# Patient Record
Sex: FEMALE | Race: WHITE | Employment: OTHER | URBAN - METROPOLITAN AREA
[De-identification: names, ages, dates, MRNs, and addresses within clinical notes are randomized per-mention and may not be internally consistent; named-entity substitution may affect disease eponyms.]

---

## 2017-04-12 PROBLEM — Z79.899 MEDICATION MANAGEMENT: Status: ACTIVE | Noted: 2017-04-12

## 2017-06-25 PROBLEM — S81.812A LACERATION OF LEFT LEG: Status: ACTIVE | Noted: 2017-06-25

## 2017-10-03 PROBLEM — Z23 NEED FOR INFLUENZA VACCINATION: Status: ACTIVE | Noted: 2017-10-03

## 2017-11-16 PROBLEM — R53.83 FATIGUE: Status: ACTIVE | Noted: 2017-11-16

## 2017-11-16 PROBLEM — R00.2 PALPITATIONS: Status: ACTIVE | Noted: 2017-11-16

## 2018-01-09 PROBLEM — Z23 NEED FOR INFLUENZA VACCINATION: Status: RESOLVED | Noted: 2017-10-03 | Resolved: 2018-01-09

## 2018-01-09 PROBLEM — Z79.899 MEDICATION MANAGEMENT: Status: RESOLVED | Noted: 2017-04-12 | Resolved: 2018-01-09

## 2018-04-05 ENCOUNTER — TELEPHONE (OUTPATIENT)
Dept: FAMILY MEDICINE CLINIC | Age: 83
End: 2018-04-05

## 2018-04-16 DIAGNOSIS — E06.9 THYROIDITIS: ICD-10-CM

## 2018-04-16 RX ORDER — LEVOTHYROXINE SODIUM 0.1 MG/1
100 TABLET ORAL DAILY
Qty: 90 TABLET | Refills: 1 | Status: SHIPPED | OUTPATIENT
Start: 2018-04-16 | End: 2018-07-03 | Stop reason: SDUPTHER

## 2018-04-18 ENCOUNTER — OFFICE VISIT (OUTPATIENT)
Dept: CARDIOLOGY CLINIC | Age: 83
End: 2018-04-18
Payer: MEDICARE

## 2018-04-18 VITALS
SYSTOLIC BLOOD PRESSURE: 100 MMHG | HEART RATE: 67 BPM | WEIGHT: 127 LBS | HEIGHT: 60 IN | DIASTOLIC BLOOD PRESSURE: 50 MMHG | BODY MASS INDEX: 24.94 KG/M2 | RESPIRATION RATE: 14 BRPM

## 2018-04-18 DIAGNOSIS — R00.2 PALPITATIONS: Primary | ICD-10-CM

## 2018-04-18 DIAGNOSIS — I48.0 PAROXYSMAL ATRIAL FIBRILLATION (HCC): ICD-10-CM

## 2018-04-18 DIAGNOSIS — I10 ESSENTIAL HYPERTENSION: ICD-10-CM

## 2018-04-18 PROCEDURE — 93000 ELECTROCARDIOGRAM COMPLETE: CPT | Performed by: INTERNAL MEDICINE

## 2018-04-18 PROCEDURE — 99214 OFFICE O/P EST MOD 30 MIN: CPT | Performed by: INTERNAL MEDICINE

## 2018-04-19 ENCOUNTER — OFFICE VISIT (OUTPATIENT)
Dept: FAMILY MEDICINE CLINIC | Age: 83
End: 2018-04-19
Payer: MEDICARE

## 2018-04-19 ENCOUNTER — HOSPITAL ENCOUNTER (OUTPATIENT)
Age: 83
Discharge: HOME | End: 2018-04-21
Payer: MEDICARE

## 2018-04-19 ENCOUNTER — HOSPITAL ENCOUNTER (OUTPATIENT)
Dept: GENERAL RADIOLOGY | Age: 83
Discharge: HOME | End: 2018-04-21
Payer: MEDICARE

## 2018-04-19 VITALS
HEIGHT: 60 IN | BODY MASS INDEX: 25.13 KG/M2 | DIASTOLIC BLOOD PRESSURE: 70 MMHG | HEART RATE: 62 BPM | SYSTOLIC BLOOD PRESSURE: 145 MMHG | RESPIRATION RATE: 16 BRPM | WEIGHT: 128 LBS

## 2018-04-19 DIAGNOSIS — F43.22 ADJUSTMENT DISORDER WITH ANXIOUS MOOD: ICD-10-CM

## 2018-04-19 DIAGNOSIS — R05.9 COUGH: Primary | ICD-10-CM

## 2018-04-19 DIAGNOSIS — K44.9 HIATAL HERNIA: ICD-10-CM

## 2018-04-19 DIAGNOSIS — R05.9 COUGH: ICD-10-CM

## 2018-04-19 DIAGNOSIS — M05.79 RHEUMATOID ARTHRITIS INVOLVING MULTIPLE SITES WITH POSITIVE RHEUMATOID FACTOR (HCC): ICD-10-CM

## 2018-04-19 DIAGNOSIS — K30 DELAYED GASTRIC EMPTYING: ICD-10-CM

## 2018-04-19 DIAGNOSIS — S82.002A CLOSED NONDISPLACED FRACTURE OF LEFT PATELLA, UNSPECIFIED FRACTURE MORPHOLOGY, INITIAL ENCOUNTER: ICD-10-CM

## 2018-04-19 DIAGNOSIS — I48.0 PAROXYSMAL ATRIAL FIBRILLATION (HCC): ICD-10-CM

## 2018-04-19 PROCEDURE — 3288F FALL RISK ASSESSMENT DOCD: CPT | Performed by: FAMILY MEDICINE

## 2018-04-19 PROCEDURE — 71046 X-RAY EXAM CHEST 2 VIEWS: CPT

## 2018-04-19 PROCEDURE — 99214 OFFICE O/P EST MOD 30 MIN: CPT | Performed by: FAMILY MEDICINE

## 2018-04-19 ASSESSMENT — PATIENT HEALTH QUESTIONNAIRE - PHQ9
SUM OF ALL RESPONSES TO PHQ QUESTIONS 1-9: 0
2. FEELING DOWN, DEPRESSED OR HOPELESS: 0
SUM OF ALL RESPONSES TO PHQ9 QUESTIONS 1 & 2: 0
1. LITTLE INTEREST OR PLEASURE IN DOING THINGS: 0

## 2018-04-20 PROBLEM — K30 DELAYED GASTRIC EMPTYING: Status: ACTIVE | Noted: 2018-04-20

## 2018-04-20 RX ORDER — BENZONATATE 200 MG/1
200 CAPSULE ORAL 3 TIMES DAILY PRN
Qty: 30 CAPSULE | Refills: 3 | Status: SHIPPED | OUTPATIENT
Start: 2018-04-20 | End: 2018-04-30

## 2018-04-20 ASSESSMENT — ENCOUNTER SYMPTOMS
HEARTBURN: 1
SHORTNESS OF BREATH: 0
NAUSEA: 1
ABDOMINAL PAIN: 1
WHEEZING: 0
VOMITING: 0
BACK PAIN: 1
BLOOD IN STOOL: 0
HEMOPTYSIS: 0
SPUTUM PRODUCTION: 0
COUGH: 1

## 2018-04-26 ENCOUNTER — TELEPHONE (OUTPATIENT)
Dept: FAMILY MEDICINE CLINIC | Age: 83
End: 2018-04-26

## 2018-04-27 DIAGNOSIS — R05.9 COUGH: Primary | ICD-10-CM

## 2018-05-11 ENCOUNTER — OFFICE VISIT (OUTPATIENT)
Dept: FAMILY MEDICINE CLINIC | Age: 83
End: 2018-05-11
Payer: MEDICARE

## 2018-05-11 VITALS
HEIGHT: 60 IN | WEIGHT: 126 LBS | OXYGEN SATURATION: 97 % | HEART RATE: 57 BPM | TEMPERATURE: 97.8 F | RESPIRATION RATE: 12 BRPM | SYSTOLIC BLOOD PRESSURE: 122 MMHG | BODY MASS INDEX: 24.74 KG/M2 | DIASTOLIC BLOOD PRESSURE: 62 MMHG

## 2018-05-11 DIAGNOSIS — M62.838 MUSCLE SPASMS OF NECK: Primary | ICD-10-CM

## 2018-05-11 PROCEDURE — 99213 OFFICE O/P EST LOW 20 MIN: CPT | Performed by: NURSE PRACTITIONER

## 2018-05-11 RX ORDER — PREDNISONE 20 MG/1
40 TABLET ORAL DAILY
Qty: 8 TABLET | Refills: 0 | Status: SHIPPED | OUTPATIENT
Start: 2018-05-11 | End: 2018-05-15

## 2018-05-11 RX ORDER — AMLODIPINE BESYLATE 2.5 MG/1
TABLET ORAL
COMMUNITY
Start: 2018-02-14 | End: 2018-08-28 | Stop reason: ALTCHOICE

## 2018-05-11 ASSESSMENT — ENCOUNTER SYMPTOMS
ABDOMINAL PAIN: 0
BACK PAIN: 1

## 2018-06-04 DIAGNOSIS — Z79.899 MEDICATION MANAGEMENT: ICD-10-CM

## 2018-06-04 RX ORDER — OMEPRAZOLE 40 MG/1
40 CAPSULE, DELAYED RELEASE ORAL DAILY
Qty: 90 CAPSULE | Refills: 3 | Status: SHIPPED | OUTPATIENT
Start: 2018-06-04 | End: 2018-12-13 | Stop reason: SDUPTHER

## 2018-06-14 ENCOUNTER — TELEPHONE (OUTPATIENT)
Dept: FAMILY MEDICINE CLINIC | Age: 83
End: 2018-06-14

## 2018-06-14 DIAGNOSIS — R19.7 DIARRHEA, UNSPECIFIED TYPE: Primary | ICD-10-CM

## 2018-06-15 ENCOUNTER — HOSPITAL ENCOUNTER (OUTPATIENT)
Age: 83
Setting detail: SPECIMEN
Discharge: HOME | End: 2018-06-15
Payer: MEDICARE

## 2018-06-15 DIAGNOSIS — R19.7 DIARRHEA, UNSPECIFIED TYPE: ICD-10-CM

## 2018-07-03 DIAGNOSIS — E06.9 THYROIDITIS: ICD-10-CM

## 2018-07-03 RX ORDER — MONTELUKAST SODIUM 10 MG/1
TABLET ORAL
Qty: 90 TABLET | Refills: 1 | Status: SHIPPED | OUTPATIENT
Start: 2018-07-03 | End: 2019-01-17 | Stop reason: SDUPTHER

## 2018-07-03 RX ORDER — LEVOTHYROXINE SODIUM 0.1 MG/1
100 TABLET ORAL DAILY
Qty: 90 TABLET | Refills: 1 | Status: SHIPPED | OUTPATIENT
Start: 2018-07-03 | End: 2019-01-17 | Stop reason: SDUPTHER

## 2018-07-19 ENCOUNTER — OFFICE VISIT (OUTPATIENT)
Dept: FAMILY MEDICINE CLINIC | Age: 83
End: 2018-07-19
Payer: MEDICARE

## 2018-07-19 VITALS
SYSTOLIC BLOOD PRESSURE: 147 MMHG | HEART RATE: 50 BPM | WEIGHT: 125 LBS | OXYGEN SATURATION: 96 % | DIASTOLIC BLOOD PRESSURE: 76 MMHG | RESPIRATION RATE: 16 BRPM | HEIGHT: 60 IN | BODY MASS INDEX: 24.54 KG/M2

## 2018-07-19 DIAGNOSIS — Z79.899 MEDICATION MANAGEMENT: ICD-10-CM

## 2018-07-19 DIAGNOSIS — Z23 NEED FOR ZOSTER VACCINATION: ICD-10-CM

## 2018-07-19 DIAGNOSIS — S82.002K CLOSED NONDISPLACED FRACTURE OF LEFT PATELLA WITH NONUNION, UNSPECIFIED FRACTURE MORPHOLOGY, SUBSEQUENT ENCOUNTER: ICD-10-CM

## 2018-07-19 DIAGNOSIS — M05.79 RHEUMATOID ARTHRITIS INVOLVING MULTIPLE SITES WITH POSITIVE RHEUMATOID FACTOR (HCC): Primary | ICD-10-CM

## 2018-07-19 DIAGNOSIS — I48.0 PAROXYSMAL ATRIAL FIBRILLATION (HCC): ICD-10-CM

## 2018-07-19 PROCEDURE — 99213 OFFICE O/P EST LOW 20 MIN: CPT | Performed by: FAMILY MEDICINE

## 2018-07-19 RX ORDER — FLUTICASONE PROPIONATE 50 MCG
2 SPRAY, SUSPENSION (ML) NASAL DAILY
Qty: 1 BOTTLE | Refills: 11 | Status: SHIPPED
Start: 2018-07-19 | End: 2021-05-19

## 2018-07-19 NOTE — PROGRESS NOTES
CC   Chief Complaint   Patient presents with    Arthritis     HPI:   Patient comes in today for the below issue(s). Fracture patella  Follow up  Is out of brace- having some issues with non-union  States was told that she will not imrpove without surgery, but she is not wanting to pursue  Ambulatory but with walker or cane  Denies falls    Atrial fibrillation  Follow-up. Chronic issue, is on chronic anticoagulation  Has some concerns about long-term use of Celebrex along with oral anticoagulant. She has tried discontinuing the Celebrex but has not been successful in allowing her to have a satisfactory level pain control. She has not had any bleeding events. She reports her pulse is stable. She denies chest pains or palpitations. No dizziness, lightheadedness, syncope, or near syncope. Rheumatoid arthritis  Follow-up chronic issue, present for years. Affecting her spine in the upper and lower extremities. Works with rheumatology. Is on some oral disease modifying agents,  Notably leucovorin and Celebrex. As mentioned above cannot stop Celebrex. NSAIDs are contraindicated as well. Tylenol is ineffective for her pain. Chronic allergic rhinitis,  Stable issue, present for years. Reports seasonal fluctuations. Presently states symptoms are well-controlled. Denies nasal congestion, wheezing, or chronic cough. Does note some postnasal drip. Review of Systems  Review of Systems   Constitutional: Positive for malaise/fatigue. Negative for chills and fever. Respiratory: Negative for cough and shortness of breath. Cardiovascular: Negative for chest pain, palpitations and leg swelling. Gastrointestinal: Negative for abdominal pain and nausea. Musculoskeletal: Positive for back pain and joint pain. Negative for falls, myalgias and neck pain. Neurological: Negative for dizziness, loss of consciousness and headaches. Psychiatric/Behavioral: Negative for depression. The patient is nervous/anxious.  The patient does not have insomnia. Past Medical History:   Diagnosis Date    C. 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; Dr Kenneth Schmidt; no plans for surgery    Neuropathy ([facility] [address]     chronic; 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     Osteoporosis     PAF (paroxysmal atrial fibrillation) (HCC)     anticoagulation per cardiology     Rheumatoid arthritis with rheumatoid factor ([facility] [address]     Rheumatoid arthritis(714.0)     Dr Bulmaro Menchaca; on [facility])    Rib fractures 12/30/2014    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 She appears well-developed and well-nourished. No distress. HENT:   Head: Normocephalic and atraumatic. Right Ear: Tympanic membrane and ear canal normal.   Left Ear: Tympanic membrane and ear canal normal.   Cardiovascular: Normal rate, regular rhythm, S1 normal, S2 normal and normal heart sounds. No murmur heard. Pulmonary/Chest: Effort normal and breath sounds normal. No respiratory distress. She has no wheezes. Abdominal: Normal appearance. Musculoskeletal:        Right knee: She exhibits decreased range of motion. Left knee: She exhibits decreased range of motion. Tenderness (Over the patella) found.         Thoracic back: Normal.   Ulnar deviation of both hands, guardian verbalizes understanding, agrees, feels comfortable with and wishes to proceed with above treatment plan. Call or go to ED immediately if symptoms worsen or persist. Advised patient to call with any new medication issues, and, as applicable, read all Rx info from pharmacy to assure aware of all possible risks and side effects of medication before taking. As applicable, educational materials and/or home exercises printed for patient's review and were included in patient instructions on his/her After Visit Summary and given to patient at the end of visit. Patient and/or guardian given opportunity to ask questions/raise concerns. She verbalized comfort and understanding of instructions. Follow Up:     Return in about 4 months (around 11/19/2018), or if symptoms worsen or fail to improve, for Follow-up chronic issues, RA. or sooner if the above issues change unexpectedly or new issues develop     This document was prepared at least partially through the use of voice recognition software. Although effort is taken to assure the accuracy of this document, it is possible that grammatical, syntax,  or spelling errors may occur.       Electronically signed by Bell Wright MD FAAFP

## 2018-07-20 ASSESSMENT — ENCOUNTER SYMPTOMS
NAUSEA: 0
COUGH: 0
SHORTNESS OF BREATH: 0
BACK PAIN: 1
ABDOMINAL PAIN: 0

## 2018-08-01 ENCOUNTER — TELEPHONE (OUTPATIENT)
Dept: FAMILY MEDICINE CLINIC | Age: 83
End: 2018-08-01

## 2018-08-01 RX ORDER — LOSARTAN POTASSIUM AND HYDROCHLOROTHIAZIDE 12.5; 5 MG/1; MG/1
1 TABLET ORAL DAILY
Qty: 30 TABLET | Refills: 11 | Status: SHIPPED | OUTPATIENT
Start: 2018-08-01 | End: 2018-08-13

## 2018-08-07 ENCOUNTER — HOSPITAL ENCOUNTER (EMERGENCY)
Age: 83
Discharge: HOME | End: 2018-08-07
Payer: MEDICARE

## 2018-08-07 VITALS
HEIGHT: 60 IN | TEMPERATURE: 97.8 F | RESPIRATION RATE: 16 BRPM | OXYGEN SATURATION: 96 % | HEART RATE: 67 BPM | WEIGHT: 125 LBS | BODY MASS INDEX: 24.54 KG/M2 | SYSTOLIC BLOOD PRESSURE: 130 MMHG | DIASTOLIC BLOOD PRESSURE: 63 MMHG

## 2018-08-07 DIAGNOSIS — S81.811A LACERATION OF RIGHT LOWER EXTREMITY, INITIAL ENCOUNTER: Primary | ICD-10-CM

## 2018-08-07 DIAGNOSIS — I10 ESSENTIAL HYPERTENSION: ICD-10-CM

## 2018-08-07 DIAGNOSIS — I48.0 PAROXYSMAL ATRIAL FIBRILLATION (HCC): ICD-10-CM

## 2018-08-07 PROCEDURE — 99282 EMERGENCY DEPT VISIT SF MDM: CPT

## 2018-08-07 PROCEDURE — 12001 RPR S/N/AX/GEN/TRNK 2.5CM/<: CPT

## 2018-08-07 ASSESSMENT — PAIN DESCRIPTION - LOCATION: LOCATION: LEG

## 2018-08-07 ASSESSMENT — PAIN DESCRIPTION - ORIENTATION: ORIENTATION: RIGHT

## 2018-08-07 ASSESSMENT — PAIN DESCRIPTION - PAIN TYPE: TYPE: ACUTE PAIN

## 2018-08-07 ASSESSMENT — PAIN SCALES - GENERAL: PAINLEVEL_OUTOF10: 4

## 2018-08-07 ASSESSMENT — PAIN DESCRIPTION - DESCRIPTORS: DESCRIPTORS: ACHING

## 2018-08-07 NOTE — ED PROVIDER NOTES
Skin:  Patient has a 3 cm V-shaped laceration noted to the right anterior shin. No active bleeding. No sign of foreign body or tendon injury. No signs of surrounding infection including cellulitis or abscess. Neurovascular: Motor deficit: none. Patient has poor range of motion noted to the right lower extremity. Sensory deficit: none. Patient has equal sensation noted to the bilateral lower extremities. Pulse deficit: none. Capillary refill: normal..  capillary refill is brisk, less than 3 seconds noted all digits on the right foot. · Gait:  normal.  · Lymphatics: No lymphangitis or adenopathy noted. · Neurological:  Oriented x3. Motor functions intact. Lab / Imaging Results   (All laboratory and radiology results have been personally reviewed by myself)  Labs:  No results found for this visit on 08/07/18. Imaging: All Radiology results interpreted by Radiologist unless otherwise noted. No orders to display     ED Course / Medical Decision Making   Medications - No data to display     Consults:   None    Procedure(s):     LACERATION REPAIR  PROCEDURE NOTE:  Unless otherwise indicated, this procedure was done or directly supervised by the ED attending. Laceration #: 1. Location: Right anterior shin  Length: 3cm. The wound area was cleansend with shur-clens and draped in a sterile fashion. The wound area was anesthetized with not required. WOUND COMPLEXITY:    Debridement: None. Undermining: None. Wound Margins Revised: no.  Flaps Aligned: yes. The wound was explored with the following results no foreign body or tendon injury seen. The wound was closed with steri strips. Dressing:  gauze was placed. Total number suture and steri-strips: 5    Patient tolerated procedure well. Patient was ambulated in the emergency department, afterwards Steri-Strips were intact.  Patient and family member was instructed on proper wound care and follow-up with primary care provider. Understanding was verbalized. MDM:     Patient presented to the emergency department today for a laceration to the right anterior shin. This is 3 cm in length, in a v shape. There is no active bleeding. No signs of infection or abscess. Patient was offered Steri-Strips or sutures. Patient chose Steri-Strips. These were applied as noted above in the procedure note. Patient tolerated this well. MSPs were intact after the procedure was completed. Patient was ambulated in the emergency department, Steri-Strips remained intact after ambulation. Patient was instructed to follow-up with her primary care provider. She was instructed on proper wound care. She was instructed to return to the ER for new or worsening symptoms. Patient was explicitly instructed on specific signs and symptoms on which to return to the emergency room for. Patient was instructed to return to the ER for any new or worsening symptoms. Additional discharge instructions were given verbally. All questions were answered. Patient is comfortable and agreeable with discharge plan. Patient in no acute distress and non-toxic in appearance. At this time the patient is without objective evidence of an acute process requiring hospitalization or inpatient management. They have remained hemodynamically stable throughout their entire ED visit and are stable for discharge with outpatient follow-up. The plan has been discussed in detail and they are aware of the specific conditions for emergent return, as well as the importance of follow-up. Counseling: The emergency provider has spoken with the patient and family and discussed todays results, in addition to providing specific details for the plan of care and counseling regarding the diagnosis and prognosis. Questions are answered at this time and they are agreeable with the plan. Assessment      1.  Laceration of right lower extremity, initial encounter      Plan   Discharge to home  Patient condition is stable    New Medications     New Prescriptions    No medications on file     Electronically signed by FRAN Walton CNP   DD: 8/7/18  **This report was transcribed using voice recognition software. Every effort was made to ensure accuracy; however, inadvertent computerized transcription errors may be present.   END OF ED PROVIDER NOTE       FRAN Walton CNP  08/07/18 [id number]

## 2018-08-10 ENCOUNTER — CARE COORDINATION (OUTPATIENT)
Dept: CARE COORDINATION | Age: 83
End: 2018-08-10

## 2018-08-13 ENCOUNTER — TELEPHONE (OUTPATIENT)
Dept: FAMILY MEDICINE CLINIC | Age: 83
End: 2018-08-13

## 2018-08-13 RX ORDER — LOSARTAN POTASSIUM AND HYDROCHLOROTHIAZIDE 12.5; 1 MG/1; MG/1
1 TABLET ORAL DAILY
Qty: 30 TABLET | Refills: 11 | Status: SHIPPED | OUTPATIENT
Start: 2018-08-13 | End: 2018-08-16

## 2018-08-13 NOTE — TELEPHONE ENCOUNTER
Med ERx'd. Will need to know BP readings within 1 week on the new dose. Call if new issues in the meantime.

## 2018-08-15 ENCOUNTER — TELEPHONE (OUTPATIENT)
Dept: FAMILY MEDICINE CLINIC | Age: 83
End: 2018-08-15

## 2018-08-15 NOTE — TELEPHONE ENCOUNTER
Patient called in and stated that she went to  medication yesterday and noticed her Hyzaar is 12.5mg   Patient stated that she is usually always on 25mg and would like to know if this is an error. Patient stated that she is having trouble with leg swelling and would like to know if being on this different dosage could be causing this.     Please advise  Thanks

## 2018-08-15 NOTE — TELEPHONE ENCOUNTER
Called and spoke to patient. She is getting confused about medication. I attempted to explain it to her and she seemed to understand it a little better, but not completely. Patient would like to go ahead with the increase to 25 mg - she advised that she will monitor BP at home. \  She would also like to know if she still needs to be on the 100 or if she can go back down to the 50?     Please advise  Thanks

## 2018-08-16 RX ORDER — LOSARTAN POTASSIUM AND HYDROCHLOROTHIAZIDE 25; 100 MG/1; MG/1
1 TABLET ORAL DAILY
Qty: 30 TABLET | Refills: 3 | Status: SHIPPED | OUTPATIENT
Start: 2018-08-16 | End: 2018-12-13

## 2018-08-16 NOTE — TELEPHONE ENCOUNTER
Patient notified, she had the 100/12.5 mg at home. I called [facility] to verify that this is what patient received. They said yes, but they have the 100/25 mg ready for patient to . I called patient back, she will  the new dose and start it Friday. She will continue to moniter her BP and call the office next Friday with the readings.

## 2018-08-16 NOTE — TELEPHONE ENCOUNTER
The Hyzaar only comes with the 25 mg diuretic at the 100 mg dose. So I sent in the 100/25 mg dose. Take that and continue to monitor her BP.

## 2018-08-28 ENCOUNTER — OFFICE VISIT (OUTPATIENT)
Dept: NON INVASIVE DIAGNOSTICS | Age: 83
End: 2018-08-28
Payer: MEDICARE

## 2018-08-28 VITALS
HEIGHT: 60 IN | HEART RATE: 56 BPM | DIASTOLIC BLOOD PRESSURE: 68 MMHG | RESPIRATION RATE: 16 BRPM | BODY MASS INDEX: 24.15 KG/M2 | WEIGHT: 123 LBS | SYSTOLIC BLOOD PRESSURE: 110 MMHG

## 2018-08-28 DIAGNOSIS — I51.89 DIASTOLIC DYSFUNCTION: ICD-10-CM

## 2018-08-28 DIAGNOSIS — I48.0 PAROXYSMAL ATRIAL FIBRILLATION (HCC): Primary | ICD-10-CM

## 2018-08-28 DIAGNOSIS — G47.33 OSA (OBSTRUCTIVE SLEEP APNEA): ICD-10-CM

## 2018-08-28 DIAGNOSIS — I10 ESSENTIAL HYPERTENSION: ICD-10-CM

## 2018-08-28 PROCEDURE — 93000 ELECTROCARDIOGRAM COMPLETE: CPT | Performed by: INTERNAL MEDICINE

## 2018-08-28 PROCEDURE — 99213 OFFICE O/P EST LOW 20 MIN: CPT | Performed by: INTERNAL MEDICINE

## 2018-08-28 RX ORDER — ONDANSETRON 4 MG/1
4 TABLET, FILM COATED ORAL EVERY 8 HOURS PRN
COMMUNITY
End: 2019-07-23 | Stop reason: SDUPTHER

## 2018-08-28 NOTE — PROGRESS NOTES
atrium appears to be enlarged.      Mitral Valve   Normal mitral leaflet structure and coaptation with no more than trace   (physiologic) mitral insufficiency.      Tricuspid Valve   Normal tricuspid valve structure.   Mild tricuspid insufficiency noted.   Right ventricular systolic pressure is mildly elevated at 40 mmHg.      Aortic Valve   The aortic valve is trileaflet.   The aortic valve appears moderately sclerotic.   Mild aortic regurgitation is noted.   No hemodynamically significant aortic stenosis is present.      Pulmonic Valve   Normal pulmonic valve structure and function. Physiologic pulmonic   insufficiency is present.      Pericardial Effusion   No evidence for hemodynamically significant pericardial effusion.      Aorta   Normal aortic root and ascending aorta.   Miscellaneous   The inferior vena cava diameter is normal with normal respiratory   variation.      Conclusions      Summary   The patient appeared to be in sinus rhythm during the examination.   Left ventricular internal dimensions, wall thickness, regional wall motion   and systolic function are normal.   The ejection fraction is estimated to be 68% by the biplane method of   disks.   There is Doppler evidence for stage I diastolic dysfunction.   The left atrium is mildly enlarged as determined by the left atrial volume   index.   The aortic valve appears moderately sclerotic.   Mild aortic regurgitation is noted.   No hemodynamically significant aortic stenosis is present.   Mild tricuspid insufficiency noted.   Right ventricular systolic pressure is mildly elevated at 40 mmHg.      Signature      ----------------------------------------------------------------   Electronically signed by Areli Bentley MD(Interpreting   physician) on 05/02/2017 06:02 PM      Assessment: Nicole Chen is a 80 y.o., female with a history of HTN, OA, hypothyroidism, mild PVD/CAD, diastolic CHF, moderate AI who has paroxysmal AF.  Initial evaluation 5/2016 and started on Flecainide. She is currently in SR and continues to report ongoing fatigue but has repeatedly refused CPAP.       1. Paroxysmal atrial fibrillation  - discovered 2011   - highly symptomatic   - CHADSVASC= 5; recently switched to Xarelto 15 mg; she states she is tolerating well   - current medical therapy includes BB  - No prior DCCV or ablations  - Flecainide started 5/23/2016  - tolerating Flecainide at 50 mg BID; in SB today,  QRS stable   - reports one time episode; see above   -  Re-education on importance of well controlled HTN (goal BP < 130/80), adequate weight control (goal BMI of < 27), physical activity consisting of moderate cardiopulmonary exercise up to a goal of 250 min/wk, daily compliance with CPAP in treating sleep apena, smoking cessation and limited ETOH intake. 2. JANETTE  - sleep study 6/2016: severe sleep apnea   - Defers CPAP at this time and has not followed up as directed. - continues to refuse CPAP   - I STRONGLY emphasized the health implications for untreated JANETTE which include but are NOT limited to: heart failure, worsening arrhythmias, and difficult to control HTN  - I also emphasized that CPAP is NOT the only therapy for JANETTE, and that follow up with sleep medicine COULD help elucidate alternative therapies, she states she willl consider      3. HTN  - well controlled  - continue current medications      4. Chronic diastolic CHF  - follows with Dr. Asiya Weiner  - continue PRN diuretics      5. Hypothyroidism  -   Lab Results   Component Value Date    TSH 3.440 11/16/2017   - on supplementation        6. OA  - discussed risks using NSAIDS in conjunction with warfarin, we did discuss consideration of NOACs, but no true head-head data regarding NOACs on pts also taking NSAIDS  - generally would DISCOURAGE concomitant use of NSAIDS when taking OAC, however depends on her tolerance/clinical need  - defer to Dr. Luigi Francisco  7.  Moderate AI  -  followed by

## 2018-09-05 ENCOUNTER — OFFICE VISIT (OUTPATIENT)
Dept: CARDIOLOGY CLINIC | Age: 83
End: 2018-09-05
Payer: MEDICARE

## 2018-09-05 VITALS
RESPIRATION RATE: 14 BRPM | HEART RATE: 58 BPM | BODY MASS INDEX: 23.95 KG/M2 | WEIGHT: 122 LBS | HEIGHT: 60 IN | SYSTOLIC BLOOD PRESSURE: 110 MMHG | DIASTOLIC BLOOD PRESSURE: 70 MMHG

## 2018-09-05 DIAGNOSIS — I10 ESSENTIAL HYPERTENSION: ICD-10-CM

## 2018-09-05 DIAGNOSIS — I51.89 DIASTOLIC DYSFUNCTION: ICD-10-CM

## 2018-09-05 DIAGNOSIS — I48.0 PAROXYSMAL ATRIAL FIBRILLATION (HCC): Primary | ICD-10-CM

## 2018-09-05 PROCEDURE — 93000 ELECTROCARDIOGRAM COMPLETE: CPT | Performed by: INTERNAL MEDICINE

## 2018-09-05 PROCEDURE — 99214 OFFICE O/P EST MOD 30 MIN: CPT | Performed by: INTERNAL MEDICINE

## 2018-09-05 NOTE — PROGRESS NOTES
right heart chambers. 13. Ambulatory monitor demonstrated nonsustained SVT at 150 bpm, correlating with symptoms, metoprolol dose doubled to 50 mg bid, 01/04/2007. 14. Bilateral knee replacements 06/2007, Dr. Jenniffer Cm. Well tolerated. 15. Dipyridamole stress MPS, 11/10/2008. No TID, normal perfusion, mild soft tissue attenuation, EF 89%. No ischemia. Low risk/low probability. 16. Echo, 11/10/2008. Stage I diastolic dysfunction, borderline/mild LAE. EF normal. Mild-moderate AR. Mild PHTN with RVSP 43.    17. Large hiatal hernia requiring Nissen fundoplication, [facility], 06/2011.    18. Rheumatoid arthritis documented Fall 2010. Patient being treated with methotrexate. 19. Echo, 05/23/2011. Normal LV size, wall thickening and EF. Stage I diastolic dysfunction. Normal LV filling pressures. Mild ANDREAS. MAC with mild MR, mild-moderate TR, moderately elevated RVSP 50-55 mmHg.    20. Lexiscan MPS, 05/25/2011. Normal. No TID. Wall motion normal, EF above 70%.    21. [facility] admission, 06/02/2011 with anorexia, nausea, weight loss, dyspnea. EKG sinus rhythm, borderline low voltage, minimal nonspecific ST-T abnormalities. , BMP normal. Hb 10, WBC and platelet count normal. T4 elevated at 13, free T4 elevated at 2.16, TSH low at 0.079.    22. Periop AF while at [facility] for Nissen fundoplication. Treated with amiodarone. 23. Sharp chest pain, 07/2011. CT chest negative for PE.    24. 24-hour Holter monitor, 11/07/2012. Sinus rhythm with occasional isolated PVC's, frequent PAC's and several short runs of SVT up to 31 beats in duration. No prolonged pauses and no symptoms.    25. [facility] admission, 02/04/2013 with two to three weeks of worsening diarrhea. She had been on OP antibiotic therapy for a nasal infection. CBC, LFT's, BMP normal except Cr 1.4. CXR negative except for mild cardiomegaly. EKG revealed sinus rhythm with possible old inferior infarction. Cardiac enzymes negative.    26.  Chronic back pain and leg pain which and hematuria  Derm: negative for rash and skin lesion(s)  Neurological: negative for seizures and tremors  Endocrine: negative for diabetic symptoms including polydipsia and polyuria  Musculoskeletal: Patient has severe pain and swelling in her left knee. Psychiatric: negative for anxiety and major depression.      The remainder of the review of systems is negative except as noted above. On exam, she is an elderly white female who is awake, alert and oriented. P: 58 and regular. BP: 110/70. Wt. 122 lbs. BMI: 23.8. She is 5 lbs. Less than she weighed in 04/2018. Her neck is supple. She has no jugular distention or hepatojugular reflux. Carotids are full. Respirations are unlabored. Her chest is clear. She has no presacral edema or chest wall tenderness. Her heart has a regular rhythm with an S4 gallop, but no S3 or obvious murmur. The PMI is not displaced. There is no precordial heave, lift or thrill. Her abdomen is soft and normally active. Her extremities showed trace edema bilaterally. She does have a wound on her right shin that is bandaged. I reviewed her electrocardiogram.  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 There is no change from 08/28/2018. From a cardiac standpoint, she does seem to be stable. I agree that she should try to explore her options for management of obstructive sleep apnea more thoroughly. This may help prevent future episodes of atrial fibrillation. I did also encourage her to see a Wound Care specialist for her right shin. I am not making any changes in her cardiac medications now.  She will continue:   ondansetron (ZOFRAN) 4 MG tablet Take 4 mg by mouth every 8 hours as needed for Nausea or Vomiting   losartan-hydrochlorothiazide (HYZAAR) 100-25 MG per tablet Take 1 tablet by mouth daily   metoprolol tartrate (LOPRESSOR) 25 MG tablet Take 0.5

## 2018-09-05 NOTE — LETTER
[facility]  [address]  [place] [address]  Phone: [phone]  Fax: [phone]    Negin Mcgregor MD        September 5, 2018     Patient: Glenroy Smith   YOB: 1935   Date of Visit: 9/5/2018       To Whom It May Concern: It is my medical opinion that Meenakshi Mcdermott is unable to walk to her mailbox. If someone could deliver her mail to her door. If you have any questions or concerns, please don't hesitate to call.     Sincerely,        Negin Mcgregor MD

## 2018-09-12 ENCOUNTER — HOSPITAL ENCOUNTER (OUTPATIENT)
Dept: WOUND CARE | Age: 83
Discharge: HOME | End: 2018-09-12
Payer: MEDICARE

## 2018-09-12 ENCOUNTER — HOSPITAL ENCOUNTER (OUTPATIENT)
Age: 83
Discharge: HOME | End: 2018-09-14
Payer: MEDICARE

## 2018-09-12 VITALS
HEIGHT: 60 IN | DIASTOLIC BLOOD PRESSURE: 60 MMHG | TEMPERATURE: 98.2 F | BODY MASS INDEX: 24.15 KG/M2 | HEART RATE: 60 BPM | RESPIRATION RATE: 16 BRPM | WEIGHT: 123 LBS | SYSTOLIC BLOOD PRESSURE: 116 MMHG

## 2018-09-12 PROCEDURE — 87070 CULTURE OTHR SPECIMN AEROBIC: CPT

## 2018-09-12 PROCEDURE — 11042 DBRDMT SUBQ TIS 1ST 20SQCM/<: CPT

## 2018-09-12 PROCEDURE — 99213 OFFICE O/P EST LOW 20 MIN: CPT

## 2018-09-12 PROCEDURE — 87075 CULTR BACTERIA EXCEPT BLOOD: CPT

## 2018-09-14 LAB
ANAEROBIC CULTURE: NORMAL
ORGANISM: ABNORMAL
WOUND/ABSCESS: ABNORMAL
WOUND/ABSCESS: ABNORMAL

## 2018-09-19 ENCOUNTER — HOSPITAL ENCOUNTER (OUTPATIENT)
Dept: WOUND CARE | Age: 83
Discharge: HOME | End: 2018-09-19
Payer: MEDICARE

## 2018-09-19 VITALS
BODY MASS INDEX: 24.02 KG/M2 | HEART RATE: 68 BPM | DIASTOLIC BLOOD PRESSURE: 62 MMHG | WEIGHT: 123 LBS | RESPIRATION RATE: 16 BRPM | TEMPERATURE: 98.2 F | SYSTOLIC BLOOD PRESSURE: 118 MMHG

## 2018-09-19 PROCEDURE — 11042 DBRDMT SUBQ TIS 1ST 20SQCM/<: CPT

## 2018-09-19 NOTE — PROGRESS NOTES
[facility] Followup Visit Note    Referring Physician : MD Brigette Rogel 57 RECORD NUMBER:  [id number]  AGE: 80 y.o. GENDER: female  : 1935  EPISODE DATE:  2018    Subjective:     Chief Complaint   Patient presents with    Wound Check     Rt leg      HISTORY of PRESENT ILLNESS NOEL Amaya is a 80 y.o. female who presents today for wound/ulcer evaluation.    History of Wound Context:  1 week     Wound/Ulcer Pain Timing/Severity: intermittent  Quality of pain: aching  Severity:  2 / 10   Modifying Factors: Pain worsens with walking  Associated Signs/Symptoms: edema    Ulcer Identification:  Ulcer Type: venous  Contributing Factors: edema and venous stasis    Diabetic/Pressure/Non Pressure Ulcers only:  Ulcer: Non-Pressure ulcer, fat layer exposed    Wound: N/A        PAST MEDICAL HISTORY      Diagnosis Date    Atrial fibrillation (HCC)     C. 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; Dr Sarthak Horne; no plans for surgery    Neuropathy     chronic; 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     Osteoporosis     PAF (paroxysmal atrial fibrillation) (HCC)     anticoagulation per cardiology     Rheumatoid arthritis with rheumatoid factor ([facility] [address]     Rheumatoid arthritis(714.0)     Dr Ciera Escobedo; on DMD Rexann Honey)    Rib fractures 2014    Skin cancer of lip     squamous cell    Staph infection 2017    left ankle    Tachyarrhythmia     PSVT; later A-fib; sees Dr Christophe Abel      Past Surgical History:   Procedure Laterality Date    CARDIAC CATHETERIZATION  CARPAL TUNNEL RELEASE       SECTION      COLONOSCOPY  2011    Dr Violeta Olivas; diverticular disease; repeat in 2016    GASTRIC FUNDOPLICATION  [address], TOTAL ABDOMINAL      TOTAL KNEE ARTHROPLASTY  2007    bilateral     Family History   Problem Relation Age of Onset    Heart Attack Mother 58    Other Father         suicide, depression, leg trouble    Other Sister         Dementia    Hypertension Sister     Hypertension Sister     Hypertension Brother     Thyroid Disease Sister     Rheum Arthritis Sister     Rheum Arthritis Brother     Other Daughter         hemochromatosis     Other Son         hemachromatosis      Social History   Substance Use Topics    Smoking status: Never Smoker    Smokeless tobacco: Never Used    Alcohol use No     Allergies   Allergen Reactions    Milk-Related Compounds Nausea And Vomiting     Current Outpatient Prescriptions on File Prior to Encounter   Medication Sig Dispense Refill    mupirocin (BACTROBAN) 2 % ointment Apply daily.  1 Tube 2    losartan-hydrochlorothiazide (HYZAAR) 100-25 MG per tablet Take 1 tablet by mouth daily 30 tablet 3    metoprolol tartrate (LOPRESSOR) 25 MG tablet Take 0.5 tablets by mouth 2 times daily 90 tablet 3    fluticasone (FLONASE) 50 MCG/ACT nasal spray 2 sprays by Nasal route daily 2 sprays in each nostril daily 1 Bottle 11    levothyroxine (LEVOTHROID) 100 MCG tablet Take 1 tablet by mouth Daily 90 tablet 1    montelukast (SINGULAIR) 10 MG tablet TAKE 1 TABLET NIGHTLY 90 tablet 1    omeprazole (PRILOSEC) 40 MG delayed release capsule Take 1 capsule by mouth daily 90 capsule 3    flecainide (TAMBOCOR) 50 MG tablet Take 1 tablet by mouth 2 times daily 180 tablet 3    rivaroxaban (XARELTO) 15 MG TABS tablet Take 1 tablet by mouth daily (with breakfast) 90 tablet 3    celecoxib (CELEBREX) 200 MG capsule TAKE 1 CAPSULE DAILY 90 capsule 3    clobetasol (TEMOVATE) 0.05 % cream Apply topically 2 times daily (Patient taking differently: Apply topically as needed ) 1 Tube 1    Probiotic Product (ACIDOPHILUS PROBIOTIC) CAPS capsule Take 1 capsule by mouth daily      fexofenadine (ALLEGRA) 180 MG tablet Take 180 mg by mouth daily.  leucovorin calcium (WELLCOVORIN) 5 MG tablet Take 25 mg by mouth once a week.  polyethyl glycol-propyl glycol 0.4-0.3 % (SYSTANE) 0.4-0.3 % ophthalmic solution Place 1 drop into both eyes as needed.  methotrexate 2.5 MG tablet Take by mouth once a week 2 tabs at lunch and 4 tabs  in the pm once a week on Friday      ondansetron (ZOFRAN) 4 MG tablet Take 4 mg by mouth every 8 hours as needed for Nausea or Vomiting      docusate sodium (COLACE, DULCOLAX) 100 MG CAPS Take 100 mg by mouth 2 times daily. (Patient taking differently: Take 100 mg by mouth as needed ) 60 capsule 0     No current facility-administered medications on file prior to encounter. REVIEW OF SYSTEMS See HPI    Objective:    /62   Pulse 68   Temp 98.2 °F (36.8 °C) (Oral)   Resp 16   Wt 123 lb (55.8 kg)   BMI 24.02 kg/m²   Wt Readings from Last 3 Encounters:   09/19/18 123 lb (55.8 kg)   09/12/18 123 lb (55.8 kg)   09/05/18 122 lb (55.3 kg)     PHYSICAL EXAM  CONSTITUTIONAL:   Awake, alert, cooperative   EYES:  lids and lashes normal   ENT: external ears and nose without lesions   NECK:  supple, symmetrical, trachea midline   SKIN:  Open wound Present    Assessment:     Problem List Items Addressed This Visit     None        Procedure Note  Indications:  Based on my examination of this patient's wound(s)/ulcer(s) today, debridement is required to promote healing and evaluate the wound base.     Performed by: Floresita Briones DPM    Consent obtained:  Yes    Time out taken:  Yes    Pain Control: Anesthetic  Anesthetic: 2% Lidocaine Gel Topical     Debridement:Excisional Debridement    Using #15 blade scalpel the wound(s)/ulcer(s) was/were sharply debrided down through and including the removal of subcutaneous tissue. Devitalized Tissue Debrided:  fibrin, biofilm and slough to stimulate bleeding to promote healing, post debridement good bleeding base and wound edges noted    Pre Debridement Measurements:  Are located in the [facility]  Documentation Flow Sheet    Wound/Ulcer #: 5    Post Debridement Measurements:  Wound/Ulcer Descriptions are Pre Debridement except measurements:    Wound 12/23/15 Other (Comment) Leg Left;Lateral #3 lt lateral calf  onset  12/22/15  trauma (Active)   Dressing Status Clean;Dry; Intact 1/20/2016 10:25 AM   Dressing Changed Changed/New 1/20/2016 10:25 AM   Wound Cleansed Rinsed/Irrigated with saline 1/20/2016 10:25 AM   Wound Length (cm) 1 cm 1/20/2016 10:25 AM   Wound Width (cm) 1 cm 1/20/2016 10:25 AM   Wound Depth (cm)  0.1 1/20/2016 10:25 AM   Calculated Wound Size (cm^2) (l*w) 1 cm^2 1/20/2016 10:25 AM   Change in Wound Size % (l*w) 93.65 1/20/2016 10:25 AM   Wound Assessment Red;Yellow;Black 1/20/2016 10:00 AM   Drainage Amount Small 1/20/2016 10:00 AM   Drainage Description Yellow; Tan 1/20/2016 10:00 AM   Odor None 1/20/2016 10:00 AM   Jaycee-wound Assessment Dry; Intact;Dark edges 1/20/2016 10:00 AM   Red%Wound Bed 80 1/20/2016 10:00 AM   Yellow%Wound Bed 20 1/20/2016 10:00 AM   Black%Wound Bed 20 12/30/2015 10:57 AM   Culture Taken Yes 12/30/2015 11:36 AM   Debridement per physician Subcutaneous 1/20/2016 10:25 AM   Time out Yes 1/20/2016 10:25 AM   Procedural Pain 3 1/20/2016 10:25 AM   Post procedural Pain 1 1/20/2016 10:25 AM   Number of days: 1000       Wound 08/09/17 Skin tear Leg Left;Lateral wound # 4 acquired 6/11/17 (Active)   Wound Type Wound 9/20/2017 11:36 AM   Wound Venous 9/20/2017 11:36 AM   Dressing Status Changed 10/11/2017 12:16 PM   Dressing Changed Changed/New 10/11/2017 12:16 PM   Wound Cleansed Rinsed/Irrigated with saline 10/11/2017 12:16 PM   Wound Length (cm) 0 cm 10/18/2017 11:55 AM   Wound Width (cm) 0 cm 10/18/2017 11:55 AM   Wound Depth (cm)  0 10/18/2017 11:55 AM   Calculated Wound Size (cm^2) (l*w) 0 cm^2 10/18/2017 11:55 AM   Change in Wound Size % (l*w) 100 10/18/2017 11:55 AM   Wound Assessment Epithelialization 10/18/2017 11:07 AM   Drainage Amount None 10/18/2017 11:07 AM   Drainage Description Sanguinous 10/11/2017 11:53 AM   Odor None 10/11/2017 11:53 AM   Jaycee-wound Assessment Intact 10/18/2017 11:07 AM   Culture Taken Yes 8/9/2017 11:27 AM   Debridement per physician Full thickness 10/11/2017 11:53 AM   Time out Yes 10/11/2017 11:53 AM   Procedural Pain 0 10/11/2017 11:53 AM   Post procedural Pain 0 10/11/2017 11:53 AM   Number of days: 406       Wound 09/12/18 Venous ulcer Leg Right; Lower #5 acq 8/6/18 (Active)   Wound Image   9/12/2018 10:30 AM   Wound Type Wound 9/12/2018 10:30 AM   Wound Venous 9/12/2018 10:30 AM   Dressing Status Dry;Clean; Intact 9/12/2018 11:15 AM   Dressing Changed Changed/New 9/12/2018 11:15 AM   Wound Cleansed Rinsed/Irrigated with saline 9/12/2018 11:15 AM   Wound Length (cm) 2 cm 9/19/2018 11:10 AM   Wound Width (cm) 1.2 cm 9/19/2018 11:10 AM   Wound Depth (cm)  .4 9/19/2018 11:10 AM   Calculated Wound Size (cm^2) (l*w) 2.4 cm^2 9/19/2018 11:10 AM   Change in Wound Size % (l*w) -20 9/19/2018 11:10 AM   Wound Assessment Red;Pink 9/19/2018 11:10 AM   Drainage Amount Moderate 9/19/2018 11:10 AM   Drainage Description Serosanguinous 9/19/2018 11:10 AM   Odor None 9/19/2018 11:10 AM   Jaycee-wound Assessment Pink 9/19/2018 11:10 AM   Time out Yes 9/12/2018 11:03 AM   Number of days: 7     Percent of Wound/Ulcer Debrided: 100%    Total Surface Area Debrided:  2.5 sq cm     Estimated Blood Loss:  Minimal  Hemostasis Achieved:  by pressure    Procedural Pain:  2  / 10   Post Procedural Pain:  3 / 10     Response to treatment:  Well tolerated by patient.      Plan:   Treatment Note please see attached Discharge Instructions    Written patient dismissal instructions given to patient and signed by patient or POA. Discharge Instructions       Visit Discharge/Physician Orders     Discharge condition: Stable     Assessment of pain at discharge:  \"having some tenderness, ok.\"     Anesthetic used: 2% lidocaine     Discharge to: Home     Left via:Private automobile     Accompanied by: accompanied by self     ECF/[facility]: [facility] for dressing supplies     Dressing Orders:  Cleanse right leg ulcer with normal saline. Apply Bactroban (also known as Muporicin) to wound. Cover with dry dressing. Change every day  In Clinic:  Right leg ulcer cleansed with normal saline. Apply Paulette moistened with normal saline. Cover with dry dressing to secure. Patient to wear single layer tubigrip during the day. Hand wash and hand dry tubigrip only     Take good multiple vitamin daily  Consume diet high in lean protein every day, about 40 grams     Treatment Orders:       [address] [place] [address],[address] followup visit __________one week___________________  (Please note your next appointment above and if you are unable to keep, kindly give a 24 hour notice. Thank you.)    Physician signature:__________________________      If you experience any of the following, please call the [facility] during business hours:    * Increase in Pain  * Temperature over 101  * Increase in drainage from your wound  * Drainage with a foul odor  * Bleeding  * Increase in swelling  * Need for compression bandage changes due to slippage, breakthrough drainage. If you need medical attention outside of the business hours of the [facility] please contact your PCP or go to the nearest emergency room.         Electronically signed by Floresita Briones DPM on 9/19/2018 at 11:26 AM

## 2018-09-19 NOTE — PLAN OF CARE
Problem: Wound:  Goal: Will show signs of wound healing; wound closure and no evidence of infection  Will show signs of wound healing; wound closure and no evidence of infection    Outcome: Ongoing

## 2018-09-26 ENCOUNTER — HOSPITAL ENCOUNTER (OUTPATIENT)
Dept: WOUND CARE | Age: 83
Discharge: HOME | End: 2018-09-26
Payer: MEDICARE

## 2018-09-26 VITALS
HEART RATE: 68 BPM | WEIGHT: 123 LBS | BODY MASS INDEX: 24.02 KG/M2 | RESPIRATION RATE: 16 BRPM | SYSTOLIC BLOOD PRESSURE: 120 MMHG | TEMPERATURE: 98 F | DIASTOLIC BLOOD PRESSURE: 64 MMHG

## 2018-09-26 PROCEDURE — 11042 DBRDMT SUBQ TIS 1ST 20SQCM/<: CPT

## 2018-09-26 PROCEDURE — 11045 DBRDMT SUBQ TISS EACH ADDL: CPT

## 2018-09-26 NOTE — PROGRESS NOTES
Wound Healing Center Followup Visit Note    Referring Physician : MD DANTE Leachshaw French 57 RECORD NUMBER:  [id number]  AGE: 80 y.o. GENDER: female  : 1935  EPISODE DATE:  2018    Subjective:     Chief Complaint   Patient presents with    Wound Check     rt leg      HISTORY of PRESENT ILLNESS HPI   Petra Weiss is a 80 y.o. female who presents today for wound/ulcer evaluation.    History of Wound Context:  1 month      Wound/Ulcer Pain Timing/Severity: intermittent  Quality of pain: aching  Severity:  2 / 10   Modifying Factors: Pain worsens with walking  Associated Signs/Symptoms: edema, erythema and drainage    Ulcer Identification:  Ulcer Type: venous  Contributing Factors: edema and venous stasis    Diabetic/Pressure/Non Pressure Ulcers only:  Ulcer: Non-Pressure ulcer, fat layer exposed    Wound: N/A        PAST MEDICAL HISTORY      Diagnosis Date    Atrial fibrillation (HCC)     C. 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; Dr Negin Richmond; no plans for surgery    Neuropathy     chronic; 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     Osteoporosis     PAF (paroxysmal atrial fibrillation) (HCC)     anticoagulation per cardiology     Rheumatoid arthritis with rheumatoid factor ([facility] [address]     Rheumatoid arthritis(714.0)     Dr Dayron Bobo; on DMD Ricci Blake)    Rib fractures 2014    Skin cancer of lip     squamous cell    Staph infection 2017    left ankle    Tachyarrhythmia     PSVT; later A-fib; sees Dr Mabel Johnson      Past Surgical History:   Procedure Laterality Date  CARDIAC CATHETERIZATION      CARPAL TUNNEL RELEASE       SECTION      COLONOSCOPY  2011    Dr Nick Perez; diverticular disease; repeat in 2016    GASTRIC FUNDOPLICATION  [address], TOTAL ABDOMINAL      TOTAL KNEE ARTHROPLASTY  2007    bilateral     Family History   Problem Relation Age of Onset    Heart Attack Mother 58    Other Father         suicide, depression, leg trouble    Other Sister         Dementia    Hypertension Sister     Hypertension Sister     Hypertension Brother     Thyroid Disease Sister     Rheum Arthritis Sister     Rheum Arthritis Brother     Other Daughter         hemochromatosis     Other Son         hemachromatosis      Social History   Substance Use Topics    Smoking status: Never Smoker    Smokeless tobacco: Never Used    Alcohol use No     Allergies   Allergen Reactions    Milk-Related Compounds Nausea And Vomiting     Current Outpatient Prescriptions on File Prior to Encounter   Medication Sig Dispense Refill    losartan-hydrochlorothiazide (HYZAAR) 100-25 MG per tablet Take 1 tablet by mouth daily 30 tablet 3    metoprolol tartrate (LOPRESSOR) 25 MG tablet Take 0.5 tablets by mouth 2 times daily 90 tablet 3    fluticasone (FLONASE) 50 MCG/ACT nasal spray 2 sprays by Nasal route daily 2 sprays in each nostril daily 1 Bottle 11    levothyroxine (LEVOTHROID) 100 MCG tablet Take 1 tablet by mouth Daily 90 tablet 1    montelukast (SINGULAIR) 10 MG tablet TAKE 1 TABLET NIGHTLY 90 tablet 1    omeprazole (PRILOSEC) 40 MG delayed release capsule Take 1 capsule by mouth daily 90 capsule 3    flecainide (TAMBOCOR) 50 MG tablet Take 1 tablet by mouth 2 times daily 180 tablet 3    rivaroxaban (XARELTO) 15 MG TABS tablet Take 1 tablet by mouth daily (with breakfast) 90 tablet 3    celecoxib (CELEBREX) 200 MG capsule TAKE 1 CAPSULE DAILY 90 capsule 3    clobetasol (TEMOVATE) 0.05 % cream Apply topically 2 times daily (Patient subcutaneous tissue. Devitalized Tissue Debrided:  fibrin, biofilm and slough to stimulate bleeding to promote healing, post debridement good bleeding base and wound edges noted    Pre Debridement Measurements:  Are located in the [facility]  Documentation Flow Sheet    Wound/Ulcer #: 5    Post Debridement Measurements:  Wound/Ulcer Descriptions are Pre Debridement except measurements:    Wound 12/23/15 Other (Comment) Leg Left;Lateral #3 lt lateral calf  onset  12/22/15  trauma (Active)   Dressing Status Clean;Dry; Intact 1/20/2016 10:25 AM   Dressing Changed Changed/New 1/20/2016 10:25 AM   Wound Cleansed Rinsed/Irrigated with saline 1/20/2016 10:25 AM   Wound Length (cm) 1 cm 1/20/2016 10:25 AM   Wound Width (cm) 1 cm 1/20/2016 10:25 AM   Wound Depth (cm)  0.1 1/20/2016 10:25 AM   Calculated Wound Size (cm^2) (l*w) 1 cm^2 1/20/2016 10:25 AM   Change in Wound Size % (l*w) 93.65 1/20/2016 10:25 AM   Wound Assessment Red;Yellow;Black 1/20/2016 10:00 AM   Drainage Amount Small 1/20/2016 10:00 AM   Drainage Description Yellow; Tan 1/20/2016 10:00 AM   Odor None 1/20/2016 10:00 AM   Jaycee-wound Assessment Dry; Intact;Dark edges 1/20/2016 10:00 AM   Red%Wound Bed 80 1/20/2016 10:00 AM   Yellow%Wound Bed 20 1/20/2016 10:00 AM   Black%Wound Bed 20 12/30/2015 10:57 AM   Culture Taken Yes 12/30/2015 11:36 AM   Debridement per physician Subcutaneous 1/20/2016 10:25 AM   Time out Yes 1/20/2016 10:25 AM   Procedural Pain 3 1/20/2016 10:25 AM   Post procedural Pain 1 1/20/2016 10:25 AM   Number of days: 1008       Wound 08/09/17 Skin tear Leg Left;Lateral wound # 4 acquired 6/11/17 (Active)   Wound Type Wound 9/20/2017 11:36 AM   Wound Venous 9/20/2017 11:36 AM   Dressing Status Changed 10/11/2017 12:16 PM   Dressing Changed Changed/New 10/11/2017 12:16 PM   Wound Cleansed Rinsed/Irrigated with saline 10/11/2017 12:16 PM   Wound Length (cm) 0 cm 10/18/2017 11:55 AM   Wound Width (cm) 0 cm 10/18/2017 11:55 AM   Wound Depth

## 2018-10-03 ENCOUNTER — HOSPITAL ENCOUNTER (OUTPATIENT)
Dept: WOUND CARE | Age: 83
Discharge: HOME | End: 2018-10-03
Payer: MEDICARE

## 2018-10-03 VITALS
HEART RATE: 66 BPM | SYSTOLIC BLOOD PRESSURE: 120 MMHG | TEMPERATURE: 97.9 F | DIASTOLIC BLOOD PRESSURE: 68 MMHG | RESPIRATION RATE: 16 BRPM

## 2018-10-03 PROCEDURE — 11042 DBRDMT SUBQ TIS 1ST 20SQCM/<: CPT

## 2018-10-03 NOTE — PROGRESS NOTES
CARDIAC CATHETERIZATION      CARPAL TUNNEL RELEASE       SECTION      COLONOSCOPY  2011    Dr Luigi Brenner; diverticular disease; repeat in 2016    GA[address]IC FUNDOPLICATION  [address], TOTAL ABDOMINAL      TOTAL KNEE ARTHROPLASTY  2007    bilateral     Family History   Problem Relation Age of Onset    Heart Attack Mother 58    Other Father         suicide, depression, leg trouble    Other Sister         Dementia    Hypertension Sister     Hypertension Sister     Hypertension Brother     Thyroid Disease Sister     Rheum Arthritis Sister     Rheum Arthritis Brother     Other Daughter         hemochromatosis     Other Son         hemachromatosis      Social History   Substance Use Topics    Smoking status: Never Smoker    Smokeless tobacco: Never Used    Alcohol use No     Allergies   Allergen Reactions    Milk-Related Compounds Nausea And Vomiting     Current Outpatient Prescriptions on File Prior to Encounter   Medication Sig Dispense Refill    losartan-hydrochlorothiazide (HYZAAR) 100-25 MG per tablet Take 1 tablet by mouth daily 30 tablet 3    metoprolol tartrate (LOPRESSOR) 25 MG tablet Take 0.5 tablets by mouth 2 times daily 90 tablet 3    fluticasone (FLONASE) 50 MCG/ACT nasal spray 2 sprays by Nasal route daily 2 sprays in each nostril daily 1 Bottle 11    levothyroxine (LEVOTHROID) 100 MCG tablet Take 1 tablet by mouth Daily 90 tablet 1    montelukast (SINGULAIR) 10 MG tablet TAKE 1 TABLET NIGHTLY 90 tablet 1    omeprazole (PRILOSEC) 40 MG delayed release capsule Take 1 capsule by mouth daily 90 capsule 3    flecainide (TAMBOCOR) 50 MG tablet Take 1 tablet by mouth 2 times daily 180 tablet 3    rivaroxaban (XARELTO) 15 MG TABS tablet Take 1 tablet by mouth daily (with breakfast) 90 tablet 3    celecoxib (CELEBREX) 200 MG capsule TAKE 1 CAPSULE DAILY 90 capsule 3    clobetasol (TEMOVATE) 0.05 % cream Apply topically 2 times daily (Patient taking differently: Apply topically as needed ) 1 Tube 1    Probiotic Product (ACIDOPHILUS PROBIOTIC) CAPS capsule Take 1 capsule by mouth daily      fexofenadine (ALLEGRA) 180 MG tablet Take 180 mg by mouth daily.  leucovorin calcium (WELLCOVORIN) 5 MG tablet Take 25 mg by mouth once a week.  methotrexate 2.5 MG tablet Take by mouth once a week 2 tabs at lunch and 4 tabs  in the pm once a week on Friday      ondansetron (ZOFRAN) 4 MG tablet Take 4 mg by mouth every 8 hours as needed for Nausea or Vomiting      docusate sodium (COLACE, DULCOLAX) 100 MG CAPS Take 100 mg by mouth 2 times daily. (Patient taking differently: Take 100 mg by mouth as needed ) 60 capsule 0    polyethyl glycol-propyl glycol 0.4-0.3 % (SYSTANE) 0.4-0.3 % ophthalmic solution Place 1 drop into both eyes as needed. No current facility-administered medications on file prior to encounter. REVIEW OF SYSTEMS See HPI    Objective:    /68   Pulse 66   Temp 97.9 °F (36.6 °C) (Oral)   Resp 16   Wt Readings from Last 3 Encounters:   09/26/18 123 lb (55.8 kg)   09/19/18 123 lb (55.8 kg)   09/12/18 123 lb (55.8 kg)     PHYSICAL EXAM  CONSTITUTIONAL:   Awake, alert, cooperative   EYES:  lids and lashes normal   ENT: external ears and nose without lesions   NECK:  supple, symmetrical, trachea midline   SKIN:  Open wound Present    Assessment:     Problem List Items Addressed This Visit     None        Procedure Note  Indications:  Based on my examination of this patient's wound(s)/ulcer(s) today, debridement is required to promote healing and evaluate the wound base. Performed by: Mary Spears DPM    Consent obtained:  Yes    Time out taken:  Yes    Pain Control: Anesthetic  Anesthetic: 2% Lidocaine Gel Topical     Debridement:Excisional Debridement    Using #15 blade scalpel the wound(s)/ulcer(s) was/were sharply debrided down through and including the removal of subcutaneous tissue.         Devitalized Tissue

## 2018-10-05 ENCOUNTER — NURSE ONLY (OUTPATIENT)
Dept: FAMILY MEDICINE CLINIC | Age: 83
End: 2018-10-05
Payer: MEDICARE

## 2018-10-05 PROCEDURE — G0008 ADMIN INFLUENZA VIRUS VAC: HCPCS | Performed by: FAMILY MEDICINE

## 2018-10-05 PROCEDURE — 90662 IIV NO PRSV INCREASED AG IM: CPT | Performed by: FAMILY MEDICINE

## 2018-10-10 ENCOUNTER — HOSPITAL ENCOUNTER (OUTPATIENT)
Dept: WOUND CARE | Age: 83
Discharge: HOME | End: 2018-10-10
Payer: MEDICARE

## 2018-10-10 VITALS
DIASTOLIC BLOOD PRESSURE: 70 MMHG | TEMPERATURE: 98.2 F | HEART RATE: 68 BPM | WEIGHT: 123 LBS | SYSTOLIC BLOOD PRESSURE: 138 MMHG | BODY MASS INDEX: 24.02 KG/M2 | RESPIRATION RATE: 16 BRPM

## 2018-10-10 PROCEDURE — 11042 DBRDMT SUBQ TIS 1ST 20SQCM/<: CPT

## 2018-10-10 NOTE — PLAN OF CARE
Problem: Wound:  Goal: Will show signs of wound healing; wound closure and no evidence of infection  Will show signs of wound healing; wound closure and no evidence of infection    Outcome: Ongoing      Problem: Compression therapy:  Goal: Will be free from complications associated with compression therapy  Will be free from complications associated with compression therapy    Outcome: Ongoing

## 2018-10-10 NOTE — PROGRESS NOTES
daily (Patient taking differently: Apply topically as needed ) 1 Tube 1    Probiotic Product (ACIDOPHILUS PROBIOTIC) CAPS capsule Take 1 capsule by mouth daily      docusate sodium (COLACE, DULCOLAX) 100 MG CAPS Take 100 mg by mouth 2 times daily. (Patient taking differently: Take 100 mg by mouth as needed ) 60 capsule 0    fexofenadine (ALLEGRA) 180 MG tablet Take 180 mg by mouth daily.  leucovorin calcium (WELLCOVORIN) 5 MG tablet Take 25 mg by mouth once a week.  polyethyl glycol-propyl glycol 0.4-0.3 % (SYSTANE) 0.4-0.3 % ophthalmic solution Place 1 drop into both eyes as needed.  methotrexate 2.5 MG tablet Take by mouth once a week 2 tabs at lunch and 4 tabs  in the pm once a week on Friday      ondansetron (ZOFRAN) 4 MG tablet Take 4 mg by mouth every 8 hours as needed for Nausea or Vomiting       No current facility-administered medications on file prior to encounter. REVIEW OF SYSTEMS See HPI    Objective:    /70   Pulse 68   Temp 98.2 °F (36.8 °C) (Oral)   Resp 16   Wt 123 lb (55.8 kg)   BMI 24.02 kg/m²   Wt Readings from Last 3 Encounters:   10/10/18 123 lb (55.8 kg)   09/26/18 123 lb (55.8 kg)   09/19/18 123 lb (55.8 kg)     PHYSICAL EXAM  CONSTITUTIONAL:   Awake, alert, cooperative   EYES:  lids and lashes normal   ENT: external ears and nose without lesions   NECK:  supple, symmetrical, trachea midline   SKIN:  Open wound Present    Assessment:     Problem List Items Addressed This Visit     None        Procedure Note  Indications:  Based on my examination of this patient's wound(s)/ulcer(s) today, debridement is required to promote healing and evaluate the wound base.     Performed by: Clifton Dumont DPM    Consent obtained:  Yes    Time out taken:  Yes    Pain Control: Anesthetic  Anesthetic: 2% Lidocaine Gel Topical     Debridement:Excisional Debridement    Using #15 blade scalpel the wound(s)/ulcer(s) was/were sharply debrided down through and including the

## 2018-10-17 ENCOUNTER — HOSPITAL ENCOUNTER (OUTPATIENT)
Dept: WOUND CARE | Age: 83
Discharge: HOME | End: 2018-10-17
Payer: MEDICARE

## 2018-10-17 VITALS
BODY MASS INDEX: 24.15 KG/M2 | RESPIRATION RATE: 18 BRPM | TEMPERATURE: 97.3 F | SYSTOLIC BLOOD PRESSURE: 118 MMHG | DIASTOLIC BLOOD PRESSURE: 68 MMHG | HEART RATE: 72 BPM | WEIGHT: 123 LBS | HEIGHT: 60 IN

## 2018-10-17 PROCEDURE — 11042 DBRDMT SUBQ TIS 1ST 20SQCM/<: CPT

## 2018-10-17 NOTE — PROGRESS NOTES
including the removal of subcutaneous tissue. Devitalized Tissue Debrided:  fibrin, biofilm and slough to stimulate bleeding to promote healing, post debridement good bleeding base and wound edges noted    Pre Debridement Measurements:  Are located in the [place]  Documentation Flow Sheet    Wound/Ulcer #: 3    Post Debridement Measurements:  Wound/Ulcer Descriptions are Pre Debridement except measurements:    Wound 12/23/15 Other (Comment) Leg Left;Lateral #3 lt lateral calf  onset  12/22/15  trauma (Active)   Dressing Status Clean;Dry; Intact 1/20/2016 10:25 AM   Dressing Changed Changed/New 1/20/2016 10:25 AM   Wound Cleansed Rinsed/Irrigated with saline 1/20/2016 10:25 AM   Wound Length (cm) 1 cm 1/20/2016 10:25 AM   Wound Width (cm) 1 cm 1/20/2016 10:25 AM   Wound Depth (cm)  0.1 1/20/2016 10:25 AM   Calculated Wound Size (cm^2) (l*w) 1 cm^2 1/20/2016 10:25 AM   Change in Wound Size % (l*w) 93.65 1/20/2016 10:25 AM   Wound Assessment Red;Yellow;Black 1/20/2016 10:00 AM   Drainage Amount Small 1/20/2016 10:00 AM   Drainage Description Yellow; Tan 1/20/2016 10:00 AM   Odor None 1/20/2016 10:00 AM   Jaycee-wound Assessment Dry; Intact;Dark edges 1/20/2016 10:00 AM   Red%Wound Bed 80 1/20/2016 10:00 AM   Yellow%Wound Bed 20 1/20/2016 10:00 AM   Black%Wound Bed 20 12/30/2015 10:57 AM   Culture Taken Yes 12/30/2015 11:36 AM   Debridement per physician Subcutaneous 1/20/2016 10:25 AM   Time out Yes 1/20/2016 10:25 AM   Procedural Pain 3 1/20/2016 10:25 AM   Post procedural Pain 1 1/20/2016 10:25 AM   Number of days: 2916       Wound 08/09/17 Skin tear Leg Left;Lateral wound # 4 acquired 6/11/17 (Active)   Wound Type Wound 9/20/2017 11:36 AM   Wound Venous 9/20/2017 11:36 AM   Dressing Status Changed 10/11/2017 12:16 PM   Dressing Changed Changed/New 10/11/2017 12:16 PM   Wound Cleansed Rinsed/Irrigated with saline 10/11/2017 12:16 PM   Wound Length (cm) 0 cm 10/18/2017 11:55 AM   Wound Width (cm) 0 cm 10/18/2017 treatment:  Well tolerated by patient. Plan:   Treatment Note please see attached Discharge Instructions    Written patient dismissal instructions given to patient and signed by patient or POA. Discharge Instructions       Visit Discharge/Physician Orders     Discharge condition: Stable     Assessment of pain at discharge:  none     Anesthetic used: 2% lidocaine     Discharge to: Home     Left via:Private automobile     Accompanied by: accompanied by self     ECF/HHA: [facility] for dressing supplies     Dressing Orders:  Cleanse right leg ulcer with normal saline.  Apply saline moistened Paulette, Unna boot, James Manges in place for one week.  However, if wrap slides down, bunches or becomes painful, remove and do daily dressing changes with bactroban and wear tubigrip     Take good multiple vitamin daily  Consume diet high in lean protein every day, about 40 grams     Treatment Orders:       [facility] followup visit __________one week___________________  (Please note your next appointment above and if you are unable to keep, kindly give a 24 hour notice. Thank you.)    Physician signature:__________________________      If you experience any of the following, please call the [facility] during business hours:    * Increase in Pain  * Temperature over 101  * Increase in drainage from your wound  * Drainage with a foul odor  * Bleeding  * Increase in swelling  * Need for compression bandage changes due to slippage, breakthrough drainage. If you need medical attention outside of the business hours of the [facility] please contact your PCP or go to the nearest emergency room.         Electronically signed by Rock Tayla DPM on 10/17/2018 at 12:02 PM

## 2018-10-24 ENCOUNTER — HOSPITAL ENCOUNTER (OUTPATIENT)
Dept: GENERAL RADIOLOGY | Age: 83
Discharge: HOME | End: 2018-10-26
Payer: MEDICARE

## 2018-10-24 ENCOUNTER — OFFICE VISIT (OUTPATIENT)
Dept: FAMILY MEDICINE CLINIC | Age: 83
End: 2018-10-24
Payer: MEDICARE

## 2018-10-24 ENCOUNTER — HOSPITAL ENCOUNTER (OUTPATIENT)
Dept: WOUND CARE | Age: 83
Discharge: HOME | End: 2018-10-24
Payer: MEDICARE

## 2018-10-24 ENCOUNTER — HOSPITAL ENCOUNTER (OUTPATIENT)
Age: 83
Discharge: HOME | End: 2018-10-26
Payer: MEDICARE

## 2018-10-24 VITALS
BODY MASS INDEX: 24.15 KG/M2 | SYSTOLIC BLOOD PRESSURE: 132 MMHG | RESPIRATION RATE: 18 BRPM | HEART RATE: 68 BPM | WEIGHT: 123 LBS | DIASTOLIC BLOOD PRESSURE: 58 MMHG | TEMPERATURE: 97.9 F | HEIGHT: 60 IN

## 2018-10-24 VITALS
HEIGHT: 60 IN | DIASTOLIC BLOOD PRESSURE: 69 MMHG | HEART RATE: 60 BPM | OXYGEN SATURATION: 96 % | RESPIRATION RATE: 16 BRPM | TEMPERATURE: 98.4 F | SYSTOLIC BLOOD PRESSURE: 146 MMHG | WEIGHT: 124 LBS | BODY MASS INDEX: 24.35 KG/M2

## 2018-10-24 DIAGNOSIS — R05.9 COUGH: Primary | ICD-10-CM

## 2018-10-24 DIAGNOSIS — R05.9 COUGH: ICD-10-CM

## 2018-10-24 DIAGNOSIS — J18.9 COMMUNITY ACQUIRED PNEUMONIA OF LEFT LOWER LOBE OF LUNG: ICD-10-CM

## 2018-10-24 DIAGNOSIS — J40 BRONCHITIS: ICD-10-CM

## 2018-10-24 PROCEDURE — 71046 X-RAY EXAM CHEST 2 VIEWS: CPT

## 2018-10-24 PROCEDURE — 99213 OFFICE O/P EST LOW 20 MIN: CPT | Performed by: FAMILY MEDICINE

## 2018-10-24 PROCEDURE — 11042 DBRDMT SUBQ TIS 1ST 20SQCM/<: CPT

## 2018-10-24 RX ORDER — DOXYCYCLINE HYCLATE 100 MG
100 TABLET ORAL 2 TIMES DAILY WITH MEALS
Qty: 10 TABLET | Refills: 0 | Status: SHIPPED | OUTPATIENT
Start: 2018-10-24 | End: 2018-10-29

## 2018-10-24 RX ORDER — IPRATROPIUM BROMIDE 42 UG/1
2 SPRAY, METERED NASAL 3 TIMES DAILY
Qty: 1 BOTTLE | Refills: 3 | Status: ON HOLD | OUTPATIENT
Start: 2018-10-24 | End: 2019-02-07

## 2018-10-24 RX ORDER — DEXTROMETHORPHAN POLISTIREX 30 MG/5ML
60 SUSPENSION ORAL 2 TIMES DAILY PRN
COMMUNITY
Start: 2018-10-24 | End: 2018-11-03

## 2018-10-24 RX ORDER — AMOXICILLIN 500 MG/1
1000 CAPSULE ORAL 3 TIMES DAILY
Qty: 30 CAPSULE | Refills: 0 | Status: SHIPPED | OUTPATIENT
Start: 2018-10-24 | End: 2018-10-29

## 2018-10-24 ASSESSMENT — ENCOUNTER SYMPTOMS
NAUSEA: 0
TROUBLE SWALLOWING: 0
ABDOMINAL PAIN: 0
VOMITING: 0

## 2018-10-24 NOTE — PROGRESS NOTES
sharply debrided down through and including the removal of subcutaneous tissue. Devitalized Tissue Debrided:  fibrin, biofilm, slough and necrotic/eschar to stimulate bleeding to promote healing, post debridement good bleeding base and wound edges noted    Pre Debridement Measurements:  Are located in the [place]  Documentation Flow Sheet    Wound/Ulcer #: 5    Post Debridement Measurements:  Wound/Ulcer Descriptions are Pre Debridement except measurements:    Wound 12/23/15 Other (Comment) Leg Left;Lateral #3 lt lateral calf  onset  12/22/15  trauma (Active)   Dressing Status Clean;Dry; Intact 1/20/2016 10:25 AM   Dressing Changed Changed/New 1/20/2016 10:25 AM   Wound Cleansed Rinsed/Irrigated with saline 1/20/2016 10:25 AM   Wound Length (cm) 1 cm 1/20/2016 10:25 AM   Wound Width (cm) 1 cm 1/20/2016 10:25 AM   Wound Depth (cm)  0.1 1/20/2016 10:25 AM   Calculated Wound Size (cm^2) (l*w) 1 cm^2 1/20/2016 10:25 AM   Change in Wound Size % (l*w) 93.65 1/20/2016 10:25 AM   Wound Assessment Red;Yellow;Black 1/20/2016 10:00 AM   Drainage Amount Small 1/20/2016 10:00 AM   Drainage Description Yellow; Tan 1/20/2016 10:00 AM   Odor None 1/20/2016 10:00 AM   Jaycee-wound Assessment Dry; Intact;Dark edges 1/20/2016 10:00 AM   Red%Wound Bed 80 1/20/2016 10:00 AM   Yellow%Wound Bed 20 1/20/2016 10:00 AM   Black%Wound Bed 20 12/30/2015 10:57 AM   Culture Taken Yes 12/30/2015 11:36 AM   Debridement per physician Subcutaneous 1/20/2016 10:25 AM   Time out Yes 1/20/2016 10:25 AM   Procedural Pain 3 1/20/2016 10:25 AM   Post procedural Pain 1 1/20/2016 10:25 AM   Number of days: 1036       Wound 08/09/17 Skin tear Leg Left;Lateral wound # 4 acquired 6/11/17 (Active)   Wound Type Wound 9/20/2017 11:36 AM   Wound Venous 9/20/2017 11:36 AM   Dressing Status Changed 10/11/2017 12:16 PM   Dressing Changed Changed/New 10/11/2017 12:16 PM   Wound Cleansed Rinsed/Irrigated with saline 10/11/2017 12:16 PM   Wound Length (cm) 0 cm

## 2018-10-31 ENCOUNTER — HOSPITAL ENCOUNTER (OUTPATIENT)
Dept: WOUND CARE | Age: 83
Discharge: HOME | End: 2018-10-31
Payer: MEDICARE

## 2018-11-07 ENCOUNTER — HOSPITAL ENCOUNTER (OUTPATIENT)
Dept: WOUND CARE | Age: 83
Discharge: HOME | End: 2018-11-07
Payer: MEDICARE

## 2018-11-07 VITALS
WEIGHT: 124 LBS | SYSTOLIC BLOOD PRESSURE: 128 MMHG | DIASTOLIC BLOOD PRESSURE: 70 MMHG | TEMPERATURE: 98.2 F | RESPIRATION RATE: 16 BRPM | BODY MASS INDEX: 24.22 KG/M2 | HEART RATE: 60 BPM

## 2018-11-07 PROCEDURE — 11042 DBRDMT SUBQ TIS 1ST 20SQCM/<: CPT

## 2018-11-07 NOTE — PROGRESS NOTES
Debridement    Using #15 blade scalpel the wound(s)/ulcer(s) was/were sharply debrided down through and including the removal of subcutaneous tissue. Devitalized Tissue Debrided:  fibrin, biofilm and slough to stimulate bleeding to promote healing, post debridement good bleeding base and wound edges noted    Pre Debridement Measurements:  Are located in the [place]  Documentation Flow Sheet    Wound/Ulcer #: 5    Post Debridement Measurements:  Wound/Ulcer Descriptions are Pre Debridement except measurements:    Wound 12/23/15 Other (Comment) Leg Left;Lateral #3 lt lateral calf  onset  12/22/15  trauma (Active)   Dressing Status Clean;Dry; Intact 1/20/2016 10:25 AM   Dressing Changed Changed/New 1/20/2016 10:25 AM   Wound Cleansed Rinsed/Irrigated with saline 1/20/2016 10:25 AM   Wound Length (cm) 1 cm 1/20/2016 10:25 AM   Wound Width (cm) 1 cm 1/20/2016 10:25 AM   Wound Depth (cm)  0.1 1/20/2016 10:25 AM   Calculated Wound Size (cm^2) (l*w) 1 cm^2 1/20/2016 10:25 AM   Change in Wound Size % (l*w) 93.65 1/20/2016 10:25 AM   Wound Assessment Red;Yellow;Black 1/20/2016 10:00 AM   Drainage Amount Small 1/20/2016 10:00 AM   Drainage Description Yellow; Tan 1/20/2016 10:00 AM   Odor None 1/20/2016 10:00 AM   Jaycee-wound Assessment Dry; Intact;Dark edges 1/20/2016 10:00 AM   Red%Wound Bed 80 1/20/2016 10:00 AM   Yellow%Wound Bed 20 1/20/2016 10:00 AM   Black%Wound Bed 20 12/30/2015 10:57 AM   Culture Taken Yes 12/30/2015 11:36 AM   Debridement per physician Subcutaneous 1/20/2016 10:25 AM   Time out Yes 1/20/2016 10:25 AM   Procedural Pain 3 1/20/2016 10:25 AM   Post procedural Pain 1 1/20/2016 10:25 AM   Number of days: 1050       Wound 08/09/17 Skin tear Leg Left;Lateral wound # 4 acquired 6/11/17 (Active)   Wound Type Wound 9/20/2017 11:36 AM   Wound Venous 9/20/2017 11:36 AM   Dressing Status Changed 10/11/2017 12:16 PM   Dressing Changed Changed/New 10/11/2017 12:16 PM   Wound Cleansed Rinsed/Irrigated with saline 10/11/2017 12:16 PM   Wound Length (cm) 0 cm 10/18/2017 11:55 AM   Wound Width (cm) 0 cm 10/18/2017 11:55 AM   Wound Depth (cm)  0 10/18/2017 11:55 AM   Calculated Wound Size (cm^2) (l*w) 0 cm^2 10/18/2017 11:55 AM   Change in Wound Size % (l*w) 100 10/18/2017 11:55 AM   Wound Assessment Epithelialization 10/18/2017 11:07 AM   Drainage Amount None 10/18/2017 11:07 AM   Drainage Description Sanguinous 10/11/2017 11:53 AM   Odor None 10/11/2017 11:53 AM   Jaycee-wound Assessment Intact 10/18/2017 11:07 AM   Culture Taken Yes 8/9/2017 11:27 AM   Debridement per physician Full thickness 10/11/2017 11:53 AM   Time out Yes 10/11/2017 11:53 AM   Procedural Pain 0 10/11/2017 11:53 AM   Post procedural Pain 0 10/11/2017 11:53 AM   Number of days: 455       Wound 09/12/18 Venous ulcer Leg Right; Lower #5 acq 8/6/18 (Active)   Wound Image   10/10/2018 12:02 PM   Wound Type Wound 9/12/2018 10:30 AM   Wound Venous 9/12/2018 10:30 AM   Dressing Status Clean;Dry; Intact 10/24/2018 11:53 AM   Dressing Changed Changed/New 10/24/2018 11:53 AM   Dressing/Treatment Collagen with Ag 10/24/2018 11:53 AM   Wound Cleansed Rinsed/Irrigated with saline 10/24/2018 11:53 AM   Wound Length (cm) 0.4 cm 11/7/2018 11:59 AM   Wound Width (cm) 0.5 cm 11/7/2018 11:59 AM   Wound Depth (cm)  0.1 11/7/2018 11:59 AM   Calculated Wound Size (cm^2) (l*w) 0.2 cm^2 11/7/2018 11:59 AM   Change in Wound Size % (l*w) 90 11/7/2018 11:59 AM   Wound Assessment Pink 11/7/2018 11:33 AM   Drainage Amount Scant 11/7/2018 11:33 AM   Drainage Description Serosanguinous 11/7/2018 11:33 AM   Odor None 11/7/2018 11:33 AM   Jaycee-wound Assessment Intact 11/7/2018 11:33 AM   Red%Wound Bed 50 10/3/2018 11:22 AM   Yellow%Wound Bed 50 10/3/2018 11:22 AM   Time out Yes 11/7/2018 11:59 AM   Procedural Pain 0 11/7/2018 11:59 AM   Post procedural Pain 0 11/7/2018 11:59 AM   Number of days: 56     Percent of Wound/Ulcer Debrided: 100%    Total Surface Area Debrided:  1 sq cm

## 2018-11-13 ENCOUNTER — OFFICE VISIT (OUTPATIENT)
Dept: FAMILY MEDICINE CLINIC | Age: 83
End: 2018-11-13
Payer: MEDICARE

## 2018-11-13 VITALS
DIASTOLIC BLOOD PRESSURE: 72 MMHG | HEIGHT: 60 IN | WEIGHT: 125 LBS | OXYGEN SATURATION: 98 % | SYSTOLIC BLOOD PRESSURE: 138 MMHG | BODY MASS INDEX: 24.54 KG/M2 | RESPIRATION RATE: 16 BRPM | HEART RATE: 56 BPM

## 2018-11-13 DIAGNOSIS — F43.21 GRIEF REACTION: ICD-10-CM

## 2018-11-13 DIAGNOSIS — J18.9 COMMUNITY ACQUIRED PNEUMONIA OF LEFT LOWER LOBE OF LUNG: Primary | ICD-10-CM

## 2018-11-13 PROCEDURE — 99213 OFFICE O/P EST LOW 20 MIN: CPT | Performed by: FAMILY MEDICINE

## 2018-11-13 ASSESSMENT — ENCOUNTER SYMPTOMS
DIARRHEA: 0
WHEEZING: 0
COUGH: 1
ABDOMINAL PAIN: 0
CHEST TIGHTNESS: 0
NAUSEA: 0
SHORTNESS OF BREATH: 0

## 2018-11-14 ENCOUNTER — HOSPITAL ENCOUNTER (OUTPATIENT)
Dept: WOUND CARE | Age: 83
Discharge: HOME | End: 2018-11-14
Payer: MEDICARE

## 2018-11-14 VITALS
DIASTOLIC BLOOD PRESSURE: 68 MMHG | TEMPERATURE: 98.3 F | SYSTOLIC BLOOD PRESSURE: 140 MMHG | RESPIRATION RATE: 16 BRPM | HEART RATE: 60 BPM

## 2018-11-14 PROCEDURE — 11042 DBRDMT SUBQ TIS 1ST 20SQCM/<: CPT

## 2018-11-14 NOTE — PROGRESS NOTES
later A-fib; sees Dr Latanya Monet      Past Surgical History:   Procedure Laterality Date    CARDIAC CATHETERIZATION      CARPAL TUNNEL RELEASE       SECTION      COLONOSCOPY  2011    Dr Mir Snider; diverticular disease; repeat in 2016    GASTRIC FUNDOPLICATION  [address], TOTAL ABDOMINAL      TOTAL KNEE ARTHROPLASTY  2007    bilateral     Family History   Problem Relation Age of Onset    Heart Attack Mother 58    Other Father         suicide, depression, leg trouble    Other Sister         Dementia    Hypertension Sister     Hypertension Sister     Hypertension Brother     Thyroid Disease Sister     Rheum Arthritis Sister     Rheum Arthritis Brother     Other Daughter         hemochromatosis     Other Son         hemachromatosis      Social History   Substance Use Topics    Smoking status: Never Smoker    Smokeless tobacco: Never Used    Alcohol use No     Allergies   Allergen Reactions    Milk-Related Compounds Nausea And Vomiting     Current Outpatient Prescriptions on File Prior to Encounter   Medication Sig Dispense Refill    ipratropium (ATROVENT) 0.06 % nasal spray 2 sprays by Nasal route 3 times daily 1 Bottle 3    losartan-hydrochlorothiazide (HYZAAR) 100-25 MG per tablet Take 1 tablet by mouth daily 30 tablet 3    metoprolol tartrate (LOPRESSOR) 25 MG tablet Take 0.5 tablets by mouth 2 times daily 90 tablet 3    fluticasone (FLONASE) 50 MCG/ACT nasal spray 2 sprays by Nasal route daily 2 sprays in each nostril daily 1 Bottle 11    levothyroxine (LEVOTHROID) 100 MCG tablet Take 1 tablet by mouth Daily 90 tablet 1    montelukast (SINGULAIR) 10 MG tablet TAKE 1 TABLET NIGHTLY 90 tablet 1    omeprazole (PRILOSEC) 40 MG delayed release capsule Take 1 capsule by mouth daily 90 capsule 3    flecainide (TAMBOCOR) 50 MG tablet Take 1 tablet by mouth 2 times daily 180 tablet 3    rivaroxaban (XARELTO) 15 MG TABS tablet Take 1 tablet by mouth daily 100%    Total Surface Area Debrided:  1 sq cm     Estimated Blood Loss:  Minimal  Hemostasis Achieved:  by pressure    Procedural Pain:  2  / 10   Post Procedural Pain:  3 / 10     Response to treatment:  Well tolerated by patient. Plan:   Treatment Note please see attached Discharge Instructions    Written patient dismissal instructions given to patient and signed by patient or POA. Discharge Instructions       Visit Discharge/Physician Orders     Discharge condition: Stable     Assessment of pain at discharge:  none     Anesthetic used: 2% lidocaine     Discharge to: Home     Left via:Private automobile     Accompanied by: accompanied by self     ECF/HHA: [facility] for dressing supplies     Dressing Orders:  Cleanse right leg ulcer with normal saline.  Apply saline moistened Paulette, Unna boot, [place] Rife in place for one week.  However, if wrap slides down, bunches or becomes painful, remove and do daily dressing changes with bactroban and wear tubigrip     Take good multiple vitamin daily  Consume diet high in lean protein every day, about 40 grams     Treatment Orders:       [facility] followup visit __________one week___________________  (Please note your next appointment above and if you are unable to keep, kindly give a 24 hour notice. Thank you.)    Physician signature:__________________________      If you experience any of the following, please call the [facility] during business hours:    * Increase in Pain  * Temperature over 101  * Increase in drainage from your wound  * Drainage with a foul odor  * Bleeding  * Increase in swelling  * Need for compression bandage changes due to slippage, breakthrough drainage. If you need medical attention outside of the business hours of the [facility] please contact your PCP or go to the nearest emergency room.         Electronically signed by Melissa Goode DPM on 11/14/2018 at 11:36 AM

## 2018-11-20 ENCOUNTER — HOSPITAL ENCOUNTER (OUTPATIENT)
Dept: GENERAL RADIOLOGY | Age: 83
Discharge: HOME | End: 2018-11-22
Payer: MEDICARE

## 2018-11-20 ENCOUNTER — HOSPITAL ENCOUNTER (OUTPATIENT)
Age: 83
Discharge: HOME | End: 2018-11-22
Payer: MEDICARE

## 2018-11-20 DIAGNOSIS — J18.9 COMMUNITY ACQUIRED PNEUMONIA OF LEFT LOWER LOBE OF LUNG: ICD-10-CM

## 2018-11-20 PROCEDURE — 71046 X-RAY EXAM CHEST 2 VIEWS: CPT

## 2018-11-21 ENCOUNTER — HOSPITAL ENCOUNTER (OUTPATIENT)
Dept: WOUND CARE | Age: 83
Discharge: HOME | End: 2018-11-21
Payer: MEDICARE

## 2018-11-21 VITALS
RESPIRATION RATE: 16 BRPM | SYSTOLIC BLOOD PRESSURE: 138 MMHG | BODY MASS INDEX: 24.41 KG/M2 | DIASTOLIC BLOOD PRESSURE: 70 MMHG | WEIGHT: 125 LBS | TEMPERATURE: 98.2 F | HEART RATE: 60 BPM

## 2018-11-21 PROCEDURE — 99212 OFFICE O/P EST SF 10 MIN: CPT

## 2018-11-21 NOTE — PROGRESS NOTES
capsule 3    clobetasol (TEMOVATE) 0.05 % cream Apply topically 2 times daily (Patient taking differently: Apply topically as needed ) 1 Tube 1    Probiotic Product (ACIDOPHILUS PROBIOTIC) CAPS capsule Take 1 capsule by mouth daily      docusate sodium (COLACE, DULCOLAX) 100 MG CAPS Take 100 mg by mouth 2 times daily. (Patient taking differently: Take 100 mg by mouth as needed ) 60 capsule 0    fexofenadine (ALLEGRA) 180 MG tablet Take 180 mg by mouth daily.  leucovorin calcium (WELLCOVORIN) 5 MG tablet Take 25 mg by mouth once a week.  polyethyl glycol-propyl glycol 0.4-0.3 % (SYSTANE) 0.4-0.3 % ophthalmic solution Place 1 drop into both eyes as needed.  methotrexate 2.5 MG tablet Take by mouth once a week 2 tabs at lunch and 4 tabs  in the pm once a week on Friday      ondansetron (ZOFRAN) 4 MG tablet Take 4 mg by mouth every 8 hours as needed for Nausea or Vomiting       No current facility-administered medications on file prior to encounter. REVIEW OF SYSTEMS See HPI    Objective:    /70   Pulse 60   Temp 98.2 °F (36.8 °C) (Oral)   Resp 16   Wt 125 lb (56.7 kg)   BMI 24.41 kg/m²   Wt Readings from Last 3 Encounters:   11/21/18 125 lb (56.7 kg)   11/13/18 125 lb (56.7 kg)   11/07/18 124 lb (56.2 kg)     PHYSICAL EXAM  CONSTITUTIONAL:   Awake, alert, cooperative   EYES:  lids and lashes normal   ENT: external ears and nose without lesions   NECK:  supple, symmetrical, trachea midline   SKIN:  Open wound absent     Assessment:     Problem List Items Addressed This Visit     None        Procedure Note  Indications:  Based on my examination of this patient's wound(s)/ulcer(s) today, debridement is not required to promote healing and evaluate the wound base.     Performed by: Licha Cunha DPM    Consent obtained:  Yes    Time out taken:  Yes    Pain Control: Anesthetic  Anesthetic: None     Debridement:Non-excisional Debridement    Pre Debridement Measurements:  Are located in the [place]  Documentation Flow Sheet    Wound/Ulcer #: 5    Post Debridement Measurements:  Wound/Ulcer Descriptions are Pre Debridement except measurements:    Wound 12/23/15 Other (Comment) Leg Left;Lateral #3 lt lateral calf  onset  12/22/15  trauma (Active)   Dressing Status Clean;Dry; Intact 1/20/2016 10:25 AM   Dressing Changed Changed/New 1/20/2016 10:25 AM   Wound Cleansed Rinsed/Irrigated with saline 1/20/2016 10:25 AM   Wound Length (cm) 1 cm 1/20/2016 10:25 AM   Wound Width (cm) 1 cm 1/20/2016 10:25 AM   Wound Depth (cm)  0.1 1/20/2016 10:25 AM   Calculated Wound Size (cm^2) (l*w) 1 cm^2 1/20/2016 10:25 AM   Change in Wound Size % (l*w) 93.65 1/20/2016 10:25 AM   Wound Assessment Red;Yellow;Black 1/20/2016 10:00 AM   Drainage Amount Small 1/20/2016 10:00 AM   Drainage Description Yellow; Tan 1/20/2016 10:00 AM   Odor None 1/20/2016 10:00 AM   Jaycee-wound Assessment Dry; Intact;Dark edges 1/20/2016 10:00 AM   Red%Wound Bed 80 1/20/2016 10:00 AM   Yellow%Wound Bed 20 1/20/2016 10:00 AM   Black%Wound Bed 20 12/30/2015 10:57 AM   Culture Taken Yes 12/30/2015 11:36 AM   Debridement per physician Subcutaneous 1/20/2016 10:25 AM   Time out Yes 1/20/2016 10:25 AM   Procedural Pain 3 1/20/2016 10:25 AM   Post procedural Pain 1 1/20/2016 10:25 AM   Number of days: 0855       Wound 08/09/17 Skin tear Leg Left;Lateral wound # 4 acquired 6/11/17 (Active)   Wound Type Wound 9/20/2017 11:36 AM   Wound Venous 9/20/2017 11:36 AM   Dressing Status Changed 10/11/2017 12:16 PM   Dressing Changed Changed/New 10/11/2017 12:16 PM   Wound Cleansed Rinsed/Irrigated with saline 10/11/2017 12:16 PM   Wound Length (cm) 0 cm 10/18/2017 11:55 AM   Wound Width (cm) 0 cm 10/18/2017 11:55 AM   Wound Depth (cm)  0 10/18/2017 11:55 AM   Calculated Wound Size (cm^2) (l*w) 0 cm^2 10/18/2017 11:55 AM   Change in Wound Size % (l*w) 100 10/18/2017 11:55 AM   Wound Assessment Epithelialization 10/18/2017 11:07 AM   Drainage Amount

## 2018-12-13 ENCOUNTER — OFFICE VISIT (OUTPATIENT)
Dept: FAMILY MEDICINE CLINIC | Age: 83
End: 2018-12-13
Payer: MEDICARE

## 2018-12-13 VITALS
HEIGHT: 60 IN | RESPIRATION RATE: 14 BRPM | OXYGEN SATURATION: 97 % | HEART RATE: 58 BPM | SYSTOLIC BLOOD PRESSURE: 128 MMHG | BODY MASS INDEX: 24.15 KG/M2 | WEIGHT: 123 LBS | DIASTOLIC BLOOD PRESSURE: 70 MMHG

## 2018-12-13 DIAGNOSIS — K44.9 HIATAL HERNIA: ICD-10-CM

## 2018-12-13 DIAGNOSIS — Z79.899 MEDICATION MANAGEMENT: ICD-10-CM

## 2018-12-13 DIAGNOSIS — J18.9 COMMUNITY ACQUIRED PNEUMONIA OF LEFT LOWER LOBE OF LUNG: Primary | ICD-10-CM

## 2018-12-13 DIAGNOSIS — I10 ESSENTIAL HYPERTENSION: ICD-10-CM

## 2018-12-13 DIAGNOSIS — F43.21 GRIEF REACTION: ICD-10-CM

## 2018-12-13 PROCEDURE — 99214 OFFICE O/P EST MOD 30 MIN: CPT | Performed by: FAMILY MEDICINE

## 2018-12-13 RX ORDER — VALSARTAN AND HYDROCHLOROTHIAZIDE 160; 25 MG/1; MG/1
1 TABLET ORAL DAILY
Qty: 90 TABLET | Refills: 3 | Status: SHIPPED | OUTPATIENT
Start: 2018-12-13 | End: 2019-07-23

## 2018-12-13 RX ORDER — OMEPRAZOLE 40 MG/1
40 CAPSULE, DELAYED RELEASE ORAL 2 TIMES DAILY
Qty: 180 CAPSULE | Refills: 3 | Status: SHIPPED | OUTPATIENT
Start: 2018-12-13 | End: 2019-03-21

## 2018-12-13 RX ORDER — LOSARTAN POTASSIUM AND HYDROCHLOROTHIAZIDE 25; 100 MG/1; MG/1
1 TABLET ORAL DAILY
Qty: 30 TABLET | Refills: 3 | Status: CANCELLED | OUTPATIENT
Start: 2018-12-13

## 2018-12-13 ASSESSMENT — ENCOUNTER SYMPTOMS
SHORTNESS OF BREATH: 0
WHEEZING: 0
BACK PAIN: 1
CHOKING: 0

## 2018-12-13 NOTE — PROGRESS NOTES
Cancel: losartan-hydrochlorothiazide (HYZAAR) 100-25 MG per tablet; Take 1 tablet by mouth daily        Plan:     Pneumonia is resolved. She is up-to-date with her vaccinations. Continue to monitor for recurrence. Grief issues are ongoing but stable    Blood pressure was initially high on presentation but improved. However I will switch her back to her baseline/preferred medication of Dyazide. See orders. Discussed the ongoing issues with her hiatal hernia and reflux. Offered her a return to see a surgeon. She declines. Offered Carafate, she declines. Will try twice a day dosing of her PPI for now. See if this improves her symptoms    Counseled regarding above diagnosis, including possible risks and complications,  especially if left uncontrolled. Counseled regarding the possible sideeffects, risks, benefits and alternatives to treatment; patient and/or guardian verbalizes understanding, agrees, feels comfortable with and wishes to proceed with above treatment plan. Call or go to ED immediately if symptoms worsen or persist. Advised patient to call with any new medication issues, and, as applicable, read all Rx info from pharmacy to assure aware ofall possible risks and side effects of medication before taking. As applicable, educational materials and/or home exercises printed forpatient's review and were included in patient instructions on his/her After Visit Summary and given to patient at the end of visit. Patient and/or guardian given opportunity to ask questions/raise concerns. She verbalized comfort and understanding of instructions. Follow Up:     Return in about 3 months (around 3/13/2019), or if symptoms worsen or fail to improve, for recheck GERD, chronic issue . or sooner if the above issues change unexpectedly or new issues develop     This document was prepared at least partially through the use ofvoice recognition software.  Although effort is taken to assure the accuracy

## 2019-01-17 DIAGNOSIS — E06.9 THYROIDITIS: ICD-10-CM

## 2019-01-17 DIAGNOSIS — I48.0 PAROXYSMAL ATRIAL FIBRILLATION (HCC): ICD-10-CM

## 2019-01-17 RX ORDER — MONTELUKAST SODIUM 10 MG/1
TABLET ORAL
Qty: 90 TABLET | Refills: 1 | Status: SHIPPED | OUTPATIENT
Start: 2019-01-17 | End: 2019-08-14 | Stop reason: SDUPTHER

## 2019-01-17 RX ORDER — LEVOTHYROXINE SODIUM 0.1 MG/1
100 TABLET ORAL DAILY
Qty: 90 TABLET | Refills: 1 | Status: SHIPPED | OUTPATIENT
Start: 2019-01-17 | End: 2019-06-21 | Stop reason: SDUPTHER

## 2019-01-17 RX ORDER — FLECAINIDE ACETATE 50 MG/1
50 TABLET ORAL 2 TIMES DAILY
Qty: 180 TABLET | Refills: 3 | Status: SHIPPED
Start: 2019-01-17 | End: 2020-02-24 | Stop reason: SDUPTHER

## 2019-02-06 ENCOUNTER — OFFICE VISIT (OUTPATIENT)
Dept: FAMILY MEDICINE CLINIC | Age: 84
End: 2019-02-06
Payer: MEDICARE

## 2019-02-06 VITALS
RESPIRATION RATE: 20 BRPM | WEIGHT: 126 LBS | HEIGHT: 60 IN | HEART RATE: 66 BPM | OXYGEN SATURATION: 97 % | BODY MASS INDEX: 24.74 KG/M2 | DIASTOLIC BLOOD PRESSURE: 61 MMHG | TEMPERATURE: 97.6 F | SYSTOLIC BLOOD PRESSURE: 131 MMHG

## 2019-02-06 DIAGNOSIS — R11.2 NAUSEA AND VOMITING, INTRACTABILITY OF VOMITING NOT SPECIFIED, UNSPECIFIED VOMITING TYPE: ICD-10-CM

## 2019-02-06 DIAGNOSIS — K44.9 HIATAL HERNIA: Primary | ICD-10-CM

## 2019-02-06 PROCEDURE — 99213 OFFICE O/P EST LOW 20 MIN: CPT | Performed by: FAMILY MEDICINE

## 2019-02-06 ASSESSMENT — PATIENT HEALTH QUESTIONNAIRE - PHQ9
1. LITTLE INTEREST OR PLEASURE IN DOING THINGS: 0
SUM OF ALL RESPONSES TO PHQ QUESTIONS 1-9: 0
SUM OF ALL RESPONSES TO PHQ QUESTIONS 1-9: 0
2. FEELING DOWN, DEPRESSED OR HOPELESS: 0
SUM OF ALL RESPONSES TO PHQ9 QUESTIONS 1 & 2: 0

## 2019-02-07 ENCOUNTER — APPOINTMENT (OUTPATIENT)
Dept: CT IMAGING | Age: 84
End: 2019-02-07
Payer: MEDICARE

## 2019-02-07 ENCOUNTER — HOSPITAL ENCOUNTER (OUTPATIENT)
Age: 84
Setting detail: OBSERVATION
Discharge: HOME | End: 2019-02-08
Attending: EMERGENCY MEDICINE | Admitting: FAMILY MEDICINE
Payer: MEDICARE

## 2019-02-07 ENCOUNTER — APPOINTMENT (OUTPATIENT)
Dept: GENERAL RADIOLOGY | Age: 84
End: 2019-02-07
Payer: MEDICARE

## 2019-02-07 DIAGNOSIS — Z79.899 MEDICATION MANAGEMENT: ICD-10-CM

## 2019-02-07 DIAGNOSIS — R07.1 CHEST PAIN ON BREATHING: Primary | ICD-10-CM

## 2019-02-07 DIAGNOSIS — J40 BRONCHITIS: ICD-10-CM

## 2019-02-07 PROBLEM — R07.9 CHEST PAIN: Status: ACTIVE | Noted: 2019-02-07

## 2019-02-07 LAB
ALBUMIN SERPL-MCNC: 4 G/DL (ref 3.5–5.2)
ALP BLD-CCNC: 51 U/L (ref 35–104)
ALT SERPL-CCNC: 16 U/L (ref 0–32)
ANION GAP SERPL CALCULATED.3IONS-SCNC: 12 MMOL/L (ref 7–16)
AST SERPL-CCNC: 23 U/L (ref 0–31)
BILIRUB SERPL-MCNC: 0.5 MG/DL (ref 0–1.2)
BUN BLDV-MCNC: 24 MG/DL (ref 8–23)
CALCIUM SERPL-MCNC: 9.7 MG/DL (ref 8.6–10.2)
CHLORIDE BLD-SCNC: 106 MMOL/L (ref 98–107)
CO2: 22 MMOL/L (ref 22–29)
CREAT SERPL-MCNC: 1.1 MG/DL (ref 0.5–1)
GFR AFRICAN AMERICAN: 57
GFR NON-AFRICAN AMERICAN: 47 ML/MIN/1.73
GLUCOSE BLD-MCNC: 96 MG/DL (ref 74–99)
HCT VFR BLD CALC: 33.3 % (ref 34–48)
HEMOGLOBIN: 10.8 G/DL (ref 11.5–15.5)
LIPASE: 28 U/L (ref 13–60)
MCH RBC QN AUTO: 31.6 PG (ref 26–35)
MCHC RBC AUTO-ENTMCNC: 32.4 % (ref 32–34.5)
MCV RBC AUTO: 97.4 FL (ref 80–99.9)
PDW BLD-RTO: 15.2 FL (ref 11.5–15)
PLATELET # BLD: 273 E9/L (ref 130–450)
PMV BLD AUTO: 10.7 FL (ref 7–12)
POTASSIUM SERPL-SCNC: 4.4 MMOL/L (ref 3.5–5)
RBC # BLD: 3.42 E12/L (ref 3.5–5.5)
SODIUM BLD-SCNC: 140 MMOL/L (ref 132–146)
TOTAL PROTEIN: 6.7 G/DL (ref 6.4–8.3)
TROPONIN: <0.01 NG/ML (ref 0–0.03)
WBC # BLD: 5.1 E9/L (ref 4.5–11.5)

## 2019-02-07 PROCEDURE — 83690 ASSAY OF LIPASE: CPT

## 2019-02-07 PROCEDURE — 71046 X-RAY EXAM CHEST 2 VIEWS: CPT

## 2019-02-07 PROCEDURE — G0378 HOSPITAL OBSERVATION PER HR: HCPCS

## 2019-02-07 PROCEDURE — 84484 ASSAY OF TROPONIN QUANT: CPT

## 2019-02-07 PROCEDURE — 99285 EMERGENCY DEPT VISIT HI MDM: CPT

## 2019-02-07 PROCEDURE — 85027 COMPLETE CBC AUTOMATED: CPT

## 2019-02-07 PROCEDURE — 6370000000 HC RX 637 (ALT 250 FOR IP): Performed by: EMERGENCY MEDICINE

## 2019-02-07 PROCEDURE — 96360 HYDRATION IV INFUSION INIT: CPT

## 2019-02-07 PROCEDURE — 80053 COMPREHEN METABOLIC PANEL: CPT

## 2019-02-07 PROCEDURE — 96361 HYDRATE IV INFUSION ADD-ON: CPT

## 2019-02-07 PROCEDURE — 2580000003 HC RX 258: Performed by: EMERGENCY MEDICINE

## 2019-02-07 PROCEDURE — 71275 CT ANGIOGRAPHY CHEST: CPT

## 2019-02-07 PROCEDURE — 6360000004 HC RX CONTRAST MEDICATION: Performed by: RADIOLOGY

## 2019-02-07 RX ORDER — POLYVINYL ALCOHOL 14 MG/ML
1 SOLUTION/ DROPS OPHTHALMIC PRN
Status: DISCONTINUED | OUTPATIENT
Start: 2019-02-07 | End: 2019-02-08 | Stop reason: HOSPADM

## 2019-02-07 RX ORDER — CLOTRIMAZOLE AND BETAMETHASONE DIPROPIONATE 10; .64 MG/G; MG/G
CREAM TOPICAL 2 TIMES DAILY
Qty: 1 TUBE | Refills: 5 | Status: SHIPPED
Start: 2019-02-07 | End: 2020-06-16

## 2019-02-07 RX ORDER — HYDROCHLOROTHIAZIDE 25 MG/1
25 TABLET ORAL DAILY
Status: DISCONTINUED | OUTPATIENT
Start: 2019-02-08 | End: 2019-02-08 | Stop reason: HOSPADM

## 2019-02-07 RX ORDER — LACTOBACILLUS RHAMNOSUS GG 10B CELL
1 CAPSULE ORAL DAILY
Status: DISCONTINUED | OUTPATIENT
Start: 2019-02-08 | End: 2019-02-08 | Stop reason: HOSPADM

## 2019-02-07 RX ORDER — ONDANSETRON 2 MG/ML
4 INJECTION INTRAMUSCULAR; INTRAVENOUS EVERY 6 HOURS PRN
Status: DISCONTINUED | OUTPATIENT
Start: 2019-02-07 | End: 2019-02-08 | Stop reason: HOSPADM

## 2019-02-07 RX ORDER — NITROGLYCERIN 0.4 MG/1
0.4 TABLET SUBLINGUAL
Status: ACTIVE | OUTPATIENT
Start: 2019-02-07 | End: 2019-02-07

## 2019-02-07 RX ORDER — FLUTICASONE PROPIONATE 50 MCG
2 SPRAY, SUSPENSION (ML) NASAL DAILY
Status: DISCONTINUED | OUTPATIENT
Start: 2019-02-08 | End: 2019-02-08 | Stop reason: HOSPADM

## 2019-02-07 RX ORDER — CYCLOSPORINE 0.5 MG/ML
1 EMULSION OPHTHALMIC 2 TIMES DAILY
COMMUNITY
End: 2020-02-26 | Stop reason: ALTCHOICE

## 2019-02-07 RX ORDER — ALUMINUM ZIRCONIUM OCTACHLOROHYDREX GLY 16 G/100G
1 GEL TOPICAL DAILY
Status: DISCONTINUED | OUTPATIENT
Start: 2019-02-08 | End: 2019-02-07 | Stop reason: CLARIF

## 2019-02-07 RX ORDER — SODIUM CHLORIDE 0.9 % (FLUSH) 0.9 %
10 SYRINGE (ML) INJECTION EVERY 12 HOURS SCHEDULED
Status: DISCONTINUED | OUTPATIENT
Start: 2019-02-07 | End: 2019-02-08 | Stop reason: HOSPADM

## 2019-02-07 RX ORDER — CLOTRIMAZOLE AND BETAMETHASONE DIPROPIONATE 10; .64 MG/G; MG/G
CREAM TOPICAL 2 TIMES DAILY
Status: DISCONTINUED | OUTPATIENT
Start: 2019-02-07 | End: 2019-02-08 | Stop reason: HOSPADM

## 2019-02-07 RX ORDER — VALSARTAN 160 MG/1
160 TABLET ORAL DAILY
Status: DISCONTINUED | OUTPATIENT
Start: 2019-02-08 | End: 2019-02-08 | Stop reason: HOSPADM

## 2019-02-07 RX ORDER — DOCUSATE SODIUM 100 MG/1
100 CAPSULE, LIQUID FILLED ORAL PRN
Status: DISCONTINUED | OUTPATIENT
Start: 2019-02-07 | End: 2019-02-08 | Stop reason: HOSPADM

## 2019-02-07 RX ORDER — LEUCOVORIN CALCIUM 5 MG/1
25 TABLET ORAL WEEKLY
Status: DISCONTINUED | OUTPATIENT
Start: 2019-02-12 | End: 2019-02-08 | Stop reason: HOSPADM

## 2019-02-07 RX ORDER — ACETAMINOPHEN 325 MG/1
650 TABLET ORAL ONCE
Status: COMPLETED | OUTPATIENT
Start: 2019-02-07 | End: 2019-02-07

## 2019-02-07 RX ORDER — CLOBETASOL PROPIONATE 0.5 MG/G
CREAM TOPICAL 2 TIMES DAILY
Status: DISCONTINUED | OUTPATIENT
Start: 2019-02-07 | End: 2019-02-08 | Stop reason: HOSPADM

## 2019-02-07 RX ORDER — LEVOTHYROXINE SODIUM 0.1 MG/1
100 TABLET ORAL DAILY
Status: DISCONTINUED | OUTPATIENT
Start: 2019-02-08 | End: 2019-02-08 | Stop reason: HOSPADM

## 2019-02-07 RX ORDER — VALSARTAN AND HYDROCHLOROTHIAZIDE 160; 25 MG/1; MG/1
1 TABLET ORAL DAILY
Status: DISCONTINUED | OUTPATIENT
Start: 2019-02-08 | End: 2019-02-07 | Stop reason: CLARIF

## 2019-02-07 RX ORDER — IPRATROPIUM BROMIDE 42 UG/1
2 SPRAY, METERED NASAL 3 TIMES DAILY
Status: DISCONTINUED | OUTPATIENT
Start: 2019-02-07 | End: 2019-02-07 | Stop reason: CLARIF

## 2019-02-07 RX ORDER — MONTELUKAST SODIUM 10 MG/1
10 TABLET ORAL NIGHTLY
Status: DISCONTINUED | OUTPATIENT
Start: 2019-02-07 | End: 2019-02-08 | Stop reason: HOSPADM

## 2019-02-07 RX ORDER — IPRATROPIUM BROMIDE 42 UG/1
2 SPRAY, METERED NASAL 3 TIMES DAILY
Status: DISCONTINUED | OUTPATIENT
Start: 2019-02-07 | End: 2019-02-08 | Stop reason: HOSPADM

## 2019-02-07 RX ORDER — CETIRIZINE HYDROCHLORIDE 10 MG/1
5 TABLET ORAL DAILY
Status: DISCONTINUED | OUTPATIENT
Start: 2019-02-08 | End: 2019-02-08 | Stop reason: HOSPADM

## 2019-02-07 RX ORDER — SODIUM CHLORIDE 0.9 % (FLUSH) 0.9 %
10 SYRINGE (ML) INJECTION PRN
Status: DISCONTINUED | OUTPATIENT
Start: 2019-02-07 | End: 2019-02-08 | Stop reason: HOSPADM

## 2019-02-07 RX ORDER — SODIUM CHLORIDE 9 MG/ML
1000 INJECTION, SOLUTION INTRAVENOUS CONTINUOUS
Status: DISCONTINUED | OUTPATIENT
Start: 2019-02-07 | End: 2019-02-08 | Stop reason: HOSPADM

## 2019-02-07 RX ORDER — FLECAINIDE ACETATE 50 MG/1
50 TABLET ORAL 2 TIMES DAILY
Status: DISCONTINUED | OUTPATIENT
Start: 2019-02-07 | End: 2019-02-08 | Stop reason: HOSPADM

## 2019-02-07 RX ADMIN — IOPAMIDOL 65 ML: 755 INJECTION, SOLUTION INTRAVENOUS at 19:40

## 2019-02-07 RX ADMIN — SODIUM CHLORIDE 1000 ML: 9 INJECTION, SOLUTION INTRAVENOUS at 19:19

## 2019-02-07 RX ADMIN — ACETAMINOPHEN 650 MG: 325 TABLET ORAL at 21:58

## 2019-02-07 ASSESSMENT — PAIN DESCRIPTION - FREQUENCY
FREQUENCY: CONTINUOUS
FREQUENCY: CONTINUOUS

## 2019-02-07 ASSESSMENT — PAIN DESCRIPTION - LOCATION
LOCATION: CHEST
LOCATION: CHEST

## 2019-02-07 ASSESSMENT — PAIN SCALES - GENERAL
PAINLEVEL_OUTOF10: 7
PAINLEVEL_OUTOF10: 5
PAINLEVEL_OUTOF10: 8
PAINLEVEL_OUTOF10: 0
PAINLEVEL_OUTOF10: 0

## 2019-02-07 ASSESSMENT — PAIN DESCRIPTION - DESCRIPTORS
DESCRIPTORS: TIGHTNESS
DESCRIPTORS: PRESSURE

## 2019-02-07 ASSESSMENT — PAIN DESCRIPTION - PAIN TYPE
TYPE: ACUTE PAIN
TYPE: ACUTE PAIN

## 2019-02-07 ASSESSMENT — PAIN DESCRIPTION - ORIENTATION
ORIENTATION: RIGHT;LEFT;MID
ORIENTATION: MID

## 2019-02-07 ASSESSMENT — PAIN SCALES - WONG BAKER: WONGBAKER_NUMERICALRESPONSE: 4

## 2019-02-08 ENCOUNTER — APPOINTMENT (OUTPATIENT)
Dept: NUCLEAR MEDICINE | Age: 84
End: 2019-02-08
Payer: MEDICARE

## 2019-02-08 VITALS
TEMPERATURE: 98 F | BODY MASS INDEX: 24.54 KG/M2 | DIASTOLIC BLOOD PRESSURE: 58 MMHG | OXYGEN SATURATION: 95 % | SYSTOLIC BLOOD PRESSURE: 126 MMHG | HEART RATE: 61 BPM | WEIGHT: 125 LBS | RESPIRATION RATE: 16 BRPM | HEIGHT: 60 IN

## 2019-02-08 PROBLEM — R07.9 CHEST PAIN: Status: RESOLVED | Noted: 2019-02-07 | Resolved: 2019-02-08

## 2019-02-08 LAB
ALBUMIN SERPL-MCNC: 3.9 G/DL (ref 3.5–5.2)
ALP BLD-CCNC: 48 U/L (ref 35–104)
ALT SERPL-CCNC: 14 U/L (ref 0–32)
ANION GAP SERPL CALCULATED.3IONS-SCNC: 11 MMOL/L (ref 7–16)
AST SERPL-CCNC: 19 U/L (ref 0–31)
BILIRUB SERPL-MCNC: 0.6 MG/DL (ref 0–1.2)
BUN BLDV-MCNC: 19 MG/DL (ref 8–23)
CALCIUM SERPL-MCNC: 9.5 MG/DL (ref 8.6–10.2)
CHLORIDE BLD-SCNC: 108 MMOL/L (ref 98–107)
CO2: 23 MMOL/L (ref 22–29)
CREAT SERPL-MCNC: 1 MG/DL (ref 0.5–1)
GFR AFRICAN AMERICAN: >60
GFR NON-AFRICAN AMERICAN: 53 ML/MIN/1.73
GLUCOSE BLD-MCNC: 91 MG/DL (ref 74–99)
LV EF: 95 %
LVEF MODALITY: NORMAL
MAGNESIUM: 1.7 MG/DL (ref 1.6–2.6)
POTASSIUM REFLEX MAGNESIUM: 4 MMOL/L (ref 3.5–5)
SODIUM BLD-SCNC: 142 MMOL/L (ref 132–146)
TOTAL PROTEIN: 6.3 G/DL (ref 6.4–8.3)
TROPONIN: <0.01 NG/ML (ref 0–0.03)

## 2019-02-08 PROCEDURE — 93017 CV STRESS TEST TRACING ONLY: CPT

## 2019-02-08 PROCEDURE — 93018 CV STRESS TEST I&R ONLY: CPT | Performed by: INTERNAL MEDICINE

## 2019-02-08 PROCEDURE — 83735 ASSAY OF MAGNESIUM: CPT

## 2019-02-08 PROCEDURE — 36415 COLL VENOUS BLD VENIPUNCTURE: CPT

## 2019-02-08 PROCEDURE — 6360000002 HC RX W HCPCS: Performed by: INTERNAL MEDICINE

## 2019-02-08 PROCEDURE — 84484 ASSAY OF TROPONIN QUANT: CPT

## 2019-02-08 PROCEDURE — 99219 PR INITIAL OBSERVATION CARE/DAY 50 MINUTES: CPT | Performed by: FAMILY MEDICINE

## 2019-02-08 PROCEDURE — 78452 HT MUSCLE IMAGE SPECT MULT: CPT

## 2019-02-08 PROCEDURE — 6370000000 HC RX 637 (ALT 250 FOR IP): Performed by: STUDENT IN AN ORGANIZED HEALTH CARE EDUCATION/TRAINING PROGRAM

## 2019-02-08 PROCEDURE — 97161 PT EVAL LOW COMPLEX 20 MIN: CPT

## 2019-02-08 PROCEDURE — G0378 HOSPITAL OBSERVATION PER HR: HCPCS

## 2019-02-08 PROCEDURE — 80053 COMPREHEN METABOLIC PANEL: CPT

## 2019-02-08 PROCEDURE — 2580000003 HC RX 258: Performed by: STUDENT IN AN ORGANIZED HEALTH CARE EDUCATION/TRAINING PROGRAM

## 2019-02-08 PROCEDURE — A9500 TC99M SESTAMIBI: HCPCS | Performed by: RADIOLOGY

## 2019-02-08 PROCEDURE — 97165 OT EVAL LOW COMPLEX 30 MIN: CPT

## 2019-02-08 PROCEDURE — 3430000000 HC RX DIAGNOSTIC RADIOPHARMACEUTICAL: Performed by: RADIOLOGY

## 2019-02-08 PROCEDURE — 99215 OFFICE O/P EST HI 40 MIN: CPT | Performed by: INTERNAL MEDICINE

## 2019-02-08 PROCEDURE — 93016 CV STRESS TEST SUPVJ ONLY: CPT | Performed by: INTERNAL MEDICINE

## 2019-02-08 RX ORDER — ACETAMINOPHEN 325 MG/1
650 TABLET ORAL ONCE
Status: COMPLETED | OUTPATIENT
Start: 2019-02-08 | End: 2019-02-08

## 2019-02-08 RX ORDER — PANTOPRAZOLE SODIUM 40 MG/1
40 TABLET, DELAYED RELEASE ORAL
Status: DISCONTINUED | OUTPATIENT
Start: 2019-02-08 | End: 2019-02-08 | Stop reason: HOSPADM

## 2019-02-08 RX ORDER — ACETAMINOPHEN 500 MG
500 TABLET ORAL EVERY 6 HOURS PRN
Status: DISCONTINUED | OUTPATIENT
Start: 2019-02-08 | End: 2019-02-08 | Stop reason: HOSPADM

## 2019-02-08 RX ORDER — IPRATROPIUM BROMIDE 42 UG/1
2 SPRAY, METERED NASAL 3 TIMES DAILY
Qty: 1 BOTTLE | Refills: 3 | Status: SHIPPED | OUTPATIENT
Start: 2019-02-08 | End: 2020-07-12

## 2019-02-08 RX ADMIN — FLUTICASONE PROPIONATE 2 SPRAY: 50 SPRAY, METERED NASAL at 09:20

## 2019-02-08 RX ADMIN — METOPROLOL TARTRATE 12.5 MG: 25 TABLET ORAL at 00:23

## 2019-02-08 RX ADMIN — CLOTRIMAZOLE AND BETAMETHASONE DIPROPIONATE: 10; .5 CREAM TOPICAL at 09:21

## 2019-02-08 RX ADMIN — METOPROLOL TARTRATE 12.5 MG: 25 TABLET ORAL at 09:30

## 2019-02-08 RX ADMIN — FLECAINIDE ACETATE 50 MG: 50 TABLET ORAL at 09:20

## 2019-02-08 RX ADMIN — Medication 30 MILLICURIE: at 12:57

## 2019-02-08 RX ADMIN — Medication 10 ML: at 09:21

## 2019-02-08 RX ADMIN — Medication 10 MILLICURIE: at 12:57

## 2019-02-08 RX ADMIN — CLOBETASOL PROPIONATE: 0.5 CREAM TOPICAL at 09:21

## 2019-02-08 RX ADMIN — CETIRIZINE HYDROCHLORIDE 5 MG: 10 TABLET, FILM COATED ORAL at 09:22

## 2019-02-08 RX ADMIN — LEVOTHYROXINE SODIUM 100 MCG: 100 TABLET ORAL at 09:20

## 2019-02-08 RX ADMIN — Medication 10 ML: at 00:17

## 2019-02-08 RX ADMIN — HYDROCHLOROTHIAZIDE 25 MG: 25 TABLET ORAL at 09:20

## 2019-02-08 RX ADMIN — REGADENOSON 0.4 MG: 0.08 INJECTION, SOLUTION INTRAVENOUS at 14:35

## 2019-02-08 RX ADMIN — ACETAMINOPHEN 650 MG: 325 TABLET ORAL at 07:56

## 2019-02-08 RX ADMIN — PANTOPRAZOLE SODIUM 40 MG: 40 TABLET, DELAYED RELEASE ORAL at 09:21

## 2019-02-08 RX ADMIN — VALSARTAN 160 MG: 160 TABLET ORAL at 09:21

## 2019-02-08 RX ADMIN — RIVAROXABAN 15 MG: 15 TABLET, FILM COATED ORAL at 09:31

## 2019-02-08 ASSESSMENT — PAIN DESCRIPTION - DESCRIPTORS: DESCRIPTORS: ACHING;SORE;DISCOMFORT

## 2019-02-08 ASSESSMENT — PAIN SCALES - GENERAL: PAINLEVEL_OUTOF10: 8

## 2019-02-08 ASSESSMENT — PAIN DESCRIPTION - FREQUENCY: FREQUENCY: INTERMITTENT

## 2019-02-08 ASSESSMENT — PAIN DESCRIPTION - ONSET: ONSET: GRADUAL

## 2019-02-08 ASSESSMENT — PAIN DESCRIPTION - PAIN TYPE: TYPE: CHRONIC PAIN

## 2019-02-08 ASSESSMENT — PAIN DESCRIPTION - LOCATION: LOCATION: GENERALIZED

## 2019-02-12 LAB
BASOPHILS ABSOLUTE: NORMAL /ΜL
BASOPHILS RELATIVE PERCENT: NORMAL %
EOSINOPHILS ABSOLUTE: NORMAL /ΜL
EOSINOPHILS RELATIVE PERCENT: NORMAL %
HCT VFR BLD CALC: NORMAL % (ref 36–46)
HEMOGLOBIN: NORMAL G/DL (ref 12–16)
LYMPHOCYTES ABSOLUTE: NORMAL /ΜL
LYMPHOCYTES RELATIVE PERCENT: NORMAL %
MCH RBC QN AUTO: NORMAL PG
MCHC RBC AUTO-ENTMCNC: NORMAL G/DL
MCV RBC AUTO: NORMAL FL
MONOCYTES ABSOLUTE: NORMAL /ΜL
MONOCYTES RELATIVE PERCENT: NORMAL %
NEUTROPHILS ABSOLUTE: NORMAL /ΜL
NEUTROPHILS RELATIVE PERCENT: NORMAL %
PLATELET # BLD: NORMAL K/ΜL
PMV BLD AUTO: NORMAL FL
RBC # BLD: NORMAL 10^6/ΜL
WBC # BLD: NORMAL 10^3/ML

## 2019-02-13 ENCOUNTER — TELEPHONE (OUTPATIENT)
Dept: CARDIOLOGY CLINIC | Age: 84
End: 2019-02-13

## 2019-02-13 LAB
ALBUMIN SERPL-MCNC: NORMAL G/DL
ALP BLD-CCNC: NORMAL U/L
ALT SERPL-CCNC: NORMAL U/L
ANION GAP SERPL CALCULATED.3IONS-SCNC: NORMAL MMOL/L
AST SERPL-CCNC: NORMAL U/L
BILIRUB SERPL-MCNC: NORMAL MG/DL (ref 0.1–1.4)
BUN BLDV-MCNC: NORMAL MG/DL
CALCIUM SERPL-MCNC: NORMAL MG/DL
CHLORIDE BLD-SCNC: NORMAL MMOL/L
CO2: NORMAL MMOL/L
CREAT SERPL-MCNC: 1.02 MG/DL
GFR CALCULATED: NORMAL
GLUCOSE BLD-MCNC: NORMAL MG/DL
POTASSIUM SERPL-SCNC: 4.6 MMOL/L
SODIUM BLD-SCNC: NORMAL MMOL/L
TOTAL PROTEIN: NORMAL

## 2019-02-21 LAB
EKG ATRIAL RATE: 70 BPM
EKG P AXIS: 40 DEGREES
EKG P-R INTERVAL: 162 MS
EKG Q-T INTERVAL: 398 MS
EKG QRS DURATION: 78 MS
EKG QTC CALCULATION (BAZETT): 429 MS
EKG R AXIS: -11 DEGREES
EKG T AXIS: -7 DEGREES
EKG VENTRICULAR RATE: 70 BPM

## 2019-03-20 ENCOUNTER — OFFICE VISIT (OUTPATIENT)
Dept: CARDIOLOGY CLINIC | Age: 84
End: 2019-03-20
Payer: MEDICARE

## 2019-03-20 VITALS
RESPIRATION RATE: 16 BRPM | SYSTOLIC BLOOD PRESSURE: 100 MMHG | DIASTOLIC BLOOD PRESSURE: 60 MMHG | BODY MASS INDEX: 24.19 KG/M2 | HEART RATE: 55 BPM | HEIGHT: 60 IN | WEIGHT: 123.2 LBS

## 2019-03-20 DIAGNOSIS — R00.2 PALPITATIONS: Primary | ICD-10-CM

## 2019-03-20 PROCEDURE — 99214 OFFICE O/P EST MOD 30 MIN: CPT | Performed by: INTERNAL MEDICINE

## 2019-03-20 PROCEDURE — 93000 ELECTROCARDIOGRAM COMPLETE: CPT | Performed by: INTERNAL MEDICINE

## 2019-03-20 RX ORDER — FAMOTIDINE 10 MG
10 TABLET ORAL NIGHTLY
COMMUNITY
End: 2020-02-26 | Stop reason: ALTCHOICE

## 2019-03-20 RX ORDER — AMLODIPINE BESYLATE 2.5 MG/1
TABLET ORAL
COMMUNITY
End: 2020-01-23

## 2019-03-21 ENCOUNTER — OFFICE VISIT (OUTPATIENT)
Dept: FAMILY MEDICINE CLINIC | Age: 84
End: 2019-03-21
Payer: MEDICARE

## 2019-03-21 VITALS
DIASTOLIC BLOOD PRESSURE: 70 MMHG | BODY MASS INDEX: 24.15 KG/M2 | RESPIRATION RATE: 14 BRPM | WEIGHT: 123 LBS | SYSTOLIC BLOOD PRESSURE: 130 MMHG | OXYGEN SATURATION: 97 % | HEIGHT: 60 IN | TEMPERATURE: 98.5 F | HEART RATE: 62 BPM

## 2019-03-21 DIAGNOSIS — K44.9 HIATAL HERNIA: ICD-10-CM

## 2019-03-21 DIAGNOSIS — M05.79 RHEUMATOID ARTHRITIS INVOLVING MULTIPLE SITES WITH POSITIVE RHEUMATOID FACTOR (HCC): ICD-10-CM

## 2019-03-21 DIAGNOSIS — I10 ESSENTIAL HYPERTENSION: ICD-10-CM

## 2019-03-21 DIAGNOSIS — Z79.899 MEDICATION MANAGEMENT: ICD-10-CM

## 2019-03-21 DIAGNOSIS — I48.0 PAROXYSMAL ATRIAL FIBRILLATION (HCC): Primary | ICD-10-CM

## 2019-03-21 PROCEDURE — 99213 OFFICE O/P EST LOW 20 MIN: CPT | Performed by: FAMILY MEDICINE

## 2019-03-21 RX ORDER — OMEPRAZOLE 40 MG/1
40 CAPSULE, DELAYED RELEASE ORAL 2 TIMES DAILY
Qty: 180 CAPSULE | Refills: 3
Start: 2019-03-21 | End: 2019-04-25 | Stop reason: SDUPTHER

## 2019-03-21 SDOH — SOCIAL STABILITY: SOCIAL NETWORK: ARE YOU MARRIED, WIDOWED, DIVORCED, SEPARATED, NEVER MARRIED, OR LIVING WITH A PARTNER?: WIDOWED

## 2019-03-22 ASSESSMENT — ENCOUNTER SYMPTOMS
SHORTNESS OF BREATH: 0
CONSTIPATION: 0
TROUBLE SWALLOWING: 0
BLOOD IN STOOL: 0
BACK PAIN: 1

## 2019-04-01 ENCOUNTER — TELEPHONE (OUTPATIENT)
Dept: NON INVASIVE DIAGNOSTICS | Age: 84
End: 2019-04-01

## 2019-04-01 NOTE — TELEPHONE ENCOUNTER
LVM for patient to call office about canceled appointment in March with [facility]. Patient does not have a device but does follow with Dr. Dione Hough. Patient is on Flecainide and is current with labs and EKG. If patient has any issues she is to call our office.

## 2019-04-16 NOTE — TELEPHONE ENCOUNTER
Pt returning call and notified of message below. She states understanding. She is on the recall list for a 6 mth OV with Valorie Mullins in Sept 2019. She will call either office if she has any issues in the future.

## 2019-04-18 LAB
ALBUMIN SERPL-MCNC: NORMAL G/DL
ALP BLD-CCNC: NORMAL U/L
ALT SERPL-CCNC: NORMAL U/L
ANION GAP SERPL CALCULATED.3IONS-SCNC: NORMAL MMOL/L
AST SERPL-CCNC: NORMAL U/L
BASOPHILS ABSOLUTE: NORMAL /ΜL
BASOPHILS RELATIVE PERCENT: NORMAL %
BILIRUB SERPL-MCNC: NORMAL MG/DL (ref 0.1–1.4)
BUN BLDV-MCNC: NORMAL MG/DL
CALCIUM SERPL-MCNC: NORMAL MG/DL
CHLORIDE BLD-SCNC: NORMAL MMOL/L
CO2: NORMAL MMOL/L
CREAT SERPL-MCNC: 1 MG/DL
EOSINOPHILS ABSOLUTE: NORMAL /ΜL
EOSINOPHILS RELATIVE PERCENT: NORMAL %
GFR CALCULATED: NORMAL
GLUCOSE BLD-MCNC: NORMAL MG/DL
HCT VFR BLD CALC: NORMAL % (ref 36–46)
HEMOGLOBIN: NORMAL G/DL (ref 12–16)
LYMPHOCYTES ABSOLUTE: NORMAL /ΜL
LYMPHOCYTES RELATIVE PERCENT: NORMAL %
MCH RBC QN AUTO: NORMAL PG
MCHC RBC AUTO-ENTMCNC: NORMAL G/DL
MCV RBC AUTO: NORMAL FL
MONOCYTES ABSOLUTE: NORMAL /ΜL
MONOCYTES RELATIVE PERCENT: NORMAL %
NEUTROPHILS ABSOLUTE: NORMAL /ΜL
NEUTROPHILS RELATIVE PERCENT: NORMAL %
PLATELET # BLD: NORMAL K/ΜL
PMV BLD AUTO: NORMAL FL
POTASSIUM SERPL-SCNC: 4.9 MMOL/L
RBC # BLD: NORMAL 10^6/ΜL
SODIUM BLD-SCNC: NORMAL MMOL/L
TOTAL PROTEIN: NORMAL
WBC # BLD: NORMAL 10^3/ML

## 2019-04-19 DIAGNOSIS — I10 ESSENTIAL HYPERTENSION: ICD-10-CM

## 2019-04-19 DIAGNOSIS — I48.0 PAROXYSMAL ATRIAL FIBRILLATION (HCC): ICD-10-CM

## 2019-04-25 DIAGNOSIS — K44.9 HIATAL HERNIA: ICD-10-CM

## 2019-04-25 DIAGNOSIS — Z79.899 MEDICATION MANAGEMENT: ICD-10-CM

## 2019-04-25 RX ORDER — OMEPRAZOLE 40 MG/1
40 CAPSULE, DELAYED RELEASE ORAL 2 TIMES DAILY
Qty: 180 CAPSULE | Refills: 3 | Status: SHIPPED
Start: 2019-04-25 | End: 2020-04-16 | Stop reason: SDUPTHER

## 2019-04-29 DIAGNOSIS — Z79.899 ENCOUNTER FOR MONITORING FLECAINIDE THERAPY: Primary | ICD-10-CM

## 2019-04-29 DIAGNOSIS — Z51.81 ENCOUNTER FOR MONITORING FLECAINIDE THERAPY: Primary | ICD-10-CM

## 2019-06-04 ENCOUNTER — OFFICE VISIT (OUTPATIENT)
Dept: FAMILY MEDICINE CLINIC | Age: 84
End: 2019-06-04
Payer: MEDICARE

## 2019-06-04 ENCOUNTER — HOSPITAL ENCOUNTER (OUTPATIENT)
Age: 84
Discharge: HOME | End: 2019-06-06
Payer: MEDICARE

## 2019-06-04 VITALS
SYSTOLIC BLOOD PRESSURE: 133 MMHG | HEART RATE: 63 BPM | WEIGHT: 126 LBS | OXYGEN SATURATION: 97 % | HEIGHT: 60 IN | DIASTOLIC BLOOD PRESSURE: 64 MMHG | BODY MASS INDEX: 24.74 KG/M2 | RESPIRATION RATE: 18 BRPM

## 2019-06-04 DIAGNOSIS — R29.898 WEAKNESS OF BOTH LOWER EXTREMITIES: Primary | ICD-10-CM

## 2019-06-04 DIAGNOSIS — Z86.018 HISTORY OF MENINGIOMA: ICD-10-CM

## 2019-06-04 DIAGNOSIS — R29.898 WEAKNESS OF BOTH LOWER EXTREMITIES: ICD-10-CM

## 2019-06-04 DIAGNOSIS — R53.83 FATIGUE, UNSPECIFIED TYPE: ICD-10-CM

## 2019-06-04 LAB
BACTERIA: ABNORMAL /HPF
BILIRUBIN URINE: NEGATIVE
BILIRUBIN, POC: NEGATIVE
BLOOD URINE, POC: NEGATIVE
BLOOD, URINE: NEGATIVE
CLARITY, POC: CLEAR
CLARITY: CLEAR
COLOR, POC: YELLOW
COLOR: YELLOW
EPITHELIAL CELLS, UA: ABNORMAL /HPF
GLUCOSE URINE, POC: NEGATIVE
GLUCOSE URINE: NEGATIVE MG/DL
KETONES, POC: NEGATIVE
KETONES, URINE: NEGATIVE MG/DL
LEUKOCYTE EST, POC: ABNORMAL
LEUKOCYTE ESTERASE, URINE: ABNORMAL
NITRITE, POC: NEGATIVE
NITRITE, URINE: NEGATIVE
PH UA: 5 (ref 5–9)
PH, POC: 5.5
PROTEIN UA: NEGATIVE MG/DL
PROTEIN, POC: NEGATIVE
RBC UA: ABNORMAL /HPF (ref 0–2)
SPECIFIC GRAVITY UA: 1.02 (ref 1–1.03)
SPECIFIC GRAVITY, POC: 1.02
UROBILINOGEN, POC: 0.2
UROBILINOGEN, URINE: 0.2 E.U./DL
WBC UA: ABNORMAL /HPF (ref 0–5)
YEAST: ABNORMAL

## 2019-06-04 PROCEDURE — 81002 URINALYSIS NONAUTO W/O SCOPE: CPT | Performed by: STUDENT IN AN ORGANIZED HEALTH CARE EDUCATION/TRAINING PROGRAM

## 2019-06-04 PROCEDURE — 81001 URINALYSIS AUTO W/SCOPE: CPT

## 2019-06-04 PROCEDURE — 87088 URINE BACTERIA CULTURE: CPT

## 2019-06-04 PROCEDURE — 99213 OFFICE O/P EST LOW 20 MIN: CPT | Performed by: STUDENT IN AN ORGANIZED HEALTH CARE EDUCATION/TRAINING PROGRAM

## 2019-06-04 NOTE — PROGRESS NOTES
tablet 3    valsartan-hydrochlorothiazide (DIOVAN HCT) 160-25 MG per tablet Take 1 tablet by mouth daily 90 tablet 3    ondansetron (ZOFRAN) 4 MG tablet Take 4 mg by mouth every 8 hours as needed for Nausea or Vomiting      metoprolol tartrate (LOPRESSOR) 25 MG tablet Take 0.5 tablets by mouth 2 times daily 90 tablet 3    fluticasone (FLONASE) 50 MCG/ACT nasal spray 2 sprays by Nasal route daily 2 sprays in each nostril daily 1 Bottle 11    celecoxib (CELEBREX) 200 MG capsule TAKE 1 CAPSULE DAILY 90 capsule 3    clobetasol (TEMOVATE) 0.05 % cream Apply topically 2 times daily (Patient taking differently: Apply topically as needed ) 1 Tube 1    Probiotic Product (ACIDOPHILUS PROBIOTIC) CAPS capsule Take 1 capsule by mouth daily      docusate sodium (COLACE, DULCOLAX) 100 MG CAPS Take 100 mg by mouth 2 times daily. (Patient taking differently: Take 100 mg by mouth as needed ) 60 capsule 0    fexofenadine (ALLEGRA) 180 MG tablet Take 180 mg by mouth daily.  leucovorin calcium (WELLCOVORIN) 5 MG tablet Take 25 mg by mouth once a week.  methotrexate 2.5 MG tablet Take by mouth once a week 2 tabs at lunch and 4 tabs  in the pm once a week on Friday       No current facility-administered medications for this visit.          PastMedical/Surgical Hx;  Reviewed with patient      Diagnosis Date    [facility] fibrillation ([facility] [address]     C. 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[facility])     thoracic; Dr Pancho Garcia; no plans for surgery    Neuropathy     chronic; 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     Osteoporosis     PAF (paroxysmal atrial fibrillation) (HCC)     anticoagulation per cardiology     Rheumatoid arthritis with rheumatoid factor (HCC)     Rheumatoid arthritis(714.0)     Dr Hoda Castañeda; on DMD [facility])    Rib fractures 2014    Skin cancer of lip     squamous cell    Staph infection 2017    left ankle    Tachyarrhythmia     PSVT; later A-fib; sees Dr Aroldo Montes      Past Surgical History:   Procedure Laterality Date    CARDIAC CATHETERIZATION      CARPAL TUNNEL RELEASE       SECTION      COLONOSCOPY  2011    Dr Dc Mccord; diverticular disease; repeat in 2016    GASTRIC FUNDOPLICATION  [address], TOTAL ABDOMINAL      TOTAL KNEE ARTHROPLASTY  2007    bilateral       Past Family Hx:  Reviewed with patient      Problem Relation Age of Onset    Heart Attack Mother 58    Other Father         suicide, depression, leg trouble    Other Sister         Dementia    Hypertension Sister     Hypertension Sister     Hypertension Brother     Thyroid Disease Sister     Rheum Arthritis Sister     Rheum Arthritis Brother     Other Daughter         hemochromatosis     Other Son         hemachromatosis        Social Hx:  Reviewed with patient  Social History     Tobacco Use    Smoking status: Never Smoker    Smokeless tobacco: Never Used   Substance Use Topics    Alcohol use: No       Immunization History   Administered Date(s) Administered    Influenza, High Dose (Fluzone 65 yrs and older) 2014, 10/06/2015, 10/21/2016, 10/02/2017, 10/05/2018    Pneumococcal 13-valent Conjugate ([id number]) 10/22/2015    Pneumococcal Polysaccharide ([id number]) 2017    Tdap (Boostrix, Adacel) 2011    Zoster Live (Zostavax) 2012    Zoster Subunit (Shingrix) 2018, 2019       Review of Systems  Review of Systems   Constitutional: Positive for activity change and fatigue.  Negative for fever. Respiratory: Negative for cough, shortness of breath and wheezing. Cardiovascular: Negative for chest pain and palpitations. Gastrointestinal: Negative for abdominal distention, abdominal pain and blood in stool. Neurological: Positive for dizziness and numbness. Negative for light-headedness. PE:  VS:  /64   Pulse 63   Resp 18   Ht 5' (1.524 m)   Wt 126 lb (57.2 kg)   SpO2 97%   BMI 24.61 kg/m²   Physical Exam   Constitutional: She appears well-developed and well-nourished. Neck: No thyromegaly present. Cardiovascular: Normal rate and regular rhythm. No murmur heard. Pulmonary/Chest: Effort normal and breath sounds normal. No respiratory distress. Abdominal: Bowel sounds are normal. She exhibits no distension. Musculoskeletal: She exhibits no edema or tenderness. Lymphadenopathy:     She has no cervical adenopathy. Neurological: She displays normal reflexes. No cranial nerve deficit. Assessment/Plan:  Esme Wilson was seen today for numbness. Diagnoses and all orders for this visit:    Weakness of both lower extremities:  Patient has weakness and numbness of both lower extremities. This could be due to her meningioma or thyroid, anemia, UTI, electrolyte abnormality. We'll check for all these etiologies. Patient advised about safety and when to return. -     CBC Auto Differential; Future  -     COMPREHENSIVE METABOLIC PANEL; Future  -     TSH; Future  -     Cancel: Urinalysis; Future  -     MRI Cervical Spine WO Contrast; Future  -     MRI Lumbar Spine WO Contrast; Future  -     URINE CULTURE; Future  -     POCT Urinalysis no Micro  -     External Referral To Physical Therapy    Fatigue, unspecified type  -     CBC Auto Differential; Future  -     COMPREHENSIVE METABOLIC PANEL; Future  -     TSH; Future  -     Cancel: Urinalysis;  Future  -     MRI Cervical Spine WO Contrast; Future  -     URINE CULTURE; Future  -     Urinalysis with Microscopic;

## 2019-06-04 NOTE — PROGRESS NOTES
[facility]  Precepting Note    Subjective:  Weakness and leg numbness, worsened over past 1-2 weeks. Normally able to ambulate with walker or cane. Had been going to PT twice weekly. Having more difficulty to get up and out of chairs. Feels unsure of footing, feels she could fall. Worried her knees will give out. Not having inc pain. ROS otherwise negative     Past medical, surgical, family and social history were reviewed, non-contributory, and unchanged unless otherwise stated. Objective:    /64   Pulse 63   Resp 18   Ht 5' (1.524 m)   Wt 126 lb (57.2 kg)   SpO2 97%   BMI 24.61 kg/m²     Exam is as noted by resident with the following changes, additions or corrections:    General:  NAD; alert & oriented x 3   Heart:  RRR, no murmurs, gallops, or rubs. Lungs:  CTA bilaterally, no wheeze, rales or rhonchi  Abd: bowel sounds present, nontender, nondistended, no masses  Extrem:  No clubbing, cyanosis, or edema  Neuro: left leg weaker than right (chronic). Reflexes normal in LE BL. Assessment/Plan:  Lower extremity weakness - progressed over past 2 weeks  Consider MRI spinal cord (if able to have and willing to have) to evaluate known enlarging meningioma - suspect cord compression. Check some acute causes of generalized fatigue  Labs, UA   Close follow up with pcp     Attending Physician Statement  I have reviewed the chart, including any radiology or labs. I have discussed the case, including pertinent history and exam findings with the resident. I agree with the assessment, plan and orders as documented by the resident. Please refer to the resident note for additional information.       Electronically signed by Pattie Ribeiro MD on 6/4/2019 at 12:00 PM

## 2019-06-07 LAB — URINE CULTURE, ROUTINE: NORMAL

## 2019-06-10 ENCOUNTER — HOSPITAL ENCOUNTER (OUTPATIENT)
Age: 84
Discharge: HOME | End: 2019-06-12
Payer: MEDICARE

## 2019-06-10 DIAGNOSIS — Z51.81 ENCOUNTER FOR MONITORING FLECAINIDE THERAPY: ICD-10-CM

## 2019-06-10 DIAGNOSIS — R53.83 FATIGUE, UNSPECIFIED TYPE: ICD-10-CM

## 2019-06-10 DIAGNOSIS — I48.0 PAROXYSMAL ATRIAL FIBRILLATION (HCC): ICD-10-CM

## 2019-06-10 DIAGNOSIS — R29.898 WEAKNESS OF BOTH LOWER EXTREMITIES: ICD-10-CM

## 2019-06-10 DIAGNOSIS — I10 ESSENTIAL HYPERTENSION: ICD-10-CM

## 2019-06-10 DIAGNOSIS — Z79.899 ENCOUNTER FOR MONITORING FLECAINIDE THERAPY: ICD-10-CM

## 2019-06-10 LAB
ALBUMIN SERPL-MCNC: 3.9 G/DL (ref 3.5–5.2)
ALP BLD-CCNC: 58 U/L (ref 35–104)
ALT SERPL-CCNC: 13 U/L (ref 0–32)
ANION GAP SERPL CALCULATED.3IONS-SCNC: 19 MMOL/L (ref 7–16)
AST SERPL-CCNC: 28 U/L (ref 0–31)
BASOPHILS ABSOLUTE: 0.03 E9/L (ref 0–0.2)
BASOPHILS RELATIVE PERCENT: 0.6 % (ref 0–2)
BILIRUB SERPL-MCNC: 0.2 MG/DL (ref 0–1.2)
BUN BLDV-MCNC: 38 MG/DL (ref 8–23)
CALCIUM SERPL-MCNC: 9.7 MG/DL (ref 8.6–10.2)
CHLORIDE BLD-SCNC: 108 MMOL/L (ref 98–107)
CHOLESTEROL, TOTAL: 174 MG/DL (ref 0–199)
CO2: 16 MMOL/L (ref 22–29)
CREAT SERPL-MCNC: 1.4 MG/DL (ref 0.5–1)
EOSINOPHILS ABSOLUTE: 0.21 E9/L (ref 0.05–0.5)
EOSINOPHILS RELATIVE PERCENT: 4.2 % (ref 0–6)
GFR AFRICAN AMERICAN: 43
GFR NON-AFRICAN AMERICAN: 36 ML/MIN/1.73
GLUCOSE BLD-MCNC: 95 MG/DL (ref 74–99)
HCT VFR BLD CALC: 31.7 % (ref 34–48)
HDLC SERPL-MCNC: 49 MG/DL
HEMOGLOBIN: 9.5 G/DL (ref 11.5–15.5)
IMMATURE GRANULOCYTES #: 0.03 E9/L
IMMATURE GRANULOCYTES %: 0.6 % (ref 0–5)
LDL CHOLESTEROL CALCULATED: 99 MG/DL (ref 0–99)
LYMPHOCYTES ABSOLUTE: 0.75 E9/L (ref 1.5–4)
LYMPHOCYTES RELATIVE PERCENT: 14.9 % (ref 20–42)
MAGNESIUM: 2.1 MG/DL (ref 1.6–2.6)
MCH RBC QN AUTO: 31.3 PG (ref 26–35)
MCHC RBC AUTO-ENTMCNC: 30 % (ref 32–34.5)
MCV RBC AUTO: 104.3 FL (ref 80–99.9)
MONOCYTES ABSOLUTE: 0.74 E9/L (ref 0.1–0.95)
MONOCYTES RELATIVE PERCENT: 14.7 % (ref 2–12)
NEUTROPHILS ABSOLUTE: 3.27 E9/L (ref 1.8–7.3)
NEUTROPHILS RELATIVE PERCENT: 65 % (ref 43–80)
PDW BLD-RTO: 16.9 FL (ref 11.5–15)
PLATELET # BLD: 326 E9/L (ref 130–450)
PMV BLD AUTO: 10.8 FL (ref 7–12)
POTASSIUM SERPL-SCNC: 5.2 MMOL/L (ref 3.5–5)
RBC # BLD: 3.04 E12/L (ref 3.5–5.5)
SODIUM BLD-SCNC: 143 MMOL/L (ref 132–146)
TOTAL PROTEIN: 6.4 G/DL (ref 6.4–8.3)
TRIGL SERPL-MCNC: 129 MG/DL (ref 0–149)
TSH SERPL DL<=0.05 MIU/L-ACNC: 5.41 UIU/ML (ref 0.27–4.2)
VLDLC SERPL CALC-MCNC: 26 MG/DL
WBC # BLD: 5 E9/L (ref 4.5–11.5)

## 2019-06-10 PROCEDURE — 80053 COMPREHEN METABOLIC PANEL: CPT

## 2019-06-10 PROCEDURE — 83735 ASSAY OF MAGNESIUM: CPT

## 2019-06-10 PROCEDURE — 80061 LIPID PANEL: CPT

## 2019-06-10 PROCEDURE — 85025 COMPLETE CBC W/AUTO DIFF WBC: CPT

## 2019-06-10 PROCEDURE — 84443 ASSAY THYROID STIM HORMONE: CPT

## 2019-06-10 PROCEDURE — 36415 COLL VENOUS BLD VENIPUNCTURE: CPT

## 2019-06-11 ASSESSMENT — ENCOUNTER SYMPTOMS
ABDOMINAL PAIN: 0
COUGH: 0
WHEEZING: 0
BLOOD IN STOOL: 0
ABDOMINAL DISTENTION: 0
SHORTNESS OF BREATH: 0

## 2019-06-12 ENCOUNTER — HOSPITAL ENCOUNTER (OUTPATIENT)
Dept: MRI IMAGING | Age: 84
Discharge: HOME | End: 2019-06-14
Payer: MEDICARE

## 2019-06-12 DIAGNOSIS — R53.83 FATIGUE, UNSPECIFIED TYPE: ICD-10-CM

## 2019-06-12 DIAGNOSIS — R29.898 WEAKNESS OF BOTH LOWER EXTREMITIES: ICD-10-CM

## 2019-06-12 DIAGNOSIS — Z86.018 HISTORY OF MENINGIOMA: ICD-10-CM

## 2019-06-12 PROCEDURE — 72148 MRI LUMBAR SPINE W/O DYE: CPT

## 2019-06-12 PROCEDURE — 72141 MRI NECK SPINE W/O DYE: CPT

## 2019-06-13 DIAGNOSIS — D64.9 ANEMIA, UNSPECIFIED TYPE: Primary | ICD-10-CM

## 2019-06-14 ENCOUNTER — HOSPITAL ENCOUNTER (OUTPATIENT)
Age: 84
Discharge: HOME | End: 2019-06-16
Payer: MEDICARE

## 2019-06-14 DIAGNOSIS — D64.9 ANEMIA, UNSPECIFIED TYPE: ICD-10-CM

## 2019-06-14 LAB
BASOPHILS ABSOLUTE: 0.03 E9/L (ref 0–0.2)
BASOPHILS RELATIVE PERCENT: 0.7 % (ref 0–2)
EOSINOPHILS ABSOLUTE: 0.15 E9/L (ref 0.05–0.5)
EOSINOPHILS RELATIVE PERCENT: 3.3 % (ref 0–6)
FERRITIN: 25 NG/ML
FOLATE: >20 NG/ML (ref 4.8–24.2)
HCT VFR BLD CALC: 31.4 % (ref 34–48)
HEMOGLOBIN: 9.6 G/DL (ref 11.5–15.5)
IMMATURE GRANULOCYTES #: 0.02 E9/L
IMMATURE GRANULOCYTES %: 0.4 % (ref 0–5)
IMMATURE RETIC FRACT: 20.1 % (ref 3–15.9)
IRON SATURATION: 22 % (ref 15–50)
IRON: 78 MCG/DL (ref 37–145)
LYMPHOCYTES ABSOLUTE: 0.72 E9/L (ref 1.5–4)
LYMPHOCYTES RELATIVE PERCENT: 15.9 % (ref 20–42)
MCH RBC QN AUTO: 30.9 PG (ref 26–35)
MCHC RBC AUTO-ENTMCNC: 30.6 % (ref 32–34.5)
MCV RBC AUTO: 101 FL (ref 80–99.9)
MONOCYTES ABSOLUTE: 0.56 E9/L (ref 0.1–0.95)
MONOCYTES RELATIVE PERCENT: 12.3 % (ref 2–12)
NEUTROPHILS ABSOLUTE: 3.06 E9/L (ref 1.8–7.3)
NEUTROPHILS RELATIVE PERCENT: 67.4 % (ref 43–80)
PDW BLD-RTO: 16.2 FL (ref 11.5–15)
PLATELET # BLD: 364 E9/L (ref 130–450)
PMV BLD AUTO: 10.5 FL (ref 7–12)
RBC # BLD: 3.11 E12/L (ref 3.5–5.5)
RETIC HGB EQUIVALENT: 35.4 PG (ref 28.2–36.6)
RETICULOCYTE ABSOLUTE COUNT: 0.05 E12/L
RETICULOCYTE COUNT PCT: 1.5 % (ref 0.4–1.9)
TOTAL IRON BINDING CAPACITY: 359 MCG/DL (ref 250–450)
TRANSFERRIN: 308 MG/DL (ref 200–360)
VITAMIN B-12: 330 PG/ML (ref 211–946)
WBC # BLD: 4.5 E9/L (ref 4.5–11.5)

## 2019-06-14 PROCEDURE — 83540 ASSAY OF IRON: CPT

## 2019-06-14 PROCEDURE — 83550 IRON BINDING TEST: CPT

## 2019-06-14 PROCEDURE — 82746 ASSAY OF FOLIC ACID SERUM: CPT

## 2019-06-14 PROCEDURE — 82607 VITAMIN B-12: CPT

## 2019-06-14 PROCEDURE — 85025 COMPLETE CBC W/AUTO DIFF WBC: CPT

## 2019-06-14 PROCEDURE — 85045 AUTOMATED RETICULOCYTE COUNT: CPT

## 2019-06-14 PROCEDURE — 84466 ASSAY OF TRANSFERRIN: CPT

## 2019-06-14 PROCEDURE — 36415 COLL VENOUS BLD VENIPUNCTURE: CPT

## 2019-06-14 PROCEDURE — 82728 ASSAY OF FERRITIN: CPT

## 2019-06-18 ENCOUNTER — OFFICE VISIT (OUTPATIENT)
Dept: FAMILY MEDICINE CLINIC | Age: 84
End: 2019-06-18
Payer: MEDICARE

## 2019-06-18 VITALS
OXYGEN SATURATION: 98 % | SYSTOLIC BLOOD PRESSURE: 140 MMHG | DIASTOLIC BLOOD PRESSURE: 58 MMHG | HEIGHT: 60 IN | RESPIRATION RATE: 18 BRPM | WEIGHT: 126 LBS | HEART RATE: 59 BPM | BODY MASS INDEX: 24.74 KG/M2

## 2019-06-18 DIAGNOSIS — M54.16 LUMBAR RADICULOPATHY: ICD-10-CM

## 2019-06-18 DIAGNOSIS — Z86.018 HISTORY OF MENINGIOMA: ICD-10-CM

## 2019-06-18 DIAGNOSIS — R29.898 WEAKNESS OF BOTH LOWER EXTREMITIES: Primary | ICD-10-CM

## 2019-06-18 PROCEDURE — 99213 OFFICE O/P EST LOW 20 MIN: CPT | Performed by: STUDENT IN AN ORGANIZED HEALTH CARE EDUCATION/TRAINING PROGRAM

## 2019-06-18 NOTE — PROGRESS NOTES
S: 80 y.o. female with   Chief Complaint   Patient presents with    Results       Follow up balance issues and weakness in RLE. History of spine meningioma. She had some neurological sx in LE and repeat imaging was obtained showing no changes to meningioma. She however is describing numbness from R foot up into thigh. BP Readings from Last 3 Encounters:   06/18/19 (!) 140/58   06/04/19 133/64   03/21/19 130/70       O: VS:  height is 5' (1.524 m) and weight is 126 lb (57.2 kg). Her blood pressure is 140/58 (abnormal) and her pulse is 59. Her respiration is 18 and oxygen saturation is 98%. AAO/NAD, appropriate affect for mood  CV:  RRR, Sys murmur  Resp: CTAB  LE with 4+/5 str b/l LE. Diminished sensation RLE. Impression/Plan:   1) Numbness - refer to Dr Hao De La Torre. Also will refer to PM&R        There are no preventive care reminders to display for this patient. Attending Physician Statement  I have discussed the case, including pertinent history and exam findings with the resident. I agree with the documented assessment and plan.       Eva Campa MD

## 2019-06-18 NOTE — PROGRESS NOTES
6/19/2019    Yanci Jones is a 80 y.o. female here for   Chief Complaint   Patient presents with    Results   Patient is here for a follow up of her numbness and balance issues. She has a history of meningioma in her spine which was previously stable but was found to have increased in size in Feb 2019. Since it was asymptomatic, patient had decline work up at that time. Last visit she reported increased numbness and tingling, right LE more than left. She also had worsening balance. In light of her history, we suggested MRI for the mass and patient agreed. MRI was reviewed. Showed mass and also L4 root compression. Multiple disc bulging. Patient has previously seen Dr. Kita Treadwell, about 4 yrs ago. In the interim, patient reports that her numbness has progressed. Her sensory symptoms now progress up to her thigh, on the medial side. SHe has numbness,tingling and pain. Right foot is worse than left. Cannot walk in bare feet. They are very sensitive when she walks. She has burning pain when she walks.       Allergies   Allergen Reactions    Amoxicillin-Pot Clavulanate     Milk-Related Compounds Nausea And Vomiting       Medications  Current Outpatient Medications   Medication Sig Dispense Refill    omeprazole (PRILOSEC) 40 MG delayed release capsule Take 1 capsule by mouth 2 times daily 180 capsule 3    amLODIPine (NORVASC) 2.5 MG tablet Take 2.5 mg by mouth daily      Multiple Vitamins-Minerals (MULTIVITAMIN ADULT PO) Take by mouth      famotidine (PEPCID) 10 MG tablet Take 10 mg by mouth nightly       polyethyl glycol-propyl glycol 0.4-0.3 % (SYSTANE) 0.4-0.3 % ophthalmic solution 1 drop as needed for Dry Eyes      ipratropium (ATROVENT) 0.06 % nasal spray 2 sprays by Nasal route 3 times daily 1 Bottle 3    clotrimazole-betamethasone (LOTRISONE) 1-0.05 % cream Apply topically 2 times daily 1 Tube 5    cycloSPORINE (RESTASIS) 0.05 % ophthalmic emulsion Place 1 drop into both eyes 2 times daily      levothyroxine (LEVOTHROID) 100 MCG tablet Take 1 tablet by mouth Daily 90 tablet 1    montelukast (SINGULAIR) 10 MG tablet TAKE 1 TABLET NIGHTLY 90 tablet 1    rivaroxaban (XARELTO) 15 MG TABS tablet Take 1 tablet by mouth daily (with breakfast) 90 tablet 3    flecainide (TAMBOCOR) 50 MG tablet Take 1 tablet by mouth 2 times daily 180 tablet 3    valsartan-hydrochlorothiazide (DIOVAN HCT) 160-25 MG per tablet Take 1 tablet by mouth daily 90 tablet 3    ondansetron (ZOFRAN) 4 MG tablet Take 4 mg by mouth every 8 hours as needed for Nausea or Vomiting      metoprolol tartrate (LOPRESSOR) 25 MG tablet Take 0.5 tablets by mouth 2 times daily 90 tablet 3    fluticasone (FLONASE) 50 MCG/ACT nasal spray 2 sprays by Nasal route daily 2 sprays in each nostril daily 1 Bottle 11    celecoxib (CELEBREX) 200 MG capsule TAKE 1 CAPSULE DAILY 90 capsule 3    clobetasol (TEMOVATE) 0.05 % cream Apply topically 2 times daily (Patient taking differently: Apply topically as needed ) 1 Tube 1    Probiotic Product (ACIDOPHILUS PROBIOTIC) CAPS capsule Take 1 capsule by mouth daily      docusate sodium (COLACE, DULCOLAX) 100 MG CAPS Take 100 mg by mouth 2 times daily. (Patient taking differently: Take 100 mg by mouth as needed ) 60 capsule 0    fexofenadine (ALLEGRA) 180 MG tablet Take 180 mg by mouth daily.  leucovorin calcium (WELLCOVORIN) 5 MG tablet Take 25 mg by mouth once a week.  methotrexate 2.5 MG tablet Take by mouth once a week 2 tabs at lunch and 4 tabs  in the pm once a week on Friday       No current facility-administered medications for this visit.          PastMedical/Surgical Hx;  Reviewed with patient      Diagnosis Date    [facility] ([facility] [address]     C. 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)     thoracic; Dr Luis Manjarrez; no plans for surgery    Neuropathy     chronic; 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     Osteoporosis     PAF (paroxysmal atrial fibrillation) (HCC)     anticoagulation per cardiology     Rheumatoid arthritis with rheumatoid factor ([address]     Rheumatoid arthritis(714.0)     Dr [place] Patient; on DMD [place])    Rib fractures 2014    Skin cancer of lip     squamous cell    Staph infection 2017    left ankle    Tachyarrhythmia     PSVT; later A-fib; sees Dr Donna Morgan      Past Surgical History:   Procedure Laterality Date    CARDIAC CATHETERIZATION      CARPAL TUNNEL RELEASE       SECTION      COLONOSCOPY  2011    Dr Martha Garner; diverticular disease; repeat in     GASTRIC [address]LICATION  [address], TOTAL ABDOMINAL      TOTAL KNEE ARTHROPLASTY  2007    bilateral       Past Family Hx:  Reviewed with patient      Problem Relation Age of Onset    Heart Attack Mother 58    Other Father         suicide, depression, leg trouble    Other Sister         Dementia    Hypertension Sister     Hypertension Sister     Hypertension Brother     Thyroid Disease Sister     Rheum Arthritis Sister     Rheum Arthritis Brother     Other Daughter         hemochromatosis     Other Son         hemachromatosis        Social Hx:  Reviewed with patient  Social History     Tobacco Use    Smoking status: Never Smoker    Smokeless tobacco: Never Used   Substance Use Topics    Alcohol use: No       Immunization History   Administered Date(s) Administered    Influenza, High Dose (Fluzone 65 yrs and older) 2014, 10/06/2015, 10/21/2016, 10/02/2017, 10/05/2018    Pneumococcal Conjugate 13-valent ([id number]) 10/22/2015    Pneumococcal Polysaccharide ([id number]) 01/23/2017    Tdap (Boostrix, Adacel) 11/17/2011    Zoster Live (Zostavax) 08/27/2012    Zoster Recombinant (Shingrix) 09/19/2018, 03/19/2019       Review of Systems  Review of Systems   Constitutional: Positive for activity change. Negative for chills and fever. Respiratory: Negative for cough, shortness of breath and wheezing. Cardiovascular: Negative for chest pain and palpitations. Neurological: Positive for light-headedness and numbness. Negative for dizziness, weakness and headaches. PE:  VS:  BP (!) 140/58   Pulse 59   Resp 18   Ht 5' (1.524 m)   Wt 126 lb (57.2 kg)   SpO2 98%   BMI 24.61 kg/m²   Physical Exam   Constitutional: She appears well-developed and well-nourished. Neck: No thyromegaly present. Cardiovascular: Normal rate, regular rhythm and normal heart sounds. No murmur heard. Pulmonary/Chest: Effort normal and breath sounds normal. No respiratory distress. She has no wheezes. Musculoskeletal: Normal range of motion. She exhibits no edema, tenderness or deformity. Lymphadenopathy:     She has no cervical adenopathy. Neurological: She displays normal reflexes. No cranial nerve deficit. Medial leg, thigh and foot numbness to touch. Motor strength 4.5/5 bilaterally. DTR intact. Assessment/Plan:  Esme Wilson was seen today for results. Diagnoses and all orders for this visit:      History of meningioma  -     Miranda Moya MD, Neurosurgery, [facility]    Lumbar radiculopathy:  Given her numbness and sensory change, will send to Neurosurgery for Mx options. Also has L4 compression, so will refer to PMR for possible injections for symptom control.  -     Marlene De Leon MD, Physical Medicine and Rehabilitation, [facility]    Return if symptoms worsen or fail to improve.

## 2019-06-19 ASSESSMENT — ENCOUNTER SYMPTOMS
SHORTNESS OF BREATH: 0
COUGH: 0
WHEEZING: 0

## 2019-06-21 DIAGNOSIS — E06.9 THYROIDITIS: ICD-10-CM

## 2019-06-21 DIAGNOSIS — I48.0 PAROXYSMAL ATRIAL FIBRILLATION (HCC): ICD-10-CM

## 2019-06-21 DIAGNOSIS — I10 ESSENTIAL HYPERTENSION: ICD-10-CM

## 2019-06-21 RX ORDER — LEVOTHYROXINE SODIUM 0.1 MG/1
100 TABLET ORAL DAILY
Qty: 90 TABLET | Refills: 1 | Status: SHIPPED | OUTPATIENT
Start: 2019-06-21 | End: 2019-11-07 | Stop reason: SDUPTHER

## 2019-07-23 ENCOUNTER — OFFICE VISIT (OUTPATIENT)
Dept: FAMILY MEDICINE CLINIC | Age: 84
End: 2019-07-23
Payer: MEDICARE

## 2019-07-23 VITALS
WEIGHT: 128 LBS | DIASTOLIC BLOOD PRESSURE: 63 MMHG | HEIGHT: 60 IN | BODY MASS INDEX: 25.13 KG/M2 | RESPIRATION RATE: 16 BRPM | SYSTOLIC BLOOD PRESSURE: 120 MMHG | HEART RATE: 64 BPM

## 2019-07-23 DIAGNOSIS — I48.0 PAROXYSMAL ATRIAL FIBRILLATION (HCC): ICD-10-CM

## 2019-07-23 DIAGNOSIS — M54.16 LUMBAR RADICULOPATHY: Primary | ICD-10-CM

## 2019-07-23 DIAGNOSIS — N17.9 AKI (ACUTE KIDNEY INJURY) (HCC): ICD-10-CM

## 2019-07-23 DIAGNOSIS — R29.898 WEAKNESS OF BOTH LOWER EXTREMITIES: ICD-10-CM

## 2019-07-23 PROCEDURE — 99214 OFFICE O/P EST MOD 30 MIN: CPT | Performed by: FAMILY MEDICINE

## 2019-07-23 RX ORDER — VALSARTAN 160 MG/1
160 TABLET ORAL DAILY
Qty: 90 TABLET | Refills: 3 | Status: SHIPPED | OUTPATIENT
Start: 2019-07-23 | End: 2019-08-07 | Stop reason: SDUPTHER

## 2019-07-23 RX ORDER — PREDNISONE 20 MG/1
40 TABLET ORAL DAILY
Qty: 8 TABLET | Refills: 0 | Status: SHIPPED | OUTPATIENT
Start: 2019-07-23 | End: 2019-07-27

## 2019-07-23 RX ORDER — ONDANSETRON 4 MG/1
4 TABLET, FILM COATED ORAL EVERY 8 HOURS PRN
Qty: 30 TABLET | Refills: 5 | Status: SHIPPED | OUTPATIENT
Start: 2019-07-23

## 2019-07-23 ASSESSMENT — ENCOUNTER SYMPTOMS
NAUSEA: 1
WHEEZING: 0
CONSTIPATION: 0
SHORTNESS OF BREATH: 0
BACK PAIN: 1
ABDOMINAL PAIN: 0

## 2019-07-23 NOTE — PROGRESS NOTES
CC   Chief Complaint   Patient presents with    Atrial Fibrillation     HPI:   Patient comes in today for the below issue(s). Gait issues and radicular pain  Notes from Dr Dinora Kaiser reviewed  Had PT sessions x 2  sw some improvement but not feeling like balance is back to normal.   No falls   Overall gait has declined to point where using walker almost exclusively. Still has paresthesia and numbness especially in RLE   Sees neurosurgeon in a few weeks  Had no success with prior attempts with epidurals and nerve ablation   Rheum has been working with her on her rheumatoid arthritis  Stopped Cymbalta -thought it made her feel unusual    We also reviewed recent labs from her rheumatologist.  BUN and creatinine continue to increase  She admits to poor oral intake of liquids  She is also on a thiazide diuretic  Her blood pressure is well controlled    PAF  Chronic issue, no new issues. Negative stress test earlier in the year  Denies any chest pains, palpitations, syncope or near syncope    Review of Systems  Review of Systems   Constitutional: Positive for fatigue. Negative for fever and unexpected weight change. Respiratory: Negative for shortness of breath and wheezing. Cardiovascular: Negative for chest pain, palpitations and leg swelling. Gastrointestinal: Positive for nausea. Negative for abdominal pain and constipation. Still gets moderate to significant amounts of heartburn. Does create some nausea   Genitourinary: Negative for difficulty urinating. Musculoskeletal: Positive for arthralgias, back pain and gait problem. Hematological: Does not bruise/bleed easily.           Past Medical History:   Diagnosis Date    Atrial fibrillation ([facility] [address]     C. difficile colitis     february 2013    Community acquired pneumonia of left lower lobe of lung ([facility] [address] 06/26/3574    Diastolic dysfunction     stage I    Diverticular disease 2011    identified on colonoscopy in 9/2011    Dyspnea     mixed

## 2019-07-31 ENCOUNTER — TELEPHONE (OUTPATIENT)
Dept: FAMILY MEDICINE CLINIC | Age: 84
End: 2019-07-31

## 2019-07-31 ENCOUNTER — OFFICE VISIT (OUTPATIENT)
Dept: NEUROSURGERY | Age: 84
End: 2019-07-31
Payer: MEDICARE

## 2019-07-31 VITALS
SYSTOLIC BLOOD PRESSURE: 163 MMHG | BODY MASS INDEX: 24.74 KG/M2 | DIASTOLIC BLOOD PRESSURE: 77 MMHG | HEART RATE: 61 BPM | HEIGHT: 60 IN | WEIGHT: 126 LBS

## 2019-07-31 DIAGNOSIS — G89.29 CHRONIC MIDLINE LOW BACK PAIN WITH BILATERAL SCIATICA: Primary | ICD-10-CM

## 2019-07-31 DIAGNOSIS — M54.42 CHRONIC MIDLINE LOW BACK PAIN WITH BILATERAL SCIATICA: Primary | ICD-10-CM

## 2019-07-31 DIAGNOSIS — M54.41 CHRONIC MIDLINE LOW BACK PAIN WITH BILATERAL SCIATICA: Primary | ICD-10-CM

## 2019-07-31 PROCEDURE — 99203 OFFICE O/P NEW LOW 30 MIN: CPT | Performed by: NEUROLOGICAL SURGERY

## 2019-07-31 RX ORDER — HYDROCHLOROTHIAZIDE 25 MG/1
25 TABLET ORAL DAILY
Qty: 90 TABLET | Refills: 0 | Status: SHIPPED | OUTPATIENT
Start: 2019-07-31 | End: 2019-08-07 | Stop reason: SDUPTHER

## 2019-07-31 ASSESSMENT — ENCOUNTER SYMPTOMS
GASTROINTESTINAL NEGATIVE: 1
ABDOMINAL PAIN: 0
EYES NEGATIVE: 1
RESPIRATORY NEGATIVE: 1
BACK PAIN: 1
ALLERGIC/IMMUNOLOGIC NEGATIVE: 1
BOWEL INCONTINENCE: 0

## 2019-07-31 NOTE — PROGRESS NOTES
Subjective:      Patient ID: Valdez Bradley is a 80 y.o. female. Back Pain   This is a chronic problem. The current episode started more than 1 year ago. The problem occurs constantly. The problem has been gradually worsening since onset. The pain is present in the lumbar spine. The quality of the pain is described as aching, shooting and stabbing. The pain radiates to the right thigh, right knee, right foot, left thigh and left knee. The pain is at a severity of 5/10. The pain is moderate. The pain is the same all the time. The symptoms are aggravated by position. Stiffness is present in the morning. Associated symptoms include leg pain, numbness and paresthesias. Pertinent negatives include no abdominal pain, bladder incontinence, bowel incontinence, chest pain, dysuria, fever, headaches, paresis, pelvic pain, perianal numbness, tingling or weakness. She has tried NSAIDs, ice, heat and analgesics for the symptoms. The treatment provided mild relief. Leg Pain    Associated symptoms include numbness. Pertinent negatives include no tingling. Review of Systems   Constitutional: Negative. Negative for fever. HENT: Negative. Eyes: Negative. Respiratory: Negative. Cardiovascular: Negative. Negative for chest pain. Gastrointestinal: Negative. Negative for abdominal pain and bowel incontinence. Endocrine: Negative. Genitourinary: Negative. Negative for bladder incontinence, dysuria and pelvic pain. Musculoskeletal: Positive for back pain. Skin: Negative. Allergic/Immunologic: Negative. Neurological: Positive for numbness and paresthesias. Negative for tingling, weakness and headaches. Hematological: Negative. Psychiatric/Behavioral: Negative. Objective:   Physical Exam   Constitutional: She is oriented to person, place, and time. She appears well-developed and well-nourished. HENT:   Head: Normocephalic and atraumatic.    Right Ear: External ear normal.   Left Ear: Overall, she is a poor surgical candidate and no surgical intervention is recommended. Recommend continued conservative care with medications, epidurals and PT.         Jesus Treadwell MD

## 2019-07-31 NOTE — LETTER
[facility] Neurosurgery  [address] [address]  Phone: [phone]  Fax: [phone]    Laura Edgar MD      July 31, 2019     Louis Ramos MD  [facility] [address] [address]    Patient: Conrad Veliz  MR Number: [id number]  YOB: 1935  Date of Visit: 7/31/2019    Dear Dr. Louis Ramos: Thank you for the request for consultation for Mallory Turciosd to me for the evaluation of back pain. Below are the relevant portions of my assessment and plan of care. Assessment:  80year old lady with back pain. She also has numbness in her feet and legs. This suspicious for neuropathy. She also has some mild cervical stenosis. Plan:  Overall, she is a poor surgical candidate and no surgical intervention is recommended. Recommend continued conservative care with medications, epidurals and PT. If you have questions, please do not hesitate to call me. I look forward to following David Little along with you.     Sincerely,        Laura Edgar MD

## 2019-07-31 NOTE — TELEPHONE ENCOUNTER
Noted, agree. Blood pressures need to be running better.   Okay to restart hydrochlorothiazide 25 mg daily, 90-day supply prescribed

## 2019-07-31 NOTE — COMMUNICATION BODY
Assessment:  80year old lady with back pain. She also has numbness in her feet and legs. This suspicious for neuropathy. She also has some mild cervical stenosis. Plan:  Overall, she is a poor surgical candidate and no surgical intervention is recommended. Recommend continued conservative care with medications, epidurals and PT.

## 2019-08-01 NOTE — PROGRESS NOTES
Electrophysiology Outpatient Progress Note    Ale Tucker  1935  Date of Service: 8/7/2019  Referring Provider/PCP: Bakari Cano MD  Primary Electrophysiologist: Nelly Quiroz MD    Chief Complaint: Paroxysmal AF on Flecainide     SUBJECTIVE: Ale Tucker presents to the office today for a follow -up. Ms. Gogo Blackburn previously followed with Dr. Berto Douglass and wishes to follow with me since Dr. Audrey Larkin departure. Ms. Gogo Blackburn states she feels overall well and her only complaint today in fatigue which she reports is ongoing and relates to her back pain. She denies any episodes of AF. She presents today in SR and continues on Flecainide for rhythm control. She continues on Xarelto for stroke prevention. She currently denies any chest pain, SOB, palpitations or syncope.      Patient Active Problem List    Diagnosis Date Noted    Grief reaction 11/13/2018    Community acquired pneumonia of left lower lobe of lung ([address] 11/13/2018    Delayed gastric emptying 04/20/2018    Closed nondisplaced fracture of left patella 04/19/2018    Palpitations 11/16/2017    Fatigue 11/16/2017    Laceration of left leg 06/25/2017    Sinus bradycardia 10/10/2016    Adjustment disorder with anxious mood 07/20/2016    Paroxysmal atrial fibrillation ([address] 05/23/2016    JANETTE (obstructive sleep apnea) 05/23/2016     Overview Note:     Recommended CPAP, pt refused numerous treatment options by Dr. Flo Marcelo hypertension 05/23/2016    Abrasion, leg w/o infection 08/20/2015    Multiple fractures of ribs of left side 8-11 12/16/2014    Thoracic spinal stenosis with mass at T6-T7 12/16/2014    Thoracic spine tumor 12/16/2014    Thoracic spine fracture ([address] 12/16/2014    Anemia 07/26/2014    Lumbar radicular pain 03/19/2014    Numbness and tingling of leg 71/17/7502    Diastolic dysfunction     Rheumatoid arthritis (HCC)     Skin cancer of lip      Overview Note:     squamous cell      Diverticular (CELEBREX) 200 MG capsule TAKE 1 CAPSULE DAILY 90 capsule 3    clobetasol (TEMOVATE) 0.05 % cream Apply topically 2 times daily (Patient taking differently: Apply topically as needed ) 1 Tube 1    Probiotic Product (ACIDOPHILUS PROBIOTIC) CAPS capsule Take 1 capsule by mouth daily      docusate sodium (COLACE, DULCOLAX) 100 MG CAPS Take 100 mg by mouth 2 times daily. (Patient taking differently: Take 100 mg by mouth daily ) 60 capsule 0    fexofenadine (ALLEGRA) 180 MG tablet Take 180 mg by mouth daily.  leucovorin calcium (WELLCOVORIN) 5 MG tablet Take 25 mg by mouth once a week.  methotrexate 2.5 MG tablet Take by mouth once a week 2 tabs at lunch and 4 tabs  in the pm once a week on Friday      amLODIPine (NORVASC) 2.5 MG tablet        No current facility-administered medications for this visit. Allergies   Allergen Reactions    Amoxicillin-Pot Clavulanate     Milk-Related Compounds Nausea And Vomiting       PHYSICAL EXAM:  Vitals:    19 1516   BP: 110/68   Pulse: 68   Resp: 18   Weight: 125 lb (56.7 kg)   Height: 5' (1.524 m)   Constitutional: Oriented to person, place, and time. Well-developed and cooperative. Head: Normocephalic and atraumatic. Cardiovascular:  Normal S1/ S2, Feet appear to be well perfused. Regular rhythm present. PMI is not displaced. Pulmonary/Chest: Effort normal and breath sounds normal. No respiratory distress. Musculoskeletal: Normal range of motion of all extremities, no muscle weakness. Neurological: Alert and oriented to person, place, and time. Gait normal.   Skin: Skin is warm and dry. No bruising, no ecchymosis and no rash noted. Extremity: No clubbing or cyanosis. No edema. Psychiatric: Normal mood and affect.  Thought content normal.     EK2019: SR,  Rate 68 bpm, low voltage, possible anteroseptal infarct, , QRS 88, QTc 422 - see scanned cardiology    TTE  2017:  Findings      Left Ventricle   Left ventricular internal dimensions, wall thickness, regional wall motion   and systolic function are normal.   The ejection fraction is estimated to be 68% by the biplane method of   disks.   There is Doppler evidence for stage I diastolic dysfunction.      Right Ventricle   Normal right ventricular size and systolic function.   TAPSE is 17 mm consistent with normal global right ventricular systolic   function.      Left Atrium   The left atrium is mildly enlarged as determined by the left atrial volume   index.      Right Atrium   The right atrium appears to be enlarged.      Mitral Valve   Normal mitral leaflet structure and coaptation with no more than trace   (physiologic) mitral insufficiency.      Tricuspid Valve   Normal tricuspid valve structure.   Mild tricuspid insufficiency noted.   Right ventricular systolic pressure is mildly elevated at 40 mmHg.      Aortic Valve   The aortic valve is trileaflet.   The aortic valve appears moderately sclerotic.   Mild aortic regurgitation is noted.   No hemodynamically significant aortic stenosis is present.      Pulmonic Valve   Normal pulmonic valve structure and function.  Physiologic pulmonic   insufficiency is present.      Pericardial Effusion   No evidence for hemodynamically significant pericardial effusion.      Aorta   Normal aortic root and ascending aorta.   Miscellaneous   The inferior vena cava diameter is normal with normal respiratory   variation.      Conclusions      Summary   The patient appeared to be in sinus rhythm during the examination.   Left ventricular internal dimensions, wall thickness, regional wall motion   and systolic function are normal.   The ejection fraction is estimated to be 68% by the biplane method of   disks.   There is Doppler evidence for stage I diastolic dysfunction.   The left atrium is mildly enlarged as determined by the left atrial volume   index.   The aortic valve appears moderately sclerotic.   Mild aortic regurgitation is

## 2019-08-07 ENCOUNTER — OFFICE VISIT (OUTPATIENT)
Dept: NON INVASIVE DIAGNOSTICS | Age: 84
End: 2019-08-07
Payer: MEDICARE

## 2019-08-07 VITALS
DIASTOLIC BLOOD PRESSURE: 68 MMHG | HEART RATE: 68 BPM | WEIGHT: 125 LBS | HEIGHT: 60 IN | SYSTOLIC BLOOD PRESSURE: 110 MMHG | RESPIRATION RATE: 18 BRPM | BODY MASS INDEX: 24.54 KG/M2

## 2019-08-07 DIAGNOSIS — I48.0 PAROXYSMAL ATRIAL FIBRILLATION (HCC): Primary | ICD-10-CM

## 2019-08-07 PROCEDURE — 93000 ELECTROCARDIOGRAM COMPLETE: CPT | Performed by: INTERNAL MEDICINE

## 2019-08-07 PROCEDURE — 99214 OFFICE O/P EST MOD 30 MIN: CPT | Performed by: INTERNAL MEDICINE

## 2019-08-07 RX ORDER — LOTEPREDNOL ETABONATE 5 MG/G
1 GEL OPHTHALMIC PRN
COMMUNITY
Start: 2019-07-30 | End: 2020-01-23

## 2019-08-07 RX ORDER — VALSARTAN AND HYDROCHLOROTHIAZIDE 160; 25 MG/1; MG/1
1 TABLET ORAL DAILY
COMMUNITY
End: 2020-02-24 | Stop reason: SDUPTHER

## 2019-08-14 RX ORDER — MONTELUKAST SODIUM 10 MG/1
TABLET ORAL
Qty: 90 TABLET | Refills: 1 | Status: SHIPPED
Start: 2019-08-14 | End: 2020-02-27 | Stop reason: SDUPTHER

## 2019-08-23 ENCOUNTER — CARE COORDINATION (OUTPATIENT)
Dept: CARE COORDINATION | Age: 84
End: 2019-08-23

## 2019-10-16 ENCOUNTER — OFFICE VISIT (OUTPATIENT)
Dept: FAMILY MEDICINE CLINIC | Age: 84
End: 2019-10-16
Payer: MEDICARE

## 2019-10-16 VITALS
BODY MASS INDEX: 25.13 KG/M2 | SYSTOLIC BLOOD PRESSURE: 116 MMHG | RESPIRATION RATE: 18 BRPM | DIASTOLIC BLOOD PRESSURE: 61 MMHG | WEIGHT: 128 LBS | HEART RATE: 67 BPM | OXYGEN SATURATION: 97 % | HEIGHT: 60 IN

## 2019-10-16 DIAGNOSIS — K44.9 HIATAL HERNIA: ICD-10-CM

## 2019-10-16 DIAGNOSIS — M05.79 RHEUMATOID ARTHRITIS INVOLVING MULTIPLE SITES WITH POSITIVE RHEUMATOID FACTOR (HCC): ICD-10-CM

## 2019-10-16 DIAGNOSIS — I48.0 PAROXYSMAL ATRIAL FIBRILLATION (HCC): Primary | ICD-10-CM

## 2019-10-16 DIAGNOSIS — Z23 NEED FOR IMMUNIZATION AGAINST INFLUENZA: ICD-10-CM

## 2019-10-16 DIAGNOSIS — M54.16 LUMBAR RADICULOPATHY: ICD-10-CM

## 2019-10-16 DIAGNOSIS — I10 ESSENTIAL HYPERTENSION: ICD-10-CM

## 2019-10-16 PROCEDURE — 90653 IIV ADJUVANT VACCINE IM: CPT | Performed by: FAMILY MEDICINE

## 2019-10-16 PROCEDURE — G0008 ADMIN INFLUENZA VIRUS VAC: HCPCS | Performed by: FAMILY MEDICINE

## 2019-10-16 PROCEDURE — 99214 OFFICE O/P EST MOD 30 MIN: CPT | Performed by: FAMILY MEDICINE

## 2019-10-16 RX ORDER — FAMOTIDINE 40 MG/1
TABLET, FILM COATED ORAL
COMMUNITY
Start: 2019-10-02 | End: 2019-10-16 | Stop reason: SDUPTHER

## 2019-10-17 ASSESSMENT — ENCOUNTER SYMPTOMS
SHORTNESS OF BREATH: 0
WHEEZING: 0
TROUBLE SWALLOWING: 0
ABDOMINAL PAIN: 1
BACK PAIN: 1

## 2019-10-22 ENCOUNTER — HOSPITAL ENCOUNTER (OUTPATIENT)
Age: 84
Discharge: HOME | End: 2019-10-24
Payer: MEDICARE

## 2019-10-22 DIAGNOSIS — I10 ESSENTIAL HYPERTENSION: ICD-10-CM

## 2019-10-22 LAB
ALBUMIN SERPL-MCNC: 3.9 G/DL (ref 3.5–5.2)
ALP BLD-CCNC: 51 U/L (ref 35–104)
ALT SERPL-CCNC: 8 U/L (ref 0–32)
ANION GAP SERPL CALCULATED.3IONS-SCNC: 15 MMOL/L (ref 7–16)
AST SERPL-CCNC: 21 U/L (ref 0–31)
BASOPHILS ABSOLUTE: 0.02 E9/L (ref 0–0.2)
BASOPHILS RELATIVE PERCENT: 0.4 % (ref 0–2)
BILIRUB SERPL-MCNC: 0.3 MG/DL (ref 0–1.2)
BUN BLDV-MCNC: 29 MG/DL (ref 8–23)
C-REACTIVE PROTEIN: <0.1 MG/DL (ref 0–0.4)
CALCIUM SERPL-MCNC: 9.1 MG/DL (ref 8.6–10.2)
CHLORIDE BLD-SCNC: 105 MMOL/L (ref 98–107)
CHOLESTEROL, TOTAL: 182 MG/DL (ref 0–199)
CO2: 18 MMOL/L (ref 22–29)
CREAT SERPL-MCNC: 1.4 MG/DL (ref 0.5–1)
EOSINOPHILS ABSOLUTE: 0.25 E9/L (ref 0.05–0.5)
EOSINOPHILS RELATIVE PERCENT: 5.5 % (ref 0–6)
GFR AFRICAN AMERICAN: 43
GFR NON-AFRICAN AMERICAN: 36 ML/MIN/1.73
GLUCOSE BLD-MCNC: 91 MG/DL (ref 74–99)
HCT VFR BLD CALC: 31.9 % (ref 34–48)
HDLC SERPL-MCNC: 44 MG/DL
HEMOGLOBIN: 9.3 G/DL (ref 11.5–15.5)
IMMATURE GRANULOCYTES #: 0.02 E9/L
IMMATURE GRANULOCYTES %: 0.4 % (ref 0–5)
LDL CHOLESTEROL CALCULATED: 110 MG/DL (ref 0–99)
LYMPHOCYTES ABSOLUTE: 0.8 E9/L (ref 1.5–4)
LYMPHOCYTES RELATIVE PERCENT: 17.5 % (ref 20–42)
MCH RBC QN AUTO: 28.9 PG (ref 26–35)
MCHC RBC AUTO-ENTMCNC: 29.2 % (ref 32–34.5)
MCV RBC AUTO: 99.1 FL (ref 80–99.9)
MONOCYTES ABSOLUTE: 0.73 E9/L (ref 0.1–0.95)
MONOCYTES RELATIVE PERCENT: 15.9 % (ref 2–12)
NEUTROPHILS ABSOLUTE: 2.76 E9/L (ref 1.8–7.3)
NEUTROPHILS RELATIVE PERCENT: 60.3 % (ref 43–80)
PDW BLD-RTO: 16.6 FL (ref 11.5–15)
PLATELET # BLD: 307 E9/L (ref 130–450)
PMV BLD AUTO: 11.3 FL (ref 7–12)
POTASSIUM SERPL-SCNC: 4.8 MMOL/L (ref 3.5–5)
RBC # BLD: 3.22 E12/L (ref 3.5–5.5)
SEDIMENTATION RATE, ERYTHROCYTE: 17 MM/HR (ref 0–20)
SODIUM BLD-SCNC: 138 MMOL/L (ref 132–146)
TOTAL PROTEIN: 6.8 G/DL (ref 6.4–8.3)
TRIGL SERPL-MCNC: 140 MG/DL (ref 0–149)
VLDLC SERPL CALC-MCNC: 28 MG/DL
WBC # BLD: 4.6 E9/L (ref 4.5–11.5)

## 2019-10-22 PROCEDURE — 80061 LIPID PANEL: CPT

## 2019-10-22 PROCEDURE — 86140 C-REACTIVE PROTEIN: CPT

## 2019-10-22 PROCEDURE — 85651 RBC SED RATE NONAUTOMATED: CPT

## 2019-10-22 PROCEDURE — 80053 COMPREHEN METABOLIC PANEL: CPT

## 2019-10-22 PROCEDURE — 85025 COMPLETE CBC W/AUTO DIFF WBC: CPT

## 2019-10-22 PROCEDURE — 36415 COLL VENOUS BLD VENIPUNCTURE: CPT

## 2019-10-23 ENCOUNTER — OFFICE VISIT (OUTPATIENT)
Dept: CARDIOLOGY CLINIC | Age: 84
End: 2019-10-23
Payer: MEDICARE

## 2019-10-23 VITALS
SYSTOLIC BLOOD PRESSURE: 92 MMHG | HEIGHT: 60 IN | WEIGHT: 122.8 LBS | RESPIRATION RATE: 18 BRPM | DIASTOLIC BLOOD PRESSURE: 60 MMHG | HEART RATE: 54 BPM | BODY MASS INDEX: 24.11 KG/M2

## 2019-10-23 DIAGNOSIS — R00.1 SINUS BRADYCARDIA: ICD-10-CM

## 2019-10-23 DIAGNOSIS — I10 ESSENTIAL HYPERTENSION: ICD-10-CM

## 2019-10-23 DIAGNOSIS — I48.0 PAROXYSMAL ATRIAL FIBRILLATION (HCC): Primary | ICD-10-CM

## 2019-10-23 PROCEDURE — 99213 OFFICE O/P EST LOW 20 MIN: CPT | Performed by: INTERNAL MEDICINE

## 2019-10-23 PROCEDURE — 93000 ELECTROCARDIOGRAM COMPLETE: CPT | Performed by: INTERNAL MEDICINE

## 2019-11-07 DIAGNOSIS — E06.9 THYROIDITIS: ICD-10-CM

## 2019-11-07 RX ORDER — LEVOTHYROXINE SODIUM 0.1 MG/1
100 TABLET ORAL DAILY
Qty: 90 TABLET | Refills: 1 | Status: SHIPPED
Start: 2019-11-07 | End: 2020-09-16 | Stop reason: SDUPTHER

## 2019-12-09 DIAGNOSIS — Z12.11 COLON CANCER SCREENING: Primary | ICD-10-CM

## 2019-12-09 LAB
CONTROL: NORMAL
HEMOCCULT STL QL: POSITIVE

## 2019-12-09 PROCEDURE — 82274 ASSAY TEST FOR BLOOD FECAL: CPT | Performed by: FAMILY MEDICINE

## 2020-01-02 ENCOUNTER — TELEPHONE (OUTPATIENT)
Dept: CARDIOLOGY CLINIC | Age: 85
End: 2020-01-02

## 2020-01-08 ENCOUNTER — HOSPITAL ENCOUNTER (OUTPATIENT)
Age: 85
Discharge: HOME | End: 2020-01-10

## 2020-01-08 PROCEDURE — 87081 CULTURE SCREEN ONLY: CPT

## 2020-01-08 PROCEDURE — 88305 TISSUE EXAM BY PATHOLOGIST: CPT

## 2020-01-09 LAB — CLOTEST: NORMAL

## 2020-01-20 ENCOUNTER — TELEPHONE (OUTPATIENT)
Dept: ADMINISTRATIVE | Age: 85
End: 2020-01-20

## 2020-01-21 NOTE — TELEPHONE ENCOUNTER
Patient notified of Dr. Breezy Kenyon recommendations. Put put on list to call when schedule opens. Patient given EP phone number, her daughter will call to schedule.

## 2020-01-22 NOTE — PROGRESS NOTES
Electrophysiology Outpatient Progress Note    Norm Valiente  1935  Date of Service: 1/23/2020  Referring Provider/PCP: Ana Canchola MD  Primary Electrophysiologist: Renetta Romero MD    Chief Complaint: Paroxysmal AF on Flecainide     SUBJECTIVE: Norm Valiente presents to the office today for a follow -up of Paroxsymal Atrial Fibrillation. Mrs. Jeff Cousin was last seen in office by Dr. Zita Gonzalez on 8/1/19 and was doing well at that time. She recently went to [facility] to visit her daughter and grandson while  their she awoke suddenly with feelings of palpitation and chest pain on the 16th of January and was taken to [facility] where she was noted to be in SVT/a narrow complex tachycardia and spontaneously converted to sinus. At that time her Lopressor was increased to 25 mg twice daily and an echocardiogram was performed that showed a normal ejection fraction. She reports no orthopnea, no chest pain outside of this incident, palpitations, syncope or near syncope. She continues to refuse treatment of her obstructive sleep apnea. She reports no decreased activity tolerance. She has been compliant with her Xarelto without complaints of bleeding.         Patient Active Problem List    Diagnosis Date Noted    Delayed gastric emptying 04/20/2018    Closed nondisplaced fracture of left patella 04/19/2018    Palpitations 11/16/2017    Fatigue 11/16/2017    Laceration of left leg 06/25/2017    Sinus bradycardia 10/10/2016    Adjustment disorder with anxious mood 07/20/2016    Paroxysmal atrial fibrillation ([address] 05/23/2016    JANETTE (obstructive sleep apnea) 05/23/2016     Overview Note:     Recommended CPAP, pt refused numerous treatment options by Dr. Herman Mitchell hypertension 05/23/2016    Abrasion, leg w/o infection 08/20/2015    Multiple fractures of ribs of left side 8-11 12/16/2014    Thoracic spinal stenosis with mass at T6-T7 12/16/2014    Thoracic spine tumor 12/16/2014    Thoracic spine fracture ([facility] 12/16/2014    Anemia 07/26/2014    Lumbar radicular pain 03/19/2014    Numbness and tingling of leg 75/24/7767    Diastolic dysfunction     Rheumatoid arthritis (HCC)     Skin cancer of lip      Overview Note:     squamous cell      Diverticular disease      Overview Note:     identified on colonoscopy in 9/2011      Thyroiditis 07/11/2011    Elevated CA-125 06/20/2011     Overview Note:     Uncertain significance; identified along with elevated CEA; will need later outpatient oncology follow up      Hiatal hernia 06/20/2011     Overview Note:     S/p Nissen      Hypertension 06/20/2011       Current Outpatient Medications   Medication Sig Dispense Refill    metoprolol tartrate (LOPRESSOR) 25 MG tablet Take 1 tablet by mouth 2 times daily 180 tablet 3    levothyroxine (LEVOTHROID) 100 MCG tablet Take 1 tablet by mouth Daily 90 tablet 1    montelukast (SINGULAIR) 10 MG tablet TAKE 1 TABLET NIGHTLY 90 tablet 1    valsartan-hydrochlorothiazide (DIOVAN-HCT) 160-25 MG per tablet Take 1 tablet by mouth daily      ondansetron (ZOFRAN) 4 MG tablet Take 1 tablet by mouth every 8 hours as needed for Nausea or Vomiting 30 tablet 5    omeprazole (PRILOSEC) 40 MG delayed release capsule Take 1 capsule by mouth 2 times daily 180 capsule 3    Multiple Vitamins-Minerals (MULTIVITAMIN ADULT PO) Take by mouth      famotidine (PEPCID) 10 MG tablet Take 10 mg by mouth nightly       polyethyl glycol-propyl glycol 0.4-0.3 % (SYSTANE) 0.4-0.3 % ophthalmic solution 1 drop as needed for Dry Eyes      ipratropium (ATROVENT) 0.06 % nasal spray 2 sprays by Nasal route 3 times daily (Patient taking differently: 2 sprays by Nasal route as needed ) 1 Bottle 3    clotrimazole-betamethasone (LOTRISONE) 1-0.05 % cream Apply topically 2 times daily (Patient taking differently: Apply topically as needed ) 1 Tube 5    rivaroxaban (XARELTO) 15 MG TABS tablet Take 1 tablet by mouth daily (with is prominent. Left ventricular systolic function is normal. EF = 57% (2D biplane)  Left ventricular diastolic function is consistent with abnormal relaxation (stage I). Left Atrial pressures are elevated. Mitral annular lateral e': 5.3 cm/s. Mitral annular lateral E/e': 15.7. Mitral annular septal e': 5.4 cm/s. Mitral annular septal E/e': 15.4. The average Mitral E/e' ratio is 15.6. LV Wall Motion:  Left ventricular wall motion is normal.    RIGHT VENTRICLE:  The right ventricle is mildly dilated. Right ventricular systolic function is normal.  The right atrial pressure could not be estimated due to poor visualization of the inferior vena cava. The estimated right ventricular systolic pressure is 28 mmHg. The right atrial pressure is 3 mmHg. LEFT ATRIUM:  The left atrial size is mildly enlarged. RIGHT ATRIUM:  The right atrial cavity is mildly dilated. MITRAL VALVE:  There is mild posterior mitral annular calcification. There is mild mitral valve leaflet thickening. There is no mitral stenosis. There is trivial mitral valve regurgitation. TRICUSPID VALVE:  The tricuspid valve leaflets are structurally normal. There is no tricuspid stenosis. There is mild tricuspid valve regurgitation. AORTIC VALVE:  The aortic valve is tricuspid. There is mild thickening. There is mild calcification. There is no aortic valve stenosis. There is mild aortic valve regurgitation. The left coronary leaflet is immobile. PULMONIC VALVE  The pulmonic valve is not well visualized, but grossly normal. There is no pulmonic stenosis. There is trivial pulmonic valve regurgitation. AORTA:  The visualized aorta is dilated. Measurements - Aortic valve annulus 2.2 cm. Sinus 3.0 cm. Sinotubular junction 2.3 cm. Proximal ascending aorta 3.5 cm. PERICARDIUM:  There is small circumferential pericardial effusion. There is an epicardial fat pad.     CONCLUSIONS:  -Two-dimensional transthoracic echocardiography was performed using

## 2020-01-23 ENCOUNTER — OFFICE VISIT (OUTPATIENT)
Dept: NON INVASIVE DIAGNOSTICS | Age: 85
End: 2020-01-23
Payer: MEDICARE

## 2020-01-23 VITALS
BODY MASS INDEX: 24.54 KG/M2 | WEIGHT: 125 LBS | HEIGHT: 60 IN | DIASTOLIC BLOOD PRESSURE: 60 MMHG | RESPIRATION RATE: 16 BRPM | SYSTOLIC BLOOD PRESSURE: 118 MMHG | HEART RATE: 56 BPM

## 2020-01-23 PROBLEM — F43.21 GRIEF REACTION: Status: RESOLVED | Noted: 2018-11-13 | Resolved: 2020-01-23

## 2020-01-23 PROBLEM — J18.9 COMMUNITY ACQUIRED PNEUMONIA OF LEFT LOWER LOBE OF LUNG: Status: RESOLVED | Noted: 2018-11-13 | Resolved: 2020-01-23

## 2020-01-23 PROCEDURE — 99213 OFFICE O/P EST LOW 20 MIN: CPT | Performed by: NURSE PRACTITIONER

## 2020-01-23 PROCEDURE — 93000 ELECTROCARDIOGRAM COMPLETE: CPT | Performed by: INTERNAL MEDICINE

## 2020-01-23 NOTE — PROGRESS NOTES
for dizziness, facial asymmetry and light-headedness. Psychiatric/Behavioral: Positive for dysphoric mood and sleep disturbance. The patient is nervous/anxious. Prior to Visit Medications    Medication Sig Taking?  Authorizing Provider   metoprolol tartrate (LOPRESSOR) 25 MG tablet Take 1 tablet by mouth 2 times daily Yes FRAN Nicolas - CNP   levothyroxine (LEVOTHROID) 100 MCG tablet Take 1 tablet by mouth Daily Yes Rui Castaneda MD   montelukast (SINGULAIR) 10 MG tablet TAKE 1 TABLET NIGHTLY Yes Rui Castaneda MD   valsartan-hydrochlorothiazide (DIOVAN-HCT) 160-25 MG per tablet Take 1 tablet by mouth daily Yes Historical Provider, MD   ondansetron (ZOFRAN) 4 MG tablet Take 1 tablet by mouth every 8 hours as needed for Nausea or Vomiting Yes Rui Castaneda MD   omeprazole (PRILOSEC) 40 MG delayed release capsule Take 1 capsule by mouth 2 times daily Yes Rui Castaneda MD   Multiple Vitamins-Minerals (MULTIVITAMIN ADULT PO) Take by mouth Yes Historical Provider, MD   famotidine (PEPCID) 10 MG tablet Take 10 mg by mouth nightly  Yes Historical Provider, MD   polyethyl glycol-propyl glycol 0.4-0.3 % (SYSTANE) 0.4-0.3 % ophthalmic solution 1 drop as needed for Dry Eyes Yes Historical Provider, MD   ipratropium (ATROVENT) 0.06 % nasal spray 2 sprays by Nasal route 3 times daily  Patient taking differently: 2 sprays by Nasal route as needed  Yes Mckenzie Sheets DO   clotrimazole-betamethasone (LOTRISONE) 1-0.05 % cream Apply topically 2 times daily  Patient taking differently: Apply topically as needed  Yes Rui Castaneda MD   cycloSPORINE (RESTASIS) 0.05 % ophthalmic emulsion Place 1 drop into both eyes 2 times daily Yes Historical Provider, MD   rivaroxaban (XARELTO) 15 MG TABS tablet Take 1 tablet by mouth daily (with breakfast) Yes Felton Merlin, MD   flecainide (TAMBOCOR) 50 MG tablet Take 1 tablet by mouth 2 times daily Yes Alison Marin,    fluticasone (FLONASE) 50 MCG/ACT nasal spray 2 sprays by Nasal route daily 2 sprays in each nostril daily Yes Liseth Pelaez MD   celecoxib (CELEBREX) 200 MG capsule TAKE 1 CAPSULE DAILY Yes Liseth Pelaez MD   clobetasol (TEMOVATE) 0.05 % cream Apply topically 2 times daily  Patient taking differently: Apply topically as needed  Yes Liseth Pelaez MD   Probiotic Product (ACIDOPHILUS PROBIOTIC) CAPS capsule Take 1 capsule by mouth daily Yes Historical Provider, MD   docusate sodium (COLACE, DULCOLAX) 100 MG CAPS Take 100 mg by mouth 2 times daily. Patient taking differently: Take 100 mg by mouth daily  Yes Lazarus Mejía MD   fexofenadine (ALLEGRA) 180 MG tablet Take 180 mg by mouth daily. Yes Historical Provider, MD   leucovorin calcium (WELLCOVORIN) 5 MG tablet Take 25 mg by mouth once a week. Yes Historical Provider, MD   methotrexate 2.5 MG tablet Take by mouth once a week 2 tabs at lunch and 4 tabs  in the pm once a week on Friday Yes Historical Provider, MD        Social History     Tobacco Use    Smoking status: Never Smoker    Smokeless tobacco: Never Used   Substance Use Topics    Alcohol use: No        Vitals:    01/24/20 1020   BP: (!) 105/52   Pulse: 57   Resp: 18   Temp: 97.8 °F (36.6 °C)   SpO2: 95%   Weight: 125 lb (56.7 kg)   Height: 5' (1.524 m)     Estimated body mass index is 24.41 kg/m² as calculated from the following:    Height as of this encounter: 5' (1.524 m). Weight as of this encounter: 125 lb (56.7 kg). Physical Exam  Constitutional:       General: She is not in acute distress. Appearance: Normal appearance. She is well-developed and normal weight. She is not ill-appearing or toxic-appearing. HENT:      Head: Normocephalic and atraumatic. Neck:      Thyroid: No thyromegaly. Vascular: No carotid bruit. Cardiovascular:      Rate and Rhythm: Bradycardia present. No extrasystoles are present. Heart sounds: Normal heart sounds, S1 normal and S2 normal. Heart sounds not distant.

## 2020-01-24 ENCOUNTER — OFFICE VISIT (OUTPATIENT)
Dept: FAMILY MEDICINE CLINIC | Age: 85
End: 2020-01-24
Payer: MEDICARE

## 2020-01-24 VITALS
RESPIRATION RATE: 18 BRPM | OXYGEN SATURATION: 95 % | HEIGHT: 60 IN | TEMPERATURE: 97.8 F | SYSTOLIC BLOOD PRESSURE: 105 MMHG | BODY MASS INDEX: 24.54 KG/M2 | WEIGHT: 125 LBS | DIASTOLIC BLOOD PRESSURE: 52 MMHG | HEART RATE: 57 BPM

## 2020-01-24 PROCEDURE — 99214 OFFICE O/P EST MOD 30 MIN: CPT | Performed by: FAMILY MEDICINE

## 2020-01-24 ASSESSMENT — ENCOUNTER SYMPTOMS
WHEEZING: 0
CHEST TIGHTNESS: 0
ABDOMINAL PAIN: 1
ABDOMINAL DISTENTION: 0
SHORTNESS OF BREATH: 1
COUGH: 0

## 2020-01-30 ENCOUNTER — HOSPITAL ENCOUNTER (OUTPATIENT)
Age: 85
Discharge: HOME | End: 2020-02-01
Payer: MEDICARE

## 2020-01-30 LAB
ANISOCYTOSIS: ABNORMAL
BASOPHILS ABSOLUTE: 0.04 E9/L (ref 0–0.2)
BASOPHILS RELATIVE PERCENT: 0.6 % (ref 0–2)
BURR CELLS: ABNORMAL
EOSINOPHILS ABSOLUTE: 0.25 E9/L (ref 0.05–0.5)
EOSINOPHILS RELATIVE PERCENT: 3.7 % (ref 0–6)
HCT VFR BLD CALC: 34.4 % (ref 34–48)
HEMOGLOBIN: 9.7 G/DL (ref 11.5–15.5)
IMMATURE GRANULOCYTES #: 0.03 E9/L
IMMATURE GRANULOCYTES %: 0.4 % (ref 0–5)
LYMPHOCYTES ABSOLUTE: 0.93 E9/L (ref 1.5–4)
LYMPHOCYTES RELATIVE PERCENT: 13.6 % (ref 20–42)
MCH RBC QN AUTO: 28.4 PG (ref 26–35)
MCHC RBC AUTO-ENTMCNC: 28.2 % (ref 32–34.5)
MCV RBC AUTO: 100.6 FL (ref 80–99.9)
MONOCYTES ABSOLUTE: 0.6 E9/L (ref 0.1–0.95)
MONOCYTES RELATIVE PERCENT: 8.8 % (ref 2–12)
NEUTROPHILS ABSOLUTE: 4.98 E9/L (ref 1.8–7.3)
NEUTROPHILS RELATIVE PERCENT: 72.9 % (ref 43–80)
OVALOCYTES: ABNORMAL
PDW BLD-RTO: 18.7 FL (ref 11.5–15)
PLATELET # BLD: 326 E9/L (ref 130–450)
PMV BLD AUTO: 11.8 FL (ref 7–12)
POIKILOCYTES: ABNORMAL
POLYCHROMASIA: ABNORMAL
RBC # BLD: 3.42 E12/L (ref 3.5–5.5)
SCHISTOCYTES: ABNORMAL
TEAR DROP CELLS: ABNORMAL
WBC # BLD: 6.8 E9/L (ref 4.5–11.5)

## 2020-01-30 PROCEDURE — 36415 COLL VENOUS BLD VENIPUNCTURE: CPT

## 2020-01-30 PROCEDURE — 85025 COMPLETE CBC W/AUTO DIFF WBC: CPT

## 2020-01-31 ENCOUNTER — TELEPHONE (OUTPATIENT)
Dept: FAMILY MEDICINE CLINIC | Age: 85
End: 2020-01-31

## 2020-01-31 NOTE — TELEPHONE ENCOUNTER
MA:  Please call daughter Karen Montoya [phone]. Anemia is improving from hospital visit and is back to her baseline before the hospitalization.     lnw

## 2020-01-31 NOTE — TELEPHONE ENCOUNTER
Dr. Mabel Soto: Thoughts on general surgeon to send patient too? They would prefer to stay in the [id number] [employer] system and GI is not in the system. Also, pt was referred back to pulmonary for her JANETTE as she is open to using a CPAP machine.   tiffany

## 2020-01-31 NOTE — TELEPHONE ENCOUNTER
The referral was already done to Dr. Fely Andersen as was requested by the family. Please note calling the daughter re: anemia improvement.    lnw

## 2020-02-04 NOTE — TELEPHONE ENCOUNTER
Daughter, Tre Hickman, notified and verbalized understanding. Please place referral to Dr Melly Stout.

## 2020-02-11 ENCOUNTER — TELEPHONE (OUTPATIENT)
Dept: ADMINISTRATIVE | Age: 85
End: 2020-02-11

## 2020-02-11 NOTE — TELEPHONE ENCOUNTER
Patient called to cancel her appt for tomorrow 2/12/20 due to she is not feeling good and stated she will call back to reschedule.

## 2020-02-12 ENCOUNTER — CARE COORDINATION (OUTPATIENT)
Dept: CARE COORDINATION | Age: 85
End: 2020-02-12

## 2020-02-12 NOTE — LETTER
2/12/2020    [address] [place] [id number]      Dear Wilver Mccann,    My name is Cherelle Carrasco and I am a registered nurse who partners with Filippo Cronin MD to improve patients' health. Filippo Cronin MD believes you would benefit from working with me. As a member of your health care team, I would work with other providers involved in your care, offer education for your specific health conditions, and connect you with additional resources as needed. I will collaborate with Filippo Cronin MD to support you in following your treatment plan. The additional support I provide is no additional cost to you. My primary focus is to help you achieve specific goals and improve your health. We are committed to walk with you on this journey and look forward to working with you. Please call me to further discuss your healthcare needs. I am available by phone or for appointments at the office. You can reach me at [phone].     In good health,     Cherelle Carrasco RN

## 2020-02-12 NOTE — CARE COORDINATION
-Attempted to reach pt to offer enrollment into care coordination program, however no answer.  -Detailed VM left introducing self, reason for call, and brief explanation of program.  -Left [facility]'s contact information, requesting call back.  -Will mail introduction letter to patient.

## 2020-02-19 ENCOUNTER — HOSPITAL ENCOUNTER (OUTPATIENT)
Age: 85
Discharge: HOME | End: 2020-02-21
Payer: MEDICARE

## 2020-02-19 ENCOUNTER — OFFICE VISIT (OUTPATIENT)
Dept: FAMILY MEDICINE CLINIC | Age: 85
End: 2020-02-19
Payer: MEDICARE

## 2020-02-19 VITALS
BODY MASS INDEX: 24.74 KG/M2 | HEIGHT: 60 IN | SYSTOLIC BLOOD PRESSURE: 133 MMHG | OXYGEN SATURATION: 97 % | WEIGHT: 126 LBS | HEART RATE: 57 BPM | RESPIRATION RATE: 18 BRPM | DIASTOLIC BLOOD PRESSURE: 63 MMHG

## 2020-02-19 PROCEDURE — 80053 COMPREHEN METABOLIC PANEL: CPT

## 2020-02-19 PROCEDURE — 85025 COMPLETE CBC W/AUTO DIFF WBC: CPT

## 2020-02-19 PROCEDURE — 36415 COLL VENOUS BLD VENIPUNCTURE: CPT | Performed by: FAMILY MEDICINE

## 2020-02-19 PROCEDURE — 99214 OFFICE O/P EST MOD 30 MIN: CPT | Performed by: FAMILY MEDICINE

## 2020-02-19 ASSESSMENT — PATIENT HEALTH QUESTIONNAIRE - PHQ9
1. LITTLE INTEREST OR PLEASURE IN DOING THINGS: 0
SUM OF ALL RESPONSES TO PHQ QUESTIONS 1-9: 0
2. FEELING DOWN, DEPRESSED OR HOPELESS: 0
SUM OF ALL RESPONSES TO PHQ QUESTIONS 1-9: 0
SUM OF ALL RESPONSES TO PHQ9 QUESTIONS 1 & 2: 0

## 2020-02-19 ASSESSMENT — ENCOUNTER SYMPTOMS
WHEEZING: 0
BACK PAIN: 1
ABDOMINAL PAIN: 1
SHORTNESS OF BREATH: 0
TROUBLE SWALLOWING: 0

## 2020-02-19 NOTE — PROGRESS NOTES
CC   Chief Complaint   Patient presents with    Atrial Fibrillation    Hypertension     HPI:   Patient comes in today for the below issue(s). History obtained from patient but also was accompanied today by her daughter-in-law as well as her daughter Andrés Arreola who spoke with me by phone during the interview    Cardiac issues  A fib, diastolic HF, HTN  She was admitted to the hospital in [facility] with SVT  Notes from cardio reviewed  No new issues. Feels about the same as before, denies syncope, chest pains, or palpitations  Continues to complain of significant fatigue and lack of energy. Blood pressure is reasonably well controlled. Remains on oral anticoagulation    Chronic back pain and RA  Known calcified meningeal mass  No new issues  Continues to struggle with back pain, not sure how much of that is from this versus her rheumatoid arthritis. Is on disease modifying drugs through rheumatology  Ambulates with a walker  Also is reporting pain in her hands, hips, and other joints. Taking Tylenol with suboptimal relief  Denies falls  Was not able to wean off her Celebrex  This issue probably is the most significantly limiting to her daily functioning. However, because no falls and has attentive family nearby, family agrees that she is safe being by herself. No significant needs to be identified- has grab bars and other home health equipment. Able to make her own meals and family assists with shopping. Not interested in pill pack delivery but may consider general home delivery of her prescriptions    Hiatal hernia  Status post failed Nissen fundoplication  No new complaints  Still having significant reflux symptoms  Denies any odynophagia  Bowels are regular   recent labs showed anemia-was to be working with someone about colonoscopy, but she did not follow through with GI. States she was told she could keep her appointment in April. We offered referral to surgeon, but she canceled her appointment. Wants to have someone closer to her home. Has no melena or hematochezia reported. She is on anticoagulants. She did require transfusion when in the hospital in [address] [place] [address] to feel fatigued    Sleep apnea  In light of the above issues, she is scheduled to see a pulmonologist regarding the previous diagnosis of sleep apnea but she had refused treatment for    Review of Systems  Review of Systems   Constitutional: Positive for fatigue. Negative for activity change, appetite change, fever and unexpected weight change. HENT: Positive for postnasal drip. Negative for trouble swallowing. Respiratory: Negative for shortness of breath and wheezing. Cardiovascular: Negative for chest pain, palpitations and leg swelling. Gastrointestinal: Positive for abdominal pain. Genitourinary: Negative for difficulty urinating. Musculoskeletal: Positive for arthralgias, back pain, gait problem and joint swelling. Skin: Negative for rash. Neurological: Positive for weakness. Negative for dizziness and headaches. Hematological: Does not bruise/bleed easily. Psychiatric/Behavioral: Negative for behavioral problems, confusion and dysphoric mood.           Past Medical History:   Diagnosis Date    Atrial fibrillation ([facility]     C. 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

## 2020-02-20 ENCOUNTER — CARE COORDINATION (OUTPATIENT)
Dept: CARE COORDINATION | Age: 85
End: 2020-02-20

## 2020-02-20 LAB
ALBUMIN SERPL-MCNC: 3.8 G/DL (ref 3.5–5.2)
ALP BLD-CCNC: 57 U/L (ref 35–104)
ALT SERPL-CCNC: 12 U/L (ref 0–32)
ANION GAP SERPL CALCULATED.3IONS-SCNC: 11 MMOL/L (ref 7–16)
ANISOCYTOSIS: ABNORMAL
AST SERPL-CCNC: 19 U/L (ref 0–31)
BASOPHILS ABSOLUTE: 0.03 E9/L (ref 0–0.2)
BASOPHILS RELATIVE PERCENT: 0.5 % (ref 0–2)
BILIRUB SERPL-MCNC: 0.3 MG/DL (ref 0–1.2)
BUN BLDV-MCNC: 26 MG/DL (ref 8–23)
BURR CELLS: ABNORMAL
CALCIUM SERPL-MCNC: 8.9 MG/DL (ref 8.6–10.2)
CHLORIDE BLD-SCNC: 108 MMOL/L (ref 98–107)
CO2: 20 MMOL/L (ref 22–29)
CREAT SERPL-MCNC: 1.2 MG/DL (ref 0.5–1)
EOSINOPHILS ABSOLUTE: 0.27 E9/L (ref 0.05–0.5)
EOSINOPHILS RELATIVE PERCENT: 4.7 % (ref 0–6)
GFR AFRICAN AMERICAN: 52
GFR NON-AFRICAN AMERICAN: 43 ML/MIN/1.73
GLUCOSE BLD-MCNC: 107 MG/DL (ref 74–99)
HCT VFR BLD CALC: 31.2 % (ref 34–48)
HEMOGLOBIN: 8.9 G/DL (ref 11.5–15.5)
IMMATURE GRANULOCYTES #: 0.03 E9/L
IMMATURE GRANULOCYTES %: 0.5 % (ref 0–5)
LYMPHOCYTES ABSOLUTE: 0.83 E9/L (ref 1.5–4)
LYMPHOCYTES RELATIVE PERCENT: 14.4 % (ref 20–42)
MCH RBC QN AUTO: 27.9 PG (ref 26–35)
MCHC RBC AUTO-ENTMCNC: 28.5 % (ref 32–34.5)
MCV RBC AUTO: 97.8 FL (ref 80–99.9)
MONOCYTES ABSOLUTE: 0.85 E9/L (ref 0.1–0.95)
MONOCYTES RELATIVE PERCENT: 14.7 % (ref 2–12)
NEUTROPHILS ABSOLUTE: 3.77 E9/L (ref 1.8–7.3)
NEUTROPHILS RELATIVE PERCENT: 65.2 % (ref 43–80)
OVALOCYTES: ABNORMAL
PDW BLD-RTO: 18.3 FL (ref 11.5–15)
PLATELET # BLD: 340 E9/L (ref 130–450)
PMV BLD AUTO: 10.9 FL (ref 7–12)
POIKILOCYTES: ABNORMAL
POLYCHROMASIA: ABNORMAL
POTASSIUM SERPL-SCNC: 5.1 MMOL/L (ref 3.5–5)
RBC # BLD: 3.19 E12/L (ref 3.5–5.5)
SODIUM BLD-SCNC: 139 MMOL/L (ref 132–146)
TEAR DROP CELLS: ABNORMAL
TOTAL PROTEIN: 6.4 G/DL (ref 6.4–8.3)
WBC # BLD: 5.8 E9/L (ref 4.5–11.5)

## 2020-02-24 RX ORDER — FLECAINIDE ACETATE 50 MG/1
50 TABLET ORAL 2 TIMES DAILY
Qty: 180 TABLET | Refills: 3 | Status: SHIPPED
Start: 2020-02-24 | End: 2020-11-23 | Stop reason: SDUPTHER

## 2020-02-24 RX ORDER — VALSARTAN AND HYDROCHLOROTHIAZIDE 160; 25 MG/1; MG/1
1 TABLET ORAL DAILY
Qty: 90 TABLET | Refills: 1 | Status: SHIPPED
Start: 2020-02-24 | End: 2020-10-28 | Stop reason: SDUPTHER

## 2020-02-25 ENCOUNTER — TELEPHONE (OUTPATIENT)
Dept: NON INVASIVE DIAGNOSTICS | Age: 85
End: 2020-02-25

## 2020-02-25 NOTE — TELEPHONE ENCOUNTER
[facility] informed [facility] that her event monitor showed no abnormal arrhythmias and she verbalized understanding.

## 2020-02-27 ENCOUNTER — OFFICE VISIT (OUTPATIENT)
Dept: SURGERY | Age: 85
End: 2020-02-27
Payer: MEDICARE

## 2020-02-27 VITALS
DIASTOLIC BLOOD PRESSURE: 64 MMHG | TEMPERATURE: 97.5 F | HEIGHT: 60 IN | BODY MASS INDEX: 24.62 KG/M2 | WEIGHT: 125.4 LBS | HEART RATE: 55 BPM | OXYGEN SATURATION: 95 % | SYSTOLIC BLOOD PRESSURE: 119 MMHG | RESPIRATION RATE: 16 BRPM

## 2020-02-27 PROCEDURE — 99203 OFFICE O/P NEW LOW 30 MIN: CPT | Performed by: SURGERY

## 2020-02-27 RX ORDER — MONTELUKAST SODIUM 10 MG/1
TABLET ORAL
Qty: 90 TABLET | Refills: 1 | Status: SHIPPED
Start: 2020-02-27 | End: 2020-07-27

## 2020-02-27 NOTE — PROGRESS NOTES
Provider, MD   clotrimazole-betamethasone (LOTRISONE) 1-0.05 % cream Apply topically 2 times daily  Patient taking differently: Apply topically as needed  2/7/19  Yes Ana Canchola MD   fluticasone Randye Pair) 50 MCG/ACT nasal spray 2 sprays by Nasal route daily 2 sprays in each nostril daily  Patient taking differently: 2 sprays by Nasal route as needed 2 sprays in each nostril daily 7/19/18  Yes Ana Canchola MD   celecoxib (CELEBREX) 200 MG capsule TAKE 1 CAPSULE DAILY 6/22/17  Yes Ana Canchola MD   clobetasol (TEMOVATE) 0.05 % cream Apply topically 2 times daily  Patient taking differently: Apply topically as needed  2/23/17  Yes Ana Canchola MD   Probiotic Product (ACIDOPHILUS PROBIOTIC) CAPS capsule Take 1 capsule by mouth daily   Yes Historical Provider, MD   docusate sodium (COLACE, DULCOLAX) 100 MG CAPS Take 100 mg by mouth 2 times daily. Patient taking differently: Take 100 mg by mouth daily  12/18/14  Yes Johanny Early MD   fexofenadine (ALLEGRA) 180 MG tablet Take 180 mg by mouth daily. Yes Historical Provider, MD   leucovorin calcium (WELLCOVORIN) 5 MG tablet Take 25 mg by mouth once a week. 7/19/13  Yes Historical Provider, MD   methotrexate 2.5 MG tablet Take by mouth once a week 2 tabs at lunch and 4 tabs  in the pm once a week on Monday   Yes Historical Provider, MD   ipratropium (ATROVENT) 0.06 % nasal spray 2 sprays by Nasal route 3 times daily  Patient not taking: Reported on 2/27/2020 2/8/19 2/27/20  Mercy Saldaña DO       Allergies   Allergen Reactions    Amoxicillin-Pot Clavulanate     Milk-Related Compounds Nausea And Vomiting       Social History     Socioeconomic History    Marital status:       Spouse name: None    Number of children: None    Years of education: None    Highest education level: None   Occupational History    None   Social Needs    Financial resource strain: None    Food insecurity:     Worry: None     Inability: None    Transportation needs: Medical: None     Non-medical: None   Tobacco Use    Smoking status: Never Smoker    Smokeless tobacco: Never Used   Substance and Sexual Activity    Alcohol use: No    Drug use: No    Sexual activity: None   Lifestyle    Physical activity:     Days per week: None     Minutes per session: None    Stress: None   Relationships    Social connections:     Talks on phone: None     Gets together: None     Attends Yazidi service: None     Active member of club or organization: None     Attends meetings of clubs or organizations: None     Relationship status:     Intimate partner violence:     Fear of current or ex partner: None     Emotionally abused: None     Physically abused: None     Forced sexual activity: None   Other Topics Concern    None   Social History Narrative    None       Family History   Problem Relation Age of Onset    Heart Attack Mother 58    Other Father         suicide, depression, leg trouble    Other Sister         Dementia    Hypertension Sister     Hypertension Sister     Hypertension Brother     Thyroid Disease Sister     Rheum Arthritis Sister     Rheum Arthritis Brother     Other Daughter         hemochromatosis     Other Son         hemachromatosis        Review of Systems   All other systems reviewed and are negative. Objective:  Vitals:    02/27/20 1301   BP: 119/64   Site: Right Upper Arm   Position: Sitting   Cuff Size: Medium Adult   Pulse: 55   Resp: 16   Temp: 97.5 °F (36.4 °C)   TempSrc: Oral   SpO2: 95%   Weight: 125 lb 6.4 oz (56.9 kg)   Height: 5' (1.524 m)          Physical Exam  HENT:      Head: Normocephalic and atraumatic. Eyes:      General:         Right eye: No discharge. Left eye: No discharge. Neck:      Trachea: No tracheal deviation. Cardiovascular:      Rate and Rhythm: Normal rate. Pulmonary:      Effort: Pulmonary effort is normal. No respiratory distress.    Abdominal:      General: There is no

## 2020-03-04 ENCOUNTER — TELEPHONE (OUTPATIENT)
Dept: SURGERY | Age: 85
End: 2020-03-04

## 2020-03-04 NOTE — TELEPHONE ENCOUNTER
Mane James is scheduled for Colonoscopy/EGD with Dr Dayne Santamaria on 4/1/20 at 1:45 PM at [facility]. Patient was told to arrive at 12:45 PM. Patient needs to be NPO after midnight the night before procedure. All surgery instructions were explained to the patient and a surgery letter was also mailed out. MA informed patient that PAT will also be calling to review pre-op instructions and medications. Patient verbalized understanding.     Electronically signed by Leon Cole on 3/4/20 at 2:14 PM

## 2020-03-04 NOTE — TELEPHONE ENCOUNTER
MA attempted to call the patient in regards to her colonoscopy/EGD being scheduled. MA left a message with contact information to call the office back.     Electronically signed by Hermelinda Vargas on 3/4/20 at 8:02 AM

## 2020-03-10 ENCOUNTER — TELEPHONE (OUTPATIENT)
Dept: CARDIOLOGY CLINIC | Age: 85
End: 2020-03-10

## 2020-03-10 NOTE — TELEPHONE ENCOUNTER
Patient's daughter called stating patient is scheduled for a colonoscopy on 4/1/20 and has been recommended to hold Xarelto (2) days prior. Please advise.

## 2020-03-17 ENCOUNTER — TELEPHONE (OUTPATIENT)
Dept: SURGERY | Age: 85
End: 2020-03-17

## 2020-03-25 ENCOUNTER — TELEPHONE (OUTPATIENT)
Dept: SURGERY | Age: 85
End: 2020-03-25

## 2020-03-25 NOTE — TELEPHONE ENCOUNTER
Patient's colonoscopy possible EGD is rescheduled for 06-11-20 due to covid 19. MA mailed out new surgery and follow up letter.     Electronically signed by Sunshine Breen MA on 3/25/2020 at 8:25 AM

## 2020-03-31 RX ORDER — FAMOTIDINE 40 MG/1
40 TABLET, FILM COATED ORAL NIGHTLY
Qty: 90 TABLET | Refills: 3 | Status: SHIPPED
Start: 2020-03-31 | End: 2021-05-19

## 2020-04-02 ENCOUNTER — TELEPHONE (OUTPATIENT)
Dept: NON INVASIVE DIAGNOSTICS | Age: 85
End: 2020-04-02

## 2020-04-02 NOTE — TELEPHONE ENCOUNTER
Phone call to the patient to discuss her 04/24/20 appointment with [facility]. Patient declined virtual visit options understanding at this time we will not be able to schedule them until after May 26 which is also subject to change.      Added to the Recall List

## 2020-04-14 ENCOUNTER — VIRTUAL VISIT (OUTPATIENT)
Dept: FAMILY MEDICINE CLINIC | Age: 85
End: 2020-04-14
Payer: MEDICARE

## 2020-04-14 VITALS — DIASTOLIC BLOOD PRESSURE: 58 MMHG | SYSTOLIC BLOOD PRESSURE: 122 MMHG

## 2020-04-14 PROCEDURE — 99442 PR PHYS/QHP TELEPHONE EVALUATION 11-20 MIN: CPT | Performed by: FAMILY MEDICINE

## 2020-04-15 ENCOUNTER — NURSE ONLY (OUTPATIENT)
Dept: FAMILY MEDICINE CLINIC | Age: 85
End: 2020-04-15
Payer: MEDICARE

## 2020-04-15 ENCOUNTER — HOSPITAL ENCOUNTER (OUTPATIENT)
Age: 85
Discharge: HOME | End: 2020-04-17
Payer: MEDICARE

## 2020-04-15 LAB
ANISOCYTOSIS: ABNORMAL
BASOPHILS ABSOLUTE: 0.04 E9/L (ref 0–0.2)
BASOPHILS RELATIVE PERCENT: 0.7 % (ref 0–2)
BURR CELLS: ABNORMAL
EOSINOPHILS ABSOLUTE: 0.25 E9/L (ref 0.05–0.5)
EOSINOPHILS RELATIVE PERCENT: 4.1 % (ref 0–6)
HCT VFR BLD CALC: 32.9 % (ref 34–48)
HEMOGLOBIN: 9.3 G/DL (ref 11.5–15.5)
IMMATURE GRANULOCYTES #: 0.02 E9/L
IMMATURE GRANULOCYTES %: 0.3 % (ref 0–5)
LYMPHOCYTES ABSOLUTE: 0.86 E9/L (ref 1.5–4)
LYMPHOCYTES RELATIVE PERCENT: 14.1 % (ref 20–42)
MCH RBC QN AUTO: 27.1 PG (ref 26–35)
MCHC RBC AUTO-ENTMCNC: 28.3 % (ref 32–34.5)
MCV RBC AUTO: 95.9 FL (ref 80–99.9)
MONOCYTES ABSOLUTE: 0.75 E9/L (ref 0.1–0.95)
MONOCYTES RELATIVE PERCENT: 12.3 % (ref 2–12)
NEUTROPHILS ABSOLUTE: 4.18 E9/L (ref 1.8–7.3)
NEUTROPHILS RELATIVE PERCENT: 68.5 % (ref 43–80)
OVALOCYTES: ABNORMAL
PDW BLD-RTO: 17.2 FL (ref 11.5–15)
PLATELET # BLD: 343 E9/L (ref 130–450)
PMV BLD AUTO: 11.7 FL (ref 7–12)
POIKILOCYTES: ABNORMAL
POLYCHROMASIA: ABNORMAL
RBC # BLD: 3.43 E12/L (ref 3.5–5.5)
TEAR DROP CELLS: ABNORMAL
WBC # BLD: 6.1 E9/L (ref 4.5–11.5)

## 2020-04-15 PROCEDURE — 85025 COMPLETE CBC W/AUTO DIFF WBC: CPT

## 2020-04-15 PROCEDURE — 36415 COLL VENOUS BLD VENIPUNCTURE: CPT | Performed by: FAMILY MEDICINE

## 2020-04-16 RX ORDER — OMEPRAZOLE 40 MG/1
40 CAPSULE, DELAYED RELEASE ORAL 2 TIMES DAILY
Qty: 180 CAPSULE | Refills: 3 | Status: SHIPPED
Start: 2020-04-16 | End: 2021-05-05

## 2020-04-20 ENCOUNTER — TELEPHONE (OUTPATIENT)
Dept: FAMILY MEDICINE CLINIC | Age: 85
End: 2020-04-20

## 2020-04-20 ENCOUNTER — TELEPHONE (OUTPATIENT)
Dept: SURGERY | Age: 85
End: 2020-04-20

## 2020-04-20 NOTE — TELEPHONE ENCOUNTER
Wolf Huggins is scheduled for colonoscopy possible EGD with Dr Regina Rick on 04-29-20 at [facility] at 8:15 am. Patient was told to arrive at 7:15 am. Patient needs to be NPO after midnight the night before procedure. All surgery instructions were explained to the patient and a surgery letter was also mailed out. MA informed patient that PAT will also be calling to review pre-op instructions and medications. Patient verbalized understanding.   Electronically signed by Richard Retana MA on 4/20/2020 at 3:38 PM

## 2020-04-22 ENCOUNTER — TELEPHONE (OUTPATIENT)
Dept: NON INVASIVE DIAGNOSTICS | Age: 85
End: 2020-04-22

## 2020-04-22 ENCOUNTER — TELEPHONE (OUTPATIENT)
Dept: FAMILY MEDICINE CLINIC | Age: 85
End: 2020-04-22

## 2020-04-22 NOTE — PROGRESS NOTES
Take your temperature before you leave for your procedure, if temperature is 100 or greater call us at [phone], [phone] before 8am    Have you been tested for COVID       No              Have you been told you were positive for COVID   No  Have you had any known exposure to someone that is positive for COVID   No  Do you have a cough    No              Do you have shortness of breath       No                 Do you have a sore throat       No                Are you having chills          No                Are you having muscle aches     No                   Please come to the hospital wearing a mask and have your significant other wear a mask as well.

## 2020-04-24 ENCOUNTER — HOSPITAL ENCOUNTER (OUTPATIENT)
Dept: INTERVENTIONAL RADIOLOGY/VASCULAR | Age: 85
Discharge: HOME | End: 2020-04-26
Payer: MEDICARE

## 2020-04-24 PROCEDURE — 93923 UPR/LXTR ART STDY 3+ LVLS: CPT

## 2020-04-24 NOTE — PROGRESS NOTES
Cardiac Electrophysiology Telephone Visit Progress Note    Smita Velez  1935  Date of Service: 5/5/2020  Referring Provider/PCP: Barbara Guardado MD  Primary Electrophysiologist: Cara Dalton MD    Chief Complaint: Paroxysmal atrial fibrillation on Flecainide     SUBJECTIVE: Smita Velez has been called today for a telephone visit for paroxysmal atrial fibrillation. She gives verbal consent for a telephone visit. Mrs. Keeley Joaquin was last seen in office by Kalani Cortez on 1/23/20. She underwent 14 day cardiac monitor on 2/10/20 which showed no arrhythmias. She underwent sleep study in March 2020. She was diagnosed with moderate JANETTE and was prescribed BIPAP. She recently also had EGD and colonoscopy done which showed mild diverticulosis, polyp, hemorrhoid and moderate gastritis. The patient denies any chest pain, dyspnea, palpitations, dizziness, syncope, orthopnea or paroxysmal nocturnal dyspnea. She remains on Tambocor for rhythm control, Lopressor for rate control and Xarelto for stroke risk reduction. She denies any bleeding issues.     Patient Active Problem List    Diagnosis Date Noted    Delayed gastric emptying 04/20/2018    Closed nondisplaced fracture of left patella 04/19/2018    Palpitations 11/16/2017    Fatigue 11/16/2017    Laceration of left leg 06/25/2017    Sinus bradycardia 10/10/2016    Adjustment disorder with anxious mood 07/20/2016    JANETTE (obstructive sleep apnea) 05/23/2016     Overview Note:     Recommended CPAP, pt refused numerous treatment options by Dr. Bryce Rodriguez hypertension 05/23/2016    Abrasion, leg w/o infection 08/20/2015    Multiple fractures of ribs of left side 8-11 12/16/2014    Thoracic spinal stenosis with mass at T6-T7 12/16/2014    Thoracic spine tumor 12/16/2014    Thoracic spine fracture ([facility] [address] 12/16/2014    Anemia 07/26/2014    Lumbar radicular pain 03/19/2014    Numbness and tingling of leg 88/86/9664    Diastolic shows normal left ventricular wall motion and   myocardial thickening during systole. Resting left ventricular   ejection fraction is calculated at 95%.         Impression   No evidence of stress-induced left ventricular myocardial ischemia. TTE 1/16/20:  FINDINGS:    LEFT VENTRICLE:  The left ventricle is normal in size. There is no left ventricular hypertrophy. Basal septum is prominent. Left ventricular systolic function is normal. EF = 57% (2D biplane)  Left ventricular diastolic function is consistent with abnormal relaxation (stage I). Left Atrial pressures are elevated. Mitral annular lateral e': 5.3 cm/s. Mitral annular lateral E/e': 15.7. Mitral annular septal e': 5.4 cm/s. Mitral annular septal E/e': 15.4. The average Mitral E/e' ratio is 15.6. LV Wall Motion:  Left ventricular wall motion is normal.    RIGHT VENTRICLE:  The right ventricle is mildly dilated. Right ventricular systolic function is normal.  The right atrial pressure could not be estimated due to poor visualization of the inferior vena cava. The estimated right ventricular systolic pressure is 28 mmHg. The right atrial pressure is 3 mmHg. LEFT ATRIUM:  The left atrial size is mildly enlarged. RIGHT ATRIUM:  The right atrial cavity is mildly dilated. MITRAL VALVE:  There is mild posterior mitral annular calcification. There is mild mitral valve leaflet thickening. There is no mitral stenosis. There is trivial mitral valve regurgitation. TRICUSPID VALVE:  The tricuspid valve leaflets are structurally normal. There is no tricuspid stenosis. There is mild tricuspid valve regurgitation. AORTIC VALVE:  The aortic valve is tricuspid. There is mild thickening. There is mild calcification. There is no aortic valve stenosis. There is mild aortic valve regurgitation. The left coronary leaflet is immobile. PULMONIC VALVE  The pulmonic valve is not well visualized, but grossly normal. There is no pulmonic stenosis.  There is 3/3/20: moderate sleep apnea  - Started on BIPAP but noncompliants. - Encouraged treatment.      3. Hypertension  - On Diovan-HCT and Lopressor      4. Chronic diastolic CHF  - Euvolemic and compensated per history. - Follows with Cardiology.      5. Hypothyroidism  - On Levothroid. 6. OA  - On Celebrex.      7. Moderate AI  -  Followed by Cardiology. 8. Peripheral neuropathy  - Newly diagnosed. Plan:     1. Continue Flecainide 50 mg bid. 2. Continue Lopressor 25 mg bid. 3. Continue Xarelto 15 mg daily. 4. Life style modifications and encouraged treatment of JANETTE  5. EKG and CMP every 6 months. 6. Follow-up in 6 months. Encouraged the patient to call the office for any questions or concerns. Thank you for allowing me to participate in their care.      I have spent a total of 25 minutes with the Lizet Jm a telephone visit reviewing the above stated recommendations. A total of >50% of that time involved face-to-face time providing counseling and or coordination of care with the other providers     Mariel Schaumann, MD  Cardiac Electrophysiology  [facility]  [facility] [place]: [facility]  10:08 AM  5/5/2020     Due to this being a TeleHealth encounter, evaluation of organ systems is limited.     Pursuant to the emergency declaration under the [facility], [facility] waiver authority and the [employer] Act, this telephone visit was conducted, with patient's consent, to reduce the patient's risk of exposure to COVID-19 and provide continuity of care for an established patient.     Services were provided through a telephone discussion to substitute for in-person clinic visit.

## 2020-04-28 ENCOUNTER — ANESTHESIA EVENT (OUTPATIENT)
Dept: ENDOSCOPY | Age: 85
End: 2020-04-28
Payer: MEDICARE

## 2020-04-28 NOTE — ANESTHESIA PRE PROCEDURE
Department of Anesthesiology  Preprocedure Note       Name:  Marisa Ortega   Age:  80 y.o.  :  1935                                          MRN:  [id number]         Date:  2020      Surgeon: Marlon Johnson):  Brian Ballard MD    Procedure: POSS EGD ESOPHAGOGASTRODUODENOSCOPY (N/A )  COLONOSCOPY DIAGNOSTIC (N/A )    Medications prior to admission:   Prior to Admission medications    Medication Sig Start Date End Date Taking?  Authorizing Provider   omeprazole (PRILOSEC) 40 MG delayed release capsule Take 1 capsule by mouth 2 times daily 20  Yes Alex Hurst MD   famotidine (PEPCID) 40 MG tablet Take 1 tablet by mouth nightly 3/31/20  Yes Alex Hurst MD   montelukast (SINGULAIR) 10 MG tablet TAKE 1 TABLET NIGHTLY 20  Yes Alex Hurst MD   valsartan-hydroCHLOROthiazide (DIOVAN-HCT) 160-25 MG per tablet Take 1 tablet by mouth daily 20  Yes Alex Hurst MD   flecainide (TAMBOCOR) 50 MG tablet Take 1 tablet by mouth 2 times daily 20  Yes Adalberto Hanna MD   metoprolol tartrate (LOPRESSOR) 25 MG tablet Take 1 tablet by mouth 2 times daily 20  Yes FRAN Ricketts - CNP   levothyroxine (LEVOTHROID) 100 MCG tablet Take 1 tablet by mouth Daily 19  Yes Alex Hurst MD   ondansetron [facility]) 4 MG tablet Take 1 tablet by mouth every 8 hours as needed for Nausea or Vomiting 19  Yes Alex Hurst MD   polyethyl glycol-propyl glycol 0.4-0.3 % (SYSTANE) 0.4-0.3 % ophthalmic solution 1 drop as needed for Dry Eyes   Yes Historical Provider, MD   fluticasone (FLONASE) 50 MCG/ACT nasal spray 2 sprays by Nasal route daily 2 sprays in each nostril daily  Patient taking differently: 2 sprays by Nasal route as needed 2 sprays in each nostril daily 18  Yes Alex Hurst MD   celecoxib (CELEBREX) 200 MG capsule TAKE 1 CAPSULE DAILY 17  Yes Alex Hurst MD   Probiotic Product (ACIDOPHILUS PROBIOTIC) CAPS capsule Take 1 capsule by mouth daily PREGTESTUR, PREGSERUM, HCG, HCGQUANT     ABGs: No results found for: PHART, PO2ART, LNA6GDB, POM5DWH, BEART, D2DNFRHB     Type & Screen (If Applicable):  No results found for: LABABO, [address]    Anesthesia Evaluation  Patient summary reviewed no history of anesthetic complications:   Airway: Mallampati: II  TM distance: <3 FB     Mouth opening: < 3 FB Dental:    (+) upper dentures      Pulmonary: breath sounds clear to auscultation  (+) sleep apnea: on noncompliant,                            ROS comment: Denies smoking    Cardiovascular:    (+) hypertension:, dysrhythmias: atrial fibrillation and SVT,     (-) past MI and CABG/stent      Rhythm: regular  Rate: normal                 ROS comment: Uses walker for ambulation     Neuro/Psych:   (+) psychiatric history:             ROS comment: 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 , .                 Abdominal:           Vascular: negative vascular ROS. Anesthesia Plan      MAC     ASA 3     (Backup GA if needed)  Induction: intravenous. Anesthetic plan and risks discussed with patient. Plan discussed with CRNA.                   Alysa Ibanez MD   4/29/2020

## 2020-04-29 ENCOUNTER — ANESTHESIA (OUTPATIENT)
Dept: ENDOSCOPY | Age: 85
End: 2020-04-29
Payer: MEDICARE

## 2020-04-29 ENCOUNTER — HOSPITAL ENCOUNTER (OUTPATIENT)
Age: 85
Setting detail: OUTPATIENT SURGERY
Discharge: HOME | End: 2020-04-29
Attending: SURGERY | Admitting: SURGERY
Payer: MEDICARE

## 2020-04-29 VITALS
OXYGEN SATURATION: 100 % | HEART RATE: 58 BPM | HEIGHT: 60 IN | DIASTOLIC BLOOD PRESSURE: 68 MMHG | BODY MASS INDEX: 24.54 KG/M2 | RESPIRATION RATE: 16 BRPM | WEIGHT: 125 LBS | TEMPERATURE: 97.3 F | SYSTOLIC BLOOD PRESSURE: 148 MMHG

## 2020-04-29 VITALS
OXYGEN SATURATION: 100 % | RESPIRATION RATE: 18 BRPM | DIASTOLIC BLOOD PRESSURE: 63 MMHG | SYSTOLIC BLOOD PRESSURE: 148 MMHG

## 2020-04-29 PROCEDURE — 88305 TISSUE EXAM BY PATHOLOGIST: CPT

## 2020-04-29 PROCEDURE — 3700000000 HC ANESTHESIA ATTENDED CARE: Performed by: SURGERY

## 2020-04-29 PROCEDURE — 88342 IMHCHEM/IMCYTCHM 1ST ANTB: CPT

## 2020-04-29 PROCEDURE — 7100000011 HC PHASE II RECOVERY - ADDTL 15 MIN: Performed by: SURGERY

## 2020-04-29 PROCEDURE — 45380 COLONOSCOPY AND BIOPSY: CPT | Performed by: SURGERY

## 2020-04-29 PROCEDURE — 88341 IMHCHEM/IMCYTCHM EA ADD ANTB: CPT

## 2020-04-29 PROCEDURE — 6360000002 HC RX W HCPCS: Performed by: NURSE ANESTHETIST, CERTIFIED REGISTERED

## 2020-04-29 PROCEDURE — 2709999900 HC NON-CHARGEABLE SUPPLY: Performed by: SURGERY

## 2020-04-29 PROCEDURE — 3609010300 HC COLONOSCOPY W/BIOPSY SINGLE/MULTIPLE: Performed by: SURGERY

## 2020-04-29 PROCEDURE — 3609012400 HC EGD TRANSORAL BIOPSY SINGLE/MULTIPLE: Performed by: SURGERY

## 2020-04-29 PROCEDURE — 3700000001 HC ADD 15 MINUTES (ANESTHESIA): Performed by: SURGERY

## 2020-04-29 PROCEDURE — 7100000010 HC PHASE II RECOVERY - FIRST 15 MIN: Performed by: SURGERY

## 2020-04-29 PROCEDURE — 2580000003 HC RX 258: Performed by: NURSE ANESTHETIST, CERTIFIED REGISTERED

## 2020-04-29 PROCEDURE — 43239 EGD BIOPSY SINGLE/MULTIPLE: CPT | Performed by: SURGERY

## 2020-04-29 RX ORDER — PROPOFOL 10 MG/ML
INJECTION, EMULSION INTRAVENOUS PRN
Status: DISCONTINUED | OUTPATIENT
Start: 2020-04-29 | End: 2020-04-29 | Stop reason: SDUPTHER

## 2020-04-29 RX ORDER — SODIUM CHLORIDE 9 MG/ML
INJECTION, SOLUTION INTRAVENOUS CONTINUOUS PRN
Status: DISCONTINUED | OUTPATIENT
Start: 2020-04-29 | End: 2020-04-29 | Stop reason: SDUPTHER

## 2020-04-29 RX ADMIN — SODIUM CHLORIDE: 9 INJECTION, SOLUTION INTRAVENOUS at 08:06

## 2020-04-29 RX ADMIN — PROPOFOL 300 MG: 10 INJECTION, EMULSION INTRAVENOUS at 08:13

## 2020-04-29 ASSESSMENT — PAIN SCALES - GENERAL
PAINLEVEL_OUTOF10: 0

## 2020-04-29 ASSESSMENT — PAIN - FUNCTIONAL ASSESSMENT: PAIN_FUNCTIONAL_ASSESSMENT: 0-10

## 2020-04-29 NOTE — H&P
no plans for surgery    Neuropathy       chronic; 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     Osteoporosis      PAF (paroxysmal atrial fibrillation) (HCC)       anticoagulation per cardiology     Rheumatoid arthritis with rheumatoid factor ([facility] [address]      Rheumatoid arthritis(714.0)       Dr Jazmine Rubi; on DMD Jessica Almaguer)    Rib fractures 2014    Skin cancer of lip      squamous cell    Staph infection 2017     left ankle    Tachyarrhythmia       PSVT; later A-fib; sees Dr Prosper Gamboa  Past Surgical History         Past Surgical History:   Procedure Laterality Date    CARDIAC CATHETERIZATION        CARPAL TUNNEL RELEASE         SECTION        COLONOSCOPY   2011     Dr Flor Molina; diverticular disease; repeat in 2016    GASTRIC FUNDOPLICATION   [id number], TOTAL ABDOMINAL        TOTAL KNEE ARTHROPLASTY   2007     bilateral            Home Medications           Prior to Admission medications    Medication Sig Start Date End Date Taking?  Authorizing Provider   montelukast (SINGULAIR) 10 MG tablet TAKE 1 TABLET NIGHTLY 20   Yes Sydnee Mata MD   valsartan-hydroCHLOROthiazide (DIOVAN-HCT) 160-25 MG per tablet Take 1 tablet by mouth daily 20   Yes Sydnee Mata MD   rivaroxaban (XARELTO) 15 MG TABS tablet Take 1 tablet by mouth daily (with breakfast) 20   Yes Kala Hernández MD   flecainide (TAMBOCOR) 50 MG tablet Take 1 tablet by mouth 2 times daily 20   Yes Kala Hernández MD   metoprolol tartrate (LOPRESSOR) 25 MG tablet Take 1 tablet by mouth 2 times daily 20   Yes Flip Starks, APRN - CNP   levothyroxine (LEVOTHROID) 100 MCG tablet Take 1 tablet by mouth Daily 19   Yes Sydnee Mata MD   ondansetron [facility]) 4 MG tablet Take 1 tablet by mouth every 8 hours as needed for Nausea or Vomiting 19   Yes Sydnee Mata MD   omeprazole (PRILOSEC) 40 MG delayed release capsule Take 1 capsule by mouth 2 times daily 19   Yes Eliza Srivastava MD   polyethyl glycol-propyl glycol 0.4-0.3 % (SYSTANE) 0.4-0.3 % ophthalmic solution 1 drop as needed for Dry Eyes     Yes Historical Provider, MD   clotrimazole-betamethasone (LOTRISONE) 1-0.05 % cream Apply topically 2 times daily  Patient taking differently: Apply topically as needed  2/7/19   Yes Eliza Srivastava MD   fluticasone (FLONASE) 50 MCG/ACT nasal spray 2 sprays by Nasal route daily 2 sprays in each nostril daily  Patient taking differently: 2 sprays by Nasal route as needed 2 sprays in each nostril daily 7/19/18   Yes Eliza Srivastava MD   celecoxib (CELEBREX) 200 MG capsule TAKE 1 CAPSULE DAILY 6/22/17   Yes Eliza Srivastava MD   clobetasol (TEMOVATE) 0.05 % cream Apply topically 2 times daily  Patient taking differently: Apply topically as needed  2/23/17   Yes Eliza Srivastava MD   Probiotic Product (ACIDOPHILUS PROBIOTIC) CAPS capsule Take 1 capsule by mouth daily     Yes Historical Provider, MD   docusate sodium (COLACE, DULCOLAX) 100 MG CAPS Take 100 mg by mouth 2 times daily. Patient taking differently: Take 100 mg by mouth daily  12/18/14   Yes Tha Silva MD   fexofenadine (ALLEGRA) 180 MG tablet Take 180 mg by mouth daily.     Yes Historical Provider, MD   leucovorin calcium (WELLCOVORIN) 5 MG tablet Take 25 mg by mouth once a week. 7/19/13   Yes Historical Provider, MD   methotrexate 2.5 MG tablet Take by mouth once a week 2 tabs at lunch and 4 tabs  in the pm once a week on Monday     Yes Historical Provider, MD   ipratropium (ATROVENT) 0.06 % nasal spray 2 sprays by Nasal route 3 times daily  Patient not taking: Reported on 2/27/2020 2/8/19 2/27/20   Brandon Marino DO                 Allergies   Allergen Reactions    Amoxicillin-Pot Clavulanate      Milk-Related Compounds Nausea And Vomiting         Social History   Social History            Socioeconomic History    Marital status:         Spouse name: None    Number of children: None    Years atraumatic. Eyes:      General:         Right eye: No discharge. Left eye: No discharge. Neck:      Trachea: No tracheal deviation. Cardiovascular:      Rate and Rhythm: Normal rate. Pulmonary:      Effort: Pulmonary effort is normal. No respiratory distress. Abdominal:      General: There is no distension. Palpations: Abdomen is soft. Tenderness: There is no abdominal tenderness. There is no guarding or rebound. Skin:     General: Skin is warm and dry. Neurological:      Mental Status: She is alert and oriented to person, place, and time.                      Krysten Jackson MD       NOTE: This report, in part or full,may have been transcribed using voice recognition software. Every effort was made to ensure accuracy; however, inadvertent computerized transcription errors may be present. Please excuse any transcriptional grammatical or spelling errors that may have escaped my editorial review.     CC:  Chloe Nuñez MD

## 2020-04-29 NOTE — ANESTHESIA POSTPROCEDURE EVALUATION
Department of Anesthesiology  Postprocedure Note    Patient: Maurizio Dao  MRN: [id number]  YOB: 1935  Date of evaluation: 4/29/2020  Time:  1:03 PM     Procedure Summary     Date:  04/29/20 Room / Location:  [facility] 02 / [address] [facility]    Anesthesia Start:  7061 Anesthesia Stop:  0848    Procedures:       EGD BIOPSY (N/A )      COLONOSCOPY WITH BIOPSY (N/A ) Diagnosis:  (POSITIVE FIT TEST)    Surgeon:  Aditi Park MD Responsible Provider:  Sandy Castillo MD    Anesthesia Type:  MAC ASA Status:  3          Anesthesia Type: MAC    Tanesha Phase I: Tanesha Score: 10    Tanesha Phase II: Tanesha Score: 10    Last vitals: Reviewed and per EMR flowsheets.        Anesthesia Post Evaluation    Patient location during evaluation: PACU  Patient participation: complete - patient participated  Level of consciousness: awake  Pain score: 1  Airway patency: patent  Nausea & Vomiting: no vomiting and no nausea  Complications: no  Cardiovascular status: hemodynamically stable  Respiratory status: acceptable  Hydration status: stable

## 2020-05-04 ENCOUNTER — TELEPHONE (OUTPATIENT)
Dept: FAMILY MEDICINE CLINIC | Age: 85
End: 2020-05-04

## 2020-05-04 NOTE — TELEPHONE ENCOUNTER
Patient daughter, Kamaljit Cyr, called requesting orders for Home PT. Patient fell 1 week ago. She had minor injuries. She is having severe peripheral neuropathy from her groin down. Daughter is concerned with her difficulty walking and her lack of strength.

## 2020-05-05 ENCOUNTER — TELEPHONE (OUTPATIENT)
Dept: FAMILY MEDICINE CLINIC | Age: 85
End: 2020-05-05

## 2020-05-05 ENCOUNTER — VIRTUAL VISIT (OUTPATIENT)
Dept: NON INVASIVE DIAGNOSTICS | Age: 85
End: 2020-05-05
Payer: MEDICARE

## 2020-05-05 VITALS
HEIGHT: 60 IN | SYSTOLIC BLOOD PRESSURE: 125 MMHG | DIASTOLIC BLOOD PRESSURE: 53 MMHG | BODY MASS INDEX: 24.54 KG/M2 | WEIGHT: 125 LBS | HEART RATE: 53 BPM

## 2020-05-05 PROCEDURE — 99214 OFFICE O/P EST MOD 30 MIN: CPT | Performed by: INTERNAL MEDICINE

## 2020-05-07 ENCOUNTER — OFFICE VISIT (OUTPATIENT)
Dept: VASCULAR SURGERY | Age: 85
End: 2020-05-07
Payer: MEDICARE

## 2020-05-07 VITALS — RESPIRATION RATE: 16 BRPM | WEIGHT: 125 LBS | HEIGHT: 60 IN | BODY MASS INDEX: 24.54 KG/M2

## 2020-05-07 PROBLEM — S81.812A LACERATION OF LEFT LEG: Status: RESOLVED | Noted: 2017-06-25 | Resolved: 2020-05-07

## 2020-05-07 PROBLEM — M79.662 PAIN IN BOTH LOWER LEGS: Status: ACTIVE | Noted: 2020-05-07

## 2020-05-07 PROBLEM — M79.661 PAIN IN BOTH LOWER LEGS: Status: ACTIVE | Noted: 2020-05-07

## 2020-05-07 PROCEDURE — 99204 OFFICE O/P NEW MOD 45 MIN: CPT | Performed by: SURGERY

## 2020-05-07 RX ORDER — GABAPENTIN 100 MG/1
100 CAPSULE ORAL DAILY
COMMUNITY
End: 2020-05-15

## 2020-05-07 RX ORDER — LANOLIN ALCOHOL/MO/W.PET/CERES
1000 CREAM (GRAM) TOPICAL DAILY
COMMUNITY

## 2020-05-07 NOTE — PROGRESS NOTES
informed them, that I have personally reviewed the lower extremity arterial Doppler study, essentially normal, her symptoms are not from a vascular issue and most likely due to neuropathy and to discuss with her PCP regarding additional work-up for the same and to call me. If any increasing symptoms    All their questions were answered            Indicated follow-up: Return if symptoms worsen or fail to improve.

## 2020-05-15 ENCOUNTER — OFFICE VISIT (OUTPATIENT)
Dept: SURGERY | Age: 85
End: 2020-05-15
Payer: MEDICARE

## 2020-05-15 VITALS
RESPIRATION RATE: 16 BRPM | HEART RATE: 59 BPM | WEIGHT: 125 LBS | BODY MASS INDEX: 24.54 KG/M2 | SYSTOLIC BLOOD PRESSURE: 120 MMHG | TEMPERATURE: 97.8 F | HEIGHT: 60 IN | DIASTOLIC BLOOD PRESSURE: 62 MMHG

## 2020-05-15 PROCEDURE — 99213 OFFICE O/P EST LOW 20 MIN: CPT | Performed by: SURGERY

## 2020-05-15 RX ORDER — GABAPENTIN 300 MG/1
CAPSULE ORAL
COMMUNITY
Start: 2020-05-08 | End: 2020-06-16 | Stop reason: DRUGHIGH

## 2020-05-15 NOTE — PROGRESS NOTES
[facility] Note    Assessment/Plan:     Diagnosis Orders   1. Colon adenoma      Given age, recommend repeating colonoscopy only as needed. 2. Diverticulosis of large intestine without hemorrhage      Fiber diet   3. Gastroesophageal reflux disease with recurrent hiatal hernia      Symptoms better controlled with omeprazole and diet modification. Continue. Return if symptoms worsen or fail to improve. Chief Complaint   Patient presents with    2 Week Follow-Up     2 week follow up colonoscopy. no problems since the colonoscopy. doing good. PCP: [facility], [place]    HPI: Genaro Bobo is a 80 y.o. female for follow-up of endoscopy for positive fit test.  Upper endoscopy showed what appeared to be recurrent hiatal hernia from prior fundoplication procedure. She had some gastric ulcerations. She is taking her omeprazole. That seems to control her reflux when she combines with dietary modification. Her colonoscopy showed hemorrhoids, diverticulosis, and a sessile serrated adenoma. She otherwise doing well. No abdominal pain. Bowels moving normally. Review of Systems   All other systems reviewed and are negative. The remainder of the past medical, past surgical, family, and psychosocial history, as well as medication and allergy review, were completed and are as documented elsewhere in the chart. Objective:  Vitals:    05/15/20 0935   BP: 120/62   Site: Right Upper Arm   Position: Sitting   Cuff Size: Medium Adult   Pulse: 59   Resp: 16   Temp: 97.8 °F (36.6 °C)   TempSrc: Oral   Weight: 125 lb (56.7 kg)   Height: 5' (1.524 m)          Physical Exam  Constitutional:       General: She is not in acute distress. Appearance: She is not diaphoretic. Cardiovascular:      Rate and Rhythm: Normal rate. Pulmonary:      Effort: Pulmonary effort is normal. No respiratory distress. Abdominal:      General: There is no distension.       Palpations: Abdomen is soft. Tenderness: There is no abdominal tenderness. There is no guarding or rebound. Toya Mir MD  5/15/2020    NOTE: This report, in part or full, may have been transcribed using voice recognition software. Every effort was made to ensure accuracy; however, inadvertent computerized transcription errors may be present. Please excuse any transcriptional grammatical or spelling errors that may have escaped my editorial review.       CC: Jovanny Boston MD

## 2020-06-09 ENCOUNTER — TELEPHONE (OUTPATIENT)
Dept: FAMILY MEDICINE CLINIC | Age: 85
End: 2020-06-09

## 2020-06-16 ENCOUNTER — HOSPITAL ENCOUNTER (OUTPATIENT)
Age: 85
Discharge: HOME | End: 2020-06-18
Payer: MEDICARE

## 2020-06-16 ENCOUNTER — OFFICE VISIT (OUTPATIENT)
Dept: FAMILY MEDICINE CLINIC | Age: 85
End: 2020-06-16
Payer: MEDICARE

## 2020-06-16 VITALS
WEIGHT: 129 LBS | BODY MASS INDEX: 25.19 KG/M2 | TEMPERATURE: 98.1 F | OXYGEN SATURATION: 97 % | SYSTOLIC BLOOD PRESSURE: 122 MMHG | DIASTOLIC BLOOD PRESSURE: 50 MMHG | RESPIRATION RATE: 16 BRPM | HEART RATE: 59 BPM

## 2020-06-16 PROBLEM — S82.002A CLOSED NONDISPLACED FRACTURE OF LEFT PATELLA: Status: RESOLVED | Noted: 2018-04-19 | Resolved: 2020-06-16

## 2020-06-16 LAB
ALBUMIN SERPL-MCNC: 3.8 G/DL (ref 3.5–5.2)
ALP BLD-CCNC: 48 U/L (ref 35–104)
ALT SERPL-CCNC: 14 U/L (ref 0–32)
ANION GAP SERPL CALCULATED.3IONS-SCNC: 11 MMOL/L (ref 7–16)
AST SERPL-CCNC: 25 U/L (ref 0–31)
BILIRUB SERPL-MCNC: 0.3 MG/DL (ref 0–1.2)
BUN BLDV-MCNC: 29 MG/DL (ref 8–23)
CALCIUM SERPL-MCNC: 8.9 MG/DL (ref 8.6–10.2)
CHLORIDE BLD-SCNC: 107 MMOL/L (ref 98–107)
CO2: 21 MMOL/L (ref 22–29)
CREAT SERPL-MCNC: 1.4 MG/DL (ref 0.5–1)
FOLATE: >20 NG/ML (ref 4.8–24.2)
GFR AFRICAN AMERICAN: 43
GFR NON-AFRICAN AMERICAN: 36 ML/MIN/1.73
GLUCOSE BLD-MCNC: 94 MG/DL (ref 74–99)
IRON: 41 MCG/DL (ref 37–145)
POTASSIUM SERPL-SCNC: 4.9 MMOL/L (ref 3.5–5)
SODIUM BLD-SCNC: 139 MMOL/L (ref 132–146)
TOTAL PROTEIN: 6.7 G/DL (ref 6.4–8.3)
VITAMIN B-12: 1718 PG/ML (ref 211–946)

## 2020-06-16 PROCEDURE — 80053 COMPREHEN METABOLIC PANEL: CPT

## 2020-06-16 PROCEDURE — 85025 COMPLETE CBC W/AUTO DIFF WBC: CPT

## 2020-06-16 PROCEDURE — 36415 COLL VENOUS BLD VENIPUNCTURE: CPT | Performed by: FAMILY MEDICINE

## 2020-06-16 PROCEDURE — 99214 OFFICE O/P EST MOD 30 MIN: CPT | Performed by: FAMILY MEDICINE

## 2020-06-16 PROCEDURE — 82607 VITAMIN B-12: CPT

## 2020-06-16 PROCEDURE — 82746 ASSAY OF FOLIC ACID SERUM: CPT

## 2020-06-16 PROCEDURE — 83540 ASSAY OF IRON: CPT

## 2020-06-16 RX ORDER — GABAPENTIN 100 MG/1
100 CAPSULE ORAL NIGHTLY
COMMUNITY
End: 2020-10-08 | Stop reason: SDUPTHER

## 2020-06-16 ASSESSMENT — ENCOUNTER SYMPTOMS
SHORTNESS OF BREATH: 0
BACK PAIN: 1
TROUBLE SWALLOWING: 0
ABDOMINAL PAIN: 0
CONSTIPATION: 0

## 2020-06-16 NOTE — PROGRESS NOTES
CC   Chief Complaint   Patient presents with    Anemia     HPI:   Patient comes in today for the below issue(s). Accompanied today by her daughter Mary Newsome and her son Tabitha Britton    Anemia follow-up  See previous notes  She is status post panendoscopy  Adenoma found, recommended no further follow-up. Also has diverticulosis without diverticulitis. GERD is found to be recurrent related to her hiatal hernia. She is due for repeat blood counts  She is back on Xarelto, tolerating without any obvious bleeding or bruising problems    Atrial fibrillation  Stable  Paroxysmal issue present for over a year  Did have an issue with SVT for which she is now on antiarrhythmics  Follows with cardiology and EP  No complaints with chest pain palpitations dizziness or syncope    Peripheral neuropathy and gait dysfunction  Working with physical therapy. Had slight abnormalities found on vascular imaging so she saw vascular surgery. [place] no significant vascular issues are going on  Persisting peripheral neuropathy symptoms that have complicated her chronic gait issues. Gait is also impacted by her rheumatoid arthritis  Has been working with physical therapy  She is adamant about remaining in her home. She does not want to consider assisted living or nursing facility  She tries to perform as many of her activities of daily living as possible  Family wants her to move in with any of her children but she refuses  She does acknowledge she is at an increased risk for fall with serious injury  Family is employing someone to start coming to the home 3 hours a day 5 days a week to assist with some of her activities of daily living      End-of-life discussion  Patient discussion regarding her mobility, ability to remain at home led into a discussion of her end-of-life wishes. She states she has a living will. I have asked her to provide a copy. She is not ready to consider a DNR status.   Indications and discussion of each type of DNR were reviewed today    Review of Systems  Review of Systems   Constitutional: Positive for fatigue. Negative for appetite change and unexpected weight change. HENT: Negative for trouble swallowing. Respiratory: Negative for shortness of breath. Cardiovascular: Negative for chest pain, palpitations and leg swelling. Gastrointestinal: Negative for abdominal pain and constipation. Genitourinary: Negative for difficulty urinating. Musculoskeletal: Positive for arthralgias, back pain, gait problem and joint swelling. Hematological: Does not bruise/bleed easily.            Past Medical History:   Diagnosis Date    Anticoagulated     takes Xarelto    Atrial fibrillation ([facility] [address]     follows with Dr. 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    Wears dentures          PE:  VS:  BP (!) 122/50   Pulse 59   Temp 98.1 °F (36.7 °C) (Temporal)   Resp 16   Wt 129 lb (58.5 kg)   SpO2 97%   BMI 25.19 kg/m²   Physical Exam  General: Well nourished, well developed, no acute distress  Eyes:  PERRL   Conjunctiva unremarkable   ENT: TM's intact bilaterally, no effusion   Nasal:  +mucosal edema     No nasal drainage   Oral:  mucosa moist and pink             Minimal posterior pharyngeal drainage     no posterior pharyngeal exudate  Neck:  Supple   No palpable cervical lymphoadenopathy   Thyroid without mass or enlargement  Resp: Lungs clear to auscultation bilaterally   Equal and adequate air exchange without accessory muscle use   No wheeze  CV: S1S2 RRR no ectopy    +1-7/4 systolic RUSB murmur   Intact distal pulses   No edema  GI: Abdomen Soft, non tender, non distended  MS: Kyphosis noted   Seated in wheelchair, non-ambulatory  Skin No rash  Psych Normal mood, congruent affect       Assessment (including specific orders/meds/labs):      Trell Hurd was seen today for anemia. Diagnoses and all orders for this visit:    Anemia, unspecified type  -     CBC Auto Differential; Future  -     Iron; Future  -     Vitamin B12 & Folate; Future  -     Comprehensive Metabolic Panel; Future    Rheumatoid arthritis involving multiple sites with positive rheumatoid factor (HCC)    Hiatal hernia    Paroxysmal atrial fibrillation ([facility] [address]    Counseling regarding end of life decision making    Pain in both lower legs        Plan:     Reassurance regarding findings on panendoscopy. No obvious active bleeding source identified. Most likely an anemia of chronic disease although subacute blood loss was not entirely excluded. Okay to resume anticoagulation. Continue with risk factor modification. Check status of anemia. RA and polyneuropathy issues are ongoing, progressive, and this is probably the main reason behind her progressive difficulty with ambulation. Lumbar radiculopathy may also be playing a lesser role in this. We discussed her difficulty with tolerating additional medications. She has worked with neurology. She is not wanting to return. She is going to continue with physical therapy.   I did discuss with her with her family present the risk for serious or fatal accidents that could occur if she were to fall with an injury. She verbalizes comfort and understanding. She is insistent upon remaining in her home as long as possible. End-of-life discussion undertaken today. She is going to think about what her DNR wishes are and we will discuss further at follow-up. She will provide a copy of her power of  paperwork and living will    Atrial fibrillation issues appear stable today. Continue with anticoagulation follow-up with cardiology as scheduled    Counseled regarding above diagnosis, including possible risks and complications,  especially if left uncontrolled. Counseled regarding the possible sideeffects, risks, benefits and alternatives to treatment; patient and/or guardian verbalizes understanding, agrees, feels comfortable with and wishes to proceed with above treatment plan. Call or go to ED immediately if symptoms worsen or persist. Advised patient to call with any new medication issues, and, as applicable, read all Rx info from pharmacy to assure aware ofall possible risks and side effects of medication before taking. As applicable, educational materials and/or home exercises printed forpatient's review and were included in patient instructions on his/her After Visit Summary and given to patient at the end of visit. Patient and/or guardian given opportunity to ask questions/raise concerns. She and her children verbalized comfort and understanding of instructions. Follow Up:     Return in about 3 months (around 9/16/2020), or if symptoms worsen or fail to improve, for a-fib, neuropathy, anemia . or sooner if the above issues change unexpectedly or new issues develop     This document was prepared at least partially through the use of[employer] recognition software.  Although effort is taken to assure the accuracy of this document, it is possible that grammatical, syntax,  or spelling errors may occur.      Electronically signed by Severiano Gess, MD FAAFP

## 2020-06-16 NOTE — PATIENT INSTRUCTIONS
might happen? (Maybe you're afraid of having pain, losing your independence, or being kept alive by machines.)  · Where would you prefer to die? (Your home? A hospital? A nursing home?)  · Do you want to donate your organs when you die? · Do you want certain Congregational practices performed before you die? When should you call for help? Be sure to contact your doctor if you have any questions. Where can you learn more? Go to [email or web address] and sign in to your [employer] account. Enter R264 in the Locaid box to learn more about \"Advance Directives: Care Instructions. \"     If you do not have an account, please click on the \"Sign Up Now\" link. Current as of: December 9, 2019               Content Version: 12.5  © 2204-7757 Healthwise, Incorporated. Care instructions adapted under license by [facility] ([facility]). If you have questions about a medical condition or this instruction, always ask your healthcare professional. [place] [address] any warranty or liability for your use of this information. Patient Education        Learning About Living [place]  What is a living will? A living will, also called a declaration, is a legal form. It tells your family and your doctor your wishes when you can't speak for yourself. It's used by the health professionals who will treat you as you near the end of your life or if you get seriously hurt or ill. If you put your wishes in writing, your loved ones and others will know what kind of care you want. They won't need to guess. This can ease your mind and be helpful to others. And you can change or cancel your living will at any time. A living will is not the same as an estate or property will. An estate will explains what you want to happen with your money and property after you die. How do you use it?   A living will is used to describe the kinds of treatment or life support you want as you near the end of your life

## 2020-06-17 LAB
ANISOCYTOSIS: ABNORMAL
BASOPHILS ABSOLUTE: 0.05 E9/L (ref 0–0.2)
BASOPHILS RELATIVE PERCENT: 0.9 % (ref 0–2)
BURR CELLS: ABNORMAL
EOSINOPHILS ABSOLUTE: 0.23 E9/L (ref 0.05–0.5)
EOSINOPHILS RELATIVE PERCENT: 4.4 % (ref 0–6)
HCT VFR BLD CALC: 30.3 % (ref 34–48)
HEMOGLOBIN: 8.6 G/DL (ref 11.5–15.5)
LYMPHOCYTES ABSOLUTE: 0.62 E9/L (ref 1.5–4)
LYMPHOCYTES RELATIVE PERCENT: 12.4 % (ref 20–42)
MCH RBC QN AUTO: 27.4 PG (ref 26–35)
MCHC RBC AUTO-ENTMCNC: 28.4 % (ref 32–34.5)
MCV RBC AUTO: 96.5 FL (ref 80–99.9)
MONOCYTES ABSOLUTE: 0.62 E9/L (ref 0.1–0.95)
MONOCYTES RELATIVE PERCENT: 11.5 % (ref 2–12)
MYELOCYTE PERCENT: 0.9 % (ref 0–0)
NEUTROPHILS ABSOLUTE: 3.69 E9/L (ref 1.8–7.3)
NEUTROPHILS RELATIVE PERCENT: 69.9 % (ref 43–80)
OVALOCYTES: ABNORMAL
PDW BLD-RTO: 18.9 FL (ref 11.5–15)
PLATELET # BLD: 322 E9/L (ref 130–450)
PMV BLD AUTO: 11.1 FL (ref 7–12)
POIKILOCYTES: ABNORMAL
POLYCHROMASIA: ABNORMAL
RBC # BLD: 3.14 E12/L (ref 3.5–5.5)
SCHISTOCYTES: ABNORMAL
TEAR DROP CELLS: ABNORMAL
WBC # BLD: 5.2 E9/L (ref 4.5–11.5)

## 2020-06-18 RX ORDER — FERROUS SULFATE 325(65) MG
325 TABLET ORAL
Qty: 30 TABLET | Refills: 5 | Status: SHIPPED
Start: 2020-06-18 | End: 2020-09-17

## 2020-07-06 ENCOUNTER — TELEPHONE (OUTPATIENT)
Dept: FAMILY MEDICINE CLINIC | Age: 85
End: 2020-07-06

## 2020-07-06 NOTE — TELEPHONE ENCOUNTER
Dinora Dave from [address] [place] [address] requests to continue Home PT 2 times a week for 4 weeks.

## 2020-07-12 ENCOUNTER — HOSPITAL ENCOUNTER (INPATIENT)
Age: 85
LOS: 4 days | Discharge: NURSING, REHAB OR LONG-TERM CARE | DRG: 544 | End: 2020-07-16
Attending: EMERGENCY MEDICINE | Admitting: FAMILY MEDICINE
Payer: MEDICARE

## 2020-07-12 ENCOUNTER — APPOINTMENT (OUTPATIENT)
Dept: CT IMAGING | Age: 85
DRG: 544 | End: 2020-07-12
Payer: MEDICARE

## 2020-07-12 ENCOUNTER — APPOINTMENT (OUTPATIENT)
Dept: GENERAL RADIOLOGY | Age: 85
DRG: 544 | End: 2020-07-12
Payer: MEDICARE

## 2020-07-12 PROBLEM — M81.0 OSTEOPOROSIS: Status: ACTIVE | Noted: 2020-07-12

## 2020-07-12 PROBLEM — R79.1 ELEVATED INTERNATIONAL NORMALIZED RATIO (INR): Status: ACTIVE | Noted: 2020-07-12

## 2020-07-12 PROBLEM — S82.842A BIMALLEOLAR FRACTURE, LEFT, CLOSED, INITIAL ENCOUNTER: Status: ACTIVE | Noted: 2020-07-12

## 2020-07-12 LAB
ANION GAP SERPL CALCULATED.3IONS-SCNC: 12 MMOL/L (ref 7–16)
APTT: 34.5 SEC (ref 24.5–35.1)
BASOPHILS ABSOLUTE: 0.04 E9/L (ref 0–0.2)
BASOPHILS RELATIVE PERCENT: 0.5 % (ref 0–2)
BUN BLDV-MCNC: 29 MG/DL (ref 8–23)
CALCIUM SERPL-MCNC: 9.5 MG/DL (ref 8.6–10.2)
CHLORIDE BLD-SCNC: 105 MMOL/L (ref 98–107)
CO2: 22 MMOL/L (ref 22–29)
CREAT SERPL-MCNC: 1.3 MG/DL (ref 0.5–1)
EOSINOPHILS ABSOLUTE: 0.14 E9/L (ref 0.05–0.5)
EOSINOPHILS RELATIVE PERCENT: 1.7 % (ref 0–6)
GFR AFRICAN AMERICAN: 47
GFR NON-AFRICAN AMERICAN: 39 ML/MIN/1.73
GLUCOSE BLD-MCNC: 113 MG/DL (ref 74–99)
HCT VFR BLD CALC: 32.3 % (ref 34–48)
HEMOGLOBIN: 10 G/DL (ref 11.5–15.5)
IMMATURE GRANULOCYTES #: 0.06 E9/L
IMMATURE GRANULOCYTES %: 0.7 % (ref 0–5)
INR BLD: 2.1
LYMPHOCYTES ABSOLUTE: 0.67 E9/L (ref 1.5–4)
LYMPHOCYTES RELATIVE PERCENT: 8.3 % (ref 20–42)
MAGNESIUM: 1.7 MG/DL (ref 1.6–2.6)
MCH RBC QN AUTO: 29.9 PG (ref 26–35)
MCHC RBC AUTO-ENTMCNC: 31 % (ref 32–34.5)
MCV RBC AUTO: 96.4 FL (ref 80–99.9)
MONOCYTES ABSOLUTE: 0.77 E9/L (ref 0.1–0.95)
MONOCYTES RELATIVE PERCENT: 9.6 % (ref 2–12)
NEUTROPHILS ABSOLUTE: 6.37 E9/L (ref 1.8–7.3)
NEUTROPHILS RELATIVE PERCENT: 79.2 % (ref 43–80)
PDW BLD-RTO: 19.9 FL (ref 11.5–15)
PHOSPHORUS: 4.4 MG/DL (ref 2.5–4.5)
PLATELET # BLD: 324 E9/L (ref 130–450)
PMV BLD AUTO: 10.8 FL (ref 7–12)
POTASSIUM REFLEX MAGNESIUM: 4.4 MMOL/L (ref 3.5–5)
PROTHROMBIN TIME: 24.9 SEC (ref 9.3–12.4)
RBC # BLD: 3.35 E12/L (ref 3.5–5.5)
SODIUM BLD-SCNC: 139 MMOL/L (ref 132–146)
WBC # BLD: 8.1 E9/L (ref 4.5–11.5)

## 2020-07-12 PROCEDURE — 2580000003 HC RX 258: Performed by: FAMILY MEDICINE

## 2020-07-12 PROCEDURE — 85730 THROMBOPLASTIN TIME PARTIAL: CPT

## 2020-07-12 PROCEDURE — 96376 TX/PRO/DX INJ SAME DRUG ADON: CPT

## 2020-07-12 PROCEDURE — 80076 HEPATIC FUNCTION PANEL: CPT

## 2020-07-12 PROCEDURE — 70450 CT HEAD/BRAIN W/O DYE: CPT

## 2020-07-12 PROCEDURE — 36415 COLL VENOUS BLD VENIPUNCTURE: CPT

## 2020-07-12 PROCEDURE — 82550 ASSAY OF CK (CPK): CPT

## 2020-07-12 PROCEDURE — 6370000000 HC RX 637 (ALT 250 FOR IP): Performed by: FAMILY MEDICINE

## 2020-07-12 PROCEDURE — 72125 CT NECK SPINE W/O DYE: CPT

## 2020-07-12 PROCEDURE — 85610 PROTHROMBIN TIME: CPT

## 2020-07-12 PROCEDURE — 94761 N-INVAS EAR/PLS OXIMETRY MLT: CPT

## 2020-07-12 PROCEDURE — 6360000002 HC RX W HCPCS: Performed by: FAMILY MEDICINE

## 2020-07-12 PROCEDURE — G0378 HOSPITAL OBSERVATION PER HR: HCPCS

## 2020-07-12 PROCEDURE — 84100 ASSAY OF PHOSPHORUS: CPT

## 2020-07-12 PROCEDURE — 73560 X-RAY EXAM OF KNEE 1 OR 2: CPT

## 2020-07-12 PROCEDURE — 6360000002 HC RX W HCPCS: Performed by: STUDENT IN AN ORGANIZED HEALTH CARE EDUCATION/TRAINING PROGRAM

## 2020-07-12 PROCEDURE — 73610 X-RAY EXAM OF ANKLE: CPT

## 2020-07-12 PROCEDURE — 99285 EMERGENCY DEPT VISIT HI MDM: CPT

## 2020-07-12 PROCEDURE — 6360000002 HC RX W HCPCS

## 2020-07-12 PROCEDURE — 83735 ASSAY OF MAGNESIUM: CPT

## 2020-07-12 PROCEDURE — 96374 THER/PROPH/DIAG INJ IV PUSH: CPT

## 2020-07-12 PROCEDURE — 80048 BASIC METABOLIC PNL TOTAL CA: CPT

## 2020-07-12 PROCEDURE — 85025 COMPLETE CBC W/AUTO DIFF WBC: CPT

## 2020-07-12 PROCEDURE — 96375 TX/PRO/DX INJ NEW DRUG ADDON: CPT

## 2020-07-12 PROCEDURE — 2580000003 HC RX 258

## 2020-07-12 PROCEDURE — 2060000000 HC ICU INTERMEDIATE R&B

## 2020-07-12 RX ORDER — HYDROCODONE BITARTRATE AND ACETAMINOPHEN 5; 325 MG/1; MG/1
1 TABLET ORAL EVERY 6 HOURS PRN
Status: DISCONTINUED | OUTPATIENT
Start: 2020-07-12 | End: 2020-07-12

## 2020-07-12 RX ORDER — FERROUS SULFATE 325(65) MG
325 TABLET ORAL
Status: DISCONTINUED | OUTPATIENT
Start: 2020-07-13 | End: 2020-07-16 | Stop reason: HOSPADM

## 2020-07-12 RX ORDER — CETIRIZINE HYDROCHLORIDE 10 MG/1
10 TABLET ORAL DAILY
Status: DISCONTINUED | OUTPATIENT
Start: 2020-07-13 | End: 2020-07-16 | Stop reason: HOSPADM

## 2020-07-12 RX ORDER — HYDROCODONE BITARTRATE AND ACETAMINOPHEN 7.5; 325 MG/1; MG/1
1 TABLET ORAL EVERY 6 HOURS PRN
Status: DISCONTINUED | OUTPATIENT
Start: 2020-07-12 | End: 2020-07-12

## 2020-07-12 RX ORDER — ACETAMINOPHEN 650 MG/1
650 SUPPOSITORY RECTAL EVERY 6 HOURS PRN
Status: DISCONTINUED | OUTPATIENT
Start: 2020-07-12 | End: 2020-07-16 | Stop reason: HOSPADM

## 2020-07-12 RX ORDER — DOCUSATE SODIUM 100 MG/1
100 CAPSULE, LIQUID FILLED ORAL DAILY
Status: DISCONTINUED | OUTPATIENT
Start: 2020-07-12 | End: 2020-07-16 | Stop reason: HOSPADM

## 2020-07-12 RX ORDER — FLECAINIDE ACETATE 50 MG/1
50 TABLET ORAL 2 TIMES DAILY
Status: DISCONTINUED | OUTPATIENT
Start: 2020-07-12 | End: 2020-07-16 | Stop reason: HOSPADM

## 2020-07-12 RX ORDER — ONDANSETRON 2 MG/ML
4 INJECTION INTRAMUSCULAR; INTRAVENOUS EVERY 6 HOURS PRN
Status: DISCONTINUED | OUTPATIENT
Start: 2020-07-12 | End: 2020-07-16 | Stop reason: HOSPADM

## 2020-07-12 RX ORDER — SODIUM CHLORIDE 0.9 % (FLUSH) 0.9 %
10 SYRINGE (ML) INJECTION PRN
Status: DISCONTINUED | OUTPATIENT
Start: 2020-07-12 | End: 2020-07-16 | Stop reason: HOSPADM

## 2020-07-12 RX ORDER — HYDRALAZINE HYDROCHLORIDE 20 MG/ML
10 INJECTION INTRAMUSCULAR; INTRAVENOUS EVERY 6 HOURS PRN
Status: DISCONTINUED | OUTPATIENT
Start: 2020-07-12 | End: 2020-07-12

## 2020-07-12 RX ORDER — FENTANYL CITRATE 50 UG/ML
50 INJECTION, SOLUTION INTRAMUSCULAR; INTRAVENOUS ONCE
Status: COMPLETED | OUTPATIENT
Start: 2020-07-12 | End: 2020-07-12

## 2020-07-12 RX ORDER — SODIUM CHLORIDE 0.9 % (FLUSH) 0.9 %
10 SYRINGE (ML) INJECTION EVERY 12 HOURS SCHEDULED
Status: DISCONTINUED | OUTPATIENT
Start: 2020-07-12 | End: 2020-07-16 | Stop reason: HOSPADM

## 2020-07-12 RX ORDER — VALSARTAN AND HYDROCHLOROTHIAZIDE 160; 25 MG/1; MG/1
1 TABLET ORAL DAILY
Status: DISCONTINUED | OUTPATIENT
Start: 2020-07-13 | End: 2020-07-12 | Stop reason: CLARIF

## 2020-07-12 RX ORDER — MAGNESIUM SULFATE IN WATER 40 MG/ML
INJECTION, SOLUTION INTRAVENOUS
Status: COMPLETED
Start: 2020-07-12 | End: 2020-07-12

## 2020-07-12 RX ORDER — LEUCOVORIN CALCIUM 5 MG/1
25 TABLET ORAL WEEKLY
Status: DISCONTINUED | OUTPATIENT
Start: 2020-07-14 | End: 2020-07-16 | Stop reason: HOSPADM

## 2020-07-12 RX ORDER — MORPHINE SULFATE 2 MG/ML
1 INJECTION, SOLUTION INTRAMUSCULAR; INTRAVENOUS EVERY 4 HOURS PRN
Status: DISCONTINUED | OUTPATIENT
Start: 2020-07-12 | End: 2020-07-12

## 2020-07-12 RX ORDER — MONTELUKAST SODIUM 10 MG/1
10 TABLET ORAL DAILY
Status: DISCONTINUED | OUTPATIENT
Start: 2020-07-13 | End: 2020-07-16 | Stop reason: HOSPADM

## 2020-07-12 RX ORDER — VALSARTAN 160 MG/1
160 TABLET ORAL DAILY
Status: DISCONTINUED | OUTPATIENT
Start: 2020-07-13 | End: 2020-07-13

## 2020-07-12 RX ORDER — SODIUM CHLORIDE 9 MG/ML
INJECTION, SOLUTION INTRAVENOUS CONTINUOUS
Status: DISCONTINUED | OUTPATIENT
Start: 2020-07-12 | End: 2020-07-14

## 2020-07-12 RX ORDER — FAMOTIDINE 20 MG/1
40 TABLET, FILM COATED ORAL NIGHTLY
Status: DISCONTINUED | OUTPATIENT
Start: 2020-07-12 | End: 2020-07-16 | Stop reason: HOSPADM

## 2020-07-12 RX ORDER — LEVOTHYROXINE SODIUM 0.1 MG/1
100 TABLET ORAL DAILY
Status: DISCONTINUED | OUTPATIENT
Start: 2020-07-13 | End: 2020-07-16 | Stop reason: HOSPADM

## 2020-07-12 RX ORDER — MORPHINE SULFATE 2 MG/ML
2 INJECTION, SOLUTION INTRAMUSCULAR; INTRAVENOUS EVERY 4 HOURS PRN
Status: DISCONTINUED | OUTPATIENT
Start: 2020-07-12 | End: 2020-07-12

## 2020-07-12 RX ORDER — ACETAMINOPHEN 325 MG/1
650 TABLET ORAL EVERY 6 HOURS PRN
Status: DISCONTINUED | OUTPATIENT
Start: 2020-07-12 | End: 2020-07-16 | Stop reason: HOSPADM

## 2020-07-12 RX ORDER — HYDRALAZINE HYDROCHLORIDE 20 MG/ML
10 INJECTION INTRAMUSCULAR; INTRAVENOUS EVERY 6 HOURS PRN
Status: DISCONTINUED | OUTPATIENT
Start: 2020-07-12 | End: 2020-07-16 | Stop reason: HOSPADM

## 2020-07-12 RX ORDER — PANTOPRAZOLE SODIUM 40 MG/1
40 TABLET, DELAYED RELEASE ORAL
Status: DISCONTINUED | OUTPATIENT
Start: 2020-07-13 | End: 2020-07-16 | Stop reason: HOSPADM

## 2020-07-12 RX ORDER — MAGNESIUM SULFATE IN WATER 40 MG/ML
2 INJECTION, SOLUTION INTRAVENOUS ONCE
Status: COMPLETED | OUTPATIENT
Start: 2020-07-12 | End: 2020-07-12

## 2020-07-12 RX ORDER — LANOLIN ALCOHOL/MO/W.PET/CERES
1000 CREAM (GRAM) TOPICAL DAILY
Status: DISCONTINUED | OUTPATIENT
Start: 2020-07-13 | End: 2020-07-16 | Stop reason: HOSPADM

## 2020-07-12 RX ORDER — TIZANIDINE 4 MG/1
1 TABLET ORAL ONCE
Status: COMPLETED | OUTPATIENT
Start: 2020-07-12 | End: 2020-07-12

## 2020-07-12 RX ORDER — SODIUM CHLORIDE 0.9 % (FLUSH) 0.9 %
SYRINGE (ML) INJECTION
Status: COMPLETED
Start: 2020-07-12 | End: 2020-07-12

## 2020-07-12 RX ORDER — POLYETHYLENE GLYCOL 3350 17 G/17G
17 POWDER, FOR SOLUTION ORAL DAILY
Status: DISCONTINUED | OUTPATIENT
Start: 2020-07-12 | End: 2020-07-15

## 2020-07-12 RX ORDER — PROMETHAZINE HYDROCHLORIDE 25 MG/1
12.5 TABLET ORAL EVERY 6 HOURS PRN
Status: DISCONTINUED | OUTPATIENT
Start: 2020-07-12 | End: 2020-07-16 | Stop reason: HOSPADM

## 2020-07-12 RX ORDER — CELECOXIB 100 MG/1
200 CAPSULE ORAL DAILY
Status: DISCONTINUED | OUTPATIENT
Start: 2020-07-13 | End: 2020-07-16 | Stop reason: HOSPADM

## 2020-07-12 RX ORDER — GABAPENTIN 100 MG/1
100 CAPSULE ORAL NIGHTLY
Status: DISCONTINUED | OUTPATIENT
Start: 2020-07-12 | End: 2020-07-16 | Stop reason: HOSPADM

## 2020-07-12 RX ORDER — HYDROCHLOROTHIAZIDE 25 MG/1
25 TABLET ORAL DAILY
Status: DISCONTINUED | OUTPATIENT
Start: 2020-07-13 | End: 2020-07-13

## 2020-07-12 RX ADMIN — FENTANYL CITRATE 50 MCG: 50 INJECTION INTRAMUSCULAR; INTRAVENOUS at 15:10

## 2020-07-12 RX ADMIN — METOPROLOL TARTRATE 25 MG: 25 TABLET, FILM COATED ORAL at 22:10

## 2020-07-12 RX ADMIN — Medication 10 ML: at 21:15

## 2020-07-12 RX ADMIN — TIZANIDINE 1 MG: 4 TABLET ORAL at 20:11

## 2020-07-12 RX ADMIN — Medication 10 ML: at 19:20

## 2020-07-12 RX ADMIN — MORPHINE SULFATE 2 MG: 2 INJECTION, SOLUTION INTRAMUSCULAR; INTRAVENOUS at 22:00

## 2020-07-12 RX ADMIN — MAGNESIUM SULFATE 2 G: 2 INJECTION INTRAVENOUS at 22:06

## 2020-07-12 RX ADMIN — SODIUM CHLORIDE: 9 INJECTION, SOLUTION INTRAVENOUS at 21:15

## 2020-07-12 RX ADMIN — HYDROCODONE BITARTRATE AND ACETAMINOPHEN 1 TABLET: 5; 325 TABLET ORAL at 20:56

## 2020-07-12 RX ADMIN — SODIUM CHLORIDE, PRESERVATIVE FREE 10 ML: 5 INJECTION INTRAVENOUS at 19:20

## 2020-07-12 RX ADMIN — MORPHINE SULFATE 2 MG: 2 INJECTION, SOLUTION INTRAMUSCULAR; INTRAVENOUS at 19:19

## 2020-07-12 RX ADMIN — FAMOTIDINE 40 MG: 20 TABLET, FILM COATED ORAL at 22:10

## 2020-07-12 RX ADMIN — MAGNESIUM SULFATE IN WATER 2 G: 40 INJECTION, SOLUTION INTRAVENOUS at 22:06

## 2020-07-12 RX ADMIN — GABAPENTIN 100 MG: 100 CAPSULE ORAL at 20:56

## 2020-07-12 ASSESSMENT — PAIN SCALES - GENERAL
PAINLEVEL_OUTOF10: 10

## 2020-07-12 ASSESSMENT — ENCOUNTER SYMPTOMS
ABDOMINAL PAIN: 0
CHEST TIGHTNESS: 0
RHINORRHEA: 0
COUGH: 0
SHORTNESS OF BREATH: 0
DIARRHEA: 0
APNEA: 0
PHOTOPHOBIA: 0
COLOR CHANGE: 0
TROUBLE SWALLOWING: 0
BACK PAIN: 0
BLOOD IN STOOL: 0
SORE THROAT: 0
NAUSEA: 0
EYE PAIN: 0
WHEEZING: 0
CONSTIPATION: 0
VOMITING: 0

## 2020-07-12 ASSESSMENT — PAIN DESCRIPTION - ORIENTATION
ORIENTATION: LEFT

## 2020-07-12 ASSESSMENT — PAIN DESCRIPTION - FREQUENCY
FREQUENCY: CONTINUOUS
FREQUENCY: CONTINUOUS

## 2020-07-12 ASSESSMENT — PAIN DESCRIPTION - LOCATION
LOCATION: LEG

## 2020-07-12 ASSESSMENT — PAIN DESCRIPTION - ONSET
ONSET: ON-GOING
ONSET: ON-GOING

## 2020-07-12 ASSESSMENT — PAIN DESCRIPTION - PAIN TYPE
TYPE: ACUTE PAIN

## 2020-07-12 ASSESSMENT — PAIN - FUNCTIONAL ASSESSMENT
PAIN_FUNCTIONAL_ASSESSMENT: PREVENTS OR INTERFERES SOME ACTIVE ACTIVITIES AND ADLS
PAIN_FUNCTIONAL_ASSESSMENT: PREVENTS OR INTERFERES SOME ACTIVE ACTIVITIES AND ADLS

## 2020-07-12 ASSESSMENT — PAIN DESCRIPTION - PROGRESSION
CLINICAL_PROGRESSION: NOT CHANGED
CLINICAL_PROGRESSION: NOT CHANGED

## 2020-07-12 NOTE — ED PROVIDER NOTES
[address]      Pt Name: Sravan Campos  MRN: [id number]  [place] 1935  Date of evaluation: 7/12/2020      CHIEF COMPLAINT       Chief Complaint   Patient presents with    Leg Injury     right and left    Fall        HPI  Sravan Campos is a 80 y.o. female with history of rheumatoid arthritis on methotrexate, atrial fibrillation on Xarelto, systolic heart function who presents to the emergency department after a mechanical fall. She was walking her walker when she slipped and fell  . She is complaining of left-sided ankle pain. She describes as moderate to severe, constant. The pain is worse with movement or when she touches her ankle. Is better at rest.  EMS arrived noted strong pulses. She does have a skin tear to the right lower leg. They applied a air splint and wrapped the other leg. The patient denies hitting her head or any loss of consciousness. She insisted was a mechanical fall where she tripped. She denies any head pain, vision changes, headache, neck pain, back pain, chest pain, shortness of breath, abdominal pain, hip pain. She was unable to ambulate after the fall secondary to pain. Except as noted above the remainder of the review of systems was reviewed and negative. Review of Systems   Constitutional: Negative for chills, diaphoresis, fatigue and fever. HENT: Negative for rhinorrhea, sore throat and trouble swallowing. Eyes: Negative for photophobia and pain. Respiratory: Negative for apnea, cough, chest tightness, shortness of breath and wheezing. Cardiovascular: Negative for chest pain, palpitations and leg swelling. Gastrointestinal: Negative for abdominal pain, constipation, diarrhea, nausea and vomiting. Endocrine: Negative for polyuria. Genitourinary: Negative for difficulty urinating and dysuria.    Musculoskeletal: Negative for back pain, neck pain and neck Skin:     General: Skin is warm and dry. Capillary Refill: Capillary refill takes less than 2 seconds. Comments: There is a 6 cm skin tear into the subcu to the right anterior shin with no active bleeding no obvious foreign bodies. Neurological:      General: No focal deficit present. Mental Status: She is alert and oriented to person, place, and time. Cranial Nerves: No cranial nerve deficit. Sensory: No sensory deficit. Motor: No weakness or abnormal muscle tone. Psychiatric:         Mood and Affect: Mood normal.         Behavior: Behavior normal.          Procedures     MDM   This is a 80-year-old female with a history of atrial fibrillation on Xarelto, rheumatoid arthritis, who presents after mechanical fall with left ankle pain. In the emergency department she is awake and alert, hemodynamically stable. She does have tenderness to the bilateral malleolus. X-ray showed bimalleolar fracture to the left ankle. Not displaced. The ankle was splinted in the emergency department. Discussed with orthopedics, Dr. Amisha Vasquez, who will consult on the patient in the hospital.  Patient unable to ambulate lives alone not safe for discharge. Patient was discussed with on-call doctor for the house medicine team who accepts the patient for further evaluation and actually evaluated the patient in the ER. Basic lab testing was unremarkable. Patient did have a skin tear to the right anterior calf however given the skin tear the skin cannot be approximated and will have to heal by secondary intention. IV fentanyl analgesic was provided while the patient was in the department. Given her distracting injury CT head without contrast as well as CT cervical spine without contrast were obtained that showed no acute findings.               --------------------------------------------- PAST HISTORY ---------------------------------------------  Past Medical History:  has a past medical history of Anticoagulated, Atrial fibrillation (HCC), Diastolic dysfunction, Difficulty walking, Diverticular disease, Elevated CA-125, Hiatal hernia, Hypertension, Hypothyroid, Meningocele spinal ([facility] [address] Neuropathy, JANETTE (obstructive sleep apnea), Osteoporosis, PAF (paroxysmal atrial fibrillation) ([facility] [address] Pain in both lower legs, Rheumatoid arthritis with rheumatoid factor ([facility] [address] Rheumatoid arthritis(714.0), Rib fractures, Seasonal allergies, Skin cancer of lip, and Wears dentures. Past Surgical History:  has a past surgical history that includes Gastric fundoplication ();  section; Carpal tunnel release (Bilateral); Colonoscopy (2011); Total knee arthroplasty (2007); Cardiac catheterization; Hysterectomy, total abdominal (early ); Upper gastrointestinal endoscopy (N/A, 2020); and Colonoscopy (N/A, 2020). Social History:  reports that she has never smoked. She has never used smokeless tobacco. She reports that she does not drink alcohol or use drugs. Family History: family history includes Heart Attack (age of onset: 58) in her mother; Hypertension in her brother, sister, and sister; Other in her daughter, father, sister, and son; Rheum Arthritis in her brother and sister; Thyroid Disease in her sister. The patients home medications have been reviewed.     Allergies: Amoxicillin-pot clavulanate and Milk-related compounds    -------------------------------------------------- RESULTS -------------------------------------------------    LABS:  Results for orders placed or performed during the hospital encounter of 20   CBC Auto Differential   Result Value Ref Range    WBC 8.1 4.5 - 11.5 E9/L    RBC 3.35 (L) 3.50 - 5.50 E12/L    Hemoglobin 10.0 (L) 11.5 - 15.5 g/dL    Hematocrit 32.3 (L) 34.0 - 48.0 %    MCV 96.4 80.0 - 99.9 fL    MCH 29.9 26.0 - 35.0 pg    MCHC 31.0 (L) 32.0 - 34.5 %    RDW 19.9 (H) 11.5 - 15.0 fL    Platelets 764 502 - 044 E9/L    MPV 10.8 7.0 - 12.0 fL ---------------------------  Date / Time Roomed:  7/12/2020  2:31 PM  ED Bed Assignment:  BAUDILIO/BAUDILIO    The nursing notes within the ED encounter and vital signs as below have been reviewed. Patient Vitals for the past 24 hrs:   BP Temp Temp src Pulse Resp SpO2   07/12/20 1817 (!) 160/80 98.4 °F (36.9 °C) Oral 68 16 96 %   07/12/20 1615 (!) 169/88 -- -- 60 -- 94 %   07/12/20 1600 (!) 169/88 -- -- 61 -- 97 %       Oxygen Saturation Interpretation: Normal    ------------------------------------------ PROGRESS NOTES ------------------------------------------    Counseling:  I have spoken with the patient and discussed todays results, in addition to providing specific details for the plan of care and counseling regarding the diagnosis and prognosis. Their questions are answered at this time and they are agreeable with the plan of admission.    --------------------------------- ADDITIONAL PROVIDER NOTES ---------------------------------  Consultations:  Time: 1740. Spoke with Dr. Tiffany Reyes. Discussed case. They will admit the patient. This patient's ED course included: a personal history and physicial examination, re-evaluation prior to disposition, multiple bedside re-evaluations, IV medications, cardiac monitoring and continuous pulse oximetry    This patient has remained hemodynamically stable during their ED course. Diagnosis:  1. Bimalleolar fracture, left, closed, initial encounter    2. Fall, initial encounter        Disposition:  Patient's disposition: Admit to telemetry  Patient's condition is stable.          Radha Carrasco DO  Resident  07/12/20 9821

## 2020-07-12 NOTE — ED NOTES
Bed: 12  Expected date:   Expected time:   Means of arrival:   Comments:  Fadia Andersen RN  07/12/20 1690

## 2020-07-12 NOTE — H&P
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 Lindsay Chiquito    Pain in both lower legs 2020    Rheumatoid arthritis with rheumatoid factor ([facility]     Rheumatoid arthritis(714.0)     Dr Baird Purpkasie; on DMD Wandalee Comings)    Rib fractures 2014    Seasonal allergies     Skin cancer of lip     squamous cell    Wears dentures          Past Surgical History:   Procedure Laterality Date    CARDIAC CATHETERIZATION      CARPAL TUNNEL RELEASE Bilateral      SECTION      COLONOSCOPY  2011    Dr Tyrell Arellano; diverticular disease; repeat in     COLONOSCOPY N/A 2020    COLONOSCOPY WITH BIOPSY performed by Angela Ferrera MD at [address] [place] [address]  early     TOTAL KNEE ARTHROPLASTY  2007    bilateral    UPPER GASTROINTESTINAL ENDOSCOPY N/A 2020    EGD BIOPSY performed by Angela Ferrera MD at [facility] ENDOSCOPY       Medications Prior to Admission:    Not in a hospital admission. Allergies:    Amoxicillin-pot clavulanate and Milk-related compounds    Social History:    reports that she has never smoked. She has never used smokeless tobacco. She reports that she does not drink alcohol or use drugs.     Family History:   family history includes Heart Attack (age of onset: 58) in her mother; Hypertension in her brother, sister, and sister; Other in her daughter, father, sister, and son; Rheum Arthritis in her brother and sister; Thyroid Disease in her sister. REVIEW OF SYSTEMS:  Review of Systems   Constitutional: Negative for appetite change, chills, fever and unexpected weight change. HENT: Negative for congestion, ear pain, rhinorrhea, sore throat and trouble swallowing. Eyes: Negative for pain and visual disturbance. Respiratory: Negative for cough, shortness of breath and wheezing. Cardiovascular: Positive for leg swelling. Negative for chest pain and palpitations. Gastrointestinal: Negative for abdominal pain, blood in stool, constipation, diarrhea, nausea and vomiting. Genitourinary: Negative for dysuria, hematuria and vaginal bleeding. Musculoskeletal: Positive for arthralgias (left ankle) and joint swelling (left ankle). Negative for back pain. Skin: Positive for wound (laceration to right leg). Negative for color change and rash. Allergic/Immunologic: Negative for environmental allergies and food allergies. Neurological: Positive for numbness (neuropathy). Negative for dizziness, syncope and light-headedness. Hematological: Negative for adenopathy. Bruises/bleeds easily. Psychiatric/Behavioral: Negative for decreased concentration. The patient is not nervous/anxious. PHYSICAL EXAM:  Vitals:  BP (!) 169/88   Pulse 60   SpO2 94%     Physical Exam  Vitals signs reviewed. Constitutional:       General: She is awake. She is not in acute distress. Appearance: She is well-developed and well-groomed. She is not ill-appearing, toxic-appearing or diaphoretic. HENT:      Head: Normocephalic and atraumatic. Mouth/Throat:      Lips: Pink. Mouth: Mucous membranes are moist.      Dentition: Has dentures. Tongue: Tongue does not deviate from midline. Eyes:      General: No scleral icterus. Extraocular Movements: Extraocular movements intact.       Conjunctiva/sclera:      Right eye: Right conjunctiva is not injected. Left eye: Left conjunctiva is not injected. Pupils: Pupils are equal, round, and reactive to light. Cardiovascular:      Rate and Rhythm: Normal rate and regular rhythm. Pulses:           Dorsalis pedis pulses are 2+ on the right side and 2+ on the left side. Posterior tibial pulses are 2+ on the right side and 2+ on the left side. Heart sounds: S1 normal and S2 normal. No murmur. Pulmonary:      Effort: No tachypnea or respiratory distress. Breath sounds: No decreased breath sounds, wheezing, rhonchi or rales. Abdominal:      General: Bowel sounds are normal.      Palpations: Abdomen is soft. Tenderness: There is no abdominal tenderness. There is no guarding or rebound. Musculoskeletal:      Right ankle: She exhibits no swelling and no ecchymosis. No tenderness. Left ankle: She exhibits swelling and ecchymosis. Tenderness. Lateral malleolus and medial malleolus tenderness found. Right lower leg: She exhibits laceration. Left lower leg: She exhibits no laceration. Skin:     General: Skin is warm and dry. Coloration: Skin is not jaundiced. Findings: Laceration (right lower extremity) present. No rash. Neurological:      General: No focal deficit present. Mental Status: She is alert. Sensory: Sensation is intact. Psychiatric:         Attention and Perception: Attention normal.         Mood and Affect: Mood normal.         Speech: Speech normal.         Behavior: Behavior is cooperative. Thought Content:  Thought content normal.         Cognition and Memory: Cognition normal.         Judgment: Judgment normal.         LABS:  CBC with Differential:    Lab Results   Component Value Date    WBC 8.1 07/12/2020    RBC 3.35 07/12/2020    HGB 10.0 07/12/2020    HCT 32.3 07/12/2020     07/12/2020    MCV 96.4 07/12/2020    MCH 29.9 07/12/2020    MCHC 31.0 07/12/2020    RDW 19.9 07/12/2020 SEGSPCT 66 03/26/2013    LYMPHOPCT 8.3 07/12/2020    MONOPCT 9.6 07/12/2020    MYELOPCT 0.9 06/16/2020    BASOPCT 0.5 07/12/2020    MONOSABS 0.77 07/12/2020    LYMPHSABS 0.67 07/12/2020    EOSABS 0.14 07/12/2020    BASOSABS 0.04 07/12/2020     BMP:    Lab Results   Component Value Date     07/12/2020    K 4.4 07/12/2020     07/12/2020    CO2 22 07/12/2020    BUN 29 07/12/2020    LABALBU 3.8 06/16/2020    LABALBU 4.0 04/24/2012    CREATININE 1.3 07/12/2020    CALCIUM 9.5 07/12/2020    GFRAA 47 07/12/2020    LABGLOM 39 07/12/2020    GLUCOSE 113 07/12/2020    GLUCOSE 79 04/24/2012     PT/INR:    Lab Results   Component Value Date    PROTIME 24.9 07/12/2020    PROTIME 15.8 05/12/2014    INR 2.1 07/12/2020     PTT:    Lab Results   Component Value Date    APTT 34.5 07/12/2020       IMAGING  CT Head WO Contrast   Final Result      No evidence for acute intracranial process. Cortical atrophy and chronic periventricular microangiopathy. CT Cervical Spine WO Contrast   Final Result      Grade 1 anterior spondylolisthesis of C7 relation to T1 was present on   the prior study. Slight anterolisthesis of C6 is unchanged. Remote fracture of the left articulating facet of T1. Degenerative spondylosis, uncovertebral hypertrophy and facet   arthropathy result in neural foraminal canal stenosis bilaterally at   C5-6 and on the right at C6-7. XR ANKLE LEFT (MIN 3 VIEWS)   Final Result      Acute left ankle bimalleolar fractures. XR KNEE LEFT (1-2 VIEWS)   Final Result      Acute fracture of the left patella.            ASSESSMENT:      Active Hospital Problems    Diagnosis Date Noted    Bimalleolar fracture, left, closed, initial encounter Onel Meredith 07/12/2020    Osteoporosis [M81.0] 07/12/2020    Elevated international normalized ratio (INR) [R79.1] 07/12/2020    Anemia [D64.9] 07/26/2014    Numbness and tingling of both legs below knees [R20.0, R20.2] 04/18/2013    Hypertension [I10] 06/20/2011    Hypothyroidism [E03.9] 06/10/2011    Rheumatoid arthritis ([facility] [M06.9] 06/10/2011    A-fib ([facility] [I48.91] 06/09/2011       PLAN:  Bimalleolar fracture (left)  - Neurovascular checks  - Bowel regimen: Colace 100 mg QD, Glycolax 17 g QD  - Pain control: Norco 5-325 mg q 6 hrs PRN (moderate) or Norco 7.5-325 mg q 6 hrs PRN (severe); IV morphine 1 mg q 4 hrs PRN (moderate) or IV morphine 2 mg q 4 hrs PRN (severe) for breakthrough pain  - Consult to Orthopedic Surgery (Dr. Gaston Kenney). Appreciate input.   - Consult to  for disposition planning   - Consult to PT/OT to assess need for placement upon discharge     Right lower extremity laceration  - Consult to Wound Care     Osteoporosis  Hx of multiple rib fractures and a thoracic spine fracture. Not currently on a bisphosphonate. - Check phosphorus, PTH, and vitamin D 25-hydroxy to evaluate for hyperparathyroidism as potential cause of osteoporosis     Elevated INR  - Check hepatic function panel to evaluate liver function   - INR ordered daily     Anemia  Stable  - Daily CBC   - Continue ferrous sulfate 325 mg QD    Neuropathy of lower extremities   - Continue gabapentin 100 mg nightly     HTN   - Monitor BP daily   - Continue valsartan- HCTZ 160-25 mg QD   - IV hydralazine 10 mg q 6 hrs PRN if SBP>160 mmHg or DBP> 100 mmHg     Hypothyroidism  Stable  - Continue Synthroid 100 mcg QD    Rheumatoid arthritis  Stable  - Continue Celebrex 200 mg QD  - Continue methotrexate 2.5 mg tablets once weekly (Mondays). Patient takes 2 tablets in the morning and 4 tablets in the evenings on Mondays. Atrial fibrillation  Stable  - Telemetry monitoring   - Continue flecainide 50 mg BID   - Continue Lopressor 25 mg BID   - Patient is anticoagulated and is on Xarelto 15 mg QD. She took her Xarelto this morning. It was NOT continued upon admission in case she has surgery tomorrow for her bimalleolar fracture.  If no surgery is warranted, her Xarelto will need to be resumed during her admission. Additional home medications continued:  - Allegra 180 mg QD (substituted with Zyrtec 10 mg QD)  - Pepcid 40 mg nightly   - Omeprazole 40 mg BID (substituted with Protonix 40 mg BID)  - Singulair 10 mg QD  - Vitamin B12 1000 mcg QD     DVT prophylaxis: PCDs  Code: Full   Diet: General, NPO after midnight     Discussed with on-call attending physician, Dr. Orville Raygoza.        Caryle Nip, DO, PGY-3  6:10 PM  7/12/2020

## 2020-07-12 NOTE — PROGRESS NOTES
Notified Dr GONZALO Rader of pt c/o severe spasms in LLE. Had morphine @ 1920 with no effect on spasms.

## 2020-07-13 LAB
ALBUMIN SERPL-MCNC: 3.8 G/DL (ref 3.5–5.2)
ALBUMIN SERPL-MCNC: 3.8 G/DL (ref 3.5–5.2)
ALP BLD-CCNC: 52 U/L (ref 35–104)
ALP BLD-CCNC: 52 U/L (ref 35–104)
ALT SERPL-CCNC: 10 U/L (ref 0–32)
ALT SERPL-CCNC: 12 U/L (ref 0–32)
ANION GAP SERPL CALCULATED.3IONS-SCNC: 8 MMOL/L (ref 7–16)
APTT: 24.7 SEC (ref 24.5–35.1)
AST SERPL-CCNC: 18 U/L (ref 0–31)
AST SERPL-CCNC: 20 U/L (ref 0–31)
BASOPHILS ABSOLUTE: 0.03 E9/L (ref 0–0.2)
BASOPHILS RELATIVE PERCENT: 0.5 % (ref 0–2)
BILIRUB SERPL-MCNC: 0.4 MG/DL (ref 0–1.2)
BILIRUB SERPL-MCNC: 0.4 MG/DL (ref 0–1.2)
BILIRUBIN DIRECT: <0.2 MG/DL (ref 0–0.3)
BILIRUBIN DIRECT: <0.2 MG/DL (ref 0–0.3)
BILIRUBIN, INDIRECT: ABNORMAL MG/DL (ref 0–1)
BILIRUBIN, INDIRECT: ABNORMAL MG/DL (ref 0–1)
BUN BLDV-MCNC: 23 MG/DL (ref 8–23)
CALCIUM SERPL-MCNC: 9.6 MG/DL (ref 8.6–10.2)
CHLORIDE BLD-SCNC: 101 MMOL/L (ref 98–107)
CO2: 26 MMOL/L (ref 22–29)
CREAT SERPL-MCNC: 1.1 MG/DL (ref 0.5–1)
EOSINOPHILS ABSOLUTE: 0.12 E9/L (ref 0.05–0.5)
EOSINOPHILS RELATIVE PERCENT: 2 % (ref 0–6)
GFR AFRICAN AMERICAN: 57
GFR NON-AFRICAN AMERICAN: 47 ML/MIN/1.73
GLUCOSE BLD-MCNC: 96 MG/DL (ref 74–99)
HCT VFR BLD CALC: 31 % (ref 34–48)
HEMOGLOBIN: 9.4 G/DL (ref 11.5–15.5)
IMMATURE GRANULOCYTES #: 0.04 E9/L
IMMATURE GRANULOCYTES %: 0.7 % (ref 0–5)
INR BLD: 1.1
LYMPHOCYTES ABSOLUTE: 0.81 E9/L (ref 1.5–4)
LYMPHOCYTES RELATIVE PERCENT: 13.4 % (ref 20–42)
MCH RBC QN AUTO: 29.2 PG (ref 26–35)
MCHC RBC AUTO-ENTMCNC: 30.3 % (ref 32–34.5)
MCV RBC AUTO: 96.3 FL (ref 80–99.9)
MONOCYTES ABSOLUTE: 1.04 E9/L (ref 0.1–0.95)
MONOCYTES RELATIVE PERCENT: 17.2 % (ref 2–12)
NEUTROPHILS ABSOLUTE: 3.99 E9/L (ref 1.8–7.3)
NEUTROPHILS RELATIVE PERCENT: 66.2 % (ref 43–80)
PARATHYROID HORMONE INTACT: 45 PG/ML (ref 15–65)
PDW BLD-RTO: 20 FL (ref 11.5–15)
PLATELET # BLD: 300 E9/L (ref 130–450)
PMV BLD AUTO: 10.7 FL (ref 7–12)
POTASSIUM REFLEX MAGNESIUM: 4.6 MMOL/L (ref 3.5–5)
PROTHROMBIN TIME: 12.7 SEC (ref 9.3–12.4)
RBC # BLD: 3.22 E12/L (ref 3.5–5.5)
SODIUM BLD-SCNC: 135 MMOL/L (ref 132–146)
TOTAL CK: 105 U/L (ref 20–180)
TOTAL PROTEIN: 6 G/DL (ref 6.4–8.3)
TOTAL PROTEIN: 6 G/DL (ref 6.4–8.3)
VITAMIN D 25-HYDROXY: 20 NG/ML (ref 30–100)
WBC # BLD: 6 E9/L (ref 4.5–11.5)

## 2020-07-13 PROCEDURE — 2580000003 HC RX 258: Performed by: FAMILY MEDICINE

## 2020-07-13 PROCEDURE — G0378 HOSPITAL OBSERVATION PER HR: HCPCS

## 2020-07-13 PROCEDURE — 80076 HEPATIC FUNCTION PANEL: CPT

## 2020-07-13 PROCEDURE — 6370000000 HC RX 637 (ALT 250 FOR IP): Performed by: FAMILY MEDICINE

## 2020-07-13 PROCEDURE — 6370000000 HC RX 637 (ALT 250 FOR IP): Performed by: STUDENT IN AN ORGANIZED HEALTH CARE EDUCATION/TRAINING PROGRAM

## 2020-07-13 PROCEDURE — 1200000000 HC SEMI PRIVATE

## 2020-07-13 PROCEDURE — 96376 TX/PRO/DX INJ SAME DRUG ADON: CPT

## 2020-07-13 PROCEDURE — 82306 VITAMIN D 25 HYDROXY: CPT

## 2020-07-13 PROCEDURE — 85025 COMPLETE CBC W/AUTO DIFF WBC: CPT

## 2020-07-13 PROCEDURE — 96375 TX/PRO/DX INJ NEW DRUG ADDON: CPT

## 2020-07-13 PROCEDURE — 97165 OT EVAL LOW COMPLEX 30 MIN: CPT

## 2020-07-13 PROCEDURE — 80048 BASIC METABOLIC PNL TOTAL CA: CPT

## 2020-07-13 PROCEDURE — 99222 1ST HOSP IP/OBS MODERATE 55: CPT | Performed by: FAMILY MEDICINE

## 2020-07-13 PROCEDURE — 85730 THROMBOPLASTIN TIME PARTIAL: CPT

## 2020-07-13 PROCEDURE — 83970 ASSAY OF PARATHORMONE: CPT

## 2020-07-13 PROCEDURE — 6360000002 HC RX W HCPCS: Performed by: FAMILY MEDICINE

## 2020-07-13 PROCEDURE — 36415 COLL VENOUS BLD VENIPUNCTURE: CPT

## 2020-07-13 PROCEDURE — 85610 PROTHROMBIN TIME: CPT

## 2020-07-13 PROCEDURE — 97161 PT EVAL LOW COMPLEX 20 MIN: CPT

## 2020-07-13 RX ORDER — VALSARTAN 80 MG/1
80 TABLET ORAL DAILY
Status: DISCONTINUED | OUTPATIENT
Start: 2020-07-14 | End: 2020-07-16 | Stop reason: HOSPADM

## 2020-07-13 RX ORDER — HYDROCODONE BITARTRATE AND ACETAMINOPHEN 10; 325 MG/1; MG/1
1 TABLET ORAL EVERY 6 HOURS PRN
Status: DISCONTINUED | OUTPATIENT
Start: 2020-07-13 | End: 2020-07-16 | Stop reason: HOSPADM

## 2020-07-13 RX ORDER — ERGOCALCIFEROL 1.25 MG/1
50000 CAPSULE ORAL WEEKLY
Status: DISCONTINUED | OUTPATIENT
Start: 2020-07-13 | End: 2020-07-16 | Stop reason: HOSPADM

## 2020-07-13 RX ORDER — HYDROCHLOROTHIAZIDE 25 MG/1
25 TABLET ORAL DAILY
Status: DISCONTINUED | OUTPATIENT
Start: 2020-07-14 | End: 2020-07-16 | Stop reason: HOSPADM

## 2020-07-13 RX ADMIN — HYDROCHLOROTHIAZIDE 25 MG: 25 TABLET ORAL at 09:25

## 2020-07-13 RX ADMIN — METHOTREXATE 10 MG: 2.5 TABLET ORAL at 17:57

## 2020-07-13 RX ADMIN — Medication 10 ML: at 09:26

## 2020-07-13 RX ADMIN — ONDANSETRON 4 MG: 2 INJECTION INTRAMUSCULAR; INTRAVENOUS at 07:51

## 2020-07-13 RX ADMIN — FLECAINIDE ACETATE 50 MG: 50 TABLET ORAL at 09:37

## 2020-07-13 RX ADMIN — FAMOTIDINE 40 MG: 20 TABLET, FILM COATED ORAL at 23:00

## 2020-07-13 RX ADMIN — HYDROMORPHONE HYDROCHLORIDE 0.5 MG: 1 INJECTION, SOLUTION INTRAMUSCULAR; INTRAVENOUS; SUBCUTANEOUS at 07:02

## 2020-07-13 RX ADMIN — MONTELUKAST SODIUM 10 MG: 10 TABLET, FILM COATED ORAL at 09:25

## 2020-07-13 RX ADMIN — CELECOXIB 200 MG: 100 CAPSULE ORAL at 09:37

## 2020-07-13 RX ADMIN — HYDROMORPHONE HYDROCHLORIDE 0.5 MG: 1 INJECTION, SOLUTION INTRAMUSCULAR; INTRAVENOUS; SUBCUTANEOUS at 02:19

## 2020-07-13 RX ADMIN — PANTOPRAZOLE SODIUM 40 MG: 40 TABLET, DELAYED RELEASE ORAL at 17:53

## 2020-07-13 RX ADMIN — SODIUM CHLORIDE: 9 INJECTION, SOLUTION INTRAVENOUS at 18:02

## 2020-07-13 RX ADMIN — DOCUSATE SODIUM 100 MG: 100 CAPSULE, LIQUID FILLED ORAL at 09:25

## 2020-07-13 RX ADMIN — GABAPENTIN 100 MG: 100 CAPSULE ORAL at 23:00

## 2020-07-13 RX ADMIN — RIVAROXABAN 15 MG: 15 TABLET, FILM COATED ORAL at 09:36

## 2020-07-13 RX ADMIN — Medication 10 ML: at 22:20

## 2020-07-13 RX ADMIN — FLECAINIDE ACETATE 50 MG: 50 TABLET ORAL at 00:08

## 2020-07-13 RX ADMIN — HYDROCODONE BITARTRATE AND ACETAMINOPHEN 1 TABLET: 10; 325 TABLET ORAL at 17:57

## 2020-07-13 RX ADMIN — CYANOCOBALAMIN TAB 1000 MCG 1000 MCG: 1000 TAB at 09:37

## 2020-07-13 RX ADMIN — METHOTREXATE 5 MG: 2.5 TABLET ORAL at 09:37

## 2020-07-13 RX ADMIN — VALSARTAN 160 MG: 160 TABLET, FILM COATED ORAL at 09:37

## 2020-07-13 RX ADMIN — POLYETHYLENE GLYCOL 3350 17 G: 17 POWDER, FOR SOLUTION ORAL at 09:25

## 2020-07-13 RX ADMIN — METOPROLOL TARTRATE 25 MG: 25 TABLET, FILM COATED ORAL at 09:25

## 2020-07-13 RX ADMIN — CETIRIZINE HYDROCHLORIDE 10 MG: 10 TABLET, FILM COATED ORAL at 09:25

## 2020-07-13 RX ADMIN — ERGOCALCIFEROL 50000 UNITS: 1.25 CAPSULE ORAL at 09:36

## 2020-07-13 RX ADMIN — FLECAINIDE ACETATE 50 MG: 50 TABLET ORAL at 23:01

## 2020-07-13 RX ADMIN — FERROUS SULFATE TAB 325 MG (65 MG ELEMENTAL FE) 325 MG: 325 (65 FE) TAB at 09:25

## 2020-07-13 ASSESSMENT — PAIN DESCRIPTION - PROGRESSION
CLINICAL_PROGRESSION: GRADUALLY WORSENING
CLINICAL_PROGRESSION: NOT CHANGED

## 2020-07-13 ASSESSMENT — PAIN SCALES - GENERAL
PAINLEVEL_OUTOF10: 0
PAINLEVEL_OUTOF10: 3
PAINLEVEL_OUTOF10: 8
PAINLEVEL_OUTOF10: 0
PAINLEVEL_OUTOF10: 6
PAINLEVEL_OUTOF10: 6

## 2020-07-13 ASSESSMENT — PAIN DESCRIPTION - ONSET
ONSET: ON-GOING
ONSET: ON-GOING

## 2020-07-13 ASSESSMENT — PAIN DESCRIPTION - FREQUENCY
FREQUENCY: INTERMITTENT
FREQUENCY: INTERMITTENT

## 2020-07-13 ASSESSMENT — PAIN DESCRIPTION - LOCATION
LOCATION: LEG
LOCATION: LEG

## 2020-07-13 ASSESSMENT — PAIN DESCRIPTION - ORIENTATION
ORIENTATION: LEFT
ORIENTATION: LEFT

## 2020-07-13 ASSESSMENT — PAIN DESCRIPTION - PAIN TYPE
TYPE: ACUTE PAIN
TYPE: ACUTE PAIN

## 2020-07-13 ASSESSMENT — PAIN DESCRIPTION - DESCRIPTORS: DESCRIPTORS: ACHING;DISCOMFORT

## 2020-07-13 NOTE — PROGRESS NOTES
Occupational Therapy  OCCUPATIONAL THERAPY INITIAL EVALUATION      Date:2020  Patient Name: Ranulfo Golden  MRN: [id number]  : 1935  Room: [address] [place] [address]    Referring Provider: Azam Vickers DO    Evaluating OT: Leesa Harrison OTR/L [id number]    AM-PAC Daily Activity Raw Score: 1624    Recommended Adaptive Equipment: TBD    Diagnosis: L bimalleolar fracture. Pertinent Medical History:  Conservative management of L ankle fracture per ortho  Past Medical History: RA, HTN, PAF, osteoporosis  Precautions:  Falls, NWB L LE     Home Living: Pt lives alone in a single story home with 2 steps on entry. Bathroom setup: walk in shower with seat, standard commode     Prior Level of Function: Mod I with ADLs, pd caregiver assist 5x/wk for 3hr/day for assist with IADLs; completed functional mobility with [employer]  Driving: Yes    Pain Level: L LE pain, nausea throughout session    Cognition: A&O: 4/4.  Pt is alert and oriented, appears distracted by nausea and fatigue during session   Problem solving:  fair   Judgement/safety:  fair     Functional Assessment:   Initial Eval Status  Date: 20 Treatment session:  Short Term Goals  Treatment frequency: 2-5x/wk PRN x1-3 wks     Feeding Independent     Grooming Set up     UB Dressing Min A  Management of hospital gown  Set up   LB Dressing Max A  Management R sock  Min A    Bathing Max A  Min A   Toileting Max A  Incontinent of urine upon sitting at EOB  Min A   Bed Mobility  Supine to sit: SBA  Sit to Supine: Min A  Scooting to HOB: SBA  Min cues to maintain NWB L LE     Functional Transfers STS: Max A  Max A to maintain NWB L LE  Min A   Functional Mobility Unable to complete hop at [employer] A during ADLs   Balance Sitting: fair    Standing: poor plus at [employer]     Activity Tolerance poor  standing lelo x4-5 min with fair balance during self care tasks             Treatment: Patient educated on techniques for completion of ADL, safe functional transfers and functional understanding. Upon arrival, patient supine in bed. At end of session, patient supine in bed with call light and phone within reach, all lines and tubes intact. Pt would benefit from continued skilled OT to increase safety and independence with completion of ADL/IADL tasks for functional independence and quality of life.  Bed/chair alarm: ON    Low Evaluation + 0 timed treatment minutes    Evaluation time includes thorough review of current medical information, gathering information on past medical history/social history and prior level of function, completion of standardized testing/informal observation of tasks, assessment of data, and development of POC/Goals    Kam Whatley OTR/L  [id number]

## 2020-07-13 NOTE — PROGRESS NOTES
[facility]  Progress Note    Subjective:    Patient states her pain is improved compared to overnight. She was having contractions in the ankle/foot that have improved. She feels nauseous and believes it is from the pain medicine but is able to tolerate nawaf jhon of food. Denies CP, SOB, fever, chills. Objective:    BP (!) 130/56   Pulse 78   Temp 98.2 °F (36.8 °C) (Oral)   Resp 16   Wt 137 lb 9.6 oz (62.4 kg)   SpO2 94%   Breastfeeding No   BMI 26.87 kg/m²     Heart:  RRR, 2/6 systolic murmur, gallops, or rubs. Lungs:  CTA bilaterally, no wheeze, rales or rhonchi  Abd: bowel sounds present, nontender, nondistended, no masses  Extrem:  No clubbing, cyanosis, or edema. L leg wrapped. Good capillary refill of the toes. Dressing is CDI. R leg dressing in place CDI.     Recent Results (from the past 12 hour(s))   Magnesium    Collection Time: 07/12/20  9:20 PM   Result Value Ref Range    Magnesium 1.7 1.6 - 2.6 mg/dL   Hepatic Function Panel    Collection Time: 07/12/20  9:20 PM   Result Value Ref Range    Total Protein 6.0 (L) 6.4 - 8.3 g/dL    Alb 3.8 3.5 - 5.2 g/dL    Alkaline Phosphatase 52 35 - 104 U/L    ALT 12 0 - 32 U/L    AST 18 0 - 31 U/L    Total Bilirubin 0.4 0.0 - 1.2 mg/dL    Bilirubin, Direct <0.2 0.0 - 0.3 mg/dL    Bilirubin, Indirect see below 0.0 - 1.0 mg/dL   CK    Collection Time: 07/12/20  9:20 PM   Result Value Ref Range    Total  20 - 180 U/L   Basic Metabolic Panel w/ Reflex to MG    Collection Time: 07/13/20  3:40 AM   Result Value Ref Range    Sodium 135 132 - 146 mmol/L    Potassium reflex Magnesium 4.6 3.5 - 5.0 mmol/L    Chloride 101 98 - 107 mmol/L    CO2 26 22 - 29 mmol/L    Anion Gap 8 7 - 16 mmol/L    Glucose 96 74 - 99 mg/dL    BUN 23 8 - 23 mg/dL    CREATININE 1.1 (H) 0.5 - 1.0 mg/dL    GFR Non-African American 47 >=60 mL/min/1.73    GFR African American 57     Calcium 9.6 8.6 - 10.2 mg/dL   Hepatic function panel    Collection Time: 07/13/20  3:40 AM   Result Value Ref Range    Total Protein 6.0 (L) 6.4 - 8.3 g/dL    Alb 3.8 3.5 - 5.2 g/dL    Alkaline Phosphatase 52 35 - 104 U/L    ALT 10 0 - 32 U/L    AST 20 0 - 31 U/L    Total Bilirubin 0.4 0.0 - 1.2 mg/dL    Bilirubin, Direct <0.2 0.0 - 0.3 mg/dL    Bilirubin, Indirect see below 0.0 - 1.0 mg/dL   CBC auto differential    Collection Time: 07/13/20  3:40 AM   Result Value Ref Range    WBC 6.0 4.5 - 11.5 E9/L    RBC 3.22 (L) 3.50 - 5.50 E12/L    Hemoglobin 9.4 (L) 11.5 - 15.5 g/dL    Hematocrit 31.0 (L) 34.0 - 48.0 %    MCV 96.3 80.0 - 99.9 fL    MCH 29.2 26.0 - 35.0 pg    MCHC 30.3 (L) 32.0 - 34.5 %    RDW 20.0 (H) 11.5 - 15.0 fL    Platelets 030 079 - 244 E9/L    MPV 10.7 7.0 - 12.0 fL    Neutrophils % 66.2 43.0 - 80.0 %    Immature Granulocytes % 0.7 0.0 - 5.0 %    Lymphocytes % 13.4 (L) 20.0 - 42.0 %    Monocytes % 17.2 (H) 2.0 - 12.0 %    Eosinophils % 2.0 0.0 - 6.0 %    Basophils % 0.5 0.0 - 2.0 %    Neutrophils Absolute 3.99 1.80 - 7.30 E9/L    Immature Granulocytes # 0.04 E9/L    Lymphocytes Absolute 0.81 (L) 1.50 - 4.00 E9/L    Monocytes Absolute 1.04 (H) 0.10 - 0.95 E9/L    Eosinophils Absolute 0.12 0.05 - 0.50 E9/L    Basophils Absolute 0.03 0.00 - 0.20 E9/L   Protime-INR    Collection Time: 07/13/20  3:40 AM   Result Value Ref Range    Protime 12.7 (H) 9.3 - 12.4 sec    INR 1.1    APTT    Collection Time: 07/13/20  3:40 AM   Result Value Ref Range    aPTT 24.7 24.5 - 35.1 sec   ]    Assessment:    Active Hospital Problems    Diagnosis Date Noted    Bimalleolar fracture, left, closed, initial encounter [S82.842A] 07/12/2020    Osteoporosis [M81.0] 07/12/2020    Elevated international normalized ratio (INR) [R79.1] 07/12/2020    Anemia [D64.9] 07/26/2014    Numbness and tingling of both legs below knees [R20.0, R20.2] 04/18/2013    Hypertension [I10] 06/20/2011    Hypothyroidism [E03.9] 06/10/2011    Rheumat[facility] arthritis ([facility] [M06.9] 06/10/2011    A-fib ([facility] [I48.91] 06/09/2011       Plan:     Bimalleolar fracture (left)  - Neurovascular checks  - Bowel regimen: Colace 100 mg QD, Glycolax 17 g QD  - Pain control: switched to 2 mg dilaudid PRN overnight, will likely switch to oral option this AM with morphine for breakthrough  - Consult to Orthopedic Surgery (Dr. Nemesio Trotter). Appreciate input. No surgical intervention planned  - Consult to  for disposition planning   - Consult to PT/OT to assess need for placement upon discharge      Right lower extremity laceration  - Consult to Wound Care      Osteoporosis  Hx of multiple rib fractures and a thoracic spine fracture. Not currently on a bisphosphonate.    - Check phosphorus, PTH, and vitamin D 25-hydroxy to evaluate for hyperparathyroidism as potential cause of osteoporosis, remain pending    Anemia  Stable  - Daily CBC, 9.4 this AM down from 10, likely dillutional  - Continue ferrous sulfate 325 mg QD     HTN   - Monitor BP daily, currently stable  - Continue valsartan- HCTZ 160-25 mg QD   - IV hydralazine 10 mg q 6 hrs PRN if SBP>160 mmHg or DBP> 100 mmHg      Atrial fibrillation  Stable  - Telemetry monitoring   - Continue flecainide 50 mg BID   - Continue Lopressor 25 mg BID   - Home xarelto 15 mg QD resumed this AM       Additional home medications continued:  - Allegra 180 mg QD (substituted with Zyrtec 10 mg QD)  - Pepcid 40 mg nightly   - Omeprazole 40 mg BID (substituted with Protonix 40 mg BID)  - Singulair 10 mg QD  - Vitamin B12 1000 mcg QD   - Celebrex 200 mg QD and methotrexate 15 mg in divided doses weekly (Mondays) for RA  - Synthroid 100 mcg QD  - Gabapentin 100 mg nightly     DVT prophylaxis: PCDs  Code: Full   Diet: General, NPO after midnight       Electronically signed by Margarita Castillo MD on 7/13/2020 at 7:04 AM

## 2020-07-13 NOTE — PROGRESS NOTES
Secure message sent to Dr. Bonnie Marcelo to check transfer to [address] [place], [place] [address]vibha Oneal [facility].

## 2020-07-13 NOTE — PROGRESS NOTES
Physical Therapy    Facility/Department: [address] INTERNAL MEDICINE 2  Initial Assessment    NAME: Nikki Main  : 1935  MRN: [id number]    Date of Service: 2020       REQUIRES PT FOLLOW UP: Yes       Patient Diagnosis(es): The primary encounter diagnosis was Bimalleolar fracture, left, closed, initial encounter. A diagnosis of Fall, initial encounter was also pertinent to this visit. has a past medical history of Anticoagulated, Atrial fibrillation ([address] Diastolic dysfunction, Difficulty walking, Diverticular disease, Elevated CA-125, Hiatal hernia, Hypertension, Hypothyroid, Meningocele spinal ([address] Neuropathy, JANETTE (obstructive sleep apnea), Osteoporosis, PAF (paroxysmal atrial fibrillation) ([address] Pain in both lower legs, Rheumatoid arthritis with rheumatoid factor ([address] Rheumatoid arthritis(714.0), Rib fractures, Seasonal allergies, Skin cancer of lip, and Wears dentures. has a past surgical history that includes Gastric fundoplication (7298);  section; Carpal tunnel release (Bilateral); Colonoscopy (2011); Total knee arthroplasty (2007); Cardiac catheterization; Hysterectomy, total abdominal (early ); Upper gastrointestinal endoscopy (N/A, 2020); and Colonoscopy (N/A, 2020). Evaluating Therapist: Jerry Smiley PT     Referring Provider:     Marichuy Conroy DO          Room #: 262   DIAGNOSIS:  Bimalleolar L ankle fx , non surgical treatment   PRECAUTIONS: falls, L LE NWB     Social:  Pt lives with  Alone  in a 1 floor plan 1 steps  to enter. Prior to admission pt walked with  ww . Has aides 3 hours /dey      Initial Evaluation  Date: 2020 Treatment      Short Term/ Long Term   Goals   Was pt agreeable to Eval/treatment?  with encouragement      Does pt have pain?   L ankle      Bed Mobility  Rolling:  NT   Supine to sit:  SBA   Sit to supine:  Min assist   Scooting:  Min assist in sit    S/I    Transfers Sit to stand:  Max assist   Stand to sit:  Max assist   Stand pivot:  NT    min assist    Ambulation   NT , unable   10  feet with  ww  with  Min assist L LE NWB    LE ROM  Grossly WFL, L ankle NT      LE strength  4- / 5 except L ankle NT      AM- PAC RAW score  12/ 24            Pt is alert and Oriented x  3      Balance:  Max assist in stand, high fall risk   Endurance: poor   Bed/Chair alarm: Yes      ASSESSMENT  Pt displays functional ability as noted in the objective portion of this evaluation. Treatment/Education:     Limited mobility due to pt c/o nausea and fatigue. Needs assist to maintain L LE NWB . High fall risk due to max assist to maintain static standing balance while hygiene performed     Pt educated on fall risk, L LE NWB        Patient response to education:   Pt verbalized understanding Pt demonstrated skill Pt requires further education in this area   Yes   no  yes        Comments:  Pt left in bed after session, with call light in reach. Rehab potential is Good for reaching above PT goals. Pts/ family goals   1. Rehab     Patient and or family understand(s) diagnosis, prognosis, and plan of care. - yes     PLAN  PT care will be provided in accordance with the objectives noted above. Whenever appropriate, clear delegation orders will be provided for nursing staff. Exercises and functional mobility practice will be used as well as appropriate assistive devices or modalities to obtain goals. Patient and family education will also be administered as needed. Frequency of treatments will be 2-5x/week x  5-7 days. Time in:  1145   Time out:  1158       Evaluation Time includes thorough review of current medical information, gathering information on past medical history/social history and prior level of function, completion of standardized testing/informal observation of tasks, assessment of data and education on plan of care and goals.     CPT codes:  [x] Low Complexity PT evaluation 45383  [] Moderate Complexity PT evaluation [id number]  [] High Complexity PT evaluation [id number]  [] PT Re-evaluation [id number]  [] Gait training 37219  minutes  [] Therapeutic activities 10205  minutes  [] Therapeutic exercises 51422  minutes  [] Neuromuscular reeducation 97086  minutes       [address] number:  PT [id number]

## 2020-07-13 NOTE — CARE COORDINATION
7/13/2020  Social Work Discharge Planning: This worker met with Pt to discuss  role and transition of care/discharge planning. Pt resides alone in a 1 story home. Pt has a fractured ankle and is interested in going to a [facility] for rehab. SW gave SR choices.  Awaiting Pt eval. Electronically signed by JU Lincoln on 7/13/2020 at 10:58 AM

## 2020-07-13 NOTE — PROGRESS NOTES
Pt's daughter Ana Paula Crowder updated on pt condition; pt resting comfortably at this time. Daughter would like surgical team to call her from pt's cell phone in room regarding surgical plans as she states pt tends to be forgetful or confused about these things.

## 2020-07-13 NOTE — PROGRESS NOTES
Notified Dr Darian Liang of pt Mg level. Pt daughter, who is an NP, wanted this nurse to inform Dr that pt goes into SVT easily with trauma/illness. Message relayed.

## 2020-07-14 LAB
ALBUMIN SERPL-MCNC: 3.1 G/DL (ref 3.5–5.2)
ALP BLD-CCNC: 45 U/L (ref 35–104)
ALT SERPL-CCNC: 10 U/L (ref 0–32)
ANION GAP SERPL CALCULATED.3IONS-SCNC: 10 MMOL/L (ref 7–16)
APTT: 27.9 SEC (ref 24.5–35.1)
AST SERPL-CCNC: 19 U/L (ref 0–31)
BASOPHILS ABSOLUTE: 0.03 E9/L (ref 0–0.2)
BASOPHILS RELATIVE PERCENT: 0.6 % (ref 0–2)
BILIRUB SERPL-MCNC: 0.5 MG/DL (ref 0–1.2)
BILIRUBIN DIRECT: <0.2 MG/DL (ref 0–0.3)
BILIRUBIN, INDIRECT: ABNORMAL MG/DL (ref 0–1)
BUN BLDV-MCNC: 17 MG/DL (ref 8–23)
CALCIUM SERPL-MCNC: 8.8 MG/DL (ref 8.6–10.2)
CHLORIDE BLD-SCNC: 105 MMOL/L (ref 98–107)
CO2: 23 MMOL/L (ref 22–29)
CREAT SERPL-MCNC: 1 MG/DL (ref 0.5–1)
EOSINOPHILS ABSOLUTE: 0.25 E9/L (ref 0.05–0.5)
EOSINOPHILS RELATIVE PERCENT: 5.2 % (ref 0–6)
GFR AFRICAN AMERICAN: >60
GFR NON-AFRICAN AMERICAN: 53 ML/MIN/1.73
GLUCOSE BLD-MCNC: 95 MG/DL (ref 74–99)
HCT VFR BLD CALC: 26.6 % (ref 34–48)
HEMOGLOBIN: 8.2 G/DL (ref 11.5–15.5)
IMMATURE GRANULOCYTES #: 0.02 E9/L
IMMATURE GRANULOCYTES %: 0.4 % (ref 0–5)
INR BLD: 1.1
LYMPHOCYTES ABSOLUTE: 0.77 E9/L (ref 1.5–4)
LYMPHOCYTES RELATIVE PERCENT: 16 % (ref 20–42)
MCH RBC QN AUTO: 29.7 PG (ref 26–35)
MCHC RBC AUTO-ENTMCNC: 30.8 % (ref 32–34.5)
MCV RBC AUTO: 96.4 FL (ref 80–99.9)
MONOCYTES ABSOLUTE: 0.89 E9/L (ref 0.1–0.95)
MONOCYTES RELATIVE PERCENT: 18.5 % (ref 2–12)
NEUTROPHILS ABSOLUTE: 2.85 E9/L (ref 1.8–7.3)
NEUTROPHILS RELATIVE PERCENT: 59.3 % (ref 43–80)
PDW BLD-RTO: 20 FL (ref 11.5–15)
PLATELET # BLD: 216 E9/L (ref 130–450)
PMV BLD AUTO: 10.2 FL (ref 7–12)
POTASSIUM REFLEX MAGNESIUM: 4.1 MMOL/L (ref 3.5–5)
PROTHROMBIN TIME: 13.3 SEC (ref 9.3–12.4)
RBC # BLD: 2.76 E12/L (ref 3.5–5.5)
SODIUM BLD-SCNC: 138 MMOL/L (ref 132–146)
TOTAL PROTEIN: 5.2 G/DL (ref 6.4–8.3)
WBC # BLD: 4.8 E9/L (ref 4.5–11.5)

## 2020-07-14 PROCEDURE — 85730 THROMBOPLASTIN TIME PARTIAL: CPT

## 2020-07-14 PROCEDURE — 6370000000 HC RX 637 (ALT 250 FOR IP): Performed by: FAMILY MEDICINE

## 2020-07-14 PROCEDURE — 99232 SBSQ HOSP IP/OBS MODERATE 35: CPT | Performed by: FAMILY MEDICINE

## 2020-07-14 PROCEDURE — 80048 BASIC METABOLIC PNL TOTAL CA: CPT

## 2020-07-14 PROCEDURE — G0378 HOSPITAL OBSERVATION PER HR: HCPCS

## 2020-07-14 PROCEDURE — 85610 PROTHROMBIN TIME: CPT

## 2020-07-14 PROCEDURE — 1200000000 HC SEMI PRIVATE

## 2020-07-14 PROCEDURE — 6370000000 HC RX 637 (ALT 250 FOR IP): Performed by: STUDENT IN AN ORGANIZED HEALTH CARE EDUCATION/TRAINING PROGRAM

## 2020-07-14 PROCEDURE — 36415 COLL VENOUS BLD VENIPUNCTURE: CPT

## 2020-07-14 PROCEDURE — 85025 COMPLETE CBC W/AUTO DIFF WBC: CPT

## 2020-07-14 PROCEDURE — 97530 THERAPEUTIC ACTIVITIES: CPT

## 2020-07-14 PROCEDURE — 2580000003 HC RX 258: Performed by: FAMILY MEDICINE

## 2020-07-14 PROCEDURE — 97535 SELF CARE MNGMENT TRAINING: CPT

## 2020-07-14 PROCEDURE — 80076 HEPATIC FUNCTION PANEL: CPT

## 2020-07-14 RX ADMIN — CETIRIZINE HYDROCHLORIDE 10 MG: 10 TABLET, FILM COATED ORAL at 09:04

## 2020-07-14 RX ADMIN — RIVAROXABAN 15 MG: 15 TABLET, FILM COATED ORAL at 09:03

## 2020-07-14 RX ADMIN — FERROUS SULFATE TAB 325 MG (65 MG ELEMENTAL FE) 325 MG: 325 (65 FE) TAB at 09:04

## 2020-07-14 RX ADMIN — FAMOTIDINE 40 MG: 20 TABLET, FILM COATED ORAL at 21:43

## 2020-07-14 RX ADMIN — MONTELUKAST SODIUM 10 MG: 10 TABLET, FILM COATED ORAL at 09:03

## 2020-07-14 RX ADMIN — LEUCOVORIN CALCIUM 25 MG: 5 TABLET ORAL at 09:04

## 2020-07-14 RX ADMIN — POLYETHYLENE GLYCOL 3350 17 G: 17 POWDER, FOR SOLUTION ORAL at 09:03

## 2020-07-14 RX ADMIN — CYANOCOBALAMIN TAB 1000 MCG 1000 MCG: 1000 TAB at 09:04

## 2020-07-14 RX ADMIN — HYDROCHLOROTHIAZIDE 25 MG: 25 TABLET ORAL at 09:03

## 2020-07-14 RX ADMIN — METOPROLOL TARTRATE 25 MG: 25 TABLET, FILM COATED ORAL at 09:04

## 2020-07-14 RX ADMIN — FLECAINIDE ACETATE 50 MG: 50 TABLET ORAL at 21:43

## 2020-07-14 RX ADMIN — HYDROCODONE BITARTRATE AND ACETAMINOPHEN 1 TABLET: 10; 325 TABLET ORAL at 09:10

## 2020-07-14 RX ADMIN — PANTOPRAZOLE SODIUM 40 MG: 40 TABLET, DELAYED RELEASE ORAL at 09:04

## 2020-07-14 RX ADMIN — Medication 10 ML: at 21:03

## 2020-07-14 RX ADMIN — VALSARTAN 80 MG: 80 TABLET, FILM COATED ORAL at 09:03

## 2020-07-14 RX ADMIN — FLECAINIDE ACETATE 50 MG: 50 TABLET ORAL at 09:04

## 2020-07-14 RX ADMIN — PANTOPRAZOLE SODIUM 40 MG: 40 TABLET, DELAYED RELEASE ORAL at 16:58

## 2020-07-14 RX ADMIN — CELECOXIB 200 MG: 100 CAPSULE ORAL at 09:03

## 2020-07-14 RX ADMIN — LEVOTHYROXINE SODIUM 100 MCG: 100 TABLET ORAL at 06:40

## 2020-07-14 RX ADMIN — DOCUSATE SODIUM 100 MG: 100 CAPSULE, LIQUID FILLED ORAL at 09:03

## 2020-07-14 RX ADMIN — GABAPENTIN 100 MG: 100 CAPSULE ORAL at 21:43

## 2020-07-14 ASSESSMENT — PAIN DESCRIPTION - PAIN TYPE: TYPE: ACUTE PAIN

## 2020-07-14 ASSESSMENT — PAIN DESCRIPTION - FREQUENCY: FREQUENCY: CONTINUOUS

## 2020-07-14 ASSESSMENT — PAIN SCALES - GENERAL
PAINLEVEL_OUTOF10: 7
PAINLEVEL_OUTOF10: 2

## 2020-07-14 ASSESSMENT — PAIN - FUNCTIONAL ASSESSMENT: PAIN_FUNCTIONAL_ASSESSMENT: PREVENTS OR INTERFERES WITH MANY ACTIVE NOT PASSIVE ACTIVITIES

## 2020-07-14 ASSESSMENT — PAIN DESCRIPTION - LOCATION: LOCATION: LEG

## 2020-07-14 ASSESSMENT — PAIN DESCRIPTION - DESCRIPTORS: DESCRIPTORS: ACHING;DISCOMFORT

## 2020-07-14 ASSESSMENT — PAIN DESCRIPTION - ONSET: ONSET: GRADUAL

## 2020-07-14 ASSESSMENT — PAIN DESCRIPTION - ORIENTATION: ORIENTATION: LEFT

## 2020-07-14 NOTE — PROGRESS NOTES
[facility] [address]  Progress Note    Subjective:    Feeling better this AM without nausea. Tolerating food. She has not been using any IV pain medication and has not taken any oral since last night. Denies chest pain and SOB    Objective:    BP (!) 108/49   Pulse 58   Temp 98.8 °F (37.1 °C) (Oral)   Resp 16   Ht 5' 2\" (1.575 m)   Wt 134 lb (60.8 kg)   SpO2 94%   Breastfeeding No   BMI 24.51 kg/m²     Heart:  RRR, 2/6 systolic murmur, gallops, or rubs. Lungs:  CTA bilaterally, no wheeze, rales or rhonchi  Abd: bowel sounds present, nontender, nondistended, no masses  Extrem:  No clubbing, cyanosis, or edema. L leg wrapped. Good capillary refill of the toes. Dressing is CDI. R leg dressing in place CDI.     Recent Results (from the past 12 hour(s))   Basic Metabolic Panel w/ Reflex to MG    Collection Time: 07/14/20  4:50 AM   Result Value Ref Range    Sodium 138 132 - 146 mmol/L    Potassium reflex Magnesium 4.1 3.5 - 5.0 mmol/L    Chloride 105 98 - 107 mmol/L    CO2 23 22 - 29 mmol/L    Anion Gap 10 7 - 16 mmol/L    Glucose 95 74 - 99 mg/dL    BUN 17 8 - 23 mg/dL    CREATININE 1.0 0.5 - 1.0 mg/dL    GFR Non-African American 53 >=60 mL/min/1.73    GFR African American >60     Calcium 8.8 8.6 - 10.2 mg/dL   Hepatic function panel    Collection Time: 07/14/20  4:50 AM   Result Value Ref Range    Total Protein 5.2 (L) 6.4 - 8.3 g/dL    Alb 3.1 (L) 3.5 - 5.2 g/dL    Alkaline Phosphatase 45 35 - 104 U/L    ALT 10 0 - 32 U/L    AST 19 0 - 31 U/L    Total Bilirubin 0.5 0.0 - 1.2 mg/dL    Bilirubin, Direct <0.2 0.0 - 0.3 mg/dL    Bilirubin, Indirect see below 0.0 - 1.0 mg/dL   CBC auto differential    Collection Time: 07/14/20  4:50 AM   Result Value Ref Range    WBC 4.8 4.5 - 11.5 E9/L    RBC 2.76 (L) 3.50 - 5.50 E12/L    Hemoglobin 8.2 (L) 11.5 - 15.5 g/dL    Hematocrit 26.6 (L) 34.0 - 48.0 %    MCV 96.4 80.0 - 99.9 fL    MCH 29.7 26.0 - 35.0 pg    MCHC 30.8 (L) 32.0 - 34.5 %    RDW 20.0 (H) 11.5 - 15.0 fL    Platelets 249 496 - 267 E9/L    MPV 10.2 7.0 - 12.0 fL    Neutrophils % 59.3 43.0 - 80.0 %    Immature Granulocytes % 0.4 0.0 - 5.0 %    Lymphocytes % 16.0 (L) 20.0 - 42.0 %    Monocytes % 18.5 (H) 2.0 - 12.0 %    Eosinophils % 5.2 0.0 - 6.0 %    Basophils % 0.6 0.0 - 2.0 %    Neutrophils Absolute 2.85 1.80 - 7.30 E9/L    Immature Granulocytes # 0.02 E9/L    Lymphocytes Absolute 0.77 (L) 1.50 - 4.00 E9/L    Monocytes Absolute 0.89 0.10 - 0.95 E9/L    Eosinophils Absolute 0.25 0.05 - 0.50 E9/L    Basophils Absolute 0.03 0.00 - 0.20 E9/L   Protime-INR    Collection Time: 07/14/20  4:50 AM   Result Value Ref Range    Protime 13.3 (H) 9.3 - 12.4 sec    INR 1.1    APTT    Collection Time: 07/14/20  4:50 AM   Result Value Ref Range    aPTT 27.9 24.5 - 35.1 sec   ]    Assessment:    Active Hospital Problems    Diagnosis Date Noted    Fall [W19. XXXA]     Bimalleolar fracture, left, closed, initial encounter Carlos A Mclaughlin 07/12/2020    Osteoporosis [M81.0] 07/12/2020    Elevated international normalized ratio (INR) [R79.1] 07/12/2020    Anemia [D64.9] 07/26/2014    Numbness and tingling of both legs below knees [R20.0, R20.2] 04/18/2013    Hypertension [I10] 06/20/2011    Hypothyroidism [E03.9] 06/10/2011    Rheumatoid arthritis ([facility] [M06.9] 06/10/2011    A-fib ([facility] [I48.91] 06/09/2011       Plan:     Bimalleolar fracture (left)  - Neurovascular checks  - Bowel regimen: Colace 100 mg QD, Glycolax 17 g QD  - Pain control: Norco 10 q6 hours. - Consult to Orthopedic Surgery (Dr. Denise Wick). Appreciate input. No surgical intervention planned  - Consult to SW for disposition planning   - PT/OT  - Placement needed for discharge     Right lower extremity laceration  - Consult to Wound Care      Osteoporosis  Hx of multiple rib fractures and a thoracic spine fracture. Not currently on a bisphosphonate. - Vit D deficiency.  Consider bisphosphonate as soutpatient    Anemia  Stable  - Daily CBC, 9.4 this AM down from 10, likely dillutional  - Continue ferrous sulfate 325 mg QD     HTN   - Monitor BP daily, currently stable  - Continue valsartan- HCTZ 160-25 mg QD   - IV hydralazine 10 mg q 6 hrs PRN if SBP>160 mmHg or DBP> 100 mmHg      Atrial fibrillation  Stable  - Telemetry monitoring   - Continue flecainide 50 mg BID   - Continue Lopressor 25 mg BID   - Home xarelto 15 mg QD resumed       Additional home medications continued:  - Allegra 180 mg QD (substituted with Zyrtec 10 mg QD)  - Pepcid 40 mg nightly   - Omeprazole 40 mg BID (substituted with Protonix 40 mg BID)  - Singulair 10 mg QD  - Vitamin B12 1000 mcg QD   - Celebrex 200 mg QD and methotrexate 15 mg in divided doses weekly (Mondays) for RA  - Synthroid 100 mcg QD  - Gabapentin 100 mg nightly    Discharge when placement available    DVT prophylaxis: PCDs  Code: Full   Diet: General, NPO after midnight       Electronically signed by Rahul Marrufo MD on 7/14/2020 at 6:55 AM

## 2020-07-14 NOTE — PROGRESS NOTES
Ortho- Dr. Marshal Meigs    Pt seen and evaluated this afternoon. Still moderate swelling of digits. Cap refill intact, NVSI. Moderate bimalleolar tenderness to palpation. Imp: Bimalleolar fracture of the left ankle. Plan:  Pt placed in plaster cast in neutral position. She tolerated procedure well. Cont NWB Left LE with walker.

## 2020-07-14 NOTE — PROGRESS NOTES
Physical Therapy    Facility/Department: [facility] Ortho/surgical  Treatment note    NAME: Georgie Vega  : 1935  MRN: [id number]    Date of Service: 2020               Patient Diagnosis(es): The primary encounter diagnosis was Bimalleolar fracture, left, closed, initial encounter. A diagnosis of Fall, initial encounter was also pertinent to this visit. has a past medical history of Anticoagulated, Atrial fibrillation ([address] Diastolic dysfunction, Difficulty walking, Diverticular disease, Elevated CA-125, Hiatal hernia, Hypertension, Hypothyroid, Meningocele spinal ([address] Neuropathy, JANETTE (obstructive sleep apnea), Osteoporosis, PAF (paroxysmal atrial fibrillation) ([address] Pain in both lower legs, Rheumatoid arthritis with rheumatoid factor ([address] Rheumatoid arthritis(714.0), Rib fractures, Seasonal allergies, Skin cancer of lip, and Wears dentures. has a past surgical history that includes Gastric fundoplication ();  section; Carpal tunnel release (Bilateral); Colonoscopy (2011); Total knee arthroplasty (2007); Cardiac catheterization; Hysterectomy, total abdominal (early s); Upper gastrointestinal endoscopy (N/A, 2020); and Colonoscopy (N/A, 2020). Evaluating Therapist: Ziyad Brooke PT     Referring Provider:     Ashli Wyatt DO          Room #: 390  DIAGNOSIS:  Bimalleolar L ankle fx , non surgical treatment   PRECAUTIONS: falls, L LE NWB     Social:  Pt lives with  Alone  in a 1 floor plan 1 steps  to enter. Prior to admission pt walked with  ww . Has aides 3 hours /dey      Initial Evaluation  Date: 2020 Treatment  2020    Short Term/ Long Term   Goals   Was pt agreeable to Eval/treatment?  with encouragement  yes    Does pt have pain?   L ankle  L ankle    Bed Mobility  Rolling:  NT   Supine to sit:  SBA   Sit to supine:  Min assist   Scooting:  Min assist in sit  NT  S/I    Transfers Sit to stand:  Max assist   Stand to sit:  Max assist Stand pivot:  NT  Sit to stand: Max A  Stand to sit: Max A    min assist    Ambulation   NT , unable  NT. See comments 10  feet with  ww  with  Min assist L LE NWB    LE ROM  Grossly WFL, L ankle NT      LE strength  4- / 5 except L ankle NT      AM- PAC RAW score  12/ 24 12/24      Pt is alert and able to follow instruction  Balance: poor standing using [address] for support    Pt performed therapeutic exercise of the following: NT    Patient education  Pt was educated on UE usage to assist with transfers, standing tolerance, NWB L LE    Patient response to education:   Pt verbalized understanding Pt demonstrated skill Pt requires further education in this area   yes With prompt yes     ASSESSMENT:   Comments: Pt found in a bedside chair, agreed to rx, states L ankle painful. Pt performed standing tolerance 1 minute x 3 with mod A for standing balance and prompt to maintain NWB L LE. Much explanation given about need for standing tolerance promoting strengthening needed for functional mobility. Treatment: Pt practiced and was instructed in the following treatment: UE usage to assist with transfers, standing tolerance    Pt was left in a bedside chair as found with call light in reach, LEs elevated for comfort and edema management. Time in 0940   Time out 1005   Total Treatment Time 25 minutes   CPT codes:     Therapeutic activities 46285 25 minutes   Therapeutic exercises 38624 0 minutes       Pt is making good progress toward established Physical Therapy goals as per tolerance to standing. Continue with physical therapy current plan of care.     Alexandra Kern PTA   License Number: [id number]

## 2020-07-14 NOTE — PROGRESS NOTES
Occupational Therapy  OT BEDSIDE TREATMENT NOTE      Date:2020  Patient Name: Seamus Moctezuma  MRN: [id number]  : 1935  Room: [address] [place] [address]-A     Referring Provider: Isabelle Cisneros DO     Evaluating OT: Irasema Cordova OTR/L [id number]     AM-PAC Daily Activity Raw Score: 16     Recommended Adaptive Equipment: TBD     Diagnosis: L bimalleolar fracture.       Pertinent Medical History:  Conservative management of L ankle fracture per ortho  Past Medical History: RA, HTN, PAF, osteoporosis  Precautions:  Falls, NWB L LE     Home Living: Pt lives alone in a single story home with 2 steps on entry. Bathroom setup: walk in shower with seat, standard commode      Prior Level of Function: Mod I with ADLs, pd caregiver assist 5x/wk for 3hr/day for assist with IADLs; completed functional mobility with [address]  Driving: Yes     Pain Level: L LE- Moderate     Cognition: A&O: 4/4 with poor safety awareness demoed                Functional Assessment:    Initial Eval Status  Date: 20 Treatment session: 20 Short Term Goals  Treatment frequency: 2-5x/wk PRN x1-3 wks      Feeding Independent       Grooming Set up S/U seated      UB Dressing Min A  Management of hospital gown Min A  To doff/ don gown  Set up   LB Dressing Max A  Management R sock Max A to don brief  Min A    Bathing Max A UE: S/U  LE: Mod A  Min A   Toileting Max A  Incontinent of urine upon sitting at EOB Max A  Standing during brown care  Min A   Bed Mobility  Supine to sit: SBA  Sit to Supine: Min A  Scooting to HOB: SBA  Min cues to maintain NWB L LE Supine to sit: Min A      Functional Transfers STS: Max A  Max A to maintain NWB L LE  STS: Max A from EOB and BSC    SPT: Max A    SPT: Mod A x2 bed to BSC using ww Min A   Functional Mobility Unable to complete hop at [employer] A during ADLs   Balance Sitting: fair     Standing: poor plus at [address] Sitting: Sup  Standing:  Max A     Activity Tolerance poor  Limited  standing lelo x4-5 min with fair

## 2020-07-14 NOTE — PLAN OF CARE
Problem: Falls - Risk of:  Goal: Will remain free from falls  Description: Will remain free from falls  Outcome: Met This Shift     Problem: Skin Integrity:  Goal: Will show no infection signs and symptoms  Description: Will show no infection signs and symptoms  Outcome: Met This Shift     Problem: Skin Integrity:  Goal: Absence of new skin breakdown  Description: Absence of new skin breakdown  Outcome: Met This Shift     Problem: Pain:  Goal: Pain level will decrease  Description: Pain level will decrease  Outcome: Met This Shift     Problem: Pain:  Goal: Control of acute pain  Description: Control of acute pain  Outcome: Met This Shift

## 2020-07-14 NOTE — PROGRESS NOTES
Nutrition Consult:   Consult received for Keny Almaraz. Chart reviewed. Pt currently w/ no noted significant non-healing wounds (skin tear noted only) and w/ no other significant nutritional issues noted at this time. Will follow-up per policy. Please re-consult if RD needed.       Electronically signed by Lisa Lay RD, LD on 7/14/2020 at 3:52 PM

## 2020-07-14 NOTE — DISCHARGE INSTR - COC
Continuity of Care Form    Patient Name: Georgie Vega   :  1935  MRN:  [id number]    Admit date:  2020  Discharge date:  2020    Code Status Order: Full Code   Advance Directives:   [facility] Documentation     Date/Time Healthcare Directive Type of Healthcare Directive Copy in [address] Agent's Name Healthcare Agent's Phone Number    [phone]  Yes, patient has an advance directive for healthcare treatment  Living will;Durable power of  for health care  --  --  unknown to patient  --          Admitting Physician:  Emile Lake MD  PCP: Ligia Oh MD    Discharging Nurse: [facility] Unit/Room#: 3497/6791-N  Discharging Unit Phone Number: [phone]    Emergency Contact:   Extended Emergency Contact Information  Primary Emergency Contact: Lawson Christinayoung  Address: [address] [address] [place] [address] [address] [facility] Phone: [phone]  Relation: Child  Secondary Emergency Contact: Violet Kee  Address: [address] [facility] Phone: [phone]  Relation: Child    Past Surgical History:  Past Surgical History:   Procedure Laterality Date    CARDIAC CATHETERIZATION      CARPAL TUNNEL RELEASE Bilateral      SECTION      COLONOSCOPY  2011    Dr Mayra Frederick; diverticular disease; repeat in 2016    COLONOSCOPY N/A 2020    COLONOSCOPY WITH BIOPSY performed by Arielle Garzon MD at [address] [place] [address]  early     TOTAL KNEE ARTHROPLASTY  2007    bilateral    UPPER GASTROINTESTINAL ENDOSCOPY N/A 2020    EGD BIOPSY [facility] by [facility]elle Garzon MD at [facility] ENDOSCOPY       Immunization History:   Immunization History   Administered Date(s) Administered    Influenza Virus Vaccine 10/06/2015, 10/21/2016    Influenza, High Dose (Fluzone 65 yrs and older) 09/25/2014, 10/06/2015, 10/21/2016, 10/02/2017, 10/05/2018    Influenza, Triv, inactivated, subunit, adjuvanted, IM (Fluad 65 yrs and older) 10/16/2019    Pneumococcal Conjugate 13-valent (Bpgygfe99) 10/22/2015    Pneumococcal Polysaccharide (Ftitvezdg02) 01/23/2017    Tdap (Boostrix, Adacel) 11/17/2011    Zoster Live (Zostavax) 08/27/2012    Zoster Recombinant (Shingrix) 09/19/2018, 03/19/2019       Active Problems:  Patient Active Problem List   Diagnosis Code    Elevated CA-125 R97.1    Hiatal hernia K44.9    Hypertension I10    Hypothyroidism E03.9    Diverticular disease K57.90    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    Rheumatoid arthritis ([facility] C86.0    Diastolic dysfunction M56.92    Numbness and tingling of both legs below knees R20.0, R20.2    Lumbar radicular pain M54.16    Anemia D64.9    Multiple fractures of ribs of left side 8-11 S22.42XA    Thoracic spinal stenosis with mass at T6-T7 M48.04    Thoracic spine tumor D49.2    Thoracic spine fracture (HCC) S22.009A    A-fib (HCC) I48.91    JANETTE (obstructive sleep apnea) G47.33    Essential hypertension I10    Adjustment disorder with anxious mood F43.22    Palpitations R00.2    Fatigue R53.83    Delayed gastric emptying K30    Pain in both lower legs M79.661, M79.662    Bimalleolar fracture, left, closed, initial encounter S82.842A    Osteoporosis M81.0    Elevated international normalized ratio (INR) R79.1    Fall W19. Susan Flaherty       Isolation/Infection:   Isolation          No Isolation        Patient Infection Status     None to display          Nurse Assessment:  Last Vital Signs: BP (!) 110/54   Pulse 85   Temp 98.2 °F (36.8 °C) (Oral)   Resp 17   Ht 5' 2\" (1.575 m)   Wt 134 lb (60.8 kg)   SpO2 95%   Breastfeeding No   BMI 24.51 kg/m²     Last documented pain score (0-10 scale): Pain Level: 2  Last Weight:   Wt Readings from Last 1 Encounters:   07/14/20 134 lb (60.8 kg)     Mental Status: oriented, alert, coherent, logical, thought processes intact and able to concentrate and follow conversation    IV Access:  - None    Nursing Mobility/ADLs:  Walking   Assisted  Transfer  Assisted  Bathing  Assisted  Dressing  Assisted  Toileting  Assisted  Feeding  Assisted  Med Admin  Assisted  Med Delivery   whole    Wound Care Documentation and Therapy:  Wound 07/12/20 Leg Lateral;Lower;Right (Active)   Wound Skin Tear 07/13/20 1305   Dressing Status Clean;Dry; Intact 07/14/20 0900   Dressing Changed Changed/New 07/13/20 1305   Dressing/Treatment Alginate;Non adherent 07/14/20 0900   Wound Cleansed Rinsed/Irrigated with saline 07/13/20 1305   Dressing Change Due 07/15/20 07/14/20 0900   Wound Length (cm) 10 cm 07/13/20 1305   Wound Width (cm) 1.5 cm 07/13/20 1305   Wound Depth (cm) 0.2 cm 07/13/20 1305   Wound Surface Area (cm^2) 15 cm^2 07/13/20 1305   Change in Wound Size % (l*w) 25 07/13/20 1305   Wound Volume (cm^3) 3 cm^3 07/13/20 1305   Wound Healing % -50 07/13/20 1305   Wound Assessment Other (Comment) 07/14/20 0900   Drainage Amount None 07/14/20 0900   Drainage Description Serosanguinous 07/13/20 1305   Odor None 07/14/20 0900   Jaycee-wound Assessment Other (Comment) 07/14/20 0900   Number of days: 1        Elimination:  Continence:   · Bowel: Yes  · Bladder: Yes  Urinary Catheter: None   Colostomy/Ileostomy/Ileal Conduit: {YES / :64380}       Date of Last BM: 07/16/20    Intake/Output Summary (Last 24 hours) at 7/14/2020 1331  Last data filed at 7/13/2020 1804  Gross per 24 hour   Intake 600 ml   Output --   Net 600 ml     I/O last 3 completed shifts: In: 600 [I.V.:600]  Out: -     Safety Concerns:     None    Impairments/Disabilities:      nonweightbearing to left leg, cast on leg    Nutrition Therapy:  Current Nutrition Therapy:   - Oral Diet:  General    Routes of Feeding: Oral  Liquids:  Thin Liquids  Daily Fluid Restriction: no  Last Modified Barium Swallow with Video (Video Swallowing Test): not done    Treatments at the Time of Hospital Discharge:   Respiratory Treatments: none    Oxygen Therapy:  is not on home oxygen therapy. Ventilator:    - No ventilator support    Rehab Therapies: Physical Therapy and Occupational Therapy  Weight Bearing Status/Restrictions: Non-weight bearing on left leg  Other Medical Equipment (for information only, NOT a DME order):  walker and bedside commode  Other Treatments: dressing change to abrasion left lower leg m-w-f cleanse with normal saline, then adaptic, then opticell, abd and kerlex     Patient's personal belongings (please select all that are sent with patient):  Glasses, Dentures upper and lower    RN SIGNATURE: Electronically signed by Ravinder Hallman RN on 7/16/2020 at 12:33 PM      CASE MANAGEMENT/SOCIAL WORK SECTION    Inpatient Status Date: ***    Readmission Risk Assessment Score:  Readmission Risk              Risk of Unplanned Readmission:        12           Discharging to Facility/ Agency   · Name: [place]  · Address:  · Phone: [phone]  · Fax:[phone]    Dialysis Facility (if applicable)   · Name:  · Address:  · Dialysis Schedule:  · Phone:  · Fax:    / signature: Electronically signed by JU Cuevas on 7/14/2020 at 1:31 PM    PHYSICIAN SECTION    Prognosis: Good    Condition at Discharge: Stable    Rehab Potential (if transferring to Rehab): Good    Recommended Labs or Other Treatments After Discharge: Check BMP in one week, Wound Care for Right Lower Extremity    Physician Certification: I certify the above information and transfer of Jhonathan Patel  is necessary for the continuing treatment of the diagnosis listed and that she requires [facility] for less 30 days.      Update Admission H&P: No change in H&P    PHYSICIAN SIGNATURE:  Electronically signed by Tereza Nair MD on 7/16/20 at 10:58 AM EDT

## 2020-07-14 NOTE — CARE COORDINATION
Social Work:    Social service placed two calls to [employer] regarding skilled rehab choices and a final decision was made for [facility]. Radha Santamaria at [facility] accepted the referral and will start auth today.      Electronically signed by JU Mcgregor on 7/14/2020 at 1:42 PM

## 2020-07-15 LAB
ALBUMIN SERPL-MCNC: 3.5 G/DL (ref 3.5–5.2)
ALP BLD-CCNC: 54 U/L (ref 35–104)
ALT SERPL-CCNC: 15 U/L (ref 0–32)
ANION GAP SERPL CALCULATED.3IONS-SCNC: 11 MMOL/L (ref 7–16)
APTT: 29.6 SEC (ref 24.5–35.1)
AST SERPL-CCNC: 24 U/L (ref 0–31)
BASOPHILS ABSOLUTE: 0.02 E9/L (ref 0–0.2)
BASOPHILS RELATIVE PERCENT: 0.4 % (ref 0–2)
BILIRUB SERPL-MCNC: 0.4 MG/DL (ref 0–1.2)
BILIRUBIN DIRECT: <0.2 MG/DL (ref 0–0.3)
BILIRUBIN, INDIRECT: ABNORMAL MG/DL (ref 0–1)
BUN BLDV-MCNC: 17 MG/DL (ref 8–23)
CALCIUM SERPL-MCNC: 9.2 MG/DL (ref 8.6–10.2)
CHLORIDE BLD-SCNC: 103 MMOL/L (ref 98–107)
CO2: 25 MMOL/L (ref 22–29)
CREAT SERPL-MCNC: 1.1 MG/DL (ref 0.5–1)
EOSINOPHILS ABSOLUTE: 0.25 E9/L (ref 0.05–0.5)
EOSINOPHILS RELATIVE PERCENT: 5 % (ref 0–6)
GFR AFRICAN AMERICAN: 57
GFR NON-AFRICAN AMERICAN: 47 ML/MIN/1.73
GLUCOSE BLD-MCNC: 102 MG/DL (ref 74–99)
HCT VFR BLD CALC: 30.5 % (ref 34–48)
HEMOGLOBIN: 9.5 G/DL (ref 11.5–15.5)
IMMATURE GRANULOCYTES #: 0.03 E9/L
IMMATURE GRANULOCYTES %: 0.6 % (ref 0–5)
INR BLD: 1
LYMPHOCYTES ABSOLUTE: 1.01 E9/L (ref 1.5–4)
LYMPHOCYTES RELATIVE PERCENT: 20.4 % (ref 20–42)
MCH RBC QN AUTO: 30.1 PG (ref 26–35)
MCHC RBC AUTO-ENTMCNC: 31.1 % (ref 32–34.5)
MCV RBC AUTO: 96.5 FL (ref 80–99.9)
MONOCYTES ABSOLUTE: 0.52 E9/L (ref 0.1–0.95)
MONOCYTES RELATIVE PERCENT: 10.5 % (ref 2–12)
NEUTROPHILS ABSOLUTE: 3.13 E9/L (ref 1.8–7.3)
NEUTROPHILS RELATIVE PERCENT: 63.1 % (ref 43–80)
PDW BLD-RTO: 19.9 FL (ref 11.5–15)
PLATELET # BLD: 256 E9/L (ref 130–450)
PMV BLD AUTO: 10.6 FL (ref 7–12)
POTASSIUM REFLEX MAGNESIUM: 4.3 MMOL/L (ref 3.5–5)
PROTHROMBIN TIME: 12.4 SEC (ref 9.3–12.4)
RBC # BLD: 3.16 E12/L (ref 3.5–5.5)
SARS-COV-2, NAAT: NOT DETECTED
SODIUM BLD-SCNC: 139 MMOL/L (ref 132–146)
TOTAL PROTEIN: 6.1 G/DL (ref 6.4–8.3)
WBC # BLD: 5 E9/L (ref 4.5–11.5)

## 2020-07-15 PROCEDURE — 6370000000 HC RX 637 (ALT 250 FOR IP): Performed by: STUDENT IN AN ORGANIZED HEALTH CARE EDUCATION/TRAINING PROGRAM

## 2020-07-15 PROCEDURE — 85730 THROMBOPLASTIN TIME PARTIAL: CPT

## 2020-07-15 PROCEDURE — 2580000003 HC RX 258: Performed by: FAMILY MEDICINE

## 2020-07-15 PROCEDURE — G0378 HOSPITAL OBSERVATION PER HR: HCPCS

## 2020-07-15 PROCEDURE — 80048 BASIC METABOLIC PNL TOTAL CA: CPT

## 2020-07-15 PROCEDURE — 80076 HEPATIC FUNCTION PANEL: CPT

## 2020-07-15 PROCEDURE — 6370000000 HC RX 637 (ALT 250 FOR IP): Performed by: FAMILY MEDICINE

## 2020-07-15 PROCEDURE — 1200000000 HC SEMI PRIVATE

## 2020-07-15 PROCEDURE — U0002 COVID-19 LAB TEST NON-CDC: HCPCS

## 2020-07-15 PROCEDURE — 99232 SBSQ HOSP IP/OBS MODERATE 35: CPT | Performed by: FAMILY MEDICINE

## 2020-07-15 PROCEDURE — 36415 COLL VENOUS BLD VENIPUNCTURE: CPT

## 2020-07-15 PROCEDURE — 97530 THERAPEUTIC ACTIVITIES: CPT

## 2020-07-15 PROCEDURE — 85025 COMPLETE CBC W/AUTO DIFF WBC: CPT

## 2020-07-15 PROCEDURE — 85610 PROTHROMBIN TIME: CPT

## 2020-07-15 RX ORDER — POLYETHYLENE GLYCOL 3350 17 G/17G
17 POWDER, FOR SOLUTION ORAL 2 TIMES DAILY
Status: DISCONTINUED | OUTPATIENT
Start: 2020-07-15 | End: 2020-07-16 | Stop reason: HOSPADM

## 2020-07-15 RX ADMIN — CYANOCOBALAMIN TAB 1000 MCG 1000 MCG: 1000 TAB at 09:20

## 2020-07-15 RX ADMIN — PANTOPRAZOLE SODIUM 40 MG: 40 TABLET, DELAYED RELEASE ORAL at 15:59

## 2020-07-15 RX ADMIN — GABAPENTIN 100 MG: 100 CAPSULE ORAL at 20:16

## 2020-07-15 RX ADMIN — RIVAROXABAN 15 MG: 15 TABLET, FILM COATED ORAL at 09:20

## 2020-07-15 RX ADMIN — CELECOXIB 200 MG: 100 CAPSULE ORAL at 09:19

## 2020-07-15 RX ADMIN — POLYETHYLENE GLYCOL 3350 17 G: 17 POWDER, FOR SOLUTION ORAL at 09:20

## 2020-07-15 RX ADMIN — Medication 10 ML: at 20:15

## 2020-07-15 RX ADMIN — MONTELUKAST SODIUM 10 MG: 10 TABLET, FILM COATED ORAL at 09:19

## 2020-07-15 RX ADMIN — FLECAINIDE ACETATE 50 MG: 50 TABLET ORAL at 20:14

## 2020-07-15 RX ADMIN — METOPROLOL TARTRATE 25 MG: 25 TABLET, FILM COATED ORAL at 09:19

## 2020-07-15 RX ADMIN — HYDROCHLOROTHIAZIDE 25 MG: 25 TABLET ORAL at 09:19

## 2020-07-15 RX ADMIN — PANTOPRAZOLE SODIUM 40 MG: 40 TABLET, DELAYED RELEASE ORAL at 07:00

## 2020-07-15 RX ADMIN — VALSARTAN 80 MG: 80 TABLET, FILM COATED ORAL at 09:20

## 2020-07-15 RX ADMIN — Medication 10 ML: at 09:21

## 2020-07-15 RX ADMIN — DOCUSATE SODIUM 100 MG: 100 CAPSULE, LIQUID FILLED ORAL at 09:19

## 2020-07-15 RX ADMIN — HYDROCODONE BITARTRATE AND ACETAMINOPHEN 1 TABLET: 10; 325 TABLET ORAL at 15:18

## 2020-07-15 RX ADMIN — CETIRIZINE HYDROCHLORIDE 10 MG: 10 TABLET, FILM COATED ORAL at 09:19

## 2020-07-15 RX ADMIN — FERROUS SULFATE TAB 325 MG (65 MG ELEMENTAL FE) 325 MG: 325 (65 FE) TAB at 09:19

## 2020-07-15 RX ADMIN — LEVOTHYROXINE SODIUM 100 MCG: 100 TABLET ORAL at 07:00

## 2020-07-15 RX ADMIN — FLECAINIDE ACETATE 50 MG: 50 TABLET ORAL at 09:20

## 2020-07-15 RX ADMIN — ACETAMINOPHEN 650 MG: 325 TABLET ORAL at 09:33

## 2020-07-15 RX ADMIN — POLYETHYLENE GLYCOL 3350 17 G: 17 POWDER, FOR SOLUTION ORAL at 20:13

## 2020-07-15 RX ADMIN — FAMOTIDINE 40 MG: 20 TABLET, FILM COATED ORAL at 20:14

## 2020-07-15 ASSESSMENT — PAIN DESCRIPTION - ORIENTATION
ORIENTATION: LEFT
ORIENTATION: LEFT

## 2020-07-15 ASSESSMENT — PAIN SCALES - GENERAL
PAINLEVEL_OUTOF10: 6
PAINLEVEL_OUTOF10: 7
PAINLEVEL_OUTOF10: 5
PAINLEVEL_OUTOF10: 5
PAINLEVEL_OUTOF10: 0

## 2020-07-15 ASSESSMENT — PAIN DESCRIPTION - PAIN TYPE
TYPE: ACUTE PAIN

## 2020-07-15 ASSESSMENT — PAIN DESCRIPTION - LOCATION
LOCATION: LEG

## 2020-07-15 ASSESSMENT — PAIN DESCRIPTION - DESCRIPTORS
DESCRIPTORS: ACHING;DISCOMFORT
DESCRIPTORS: ACHING;DISCOMFORT
DESCRIPTORS: ACHING;CONSTANT;DISCOMFORT

## 2020-07-15 NOTE — PLAN OF CARE
Problem: Falls - Risk of:  Goal: Will remain free from falls  Description: Will remain free from falls  Outcome: Met This Shift     Problem: Skin Integrity:  Goal: Will show no infection signs and symptoms  Description: Will show no infection signs and symptoms  Outcome: Met This Shift     Problem: Pain:  Goal: Control of acute pain  Description: Control of acute pain  Outcome: Met This Shift

## 2020-07-15 NOTE — PROGRESS NOTES
Platelets 607 239 - 571 E9/L    MPV 10.6 7.0 - 12.0 fL    Neutrophils % 63.1 43.0 - 80.0 %    Immature Granulocytes % 0.6 0.0 - 5.0 %    Lymphocytes % 20.4 20.0 - 42.0 %    Monocytes % 10.5 2.0 - 12.0 %    Eosinophils % 5.0 0.0 - 6.0 %    Basophils % 0.4 0.0 - 2.0 %    Neutrophils Absolute 3.13 1.80 - 7.30 E9/L    Immature Granulocytes # 0.03 E9/L    Lymphocytes Absolute 1.01 (L) 1.50 - 4.00 E9/L    Monocytes Absolute 0.52 0.10 - 0.95 E9/L    Eosinophils Absolute 0.25 0.05 - 0.50 E9/L    Basophils Absolute 0.02 0.00 - 0.20 E9/L   Protime-INR    Collection Time: 07/15/20  3:52 AM   Result Value Ref Range    Protime 12.4 9.3 - 12.4 sec    INR 1.0    APTT    Collection Time: 07/15/20  3:52 AM   Result Value Ref Range    aPTT 29.6 24.5 - 35.1 sec   ]    Assessment:    Active Hospital Problems    Diagnosis Date Noted    Fall [W19. XXXA]     Bimalleolar fracture, left, closed, initial encounter Jacinto Johnson 07/12/2020    Osteoporosis [M81.0] 07/12/2020    Elevated international normalized ratio (INR) [R79.1] 07/12/2020    Anemia [D64.9] 07/26/2014    Numbness and tingling of both legs below knees [R20.0, R20.2] 04/18/2013    Hypertension [I10] 06/20/2011    Hypothyroidism [E03.9] 06/10/2011    Rheumatoid arthritis ([facility] [M06.9] 06/10/2011    A-fib ([facility] [I48.91] 06/09/2011       Plan:     Bimalleolar fracture (left)  - Bowel regimen: Colace 100 mg QD, Glycolax 17 g QD  - Pain control: Norco 10 q6 hours. - Consult to Orthopedic Surgery. No surgical intervention planned  - Consult to  for disposition planning   - PT/OT  - Placement needed for discharge     Right lower extremity laceration  - Consult to Wound Care      Osteoporosis  Hx of multiple rib fractures and a thoracic spine fracture. Not currently on a bisphosphonate. - Vit D deficiency.  Consider bisphosphonate as soutpatient    Anemia  Stable  - Daily CBC, 9.4 this AM down from 10, likely dillutional  - Continue ferrous sulfate 325 mg QD     HTN   - Monitor BP daily, currently stable  - Continue valsartan- HCTZ 160-25 mg QD   - IV hydralazine 10 mg q 6 hrs PRN if SBP>160 mmHg or DBP> 100 mmHg      Atrial fibrillation  Stable  - Telemetry monitoring   - Continue flecainide 50 mg BID   - Continue Lopressor 25 mg BID   - Home xarelto 15 mg QD resumed       Additional home medications continued:  - Allegra 180 mg QD (substituted with Zyrtec 10 mg QD)  - Pepcid 40 mg nightly   - Omeprazole 40 mg BID (substituted with Protonix 40 mg BID)  - Singulair 10 mg QD  - Vitamin B12 1000 mcg QD   - Celebrex 200 mg QD and methotrexate 15 mg in divided doses weekly (Mondays) for RA  - Synthroid 100 mcg QD  - Gabapentin 100 mg nightly    Discharge when placement available    DVT prophylaxis: PCDs  Code: Full   Diet: General, NPO after midnight       Electronically signed by Melisa Sykes MD on 7/15/2020 at 7:05 AM

## 2020-07-15 NOTE — PROGRESS NOTES
Occupational Therapy  OT BEDSIDE TREATMENT NOTE      Date:7/15/2020  Patient Name: Damon Summers  MRN: [id number]  : 1935  Room: [address] [place] [address]A     Referring Colt Hernadez DO     Evaluating OT: Matt Morrison OTR/L [id number]     AM-PAC Daily Activity Raw Score:      Recommended Adaptive Equipment: TBD     Diagnosis: L bimalleolar fracture.       Pertinent Medical History:  Conservative management of L ankle fracture per ortho  Past Medical History: RA, HTN, PAF, osteoporosis  Precautions:  Falls, NWB L LE     Home Living: Pt lives alone in a single story home with 2 steps on entry. Bathroom setup: walk in shower with seat, standard commode      Prior Level of Function: Mod I with ADLs, pd caregiver assist 5x/wk for 3hr/day for assist with IADLs; completed functional mobility with [employer]  Driving: Yes     Pain Level: L LE- Moderate     Cognition: A&O: 4/4 with fair ability to follow commands.                Functional Assessment:    Initial Eval Status  Date: 20 Treatment session: 7/15/20 Short Term Goals  Treatment frequency: 2-5x/wk PRN x1-3 wks      Feeding Independent       Grooming Set up Pt declined ADLs at this time     UB Dressing Min A  Management of hospital gown  Set up   LB Dressing Max A  Management R sock  Min A    Bathing Max A  Min A   Toileting Max A  Incontinent of urine upon sitting at EOB  Min A   Bed Mobility  Supine to sit: SBA  Sit to Supine: Min A  Scooting to HOB: SBA  Min cues to maintain NWB L LE      Functional Transfers STS: Max A  Max A to maintain NWB L LE  STS: Max A from arm chair Min A   Functional Mobility Unable to complete hop at [employer] Max A x2 taking few steps forward using ww Min A during ADLs   Balance Sitting: fair     Standing: poor plus at [employer] Sitting: Sup  Standing: Mod A     Activity Tolerance poor  Limited  standing lelo x4-5 min with fair balance during self care tasks          B UE were WFL during tasks.     Comments: Upon arrival pt was sitting in arm chair. At end of session pt was transferred back to chair with alarm on, all lines and tubes intact and call light within reach. Treatment: Instructed pt on static and dynamic standing balance tasks. Max A required to maintain NWB restrictions. Education: Safe transfer training and techniques to improve standing balance to maximize independence with ADLs. · Pt has made good progress towards set goals.    · Continue with current plan of care      Treatment Charges: Mins Units   Ther Ex  32696     Manual Therapy 79531     Thera Activities 74986 15 1   ADL/Home Mgt [id number]     Neuro Re-ed 64590     Group Therapy      Orthotic manage/training  11357     Non-Billable Time     Total Timed Treatment 15 1       Kattie Mcardle JARAMILLO/L [id number]

## 2020-07-15 NOTE — CARE COORDINATION
Updated discharge planning. Auth pending for [facility]. COVID done this am and pending results.  Will follow-MJO

## 2020-07-15 NOTE — PLAN OF CARE
Problem: Falls - Risk of:  Goal: Will remain free from falls  Description: Will remain free from falls  7/15/2020 1039 by Cherrie Humphreys RN  Outcome: Met This Shift     Problem: Falls - Risk of:  Goal: Absence of physical injury  Description: Absence of physical injury  Outcome: Met This Shift     Problem: Skin Integrity:  Goal: Will show no infection signs and symptoms  Description: Will show no infection signs and symptoms  7/15/2020 1039 by Cherrie Humphreys RN  Outcome: Met This Shift     Problem: Skin Integrity:  Goal: Absence of new skin breakdown  Description: Absence of new skin breakdown  Outcome: Met This Shift     Problem: Pain:  Goal: Pain level will decrease  Description: Pain level will decrease  Outcome: Met This Shift     Problem: Pain:  Goal: Control of acute pain  Description: Control of acute pain  7/15/2020 1039 by Cherrie Humphreys RN  Outcome: Met This Shift

## 2020-07-15 NOTE — PROGRESS NOTES
Physician Progress Note      PATIENT:               Latrice Gaffney  CSN #:                  [id number]  :                       1935  ADMIT DATE:       2020 2:31 PM  [address] [place] DATE:  RESPONDING  PROVIDER #:        Lori Hargrove MD          QUERY TEXT:    Pt admitted with left bimalleolar fracture. Pt noted to have osteoporosis. If   possible, please document in progress notes and discharge summary if you are   evaluating and/or treating any of the following:[[Traumatic bimalleolar   fracture[de-identified] This patient has a traumatic fracture of left bimalleolar bone. The medical record reflects the following:  Risk Factors: osteoporosis, Vitamin D deficiency  Clinical Indicators: per PN's \"Osteoporosis, Hx of multiple rib fractures and   a thoracic spine fracture. Not currently on a bisphosphonate. Vit D   deficiency. Consider bisphosphonate as outpatient. \"  Treatment:  bisphosphonate as outpatient    Thank you,  Yobany Cordova RN, CCDS  Clinical Documentation Improvement Specialist  [email or web address]  Options provided:  -- Pathological bimalleolar fracture  -- Osteoporotic bimalleolar Fracture  -- Osteoporotic bimalleolar fracture following fall which would not usually   break a normal, healthy bone  -- Refer to Clinical Documentation Reviewer    PROVIDER RESPONSE TEXT:    This patient has a osteoporotic fracture of left bimalleolar following fall   which would not break a normal, healthy bone.     Query created by: Alyson Hernandez on 7/15/2020 11:27 AM      Electronically signed by:  Lori Hargrove MD 7/15/2020 12:09 PM

## 2020-07-15 NOTE — PROGRESS NOTES
Physical Therapy    Facility/Department: [facility] Ortho/surgical  Treatment note    NAME: Velasquez Gamboa  : 1935  MRN: [id number]    Date of Service: 7/15/2020               Patient Diagnosis(es): The primary encounter diagnosis was Bimalleolar fracture, left, closed, initial encounter. A diagnosis of Fall, initial encounter was also pertinent to this visit. has a past medical history of Anticoagulated, Atrial fibrillation ([address] Diastolic dysfunction, Difficulty walking, Diverticular disease, Elevated CA-125, Hiatal hernia, Hypertension, Hypothyroid, Meningocele spinal ([address] Neuropathy, JANETTE (obstructive sleep apnea), Osteoporosis, PAF (paroxysmal atrial fibrillation) ([address] Pain in both lower legs, Rheumatoid arthritis with rheumatoid factor ([address] Rheumatoid arthritis(714.0), Rib fractures, Seasonal allergies, Skin cancer of lip, and Wears dentures. has a past surgical history that includes Gastric fundoplication (495);  section; Carpal tunnel release (Bilateral); Colonoscopy (2011); Total knee arthroplasty (2007); Cardiac catheterization; Hysterectomy, total abdominal (early ); Upper gastrointestinal endoscopy (N/A, 2020); and Colonoscopy (N/A, 2020). Evaluating Therapist: Oksana Armas PT     Referring Provider:     Tish Montana DO          Room #: 070  DIAGNOSIS:  Bimalleolar L ankle fx , non surgical treatment   PRECAUTIONS: falls, L LE NWB     Social:  Pt lives with  Alone  in a 1 floor plan 1 steps  to enter. Prior to admission pt walked with  ww . Has aides 3 hours /dey      Initial Evaluation  Date: 2020 Treatment  7/15/2020    Short Term/ Long Term   Goals   Was pt agreeable to Eval/treatment?  with encouragement  yes    Does pt have pain?   L ankle  L ankle    Bed Mobility  Rolling:  NT   Supine to sit:  SBA   Sit to supine:  Min assist   Scooting:  Min assist in sit  NT  S/I    Transfers Sit to stand:  Max assist   Stand to sit:  Max assist

## 2020-07-16 VITALS
TEMPERATURE: 98 F | HEART RATE: 67 BPM | SYSTOLIC BLOOD PRESSURE: 133 MMHG | BODY MASS INDEX: 23.7 KG/M2 | RESPIRATION RATE: 16 BRPM | WEIGHT: 128.8 LBS | HEIGHT: 62 IN | DIASTOLIC BLOOD PRESSURE: 58 MMHG | OXYGEN SATURATION: 95 %

## 2020-07-16 PROBLEM — R79.1 ELEVATED INTERNATIONAL NORMALIZED RATIO (INR): Status: RESOLVED | Noted: 2020-07-12 | Resolved: 2020-07-16

## 2020-07-16 LAB
ALBUMIN SERPL-MCNC: 3.2 G/DL (ref 3.5–5.2)
ALP BLD-CCNC: 51 U/L (ref 35–104)
ALT SERPL-CCNC: 14 U/L (ref 0–32)
ANION GAP SERPL CALCULATED.3IONS-SCNC: 10 MMOL/L (ref 7–16)
APTT: 30 SEC (ref 24.5–35.1)
AST SERPL-CCNC: 20 U/L (ref 0–31)
BASOPHILS ABSOLUTE: 0.02 E9/L (ref 0–0.2)
BASOPHILS RELATIVE PERCENT: 0.5 % (ref 0–2)
BILIRUB SERPL-MCNC: 0.3 MG/DL (ref 0–1.2)
BILIRUBIN DIRECT: <0.2 MG/DL (ref 0–0.3)
BILIRUBIN, INDIRECT: ABNORMAL MG/DL (ref 0–1)
BUN BLDV-MCNC: 21 MG/DL (ref 8–23)
CALCIUM SERPL-MCNC: 8.9 MG/DL (ref 8.6–10.2)
CHLORIDE BLD-SCNC: 104 MMOL/L (ref 98–107)
CO2: 25 MMOL/L (ref 22–29)
CREAT SERPL-MCNC: 1.3 MG/DL (ref 0.5–1)
EOSINOPHILS ABSOLUTE: 0.28 E9/L (ref 0.05–0.5)
EOSINOPHILS RELATIVE PERCENT: 6.4 % (ref 0–6)
GFR AFRICAN AMERICAN: 47
GFR NON-AFRICAN AMERICAN: 39 ML/MIN/1.73
GLUCOSE BLD-MCNC: 103 MG/DL (ref 74–99)
HCT VFR BLD CALC: 28.9 % (ref 34–48)
HEMOGLOBIN: 8.7 G/DL (ref 11.5–15.5)
IMMATURE GRANULOCYTES #: 0.03 E9/L
IMMATURE GRANULOCYTES %: 0.7 % (ref 0–5)
INR BLD: 1.2
LYMPHOCYTES ABSOLUTE: 1.06 E9/L (ref 1.5–4)
LYMPHOCYTES RELATIVE PERCENT: 24.2 % (ref 20–42)
MCH RBC QN AUTO: 28.8 PG (ref 26–35)
MCHC RBC AUTO-ENTMCNC: 30.1 % (ref 32–34.5)
MCV RBC AUTO: 95.7 FL (ref 80–99.9)
MONOCYTES ABSOLUTE: 0.52 E9/L (ref 0.1–0.95)
MONOCYTES RELATIVE PERCENT: 11.9 % (ref 2–12)
NEUTROPHILS ABSOLUTE: 2.47 E9/L (ref 1.8–7.3)
NEUTROPHILS RELATIVE PERCENT: 56.3 % (ref 43–80)
PDW BLD-RTO: 19.5 FL (ref 11.5–15)
PLATELET # BLD: 263 E9/L (ref 130–450)
PMV BLD AUTO: 10.5 FL (ref 7–12)
POTASSIUM REFLEX MAGNESIUM: 4.2 MMOL/L (ref 3.5–5)
PROTHROMBIN TIME: 14.4 SEC (ref 9.3–12.4)
RBC # BLD: 3.02 E12/L (ref 3.5–5.5)
SODIUM BLD-SCNC: 139 MMOL/L (ref 132–146)
TOTAL PROTEIN: 5.5 G/DL (ref 6.4–8.3)
WBC # BLD: 4.4 E9/L (ref 4.5–11.5)

## 2020-07-16 PROCEDURE — 6370000000 HC RX 637 (ALT 250 FOR IP): Performed by: FAMILY MEDICINE

## 2020-07-16 PROCEDURE — 97535 SELF CARE MNGMENT TRAINING: CPT

## 2020-07-16 PROCEDURE — 85025 COMPLETE CBC W/AUTO DIFF WBC: CPT

## 2020-07-16 PROCEDURE — 97530 THERAPEUTIC ACTIVITIES: CPT

## 2020-07-16 PROCEDURE — 85610 PROTHROMBIN TIME: CPT

## 2020-07-16 PROCEDURE — G0378 HOSPITAL OBSERVATION PER HR: HCPCS

## 2020-07-16 PROCEDURE — 80048 BASIC METABOLIC PNL TOTAL CA: CPT

## 2020-07-16 PROCEDURE — 85730 THROMBOPLASTIN TIME PARTIAL: CPT

## 2020-07-16 PROCEDURE — 80076 HEPATIC FUNCTION PANEL: CPT

## 2020-07-16 PROCEDURE — 6370000000 HC RX 637 (ALT 250 FOR IP): Performed by: STUDENT IN AN ORGANIZED HEALTH CARE EDUCATION/TRAINING PROGRAM

## 2020-07-16 PROCEDURE — 99238 HOSP IP/OBS DSCHRG MGMT 30/<: CPT | Performed by: FAMILY MEDICINE

## 2020-07-16 PROCEDURE — 2580000003 HC RX 258: Performed by: STUDENT IN AN ORGANIZED HEALTH CARE EDUCATION/TRAINING PROGRAM

## 2020-07-16 PROCEDURE — 36415 COLL VENOUS BLD VENIPUNCTURE: CPT

## 2020-07-16 RX ORDER — ERGOCALCIFEROL 1.25 MG/1
50000 CAPSULE ORAL WEEKLY
Qty: 6 CAPSULE | Refills: 1 | DISCHARGE
Start: 2020-07-20 | End: 2020-11-23

## 2020-07-16 RX ORDER — PSEUDOEPHEDRINE HCL 30 MG
100 TABLET ORAL DAILY
Qty: 30 CAPSULE | Refills: 0 | DISCHARGE
Start: 2020-07-17 | End: 2021-05-19

## 2020-07-16 RX ORDER — 0.9 % SODIUM CHLORIDE 0.9 %
1000 INTRAVENOUS SOLUTION INTRAVENOUS ONCE
Status: COMPLETED | OUTPATIENT
Start: 2020-07-16 | End: 2020-07-16

## 2020-07-16 RX ORDER — POLYETHYLENE GLYCOL 3350 17 G/17G
17 POWDER, FOR SOLUTION ORAL 2 TIMES DAILY
Qty: 527 G | Refills: 0 | DISCHARGE
Start: 2020-07-16 | End: 2020-08-15

## 2020-07-16 RX ORDER — HYDROCODONE BITARTRATE AND ACETAMINOPHEN 10; 325 MG/1; MG/1
1 TABLET ORAL 2 TIMES DAILY PRN
Qty: 6 TABLET | Refills: 0 | Status: SHIPPED | DISCHARGE
Start: 2020-07-16 | End: 2020-07-19

## 2020-07-16 RX ADMIN — FLECAINIDE ACETATE 50 MG: 50 TABLET ORAL at 08:29

## 2020-07-16 RX ADMIN — POLYETHYLENE GLYCOL 3350 17 G: 17 POWDER, FOR SOLUTION ORAL at 08:30

## 2020-07-16 RX ADMIN — LEVOTHYROXINE SODIUM 100 MCG: 100 TABLET ORAL at 06:14

## 2020-07-16 RX ADMIN — RIVAROXABAN 15 MG: 15 TABLET, FILM COATED ORAL at 08:29

## 2020-07-16 RX ADMIN — PANTOPRAZOLE SODIUM 40 MG: 40 TABLET, DELAYED RELEASE ORAL at 06:14

## 2020-07-16 RX ADMIN — CYANOCOBALAMIN TAB 1000 MCG 1000 MCG: 1000 TAB at 08:29

## 2020-07-16 RX ADMIN — MONTELUKAST SODIUM 10 MG: 10 TABLET, FILM COATED ORAL at 08:29

## 2020-07-16 RX ADMIN — CETIRIZINE HYDROCHLORIDE 10 MG: 10 TABLET, FILM COATED ORAL at 08:30

## 2020-07-16 RX ADMIN — HYDROCODONE BITARTRATE AND ACETAMINOPHEN 1 TABLET: 10; 325 TABLET ORAL at 12:10

## 2020-07-16 RX ADMIN — VALSARTAN 80 MG: 80 TABLET, FILM COATED ORAL at 08:29

## 2020-07-16 RX ADMIN — DOCUSATE SODIUM 100 MG: 100 CAPSULE, LIQUID FILLED ORAL at 08:29

## 2020-07-16 RX ADMIN — HYDROCHLOROTHIAZIDE 25 MG: 25 TABLET ORAL at 08:30

## 2020-07-16 RX ADMIN — METOPROLOL TARTRATE 25 MG: 25 TABLET, FILM COATED ORAL at 08:30

## 2020-07-16 RX ADMIN — FERROUS SULFATE TAB 325 MG (65 MG ELEMENTAL FE) 325 MG: 325 (65 FE) TAB at 08:29

## 2020-07-16 RX ADMIN — SODIUM CHLORIDE 1000 ML: 9 INJECTION, SOLUTION INTRAVENOUS at 08:45

## 2020-07-16 ASSESSMENT — PAIN DESCRIPTION - PAIN TYPE: TYPE: ACUTE PAIN

## 2020-07-16 ASSESSMENT — PAIN SCALES - GENERAL
PAINLEVEL_OUTOF10: 6
PAINLEVEL_OUTOF10: 5

## 2020-07-16 ASSESSMENT — PAIN DESCRIPTION - DESCRIPTORS: DESCRIPTORS: ACHING;DISCOMFORT

## 2020-07-16 ASSESSMENT — PAIN DESCRIPTION - LOCATION: LOCATION: LEG

## 2020-07-16 ASSESSMENT — PAIN DESCRIPTION - FREQUENCY: FREQUENCY: CONTINUOUS

## 2020-07-16 ASSESSMENT — PAIN DESCRIPTION - ORIENTATION: ORIENTATION: LEFT

## 2020-07-16 NOTE — PROGRESS NOTES
tasks. Comments: Upon arrival pt was supine in bed. At end of session pt was transferred to chair with alarm on, all lines and tubes intact and call light within reach. Treatment: Pt demoed limited carry over of safety training during transfers and functional mobility as discussed during previous tx. Demonstrated use of sock aid/ reacher to manage socks    Education: Safety training during ADLs and AE    · Pt has made good progress towards set goals.    · Continue with current plan of care      Treatment Charges: Mins Units   Ther Ex  96816     Manual Therapy 39350     Thera Activities 53165 9 1   ADL/Home Mgt 44548 15 1   Neuro Re-ed 84728     Group Therapy      Orthotic manage/training  57824     Non-Billable Time     Total Timed Treatment 24 2       [address] JARAMILLO/L [id number]

## 2020-07-16 NOTE — CARE COORDINATION
Social Work:    Nehal Clark at [employer] received Keithveronica Lopez and is checking to see if they can send their ambulette to  Mrs. Karlo Bolton. Await call back.     Electronically signed by JU García on 7/16/2020 at 10:12 AM

## 2020-07-16 NOTE — PROGRESS NOTES
Physical Therapy    Facility/Department: [facility] Ortho/surgical  Treatment note    NAME: Jhonathan Patel  : 1935  MRN: [id number]    Date of Service: 2020               Patient Diagnosis(es): The primary encounter diagnosis was Bimalleolar fracture, left, closed, initial encounter. A diagnosis of Fall, initial encounter was also pertinent to this visit. has a past medical history of Anticoagulated, Atrial fibrillation ([address] Diastolic dysfunction, Difficulty walking, Diverticular disease, Elevated CA-125, Hiatal hernia, Hypertension, Hypothyroid, Meningocele spinal ([address] Neuropathy, JANETTE (obstructive sleep apnea), Osteoporosis, PAF (paroxysmal atrial fibrillation) ([address] Pain in both lower legs, Rheumatoid arthritis with rheumatoid factor ([address] Rheumatoid arthritis(714.0), Rib fractures, Seasonal allergies, Skin cancer of lip, and Wears dentures. has a past surgical history that includes Gastric fundoplication ();  section; Carpal tunnel release (Bilateral); Colonoscopy (2011); Total knee arthroplasty (2007); Cardiac catheterization; Hysterectomy, total abdominal (early ); Upper gastrointestinal endoscopy (N/A, 2020); and Colonoscopy (N/A, 2020). Evaluating Therapist: Nisha Herrera PT     Referring Provider:     Arianna Mena DO          Room #: 652  DIAGNOSIS:  Bimalleolar L ankle fx , non surgical treatment   PRECAUTIONS: falls, L LE NWB     Social:  Pt lives with  Alone  in a 1 floor plan 1 steps  to enter. Prior to admission pt walked with  ww . Has aides 3 hours /dey      Initial Evaluation  Date: 2020 Treatment  2020    Short Term/ Long Term   Goals   Was pt agreeable to Eval/treatment?  with encouragement  yes    Does pt have pain?   L ankle  L ankle    Bed Mobility  Rolling:  NT   Supine to sit:  SBA   Sit to supine:  Min assist   Scooting:  Min assist in sit  Rolling: Min A  Supine to sit: Min A L LE support  Scooting: SBA to EOB  S/I Transfers Sit to stand:  Max assist   Stand to sit:  Max assist   Stand pivot:  NT  Sit to stand: Max A  Stand to sit: Max A  Stand Pivot: Max A using [employer] for support    min assist    Ambulation   NT , unable  2 feet x 1 during pivot using [employer] for support Max A of 2  10  feet with  ww  with  Min assist L LE NWB    LE ROM  Grossly WFL, L ankle NT      LE strength  4- / 5 except L ankle NT      AM- PAC RAW score  12/ 24 13/24      Pt is alert and able to follow instruction  Balance: poor standing and during brief gait attempt using [employer] for support    Pt performed therapeutic exercise of the following: NT    Patient education  Pt was educated on UE usage to assist with transfers, standing tolerance, NWB L LE    Patient response to education:   Pt verbalized understanding Pt demonstrated skill Pt requires further education in this area   yes With prompt for transfer yes     ASSESSMENT:   Comments: Pt found in bed, agreed to get OOB to a chair. Pt briefly able to advance R LE today while maintaining NWB L LE using [employer] for support, was fatigued after brief activity. Treatment: Pt practiced and was instructed in the following treatment: UE usage to assist with transfers, transfer promoting NWB L LE    Pt was left in a bedside chair as found with call light in reach  Chair alarm active, KARELY present    Time in 0847   Time out 0904  Total Treatment Time 17 minutes   CPT codes:     Therapeutic activities 60068 17 minutes   Therapeutic exercises 23983 0 minutes       Pt is showing good progress toward established Physical Therapy goals as per ability to maintain NWB L LE during pivot transfer. Continue with physical therapy current plan of care.     Curt Osullivan PTA   License Number: [id number]

## 2020-07-16 NOTE — PROGRESS NOTES
[facility] Quality Flow/Interdisciplinary Rounds Progress Note        Quality Flow Rounds held on July 16, 2020    Disciplines Attending:  Bedside Nurse, ,  and Nursing Unit Leadership    Jerry Gilliam was admitted on 7/12/2020  2:31 PM    Anticipated Discharge Date:  Expected Discharge Date: 07/14/20    Disposition:    Sung Score:  Sung Scale Score: 19    Readmission Risk              Risk of Unplanned Readmission:        12           Discussed patient goal for the day, patient clinical progression, and barriers to discharge.   The following Goal(s) of the Day/Commitment(s) have been identified:  Discharge Planning      Zion Ortiz  July 16, 2020

## 2020-07-16 NOTE — DISCHARGE SUMMARY
Physician Discharge Summary         Patient ID:  Davyddalex Poster  [id number]  80 y.o.  1935    Admit date: 7/12/2020    Discharge date and time:  7/15/2020    Admission Diagnoses: Principal Problem:    Bimalleolar fracture, left, closed, initial encounter  Active Problems:    Hypertension    Hypothyroidism    Rheumatoid arthritis ([facility]    Numbness and tingling of both legs below knees    Anemia    A-fib ([facility]    Osteoporosis    Fall  Resolved Problems:    Elevated international normalized ratio (INR)       Discharge Diagnoses: bilateral malleolar fracture     Consults: orthopedic surgery    Procedures: None    Hospital Course: Patient presented to the ER after a fall and was found to have L bilateral malleolar fracture. Orthopedic surgery was consulted and opted for conservative management with a cast.    She remained hospitalized while her insurance company approved her choice for sub acute rehab. Wound care was consulted for her RLE laceration, he labs were monitored for stable anemia, and her BP was well controlled. Her home a fib medications were continued. She was discharged to sub acute rehab with 3 days of BID pain medication. She was encouraged to take fluids PO, as she was not drinking the hospital water (she preferred bottled) and there was a mild creatinine increase before discharge. She was given 1L of fluid and her family members brought in acceptable beverages which she drank. Discharge Exam:  See progress note from today    Disposition: SNF    Patient Instructions:      Medication List      START taking these medications    docusate 100 MG Caps  Commonly known as:  COLACE, DULCOLAX  Take 100 mg by mouth daily  Start taking on:  July 17, 2020     HYDROcodone-acetaminophen  MG per tablet  Commonly known as:  NORCO  Take 1 tablet by mouth 2 times daily as needed for Pain for up to 3 days.      polyethylene glycol 17 g packet  Commonly known as:  GLYCOLAX  Take 17 g by mouth 2 times daily     vitamin D 1.25 MG (38983 UT) Caps capsule  Commonly known as:  ERGOCALCIFEROL  Take 1 capsule by mouth once a week  Start taking on:  July 20, 2020        CONTINUE taking these medications    acidophilus probiotic Caps capsule     Allegra 180 MG tablet  Generic drug:  fexofenadine     celecoxib 200 MG capsule  Commonly known as:  CELEBREX  TAKE 1 CAPSULE DAILY     famotidine 40 MG tablet  Commonly known as:  PEPCID  Take 1 tablet by mouth nightly     ferrous sulfate 325 (65 Fe) MG tablet  Commonly known as:  IRON 325  Take 1 tablet by mouth daily (with breakfast)     flecainide 50 MG tablet  Commonly known as:  TAMBOCOR  Take 1 tablet by mouth 2 times daily     fluticasone 50 MCG/ACT nasal spray  Commonly known as:  FLONASE  2 sprays by Nasal route daily 2 sprays in each nostril daily     gabapentin 100 MG capsule  Commonly known as:  NEURONTIN     leucovorin calcium 5 MG tablet  Commonly known as:  WELLCOVORIN     levothyroxine 100 MCG tablet  Commonly known as:  Levothroid  Take 1 tablet by mouth Daily     methotrexate 2.5 MG chemo tablet  Commonly known as:  RHEUMATREX     metoprolol tartrate 25 MG tablet  Commonly known as:  LOPRESSOR  Take 1 tablet by mouth 2 times daily     montelukast 10 MG tablet  Commonly known as:  SINGULAIR  TAKE 1 TABLET NIGHTLY     omeprazole 40 MG delayed release capsule  Commonly known as:  PRILOSEC  Take 1 capsule by mouth 2 times daily     ondansetron 4 MG tablet  Commonly known as:  ZOFRAN  Take 1 tablet by mouth every 8 hours as needed for Nausea or Vomiting     polyethyl glycol-propyl glycol 0.4-0.3 % 0.4-0.3 % ophthalmic solution  Commonly known as:  SYSTANE     rivaroxaban 15 MG Tabs tablet  Commonly known as:  Xarelto  Take 1 tablet by mouth daily (with breakfast)     valsartan-hydroCHLOROthiazide 160-25 MG per tablet  Commonly known as:  DIOVAN-HCT  Take 1 tablet by mouth daily     vitamin B-12 1000 MCG tablet  Commonly known as:  CYANOCOBALAMIN           Where to

## 2020-07-16 NOTE — PLAN OF CARE
Problem: Falls - Risk of:  Goal: Will remain free from falls  Description: Will remain free from falls  7/15/2020 2043 by Debra Jones RN  Outcome: Met This Shift     Problem: Falls - Risk of:  Goal: Absence of physical injury  Description: Absence of physical injury  7/15/2020 2043 by Debra Jones RN  Outcome: Met This Shift     Problem: Pain:  Goal: Pain level will decrease  Description: Pain level will decrease  7/15/2020 2043 by Debra Jones RN  Outcome: Met This Shift

## 2020-07-16 NOTE — PROGRESS NOTES
[facility]  Progress Note    Subjective:  Patient states her pain is controlled however she is drinking almost no liquids. She usually only drinks tea and does not like the tea or the city water it is made with her. She still has not have a BM, her regimen was increased yesterday. Objective:    BP (!) 101/55   Pulse 63   Temp 97.8 °F (36.6 °C) (Oral)   Resp 16   Ht 5' 2\" (1.575 m)   Wt 128 lb 12.8 oz (58.4 kg)   SpO2 95%   Breastfeeding No   BMI 23.56 kg/m²     Heart:  RRR, 2/6 systolic murmur, gallops, or rubs. Lungs:  CTA bilaterally, no wheeze, rales or rhonchi  Abd: bowel sounds present, nontender, nondistended, no masses  Extrem:  No clubbing, cyanosis, or edema. L leg wrapped. Good capillary refill of the toes. Dressing is CDI. R leg cast in place CDI.     Recent Results (from the past 12 hour(s))   Basic Metabolic Panel w/ Reflex to MG    Collection Time: 07/16/20  3:30 AM   Result Value Ref Range    Sodium 139 132 - 146 mmol/L    Potassium reflex Magnesium 4.2 3.5 - 5.0 mmol/L    Chloride 104 98 - 107 mmol/L    CO2 25 22 - 29 mmol/L    Anion Gap 10 7 - 16 mmol/L    Glucose 103 (H) 74 - 99 mg/dL    BUN 21 8 - 23 mg/dL    CREATININE 1.3 (H) 0.5 - 1.0 mg/dL    GFR Non-African American 39 >=60 mL/min/1.73    GFR African American 47     Calcium 8.9 8.6 - 10.2 mg/dL   Hepatic function panel    Collection Time: 07/16/20  3:30 AM   Result Value Ref Range    Total Protein 5.5 (L) 6.4 - 8.3 g/dL    Alb 3.2 (L) 3.5 - 5.2 g/dL    Alkaline Phosphatase 51 35 - 104 U/L    ALT 14 0 - 32 U/L    AST 20 0 - 31 U/L    Total Bilirubin 0.3 0.0 - 1.2 mg/dL    Bilirubin, Direct <0.2 0.0 - 0.3 mg/dL    Bilirubin, Indirect see below 0.0 - 1.0 mg/dL   CBC auto differential    Collection Time: 07/16/20  3:30 AM   Result Value Ref Range    WBC 4.4 (L) 4.5 - 11.5 E9/L    RBC 3.02 (L) 3.50 - 5.50 E12/L    Hemoglobin 8.7 (L) 11.5 - 15.5 g/dL    Hematocrit 28.9 (L) 34.0 - 48.0 %    MCV 95.7 bisphosphonate as outpatient    Anemia  Stable  - Continues to fluctuate, overall stable  - Continue ferrous sulfate 325 mg QD     HTN   - Monitor BP daily, currently stable  - Continue valsartan- HCTZ 160-25 mg QD   - IV hydralazine 10 mg q 6 hrs PRN if SBP>160 mmHg or DBP> 100 mmHg      Atrial fibrillation  Stable  - Telemetry monitoring   - Continue flecainide 50 mg BID   - Continue Lopressor 25 mg BID   - Home xarelto 15 mg QD resumed     Additional home medications continued:  - Allegra 180 mg QD (substituted with Zyrtec 10 mg QD)  - Pepcid 40 mg nightly   - Omeprazole 40 mg BID (substituted with Protonix 40 mg BID)  - Singulair 10 mg QD  - Vitamin B12 1000 mcg QD   - Celebrex 200 mg QD and methotrexate 15 mg in divided doses weekly (Mondays) for RA  - Synthroid 100 mcg QD  - Gabapentin 100 mg nightly    Rising creatinine likely secondary to decreased oral intake. Family is going to bring in some fluids.     Discharge when placement available    DVT prophylaxis: PCDs  Code: Full   Diet: General, NPO after midnight       Electronically signed by Pavel Dee MD on 7/16/2020 at 7:01 AM

## 2020-07-16 NOTE — CARE COORDINATION
Social Work:    Shaniqua Class at [facility] has arranged their Suezanne Pong to  Mr. Esthela Shankar at 1:30 p.m. Social service updated Mrs. Esthela Shankar and her daughter Ashish Villela, as well as provided her with resources for respite care, private duty, and assisted living.      Electronically signed by JU Flanagan on 7/16/2020 at 10:45 AM

## 2020-07-20 ENCOUNTER — HOSPITAL ENCOUNTER (OUTPATIENT)
Age: 85
Discharge: HOME | End: 2020-07-22
Payer: MEDICARE

## 2020-07-20 PROCEDURE — U0003 INFECTIOUS AGENT DETECTION BY NUCLEIC ACID (DNA OR RNA); SEVERE ACUTE RESPIRATORY SYNDROME CORONAVIRUS 2 (SARS-COV-2) (CORONAVIRUS DISEASE [COVID-19]), AMPLIFIED PROBE TECHNIQUE, MAKING USE OF HIGH THROUGHPUT TECHNOLOGIES AS DESCRIBED BY CMS-2020-01-R: HCPCS

## 2020-07-21 ENCOUNTER — HOSPITAL ENCOUNTER (OUTPATIENT)
Age: 85
Discharge: HOME | End: 2020-07-23

## 2020-07-21 LAB
ALBUMIN SERPL-MCNC: 3.3 G/DL (ref 3.5–5.2)
ALP BLD-CCNC: 62 U/L (ref 35–104)
ALT SERPL-CCNC: 10 U/L (ref 0–32)
ANION GAP SERPL CALCULATED.3IONS-SCNC: 12 MMOL/L (ref 7–16)
AST SERPL-CCNC: 22 U/L (ref 0–31)
BASOPHILS ABSOLUTE: 0.04 E9/L (ref 0–0.2)
BASOPHILS RELATIVE PERCENT: 0.6 % (ref 0–2)
BILIRUB SERPL-MCNC: 0.4 MG/DL (ref 0–1.2)
BUN BLDV-MCNC: 19 MG/DL (ref 8–23)
CALCIUM SERPL-MCNC: 9.6 MG/DL (ref 8.6–10.2)
CHLORIDE BLD-SCNC: 105 MMOL/L (ref 98–107)
CO2: 24 MMOL/L (ref 22–29)
CREAT SERPL-MCNC: 1.1 MG/DL (ref 0.5–1)
EOSINOPHILS ABSOLUTE: 0.28 E9/L (ref 0.05–0.5)
EOSINOPHILS RELATIVE PERCENT: 4.4 % (ref 0–6)
GFR AFRICAN AMERICAN: 57
GFR NON-AFRICAN AMERICAN: 47 ML/MIN/1.73
GLUCOSE BLD-MCNC: 90 MG/DL (ref 74–99)
HCT VFR BLD CALC: 29.2 % (ref 34–48)
HEMOGLOBIN: 9 G/DL (ref 11.5–15.5)
IMMATURE GRANULOCYTES #: 0.03 E9/L
IMMATURE GRANULOCYTES %: 0.5 % (ref 0–5)
LYMPHOCYTES ABSOLUTE: 0.79 E9/L (ref 1.5–4)
LYMPHOCYTES RELATIVE PERCENT: 12.4 % (ref 20–42)
MCH RBC QN AUTO: 29.4 PG (ref 26–35)
MCHC RBC AUTO-ENTMCNC: 30.8 % (ref 32–34.5)
MCV RBC AUTO: 95.4 FL (ref 80–99.9)
MONOCYTES ABSOLUTE: 0.88 E9/L (ref 0.1–0.95)
MONOCYTES RELATIVE PERCENT: 13.8 % (ref 2–12)
NEUTROPHILS ABSOLUTE: 4.37 E9/L (ref 1.8–7.3)
NEUTROPHILS RELATIVE PERCENT: 68.3 % (ref 43–80)
PDW BLD-RTO: 19.5 FL (ref 11.5–15)
PLATELET # BLD: 296 E9/L (ref 130–450)
PMV BLD AUTO: 10.5 FL (ref 7–12)
POTASSIUM SERPL-SCNC: 4.5 MMOL/L (ref 3.5–5)
RBC # BLD: 3.06 E12/L (ref 3.5–5.5)
SODIUM BLD-SCNC: 141 MMOL/L (ref 132–146)
TOTAL PROTEIN: 5.9 G/DL (ref 6.4–8.3)
WBC # BLD: 6.4 E9/L (ref 4.5–11.5)

## 2020-07-21 PROCEDURE — 36415 COLL VENOUS BLD VENIPUNCTURE: CPT

## 2020-07-21 PROCEDURE — 85025 COMPLETE CBC W/AUTO DIFF WBC: CPT

## 2020-07-21 PROCEDURE — 80053 COMPREHEN METABOLIC PANEL: CPT

## 2020-07-27 RX ORDER — MONTELUKAST SODIUM 10 MG/1
TABLET ORAL
Qty: 90 TABLET | Refills: 1 | Status: ON HOLD
Start: 2020-07-27 | End: 2021-02-08

## 2020-07-28 ENCOUNTER — HOSPITAL ENCOUNTER (OUTPATIENT)
Age: 85
Discharge: HOME | End: 2020-07-30

## 2020-07-28 LAB
ALBUMIN SERPL-MCNC: 3.2 G/DL (ref 3.5–5.2)
ALP BLD-CCNC: 71 U/L (ref 35–104)
ALT SERPL-CCNC: 8 U/L (ref 0–32)
ANION GAP SERPL CALCULATED.3IONS-SCNC: 14 MMOL/L (ref 7–16)
AST SERPL-CCNC: 18 U/L (ref 0–31)
BASOPHILS ABSOLUTE: 0.04 E9/L (ref 0–0.2)
BASOPHILS RELATIVE PERCENT: 0.5 % (ref 0–2)
BILIRUB SERPL-MCNC: 0.3 MG/DL (ref 0–1.2)
BUN BLDV-MCNC: 22 MG/DL (ref 8–23)
CALCIUM SERPL-MCNC: 9.4 MG/DL (ref 8.6–10.2)
CHLORIDE BLD-SCNC: 100 MMOL/L (ref 98–107)
CO2: 24 MMOL/L (ref 22–29)
CREAT SERPL-MCNC: 1 MG/DL (ref 0.5–1)
EOSINOPHILS ABSOLUTE: 0.29 E9/L (ref 0.05–0.5)
EOSINOPHILS RELATIVE PERCENT: 3.6 % (ref 0–6)
GFR AFRICAN AMERICAN: >60
GFR NON-AFRICAN AMERICAN: 53 ML/MIN/1.73
GLUCOSE BLD-MCNC: 85 MG/DL (ref 74–99)
HCT VFR BLD CALC: 29.9 % (ref 34–48)
HEMOGLOBIN: 9.2 G/DL (ref 11.5–15.5)
IMMATURE GRANULOCYTES #: 0.07 E9/L
IMMATURE GRANULOCYTES %: 0.9 % (ref 0–5)
LYMPHOCYTES ABSOLUTE: 0.83 E9/L (ref 1.5–4)
LYMPHOCYTES RELATIVE PERCENT: 10.2 % (ref 20–42)
MCH RBC QN AUTO: 29.9 PG (ref 26–35)
MCHC RBC AUTO-ENTMCNC: 30.8 % (ref 32–34.5)
MCV RBC AUTO: 97.1 FL (ref 80–99.9)
MONOCYTES ABSOLUTE: 1.09 E9/L (ref 0.1–0.95)
MONOCYTES RELATIVE PERCENT: 13.5 % (ref 2–12)
NEUTROPHILS ABSOLUTE: 5.78 E9/L (ref 1.8–7.3)
NEUTROPHILS RELATIVE PERCENT: 71.3 % (ref 43–80)
PDW BLD-RTO: 18.7 FL (ref 11.5–15)
PLATELET # BLD: 351 E9/L (ref 130–450)
PMV BLD AUTO: 10.5 FL (ref 7–12)
POTASSIUM SERPL-SCNC: 4.4 MMOL/L (ref 3.5–5)
RBC # BLD: 3.08 E12/L (ref 3.5–5.5)
SODIUM BLD-SCNC: 138 MMOL/L (ref 132–146)
TOTAL PROTEIN: 5.9 G/DL (ref 6.4–8.3)
WBC # BLD: 8.1 E9/L (ref 4.5–11.5)

## 2020-07-28 PROCEDURE — 80053 COMPREHEN METABOLIC PANEL: CPT

## 2020-07-28 PROCEDURE — 36415 COLL VENOUS BLD VENIPUNCTURE: CPT

## 2020-07-28 PROCEDURE — 85025 COMPLETE CBC W/AUTO DIFF WBC: CPT

## 2020-08-03 ENCOUNTER — HOSPITAL ENCOUNTER (OUTPATIENT)
Age: 85
Discharge: HOME | End: 2020-08-05

## 2020-08-03 PROCEDURE — U0003 INFECTIOUS AGENT DETECTION BY NUCLEIC ACID (DNA OR RNA); SEVERE ACUTE RESPIRATORY SYNDROME CORONAVIRUS 2 (SARS-COV-2) (CORONAVIRUS DISEASE [COVID-19]), AMPLIFIED PROBE TECHNIQUE, MAKING USE OF HIGH THROUGHPUT TECHNOLOGIES AS DESCRIBED BY CMS-2020-01-R: HCPCS

## 2020-08-04 ENCOUNTER — HOSPITAL ENCOUNTER (OUTPATIENT)
Age: 85
Discharge: HOME | End: 2020-08-06

## 2020-08-04 LAB
ALBUMIN SERPL-MCNC: 3.1 G/DL (ref 3.5–5.2)
ALP BLD-CCNC: 67 U/L (ref 35–104)
ALT SERPL-CCNC: 9 U/L (ref 0–32)
ANION GAP SERPL CALCULATED.3IONS-SCNC: 15 MMOL/L (ref 7–16)
AST SERPL-CCNC: 18 U/L (ref 0–31)
BASOPHILS ABSOLUTE: 0.05 E9/L (ref 0–0.2)
BASOPHILS RELATIVE PERCENT: 0.8 % (ref 0–2)
BILIRUB SERPL-MCNC: <0.2 MG/DL (ref 0–1.2)
BUN BLDV-MCNC: 24 MG/DL (ref 8–23)
CALCIUM SERPL-MCNC: 9.5 MG/DL (ref 8.6–10.2)
CHLORIDE BLD-SCNC: 98 MMOL/L (ref 98–107)
CO2: 23 MMOL/L (ref 22–29)
CREAT SERPL-MCNC: 1.1 MG/DL (ref 0.5–1)
EOSINOPHILS ABSOLUTE: 0.34 E9/L (ref 0.05–0.5)
EOSINOPHILS RELATIVE PERCENT: 5.5 % (ref 0–6)
GFR AFRICAN AMERICAN: 57
GFR NON-AFRICAN AMERICAN: 47 ML/MIN/1.73
GLUCOSE BLD-MCNC: 78 MG/DL (ref 74–99)
HCT VFR BLD CALC: 29.7 % (ref 34–48)
HEMOGLOBIN: 9.2 G/DL (ref 11.5–15.5)
IMMATURE GRANULOCYTES #: 0.04 E9/L
IMMATURE GRANULOCYTES %: 0.6 % (ref 0–5)
LYMPHOCYTES ABSOLUTE: 0.84 E9/L (ref 1.5–4)
LYMPHOCYTES RELATIVE PERCENT: 13.6 % (ref 20–42)
MCH RBC QN AUTO: 30.6 PG (ref 26–35)
MCHC RBC AUTO-ENTMCNC: 31 % (ref 32–34.5)
MCV RBC AUTO: 98.7 FL (ref 80–99.9)
MONOCYTES ABSOLUTE: 0.81 E9/L (ref 0.1–0.95)
MONOCYTES RELATIVE PERCENT: 13.1 % (ref 2–12)
NEUTROPHILS ABSOLUTE: 4.1 E9/L (ref 1.8–7.3)
NEUTROPHILS RELATIVE PERCENT: 66.4 % (ref 43–80)
PDW BLD-RTO: 17.9 FL (ref 11.5–15)
PLATELET # BLD: 365 E9/L (ref 130–450)
PMV BLD AUTO: 10.3 FL (ref 7–12)
POTASSIUM SERPL-SCNC: 4.1 MMOL/L (ref 3.5–5)
RBC # BLD: 3.01 E12/L (ref 3.5–5.5)
SARS-COV-2: NOT DETECTED
SODIUM BLD-SCNC: 136 MMOL/L (ref 132–146)
SOURCE: NORMAL
TOTAL PROTEIN: 5.7 G/DL (ref 6.4–8.3)
WBC # BLD: 6.2 E9/L (ref 4.5–11.5)

## 2020-08-04 PROCEDURE — 36415 COLL VENOUS BLD VENIPUNCTURE: CPT

## 2020-08-04 PROCEDURE — 80053 COMPREHEN METABOLIC PANEL: CPT

## 2020-08-04 PROCEDURE — 85025 COMPLETE CBC W/AUTO DIFF WBC: CPT

## 2020-08-12 ENCOUNTER — HOSPITAL ENCOUNTER (OUTPATIENT)
Age: 85
Discharge: HOME | End: 2020-08-14

## 2020-08-12 PROCEDURE — U0003 INFECTIOUS AGENT DETECTION BY NUCLEIC ACID (DNA OR RNA); SEVERE ACUTE RESPIRATORY SYNDROME CORONAVIRUS 2 (SARS-COV-2) (CORONAVIRUS DISEASE [COVID-19]), AMPLIFIED PROBE TECHNIQUE, MAKING USE OF HIGH THROUGHPUT TECHNOLOGIES AS DESCRIBED BY CMS-2020-01-R: HCPCS

## 2020-08-13 LAB
SARS-COV-2: NOT DETECTED
SOURCE: NORMAL

## 2020-08-17 PROCEDURE — U0003 INFECTIOUS AGENT DETECTION BY NUCLEIC ACID (DNA OR RNA); SEVERE ACUTE RESPIRATORY SYNDROME CORONAVIRUS 2 (SARS-COV-2) (CORONAVIRUS DISEASE [COVID-19]), AMPLIFIED PROBE TECHNIQUE, MAKING USE OF HIGH THROUGHPUT TECHNOLOGIES AS DESCRIBED BY CMS-2020-01-R: HCPCS

## 2020-08-18 ENCOUNTER — HOSPITAL ENCOUNTER (OUTPATIENT)
Age: 85
Discharge: HOME | End: 2020-08-20

## 2020-08-19 ENCOUNTER — HOSPITAL ENCOUNTER (OUTPATIENT)
Dept: WOUND CARE | Age: 85
Discharge: HOME | End: 2020-08-19
Payer: MEDICARE

## 2020-08-19 VITALS
DIASTOLIC BLOOD PRESSURE: 62 MMHG | TEMPERATURE: 97.4 F | HEART RATE: 63 BPM | RESPIRATION RATE: 17 BRPM | SYSTOLIC BLOOD PRESSURE: 114 MMHG

## 2020-08-19 PROBLEM — L97.812 NON-PRESSURE CHRONIC ULCER OF OTHER PART OF RIGHT LOWER LEG WITH FAT LAYER EXPOSED (HCC): Status: ACTIVE | Noted: 2020-08-19

## 2020-08-19 LAB
SARS-COV-2: NOT DETECTED
SOURCE: NORMAL

## 2020-08-19 PROCEDURE — 87186 SC STD MICRODIL/AGAR DIL: CPT

## 2020-08-19 PROCEDURE — 87075 CULTR BACTERIA EXCEPT BLOOD: CPT

## 2020-08-19 PROCEDURE — 11045 DBRDMT SUBQ TISS EACH ADDL: CPT

## 2020-08-19 PROCEDURE — 99213 OFFICE O/P EST LOW 20 MIN: CPT

## 2020-08-19 PROCEDURE — 11042 DBRDMT SUBQ TIS 1ST 20SQCM/<: CPT

## 2020-08-19 PROCEDURE — 87070 CULTURE OTHR SPECIMN AEROBIC: CPT

## 2020-08-19 RX ORDER — LIDOCAINE HYDROCHLORIDE 40 MG/ML
SOLUTION TOPICAL ONCE
Status: DISCONTINUED | OUTPATIENT
Start: 2020-08-19 | End: 2020-08-20 | Stop reason: HOSPADM

## 2020-08-19 ASSESSMENT — PAIN DESCRIPTION - FREQUENCY: FREQUENCY: INTERMITTENT

## 2020-08-19 ASSESSMENT — PAIN DESCRIPTION - ORIENTATION: ORIENTATION: LEFT

## 2020-08-19 ASSESSMENT — PAIN DESCRIPTION - PAIN TYPE: TYPE: CHRONIC PAIN

## 2020-08-19 ASSESSMENT — PAIN DESCRIPTION - DESCRIPTORS: DESCRIPTORS: ACHING

## 2020-08-19 ASSESSMENT — PAIN DESCRIPTION - LOCATION: LOCATION: LEG

## 2020-08-19 ASSESSMENT — PAIN SCALES - GENERAL: PAINLEVEL_OUTOF10: 4

## 2020-08-19 NOTE — PROGRESS NOTES
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 Socorro Coppola    Pain in both lower legs 2020    Rheumatoid arthritis with rheumatoid factor ([facility] [address]     Rheumatoid arthritis(714.0)     Dr Xochilt Doyle; on [facility] [place])    Rib fractures 2014    Seasonal allergies     Skin cancer of lip     squamous cell    Wears dentures      Past Surgical History:   Procedure Laterality Date    CARDIAC CATHETERIZATION      CARPAL TUNNEL RELEASE Bilateral      SECTION      COLONOSCOPY  2011    Dr Ricardo Harris; diverticular disease; repeat in     COLONOSCOPY N/A 2020    COLONOSCOPY WITH BIOPSY performed by Marybel Shi MD at [id number] [place] [address], TOTAL ABDOMINAL  early     TOTAL KNEE ARTHROPLASTY  2007    bilateral    UPPER GASTROINTESTINAL ENDOSCOPY N/A 2020    EGD BIOPSY performed by [facility]bel Shi MD at [facility] ENDOSCOPY     Family History   Problem Relation Age of Onset    Heart Attack Mother 58    Other Father         suicide, depression, leg trouble    Other Sister         Dementia    Hypertension Sister     Hypertension Sister     Hypertension Brother     Thyroid Disease Sister     Rheum Arthritis Sister     Rheum Arthritis Brother     Other Daughter         hemochromatosis     Other Son         hemachromatosis      Social History     Tobacco Use    Smoking status: Never Smoker    Smokeless tobacco: Take 1 capsule by mouth daily      fexofenadine (ALLEGRA) 180 MG tablet Take 180 mg by mouth daily.  leucovorin calcium (WELLCOVORIN) 5 MG tablet Take 25 mg by mouth once a week Takes every Tuesday      methotrexate 2.5 MG tablet Take by mouth once a week 2 tabs at lunch and 4 tabs  in the pm once a week on Monday       No current facility-administered medications on file prior to encounter.         REVIEW OF SYSTEMS   ROS : All others Negative if blank [], Positive if [x]  General Vascular   [] Fevers [] Claudication   [] Chills [] Rest Pain   Skin Neurologic   [x] Tissue Loss [x] Lower extremity neuropathy     Objective:    /62   Pulse 63   Temp 97.4 °F (36.3 °C) (Temporal)   Resp 17   Wt Readings from Last 3 Encounters:   07/15/20 128 lb 12.8 oz (58.4 kg)   06/16/20 129 lb (58.5 kg)   05/15/20 125 lb (56.7 kg)       PHYSICAL EXAM   CONSTITUTIONAL:   Awake, alert, cooperative   PSYCHIATRIC :  Oriented to time, place and person      normal insight to disease process  EXTREMITIES:   R LE Open wounds are noted   Skin color is abnormal with ulcer   Edema is  noted   Sensation intact to light touch   Palpation of the foot does cause pain   5/5 strength DF/PF  L LE Open wounds are not noted   Skin color is normal   Edema is  noted   Sensation intact to light touch   Palpation of the foot does cause pain   5/5 strength DF/PF  R dorsalis pedis 1 L dorsalis pedis 1   R posterior tibial 1 L posterior tibial 1     Assessment:     Problem List Items Addressed This Visit     None          Pre Debridement Measurements:  Are located in the [place]  Documentation Flow Sheet  Post Debridement Measurements:  Wound/Ulcer Descriptions are Pre Debridement except measurements:     Wound 07/12/20 Leg Lateral;Lower;Right (Active)   Number of days: 37       Wound 08/19/20 Leg Left;Lateral Wound #1 aquired 7/15/2020 (Active)   Wound Image   08/19/20 1057   Wound Length (cm) 7.6 cm 08/19/20 1057   Wound Width (cm) 4.8 cm groups for smoking cessation given    In my professional opinion and based off the information that is available at this time this patient has appropriate indication for HBO Therapy: Maybe    Treatment Note please see attached Discharge Instructions    Written patient dismissal instructions given to patient and signed by patient or POA. Discharge Instructions         Visit Discharge/Physician Orders    Discharge condition: Stable    Assessment of pain at discharge:  none    Anesthetic used:  4% lidocaine solution    Discharge to: Home    Left via:Private automobile    Accompanied by: accompanied by grand daughter    ECF/HHA: [facility]    Dressing Orders: Clean right lower leg ulcer with normal saline. Apply saline moistened toño inside wound bed. Cover with saline dampened gauze and secure with kerlex and coban. Change every other day    Treatment Orders:  Tissue C&S taken of right lower leg ulcer    Wound measurements:                                                      Wound 07/12/20 Leg Lateral;Lower;Right (Active)   Number of days: 37       Wound 08/19/20 Leg Left;Lateral Wound #1 aquired 7/15/2020 (Active)   Wound Image   08/19/20 1057   Wound Length (cm) 7.6 cm 08/19/20 1057   Wound Width (cm) 4.8 cm 08/19/20 1057   Wound Depth (cm) 0.3 cm 08/19/20 1057   Wound Surface Area (cm^2) 36.48 cm^2 08/19/20 1057   Wound Volume (cm^3) 10.94 cm^3 08/19/20 1057   Post-Procedure Length (cm) 7.6 cm 08/19/20 1125   Post-Procedure Width (cm) 4.8 cm 08/19/20 1125   Post-Procedure Depth (cm) 0.3 cm 08/19/20 1125   Post-Procedure Surface Area (cm^2) 36.48 cm^2 08/19/20 1125   Post-Procedure Volume (cm^3) 10.94 cm^3 08/19/20 1125   Wound Assessment Brown;Yellow;Pink;Pale 08/19/20 1057   Drainage Amount Moderate 08/19/20 1057   Drainage Description Tan;Yellow;Serosanguinous 08/19/20 1057   Odor None 08/19/20 1057   Jaycee-wound Assessment Intact; Purple; Swelling 08/19/20 1057   Number of days: 0      WCC followup visit ____one week with Dr. German_________________________  (Please note your next appointment above and if you are unable to keep, kindly give a 24 hour notice. Thank you.)    Physician signature:__________________________      If you experience any of the following, please call the [facility] during business hours:    * Increase in Pain  * Temperature over 101  * Increase in drainage from your wound  * Drainage with a foul odor  * Bleeding  * Increase in swelling  * Need for compression bandage changes due to slippage, breakthrough drainage. If you need medical attention outside of the business hours of the [facility] please contact your PCP or go to the nearest emergency room.         Electronically signed by Melinda Guzman DPM on 8/19/2020 at 11:35 AM

## 2020-08-21 LAB
ANAEROBIC CULTURE: NORMAL
ORGANISM: ABNORMAL
WOUND/ABSCESS: ABNORMAL

## 2020-08-24 ENCOUNTER — HOSPITAL ENCOUNTER (OUTPATIENT)
Age: 85
Discharge: HOME | End: 2020-08-26

## 2020-08-24 PROCEDURE — U0003 INFECTIOUS AGENT DETECTION BY NUCLEIC ACID (DNA OR RNA); SEVERE ACUTE RESPIRATORY SYNDROME CORONAVIRUS 2 (SARS-COV-2) (CORONAVIRUS DISEASE [COVID-19]), AMPLIFIED PROBE TECHNIQUE, MAKING USE OF HIGH THROUGHPUT TECHNOLOGIES AS DESCRIBED BY CMS-2020-01-R: HCPCS

## 2020-08-26 ENCOUNTER — HOSPITAL ENCOUNTER (OUTPATIENT)
Dept: WOUND CARE | Age: 85
Discharge: HOME | End: 2020-08-26
Payer: MEDICARE

## 2020-08-26 VITALS
DIASTOLIC BLOOD PRESSURE: 74 MMHG | RESPIRATION RATE: 19 BRPM | SYSTOLIC BLOOD PRESSURE: 138 MMHG | TEMPERATURE: 97.5 F | HEART RATE: 56 BPM

## 2020-08-26 LAB
SARS-COV-2: NOT DETECTED
SOURCE: NORMAL

## 2020-08-26 PROCEDURE — 11042 DBRDMT SUBQ TIS 1ST 20SQCM/<: CPT

## 2020-08-26 PROCEDURE — 11045 DBRDMT SUBQ TISS EACH ADDL: CPT

## 2020-08-26 RX ORDER — LIDOCAINE HYDROCHLORIDE 40 MG/ML
SOLUTION TOPICAL ONCE
Status: DISCONTINUED | OUTPATIENT
Start: 2020-08-26 | End: 2020-08-27 | Stop reason: HOSPADM

## 2020-08-26 RX ORDER — VANCOMYCIN HYDROCHLORIDE 125 MG/1
125 CAPSULE ORAL 4 TIMES DAILY
COMMUNITY
End: 2020-09-16

## 2020-08-26 ASSESSMENT — PAIN DESCRIPTION - ORIENTATION: ORIENTATION: LEFT

## 2020-08-26 ASSESSMENT — PAIN SCALES - GENERAL: PAINLEVEL_OUTOF10: 4

## 2020-08-26 ASSESSMENT — PAIN DESCRIPTION - DESCRIPTORS: DESCRIPTORS: ACHING

## 2020-08-26 ASSESSMENT — PAIN DESCRIPTION - FREQUENCY: FREQUENCY: INTERMITTENT

## 2020-08-26 ASSESSMENT — PAIN DESCRIPTION - LOCATION: LOCATION: LEG

## 2020-08-26 ASSESSMENT — PAIN DESCRIPTION - PAIN TYPE: TYPE: CHRONIC PAIN

## 2020-08-26 NOTE — PROGRESS NOTES
W[facility] Healing Center  History and Physical/Consultation  Podiatry    Referring Physician : MD Brigette Powell 57 RECORD NUMBER:  [id number]  AGE: 80 y.o. GENDER: female  : 1935  EPISODE DATE:  2020  Subjective:     Chief Complaint   Patient presents with    Wound Check     RLE         HISTORY of PRESENT ILLNESS HPI     Diane Wilks is a 80 y.o. female who presents today for wound/ulcer evaluation. History of Wound Context:  The patient has had a wound of right leg which was first noted approximately in 2020. This has been treated adaptic. On their initial visit to the wound healing center, 20 ,  the patient has noted that the wound has not been improving. The patient has had similar previous wounds in the past.      Pt is not on abx at time of initial visit.       Wound/Ulcer Pain Timing/Severity: constant  Quality of pain: aching  Severity:  4 / 10   Modifying Factors: Pain worsens with walking  Associated Signs/Symptoms: edema, erythema and drainage    Ulcer Identification:  Ulcer Type: venous  Contributing Factors: edema, venous stasis and lymphedema    Diabetic/Pressure/Non Pressure Ulcers onl y:  Ulcer: Non-Pressure ulcer, fat layer exposed    If patient has diabetic lower extremity wounds  Lock Classification of diabetic lower extremity wounds:    Grade Description   []  0 No open wound   []  1 Superficial ulcer involving the full skin thickness   []  2 Deep ulcer involves ligament, tendon, joint capsule, or fascia  No bone involvement or abscess presence   []  3 Deep Ulcer with abcess formation and/or osteomyelitis   []  4 Localized gangrene   []  5 Extensive gangrene of the foot     Wound: N/A     20  Debrided, needs ultrasound arterial, offload        PAST MEDICAL HISTORY      Diagnosis Date    Anticoagulated     takes Xarelto    Atrial fibrillation ([facility] [address]     follows with Dr. 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 Romain Chris    Pain in both lower legs 2020    Rheumatoid arthritis with rheumatoid factor ([facility] [address]     Rheumatoid arthritis(714.0)     Dr Ernesto Chacon; on DMD [place])    Rib fractures 2014    Seasonal allergies     Skin cancer of lip     squamous cell    Wears dentures      Past Surgical History:   Procedure Laterality Date    CARDIAC CATHETERIZATION      CARPAL TUNNEL RELEASE Bilateral      SECTION      COLONOSCOPY  2011    Dr Renetta Matias; diverticular disease; repeat in     COLONOSCOPY N/A 2020    COLONOSCOPY WITH BIOPSY performed by Rosanna Russ MD at [id number] [place] [address], TOTAL ABDOMINAL  early     TOTAL KNEE ARTHROPLASTY  2007    bilateral    UPPER GASTROINTESTINAL ENDOSCOPY N/A 2020    [facility]GD BIOPSY [facility] at [facility] ENDOSCOPY     Family History   Problem Relation Age of Onset    Heart Attack Mother 58    Other Father         suicide, depression, leg trouble    Other Sister         Dementia    Hypertension Sister     Hypertension Sister     Hypertension Brother     Thyroid Disease Sister     Rheum Arthritis Sister     Rheum Arthritis Brother     Other Daughter         hemochromatosis     Other Son         hemachromatosis      Social History     Tobacco Use  Smoking status: Never Smoker    Smokeless tobacco: Never Used   Substance Use Topics    Alcohol use: No    Drug use: Never     Allergies   Allergen Reactions    Amoxicillin      GI ill effects     Current Outpatient Medications on File Prior to Encounter   Medication Sig Dispense Refill    vancomycin (VANCOCIN) 125 MG capsule Take 125 mg by mouth 4 times daily Take for 10 days      montelukast (SINGULAIR) 10 MG tablet TAKE 1 TABLET NIGHTLY 90 tablet 1    docusate sodium (COLACE, DULCOLAX) 100 MG CAPS Take 100 mg by mouth daily 30 capsule 0    vitamin D (ERGOCALCIFEROL) 1.25 MG (52684 UT) CAPS capsule Take 1 capsule by mouth once a week 6 capsule 1    ferrous sulfate (IRON 325) 325 (65 Fe) MG tablet Take 1 tablet by mouth daily (with breakfast) 30 tablet 5    gabapentin (NEURONTIN) 100 MG capsule Take 100 mg by mouth nightly.  vitamin B-12 (CYANOCOBALAMIN) 1000 MCG tablet Take 1,000 mcg by mouth daily      omeprazole (PRILOSEC) 40 MG delayed release capsule Take 1 capsule by mouth 2 times daily 180 capsule 3    rivaroxaban (XARELTO) 15 MG TABS tablet Take 1 tablet by mouth daily (with breakfast) 90 tablet 3    metoprolol tartrate (LOPRESSOR) 25 MG tablet Take 1 tablet by mouth 2 times daily 180 tablet 3    levothyroxine (LEVOTHROID) 100 MCG tablet Take 1 tablet by mouth Daily 90 tablet 1    fluticasone (FLONASE) 50 MCG/ACT nasal spray 2 sprays by Nasal route daily 2 sprays in each nostril daily 1 Bottle 11    celecoxib (CELEBREX) 200 MG capsule TAKE 1 CAPSULE DAILY 90 capsule 3    Probiotic Product (ACIDOPHILUS PROBIOTIC) CAPS capsule Take 1 capsule by mouth daily      fexofenadine (ALLEGRA) 180 MG tablet Take 180 mg by mouth daily.       leucovorin calcium (WELLCOVORIN) 5 MG tablet Take 25 mg by mouth once a week Takes every Tuesday      methotrexate 2.5 MG tablet Take by mouth once a week 2 tabs at lunch and 4 tabs  in the pm once a week on Monday      famotidine (PEPCID) 40 MG tablet Take 1 tablet by mouth nightly 90 tablet 3    valsartan-hydroCHLOROthiazide (DIOVAN-HCT) 160-25 MG per tablet Take 1 tablet by mouth daily 90 tablet 1    flecainide (TAMBOCOR) 50 MG tablet Take 1 tablet by mouth 2 times daily 180 tablet 3    ondansetron (ZOFRAN) 4 MG tablet Take 1 tablet by mouth every 8 hours as needed for Nausea or Vomiting 30 tablet 5    polyethyl glycol-propyl glycol 0.4-0.3 % (SYSTANE) 0.4-0.3 % ophthalmic solution 1 drop as needed for Dry Eyes       No current facility-administered medications on file prior to encounter.         REVIEW OF SYSTEMS   ROS : All others Negative if blank [], Positive if [x]  General Vascular   [] Fevers [] Claudication   [] Chills [] Rest Pain   Skin Neurologic   [x] Tissue Loss [x] Lower extremity neuropathy     Objective:    /74   Pulse 56   Temp 97.5 °F (36.4 °C) (Temporal)   Resp 19   Wt Readings from Last 3 Encounters:   07/15/20 128 lb 12.8 oz (58.4 kg)   06/16/20 129 lb (58.5 kg)   05/15/20 125 lb (56.7 kg)       PHYSICAL EXAM   CONSTITUTIONAL:   Awake, alert, cooperative   PSYCHIATRIC :  Oriented to time, place and person      normal insight to disease process  EXTREMITIES:   R LE Open wounds are noted   Skin color is abnormal with ulcer   Edema is  noted   Sensation intact to light touch   Palpation of the foot does cause pain   5/5 strength DF/PF  L LE Open wounds are not noted   Skin color is normal   Edema is  noted   Sensation intact to light touch   Palpation of the foot does cause pain   5/5 strength DF/PF  R dorsalis pedis 1 L dorsalis pedis 1   R posterior tibial 1 L posterior tibial 1     Assessment:     Problem List Items Addressed This Visit     Non-pressure chronic ulcer of other part of right lower leg with fat layer exposed ([facility] [address]          Pre Debridement Measurements:  Are located in the [facility] [place]  Documentation Flow Sheet  Post Debridement Measurements:  Wound/Ulcer Descriptions are Pre Debridement except measurements:     Wound 07/12/20 Leg Lateral;Lower;Right (Active)   Number of days: 44       Wound 08/19/20 Leg Left;Lateral Wound #1 aquired 7/15/2020 (Active)   Wound Image   08/19/20 1057   Dressing Status Clean;Dry; Intact 08/19/20 1208   Dressing Changed Changed/New 08/19/20 1208   Dressing/Treatment Collagen; Other (comment) 08/19/20 1208   Wound Cleansed Rinsed/Irrigated with saline 08/19/20 1208   Wound Length (cm) 7.5 cm 08/26/20 1048   Wound Width (cm) 5 cm 08/26/20 1048   Wound Depth (cm) 0.2 cm 08/26/20 1048   Wound Surface Area (cm^2) 37.5 cm^2 08/26/20 1048   Change in Wound Size % (l*w) -2.8 08/26/20 1048   Wound Volume (cm^3) 7.5 cm^3 08/26/20 1048   Wound Healing % 31 08/26/20 1048   Post-Procedure Length (cm) 7.5 cm 08/26/20 1118   Post-Procedure Width (cm) 5 cm 08/26/20 1118   Post-Procedure Depth (cm) 0.2 cm 08/26/20 1118   Post-Procedure Surface Area (cm^2) 37.5 cm^2 08/26/20 1118   Post-Procedure Volume (cm^3) 7.5 cm^3 08/26/20 1118   Wound Assessment Red;Yellow;Pink 08/26/20 1048   Drainage Amount Large 08/26/20 1048   Drainage Description Tan;Yellow;Serosanguinous;Green 08/26/20 1048   Odor None 08/26/20 1048   Jaycee-wound Assessment Purple;Fragile 08/26/20 1048   Number of days: 7          Procedure Note  Indications:  Based on my examination of this patient's wound(s)/ulcer(s) today, debridement is required to promote healing and evaluate the wound base. Performed by: Preeti Larson DPM    Consent obtained:  Yes    Time out taken:  Yes    Pain Control: Anesthetic  Anesthetic: 4% Lidocaine Liquid Topical     Debridement:Excisional Debridement    Using #15 blade scalpel the wound(s)/ulcer(s) was/were sharply debrided down through and including the removal of subcutaneous tissue.         Devitalized Tissue Debrided:  fibrin, biofilm, slough and necrotic/eschar to stimulate bleeding to promote healing, post debridement good bleeding base and wound edges noted    Wound/Ulcer #: 1    Percent of Wound/Ulcer #1 aquired 7/15/2020 (Active)   Wound Image   08/19/20 1057   Dressing Status Clean;Dry; Intact 08/19/20 1208   Dressing Changed Changed/New 08/19/20 1208   Dressing/Treatment Collagen; Other (comment) 08/19/20 1208   Wound Cleansed Rinsed/Irrigated with saline 08/19/20 1208   Wound Length (cm) 7.6 cm 08/19/20 1057   Wound Width (cm) 4.8 cm 08/19/20 1057   Wound Depth (cm) 0.3 cm 08/19/20 1057   Wound Surface Area (cm^2) 36.48 cm^2 08/19/20 1057   Wound Volume (cm^3) 10.94 cm^3 08/19/20 1057   Post-Procedure Length (cm) 7.5 cm 08/26/20 1048   Post-Procedure Width (cm) 5 cm 08/26/20 1048   Post-Procedure Depth (cm) 0.2 cm 08/26/20 1048   Post-Procedure Surface Area (cm^2) 37.5 cm^2 08/26/20 1048   Post-Procedure Volume (cm^3) 7.5 cm^3 08/26/20 1048   Wound Assessment Red;Yellow;Pink 08/26/20 1048   Drainage Amount Large 08/26/20 1048   Drainage Description Tan;Yellow;Serosanguinous;Green 08/26/20 1048   Odor None 08/26/20 1048   Jaycee-wound Assessment Purple;Fragile 08/26/20 1048   Number of days: 7        [address] [place] [address] followup visit _________one week with Dr. German____________________  (Please note your next appointment above and if you are unable to keep, kindly give a 24 hour notice. Thank you.)    Physician signature:__________________________      If you experience any of the following, please call the [address] [place] [address] Road during business hours:    * Increase in Pain  * Temperature over 101  * Increase in drainage from your wound  * Drainage with a foul odor  * Bleeding  * Increase in swelling  * Need for compression bandage changes due to slippage, breakthrough drainage. If you need medical attention outside of the business hours of the [address] [place] [address] Road please contact your PCP or go to the nearest emergency room.         Electronically signed by Jovany Prabhakar DPM on 8/26/2020 at 11:22 AM

## 2020-08-31 ENCOUNTER — HOSPITAL ENCOUNTER (OUTPATIENT)
Age: 85
Discharge: HOME | End: 2020-09-02
Payer: MEDICARE

## 2020-09-01 LAB
SARS-COV-2: NOT DETECTED
SOURCE: NORMAL

## 2020-09-02 ENCOUNTER — HOSPITAL ENCOUNTER (OUTPATIENT)
Dept: WOUND CARE | Age: 85
Discharge: HOME | End: 2020-09-02
Payer: MEDICARE

## 2020-09-02 VITALS
HEART RATE: 76 BPM | TEMPERATURE: 98.1 F | DIASTOLIC BLOOD PRESSURE: 60 MMHG | RESPIRATION RATE: 18 BRPM | SYSTOLIC BLOOD PRESSURE: 118 MMHG

## 2020-09-02 PROCEDURE — 87070 CULTURE OTHR SPECIMN AEROBIC: CPT

## 2020-09-02 PROCEDURE — 11042 DBRDMT SUBQ TIS 1ST 20SQCM/<: CPT

## 2020-09-02 PROCEDURE — 11045 DBRDMT SUBQ TISS EACH ADDL: CPT

## 2020-09-02 PROCEDURE — 87186 SC STD MICRODIL/AGAR DIL: CPT

## 2020-09-02 PROCEDURE — 87075 CULTR BACTERIA EXCEPT BLOOD: CPT

## 2020-09-02 RX ORDER — LIDOCAINE HYDROCHLORIDE 40 MG/ML
SOLUTION TOPICAL ONCE
Status: DISCONTINUED | OUTPATIENT
Start: 2020-09-02 | End: 2020-09-03 | Stop reason: HOSPADM

## 2020-09-02 NOTE — PROGRESS NOTES
[facility]aling Center  History and Physical/Consultation  Podiatry    Referring Physician : MD DANTE Garciashaw French 57 RECORD NUMBER:  [id number]  AGE: 80 y.o. GENDER: female  : 1935  EPISODE DATE:  2020  Subjective:     Chief Complaint   Patient presents with    Wound Check     wound right leg         HISTORY of PRESENT ILLNESS HPI     Vadim Francisco is a 80 y.o. female who presents today for wound/ulcer evaluation. History of Wound Context:  The patient has had a wound of right leg which was first noted approximately in 2020. This has been treated adaptic. On their initial visit to the wound healing center, 20 ,  the patient has noted that the wound has not been improving. The patient has had similar previous wounds in the past.      Pt is not on abx at time of initial visit.       Wound/Ulcer Pain Timing/Severity: constant  Quality of pain: aching  Severity:  4 / 10   Modifying Factors: Pain worsens with walking  Associated Signs/Symptoms: edema, erythema and drainage    Ulcer Identification:  Ulcer Type: venous  Contributing Factors: edema, venous stasis and lymphedema    Diabetic/Pressure/Non Pressure Ulcers onl y:  Ulcer: Non-Pressure ulcer, fat layer exposed    If patient has diabetic lower extremity wounds  Lock Classification of diabetic lower extremity wounds:    Grade Description   []  0 No open wound   []  1 Superficial ulcer involving the full skin thickness   []  2 Deep ulcer involves ligament, tendon, joint capsule, or fascia  No bone involvement or abscess presence   []  3 Deep Ulcer with abcess formation and/or osteomyelitis   []  4 Localized gangrene   []  5 Extensive gangrene of the foot     Wound: N/A     20  Debrided, needs ultrasound arterial, offload    20  Debrided, cont tx and care        PAST MEDICAL HISTORY      Diagnosis Date    Anticoagulated     takes Xarelto    Atrial fibrillation ([facility] [address]     follows with Dr. 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 Pedersen David    Pain in both lower legs 2020    Rheumatoid arthritis with rheumatoid factor ([facility] [address]     Rheumatoid arthritis(714.0)     Dr Valeria Veras; on DMD Villegas Cane)    Rib fractures 2014    Seasonal allergies     Skin cancer of lip     squamous cell    Wears dentures      Past Surgical History:   Procedure Laterality Date    CARDIAC CATHETERIZATION      CARPAL TUNNEL RELEASE Bilateral      SECTION      COLONOSCOPY  2011    Dr Jamel Murillo; diverticular disease; repeat in     COLONOSCOPY N/A 2020    COLONOSCOPY WITH BIOPSY performed by Oscar Salcedo MD at [address] [facility], TOTAL ABDOMINAL  early 's    TOTAL KNEE ARTHROPLASTY  2007    bilateral    UPPER GASTROINTESTINAL ENDOSCOPY N/A 2020    EGD BIOPSY [facility] by [facility] MD at [facility] ENDOSCOPY     Family History   Problem Relation Age of Onset    Heart Attack Mother 58    Other Father         suicide, depression, leg trouble    Other Sister         Dementia    Hypertension Sister     Hypertension Sister     Hypertension Brother     Thyroid Disease Sister     Rheum Arthritis Sister     Rheum Arthritis Brother     Other Daughter         hemochromatosis     Other Son hemachromatosis      Social History     Tobacco Use    Smoking status: Never Smoker    Smokeless tobacco: Never Used   Substance Use Topics    Alcohol use: No    Drug use: Never     Allergies   Allergen Reactions    Amoxicillin      GI ill effects     Current Outpatient Medications on File Prior to Encounter   Medication Sig Dispense Refill    vancomycin (VANCOCIN) 125 MG capsule Take 125 mg by mouth 4 times daily Take for 10 days      montelukast (SINGULAIR) 10 MG tablet TAKE 1 TABLET NIGHTLY 90 tablet 1    docusate sodium (COLACE, DULCOLAX) 100 MG CAPS Take 100 mg by mouth daily 30 capsule 0    vitamin D (ERGOCALCIFEROL) 1.25 MG (17513 UT) CAPS capsule Take 1 capsule by mouth once a week 6 capsule 1    ferrous sulfate (IRON 325) 325 (65 Fe) MG tablet Take 1 tablet by mouth daily (with breakfast) 30 tablet 5    gabapentin (NEURONTIN) 100 MG capsule Take 100 mg by mouth nightly.        vitamin B-12 (CYANOCOBALAMIN) 1000 MCG tablet Take 1,000 mcg by mouth daily      omeprazole (PRILOSEC) 40 MG delayed release capsule Take 1 capsule by mouth 2 times daily 180 capsule 3    famotidine (PEPCID) 40 MG tablet Take 1 tablet by mouth nightly 90 tablet 3    valsartan-hydroCHLOROthiazide (DIOVAN-HCT) 160-25 MG per tablet Take 1 tablet by mouth daily 90 tablet 1    rivaroxaban (XARELTO) 15 MG TABS tablet Take 1 tablet by mouth daily (with breakfast) 90 tablet 3    flecainide (TAMBOCOR) 50 MG tablet Take 1 tablet by mouth 2 times daily 180 tablet 3    metoprolol tartrate (LOPRESSOR) 25 MG tablet Take 1 tablet by mouth 2 times daily 180 tablet 3    levothyroxine (LEVOTHROID) 100 MCG tablet Take 1 tablet by mouth Daily 90 tablet 1    polyethyl glycol-propyl glycol 0.4-0.3 % (SYSTANE) 0.4-0.3 % ophthalmic solution 1 drop as needed for Dry Eyes      fluticasone (FLONASE) 50 MCG/ACT nasal spray 2 sprays by Nasal route daily 2 sprays in each nostril daily 1 Bottle 11    celecoxib (CELEBREX) 200 MG capsule TAKE 1 CAPSULE DAILY 90 capsule 3    Probiotic Product (ACIDOPHILUS PROBIOTIC) CAPS capsule Take 1 capsule by mouth daily      fexofenadine (ALLEGRA) 180 MG tablet Take 180 mg by mouth daily.  leucovorin calcium (WELLCOVORIN) 5 MG tablet Take 25 mg by mouth once a week Takes every Tuesday      methotrexate 2.5 MG tablet Take by mouth once a week 2 tabs at lunch and 4 tabs  in the pm once a week on Monday      ondansetron (ZOFRAN) 4 MG tablet Take 1 tablet by mouth every 8 hours as needed for Nausea or Vomiting 30 tablet 5     No current facility-administered medications on file prior to encounter.         REVIEW OF SYSTEMS   ROS : All others Negative if blank [], Positive if [x]  General Vascular   [] Fevers [] Claudication   [] Chills [] Rest Pain   Skin Neurologic   [x] Tissue Loss [x] Lower extremity neuropathy     Objective:    /60   Pulse 76   Temp 98.1 °F (36.7 °C) (Temporal)   Resp 18   Wt Readings from Last 3 Encounters:   07/15/20 128 lb 12.8 oz (58.4 kg)   06/16/20 129 lb (58.5 kg)   05/15/20 125 lb (56.7 kg)       PHYSICAL EXAM   CONSTITUTIONAL:   Awake, alert, cooperative   PSYCHIATRIC :  Oriented to time, place and person      normal insight to disease process  EXTREMITIES:   R LE Open wounds are noted   Skin color is abnormal with ulcer   Edema is  noted   Sensation intact to light touch   Palpation of the foot does cause pain   5/5 strength DF/PF  L LE Open wounds are not noted   Skin color is normal   Edema is  noted   Sensation intact to light touch   Palpation of the foot does cause pain   5/5 strength DF/PF  R dorsalis pedis 1 L dorsalis pedis 1   R posterior tibial 1 L posterior tibial 1     Assessment:     Problem List Items Addressed This Visit     Non-pressure chronic ulcer of other part of right lower leg with fat layer exposed ([facility] [address]          Pre Debridement Measurements:  Are located in the [facility]  Documentation Flow Sheet  Post Debridement Measurements:  Wound/Ulcer signature:__________________________      If you experience any of the following, please call the [address] during business hours:    * Increase in Pain  * Temperature over 101  * Increase in drainage from your wound  * Drainage with a foul odor  * Bleeding  * Increase in swelling  * Need for compression bandage changes due to slippage, breakthrough drainage. If you need medical attention outside of the business hours of the [address] please contact your PCP or go to the nearest emergency room.         Electronically signed by Marialuisa Mix DPM on 9/2/2020 at 11:56 AM

## 2020-09-04 LAB
ANAEROBIC CULTURE: NORMAL
ORGANISM: ABNORMAL
ORGANISM: ABNORMAL
WOUND/ABSCESS: ABNORMAL
WOUND/ABSCESS: ABNORMAL

## 2020-09-08 ENCOUNTER — HOSPITAL ENCOUNTER (OUTPATIENT)
Age: 85
Discharge: HOME | End: 2020-09-10
Payer: MEDICARE

## 2020-09-08 PROCEDURE — U0003 INFECTIOUS AGENT DETECTION BY NUCLEIC ACID (DNA OR RNA); SEVERE ACUTE RESPIRATORY SYNDROME CORONAVIRUS 2 (SARS-COV-2) (CORONAVIRUS DISEASE [COVID-19]), AMPLIFIED PROBE TECHNIQUE, MAKING USE OF HIGH THROUGHPUT TECHNOLOGIES AS DESCRIBED BY CMS-2020-01-R: HCPCS

## 2020-09-09 ENCOUNTER — HOSPITAL ENCOUNTER (OUTPATIENT)
Dept: WOUND CARE | Age: 85
Discharge: HOME | End: 2020-09-09
Payer: MEDICARE

## 2020-09-09 VITALS
SYSTOLIC BLOOD PRESSURE: 118 MMHG | RESPIRATION RATE: 16 BRPM | HEART RATE: 64 BPM | TEMPERATURE: 97.8 F | DIASTOLIC BLOOD PRESSURE: 62 MMHG

## 2020-09-09 LAB
SARS-COV-2: NOT DETECTED
SOURCE: NORMAL

## 2020-09-09 PROCEDURE — 87186 SC STD MICRODIL/AGAR DIL: CPT

## 2020-09-09 PROCEDURE — 11045 DBRDMT SUBQ TISS EACH ADDL: CPT

## 2020-09-09 PROCEDURE — 87070 CULTURE OTHR SPECIMN AEROBIC: CPT

## 2020-09-09 PROCEDURE — 87075 CULTR BACTERIA EXCEPT BLOOD: CPT

## 2020-09-09 PROCEDURE — 11042 DBRDMT SUBQ TIS 1ST 20SQCM/<: CPT

## 2020-09-09 RX ORDER — LIDOCAINE HYDROCHLORIDE 40 MG/ML
SOLUTION TOPICAL ONCE
Status: DISCONTINUED | OUTPATIENT
Start: 2020-09-09 | End: 2020-09-10 | Stop reason: HOSPADM

## 2020-09-09 ASSESSMENT — PAIN SCALES - GENERAL: PAINLEVEL_OUTOF10: 0

## 2020-09-09 NOTE — PROGRESS NOTES
Multilayer Compression Wrap   (Not Unna) Below the Knee    NAME:  Neena Cobb OF BIRTH:  1935  MEDICAL RECORD NUMBER:  [id number]  DATE:  9/9/2020    Multilayer compression wrap: Removed old Multilayer wrap if indicated and wash leg with mild soap/water. Applied moisturizing agent to dry skin as needed. Applied primary and secondary dressing as ordered. Applied multilayered dressing below the knee to right lower leg. Instructed patient/caregiver not to remove dressing and to keep it clean and dry. Instructed patient/caregiver on complications to report to provider, such as pain, numbness in toes, heavy drainage, and slippage of dressing. Instructed patient on purpose of compression dressing and on activity and exercise recommendations.       Electronically signed by Catracho Murrieta RN on 9/9/2020 at 12:23 PM

## 2020-09-09 NOTE — PROGRESS NOTES
Wound Healing Center  History and Physical/Consultation  Podiatry    Referring Physician : Juanito Spencer MD  Brigette French 57 RECORD NUMBER:  [id number]  AGE: 80 y.o. GENDER: female  : 1935  EPISODE DATE:  2020  Subjective:     Chief Complaint   Patient presents with    Wound Check     right leg         HISTORY of PRESENT ILLNESS HPI     Sajan Cordova is a 80 y.o. female who presents today for wound/ulcer evaluation. History of Wound Context:  The patient has had a wound of right leg which was first noted approximately in 2020. This has been treated adaptic. On their initial visit to the wound healing center, 20 ,  the patient has noted that the wound has not been improving. The patient has had similar previous wounds in the past.      Pt is not on abx at time of initial visit.       Wound/Ulcer Pain Timing/Severity: constant  Quality of pain: aching  Severity:  4 / 10   Modifying Factors: Pain worsens with walking  Associated Signs/Symptoms: edema, erythema and drainage    Ulcer Identification:  Ulcer Type: venous  Contributing Factors: edema, venous stasis and lymphedema    Diabetic/Pressure/Non Pressure Ulcers onl y:  Ulcer: Non-Pressure ulcer, fat layer exposed    If patient has diabetic lower extremity wounds  Lock Classification of diabetic lower extremity wounds:    Grade Description   []  0 No open wound   []  1 Superficial ulcer involving the full skin thickness   []  2 Deep ulcer involves ligament, tendon, joint capsule, or fascia  No bone involvement or abscess presence   []  3 Deep Ulcer with abcess formation and/or osteomyelitis   []  4 Localized gangrene   []  5 Extensive gangrene of the foot     Wound: N/A     20  Debrided, needs ultrasound arterial, offload    20  Debrided, cont tx and care    20  Debrided, cont tx and care, culture taken      PAST MEDICAL HISTORY      Diagnosis Date    Anticoagulated     takes Xarelto    Atrial fibrillation [facility])     follows with Dr. 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 Emiliana Marcos    Pain in both lower legs 2020    Rheumatoid arthritis with rheumatoid factor ([facility] [address]     Rheumatoid arthritis(714.0)     Dr Tray Elias; on DMD Soledad Balo)    Rib fractures 2014    Seasonal allergies     Skin cancer of lip     squamous cell    Wears dentures      Past Surgical History:   Procedure Laterality Date    CARDIAC CATHETERIZATION      CARPAL TUNNEL RELEASE Bilateral      SECTION      COLONOSCOPY  2011    Dr Bree Cruz; diverticular disease; repeat in 2016    COLONOSCOPY N/A 2020    COLONOSCOPY WITH BIOPSY performed by Radha Trotter MD at [address] [facility], TOTAL ABDOMINAL  early 's    TOTAL KNEE ARTHROPLASTY  2007    bilateral    UPPER GASTROINTESTINAL ENDOSCOPY N/A 2020    EGD BIOPSY performed by Rad[facility]ter [facility] at [facility] ENDOSCOPY     Family History   Problem Relation Age of Onset    Heart Attack Mother 58    Other Father         suicide, depression, leg trouble    Other Sister         Dementia    Hypertension Sister     Hypertension Sister     Hypertension Brother     Thyroid Disease Sister     Rheum Arthritis Sister     Rheum Arthritis Brother     Other Daughter hemochromatosis     Other Son         hemachromatosis      Social History     Tobacco Use    Smoking status: Never Smoker    Smokeless tobacco: Never Used   Substance Use Topics    Alcohol use: No    Drug use: Never     Allergies   Allergen Reactions    Amoxicillin      GI ill effects     Current Outpatient Medications on File Prior to Encounter   Medication Sig Dispense Refill    vancomycin (VANCOCIN) 125 MG capsule Take 125 mg by mouth 4 times daily Take for 10 days      montelukast (SINGULAIR) 10 MG tablet TAKE 1 TABLET NIGHTLY 90 tablet 1    docusate sodium (COLACE, DULCOLAX) 100 MG CAPS Take 100 mg by mouth daily 30 capsule 0    vitamin D (ERGOCALCIFEROL) 1.25 MG (28516 UT) CAPS capsule Take 1 capsule by mouth once a week 6 capsule 1    ferrous sulfate (IRON 325) 325 (65 Fe) MG tablet Take 1 tablet by mouth daily (with breakfast) 30 tablet 5    gabapentin (NEURONTIN) 100 MG capsule Take 100 mg by mouth nightly.        vitamin B-12 (CYANOCOBALAMIN) 1000 MCG tablet Take 1,000 mcg by mouth daily      omeprazole (PRILOSEC) 40 MG delayed release capsule Take 1 capsule by mouth 2 times daily 180 capsule 3    famotidine (PEPCID) 40 MG tablet Take 1 tablet by mouth nightly 90 tablet 3    valsartan-hydroCHLOROthiazide (DIOVAN-HCT) 160-25 MG per tablet Take 1 tablet by mouth daily 90 tablet 1    rivaroxaban (XARELTO) 15 MG TABS tablet Take 1 tablet by mouth daily (with breakfast) 90 tablet 3    flecainide (TAMBOCOR) 50 MG tablet Take 1 tablet by mouth 2 times daily 180 tablet 3    metoprolol tartrate (LOPRESSOR) 25 MG tablet Take 1 tablet by mouth 2 times daily 180 tablet 3    levothyroxine (LEVOTHROID) 100 MCG tablet Take 1 tablet by mouth Daily 90 tablet 1    ondansetron (ZOFRAN) 4 MG tablet Take 1 tablet by mouth every 8 hours as needed for Nausea or Vomiting 30 tablet 5    polyethyl glycol-propyl glycol 0.4-0.3 % (SYSTANE) 0.4-0.3 % ophthalmic solution 1 drop as needed for Dry Eyes      fluticasone (FLONASE) 50 MCG/ACT nasal spray 2 sprays by Nasal route daily 2 sprays in each nostril daily 1 Bottle 11    celecoxib (CELEBREX) 200 MG capsule TAKE 1 CAPSULE DAILY 90 capsule 3    Probiotic Product (ACIDOPHILUS PROBIOTIC) CAPS capsule Take 1 capsule by mouth daily      fexofenadine (ALLEGRA) 180 MG tablet Take 180 mg by mouth daily.  leucovorin calcium (WELLCOVORIN) 5 MG tablet Take 25 mg by mouth once a week Takes every Tuesday      methotrexate 2.5 MG tablet Take by mouth once a week 2 tabs at lunch and 4 tabs  in the pm once a week on Monday       No current facility-administered medications on file prior to encounter.         REVIEW OF SYSTEMS   ROS : All others Negative if blank [], Positive if [x]  General Vascular   [] Fevers [] Claudication   [] Chills [] Rest Pain   Skin Neurologic   [x] Tissue Loss [x] Lower extremity neuropathy     Objective:    /62   Pulse 64   Temp 97.8 °F (36.6 °C) (Temporal)   Resp 16   Wt Readings from Last 3 Encounters:   07/15/20 128 lb 12.8 oz (58.4 kg)   06/16/20 129 lb (58.5 kg)   05/15/20 125 lb (56.7 kg)       PHYSICAL EXAM   CONSTITUTIONAL:   Awake, alert, cooperative   PSYCHIATRIC :  Oriented to time, place and person      normal insight to disease process  EXTREMITIES:   R LE Open wounds are noted   Skin color is abnormal with ulcer   Edema is  noted   Sensation intact to light touch   Palpation of the foot does cause pain   5/5 strength DF/PF  L LE Open wounds are not noted   Skin color is normal   Edema is  noted   Sensation intact to light touch   Palpation of the foot does cause pain   5/5 strength DF/PF  R dorsalis pedis 1 L dorsalis pedis 1   R posterior tibial 1 L posterior tibial 1     Assessment:     Problem List Items Addressed This Visit     None          Pre Debridement Measurements:  Are located in the [place]  Documentation Flow Sheet  Post Debridement Measurements:  Wound/Ulcer Descriptions are Pre Debridement except measurements:     Wound 07/12/20 Leg Lateral;Lower;Right (Active)   Number of days: 58       Wound 08/19/20 Leg Lateral;Right Wound #1 aquired 7/15/2020 (Active)   Wound Image   08/19/20 1057   Dressing Status Clean;Dry; Intact 08/19/20 1208   Dressing Changed Changed/New 09/02/20 1142   Dressing/Treatment Collagen with Ag;Non adherent 09/02/20 1142   Wound Cleansed Rinsed/Irrigated with saline 09/02/20 1142   Wound Length (cm) 6.5 cm 09/09/20 1105   Wound Width (cm) 4 cm 09/09/20 1105   Wound Depth (cm) 0.1 cm 09/09/20 1105   Wound Surface Area (cm^2) 26 cm^2 09/09/20 1105   Change in Wound Size % (l*w) 28.73 09/09/20 1105   Wound Volume (cm^3) 2.6 cm^3 09/09/20 1105   Wound Healing % 76 09/09/20 1105   Post-Procedure Length (cm) 6.5 cm 09/09/20 1128   Post-Procedure Width (cm) 4 cm 09/09/20 1128   Post-Procedure Depth (cm) 0.1 cm 09/09/20 1128   Post-Procedure Surface Area (cm^2) 26 cm^2 09/09/20 1128   Post-Procedure Volume (cm^3) 2.6 cm^3 09/09/20 1128   Wound Assessment Yellow;Pink 09/09/20 1105   Drainage Amount Moderate 09/09/20 1105   Drainage Description Yellow;Brown 09/09/20 1105   Odor None 09/09/20 1105   Jaycee-wound Assessment Fragile 09/09/20 1105   Number of days: 21          Procedure Note  Indications:  Based on my examination of this patient's wound(s)/ulcer(s) today, debridement is required to promote healing and evaluate the wound base. Performed by: Lisbeth Beck DPM    Consent obtained:  Yes    Time out taken:  Yes    Pain Control: Anesthetic  Anesthetic: 4% Lidocaine Liquid Topical     Debridement:Excisional Debridement    Using #15 blade scalpel the wound(s)/ulcer(s) was/were sharply debrided down through and including the removal of subcutaneous tissue.         Devitalized Tissue Debrided:  fibrin, biofilm, slough and necrotic/eschar to stimulate bleeding to promote healing, post debridement good bleeding base and wound edges noted    Wound/Ulcer #: 1    Percent of Wound/Ulcer Debrided: 100%    Total Surface Area Debrided:  28 sq cm     Estimated Blood Loss:  Minimal  Hemostasis Achieved:  by pressure    Procedural Pain:  3  / 10   Post Procedural Pain:  5 / 10     Response to treatment:  Well tolerated by patient. A culture was done. Plan:     Pt is not a smoker   - Discussed relationship of smoking and negative affects on wound healing   - Emphasized importance of tobacco avoidace/cessation   - Script for nicotine patch given to patient   - Information regarding support groups for smoking cessation given    In my professional opinion and based off the information that is available at this time this patient has appropriate indication for HBO Therapy: Maybe    Treatment Note please see attached Discharge Instructions    Written patient dismissal instructions given to patient and signed by patient or POA. Discharge Instructions         Visit Discharge/Physician Orders     Discharge condition: Stable     Assessment of pain at discharge:  none     Anesthetic used:  4% lidocaine solution     Discharge to: Home     Left via:Private automobile     Accompanied by: accompanied by grand daughter     [facility]     Dressing Orders: Clean right lower leg ulcer with normal saline.  Apply saline moistened toño inside wound bed, may cover with adaptic to prevent sticking. Cover with COBAN II and leave in place for 1 week.   Do not get wrap wet, if wrap becomes wet or slides down remove and apply toño and dry dressing and change every other day with spandagrip.       Treatment Orders:   Culture taken in clinic, vascular studies completed(Dr Shante Carpio saw) may be returning home soon from facility Studies reviewed  REPEAT TISSUE C&S TAKEN OF RIGHT LOWER LEG ULCER AGAIN TODAY IN CLINIC        Wound measurements:      Wound 07/12/20 Leg Lateral;Lower;Right (Active)   Number of days: 58       Wound 08/19/20 Leg Lateral;Right Wound #1 aquired 7/15/2020 (Active)   Wound Image   08/19/20 1057   Dressing Status Clean;Dry; Intact 08/19/20 1208   Dressing Changed Changed/New 09/02/20 1142   Dressing/Treatment Collagen with Ag;Non adherent 09/02/20 1142   Wound Cleansed Rinsed/Irrigated with saline 09/02/20 1142   Wound Length (cm) 6.5 cm 09/09/20 1105   Wound Width (cm) 4 cm 09/09/20 1105   Wound Depth (cm) 0.1 cm 09/09/20 1105   Wound Surface Area (cm^2) 26 cm^2 09/09/20 1105   Change in Wound Size % (l*w) 28.73 09/09/20 1105   Wound Volume (cm^3) 2.6 cm^3 09/09/20 1105   Wound Healing % 76 09/09/20 1105   Post-Procedure Length (cm) 6.5 cm 09/09/20 1128   Post-Procedure Width (cm) 4 cm 09/09/20 1128   Post-Procedure Depth (cm) 0.1 cm 09/09/20 1128   Post-Procedure Surface Area (cm^2) 26 cm^2 09/09/20 1128   Post-Procedure Volume (cm^3) 2.6 cm^3 09/09/20 1128   Wound Assessment Yellow;Pink 09/09/20 1105   Drainage Amount Moderate 09/09/20 1105   Drainage Description Yellow;Brown 09/09/20 1105   Odor None 09/09/20 1105   Jaycee-wound Assessment Fragile 09/09/20 1105   Number of days: 21         [address] [place] [address],[address] followup visit _______one week with Dr. German______________________  (Please note your next appointment above and if you are unable to keep, kindly give a 24 hour notice.  Thank you.)     Physician signature:__________________________        If you experience any of the following, please call the [address] [place] [address] during business hours:     * Increase in Pain  * Temperature over 101  * Increase in drainage from your wound  * Drainage with a foul odor  * Bleeding  * Increase in swelling  * Need for compression bandage changes due to slippage, breakthrough drainage.     If you need medical attention outside of the business hours of the [address] [place] [address] Road please contact your PCP or go to the nearest emergency room.           Electronically signed by Melinda Guzman DPM on 9/9/2020 at 11:43 AM

## 2020-09-10 ENCOUNTER — HOSPITAL ENCOUNTER (OUTPATIENT)
Age: 85
Discharge: HOME | End: 2020-09-12
Payer: MEDICARE

## 2020-09-10 LAB
ALBUMIN SERPL-MCNC: 3.6 G/DL (ref 3.5–5.2)
ALP BLD-CCNC: 72 U/L (ref 35–104)
ALT SERPL-CCNC: 16 U/L (ref 0–32)
ANION GAP SERPL CALCULATED.3IONS-SCNC: 14 MMOL/L (ref 7–16)
AST SERPL-CCNC: 21 U/L (ref 0–31)
BILIRUB SERPL-MCNC: 0.2 MG/DL (ref 0–1.2)
BUN BLDV-MCNC: 21 MG/DL (ref 8–23)
C-REACTIVE PROTEIN: 0.3 MG/DL (ref 0–0.4)
CALCIUM SERPL-MCNC: 9.6 MG/DL (ref 8.6–10.2)
CHLORIDE BLD-SCNC: 100 MMOL/L (ref 98–107)
CO2: 25 MMOL/L (ref 22–29)
CREAT SERPL-MCNC: 1.1 MG/DL (ref 0.5–1)
GFR AFRICAN AMERICAN: 57
GFR NON-AFRICAN AMERICAN: 47 ML/MIN/1.73
GLUCOSE BLD-MCNC: 86 MG/DL (ref 74–99)
HCT VFR BLD CALC: 36.3 % (ref 34–48)
HEMOGLOBIN: 11.7 G/DL (ref 11.5–15.5)
MCH RBC QN AUTO: 31.1 PG (ref 26–35)
MCHC RBC AUTO-ENTMCNC: 32.2 % (ref 32–34.5)
MCV RBC AUTO: 96.5 FL (ref 80–99.9)
PDW BLD-RTO: 15 FL (ref 11.5–15)
PLATELET # BLD: 350 E9/L (ref 130–450)
PMV BLD AUTO: 10.7 FL (ref 7–12)
POTASSIUM SERPL-SCNC: 4 MMOL/L (ref 3.5–5)
RBC # BLD: 3.76 E12/L (ref 3.5–5.5)
SEDIMENTATION RATE, ERYTHROCYTE: 29 MM/HR (ref 0–20)
SODIUM BLD-SCNC: 139 MMOL/L (ref 132–146)
TOTAL PROTEIN: 6.2 G/DL (ref 6.4–8.3)
WBC # BLD: 6.2 E9/L (ref 4.5–11.5)

## 2020-09-10 PROCEDURE — 36415 COLL VENOUS BLD VENIPUNCTURE: CPT

## 2020-09-10 PROCEDURE — 85651 RBC SED RATE NONAUTOMATED: CPT

## 2020-09-10 PROCEDURE — 85027 COMPLETE CBC AUTOMATED: CPT

## 2020-09-10 PROCEDURE — 86140 C-REACTIVE PROTEIN: CPT

## 2020-09-10 PROCEDURE — 80053 COMPREHEN METABOLIC PANEL: CPT

## 2020-09-16 ENCOUNTER — TELEPHONE (OUTPATIENT)
Dept: FAMILY MEDICINE CLINIC | Age: 85
End: 2020-09-16

## 2020-09-16 ENCOUNTER — HOSPITAL ENCOUNTER (OUTPATIENT)
Dept: WOUND CARE | Age: 85
Discharge: HOME | End: 2020-09-16
Payer: MEDICARE

## 2020-09-16 VITALS
WEIGHT: 126 LBS | RESPIRATION RATE: 20 BRPM | HEART RATE: 61 BPM | SYSTOLIC BLOOD PRESSURE: 117 MMHG | BODY MASS INDEX: 24.74 KG/M2 | DIASTOLIC BLOOD PRESSURE: 62 MMHG | TEMPERATURE: 97.5 F | HEIGHT: 60 IN

## 2020-09-16 PROCEDURE — 11042 DBRDMT SUBQ TIS 1ST 20SQCM/<: CPT

## 2020-09-16 PROCEDURE — 11045 DBRDMT SUBQ TISS EACH ADDL: CPT

## 2020-09-16 RX ORDER — GENTAMICIN SULFATE 1 MG/G
OINTMENT TOPICAL
Qty: 1 TUBE | Refills: 1 | Status: SHIPPED | OUTPATIENT
Start: 2020-09-16 | End: 2020-09-23

## 2020-09-16 RX ORDER — LIDOCAINE HYDROCHLORIDE 40 MG/ML
SOLUTION TOPICAL ONCE
Status: DISCONTINUED | OUTPATIENT
Start: 2020-09-16 | End: 2020-09-17 | Stop reason: HOSPADM

## 2020-09-16 RX ORDER — LEVOTHYROXINE SODIUM 0.1 MG/1
100 TABLET ORAL DAILY
Qty: 90 TABLET | Refills: 1 | Status: SHIPPED
Start: 2020-09-16 | End: 2020-09-17 | Stop reason: SDUPTHER

## 2020-09-16 ASSESSMENT — PAIN SCALES - GENERAL: PAINLEVEL_OUTOF10: 0

## 2020-09-16 NOTE — TELEPHONE ENCOUNTER
Janet Whitaker, patient's daughter, called from [facility] with concerns. Patient has a VV visit tomorrow and they wanted to update. She got home from [employer] on Friday. She has numbness and tingling in her feet. Pt denies being forgetful. Family notices she is crying a lot and mean. She has 24 hour care. The caretaker left her shift in tears due to how mean she was. Seems unhappy and depressed. Daughter wondered if she should be back on Cymbalta or Lexapro to help with mood and the leg pain. 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 She is being treated through wound care.

## 2020-09-16 NOTE — PROGRESS NOTES
[facility]  History and Physical/Consultation  Podiatry    Referring Physician : MD Brigette Narvaeznico 57 RECORD NUMBER:  [id number]  AGE: 80 y.o. GENDER: female  : 1935  EPISODE DATE:  2020  Subjective:     Chief Complaint   Patient presents with    Wound Check     right leg wound         HISTORY of PRESENT ILLNESS NOEL Maravilla is a 80 y.o. female who presents today for wound/ulcer evaluation. History of Wound Context:  The patient has had a wound of right leg which was first noted approximately in 2020. This has been treated adaptic. On their initial visit to the wound healing center, 20 ,  the patient has noted that the wound has not been improving. The patient has had similar previous wounds in the past.      Pt is not on abx at time of initial visit.       Wound/Ulcer Pain Timing/Severity: constant  Quality of pain: aching  Severity:  4 / 10   Modifying Factors: Pain worsens with walking  Associated Signs/Symptoms: edema, erythema and drainage    Ulcer Identification:  Ulcer Type: venous  Contributing Factors: edema, venous stasis and lymphedema    Diabetic/Pressure/Non Pressure Ulcers onl y:  Ulcer: Non-Pressure ulcer, fat layer exposed    If patient has diabetic lower extremity wounds  Lock Classification of diabetic lower extremity wounds:    Grade Description   []  0 No open wound   []  1 Superficial ulcer involving the full skin thickness   []  2 Deep ulcer involves ligament, tendon, joint capsule, or fascia  No bone involvement or abscess presence   []  3 Deep Ulcer with abcess formation and/or osteomyelitis   []  4 Localized gangrene   []  5 Extensive gangrene of the foot     Wound: N/A     20  Debrided, needs ultrasound arterial, offload    20  Debrided, cont tx and care    20  Debrided, cont tx and care, culture taken       20  Debrided, cont tx and care, gentamicin    PAST MEDICAL HISTORY      Diagnosis Date  Anticoagulated     takes Xarelto    Atrial fibrillation ([facility] [address]     follows with Dr. 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 Lisa Parra    Pain in both lower legs 2020    Rheumatoid arthritis with rheumatoid factor ([facility] [address]     Rheumatoid arthritis(714.0)     Dr Miguel Hester; on DMD Yaniv Felipe)    Rib fractures 2014    Seasonal allergies     Skin cancer of lip     squamous cell    Wears dentures      Past Surgical History:   Procedure Laterality Date    CARDIAC CATHETERIZATION      CARPAL TUNNEL RELEASE Bilateral      SECTION      COLONOSCOPY  2011    Dr Beltran Heath; diverticular disease; repeat in 2016    COLONOSCOPY N/A 2020    COLONOSCOPY WITH BIOPSY performed by Christine Polanco MD at [id number] [place] [address], TOTAL ABDOMINAL  early 's    TOTAL KNEE ARTHROPLASTY  2007    bilateral    UPPER GASTROINTESTINAL ENDOSCOPY N/A 2020    EGD BIOPSY performed by [facility]anco MD at [facility] ENDOSCOPY     Family History   Problem Relation Age of Onset    Heart Attack Mother 58    Other Father         suicide, depression, leg trouble    Other Sister         Dementia    Hypertension Sister     Hypertension Sister     Hypertension Brother     Thyroid Disease Sister     Rheum Arthritis Sister  Rheum Arthritis Brother     Other Daughter         hemochromatosis     Other Son         hemachromatosis      Social History     Tobacco Use    Smoking status: Never Smoker    Smokeless tobacco: Never Used   Substance Use Topics    Alcohol use: No    Drug use: Never     Allergies   Allergen Reactions    Amoxicillin      GI ill effects     Current Outpatient Medications on File Prior to Encounter   Medication Sig Dispense Refill    montelukast (SINGULAIR) 10 MG tablet TAKE 1 TABLET NIGHTLY 90 tablet 1    docusate sodium (COLACE, DULCOLAX) 100 MG CAPS Take 100 mg by mouth daily 30 capsule 0    vitamin D (ERGOCALCIFEROL) 1.25 MG (66466 UT) CAPS capsule Take 1 capsule by mouth once a week 6 capsule 1    ferrous sulfate (IRON 325) 325 (65 Fe) MG tablet Take 1 tablet by mouth daily (with breakfast) 30 tablet 5    gabapentin (NEURONTIN) 100 MG capsule Take 100 mg by mouth nightly.        vitamin B-12 (CYANOCOBALAMIN) 1000 MCG tablet Take 1,000 mcg by mouth daily      omeprazole (PRILOSEC) 40 MG delayed release capsule Take 1 capsule by mouth 2 times daily 180 capsule 3    famotidine (PEPCID) 40 MG tablet Take 1 tablet by mouth nightly 90 tablet 3    valsartan-hydroCHLOROthiazide (DIOVAN-HCT) 160-25 MG per tablet Take 1 tablet by mouth daily 90 tablet 1    rivaroxaban (XARELTO) 15 MG TABS tablet Take 1 tablet by mouth daily (with breakfast) 90 tablet 3    flecainide (TAMBOCOR) 50 MG tablet Take 1 tablet by mouth 2 times daily 180 tablet 3    metoprolol tartrate (LOPRESSOR) 25 MG tablet Take 1 tablet by mouth 2 times daily 180 tablet 3    levothyroxine (LEVOTHROID) 100 MCG tablet Take 1 tablet by mouth Daily 90 tablet 1    ondansetron (ZOFRAN) 4 MG tablet Take 1 tablet by mouth every 8 hours as needed for Nausea or Vomiting 30 tablet 5    polyethyl glycol-propyl glycol 0.4-0.3 % (SYSTANE) 0.4-0.3 % ophthalmic solution 1 drop as needed for Dry Eyes      fluticasone (FLONASE) 50 MCG/ACT nasal spray 2 except measurements:     Wound 08/19/20 Leg Lateral;Right Wound #1 aquired 7/15/2020 (Active)   Wound Image   09/16/20 1054   Dressing Status Clean;Dry; Intact 08/19/20 1208   Dressing Changed Changed/New 09/02/20 1142   Dressing/Treatment Collagen with Ag;Non adherent 09/02/20 1142   Wound Cleansed Rinsed/Irrigated with saline 09/02/20 1142   Wound Length (cm) 6.1 cm 09/16/20 1054   Wound Width (cm) 4.4 cm 09/16/20 1054   Wound Depth (cm) 0.1 cm 09/16/20 1054   Wound Surface Area (cm^2) 26.84 cm^2 09/16/20 1054   Change in Wound Size % (l*w) 26.43 09/16/20 1054   Wound Volume (cm^3) 2.68 cm^3 09/16/20 1054   Wound Healing % 76 09/16/20 1054   Post-Procedure Length (cm) 6.1 cm 09/16/20 1123   Post-Procedure Width (cm) 4.4 cm 09/16/20 1123   Post-Procedure Depth (cm) 0.1 cm 09/16/20 1123   Post-Procedure Surface Area (cm^2) 26.84 cm^2 09/16/20 1123   Post-Procedure Volume (cm^3) 2.68 cm^3 09/16/20 1123   Wound Assessment Pink;Red;Yellow 09/16/20 1054   Drainage Amount Moderate 09/16/20 1054   Drainage Description Serosanguinous 09/16/20 1054   Odor None 09/16/20 1054   Jaycee-wound Assessment Intact; Pink 09/16/20 1054   Number of days: 28          Procedure Note  Indications:  Based on my examination of this patient's wound(s)/ulcer(s) today, debridement is required to promote healing and evaluate the wound base. Performed by: Marialuisa Mix DPM    Consent obtained:  Yes    Time out taken:  Yes    Pain Control: Anesthetic  Anesthetic: 4% Lidocaine Liquid Topical     Debridement:Excisional Debridement    Using #15 blade scalpel the wound(s)/ulcer(s) was/were sharply debrided down through and including the removal of subcutaneous tissue.         Devitalized Tissue Debrided:  fibrin, biofilm, slough and necrotic/eschar to stimulate bleeding to promote healing, post debridement good bleeding base and wound edges noted    Wound/Ulcer #: 1    Percent of Wound/Ulcer Debrided: 100%    Total Surface Area Debrided:  26 breakthrough drainage. If you need medical attention outside of the business hours of the [facility] [address] please contact your PCP or go to the nearest emergency room.         Electronically signed by Jovany Prabhakar DPM on 9/16/2020 at 11:39 AM

## 2020-09-17 ENCOUNTER — VIRTUAL VISIT (OUTPATIENT)
Dept: FAMILY MEDICINE CLINIC | Age: 85
End: 2020-09-17
Payer: MEDICARE

## 2020-09-17 PROBLEM — S82.009A CLOSED FRACTURE OF PATELLA: Status: ACTIVE | Noted: 2018-04-19

## 2020-09-17 PROBLEM — S81.819A LACERATION OF LOWER LIMB: Status: ACTIVE | Noted: 2017-06-25

## 2020-09-17 PROCEDURE — 99213 OFFICE O/P EST LOW 20 MIN: CPT | Performed by: FAMILY MEDICINE

## 2020-09-17 RX ORDER — CELECOXIB 200 MG/1
CAPSULE ORAL
Qty: 90 CAPSULE | Refills: 3 | Status: SHIPPED
Start: 2020-09-17 | End: 2021-05-19

## 2020-09-17 RX ORDER — DULOXETIN HYDROCHLORIDE 20 MG/1
20 CAPSULE, DELAYED RELEASE ORAL DAILY
Qty: 30 CAPSULE | Refills: 1 | Status: SHIPPED
Start: 2020-09-17 | End: 2020-10-08 | Stop reason: SDUPTHER

## 2020-09-17 RX ORDER — FERROUS SULFATE 325(65) MG
325 TABLET ORAL EVERY OTHER DAY
Qty: 30 TABLET | Refills: 5
Start: 2020-09-17 | End: 2021-05-19 | Stop reason: ALTCHOICE

## 2020-09-17 RX ORDER — LEVOTHYROXINE SODIUM 0.1 MG/1
100 TABLET ORAL DAILY
Qty: 90 TABLET | Refills: 1 | Status: SHIPPED
Start: 2020-09-17 | End: 2021-05-04 | Stop reason: SDUPTHER

## 2020-09-17 NOTE — PROGRESS NOTES
TeleMedicine Patient Consent    This visit was performed as a virtual video visit using a synchronous, two-way, audio-video telehealth technology platform. Patient identification was verified at the start of the visit, including the patient's telephone number and physical location. I discussed with the patient the nature of our telehealth visits, that:     1. Due to the nature of an audio- video modality, the only components of a physical exam that could be done are the elements supported by direct observation. 2. The provider will evaluate the patient and recommend diagnostics and treatments based on their assessment. 3. If it was felt that the patient should be evaluated in clinic or an emergency room setting, then they would be directed there. 4. Our sessions are not being recorded and that personal health information is protected. 5. Our team would provide follow up care in person if/when the patient needs it. Patient does agree to proceed with telemedicine consultation. Patient location: home address in [facility]    Physician location: regular office location    This visit was completed virtually using Doxy. me    This visit was performed during the 0838 public health crisis and COVID-19 pandemic.   *Add GT modifier to all Video Visits*

## 2020-09-17 NOTE — PROGRESS NOTES
2020    TELEHEALTH EVALUATION -- Audio/Visual (During RRFOD-80 public health emergency)    HPI:    Willi Corey (:  1935) has requested an audio/video evaluation. Consent obtained per MA note, with attention to limitations inherent to a video visit for the following concern(s):    Follow-up from extended care facility  Admitted to a rehab facility after suffered a fall with a fractured patella and bimalleolar fracture of the left lower extremity  Has returned home with home health care, is not living at home. She is living with 1 of her children and their family  She reports frustration over the incident and subsequent loss of independence  Family has expressed concerns regarding mood  She reports that she is maybe a little more upset easily  Denies being depressed  Appetite and sleep are okay  She does not report any significant pain  Is ambulating only short distances  Doing home PT  Her goal is to return to ambulating independently although with a walker. She wants to return to her home  Does not have any follow-up at this time with orthopedics  She did have labs done about a week ago that showed stability of hemoglobin and inflammatory markers. Review of Systems   Constitutional: Positive for fatigue. Respiratory: Negative for shortness of breath. Cardiovascular: Negative for chest pain and leg swelling. Gastrointestinal: Negative for abdominal pain. Musculoskeletal: Positive for arthralgias, back pain and gait problem. Neurological: Positive for weakness. Prior to Visit Medications    Medication Sig Taking?  Authorizing Provider   celecoxib (CELEBREX) 200 MG capsule TAKE 1 CAPSULE DAILY Yes Jessica Streeter MD   ferrous sulfate (IRON 325) 325 (65 Fe) MG tablet Take 1 tablet by mouth every other day Yes Jessica Streeter MD   DULoxetine (CYMBALTA) 20 MG extended release capsule Take 1 capsule by mouth daily Yes Jessica Streeter MD   levothyroxine (LEVOTHROID) 100 MCG tablet Take 1 tablet by mouth Daily Yes Anjel Méndez MD   gentamicin (GARAMYCIN) 0.1 % ointment Apply topically 3 times daily. Yes Preeti Larson DPM   montelukast (SINGULAIR) 10 MG tablet TAKE 1 TABLET NIGHTLY Yes Anjel Méndez MD   docusate sodium (COLACE, DULCOLAX) 100 MG CAPS Take 100 mg by mouth daily Yes Iva Mcdowell MD   vitamin D (ERGOCALCIFEROL) 1.25 MG (99787 UT) CAPS capsule Take 1 capsule by mouth once a week Yes Iva Mcdowell MD   gabapentin (NEURONTIN) 100 MG capsule Take 100 mg by mouth nightly. Yes Historical Provider, MD   vitamin B-12 (CYANOCOBALAMIN) 1000 MCG tablet Take 1,000 mcg by mouth daily Yes Historical Provider, MD   omeprazole (PRILOSEC) 40 MG delayed release capsule Take 1 capsule by mouth 2 times daily Yes Anjel Méndez MD   famotidine (PEPCID) 40 MG tablet Take 1 tablet by mouth nightly Yes Anjel Méndez MD   valsartan-hydroCHLOROthiazide (DIOVAN-HCT) 160-25 MG per tablet Take 1 tablet by mouth daily Yes Anjel Méndez MD   rivaroxaban (XARELTO) 15 MG TABS tablet Take 1 tablet by mouth daily (with breakfast) Yes Familia Ramírez MD   flecainide (TAMBOCOR) 50 MG tablet Take 1 tablet by mouth 2 times daily Yes Familia Ramírez MD   metoprolol tartrate (LOPRESSOR) 25 MG tablet Take 1 tablet by mouth 2 times daily Yes FRAN Patrick - CNP   ondansetron (ZOFRAN) 4 MG tablet Take 1 tablet by mouth every 8 hours as needed for Nausea or Vomiting Yes Anjel Méndez MD   polyethyl glycol-propyl glycol 0.4-0.3 % (SYSTANE) 0.4-0.3 % ophthalmic solution 1 drop as needed for Dry Eyes Yes Historical Provider, MD   fluticasone (FLONASE) 50 MCG/ACT nasal spray 2 sprays by Nasal route daily 2 sprays in each nostril daily Yes Anjel Méndez MD   Probiotic Product (ACIDOPHILUS PROBIOTIC) CAPS capsule Take 1 capsule by mouth daily Yes Historical Provider, MD   fexofenadine (ALLEGRA) 180 MG tablet Take 180 mg by mouth daily.  Yes Historical Provider, MD leucovorin calcium (WELLCOVORIN) 5 MG tablet Take 25 mg by mouth once a week Takes every Tuesday Yes Historical Provider, MD   methotrexate 2.5 MG tablet Take by mouth once a week 2 tabs at lunch and 4 tabs  in the pm once a week on Monday Yes Historical Provider, MD       Social History     Tobacco Use    Smoking status: Never Smoker    Smokeless tobacco: Never Used   Substance Use Topics    Alcohol use: No    Drug use: Never        Past Medical History:   Diagnosis Date    Anticoagulated     takes Xarelto    Atrial fibrillation ([facility] [address]     follows with Dr. 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 Marin Search    Pain in both lower legs 5/7/2020    Patellar fracture 2020    Rheumatoid arthritis with rheumatoid factor ([facility] [address]     Rheumatoid arthritis(714.0)     Dr Chivo Card; on DMD Kit Gemma)    Rib fractures 12/30/2014    Seasonal allergies     Skin cancer of lip 2011    squamous cell    Wears dentures        PHYSICAL EXAMINATION:  Vital Signs: (As obtained by patient/caregiver or practitioner observation)    Physical Exam  Constitutional:       General: She is not in acute distress. Appearance: Normal appearance. She is not toxic-appearing. Pulmonary:      Effort: No respiratory distress. Neurological:      Mental Status: She is alert.  Mental return to independence will refer to PMR to assess and determine appropriate therapy. Meds refilled  OK to change iron to QOD because of stable Hgb       Return in about 3 weeks (around 10/8/2020), or if symptoms worsen or fail to improve, for VV for follow up new med. An  electronic signature was used to authenticate this note. --Chaitanya Fischer MD on 9/18/2020 at 4:30 PM        Pursuant to the emergency declaration under the [address] [place] [address], [facility] waiver authority and the [employer] Act, this Virtual  Visit was conducted, with patient's consent, to reduce the patient's risk of exposure to COVID-19 and provide continuity of care for an established patient. Services were provided through a video synchronous discussion virtually to substitute for in-person clinic visit.

## 2020-09-18 PROBLEM — S82.009A PATELLAR FRACTURE: Status: ACTIVE | Noted: 2020-01-01

## 2020-09-18 ASSESSMENT — ENCOUNTER SYMPTOMS
ABDOMINAL PAIN: 0
SHORTNESS OF BREATH: 0
BACK PAIN: 1

## 2020-09-23 ENCOUNTER — HOSPITAL ENCOUNTER (OUTPATIENT)
Dept: WOUND CARE | Age: 85
Discharge: HOME | End: 2020-09-23
Payer: MEDICARE

## 2020-09-23 VITALS
BODY MASS INDEX: 24.74 KG/M2 | HEART RATE: 62 BPM | WEIGHT: 126 LBS | RESPIRATION RATE: 16 BRPM | SYSTOLIC BLOOD PRESSURE: 110 MMHG | DIASTOLIC BLOOD PRESSURE: 64 MMHG | HEIGHT: 60 IN | TEMPERATURE: 96.6 F

## 2020-09-23 PROCEDURE — 11042 DBRDMT SUBQ TIS 1ST 20SQCM/<: CPT

## 2020-09-23 RX ORDER — LIDOCAINE HYDROCHLORIDE 40 MG/ML
SOLUTION TOPICAL ONCE
Status: DISCONTINUED | OUTPATIENT
Start: 2020-09-23 | End: 2020-09-24 | Stop reason: HOSPADM

## 2020-09-23 ASSESSMENT — PAIN SCALES - GENERAL: PAINLEVEL_OUTOF10: 0

## 2020-09-23 NOTE — PROGRESS NOTES
[facility]aling Center  History and Physical/Consultation  Podiatry    Referring Physician : MD DANTE Guerreroshaw French 57 RECORD NUMBER:  [id number]  AGE: 80 y.o. GENDER: female  : 1935  EPISODE DATE:  2020  Subjective:     Chief Complaint   Patient presents with    Wound Check     wound right leg         HISTORY of PRESENT ILLNESS HPI     Seamus Moctezuma is a 80 y.o. female who presents today for wound/ulcer evaluation. History of Wound Context:  The patient has had a wound of right leg which was first noted approximately in 2020. This has been treated adaptic. On their initial visit to the wound healing center, 20 ,  the patient has noted that the wound has not been improving. The patient has had similar previous wounds in the past.      Pt is not on abx at time of initial visit.       Wound/Ulcer Pain Timing/Severity: constant  Quality of pain: aching  Severity:  4 / 10   Modifying Factors: Pain worsens with walking  Associated Signs/Symptoms: edema, erythema and drainage    Ulcer Identification:  Ulcer Type: venous  Contributing Factors: edema, venous stasis and lymphedema    Diabetic/Pressure/Non Pressure Ulcers onl y:  Ulcer: Non-Pressure ulcer, fat layer exposed    If patient has diabetic lower extremity wounds  Lock Classification of diabetic lower extremity wounds:    Grade Description   []  0 No open wound   []  1 Superficial ulcer involving the full skin thickness   []  2 Deep ulcer involves ligament, tendon, joint capsule, or fascia  No bone involvement or abscess presence   []  3 Deep Ulcer with abcess formation and/or osteomyelitis   []  4 Localized gangrene   []  5 Extensive gangrene of the foot     Wound: N/A     20  Debrided, needs ultrasound arterial, offload    20  Debrided, cont tx and care    20  Debrided, cont tx and care, culture taken       20  Debrided, cont tx and care, gentamicin      20  Debrided, cont tx and care, offload   PAST MEDICAL HISTORY      Diagnosis Date    Anticoagulated     takes Xarelto    Atrial fibrillation ([facility] [address]     follows with Dr. 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 Laura Garcia    Pain in both lower legs 2020    Patellar fracture     Rheumatoid arthritis with rheumatoid factor ([facility] [address]     Rheumatoid arthritis(714.0)     Dr Teagan Ramírez; on DMD [facility])    Rib fractures 2014    Seasonal allergies     Skin cancer of lip     squamous cell    Wears dentures      Past Surgical History:   Procedure Laterality Date    CARDIAC CATHETERIZATION      CARPAL TUNNEL RELEASE Bilateral      SECTION      COLONOSCOPY  2011    Dr Ashleigh Molina; diverticular disease; repeat in 2016    COLONOSCOPY N/A 2020    COLONOSCOPY WITH BIOPSY performed by Anderson Bustillos MD at [address] [place] [address]  early     TOTAL KNEE ARTHROPLASTY  2007    bilateral    UPPER GASTROINTESTINAL ENDOSCOPY N/A 2020    EGD BIOPSY performed by Anderson Bustillos MD at [facility] ENDOSCOPY     Family History   Problem Relation Age of Onset    Heart Attack Mother 58    Other Father         suicide, depression, leg trouble    Other Sister         Dementia    Hypertension Sister    [facility] Hypertension Sister     Hypertension Brother     Thyroid Disease Sister     Rheum Arthritis Sister     Rheum Arthritis Brother     Other Daughter         hemochromatosis     Other Son         hemachromatosis      Social History     Tobacco Use    Smoking status: Never Smoker    Smokeless tobacco: Never Used   Substance Use Topics    Alcohol use: No    Drug use: Never     Allergies   Allergen Reactions    Amoxicillin      GI ill effects     Current Outpatient Medications on File Prior to Encounter   Medication Sig Dispense Refill    celecoxib (CELEBREX) 200 MG capsule TAKE 1 CAPSULE DAILY 90 capsule 3    ferrous sulfate (IRON 325) 325 (65 Fe) MG tablet Take 1 tablet by mouth every other day 30 tablet 5    DULoxetine (CYMBALTA) 20 MG extended release capsule Take 1 capsule by mouth daily 30 capsule 1    levothyroxine (LEVOTHROID) 100 MCG tablet Take 1 tablet by mouth Daily 90 tablet 1    gentamicin (GARAMYCIN) 0.1 % ointment Apply topically 3 times daily. 1 Tube 1    montelukast (SINGULAIR) 10 MG tablet TAKE 1 TABLET NIGHTLY 90 tablet 1    docusate sodium (COLACE, DULCOLAX) 100 MG CAPS Take 100 mg by mouth daily 30 capsule 0    vitamin D (ERGOCALCIFEROL) 1.25 MG (32669 UT) CAPS capsule Take 1 capsule by mouth once a week 6 capsule 1    gabapentin (NEURONTIN) 100 MG capsule Take 100 mg by mouth nightly.        vitamin B-12 (CYANOCOBALAMIN) 1000 MCG tablet Take 1,000 mcg by mouth daily      omeprazole (PRILOSEC) 40 MG delayed release capsule Take 1 capsule by mouth 2 times daily 180 capsule 3    famotidine (PEPCID) 40 MG tablet Take 1 tablet by mouth nightly 90 tablet 3    valsartan-hydroCHLOROthiazide (DIOVAN-HCT) 160-25 MG per tablet Take 1 tablet by mouth daily 90 tablet 1    rivaroxaban (XARELTO) 15 MG TABS tablet Take 1 tablet by mouth daily (with breakfast) 90 tablet 3    flecainide (TAMBOCOR) 50 MG tablet Take 1 tablet by mouth 2 times daily 180 tablet 3    metoprolol tartrate (LOPRESSOR) 25 MG tablet Take 1 tablet by mouth 2 times daily 180 tablet 3    ondansetron (ZOFRAN) 4 MG tablet Take 1 tablet by mouth every 8 hours as needed for Nausea or Vomiting 30 tablet 5    polyethyl glycol-propyl glycol 0.4-0.3 % (SYSTANE) 0.4-0.3 % ophthalmic solution 1 drop as needed for Dry Eyes      fluticasone (FLONASE) 50 MCG/ACT nasal spray 2 sprays by Nasal route daily 2 sprays in each nostril daily 1 Bottle 11    Probiotic Product (ACIDOPHILUS PROBIOTIC) CAPS capsule Take 1 capsule by mouth daily      fexofenadine (ALLEGRA) 180 MG tablet Take 180 mg by mouth daily.  leucovorin calcium (WELLCOVORIN) 5 MG tablet Take 25 mg by mouth once a week Takes every Tuesday      methotrexate 2.5 MG tablet Take by mouth once a week 2 tabs at lunch and 4 tabs  in the pm once a week on Monday       No current facility-administered medications on file prior to encounter.         REVIEW OF SYSTEMS   ROS : All others Negative if blank [], Positive if [x]  General Vascular   [] Fevers [] Claudication   [] Chills [] Rest Pain   Skin Neurologic   [x] Tissue Loss [x] Lower extremity neuropathy     Objective:    /64   Pulse 62   Temp 96.6 °F (35.9 °C) (Temporal)   Resp 16   Ht 5' (1.524 m)   Wt 126 lb (57.2 kg)   BMI 24.61 kg/m²   Wt Readings from Last 3 Encounters:   09/23/20 126 lb (57.2 kg)   09/16/20 126 lb (57.2 kg)   07/15/20 128 lb 12.8 oz (58.4 kg)       PHYSICAL EXAM   CONSTITUTIONAL:   Awake, alert, cooperative   PSYCHIATRIC :  Oriented to time, place and person      normal insight to disease process  EXTREMITIES:   R LE Open wounds are noted   Skin color is abnormal with ulcer   Edema is  noted   Sensation intact to light touch   Palpation of the foot does cause pain   5/5 strength DF/PF  L LE Open wounds are not noted   Skin color is normal   Edema is  noted   Sensation intact to light touch   Palpation of the foot does cause pain   5/5 strength DF/PF  R dorsalis pedis 1 L dorsalis pedis 1 hour notice. Thank you.)    Physician signature:__________________________      If you experience any of the following, [facility] during business hours:    * Increase in Pain  * Temperature over 101  * Increase in drainage from your wound  * Drainage with a foul odor  * Bleeding  * Increase in swelling  * Need for compression bandage changes due to slippage, breakthrough drainage. If you need medical attention outside of the business hours of the [address] [place] [address] please contact your PCP or go to the nearest emergency room.         Electronically signed by Raffi Maed DPM on 9/23/2020 at 11:25 AM

## 2020-09-30 ENCOUNTER — HOSPITAL ENCOUNTER (OUTPATIENT)
Dept: WOUND CARE | Age: 85
Discharge: HOME | End: 2020-09-30
Payer: MEDICARE

## 2020-09-30 VITALS
SYSTOLIC BLOOD PRESSURE: 118 MMHG | DIASTOLIC BLOOD PRESSURE: 72 MMHG | TEMPERATURE: 97.8 F | RESPIRATION RATE: 16 BRPM | WEIGHT: 126 LBS | HEART RATE: 60 BPM | HEIGHT: 60 IN | BODY MASS INDEX: 24.74 KG/M2

## 2020-09-30 PROCEDURE — 87075 CULTR BACTERIA EXCEPT BLOOD: CPT

## 2020-09-30 PROCEDURE — 87070 CULTURE OTHR SPECIMN AEROBIC: CPT

## 2020-09-30 PROCEDURE — 11042 DBRDMT SUBQ TIS 1ST 20SQCM/<: CPT

## 2020-09-30 RX ORDER — GENTAMICIN SULFATE 1 MG/G
OINTMENT TOPICAL
Qty: 30 G | Refills: 2 | Status: SHIPPED | OUTPATIENT
Start: 2020-09-30 | End: 2020-10-07

## 2020-09-30 RX ORDER — LIDOCAINE HYDROCHLORIDE 40 MG/ML
SOLUTION TOPICAL ONCE
Status: DISCONTINUED | OUTPATIENT
Start: 2020-09-30 | End: 2020-10-01 | Stop reason: HOSPADM

## 2020-09-30 ASSESSMENT — PAIN SCALES - GENERAL: PAINLEVEL_OUTOF10: 0

## 2020-09-30 NOTE — PROGRESS NOTES
Wound Healing Center  History and Physical/Consultation  Podiatry    Referring Physician : MD DANTE Saenzshaw French 57 RECORD NUMBER:  [id number]  AGE: 80 y.o. GENDER: female  : 1935  EPISODE DATE:  2020  Subjective:     Chief Complaint   Patient presents with    Wound Check     right leg         HISTORY of PRESENT ILLNESS HPI     Matthew Zambrano is a 80 y.o. female who presents today for wound/ulcer evaluation. History of Wound Context:  The patient has had a wound of right leg which was first noted approximately in 2020. This has been treated adaptic. On their initial visit to the wound healing center, 20 ,  the patient has noted that the wound has not been improving. The patient has had similar previous wounds in the past.      Pt is not on abx at time of initial visit.       Wound/Ulcer Pain Timing/Severity: constant  Quality of pain: aching  Severity:  4 / 10   Modifying Factors: Pain worsens with walking  Associated Signs/Symptoms: edema, erythema and drainage    Ulcer Identification:  Ulcer Type: venous  Contributing Factors: edema, venous stasis and lymphedema    Diabetic/Pressure/Non Pressure Ulcers onl y:  Ulcer: Non-Pressure ulcer, fat layer exposed    If patient has diabetic lower extremity wounds  Lock Classification of diabetic lower extremity wounds:    Grade Description   []  0 No open wound   []  1 Superficial ulcer involving the full skin thickness   []  2 Deep ulcer involves ligament, tendon, joint capsule, or fascia  No bone involvement or abscess presence   []  3 Deep Ulcer with abcess formation and/or osteomyelitis   []  4 Localized gangrene   []  5 Extensive gangrene of the foot     Wound: N/A     20  Debrided, needs ultrasound arterial, offload    20  Debrided, cont tx and care    20  Debrided, cont tx and care, culture taken       20  Debrided, cont tx and care, gentamicin      20  Debrided, cont tx and care, offload 20  Debrided, cont tx and care, offload, healing well, skin tear noted  PAST MEDICAL HISTORY      Diagnosis Date    Anticoagulated     takes Xarelto    Atrial fibrillation ([facility] [address]     follows with Dr. 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 Savi Trotter    Pain in both lower legs 2020    Patellar fracture     Rheumatoid arthritis with rheumatoid factor ([facility] [address]     Rheumatoid arthritis(714.0)     Dr Aj Lemus; on DMD [facility])    Rib fractures 2014    Seasonal allergies     Skin cancer of lip     squamous cell    Wears dentures      Past Surgical History:   Procedure Laterality Date    CARDIAC CATHETERIZATION      CARPAL TUNNEL RELEASE Bilateral      SECTION      COLONOSCOPY  2011    Dr Mulugeta Juarez; diverticular disease; repeat in     COLONOSCOPY N/A 2020    COLONOSCOPY WITH BIOPSY performed by Aamir Gerard MD at [address] [place] [address]  early     TOTAL KNEE ARTHROPLASTY  2007    bilateral    UPPER GASTROINTESTINAL ENDOSCOPY N/A 2020    EGD BIOPSY [facility]ir [facility] at [facility] ENDOSCOPY     Family History   Problem Relation Age of Onset    Heart Attack Mother 58    Other Father         suicide, depression, leg trouble    Other Sister         Dementia    Hypertension Sister     Hypertension Sister     Hypertension Brother     Thyroid Disease Sister     Rheum Arthritis Sister     Rheum Arthritis Brother     Other Daughter         hemochromatosis     Other Son         hemachromatosis      Social History     Tobacco Use    Smoking status: Never Smoker    Smokeless tobacco: Never Used   Substance Use Topics    Alcohol use: No    Drug use: Never     Allergies   Allergen Reactions    Amoxicillin      GI ill effects     Current Outpatient Medications on File Prior to Encounter   Medication Sig Dispense Refill    celecoxib (CELEBREX) 200 MG capsule TAKE 1 CAPSULE DAILY 90 capsule 3    ferrous sulfate (IRON 325) 325 (65 Fe) MG tablet Take 1 tablet by mouth every other day 30 tablet 5    DULoxetine (CYMBALTA) 20 MG extended release capsule Take 1 capsule by mouth daily 30 capsule 1    levothyroxine (LEVOTHROID) 100 MCG tablet Take 1 tablet by mouth Daily 90 tablet 1    montelukast (SINGULAIR) 10 MG tablet TAKE 1 TABLET NIGHTLY 90 tablet 1    docusate sodium (COLACE, DULCOLAX) 100 MG CAPS Take 100 mg by mouth daily 30 capsule 0    vitamin D (ERGOCALCIFEROL) 1.25 MG (14243 UT) CAPS capsule Take 1 capsule by mouth once a week 6 capsule 1    gabapentin (NEURONTIN) 100 MG capsule Take 100 mg by mouth nightly.        vitamin B-12 (CYANOCOBALAMIN) 1000 MCG tablet Take 1,000 mcg by mouth daily      omeprazole (PRILOSEC) 40 MG delayed release capsule Take 1 capsule by mouth 2 times daily 180 capsule 3    famotidine (PEPCID) 40 MG tablet Take 1 tablet by mouth nightly 90 tablet 3    valsartan-hydroCHLOROthiazide (DIOVAN-HCT) 160-25 MG per tablet Take 1 tablet by mouth daily 90 tablet 1    rivaroxaban (XARELTO) 15 MG TABS tablet Take 1 tablet by mouth daily (with breakfast) 90 tablet 3    flecainide (TAMBOCOR) 50 MG tablet Take 1 tablet by mouth 2 times daily 180 tablet 3    metoprolol tartrate (LOPRESSOR) 25 MG tablet Take 1 tablet by mouth 2 times daily 180 tablet 3    polyethyl glycol-propyl glycol 0.4-0.3 % (SYSTANE) 0.4-0.3 % ophthalmic solution 1 drop as needed for Dry Eyes      fluticasone (FLONASE) 50 MCG/ACT nasal spray 2 sprays by Nasal route daily 2 sprays in each nostril daily 1 Bottle 11    Probiotic Product (ACIDOPHILUS PROBIOTIC) CAPS capsule Take 1 capsule by mouth daily      fexofenadine (ALLEGRA) 180 MG tablet Take 180 mg by mouth daily.  leucovorin calcium (WELLCOVORIN) 5 MG tablet Take 25 mg by mouth once a week Takes every Tuesday      methotrexate 2.5 MG tablet Take by mouth once a week 2 tabs at lunch and 4 tabs  in the pm once a week on Monday      ondansetron (ZOFRAN) 4 MG tablet Take 1 tablet by mouth every 8 hours as needed for Nausea or Vomiting 30 tablet 5     No current facility-administered medications on file prior to encounter.         REVIEW OF SYSTEMS   ROS : All others Negative if blank [], Positive if [x]  General Vascular   [] Fevers [] Claudication   [] Chills [] Rest Pain   Skin Neurologic   [x] Tissue Loss [x] Lower extremity neuropathy     Objective:    /72   Pulse 60   Temp 97.8 °F (36.6 °C)   Resp 16   Ht 5' (1.524 m)   Wt 126 lb (57.2 kg)   BMI 24.61 kg/m²   Wt Readings from Last 3 Encounters:   09/30/20 126 lb (57.2 kg)   09/23/20 126 lb (57.2 kg)   09/16/20 126 lb (57.2 kg)       PHYSICAL EXAM   CONSTITUTIONAL:   Awake, alert, cooperative   PSYCHIATRIC :  Oriented to time, place and person      normal insight to disease process  EXTREMITIES:   R LE Open wounds are noted   Skin color is abnormal with ulcer   Edema is  noted   Sensation intact to light touch   Palpation of the foot does cause pain   5/5 strength DF/PF  L LE Open wounds are not noted   Skin color is normal   Edema is  noted   Sensation intact to light touch   Palpation of the foot does cause pain   5/5 strength DF/PF  R dorsalis pedis 1 L dorsalis pedis 1   R posterior tibial 1 L posterior tibial 1     Assessment:     Problem List Items Addressed This Visit     Non-pressure chronic ulcer of other part of right lower leg with fat layer exposed ([address]          Pre Debridement Measurements:  Are located in the [place]  Documentation Flow Sheet  Post Debridement Measurements:  Wound/Ulcer Descriptions are Pre Debridement except measurements:     Wound 08/19/20 Leg Lateral;Right Wound #1 aquired 7/15/2020 (Active)   Wound Image   09/16/20 1054   Dressing Status New drainage; Changed 09/23/20 1140   Dressing Changed Changed/New 09/23/20 1140   Dressing/Treatment Alginate;Dry dressing;Roll gauze 09/23/20 1140   Wound Cleansed Rinsed/Irrigated with saline 09/23/20 1140   Wound Length (cm) 2.5 cm 09/30/20 1030   Wound Width (cm) 3.5 cm 09/30/20 1030   Wound Depth (cm) 0.1 cm 09/30/20 1030   Wound Surface Area (cm^2) 8.75 cm^2 09/30/20 1030   Change in Wound Size % (l*w) 76.01 09/30/20 1030   Wound Volume (cm^3) 0.88 cm^3 09/30/20 1030   Wound Healing % 92 09/30/20 1030   Post-Procedure Length (cm) 2.5 cm 09/30/20 1042   Post-Procedure Width (cm) 3.5 cm 09/30/20 1042   Post-Procedure Depth (cm) 0.1 cm 09/30/20 1042   Post-Procedure Surface Area (cm^2) 8.75 cm^2 09/30/20 1042   Post-Procedure Volume (cm^3) 0.88 cm^3 09/30/20 1042   Wound Assessment Pink;Red;Yellow 09/30/20 1030   Drainage Amount Small 09/30/20 1030   Drainage Description Serosanguinous 09/30/20 1030   Odor None 09/30/20 1030   Jaycee-wound Assessment Pink 09/30/20 1030   Number of days: 41       Wound 09/30/20 Leg Right;Proximal #2  (Active)   Wound Image   09/30/20 1030   Wound Length (cm) 2 cm 09/30/20 1030   Wound Width (cm) 1.8 cm 09/30/20 1030   Wound Depth (cm) 0.1 cm 09/30/20 1030   Wound Surface Area (cm^2) 3.6 cm^2 09/30/20 1030   Wound Volume (cm^3) 0.36 cm^3 09/30/20 1030   Post-Procedure Length (cm) 2 cm 09/30/20 1042   Post-Procedure Width (cm) 1.8 cm 09/30/20 1042   Post-Procedure Depth (cm) 0.1 cm 09/30/20 1042   Post-Procedure Surface Area (cm^2) 3.6 cm^2 09/30/20 1042   Post-Procedure Volume (cm^3) 0.36 cm^3 09/30/20 1042   Wound Assessment Red;Yellow 09/30/20 1030   Drainage Amount Moderate 09/30/20 1030   Drainage Description Serosanguinous 09/30/20 1030   Odor None 09/30/20 1030   Jaycee-wound Assessment Pink;Fragile 09/30/20 1030   Number of days: 0          Procedure Note  Indications:  Based on my examination of this patient's wound(s)/ulcer(s) today, debridement is required to promote healing and evaluate the wound base. Performed by: Zoya Smith DPM    Consent obtained:  Yes    Time out taken:  Yes    Pain Control: Anesthetic  Anesthetic: 4% Lidocaine Liquid Topical     Debridement:Excisional Debridement    Using #15 blade scalpel the wound(s)/ulcer(s) was/were sharply debrided down through and including the removal of subcutaneous tissue. Devitalized Tissue Debrided:  fibrin, biofilm, slough and necrotic/eschar to stimulate bleeding to promote healing, post debridement good bleeding base and wound edges noted    Wound/Ulcer #: 1    Percent of Wound/Ulcer Debrided: 100%    Total Surface Area Debrided:  13 sq cm     Estimated Blood Loss:  Minimal  Hemostasis Achieved:  by pressure    Procedural Pain:  3  / 10   Post Procedural Pain:  5 / 10     Response to treatment:  Well tolerated by patient. A culture was done. Plan:     Pt is not a smoker   - Discussed relationship of smoking and negative affects on wound healing   - Emphasized importance of tobacco avoidace/cessation   - Script for nicotine patch given to patient   - Information regarding support groups for smoking cessation given    In my professional opinion and based off the information that is available at this time this patient has appropriate indication for HBO Therapy: Maybe    Treatment Note please see attached Discharge Instructions    Written patient dismissal instructions given to patient and signed by patient or POA.          Discharge Instructions       Visit Discharge/Physician Orders    Discharge condition: Stable     Assessment of pain at discharge:  none     Anesthetic used:  4% lidocaine solution     Discharge to: Home     Left via:Private automobile     Accompanied by: accompanied by grand daughter     ECF/HHA: [facility] Fax: [phone]   Phone:  [phone]     Dressing Orders: Clean right lower leg ulcer with normal saline.  Apply GENTAMICIN TO WOUND BED.  APPLY ALGINATE, 4 X 4 GAUZE, KERLEX AND COBAN WRAPPING FROM BASE OF TOES TO JUST BELOW THE KNEE. [id number] [facility]      Treatment Orders:    Tissue C&S taken of right lower leg ulcer.        [facility] followup visit ______One week with Dr. German_______________________  (Please note your next appointment above and if you are unable to keep, kindly give a 24 hour notice. Thank you.)    Physician signature:__________________________      If you experience any of the following, please call the [employer] during business hours:    * Increase in Pain  * Temperature over 101  * Increase in drainage from your wound  * Drainage with a foul odor  * Bleeding  * Increase in swelling  * Need for compression bandage changes due to slippage, breakthrough drainage. If you need medical attention outside of the business hours of the [employer] please contact your PCP or go to the nearest emergency room.         Electronically signed by Chase Storey DPM on 9/30/2020 at 10:47 AM

## 2020-10-02 ENCOUNTER — TELEPHONE (OUTPATIENT)
Dept: FAMILY MEDICINE CLINIC | Age: 85
End: 2020-10-02

## 2020-10-02 LAB
ANAEROBIC CULTURE: NORMAL
ORGANISM: ABNORMAL
WOUND/ABSCESS: ABNORMAL

## 2020-10-02 NOTE — TELEPHONE ENCOUNTER
FYI - Daughter Sulema Chu is updating that patient is doing really well on Duloxetine. The family has all noticed a big change. Also, she is holding off on seeing podiatry until she talks to you at her visit next week.

## 2020-10-06 ENCOUNTER — TELEPHONE (OUTPATIENT)
Dept: FAMILY MEDICINE CLINIC | Age: 85
End: 2020-10-06

## 2020-10-06 NOTE — TELEPHONE ENCOUNTER
FYI - Patient's daughter updating that she has a video visit on 10/8. Brittany Garza lives in [facility] and is not sure who will be helping patient will visit. Patient has been doing very well on Cymbalta but is already saying she wants to stop taking it due to being on so many medications. Brittany Garza said she is doing so well that she hates for her to stop it and to go back to being depressed and mean.

## 2020-10-07 ENCOUNTER — HOSPITAL ENCOUNTER (OUTPATIENT)
Dept: WOUND CARE | Age: 85
Discharge: HOME | End: 2020-10-07
Payer: MEDICARE

## 2020-10-07 VITALS
HEIGHT: 60 IN | RESPIRATION RATE: 18 BRPM | TEMPERATURE: 98 F | DIASTOLIC BLOOD PRESSURE: 72 MMHG | SYSTOLIC BLOOD PRESSURE: 120 MMHG | BODY MASS INDEX: 24.74 KG/M2 | WEIGHT: 126 LBS | HEART RATE: 57 BPM

## 2020-10-07 PROCEDURE — 11042 DBRDMT SUBQ TIS 1ST 20SQCM/<: CPT

## 2020-10-07 RX ORDER — LIDOCAINE HYDROCHLORIDE 40 MG/ML
SOLUTION TOPICAL ONCE
Status: DISCONTINUED | OUTPATIENT
Start: 2020-10-07 | End: 2020-10-08 | Stop reason: HOSPADM

## 2020-10-07 ASSESSMENT — PAIN SCALES - GENERAL: PAINLEVEL_OUTOF10: 0

## 2020-10-07 NOTE — PROGRESS NOTES
[facility]  History and Physical/Consultation  Podiatry    Referring Physician : MD DANTE Schneidershaw French 57 RECORD NUMBER:  [id number]  AGE: 80 y.o. GENDER: female  : 1935  EPISODE DATE:  10/7/2020  Subjective:     Chief Complaint   Patient presents with    Wound Check     right leg         HISTORY of PRESENT ILLNESS HPI     Barry Maciel is a 80 y.o. female who presents today for wound/ulcer evaluation. History of Wound Context:  The patient has had a wound of right leg which was first noted approximately in 2020. This has been treated adaptic. On their initial visit to the wound healing center, 20 ,  the patient has noted that the wound has not been improving. The patient has had similar previous wounds in the past.      Pt is not on abx at time of initial visit.       Wound/Ulcer Pain Timing/Severity: constant  Quality of pain: aching  Severity:  4 / 10   Modifying Factors: Pain worsens with walking  Associated Signs/Symptoms: edema, erythema and drainage    Ulcer Identification:  Ulcer Type: venous  Contributing Factors: edema, venous stasis and lymphedema    Diabetic/Pressure/Non Pressure Ulcers onl y:  Ulcer: Non-Pressure ulcer, fat layer exposed    If patient has diabetic lower extremity wounds  Lock Classification of diabetic lower extremity wounds:    Grade Description   []  0 No open wound   []  1 Superficial ulcer involving the full skin thickness   []  2 Deep ulcer involves ligament, tendon, joint capsule, or fascia  No bone involvement or abscess presence   []  3 Deep Ulcer with abcess formation and/or osteomyelitis   []  4 Localized gangrene   []  5 Extensive gangrene of the foot     Wound: N/A     20  Debrided, needs ultrasound arterial, offload    20  Debrided, cont tx and care    20  Debrided, cont tx and care, culture taken       20  Debrided, cont tx and care, gentamicin      20  Debrided, cont tx and care, offload 20  Debrided, cont tx and care, offload, healing well, skin tear noted    10-6-20  Debrided, cont tx and care  PAST MEDICAL HISTORY      Diagnosis Date    Anticoagulated     takes Xarelto    Atrial fibrillation ([facility] [address]     follows with Dr. 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 Gonzalez Carvajal    Pain in both lower legs 2020    Patellar fracture     Rheumatoid arthritis with rheumatoid factor ([facility] [address]     Rheumatoid arthritis(714.0)     Dr Shamika Mejía; on DMD Tanda Hanly)    Rib fractures 2014    Seasonal allergies     Skin cancer of lip     squamous cell    Wears dentures      Past Surgical History:   Procedure Laterality Date    CARDIAC CATHETERIZATION      CARPAL TUNNEL RELEASE Bilateral      SECTION      COLONOSCOPY  2011    Dr Sera Roberson; diverticular disease; repeat in     COLONOSCOPY N/A 2020    COLONOSCOPY WITH BIOPSY performed by Martha Morin MD at [address] [place] [address]  early     TOTAL KNEE ARTHROPLASTY  2007    bilateral    UPPER GASTROINTESTINAL ENDOSCOPY N/A 2020    EGD BIOPSY performed by [facility] at [facility] ENDOSCOPY     Family History   Problem Relation Age of Onset    Heart Attack Mother 58    Other Father         suicide, depression, leg trouble    Other Sister         Dementia    Hypertension Sister     Hypertension Sister     Hypertension Brother     Thyroid Disease Sister     Rheum Arthritis Sister     Rheum Arthritis Brother     Other Daughter         hemochromatosis     Other Son         hemachromatosis      Social History     Tobacco Use    Smoking status: Never Smoker    Smokeless tobacco: Never Used   Substance Use Topics    Alcohol use: No    Drug use: Never     Allergies   Allergen Reactions    Amoxicillin      GI ill effects     Current Outpatient Medications on File Prior to Encounter   Medication Sig Dispense Refill    gentamicin (GARAMYCIN) 0.1 % ointment Apply topically 3 times daily. 30 g 2    celecoxib (CELEBREX) 200 MG capsule TAKE 1 CAPSULE DAILY 90 capsule 3    ferrous sulfate (IRON 325) 325 (65 Fe) MG tablet Take 1 tablet by mouth every other day 30 tablet 5    DULoxetine (CYMBALTA) 20 MG extended release capsule Take 1 capsule by mouth daily 30 capsule 1    levothyroxine (LEVOTHROID) 100 MCG tablet Take 1 tablet by mouth Daily 90 tablet 1    montelukast (SINGULAIR) 10 MG tablet TAKE 1 TABLET NIGHTLY 90 tablet 1    docusate sodium (COLACE, DULCOLAX) 100 MG CAPS Take 100 mg by mouth daily 30 capsule 0    vitamin D (ERGOCALCIFEROL) 1.25 MG (59908 UT) CAPS capsule Take 1 capsule by mouth once a week 6 capsule 1    gabapentin (NEURONTIN) 100 MG capsule Take 100 mg by mouth nightly.        vitamin B-12 (CYANOCOBALAMIN) 1000 MCG tablet Take 1,000 mcg by mouth daily      omeprazole (PRILOSEC) 40 MG delayed release capsule Take 1 capsule by mouth 2 times daily 180 capsule 3    famotidine (PEPCID) 40 MG tablet Take 1 tablet by mouth nightly 90 tablet 3    valsartan-hydroCHLOROthiazide (DIOVAN-HCT) 160-25 MG per tablet Take 1 tablet by mouth daily 90 tablet 1    rivaroxaban (XARELTO) 15 MG TABS tablet Take 1 tablet by mouth daily (with breakfast) 90 tablet 3    flecainide (TAMBOCOR) 50 MG tablet Take 1 tablet by mouth 2 times daily 180 tablet 3    metoprolol tartrate (LOPRESSOR) 25 MG tablet Take 1 tablet by mouth 2 times daily 180 tablet 3    polyethyl glycol-propyl glycol 0.4-0.3 % (SYSTANE) 0.4-0.3 % ophthalmic solution 1 drop as needed for Dry Eyes      fluticasone (FLONASE) 50 MCG/ACT nasal spray 2 sprays by Nasal route daily 2 sprays in each nostril daily 1 Bottle 11    Probiotic Product (ACIDOPHILUS PROBIOTIC) CAPS capsule Take 1 capsule by mouth daily      fexofenadine (ALLEGRA) 180 MG tablet Take 180 mg by mouth daily.  leucovorin calcium (WELLCOVORIN) 5 MG tablet Take 25 mg by mouth once a week Takes every Tuesday      methotrexate 2.5 MG tablet Take by mouth once a week 2 tabs at lunch and 4 tabs  in the pm once a week on Monday      ondansetron (ZOFRAN) 4 MG tablet Take 1 tablet by mouth every 8 hours as needed for Nausea or Vomiting 30 tablet 5     No current facility-administered medications on file prior to encounter.         REVIEW OF SYSTEMS   ROS : All others Negative if blank [], Positive if [x]  General Vascular   [] Fevers [] Claudication   [] Chills [] Rest Pain   Skin Neurologic   [x] Tissue Loss [x] Lower extremity neuropathy     Objective:    /72   Pulse 57   Temp 98 °F (36.7 °C) (Temporal)   Resp 18   Ht 5' (1.524 m)   Wt 126 lb (57.2 kg)   BMI 24.61 kg/m²   Wt Readings from Last 3 Encounters:   10/07/20 126 lb (57.2 kg)   09/30/20 126 lb (57.2 kg)   09/23/20 126 lb (57.2 kg)       PHYSICAL EXAM   CONSTITUTIONAL:   Awake, alert, cooperative   PSYCHIATRIC :  Oriented to time, place and person      normal insight to disease process  EXTREMITIES:   R LE Open wounds are noted   Skin color is abnormal with ulcer   Edema is  noted   Sensation intact to light touch   Palpation of the foot does cause pain   5/5 strength DF/PF  L LE Open wounds are not noted   Skin color is normal   Edema is  noted   Sensation intact to light touch   Palpation of the foot does cause pain   5/5 strength DF/PF  R dorsalis pedis 1 L dorsalis pedis 1   R posterior tibial 1 L posterior tibial 1     Assessment:     Problem List Items Addressed This Visit     Non-pressure chronic ulcer of other part of right lower leg with fat layer exposed ([address]          Pre Debridement Measurements:  Are located in the [facility]  Documentation Flow Sheet  Post Debridement Measurements:  Wound/Ulcer Descriptions are Pre Debridement except measurements:     Wound 08/19/20 Leg Lateral;Right Wound #1 aquired 7/15/2020 (Active)   Wound Image   09/16/20 1054   Dressing Status Clean;Dry; Intact 10/07/20 1113   Wound Cleansed Irrigated with saline 10/07/20 1113   Dressing/Treatment Alginate;Roll gauze 10/07/20 1113   Wound Length (cm) 1.7 cm 10/07/20 1023   Wound Width (cm) 2 cm 10/07/20 1023   Wound Depth (cm) 0.1 cm 10/07/20 1023   Wound Surface Area (cm^2) 3.4 cm^2 10/07/20 1023   Change in Wound Size % (l*w) 90.68 10/07/20 1023   Wound Volume (cm^3) 0.34 cm^3 10/07/20 1023   Wound Healing % 97 10/07/20 1023   Post-Procedure Length (cm) 1.7 cm 10/07/20 1059   Post-Procedure Width (cm) 2 cm 10/07/20 1059   Post-Procedure Depth (cm) 0.1 cm 10/07/20 1059   Post-Procedure Surface Area (cm^2) 3.4 cm^2 10/07/20 1059   Post-Procedure Volume (cm^3) 0.34 cm^3 10/07/20 1059   Wound Assessment Other (Comment) 10/07/20 1023   Drainage Amount Small 10/07/20 1023   Drainage Description Serosanguinous 10/07/20 1023   Odor None 10/07/20 1023   Jaycee-wound Assessment Fragile 10/07/20 1023   Number of days: 49       Wound 09/30/20 Leg Right;Proximal #2  (Active)   Wound Image   09/30/20 1030   Dressing Status Clean;Dry; Intact 10/07/20 1113   Wound Cleansed Irrigated with saline 10/07/20 1113   Dressing/Treatment Alginate;Roll gauze 10/07/20 1113   Wound Length (cm) 1.9 cm 10/07/20 1023   Wound Width (cm) 1.7 cm 10/07/20 1023   Wound Depth (cm) 0.1 cm 10/07/20 1023   Wound Surface Area (cm^2) 3.23 cm^2 10/07/20 1023 Change in Wound Size % (l*w) 10.28 10/07/20 1023   Wound Volume (cm^3) 0.32 cm^3 10/07/20 1023   Wound Healing % 11 10/07/20 1023   Post-Procedure Length (cm) 1.9 cm 10/07/20 1059   Post-Procedure Width (cm) 1.7 cm 10/07/20 1059   Post-Procedure Depth (cm) 0.1 cm 10/07/20 1059   Post-Procedure Surface Area (cm^2) 3.23 cm^2 10/07/20 1059   Post-Procedure Volume (cm^3) 0.32 cm^3 10/07/20 1059   Wound Assessment Bleeding; Other (Comment) 10/07/20 1023   Drainage Amount Small 10/07/20 1023   Drainage Description Serosanguinous 10/07/20 1023   Odor None 10/07/20 1023   Jaycee-wound Assessment Fragile 10/07/20 1023   Number of days: 7          Procedure Note  Indications:  Based on my examination of this patient's wound(s)/ulcer(s) today, debridement is required to promote healing and evaluate the wound base. Performed by: Elena Goodman DPM    Consent obtained:  Yes    Time out taken:  Yes    Pain Control: Anesthetic  Anesthetic: 4% Lidocaine Liquid Topical     Debridement:Excisional Debridement    Using #15 blade scalpel the wound(s)/ulcer(s) was/were sharply debrided down through and including the removal of subcutaneous tissue. Devitalized Tissue Debrided:  fibrin, biofilm, slough and necrotic/eschar to stimulate bleeding to promote healing, post debridement good bleeding base and wound edges noted    Wound/Ulcer #: 1    Percent of Wound/Ulcer Debrided: 100%    Total Surface Area Debrided:  13 sq cm     Estimated Blood Loss:  Minimal  Hemostasis Achieved:  by pressure    Procedural Pain:  3  / 10   Post Procedural Pain:  5 / 10     Response to treatment:  Well tolerated by patient. A culture was done.       Plan:     Pt is not a smoker   - Discussed relationship of smoking and negative affects on wound healing   - Emphasized importance of tobacco avoidace/cessation   - Script for nicotine patch given to patient   - Information regarding support groups for smoking cessation given    In my professional opinion and based off the information that is available at this time this patient has appropriate indication for HBO Therapy: Maybe    Treatment Note please see attached Discharge Instructions    Written patient dismissal instructions given to patient and signed by patient or POA. Discharge Instructions       Visit Discharge/Physician Orders    Discharge condition: Stable     Assessment of pain at discharge:  none     Anesthetic used:  4% lidocaine solution     Discharge to: Home     Left via:Private automobile     Accompanied by: accompanied by grand daughter     ECF/HHA: [facility] Fax: [phone]   Phone:  [phone]     Dressing Orders: Clean right lower leg ulcer with normal saline.  Apply GENTAMICIN TO WOUND BED.  APPLY ALGINATE, 4 X 4 GAUZE, KERLEX AND COBAN WRAPPING FROM BASE OF TOES TO JUST BELOW THE KNEE. [id number] [facility]      Treatment Orders:    Tissue C&S taken of right lower leg ulcer REVIEWED no treatment required        [facility] followup visit ______One week with Dr. German_______________________  (Please note your next appointment above and if you are unable to keep, kindly give a 24 hour notice. Thank you.)    Physician signature:__________________________      If you experience any of the following, please call the [address] [place] [address] Road during business hours:    * Increase in Pain  * Temperature over 101  * Increase in drainage from your wound  * Drainage with a foul odor  * Bleeding  * Increase in swelling  * Need for compression bandage changes due to slippage, breakthrough drainage. If you need medical attention outside of the business hours of the [address] [place] [address] Road please contact your PCP or go to the nearest emergency room.         Electronically signed by Brisa Rankin DPM on 10/7/2020 at 11:21 AM

## 2020-10-08 ENCOUNTER — VIRTUAL VISIT (OUTPATIENT)
Dept: FAMILY MEDICINE CLINIC | Age: 85
End: 2020-10-08
Payer: MEDICARE

## 2020-10-08 PROCEDURE — 99213 OFFICE O/P EST LOW 20 MIN: CPT | Performed by: FAMILY MEDICINE

## 2020-10-08 RX ORDER — GABAPENTIN 100 MG/1
100 CAPSULE ORAL 2 TIMES DAILY
Qty: 180 CAPSULE | Refills: 0 | Status: SHIPPED
Start: 2020-10-08 | End: 2021-05-04 | Stop reason: SDUPTHER

## 2020-10-08 RX ORDER — DULOXETIN HYDROCHLORIDE 20 MG/1
20 CAPSULE, DELAYED RELEASE ORAL DAILY
Qty: 90 CAPSULE | Refills: 1 | Status: ON HOLD
Start: 2020-10-08 | End: 2021-02-16 | Stop reason: HOSPADM

## 2020-10-08 NOTE — PROGRESS NOTES
10/8/2020    TELEHEALTH EVALUATION -- Audio/Visual (During SBXET-78 public health emergency)    HPI:    Keegan Rivera (:  1935) has requested an audio/video evaluation. Consent obtained per MA note, with attention to limitations inherent to a video visit for the following concern(s):    Follow up medication start  Started on Cymbalta at a low dose  Patient reports no issues. Tolerating it without side effect  She is not sure that it is helping anything  Family feels that it has helped, she is more active, doing more things, and seems less irritable  Patient is reporting ongoing frustration about not returning to living independently  Reports ongoing neuropathy symptoms, states that they are getting worse, affecting most of her lower extremities. She has had a hard time tolerating several meds before including higher doses and frequencies of gabapentin, Lyrica, TCAs etc.  She is postponing seeing physiatry at the present time. She is continue to work with physical therapy at home, is unsure for how much longer. Review of Systems   Constitutional: Positive for fatigue. Negative for chills, diaphoresis and fever. Musculoskeletal: Positive for arthralgias and gait problem. Prior to Visit Medications    Medication Sig Taking? Authorizing Provider   gabapentin (NEURONTIN) 100 MG capsule Take 1 capsule by mouth 2 times daily for 90 days.  Yes Ace Whitt MD   DULoxetine (CYMBALTA) 20 MG extended release capsule Take 1 capsule by mouth daily Yes Ace Whitt MD   celecoxib (CELEBREX) 200 MG capsule TAKE 1 CAPSULE DAILY Yes Ace Whitt MD   ferrous sulfate (IRON 325) 325 (65 Fe) MG tablet Take 1 tablet by mouth every other day Yes Ace Whitt MD   levothyroxine (LEVOTHROID) 100 MCG tablet Take 1 tablet by mouth Daily Yes Ace Whitt MD   montelukast (SINGULAIR) 10 MG tablet TAKE 1 TABLET NIGHTLY Yes Ace Whitt MD   docusate sodium (COLACE, DULCOLAX) 100 MG CAPS Take 100 mg by mouth daily Yes Yareli Oswald MD   vitamin D (ERGOCALCIFEROL) 1.25 MG (77551 UT) CAPS capsule Take 1 capsule by mouth once a week Yes Yareli Oswald MD   vitamin B-12 (CYANOCOBALAMIN) 1000 MCG tablet Take 1,000 mcg by mouth daily Yes Historical Provider, MD   omeprazole (PRILOSEC) 40 MG delayed release capsule Take 1 capsule by mouth 2 times daily Yes Jennifer Granados MD   famotidine (PEPCID) 40 MG tablet Take 1 tablet by mouth nightly Yes Jennifer Granados MD   valsartan-hydroCHLOROthiazide (DIOVAN-HCT) 160-25 MG per tablet Take 1 tablet by mouth daily Yes Jennifer Granados MD   rivaroxaban (XARELTO) 15 MG TABS tablet Take 1 tablet by mouth daily (with breakfast) Yes Alannah Boggs MD   flecainide (TAMBOCOR) 50 MG tablet Take 1 tablet by mouth 2 times daily Yes Alannah Boggs MD   metoprolol tartrate (LOPRESSOR) 25 MG tablet Take 1 tablet by mouth 2 times daily Yes FRAN Keith CNP   ondansetron (ZOFRAN) 4 MG tablet Take 1 tablet by mouth every 8 hours as needed for Nausea or Vomiting Yes Jennifer Granados MD   polyethyl glycol-propyl glycol 0.4-0.3 % (SYSTANE) 0.4-0.3 % ophthalmic solution 1 drop as needed for Dry Eyes Yes Historical Provider, MD   fluticasone (FLONASE) 50 MCG/ACT nasal spray 2 sprays by Nasal route daily 2 sprays in each nostril daily Yes Jennifer Granados MD   Probiotic Product (ACIDOPHILUS PROBIOTIC) CAPS capsule Take 1 capsule by mouth daily Yes Historical Provider, MD   fexofenadine (ALLEGRA) 180 MG tablet Take 180 mg by mouth daily.  Yes Historical Provider, MD   leucovorin calcium (WELLCOVORIN) 5 MG tablet Take 25 mg by mouth once a week Takes every Tuesday Yes Historical Provider, MD   methotrexate 2.5 MG tablet Take by mouth once a week 2 tabs at lunch and 4 tabs  in the pm once a week on Monday Yes Historical Provider, MD       Social History     Tobacco Use    Smoking status: Never Smoker    Smokeless tobacco: Never Used   Substance Use Topics    Alcohol use: No    Drug use: Never        Past Medical History:   Diagnosis Date    Anticoagulated     takes Xarelto    Atrial fibrillation ([facility] [address]     follows with Dr. 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 Heard Cornea    Pain in both lower legs 5/7/2020    Patellar fracture 2020    Rheumatoid arthritis with rheumatoid factor ([facility] [address]     Rheumatoid arthritis(714.0)     Dr Santino Hernández; on DMD Teofilo Riddley)    Rib fractures 12/30/2014    Seasonal allergies     Skin cancer of lip 2011    squamous cell    Wears dentures        PHYSICAL EXAMINATION:  Vital Signs: (As obtained by patient/caregiver or practitioner observation)    Physical Exam  Vitals signs reviewed. Constitutional:       General: She is not in acute distress. Appearance: Normal appearance. Pulmonary:      Effort: Pulmonary effort is normal.   Neurological:      Mental Status: She is alert. Mental status is at baseline. Psychiatric:         Mood and Affect: Mood normal.         Behavior: Behavior normal.         Judgment: Judgment normal.           Other pertinent observable physical exam findings-     Due to this being a TeleHealth encounter, evaluation of the following organ systems is limited: Vitals/Constitutional/EENT/Resp/CV/GI//MS/Neuro/Skin/Heme-Lymph-Imm.     ASSESSMENT/PLAN:  Lulú Guillen was seen today for depression. Diagnoses and all orders for this visit:    Adjustment disorder with depressed mood  -     DULoxetine (CYMBALTA) 20 MG extended release capsule; Take 1 capsule by mouth daily    Medication management  -     DULoxetine (CYMBALTA) 20 MG extended release capsule; Take 1 capsule by mouth daily    Bimalleolar fracture, left, closed, initial encounter    Gait difficulty  -     gabapentin (NEURONTIN) 100 MG capsule; Take 1 capsule by mouth 2 times daily for 90 days. Paresthesia of lower extremity  -     gabapentin (NEURONTIN) 100 MG capsule; Take 1 capsule by mouth 2 times daily for 90 days. Plan  Patient was advised that her clinical status appears to be stable and that continuing the medication is probably a good idea. Continue Cymbalta, 90-day prescription given. Discussed dose adjustment but she is not ready to consider    Continue working with physical therapy. Will reassess need for physiatry based on upcoming visit. Encouraged her to consider this. Continue to work with therapy. If in-home therapy is discontinued, would recommend that she proceed with outpatient physical therapy. Discussed the worsening paresthesias. Previous notes reviewed. Will allow her to use a second dose of gabapentin somewhere in her day. If too fatigued or sedated to go back to only using at nighttime. Opportunity for questions from the patient and family provided. They verbalized complete understanding of the plan      Return in about 1 month (around 11/8/2020), or if symptoms worsen or fail to improve, for check on recent med changes/start. An  electronic signature was used to authenticate this note.     --Armando Fatima MD on 10/8/2020 at 4:39 PM        Pursuant to the emergency declaration under the [facility] waiver authority and the [employer] Act, this Virtual  Visit was conducted, with patient's consent, to reduce the patient's risk of exposure to COVID-19 and provide continuity of care for an established patient. Services were provided through a video synchronous discussion virtually to substitute for in-person clinic visit.

## 2020-10-14 ENCOUNTER — HOSPITAL ENCOUNTER (OUTPATIENT)
Dept: WOUND CARE | Age: 85
Discharge: HOME | End: 2020-10-14
Payer: MEDICARE

## 2020-10-14 VITALS
WEIGHT: 126 LBS | SYSTOLIC BLOOD PRESSURE: 116 MMHG | HEART RATE: 56 BPM | DIASTOLIC BLOOD PRESSURE: 80 MMHG | RESPIRATION RATE: 18 BRPM | BODY MASS INDEX: 24.74 KG/M2 | TEMPERATURE: 98 F | HEIGHT: 60 IN

## 2020-10-14 PROCEDURE — 11042 DBRDMT SUBQ TIS 1ST 20SQCM/<: CPT

## 2020-10-14 RX ORDER — LIDOCAINE HYDROCHLORIDE 40 MG/ML
SOLUTION TOPICAL ONCE
Status: DISCONTINUED | OUTPATIENT
Start: 2020-10-14 | End: 2020-10-15 | Stop reason: HOSPADM

## 2020-10-14 ASSESSMENT — PAIN SCALES - GENERAL: PAINLEVEL_OUTOF10: 0

## 2020-10-14 NOTE — PROGRESS NOTES
Wound Healing Center  History and Physical/Consultation  Podiatry    Referring Physician : MD DANTE Jadeshaw French 57 RECORD NUMBER:  [id number]  AGE: 80 y.o. GENDER: female  : 1935  EPISODE DATE:  10/14/2020  Subjective:     Chief Complaint   Patient presents with    Wound Check     right foot         HISTORY of PRESENT ILLNESS HPI     Rigoberto Martinez is a 80 y.o. female who presents today for wound/ulcer evaluation. History of Wound Context:  The patient has had a wound of right leg which was first noted approximately in 2020. This has been treated adaptic. On their initial visit to the wound healing center, 20 ,  the patient has noted that the wound has not been improving. The patient has had similar previous wounds in the past.      Pt is not on abx at time of initial visit.       Wound/Ulcer Pain Timing/Severity: constant  Quality of pain: aching  Severity:  4 / 10   Modifying Factors: Pain worsens with walking  Associated Signs/Symptoms: edema, erythema and drainage    Ulcer Identification:  Ulcer Type: venous  Contributing Factors: edema, venous stasis and lymphedema    Diabetic/Pressure/Non Pressure Ulcers onl y:  Ulcer: Non-Pressure ulcer, fat layer exposed    If patient has diabetic lower extremity wounds  Lock Classification of diabetic lower extremity wounds:    Grade Description   []  0 No open wound   []  1 Superficial ulcer involving the full skin thickness   []  2 Deep ulcer involves ligament, tendon, joint capsule, or fascia  No bone involvement or abscess presence   []  3 Deep Ulcer with abcess formation and/or osteomyelitis   []  4 Localized gangrene   []  5 Extensive gangrene of the foot     Wound: N/A     20  Debrided, needs ultrasound arterial, offload    20  Debrided, cont tx and care    20  Debrided, cont tx and care, culture taken       20  Debrided, cont tx and care, gentamicin      20  Debrided, cont tx and care, offload 20  Debrided, cont tx and care, offload, healing well, skin tear noted    10-6-20  Debrided, cont tx and care    10-7-20  Debrided, cont tx and care, one wound healed  PAST MEDICAL HISTORY      Diagnosis Date    Anticoagulated     takes Xarelto    Atrial fibrillation ([facility] [address]     follows with Dr. 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 Viki Hurtado    Pain in both lower legs 2020    Patellar fracture     Rheumatoid arthritis with rheumatoid factor ([facility] [address]     Rheumatoid arthritis(714.0)     Dr Mercedes Camargo; on DMD Suzanne Tho)    Rib fractures 2014    Seasonal allergies     Skin cancer of lip     squamous cell    Wears dentures      Past Surgical History:   Procedure Laterality Date    CARDIAC CATHETERIZATION      CARPAL TUNNEL RELEASE Bilateral      SECTION      COLONOSCOPY  2011    Dr Chioma Diehl; diverticular disease; repeat in 2016    COLONOSCOPY N/A 2020    COLONOSCOPY WITH BIOPSY performed by Latasha Casillas MD at [address] [place] [address]  early     TOTAL KNEE ARTHROPLASTY  2007    bilateral    UPPER GASTROINTESTINAL ENDOSCOPY N/A 2020    EGD BIOPSY [facility] END[facility]     Family History   Problem Relation Age of Onset    Heart Attack Mother 58    Other Father         suicide, depression, leg trouble    Other Sister         Dementia    Hypertension Sister     Hypertension Sister     Hypertension Brother     Thyroid Disease Sister     Rheum Arthritis Sister     Rheum Arthritis Brother     Other Daughter         hemochromatosis     Other Son         hemachromatosis      Social History     Tobacco Use    Smoking status: Never Smoker    Smokeless tobacco: Never Used   Substance Use Topics    Alcohol use: No    Drug use: Never     Allergies   Allergen Reactions    Amoxicillin      GI ill effects     Current Outpatient Medications on File Prior to Encounter   Medication Sig Dispense Refill    gabapentin (NEURONTIN) 100 MG capsule Take 1 capsule by mouth 2 times daily for 90 days.  180 capsule 0    DULoxetine (CYMBALTA) 20 MG extended release capsule Take 1 capsule by mouth daily 90 capsule 1    celecoxib (CELEBREX) 200 MG capsule TAKE 1 CAPSULE DAILY 90 capsule 3    ferrous sulfate (IRON 325) 325 (65 Fe) MG tablet Take 1 tablet by mouth every other day 30 tablet 5    levothyroxine (LEVOTHROID) 100 MCG tablet Take 1 tablet by mouth Daily 90 tablet 1    montelukast (SINGULAIR) 10 MG tablet TAKE 1 TABLET NIGHTLY 90 tablet 1    docusate sodium (COLACE, DULCOLAX) 100 MG CAPS Take 100 mg by mouth daily 30 capsule 0    vitamin D (ERGOCALCIFEROL) 1.25 MG (59858 UT) CAPS capsule Take 1 capsule by mouth once a week 6 capsule 1    vitamin B-12 (CYANOCOBALAMIN) 1000 MCG tablet Take 1,000 mcg by mouth daily      omeprazole (PRILOSEC) 40 MG delayed release capsule Take 1 capsule by mouth 2 times daily 180 capsule 3    famotidine (PEPCID) 40 MG tablet Take 1 tablet by mouth nightly 90 tablet 3    valsartan-hydroCHLOROthiazide (DIOVAN-HCT) 160-25 MG per tablet Take 1 tablet by mouth daily 90 tablet 1    rivaroxaban (XARELTO) 15 MG TABS tablet Take 1 tablet by mouth daily (with breakfast) 90 tablet 3    flecainide (TAMBOCOR) 50 MG tablet Take 1 tablet by mouth 2 times daily 180 tablet 3    metoprolol tartrate (LOPRESSOR) 25 MG tablet Take 1 tablet by mouth 2 times daily 180 tablet 3    polyethyl glycol-propyl glycol 0.4-0.3 % (SYSTANE) 0.4-0.3 % ophthalmic solution 1 drop as needed for Dry Eyes      fluticasone (FLONASE) 50 MCG/ACT nasal spray 2 sprays by Nasal route daily 2 sprays in each nostril daily 1 Bottle 11    Probiotic Product (ACIDOPHILUS PROBIOTIC) CAPS capsule Take 1 capsule by mouth daily      fexofenadine (ALLEGRA) 180 MG tablet Take 180 mg by mouth daily.  leucovorin calcium (WELLCOVORIN) 5 MG tablet Take 25 mg by mouth once a week Takes every Tuesday      methotrexate 2.5 MG tablet Take by mouth once a week 2 tabs at lunch and 4 tabs  in the pm once a week on Monday      ondansetron (ZOFRAN) 4 MG tablet Take 1 tablet by mouth every 8 hours as needed for Nausea or Vomiting 30 tablet 5     No current facility-administered medications on file prior to encounter.         REVIEW OF SYSTEMS   ROS : All others Negative if blank [], Positive if [x]  General Vascular   [] Fevers [] Claudication   [] Chills [] Rest Pain   Skin Neurologic   [x] Tissue Loss [x] Lower extremity neuropathy     Objective:    /80   Pulse 56   Temp 98 °F (36.7 °C) (Temporal)   Resp 18   Ht 5' (1.524 m)   Wt 126 lb (57.2 kg)   BMI 24.61 kg/m²   Wt Readings from Last 3 Encounters:   10/14/20 126 lb (57.2 kg)   10/07/20 126 lb (57.2 kg)   09/30/20 126 lb (57.2 kg)       PHYSICAL EXAM   CONSTITUTIONAL:   Awake, alert, cooperative   PSYCHIATRIC :  Oriented to time, place and person      normal insight to disease process  EXTREMITIES:   R LE Open wounds are noted   Skin color is abnormal with ulcer   Edema is  noted   Sensation intact to light touch   Palpation of the foot does cause pain   5/5 strength DF/PF  L LE Open wounds are not noted   Skin color is normal   Edema is  noted   Sensation intact to light touch   Palpation of the foot does cause pain   5/5 strength DF/PF  R dorsalis pedis 1 L dorsalis pedis 1   R posterior tibial 1 L posterior tibial 1     Assessment:     Problem List Items Addressed This Visit     Non-pressure chronic ulcer of other part of right lower leg with fat layer exposed ([facility] Graft)          Pre Debridement Measurements:  Are located in the [place]  Documentation Flow Sheet  Post Debridement Measurements:  Wound/Ulcer Descriptions are Pre Debridement except measurements:     Wound 08/19/20 Leg Lateral;Right Wound #1 aquired 7/15/2020 (Active)   Wound Image   09/16/20 1054   Dressing Status Clean;Dry; Intact 10/07/20 1113   Wound Cleansed Irrigated with saline 10/07/20 1113   Dressing/Treatment Alginate;Dry dressing;Roll gauze 09/30/20 1107   Wound Length (cm) 0 cm 10/14/20 1007   Wound Width (cm) 0 cm 10/14/20 1007   Wound Depth (cm) 0 cm 10/14/20 1007   Wound Surface Area (cm^2) 0 cm^2 10/14/20 1007   Change in Wound Size % (l*w) 100 10/14/20 1007   Wound Volume (cm^3) 0 cm^3 10/14/20 1007   Wound Healing % 100 10/14/20 1007   Post-Procedure Length (cm) 1.7 cm 10/07/20 1059   Post-Procedure Width (cm) 2 cm 10/07/20 1059   Post-Procedure Depth (cm) 0.1 cm 10/07/20 1059   Post-Procedure Surface Area (cm^2) 3.4 cm^2 10/07/20 1059   Post-Procedure Volume (cm^3) 0.34 cm^3 10/07/20 1059   Wound Assessment Other (Comment) 10/07/20 1023   Drainage Amount Small 10/07/20 1023   Drainage Description Serosanguinous 10/07/20 1023   Odor None 10/07/20 1023   Jaycee-wound Assessment Fragile 10/07/20 1023   Number of days: 55       Wound 09/30/20 Leg Right;Proximal #2  (Active)   Wound Image   09/30/20 1030   Dressing Status Clean;Dry; Intact 10/07/20 1113   Wound Cleansed Irrigated with saline 10/07/20 1113   Dressing/Treatment Alginate;Dry dressing 09/30/20 1107   Wound Length (cm) 1.9 cm 10/07/20 1023   Wound Width (cm) 1.7 cm 10/07/20 1023   Wound Depth (cm) 0.1 cm 10/07/20 1023   Wound Surface Area (cm^2) 3.23 cm^2 10/07/20 1023   Change in Wound Size % (l*w) 10.28 10/07/20 1023   Wound Volume (cm^3) 0.32 cm^3 10/07/20 1023   Wound Healing % 11 10/07/20 1023   Post-Procedure Length (cm) 1.9 cm 10/07/20 1059   Post-Procedure Width (cm) 1.7 cm 10/07/20 1059   Post-Procedure Depth (cm) 0.1 cm 10/07/20 1059   Post-Procedure Surface Area (cm^2) 3.23 cm^2 10/07/20 1059   Post-Procedure Volume (cm^3) 0.32 cm^3 10/07/20 1059   Wound Assessment Bleeding; Other (Comment) 10/07/20 1023   Drainage Amount Small 10/07/20 1023   Drainage Description Serosanguinous 10/07/20 1023   Odor None 10/07/20 1023   Jaycee-wound Assessment Fragile 10/07/20 1023   Number of days: 13          Procedure Note  Indications:  Based on my examination of this patient's wound(s)/ulcer(s) today, debridement is required to promote healing and evaluate the wound base. Performed by: Gt Walker DPM    Consent obtained:  Yes    Time out taken:  Yes    Pain Control: Anesthetic  Anesthetic: 4% Lidocaine Liquid Topical     Debridement:Excisional Debridement    Using #15 blade scalpel the wound(s)/ulcer(s) was/were sharply debrided down through and including the removal of subcutaneous tissue. Devitalized Tissue Debrided:  fibrin, biofilm, slough and necrotic/eschar to stimulate bleeding to promote healing, post debridement good bleeding base and wound edges noted    Wound/Ulcer #: 2    Percent of Wound/Ulcer Debrided: 100%    Total Surface Area Debrided:  13 sq cm     Estimated Blood Loss:  Minimal  Hemostasis Achieved:  by pressure    Procedural Pain:  3  / 10   Post Procedural Pain:  5 / 10     Response to treatment:  Well tolerated by patient. A culture was done.       Plan:     Pt is not a smoker   - Discussed relationship of smoking and negative affects on wound healing   - Emphasized importance of tobacco avoidace/cessation   - Script for nicotine patch given to patient   - Information regarding support groups for smoking cessation given    In my professional opinion and based off the information that is available at this time this patient has appropriate indication for HBO Therapy: Maybe    Treatment Note please see attached Discharge Instructions    Written patient dismissal instructions given to patient and signed by patient or POA. Discharge Instructions       Visit Discharge/Physician Orders    Discharge condition: Stable     Assessment of pain at discharge:  none     Anesthetic used:  4% lidocaine solution     Discharge to: Home     Left via:Private automobile     Accompanied by: accompanied by grand daughter     ECF/HHA: [facility] [place] Fax: [phone]   Phone:  [phone]     Dressing Orders: Clean right lower leg ulcer with normal saline.  Apply GENTAMICIN TO WOUND BED.  APPLY ALGINATE, 4 X 4 GAUZE, KERLEX AND COBAN WRAPPING FROM BASE OF TOES TO JUST BELOW THE KNEE. [id number] [facility]      Treatment Orders:           [facility] followup visit ______One week with Dr. German_______________________  (Please note your next appointment above and if you are unable to keep, kindly give a 24 hour notice. Thank you.)    Physician signature:__________________________      If you experience any of the following, please call the [address] [place] [address] Road during business hours:    * Increase in Pain  * Temperature over 101  * Increase in drainage from your wound  * Drainage with a foul odor  * Bleeding  * Increase in swelling  * Need for compression bandage changes due to slippage, breakthrough drainage. If you need medical attention outside of the business hours of the [address] [place] [address] Road please contact your PCP or go to the nearest emergency room.         Electronically signed by Brisa Rankin DPM on 10/14/2020 at 10:17 AM

## 2020-10-21 ENCOUNTER — HOSPITAL ENCOUNTER (OUTPATIENT)
Dept: WOUND CARE | Age: 85
Discharge: HOME | End: 2020-10-21
Payer: MEDICARE

## 2020-10-21 VITALS
DIASTOLIC BLOOD PRESSURE: 70 MMHG | SYSTOLIC BLOOD PRESSURE: 120 MMHG | RESPIRATION RATE: 16 BRPM | HEART RATE: 64 BPM | TEMPERATURE: 96.8 F

## 2020-10-21 PROCEDURE — 11042 DBRDMT SUBQ TIS 1ST 20SQCM/<: CPT

## 2020-10-21 RX ORDER — LIDOCAINE HYDROCHLORIDE 40 MG/ML
SOLUTION TOPICAL ONCE
Status: DISCONTINUED | OUTPATIENT
Start: 2020-10-21 | End: 2020-10-22 | Stop reason: HOSPADM

## 2020-10-21 NOTE — PROGRESS NOTES
Wound Healing Center  History and Physical/Consultation  Podiatry    Referring Physician : MD DANTE Schneidershaw French 57 RECORD NUMBER:  [id number]  AGE: 80 y.o. GENDER: female  : 1935  EPISODE DATE:  10/21/2020  Subjective:     Chief Complaint   Patient presents with    Wound Check     [facility] Wound Care Follow Up Appt with DR Gladys Dia @ [facility]         HISTORY of PRESENT ILLNESS NOEL Maciel is a 80 y.o. female who presents today for wound/ulcer evaluation. History of Wound Context:  The patient has had a wound of right leg which was first noted approximately in 2020. This has been treated adaptic. On their initial visit to the wound healing center, 20 ,  the patient has noted that the wound has not been improving. The patient has had similar previous wounds in the past.      Pt is not on abx at time of initial visit.       Wound/Ulcer Pain Timing/Severity: constant  Quality of pain: aching  Severity:  4 / 10   Modifying Factors: Pain worsens with walking  Associated Signs/Symptoms: edema, erythema and drainage    Ulcer Identification:  Ulcer Type: venous  Contributing Factors: edema, venous stasis and lymphedema    Diabetic/Pressure/Non Pressure Ulcers onl y:  Ulcer: Non-Pressure ulcer, fat layer exposed    If patient has diabetic lower extremity wounds  Lock Classification of diabetic lower extremity wounds:    Grade Description   []  0 No open wound   []  1 Superficial ulcer involving the full skin thickness   []  2 Deep ulcer involves ligament, tendon, joint capsule, or fascia  No bone involvement or abscess presence   []  3 Deep Ulcer with abcess formation and/or osteomyelitis   []  4 Localized gangrene   []  5 Extensive gangrene of the foot     Wound: N/A     20  Debrided, needs ultrasound arterial, offload    20  Debrided, cont tx and care    20  Debrided, cont tx and care, culture taken       20  Debrided, cont tx and care, gentamicin 20  Debrided, cont tx and care, offload     20  Debrided, cont tx and care, offload, healing well, skin tear noted    10-6-20  Debrided, cont tx and care    10-7-20  Debrided, cont tx and care, one wound healed    10-21-20  Debrided, compression  PAST MEDICAL HISTORY      Diagnosis Date    Anticoagulated     takes Xarelto    Atrial fibrillation ([facility] [address]     follows with Dr. 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 Ina Holt    Pain in both lower legs 2020    Patellar fracture     Rheumatoid arthritis with rheumatoid factor ([facility] [address]     Rheumatoid arthritis(714.0)     Dr Blake Andrews; on DMD Daril Purl)    Rib fractures 2014    Seasonal allergies     Skin cancer of lip     squamous cell    Wears dentures      Past Surgical History:   Procedure Laterality Date    CARDIAC CATHETERIZATION      CARPAL TUNNEL RELEASE Bilateral      SECTION      COLONOSCOPY  2011    Dr Paul Garcia; diverticular disease; repeat in     COLONOSCOPY N/A 2020    COLONOSCOPY WITH BIOPSY performed by Ej Crespo MD at [address] [place] [address]  early     TOTAL KNEE ARTHROPLASTY  2007    bilateral    UPPER GASTROINTESTINAL ENDOSCOPY N/A 2020    EGD BIOPSY performed by Sandeep Donnelly MD at [facility] ENDOSCOPY     Family History   Problem Relation Age of Onset    Heart Attack Mother 58    Other Father         suicide, depression, leg trouble    Other Sister         Dementia    Hypertension Sister     Hypertension Sister     Hypertension Brother     Thyroid Disease Sister     Rheum Arthritis Sister     Rheum Arthritis Brother     Other Daughter         hemochromatosis     Other Son         hemachromatosis      Social History     Tobacco Use    Smoking status: Never Smoker    Smokeless tobacco: Never Used   Substance Use Topics    Alcohol use: No    Drug use: Never     Allergies   Allergen Reactions    Amoxicillin      GI ill effects     Current Outpatient Medications on File Prior to Encounter   Medication Sig Dispense Refill    gabapentin (NEURONTIN) 100 MG capsule Take 1 capsule by mouth 2 times daily for 90 days.  180 capsule 0    DULoxetine (CYMBALTA) 20 MG extended release capsule Take 1 capsule by mouth daily 90 capsule 1    celecoxib (CELEBREX) 200 MG capsule TAKE 1 CAPSULE DAILY 90 capsule 3    ferrous sulfate (IRON 325) 325 (65 Fe) MG tablet Take 1 tablet by mouth every other day 30 tablet 5    levothyroxine (LEVOTHROID) 100 MCG tablet Take 1 tablet by mouth Daily 90 tablet 1    montelukast (SINGULAIR) 10 MG tablet TAKE 1 TABLET NIGHTLY 90 tablet 1    docusate sodium (COLACE, DULCOLAX) 100 MG CAPS Take 100 mg by mouth daily 30 capsule 0    vitamin D (ERGOCALCIFEROL) 1.25 MG (09751 UT) CAPS capsule Take 1 capsule by mouth once a week 6 capsule 1    vitamin B-12 (CYANOCOBALAMIN) 1000 MCG tablet Take 1,000 mcg by mouth daily      omeprazole (PRILOSEC) 40 MG delayed release capsule Take 1 capsule by mouth 2 times daily 180 capsule 3    famotidine (PEPCID) 40 MG tablet Take 1 tablet by mouth nightly 90 tablet 3    valsartan-hydroCHLOROthiazide (DIOVAN-HCT) 160-25 MG per tablet Take 1 tablet by mouth daily 90 tablet 1    rivaroxaban Control:       Debridement:Excisional Debridement    Using #15 blade scalpel the wound(s)/ulcer(s) was/were sharply debrided down through and including the removal of subcutaneous tissue. Devitalized Tissue Debrided:  fibrin, biofilm, slough and necrotic/eschar to stimulate bleeding to promote healing, post debridement good bleeding base and wound edges noted    Wound/Ulcer #: 2    Percent of Wound/Ulcer Debrided: 100%    Total Surface Area Debrided:  13 sq cm     Estimated Blood Loss:  Minimal  Hemostasis Achieved:  by pressure    Procedural Pain:  3  / 10   Post Procedural Pain:  5 / 10     Response to treatment:  Well tolerated by patient. A culture was done. Plan:     Pt is not a smoker   - Discussed relationship of smoking and negative affects on wound healing   - Emphasized importance of tobacco avoidace/cessation   - Script for nicotine patch given to patient   - Information regarding support groups for smoking cessation given    In my professional opinion and based off the information that is available at this time this patient has appropriate indication for HBO Therapy: Maybe    Treatment Note please see attached Discharge Instructions    Written patient dismissal instructions given to patient and signed by patient or POA. Discharge Instructions       Visit Discharge/Physician Orders    Discharge condition: Stable     Assessment of pain at discharge:  none     Anesthetic used:  4% lidocaine solution     Discharge to: Home     Left via:Private automobile     Accompanied by: accompanied by grand daughter     ECF/HHA: [facility] [place] Fax: [phone]   Phone: 2 [facility] [facility] AS DIRECTED BELOW ON Monday AND Wednesday.     Dressing Orders: Clean right lower leg ulcer with normal saline.  APPLY SALINE MOISTENED GEORGE, THEN APPLY ALGINATE, 4 X 4 GAUZE, KERLEX AND COBAN WRAPPING FROM BASE OF TOES TO JUST BELOW THE KNEE. Kevin Meehan Monday AND Friday.

## 2020-10-28 ENCOUNTER — HOSPITAL ENCOUNTER (OUTPATIENT)
Dept: WOUND CARE | Age: 85
Discharge: HOME | End: 2020-10-28
Payer: MEDICARE

## 2020-10-28 VITALS
DIASTOLIC BLOOD PRESSURE: 68 MMHG | HEART RATE: 72 BPM | RESPIRATION RATE: 16 BRPM | SYSTOLIC BLOOD PRESSURE: 136 MMHG | TEMPERATURE: 98.4 F

## 2020-10-28 PROCEDURE — 11042 DBRDMT SUBQ TIS 1ST 20SQCM/<: CPT

## 2020-10-28 RX ORDER — LIDOCAINE HYDROCHLORIDE 40 MG/ML
SOLUTION TOPICAL ONCE
Status: DISCONTINUED | OUTPATIENT
Start: 2020-10-28 | End: 2020-10-29 | Stop reason: HOSPADM

## 2020-10-28 RX ORDER — VALSARTAN AND HYDROCHLOROTHIAZIDE 160; 25 MG/1; MG/1
1 TABLET ORAL DAILY
Qty: 90 TABLET | Refills: 1 | Status: ON HOLD
Start: 2020-10-28 | End: 2021-03-09 | Stop reason: HOSPADM

## 2020-10-28 NOTE — PROGRESS NOTES
[facility]  History and Physical/Consultation  Podiatry    Referring Physician : MD DANTE Durbinshaw French 57 RECORD NUMBER:  [id number]  AGE: 80 y.o. GENDER: female  : 1935  EPISODE DATE:  10/28/2020  Subjective:     Chief Complaint   Patient presents with    Wound Check     wound right leg         HISTORY of PRESENT ILLNESS HPI     Alisa Amaya is a 80 y.o. female who presents today for wound/ulcer evaluation. History of Wound Context:  The patient has had a wound of right leg which was first noted approximately in 2020. This has been treated adaptic. On their initial visit to the wound healing center, 20 ,  the patient has noted that the wound has not been improving. The patient has had similar previous wounds in the past.      Pt is not on abx at time of initial visit.       Wound/Ulcer Pain Timing/Severity: constant  Quality of pain: aching  Severity:  4 / 10   Modifying Factors: Pain worsens with walking  Associated Signs/Symptoms: edema, erythema and drainage    Ulcer Identification:  Ulcer Type: venous  Contributing Factors: edema, venous stasis and lymphedema    Diabetic/Pressure/Non Pressure Ulcers onl y:  Ulcer: Non-Pressure ulcer, fat layer exposed    If patient has diabetic lower extremity wounds  Lock Classification of diabetic lower extremity wounds:    Grade Description   []  0 No open wound   []  1 Superficial ulcer involving the full skin thickness   []  2 Deep ulcer involves ligament, tendon, joint capsule, or fascia  No bone involvement or abscess presence   []  3 Deep Ulcer with abcess formation and/or osteomyelitis   []  4 Localized gangrene   []  5 Extensive gangrene of the foot     Wound: N/A     20  Debrided, needs ultrasound arterial, offload    20  Debrided, cont tx and care    20  Debrided, cont tx and care, culture taken       20  Debrided, cont tx and care, gentamicin      20  Debrided, cont tx and care, offload     20  Debrided, cont tx and care, offload, healing well, skin tear noted    10-6-20  Debrided, cont tx and care    10-7-20  Debrided, cont tx and care, one wound healed    10-21-20  Debrided, compression    10-28-20  Debrided, cont tx and care, compression    PAST MEDICAL HISTORY      Diagnosis Date    Anticoagulated     takes Xarelto    Atrial fibrillation ([address]     follows with Dr. 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 Dionisio Rivera    Pain in both lower legs 2020    Patellar fracture     Rheumatoid arthritis with rheumatoid factor ([address]     Rheumatoid arthritis(714.0)     Dr Willie Wells; on DMD Sampritesh Thompson)    Rib fractures 2014    Seasonal allergies     Skin cancer of lip     squamous cell    Wears dentures      Past Surgical History:   Procedure Laterality Date    CARDIAC CATHETERIZATION      CARPAL TUNNEL RELEASE Bilateral      SECTION      COLONOSCOPY  2011    Dr Fabiola Agarwal; diverticular disease; repeat in     COLONOSCOPY N/A 2020    COLONOSCOPY WITH BIOPSY performed by Isabel Page MD at [address] [place] [address]  early     TOTAL KNEE ARTHROPLASTY  2007    bilateral    UPPER GASTROINTESTINAL ENDOSCOPY N/A 4/29/2020    EGD BIOPSY performed by Aamir Gerard MD at [address] History   Problem Relation Age of Onset    Heart Attack Mother 58    Other Father         suicide, depression, leg trouble    Other Sister         Dementia    Hypertension Sister     Hypertension Sister     Hypertension Brother     Thyroid Disease Sister     Rheum Arthritis Sister     Rheum Arthritis Brother     Other Daughter         hemochromatosis     Other Son         hemachromatosis      Social History     Tobacco Use    Smoking status: Never Smoker    Smokeless tobacco: Never Used   Substance Use Topics    Alcohol use: No    Drug use: Never     Allergies   Allergen Reactions    Amoxicillin      GI ill effects     Current Outpatient Medications on File Prior to Encounter   Medication Sig Dispense Refill    gabapentin (NEURONTIN) 100 MG capsule Take 1 capsule by mouth 2 times daily for 90 days.  180 capsule 0    DULoxetine (CYMBALTA) 20 MG extended release capsule Take 1 capsule by mouth daily 90 capsule 1    celecoxib (CELEBREX) 200 MG capsule TAKE 1 CAPSULE DAILY 90 capsule 3    ferrous sulfate (IRON 325) 325 (65 Fe) MG tablet Take 1 tablet by mouth every other day 30 tablet 5    levothyroxine (LEVOTHROID) 100 MCG tablet Take 1 tablet by mouth Daily 90 tablet 1    montelukast (SINGULAIR) 10 MG tablet TAKE 1 TABLET NIGHTLY 90 tablet 1    docusate sodium (COLACE, DULCOLAX) 100 MG CAPS Take 100 mg by mouth daily 30 capsule 0    vitamin D (ERGOCALCIFEROL) 1.25 MG (50194 UT) CAPS capsule Take 1 capsule by mouth once a week 6 capsule 1    vitamin B-12 (CYANOCOBALAMIN) 1000 MCG tablet Take 1,000 mcg by mouth daily      omeprazole (PRILOSEC) 40 MG delayed release capsule Take 1 capsule by mouth 2 times daily 180 capsule 3    famotidine (PEPCID) 40 MG tablet Take 1 tablet by mouth nightly 90 tablet 3    rivaroxaban (XARELTO) 15 MG TABS tablet Take 1 tablet by mouth daily (with breakfast) 90 tablet 3    flecainide (TAMBOCOR) 50 MG tablet Take 1 tablet by mouth 2 times daily 180 tablet 3    metoprolol tartrate (LOPRESSOR) 25 MG tablet Take 1 tablet by mouth 2 times daily 180 tablet 3    fluticasone (FLONASE) 50 MCG/ACT nasal spray 2 sprays by Nasal route daily 2 sprays in each nostril daily 1 Bottle 11    Probiotic Product (ACIDOPHILUS PROBIOTIC) CAPS capsule Take 1 capsule by mouth daily      fexofenadine (ALLEGRA) 180 MG tablet Take 180 mg by mouth daily.  leucovorin calcium (WELLCOVORIN) 5 MG tablet Take 25 mg by mouth once a week Takes every Tuesday      methotrexate 2.5 MG tablet Take by mouth once a week 2 tabs at lunch and 4 tabs  in the pm once a week on Monday      ondansetron (ZOFRAN) 4 MG tablet Take 1 tablet by mouth every 8 hours as needed for Nausea or Vomiting 30 tablet 5    polyethyl glycol-propyl glycol 0.4-0.3 % (SYSTANE) 0.4-0.3 % ophthalmic solution 1 drop as needed for Dry Eyes       No current facility-administered medications on file prior to encounter.         REVIEW OF SYSTEMS   ROS : All others Negative if blank [], Positive if [x]  General Vascular   [] Fevers [] Claudication   [] Chills [] Rest Pain   Skin Neurologic   [x] Tissue Loss [x] Lower extremity neuropathy     Objective:    /68   Pulse 72   Temp 98.4 °F (36.9 °C) (Temporal)   Resp 16   Wt Readings from Last 3 Encounters:   10/14/20 126 lb (57.2 kg)   10/07/20 126 lb (57.2 kg)   09/30/20 126 lb (57.2 kg)       PHYSICAL EXAM   CONSTITUTIONAL:   Awake, alert, cooperative   PSYCHIATRIC :  Oriented to time, place and person      normal insight to disease process  EXTREMITIES:   R LE Open wounds are noted   Skin color is abnormal with ulcer   Edema is  noted   Sensation intact to light touch   Palpation of the foot does cause pain   5/5 strength DF/PF  L LE Open wounds are not noted   Skin color is normal   Edema is  noted   Sensation intact to light touch   Palpation of the foot does cause pain   5/5 strength DF/PF  R dorsalis pedis 1 L dorsalis pedis 1   R posterior tibial 1 L posterior tibial 1     Assessment:     Problem List Items Addressed This Visit     Non-pressure chronic ulcer of other part of right lower leg with fat layer exposed ([address]          Pre Debridement Measurements:  Are located in the [place]  Documentation Flow Sheet  Post Debridement Measurements:  Wound/Ulcer Descriptions are Pre Debridement except measurements:     Wound 09/30/20 Leg Right;Proximal #2  (Active)   Wound Image   10/14/20 1007   Dressing Status Clean;Dry; Intact 10/21/20 1116   Wound Cleansed Cleansed with saline 10/21/20 1116   Dressing/Treatment Collagen with Ag;Alginate 10/21/20 1116   Wound Length (cm) 0.8 cm 10/28/20 1018   Wound Width (cm) 0.8 cm 10/28/20 1018   Wound Depth (cm) 0.1 cm 10/28/20 1018   Wound Surface Area (cm^2) 0.64 cm^2 10/28/20 1018   Change in Wound Size % (l*w) 82.22 10/28/20 1018   Wound Volume (cm^3) 0.06 cm^3 10/28/20 1018   Wound Healing % 83 10/28/20 1018   Post-Procedure Length (cm) 0.8 cm 10/28/20 1029   Post-Procedure Width (cm) 0.8 cm 10/28/20 1029   Post-Procedure Depth (cm) 0.1 cm 10/28/20 1029   Post-Procedure Surface Area (cm^2) 0.64 cm^2 10/28/20 1029   Post-Procedure Volume (cm^3) 0.06 cm^3 10/28/20 1029   Wound Assessment Granulation tissue 10/21/20 1035   Drainage Amount Scant 10/28/20 1018   Drainage Description Serosanguinous 10/28/20 1018   Odor None 10/28/20 1018   Jaycee-wound Assessment Fragile 10/28/20 1018   Number of days: 28          Procedure Note  Indications:  Based on my examination of this patient's wound(s)/ulcer(s) today, debridement is required to promote healing and evaluate the wound base.     Performed by: Yarelis Rivera DPM    Consent obtained:  Yes    Time out taken:  Yes    Pain Control: Anesthetic  Anesthetic: 4% Lidocaine Liquid Topical     Debridement:Excisional Debridement    Using #15 blade scalpel the wound(s)/ulcer(s) was/were sharply debrided down through and including the removal of subcutaneous tissue. Devitalized Tissue Debrided:  fibrin, biofilm, slough and necrotic/eschar to stimulate bleeding to promote healing, post debridement good bleeding base and wound edges noted    Wound/Ulcer #: 2    Percent of Wound/Ulcer Debrided: 100%    Total Surface Area Debrided:  13 sq cm     Estimated Blood Loss:  Minimal  Hemostasis Achieved:  by pressure    Procedural Pain:  3  / 10   Post Procedural Pain:  5 / 10     Response to treatment:  Well tolerated by patient. A culture was done. Plan:     Pt is not a smoker   - Discussed relationship of smoking and negative affects on wound healing   - Emphasized importance of tobacco avoidace/cessation   - Script for nicotine patch given to patient   - Information regarding support groups for smoking cessation given    In my professional opinion and based off the information that is available at this time this patient has appropriate indication for HBO Therapy: Maybe    Treatment Note please see attached Discharge Instructions    Written patient dismissal instructions given to patient and signed by patient or POA. Discharge Instructions       Visit Discharge/Physician Orders    Discharge condition: Stable     Assessment of pain at discharge:  none     Anesthetic used:  4% lidocaine solution     Discharge to: Home     Left via:Private automobile     Accompanied by: accompanied by grand daughter     ECF/[facility]HA: [facility] Fax: [phone]   Phone:  [phone]  Wrap to stay in place for one week if patient tolerates.     Dressing Orders: Clean right lower leg ulcer with normal saline.  APPLY SALINE MOISTENED GEORGE, THEN APPLY ALGINATE, 4 X 4 GAUZE, KERLEX AND COBAN WRAPPING FROM BASE OF TOES TO JUST BELOW THE KNEE.  LEAVE IN PLACE FOR NO LONGER THAN A WEEK. HOWEVER, IF WRAP SLIDES DOWN, OR PAINFUL, PLEASE CALL [facility] TO CHANGE. THANK YOU.   WOUND CARE WILL CHANGE ON WEDNESDAYS DURING WOUND CARE VISIT. Treatment Orders:        [facility] followup visit ______One week with Dr. German_______________________  (Please note your next appointment above and if you are unable to keep, kindly give a 24 hour notice. Thank you.)    Physician signature:__________________________      If you experience any of the following, please call the [facility] during business hours:    * Increase in Pain  * Temperature over 101  * Increase in drainage from your wound  * Drainage with a foul odor  * Bleeding  * Increase in swelling  * Need for compression bandage changes due to slippage, breakthrough drainage. If you need medical attention outside of the business hours of the [facility] please contact your PCP or go to the nearest emergency room.         Electronically signed by Zoya Smith DPM on 10/28/2020 at 10:46 AM

## 2020-11-04 ENCOUNTER — HOSPITAL ENCOUNTER (OUTPATIENT)
Dept: WOUND CARE | Age: 85
Discharge: HOME | End: 2020-11-04
Payer: MEDICARE

## 2020-11-04 ENCOUNTER — OFFICE VISIT (OUTPATIENT)
Dept: FAMILY MEDICINE CLINIC | Age: 85
End: 2020-11-04
Payer: MEDICARE

## 2020-11-04 VITALS
RESPIRATION RATE: 14 BRPM | DIASTOLIC BLOOD PRESSURE: 62 MMHG | OXYGEN SATURATION: 97 % | HEART RATE: 61 BPM | TEMPERATURE: 97.5 F | SYSTOLIC BLOOD PRESSURE: 126 MMHG

## 2020-11-04 VITALS
BODY MASS INDEX: 24.74 KG/M2 | HEART RATE: 60 BPM | HEIGHT: 60 IN | SYSTOLIC BLOOD PRESSURE: 142 MMHG | RESPIRATION RATE: 18 BRPM | WEIGHT: 126 LBS | TEMPERATURE: 97.7 F | DIASTOLIC BLOOD PRESSURE: 74 MMHG

## 2020-11-04 PROCEDURE — 11042 DBRDMT SUBQ TIS 1ST 20SQCM/<: CPT

## 2020-11-04 PROCEDURE — 99213 OFFICE O/P EST LOW 20 MIN: CPT | Performed by: FAMILY MEDICINE

## 2020-11-04 RX ORDER — LIDOCAINE HYDROCHLORIDE 40 MG/ML
SOLUTION TOPICAL ONCE
Status: DISCONTINUED | OUTPATIENT
Start: 2020-11-04 | End: 2020-11-05 | Stop reason: HOSPADM

## 2020-11-04 ASSESSMENT — PAIN SCALES - GENERAL: PAINLEVEL_OUTOF10: 0

## 2020-11-04 NOTE — PROGRESS NOTES
CC   Chief Complaint   Patient presents with    Depression    Gait Problem     Accompanied by son Alexis Carranza     HPI:   Patient comes in today for the below issue(s). Deconditioning and debility  Follow up  See prior notes  Was doing in-home physical therapy  Reports has plateaued. Still desires to return to home, but expresses some doubts if will be able to do so safely. States pain is controlled from her fracture, but still has her baseline RA pains  Mood remains stable on Cymablta- is willing to continue. Family reports mood has improved. wound care- notes reviewed. Small abraded are on LE- improving slowly. Afib  Follow up  Doing well   No new complaints  Denies chest pain or palpitation  Lower extremity swelling is stable. No new issues. Reports compliance with anticoagulation, no bleeding    Anemia  Previous diagnosis, see previous notes  Labs from September showed stability. No issues per patient other than fatigue    Review of Systems  Review of Systems   Constitutional: Positive for fatigue. Negative for appetite change, chills and fever. HENT: Negative for trouble swallowing. Respiratory: Negative for shortness of breath. Cardiovascular: Positive for leg swelling (Baseline). Negative for chest pain and palpitations. Gastrointestinal: Negative for abdominal pain. Musculoskeletal: Positive for arthralgias, back pain and gait problem. Skin: Positive for wound. Neurological: Negative for dizziness. Psychiatric/Behavioral: Negative for sleep disturbance. The patient is not nervous/anxious.            Past Medical History:   Diagnosis Date    Anticoagulated     takes Xarelto    Atrial fibrillation ([facility]     follows with Dr. 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 Heard Cornea    Pain in both lower legs 5/7/2020    Patellar fracture 2020    Rheumatoid arthritis with rheumatoid factor ([address]     Rheumatoid arthritis(714.0)     Dr Santino Hernández; on DMD Teofilo Patel)    Rib fractures 12/30/2014    Seasonal allergies     Skin cancer of lip 2011    squamous cell    Wears dentures      Social History     Tobacco Use    Smoking status: Never Smoker    Smokeless tobacco: Never Used   Substance Use Topics    Alcohol use: No    Drug use: Never         PE:  VS:  /62   Pulse 61   Temp 97.5 °F (36.4 °C) (Temporal)   Resp 14   SpO2 97%   Physical Exam  General: Well nourished, well developed, no acute distress  Neck:  Supple   No palpable cervical lymphoadenopathy   Thyroid without mass or enlargement  Resp: Lungs CTAB   Equal and adequate air exchange without accessory muscle use   No rales, rhonchi or wheeze  CV: S1S2 RRR, no ectopy   +2-7/8 systolic murmur   Intact distal pulses   Trace right lower extremity right edema   Trace-1+ left lower extremity edema  GI: Abdomen Soft, non tender, non distended   Normoactive bowel sounds  MS: Nonambulatory, sitting in wheelchair   Lower extremity strength 3+/5 on left, 4/5 on right   Intact distal pulses   No clubbing or cyanosis   Skin Warm and dry; no rash or lesion  Neuro: Alert and oriented; normal and intact dtr's   Psych: Euthymic mood, congruent affect       Assessment (including specific orders/meds/labs):      Lulú Guillen was seen today for depression and gait problem.     Diagnoses and all orders for this visit:    Adjustment disorder with depressed mood    Gait difficulty  -     Rosa Helms MD, Physical Medicine and Rehabilitation, [place]    Paresthesia of lower extremity  -     Rosa Helms MD, Physical Medicine and Rehabilitation, [place]    Bimalleolar fracture, left, closed, initial encounter  -     Annabelle Jung MD, Physical Medicine and Rehabilitation, [place]    Anemia, unspecified type    Paroxysmal atrial fibrillation [facility])        Plan:     Mood issues have stabilized. I think it is reasonable to remain on the Cymbalta for now. Dose adjustments could be considered in the future, will reassess at follow-up    Ongoing issues with gait difficulty after her fracture. Her desire is to return to home living independently. Think she still having trouble with IADLs. If unable to improve with additional therapies I am not sure that return to independence as possible. Advised risk of fall with fracture could be fatal.  She does agree to PMR evaluation to see if any other additional interventions would be of benefit. I have also encouraged her to discuss with physical therapy about going to outpatient therapy    Call or go to ED immediately if symptoms worsen or persist. Advised patient to call with any new medication issues, and, as applicable, read all Rx info from pharmacy to assure aware ofall possible risks and side effects of medication before taking. Patient and/or guardian given opportunity to ask questions/raise concerns. She and her son verbalized comfort and understanding of instructions. Follow Up:     Return in about 2 months (around 1/4/2021), or if symptoms worsen or fail to improve, for recheck of weakness/gait issues . or sooner if the above issues change unexpectedly or new issues develop     This document was prepared at least partially through the use of[employer] recognition software.  Although effort is taken to assure the accuracy of this document, it is possible that grammatical, syntax,  or spelling errors may occur.       Electronically signed by Nohemi Rutledge MD [facility]

## 2020-11-04 NOTE — PROGRESS NOTES
[facility]  History and Physical/Consultation  Podiatry    Referring Physician : MD DANTE Vidalesshaw French 57 RECORD NUMBER:  [id number]  AGE: 80 y.o. GENDER: female  : 1935  EPISODE DATE:  2020  Subjective:     Chief Complaint   Patient presents with    Wound Check     right leg         HISTORY of PRESENT ILLNESS HPI     Gisella Gonzalez is a 80 y.o. female who presents today for wound/ulcer evaluation. History of Wound Context:  The patient has had a wound of right leg which was first noted approximately in 2020. This has been treated adaptic. On their initial visit to the wound healing center, 20 ,  the patient has noted that the wound has not been improving. The patient has had similar previous wounds in the past.      Pt is not on abx at time of initial visit.       Wound/Ulcer Pain Timing/Severity: constant  Quality of pain: aching  Severity:  4 / 10   Modifying Factors: Pain worsens with walking  Associated Signs/Symptoms: edema, erythema and drainage    Ulcer Identification:  Ulcer Type: venous  Contributing Factors: edema, venous stasis and lymphedema    Diabetic/Pressure/Non Pressure Ulcers onl y:  Ulcer: Non-Pressure ulcer, fat layer exposed    If patient has diabetic lower extremity wounds  Lock Classification of diabetic lower extremity wounds:    Grade Description   []  0 No open wound   []  1 Superficial ulcer involving the full skin thickness   []  2 Deep ulcer involves ligament, tendon, joint capsule, or fascia  No bone involvement or abscess presence   []  3 Deep Ulcer with abcess formation and/or osteomyelitis   []  4 Localized gangrene   []  5 Extensive gangrene of the foot     Wound: N/A     20  Debrided, needs ultrasound arterial, offload    20  Debrided, cont tx and care    20  Debrided, cont tx and care, culture taken       20  Debrided, cont tx and care, gentamicin      20  Debrided, cont tx and care, offload -20  Debrided, cont tx and care, offload, healing well, skin tear noted    10-6-20  Debrided, cont tx and care    10-20  Debrided, cont tx and care, one wound healed    10-21-20  Debrided, compression    10-28-20  Debrided, cont tx and care, compression    20  Healing great, debrided, cont tx and care   PAST MEDICAL HISTORY      Diagnosis Date    Anticoagulated     takes Xarelto    Atrial fibrillation ([facility] [address]     follows with Dr. 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 Floyd Navarrete    Pain in both lower legs 2020    Patellar fracture     Rheumatoid arthritis with rheumatoid factor ([facility] [address]     Rheumatoid arthritis(714.0)     Dr Jose Armando Manuel; on DMD Devoria Bold)    Rib fractures 2014    Seasonal allergies     Skin cancer of lip     squamous cell    Wears dentures      Past Surgical History:   Procedure Laterality Date    CARDIAC CATHETERIZATION      CARPAL TUNNEL RELEASE Bilateral      SECTION      COLONOSCOPY  2011    Dr Gerson Chu; diverticular disease; repeat in 2016    COLONOSCOPY N/A 2020    COLONOSCOPY WITH BIOPSY performed by Ld Sarabia MD at [address] [place] [address]  early     TOTAL KNEE ARTHROPLASTY  2007    bilateral  UPPER GASTROINTESTINAL ENDOSCOPY N/A 4/29/2020    EGD BIOPSY [facility] by Gisella Kothari MD at [facility] ENDOSCOPY     Family History   Problem Relation Age of Onset    Heart Attack Mother 58    Other Father         suicide, depression, leg trouble    Other Sister         Dementia    Hypertension Sister     Hypertension Sister     Hypertension Brother     Thyroid Disease Sister     Rheum Arthritis Sister     Rheum Arthritis Brother     Other Daughter         hemochromatosis     Other Son         hemachromatosis      Social History     Tobacco Use    Smoking status: Never Smoker    Smokeless tobacco: Never Used   Substance Use Topics    Alcohol use: No    Drug use: Never     Allergies   Allergen Reactions    Amoxicillin      GI ill effects     Current Outpatient Medications on File Prior to Encounter   Medication Sig Dispense Refill    valsartan-hydroCHLOROthiazide (DIOVAN-HCT) 160-25 MG per tablet Take 1 tablet by mouth daily 90 tablet 1    gabapentin (NEURONTIN) 100 MG capsule Take 1 capsule by mouth 2 times daily for 90 days.  180 capsule 0    DULoxetine (CYMBALTA) 20 MG extended release capsule Take 1 capsule by mouth daily 90 capsule 1    celecoxib (CELEBREX) 200 MG capsule TAKE 1 CAPSULE DAILY 90 capsule 3    ferrous sulfate (IRON 325) 325 (65 Fe) MG tablet Take 1 tablet by mouth every other day 30 tablet 5    levothyroxine (LEVOTHROID) 100 MCG tablet Take 1 tablet by mouth Daily 90 tablet 1    montelukast (SINGULAIR) 10 MG tablet TAKE 1 TABLET NIGHTLY 90 tablet 1    docusate sodium (COLACE, DULCOLAX) 100 MG CAPS Take 100 mg by mouth daily 30 capsule 0    vitamin D (ERGOCALCIFEROL) 1.25 MG (61209 UT) CAPS capsule Take 1 capsule by mouth once a week 6 capsule 1    vitamin B-12 (CYANOCOBALAMIN) 1000 MCG tablet Take 1,000 mcg by mouth daily      omeprazole (PRILOSEC) 40 MG delayed release capsule Take 1 capsule by mouth 2 times daily 180 capsule 3    rivaroxaban (XARELTO) 15 MG TABS tablet Take 1 tablet by mouth daily (with breakfast) 90 tablet 3    flecainide (TAMBOCOR) 50 MG tablet Take 1 tablet by mouth 2 times daily 180 tablet 3    metoprolol tartrate (LOPRESSOR) 25 MG tablet Take 1 tablet by mouth 2 times daily 180 tablet 3    polyethyl glycol-propyl glycol 0.4-0.3 % (SYSTANE) 0.4-0.3 % ophthalmic solution 1 drop as needed for Dry Eyes      fluticasone (FLONASE) 50 MCG/ACT nasal spray 2 sprays by Nasal route daily 2 sprays in each nostril daily 1 Bottle 11    Probiotic Product (ACIDOPHILUS PROBIOTIC) CAPS capsule Take 1 capsule by mouth daily      fexofenadine (ALLEGRA) 180 MG tablet Take 180 mg by mouth daily.  leucovorin calcium (WELLCOVORIN) 5 MG tablet Take 25 mg by mouth once a week Takes every Tuesday      methotrexate 2.5 MG tablet Take by mouth once a week 2 tabs at lunch and 4 tabs  in the pm once a week on Monday      famotidine (PEPCID) 40 MG tablet Take 1 tablet by mouth nightly 90 tablet 3    ondansetron (ZOFRAN) 4 MG tablet Take 1 tablet by mouth every 8 hours as needed for Nausea or Vomiting 30 tablet 5     No current facility-administered medications on file prior to encounter.         REVIEW OF SYSTEMS   ROS : All others Negative if blank [], Positive if [x]  General Vascular   [] Fevers [] Claudication   [] Chills [] Rest Pain   Skin Neurologic   [x] Tissue Loss [x] Lower extremity neuropathy     Objective:    BP (!) 142/74   Pulse 60   Temp 97.7 °F (36.5 °C) (Temporal)   Resp 18   Ht 5' (1.524 m)   Wt 126 lb (57.2 kg)   BMI 24.61 kg/m²   Wt Readings from Last 3 Encounters:   11/04/20 126 lb (57.2 kg)   10/14/20 126 lb (57.2 kg)   10/07/20 126 lb (57.2 kg)       PHYSICAL EXAM   CONSTITUTIONAL:   Awake, alert, cooperative   PSYCHIATRIC :  Oriented to time, place and person      normal insight to disease process  EXTREMITIES:   R LE Open wounds are noted   Skin color is abnormal with ulcer   Edema is  noted   Sensation intact to light touch   Palpation of the foot does cause pain   5/5 strength DF/PF  L LE Open wounds are not noted   Skin color is normal   Edema is  noted   Sensation intact to light touch   Palpation of the foot does cause pain   5/5 strength DF/PF  R dorsalis pedis 1 L dorsalis pedis 1   R posterior tibial 1 L posterior tibial 1     Assessment:     Problem List Items Addressed This Visit     Non-pressure chronic ulcer of other part of right lower leg with fat layer exposed ([address]          Pre Debridement Measurements:  Are located in the [facility]  Documentation Flow Sheet  Post Debridement Measurements:  Wound/Ulcer Descriptions are Pre Debridement except measurements:     Wound 09/30/20 Leg Right;Proximal #2  (Active)   Wound Image   10/14/20 1007   Dressing Status New dressing applied;New drainage noted 10/28/20 1029   Wound Cleansed Cleansed with saline 10/28/20 1029   Dressing/Treatment ABD; Alginate;Collagen with Ag;Dry dressing 10/28/20 1029   Wound Length (cm) 0.7 cm 11/04/20 1005   Wound Width (cm) 0.5 cm 11/04/20 1005   Wound Depth (cm) 0.1 cm 11/04/20 1005   Wound Surface Area (cm^2) 0.35 cm^2 11/04/20 1005   Change in Wound Size % (l*w) 90.28 11/04/20 1005   Wound Volume (cm^3) 0.04 cm^3 11/04/20 1005   Wound Healing % 89 11/04/20 1005   Post-Procedure Length (cm) 0.7 cm 11/04/20 1049   Post-Procedure Width (cm) 0.5 cm 11/04/20 1049   Post-Procedure Depth (cm) 0.2 cm 11/04/20 1049   Post-Procedure Surface Area (cm^2) 0.35 cm^2 11/04/20 1049   Post-Procedure Volume (cm^3) 0.07 cm^3 11/04/20 1049   Wound Assessment Granulation tissue 11/04/20 1005   Drainage Amount Small 11/04/20 1005   Drainage Description Serosanguinous 11/04/20 1005   Odor None 11/04/20 1005   Jaycee-wound Assessment Fragile 11/04/20 1005   Number of days: 35          Procedure Note  Indications:  Based on my examination of this patient's wound(s)/ulcer(s) today, debridement is required to promote healing and evaluate the wound base.     Performed by: Lisa Smith, DPM    Consent obtained:  Yes    Time out taken:  Yes    Pain Control: Anesthetic  Anesthetic: 4% Lidocaine Liquid Topical     Debridement:Excisional Debridement    Using #15 blade scalpel the wound(s)/ulcer(s) was/were sharply debrided down through and including the removal of subcutaneous tissue. Devitalized Tissue Debrided:  fibrin, biofilm, slough and necrotic/eschar to stimulate bleeding to promote healing, post debridement good bleeding base and wound edges noted    Wound/Ulcer #: 2    Percent of Wound/Ulcer Debrided: 100%    Total Surface Area Debrided:  1 sq cm     Estimated Blood Loss:  Minimal  Hemostasis Achieved:  by pressure    Procedural Pain:  3  / 10   Post Procedural Pain:  5 / 10     Response to treatment:  Well tolerated by patient. A culture was done. Plan:     Pt is not a smoker   - Discussed relationship of smoking and negative affects on wound healing   - Emphasized importance of tobacco avoidace/cessation   - Script for nicotine patch given to patient   - Information regarding support groups for smoking cessation given    In my professional opinion and based off the information that is available at this time this patient has appropriate indication for HBO Therapy: Maybe    Treatment Note please see attached Discharge Instructions    Written patient dismissal instructions given to patient and signed by patient or POA.          Discharge Instructions       Visit Discharge/Physician Orders     Discharge condition: Stable     Assessment of pain at discharge:  none     Anesthetic used:  4% lidocaine solution     Discharge to: Home     Left via:Private automobile     Accompanied by: accompanied by grand daughter     ECF/HHA: [facility] Fax: [phone]   Phone:  [phone]  Wrap to stay in place for one week if patient tolerates.     Dressing Orders: Clean right lower leg ulcer with normal saline.  APPLY SALINE MOISTENED GEORGE, THEN APPLY ALGINATE, 4 X 4 GAUZE, KERLEX AND COBAN WRAPPING FROM BASE OF TOES TO JUST BELOW THE KNEE.  LEAVE IN PLACE FOR NO LONGER THAN A WEEK. HOWEVER, IF WRAP SLIDES DOWN, OR PAINFUL, PLEASE CALL [facility] TO CHANGE. THANK YOU. WOUND CARE WILL CHANGE ON [facility] DURING WOUND CARE VISIT.     Treatment Orders:        [facility] followup visit ______One week with Dr. German_______________________  (Please note your next appointment above and if you are unable to keep, kindly give a 24 hour notice. Thank you.)     Physician signature:__________________________        If you experience any of the following, please call the [facility] during business hours:     * Increase in Pain  * Temperature over 101  * Increase in drainage from your wound  * Drainage with a foul odor  * Bleeding  * Increase in swelling  * Need for compression bandage changes due to slippage, breakthrough drainage.     If you need medical attention outside of the business hours of the [address] [place] [address] [facility] please contact your PCP or go to the nearest emergency room.         Electronically signed by Ifeanyi Hopson DPM on 11/4/2020 at 10:50 AM

## 2020-11-06 ASSESSMENT — ENCOUNTER SYMPTOMS
BACK PAIN: 1
SHORTNESS OF BREATH: 0
ABDOMINAL PAIN: 0
TROUBLE SWALLOWING: 0

## 2020-11-11 ENCOUNTER — HOSPITAL ENCOUNTER (OUTPATIENT)
Dept: WOUND CARE | Age: 85
Discharge: HOME | End: 2020-11-11
Payer: MEDICARE

## 2020-11-11 VITALS
SYSTOLIC BLOOD PRESSURE: 146 MMHG | TEMPERATURE: 97.8 F | DIASTOLIC BLOOD PRESSURE: 82 MMHG | HEART RATE: 64 BPM | RESPIRATION RATE: 16 BRPM

## 2020-11-11 PROCEDURE — 11042 DBRDMT SUBQ TIS 1ST 20SQCM/<: CPT

## 2020-11-11 RX ORDER — LIDOCAINE HYDROCHLORIDE 40 MG/ML
SOLUTION TOPICAL ONCE
Status: DISCONTINUED | OUTPATIENT
Start: 2020-11-11 | End: 2020-11-12 | Stop reason: HOSPADM

## 2020-11-11 ASSESSMENT — PAIN SCALES - GENERAL: PAINLEVEL_OUTOF10: 0

## 2020-11-11 NOTE — PROGRESS NOTES
Multilayer Compression Wrap   (Not Unna) Below the Knee    NAME:  Edwina Riding OF BIRTH:  1935  MEDICAL RECORD NUMBER:  [id number]  DATE:  11/11/2020    Marina Santoro: Removed old Multilayer wrap if indicated and wash leg with mild soap/water. Applied moisturizing agent to dry skin as needed. Applied primary and secondary dressing as ordered. Applied multilayered dressing below the knee to right lower leg. Instructed patient/caregiver not to remove dressing and to keep it clean and dry. Instructed patient/caregiver on complications to report to provider, such as pain, numbness in toes, heavy drainage, and slippage of dressing. Instructed patient on purpose of compression dressing and on activity and exercise recommendations.       Electronically signed by Anshu Hoyos RN on 11/11/2020 at 3:52 PM

## 2020-11-11 NOTE — PROGRESS NOTES
[facility]  History and Physical/Consultation  Podiatry    Referring Physician : MD DANTE Christiansenshaw French 57 RECORD NUMBER:  [id number]  AGE: 80 y.o. GENDER: female  : 1935  EPISODE DATE:  2020  Subjective:     Chief Complaint   Patient presents with    Wound Check     right lower extremity wound         HISTORY of PRESENT ILLNESS HPI     Yoana Lee is a 80 y.o. female who presents today for wound/ulcer evaluation. History of Wound Context:  The patient has had a wound of right leg which was first noted approximately in 2020. This has been treated adaptic. On their initial visit to the wound healing center, 20 ,  the patient has noted that the wound has not been improving. The patient has had similar previous wounds in the past.      Pt is not on abx at time of initial visit.       Wound/Ulcer Pain Timing/Severity: constant  Quality of pain: aching  Severity:  4 / 10   Modifying Factors: Pain worsens with walking  Associated Signs/Symptoms: edema, erythema and drainage    Ulcer Identification:  Ulcer Type: venous  Contributing Factors: edema, venous stasis and lymphedema    Diabetic/Pressure/Non Pressure Ulcers onl y:  Ulcer: Non-Pressure ulcer, fat layer exposed    If patient has diabetic lower extremity wounds  Lock Classification of diabetic lower extremity wounds:    Grade Description   []  0 No open wound   []  1 Superficial ulcer involving the full skin thickness   []  2 Deep ulcer involves ligament, tendon, joint capsule, or fascia  No bone involvement or abscess presence   []  3 Deep Ulcer with abcess formation and/or osteomyelitis   []  4 Localized gangrene   []  5 Extensive gangrene of the foot     Wound: N/A     20  Debrided, needs ultrasound arterial, offload    20  Debrided, cont tx and care    20  Debrided, cont tx and care, culture taken       20  Debrided, cont tx and care, gentamicin      20  Debrided, cont tx and care, offload     20  Debrided, cont tx and care, offload, healing well, skin tear noted    10-6-20  Debrided, cont tx and care    10-7-20  Debrided, cont tx and care, one wound healed    10-21-20  Debrided, compression    10-28-20  Debrided, cont tx and care, compression    20  Healing great, debrided, cont tx and care     20  Debrided, cont tx and care   PAST MEDICAL HISTORY      Diagnosis Date    Anticoagulated     takes Xarelto    Atrial fibrillation ([facility] [address]     follows with Dr. 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 Isabel Pastrana    Pain in both lower legs 2020    Patellar fracture     Rheumatoid arthritis with rheumatoid factor ([facility] [address]     Rheumatoid arthritis(714.0)     Dr Jean-Paul Shin; on [facility] Aud[facility] Cary)    Rib fractures 2014    Seasonal allergies     Skin cancer of lip     squamous cell    Wears dentures      Past Surgical History:   Procedure Laterality Date    CARDIAC CATHETERIZATION      CARPAL TUNNEL RELEASE Bilateral      SECTION      COLONOSCOPY  2011    Dr Giovany Zarate; diverticular disease; repeat in     COLONOSCOPY N/A 2020    COLONOSCOPY WITH BIOPSY performed by Olga Bolden MD at [address] [facility], TOTAL ABDOMINAL  early 2000's    TOTAL KNEE ARTHROPLASTY  6/2007    bilateral    UPPER GASTROINTESTINAL ENDOSCOPY N/A 4/29/2020    EGD BIOPSY performed by Zoila Martinez MD at [address] History   Problem Relation Age of Onset    Heart Attack Mother 58    Other Father         suicide, depression, leg trouble    Other Sister         Dementia    Hypertension Sister     Hypertension Sister     Hypertension Brother     Thyroid Disease Sister     Rheum Arthritis Sister     Rheum Arthritis Brother     Other Daughter         hemochromatosis     Other Son         hemachromatosis      Social History     Tobacco Use    Smoking status: Never Smoker    Smokeless tobacco: Never Used   Substance Use Topics    Alcohol use: No    Drug use: Never     Allergies   Allergen Reactions    Amoxicillin      GI ill effects     Current Outpatient Medications on File Prior to Encounter   Medication Sig Dispense Refill    valsartan-hydroCHLOROthiazide (DIOVAN-HCT) 160-25 MG per tablet Take 1 tablet by mouth daily 90 tablet 1    gabapentin (NEURONTIN) 100 MG capsule Take 1 capsule by mouth 2 times daily for 90 days.  180 capsule 0    DULoxetine (CYMBALTA) 20 MG extended release capsule Take 1 capsule by mouth daily 90 capsule 1    celecoxib (CELEBREX) 200 MG capsule TAKE 1 CAPSULE DAILY 90 capsule 3    ferrous sulfate (IRON 325) 325 (65 Fe) MG tablet Take 1 tablet by mouth every other day 30 tablet 5    levothyroxine (LEVOTHROID) 100 MCG tablet Take 1 tablet by mouth Daily 90 tablet 1    montelukast (SINGULAIR) 10 MG tablet TAKE 1 TABLET NIGHTLY 90 tablet 1    docusate sodium (COLACE, DULCOLAX) 100 MG CAPS Take 100 mg by mouth daily 30 capsule 0    vitamin D (ERGOCALCIFEROL) 1.25 MG (06458 UT) CAPS capsule Take 1 capsule by mouth once a week 6 capsule 1    vitamin B-12 (CYANOCOBALAMIN) 1000 MCG tablet Take 1,000 mcg by mouth daily      omeprazole (PRILOSEC) 40 MG delayed release capsule Take 1 capsule by mouth 2 times daily 180 capsule 3    famotidine (PEPCID) 40 MG tablet Take 1 tablet by mouth nightly 90 tablet 3    rivaroxaban (XARELTO) 15 MG TABS tablet Take 1 tablet by mouth daily (with breakfast) 90 tablet 3    flecainide (TAMBOCOR) 50 MG tablet Take 1 tablet by mouth 2 times daily 180 tablet 3    metoprolol tartrate (LOPRESSOR) 25 MG tablet Take 1 tablet by mouth 2 times daily 180 tablet 3    ondansetron (ZOFRAN) 4 MG tablet Take 1 tablet by mouth every 8 hours as needed for Nausea or Vomiting 30 tablet 5    polyethyl glycol-propyl glycol 0.4-0.3 % (SYSTANE) 0.4-0.3 % ophthalmic solution 1 drop as needed for Dry Eyes      fluticasone (FLONASE) 50 MCG/ACT nasal spray 2 sprays by Nasal route daily 2 sprays in each nostril daily 1 Bottle 11    Probiotic Product (ACIDOPHILUS PROBIOTIC) CAPS capsule Take 1 capsule by mouth daily      fexofenadine (ALLEGRA) 180 MG tablet Take 180 mg by mouth daily.  leucovorin calcium (WELLCOVORIN) 5 MG tablet Take 25 mg by mouth once a week Takes every Tuesday      methotrexate 2.5 MG tablet Take by mouth once a week 2 tabs at lunch and 4 tabs  in the pm once a week on Monday       No current facility-administered medications on file prior to encounter.         REVIEW OF SYSTEMS   ROS : All others Negative if blank [], Positive if [x]  General Vascular   [] Fevers [] Claudication   [] Chills [] Rest Pain   Skin Neurologic   [x] Tissue Loss [x] Lower extremity neuropathy     Objective:    BP (!) 146/82   Pulse 64   Temp 97.8 °F (36.6 °C) (Temporal)   Resp 16   Wt Readings from Last 3 Encounters:   11/04/20 126 lb (57.2 kg)   10/14/20 126 lb (57.2 kg)   10/07/20 126 lb (57.2 kg)       PHYSICAL EXAM   CONSTITUTIONAL:   Awake, alert, cooperative   PSYCHIATRIC :  Oriented to time, place and person      normal insight to disease process  EXTREMITIES:   R LE Open wounds are noted   Skin color is abnormal with ulcer   Edema is  noted   Sensation intact to light touch   Palpation of the foot does cause pain   5/5 strength DF/PF  L LE Open wounds are not noted   Skin color is normal   Edema is  noted   Sensation intact to light touch   Palpation of the foot does cause pain   5/5 strength DF/PF  R dorsalis pedis 1 L dorsalis pedis 1   R posterior tibial 1 L posterior tibial 1     Assessment:     Problem List Items Addressed This Visit     Non-pressure chronic ulcer of other part of right lower leg with fat layer exposed ([address]          Pre Debridement Measurements:  Are located in the [facility]  Documentation Flow Sheet  Post Debridement Measurements:  Wound/Ulcer Descriptions are Pre Debridement except measurements:     Wound 09/30/20 Leg Right;Proximal #2  (Active)   Wound Image   11/11/20 1036   Dressing Status New dressing applied;New drainage noted 10/28/20 1029   Wound Cleansed Cleansed with saline 10/28/20 1029   Dressing/Treatment ABD; Alginate;Collagen with Ag;Dry dressing 10/28/20 1029   Wound Length (cm) 0.4 cm 11/11/20 1036   Wound Width (cm) 0.3 cm 11/11/20 1036   Wound Depth (cm) 0.1 cm 11/11/20 1036   Wound Surface Area (cm^2) 0.12 cm^2 11/11/20 1036   Change in Wound Size % (l*w) 96.67 11/11/20 1036   Wound Volume (cm^3) 0.01 cm^3 11/11/20 1036   Wound Healing % 97 11/11/20 1036   Post-Procedure Length (cm) 0.4 cm 11/11/20 1102   Post-Procedure Width (cm) 0.3 cm 11/11/20 1102   Post-Procedure Depth (cm) 0.1 cm 11/11/20 1102   Post-Procedure Surface Area (cm^2) 0.12 cm^2 11/11/20 1102   Post-Procedure Volume (cm^3) 0.01 cm^3 11/11/20 1102   Wound Assessment Granulation tissue 11/11/20 1036   Drainage Amount Small 11/11/20 1036   Drainage Description Serosanguinous; Yellow 11/11/20 1036   Odor None 11/11/20 1036   Jaycee-wound Assessment Intact 11/11/20 1036   Number of days: 42          Procedure Note  Indications:  Based on my examination of this patient's wound(s)/ulcer(s) today, debridement is required to promote healing and evaluate the wound base.     Performed by: Aditi Orozco DPM    Consent obtained:  Yes    Time out taken:  Yes    Pain Control: Anesthetic  Anesthetic: 4% Lidocaine Liquid Topical     Debridement:Excisional Debridement    Using #15 blade scalpel the wound(s)/ulcer(s) was/were sharply debrided down through and including the removal of subcutaneous tissue. Devitalized Tissue Debrided:  fibrin, biofilm, slough and necrotic/eschar to stimulate bleeding to promote healing, post debridement good bleeding base and wound edges noted    Wound/Ulcer #: 2    Percent of Wound/Ulcer Debrided: 100%    Total Surface Area Debrided:  1 sq cm     Estimated Blood Loss:  Minimal  Hemostasis Achieved:  by pressure    Procedural Pain:  3  / 10   Post Procedural Pain:  5 / 10     Response to treatment:  Well tolerated by patient. A culture was done. Plan:     Pt is not a smoker   - Discussed relationship of smoking and negative affects on wound healing   - Emphasized importance of tobacco avoidace/cessation   - Script for nicotine patch given to patient   - Information regarding support groups for smoking cessation given    In my professional opinion and based off the information that is available at this time this patient has appropriate indication for HBO Therapy: Maybe    Treatment Note please see attached Discharge Instructions    Written patient dismissal instructions given to patient and signed by patient or POA.          Discharge Instructions       Visit Discharge/Physician Orders    Discharge condition: Stable     Assessment of pain at discharge:  none     Anesthetic used:  4% lidocaine solution     Discharge to: Home     Left via:Private automobile     Accompanied by: accompanied by grand daughter     ECF/HHA: [facility] Fax: [phone]   Phone:  [phone]  Wrap to stay in place for one week if patient tolerates.     Dressing Orders: Clean right lower leg ulcer with normal saline.  APPLY SALINE MOISTENED GEORGE, THEN APPLY ALGINATE, 4 X 4 GAUZE, KERLEX AND COBAN WRAPPING FROM BASE OF TOES TO JUST BELOW THE KNEE.  LEAVE IN PLACE FOR NO LONGER THAN A WEEK.  HOWEVER, IF WRAP SLIDES DOWN, OR PAINFUL, PLEASE CALL [facility] TO CHANGE. [facility]  CARE WILL CHANGE ON [facility] DURING WOUND CARE VISIT.     Treatment Orders:       Starting December 1st, [place] wound care will be at new location. [address] [facility], [facility]  Phone: [phone]     [facility] followup visit ______One week with Dr. German____In observance of the Thanksgiving Holiday, wound care with be closes Friday November 27th___________________  (Please note your next appointment above and if you are unable to keep, kindly give a 24 hour notice. Thank you.)     Physician signature:__________________________        If you experience any of the following, please call the [address] [place] [address] during business hours:     * Increase in Pain  * Temperature over 101  * Increase in drainage from your wound  * Drainage with a foul odor  * Bleeding  * Increase in swelling  * Need for compression bandage changes due to slippage, breakthrough drainage.     If you need medical attention outside of the business hours of the [address] [place] [address] please contact your PCP or go to the nearest emergency room.         Electronically signed by Denilson Christopher DPM on 11/11/2020 at 11:21 AM

## 2020-11-18 ENCOUNTER — HOSPITAL ENCOUNTER (OUTPATIENT)
Dept: WOUND CARE | Age: 85
Discharge: HOME | End: 2020-11-18
Payer: MEDICARE

## 2020-11-18 VITALS
TEMPERATURE: 98 F | BODY MASS INDEX: 24.74 KG/M2 | HEIGHT: 60 IN | DIASTOLIC BLOOD PRESSURE: 72 MMHG | SYSTOLIC BLOOD PRESSURE: 110 MMHG | WEIGHT: 126 LBS | RESPIRATION RATE: 18 BRPM | HEART RATE: 64 BPM

## 2020-11-18 PROCEDURE — 99212 OFFICE O/P EST SF 10 MIN: CPT

## 2020-11-18 RX ORDER — LIDOCAINE HYDROCHLORIDE 40 MG/ML
SOLUTION TOPICAL ONCE
Status: DISCONTINUED | OUTPATIENT
Start: 2020-11-18 | End: 2020-11-19 | Stop reason: HOSPADM

## 2020-11-18 ASSESSMENT — PAIN SCALES - GENERAL: PAINLEVEL_OUTOF10: 0

## 2020-11-18 NOTE — PROGRESS NOTES
[facility]  History and Physical/Consultation  Podiatry    Referring Physician : MD Brigette Siddiqui Gillian 57 RECORD NUMBER:  [id number]  AGE: 80 y.o. GENDER: female  : 1935  EPISODE DATE:  2020  Subjective:     Chief Complaint   Patient presents with    Wound Check     right leg         HISTORY of PRESENT ILLNESS HPI     Augusto Avila is a 80 y.o. female who presents today for wound/ulcer evaluation. History of Wound Context:  The patient has had a wound of right leg which was first noted approximately in 2020. This has been treated adaptic. On their initial visit to the wound healing center, 20 ,  the patient has noted that the wound has not been improving. The patient has had similar previous wounds in the past.      Pt is not on abx at time of initial visit.       Wound/Ulcer Pain Timing/Severity: constant  Quality of pain: aching  Severity:  4 / 10   Modifying Factors: Pain worsens with walking  Associated Signs/Symptoms: edema, erythema and drainage    Ulcer Identification:  Ulcer Type: venous  Contributing Factors: edema, venous stasis and lymphedema    Diabetic/Pressure/Non Pressure Ulcers onl y:  Ulcer: Non-Pressure ulcer, fat layer exposed    If patient has diabetic lower extremity wounds  Lock Classification of diabetic lower extremity wounds:    Grade Description   []  0 No open wound   []  1 Superficial ulcer involving the full skin thickness   []  2 Deep ulcer involves ligament, tendon, joint capsule, or fascia  No bone involvement or abscess presence   []  3 Deep Ulcer with abcess formation and/or osteomyelitis   []  4 Localized gangrene   []  5 Extensive gangrene of the foot     Wound: N/A     20  Debrided, needs ultrasound arterial, offload    20  Debrided, cont tx and care    20  Debrided, cont tx and care, culture taken       20  Debrided, cont tx and care, gentamicin      20  Debrided, cont tx and care, offload -20  Debrided, cont tx and care, offload, healing well, skin tear noted    10-6-20  Debrided, cont tx and care    10-7-20  Debrided, cont tx and care, one wound healed    10-21-20  Debrided, compression    10-28-20  Debrided, cont tx and care, compression    20  Healing great, debrided, cont tx and care     20  Debrided, cont tx and care   PAST MEDICAL HISTORY      Diagnosis Date    Anticoagulated     takes Xarelto    Atrial fibrillation ([facility] [address]     follows with Dr. 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 Jonny Sampson    Pain in both lower legs 2020    Patellar fracture     Rheumatoid arthritis with rheumatoid factor ([facility] [address]     Rheumatoid arthritis(714.0)     Dr Sudeep Vasquez; on DMD Rasheeda Spearing)    Rib fractures 2014    Seasonal allergies     Skin cancer of lip     squamous cell    Wears dentures      Past Surgical History:   Procedure Laterality Date    CARDIAC CATHETERIZATION      CARPAL TUNNEL RELEASE Bilateral      SECTION      COLONOSCOPY  2011    Dr Mckay Grewal; diverticular disease; repeat in 2016    COLONOSCOPY N/A 2020    COLONOSCOPY WITH BIOPSY performed by Nely Mcgrath MD at [address] [facility], TOTAL ABDOMINAL  early     TOTAL KNEE ARTHROPLASTY  6/2007    bilateral    UPPER GASTROINTESTINAL ENDOSCOPY N/A 4/29/2020    EGD BIOPSY performed by Mian Vidales MD at [address] History   Problem Relation Age of Onset    Heart Attack Mother 58    Other Father         suicide, depression, leg trouble    Other Sister         Dementia    Hypertension Sister     Hypertension Sister     Hypertension Brother     Thyroid Disease Sister     Rheum Arthritis Sister     Rheum Arthritis Brother     Other Daughter         hemochromatosis     Other Son         hemachromatosis      Social History     Tobacco Use    Smoking status: Never Smoker    Smokeless tobacco: Never Used   Substance Use Topics    Alcohol use: No    Drug use: Never     Allergies   Allergen Reactions    Amoxicillin      GI ill effects     Current Outpatient Medications on File Prior to Encounter   Medication Sig Dispense Refill    valsartan-hydroCHLOROthiazide (DIOVAN-HCT) 160-25 MG per tablet Take 1 tablet by mouth daily 90 tablet 1    gabapentin (NEURONTIN) 100 MG capsule Take 1 capsule by mouth 2 times daily for 90 days.  180 capsule 0    DULoxetine (CYMBALTA) 20 MG extended release capsule Take 1 capsule by mouth daily 90 capsule 1    celecoxib (CELEBREX) 200 MG capsule TAKE 1 CAPSULE DAILY 90 capsule 3    ferrous sulfate (IRON 325) 325 (65 Fe) MG tablet Take 1 tablet by mouth every other day 30 tablet 5    levothyroxine (LEVOTHROID) 100 MCG tablet Take 1 tablet by mouth Daily 90 tablet 1    montelukast (SINGULAIR) 10 MG tablet TAKE 1 TABLET NIGHTLY 90 tablet 1    docusate sodium (COLACE, DULCOLAX) 100 MG CAPS Take 100 mg by mouth daily 30 capsule 0    vitamin D (ERGOCALCIFEROL) 1.25 MG (50426 UT) CAPS capsule Take 1 capsule by mouth once a week 6 capsule 1    vitamin B-12 (CYANOCOBALAMIN) 1000 MCG tablet Take 1,000 mcg by mouth daily      omeprazole (PRILOSEC) 40 MG delayed release capsule Take 1 capsule by mouth 2 times daily 180 capsule 3    famotidine (PEPCID) 40 MG tablet Take 1 tablet by mouth nightly 90 tablet 3    rivaroxaban (XARELTO) 15 MG TABS tablet Take 1 tablet by mouth daily (with breakfast) 90 tablet 3    flecainide (TAMBOCOR) 50 MG tablet Take 1 tablet by mouth 2 times daily 180 tablet 3    metoprolol tartrate (LOPRESSOR) 25 MG tablet Take 1 tablet by mouth 2 times daily 180 tablet 3    polyethyl glycol-propyl glycol 0.4-0.3 % (SYSTANE) 0.4-0.3 % ophthalmic solution 1 drop as needed for Dry Eyes      fluticasone (FLONASE) 50 MCG/ACT nasal spray 2 sprays by Nasal route daily 2 sprays in each nostril daily 1 Bottle 11    Probiotic Product (ACIDOPHILUS PROBIOTIC) CAPS capsule Take 1 capsule by mouth daily      fexofenadine (ALLEGRA) 180 MG tablet Take 180 mg by mouth daily.  leucovorin calcium (WELLCOVORIN) 5 MG tablet Take 25 mg by mouth once a week Takes every Tuesday      methotrexate 2.5 MG tablet Take by mouth once a week 2 tabs at lunch and 4 tabs  in the pm once a week on Monday      ondansetron (ZOFRAN) 4 MG tablet Take 1 tablet by mouth every 8 hours as needed for Nausea or Vomiting 30 tablet 5     No current facility-administered medications on file prior to encounter.         REVIEW OF SYSTEMS   ROS : All others Negative if blank [], Positive if [x]  General Vascular   [] Fevers [] Claudication   [] Chills [] Rest Pain   Skin Neurologic   [x] Tissue Loss [x] Lower extremity neuropathy     Objective:    /72   Pulse 64   Temp 98 °F (36.7 °C)   Resp 18   Ht 5' (1.524 m)   Wt 126 lb (57.2 kg)   BMI 24.61 kg/m²   Wt Readings from Last 3 Encounters:   11/18/20 126 lb (57.2 kg)   11/04/20 126 lb (57.2 kg)   10/14/20 126 lb (57.2 kg)       PHYSICAL EXAM   CONSTITUTIONAL:   Awake, alert, cooperative   PSYCHIATRIC :  Oriented to time, place and person      normal insight to disease process  EXTREMITIES:   R LE Open wounds are noted   Skin color is abnormal with ulcer   Edema is  noted   Sensation intact to light touch   Palpation of the foot does cause pain   5/5 strength DF/PF  L LE Open wounds are not noted   Skin color is normal   Edema is  noted   Sensation intact to light touch   Palpation of the foot does cause pain   5/5 strength DF/PF  R dorsalis pedis 1 L dorsalis pedis 1   R posterior tibial 1 L posterior tibial 1     Assessment:     Problem List Items Addressed This Visit     Non-pressure chronic ulcer of other part of right lower leg with fat layer exposed ([address]          Pre Debridement Measurements:  Are located in the [place]  Documentation Flow Sheet  Post Debridement Measurements:  Wound/Ulcer Descriptions are Pre Debridement except measurements:     Wound 09/30/20 Leg Right;Proximal #2  (Active)   Wound Image   11/18/20 1011   Dressing Status New dressing applied;New drainage noted 10/28/20 1029   Wound Cleansed Cleansed with saline 10/28/20 1029   Dressing/Treatment ABD; Alginate;Collagen with Ag;Dry dressing 10/28/20 1029   Wound Length (cm) 0 cm 11/18/20 1011   Wound Width (cm) 0 cm 11/18/20 1011   Wound Depth (cm) 0 cm 11/18/20 1011   Wound Surface Area (cm^2) 0 cm^2 11/18/20 1011   Change in Wound Size % (l*w) 100 11/18/20 1011   Wound Volume (cm^3) 0 cm^3 11/18/20 1011   Wound Healing % 100 11/18/20 1011   Post-Procedure Length (cm) 0.4 cm 11/11/20 1102   Post-Procedure Width (cm) 0.3 cm 11/11/20 1102   Post-Procedure Depth (cm) 0.1 cm 11/11/20 1102   Post-Procedure Surface Area (cm^2) 0.12 cm^2 11/11/20 1102   Post-Procedure Volume (cm^3) 0.01 cm^3 11/11/20 1102   Wound Assessment Granulation tissue 11/11/20 1036   Drainage Amount Small 11/11/20 1036   Drainage Description Serosanguinous; Yellow 11/11/20 1036   Odor None 11/11/20 1036   Jaycee-wound Assessment Intact 11/11/20 1036   Number of days: 49          Procedure Note  Indications:  Based on my examination of this patient's wound(s)/ulcer(s) today, debridement is required to promote healing and evaluate the wound base.     Performed by: Rajinder Chapa Nelly Fernandez DPM    Consent obtained:  Yes    Time out taken:  Yes    Pain Control: Anesthetic  Anesthetic: 4% Lidocaine Liquid Topical     Debridement:Excisional Debridement    Using #15 blade scalpel the wound(s)/ulcer(s) was/were sharply debrided down through and including the removal of subcutaneous tissue. Devitalized Tissue Debrided:  fibrin, biofilm, slough and necrotic/eschar to stimulate bleeding to promote healing, post debridement good bleeding base and wound edges noted    Wound/Ulcer #: 2    Percent of Wound/Ulcer Debrided: 100%    Total Surface Area Debrided:  0 sq cm     Estimated Blood Loss:  Minimal  Hemostasis Achieved:  by pressure    Procedural Pain:  3  / 10   Post Procedural Pain:  5 / 10     Response to treatment:  Well tolerated by patient. Healed     Plan:     Pt is not a smoker   - Discussed relationship of smoking and negative affects on wound healing   - Emphasized importance of tobacco avoidace/cessation   - Script for nicotine patch given to patient   - Information regarding support groups for smoking cessation given    In my professional opinion and based off the information that is available at this time this patient has appropriate indication for HBO Therapy: Maybe    Treatment Note please see attached Discharge Instructions    Written patient dismissal instructions given to patient and signed by patient or POA. Discharge Instructions       Visit Discharge/Physician Orders    Discharge condition: Stable     Assessment of pain at discharge:  none     Anesthetic used:  4% lidocaine solution     Discharge to: Home     Left via:Private automobile     Accompanied by: accompanied by grand daughter     ECF/[facility]: [facility] Fax: [phone]   Phone:  [phone] [facility] discharge patient from wound care service     Dressing Orders:  right lower leg ulcer is healed. Apply Kerlex and coban from the base of toes to knee.   Daughter to remove on Wednesday.     Treatment

## 2020-11-22 NOTE — PROGRESS NOTES
in 9/2011      Elevated CA-125 06/20/2011     Overview Note:     Uncertain significance; identified along with elevated CEA; will need later outpatient oncology follow up      Hiatal hernia 06/10/2011     Overview Note:     S/p Nissen    Overview:   6/17/2011  This is a 49-year-old woman with a history of an intrathoracic stomach, dyspnea which is otherwise unexplained, and some degree of early satiety. Admitted to Dr. Lisa Baum service, underwent  repair of hiatal hernia, right-sided tube thoracostomy. Reduction of intrathoracic stomach, Nissen fundoplication on 8-4-1112  Plan:     -Continue soft mushy diet ; small meals  -OOB  /      Hypothyroidism 06/10/2011     Overview Note:     Overview:   6/17/2011  PMH of hypothyroidism. Takes levothyroxine at home 75 mg (clarified with daughter who is NP). Patient to resume her home dosage of levothyroxine. TSH drawn on 6/13/2011 was 1.63  Plan:  -Continue  synthroid 75 mcg orally   /      Rheumatoid arthritis ([address] 06/10/2011     Overview Note:     Overview:   6/17/2011  PMH of rheumatoid arthritis, took methotrexate and Celebrex pre-op. Plan:  -Check with surgeon before starting  /      A-fib [facility]) 06/09/2011     Overview Note:     Overview:   6/17/2011  Post-operatively the patient developed Atrial Fibrillation while in CVICU. Converted to NSR after lopressor IV. Patient in NSR this am. IV amiodarone infiltrated. Started on oral Amiodarone 400 mg TID on 6/14/2011. Will continue to taper dosages slowly. Plan:  -decrease  Amiodarone 400 mg bid and taper  -continue Metoprolol 50 bid (home dose)  . Current Outpatient Medications   Medication Sig Dispense Refill    valsartan-hydroCHLOROthiazide (DIOVAN-HCT) 160-25 MG per tablet Take 1 tablet by mouth daily 90 tablet 1    gabapentin (NEURONTIN) 100 MG capsule Take 1 capsule by mouth 2 times daily for 90 days.  180 capsule 0    DULoxetine (CYMBALTA) 20 MG extended release capsule Take 1 capsule by mouth daily 90 capsule 1    celecoxib (CELEBREX) 200 MG capsule TAKE 1 CAPSULE DAILY 90 capsule 3    ferrous sulfate (IRON 325) 325 (65 Fe) MG tablet Take 1 tablet by mouth every other day 30 tablet 5    levothyroxine (LEVOTHROID) 100 MCG tablet Take 1 tablet by mouth Daily 90 tablet 1    montelukast (SINGULAIR) 10 MG tablet TAKE 1 TABLET NIGHTLY 90 tablet 1    docusate sodium (COLACE, DULCOLAX) 100 MG CAPS Take 100 mg by mouth daily 30 capsule 0    vitamin B-12 (CYANOCOBALAMIN) 1000 MCG tablet Take 1,000 mcg by mouth daily      omeprazole (PRILOSEC) 40 MG delayed release capsule Take 1 capsule by mouth 2 times daily 180 capsule 3    famotidine (PEPCID) 40 MG tablet Take 1 tablet by mouth nightly 90 tablet 3    rivaroxaban (XARELTO) 15 MG TABS tablet Take 1 tablet by mouth daily (with breakfast) 90 tablet 3    flecainide (TAMBOCOR) 50 MG tablet Take 1 tablet by mouth 2 times daily 180 tablet 3    metoprolol tartrate (LOPRESSOR) 25 MG tablet Take 1 tablet by mouth 2 times daily 180 tablet 3    ondansetron (ZOFRAN) 4 MG tablet Take 1 tablet by mouth every 8 hours as needed for Nausea or Vomiting 30 tablet 5    polyethyl glycol-propyl glycol 0.4-0.3 % (SYSTANE) 0.4-0.3 % ophthalmic solution 1 drop 2 times daily as needed for Dry Eyes       fluticasone (FLONASE) 50 MCG/ACT nasal spray 2 sprays by Nasal route daily 2 sprays in each nostril daily 1 Bottle 11    Probiotic Product (ACIDOPHILUS PROBIOTIC) CAPS capsule Take 1 capsule by mouth daily      fexofenadine (ALLEGRA) 180 MG tablet Take 180 mg by mouth daily.  leucovorin calcium (WELLCOVORIN) 5 MG tablet Take 25 mg by mouth once a week Takes every Tuesday      methotrexate 2.5 MG tablet Take by mouth once a week 2 tabs at lunch and 4 tabs  in the pm once a week on Monday       No current facility-administered medications for this visit.          Allergies   Allergen Reactions    Amoxicillin      GI ill effects     ROS:   Constitutional: Negative for fever, both the LexiScan stress and rest SPECT myocardial perfusion   images. Gated study shows normal left ventricular wall motion and   myocardial thickening during systole. Resting left ventricular   ejection fraction is calculated at 95%.         Impression   No evidence of stress-induced left ventricular myocardial ischemia. TTE 1/16/20:  FINDINGS:    LEFT VENTRICLE:  The left ventricle is normal in size. There is no left ventricular hypertrophy. Basal septum is prominent. Left ventricular systolic function is normal. EF = 57% (2D biplane)  Left ventricular diastolic function is consistent with abnormal relaxation (stage I). Left Atrial pressures are elevated. Mitral annular lateral e': 5.3 cm/s. Mitral annular lateral E/e': 15.7. Mitral annular septal e': 5.4 cm/s. Mitral annular septal E/e': 15.4. The average Mitral E/e' ratio is 15.6. LV Wall Motion:  Left ventricular wall motion is normal.    RIGHT VENTRICLE:  The right ventricle is mildly dilated. Right ventricular systolic function is normal.  The right atrial pressure could not be estimated due to poor visualization of the inferior vena cava. The estimated right ventricular systolic pressure is 28 mmHg. The right atrial pressure is 3 mmHg. LEFT ATRIUM:  The left atrial size is mildly enlarged. RIGHT ATRIUM:  The right atrial cavity is mildly dilated. MITRAL VALVE:  There is mild posterior mitral annular calcification. There is mild mitral valve leaflet thickening. There is no mitral stenosis. There is trivial mitral valve regurgitation. TRICUSPID VALVE:  The tricuspid valve leaflets are structurally normal. There is no tricuspid stenosis. There is mild tricuspid valve regurgitation. AORTIC VALVE:  The aortic valve is tricuspid. There is mild thickening. There is mild calcification. There is no aortic valve stenosis. There is mild aortic valve regurgitation. The left coronary leaflet is immobile.     PULMONIC VALVE  The pulmonic valve is not well visualized, but grossly normal. There is no pulmonic stenosis. There is trivial pulmonic valve regurgitation. AORTA:  The visualized aorta is dilated. Measurements - Aortic valve annulus 2.2 cm. Sinus 3.0 cm. Sinotubular junction 2.3 cm. Proximal ascending aorta 3.5 cm. PERICARDIUM:  There is small circumferential pericardial effusion. There is an epicardial fat pad. CONCLUSIONS:  -Two-dimensional transthoracic echocardiography was performed using standard views & projections with M-mode and Doppler (continuous, pulsed wave, spectral & color flow). -The left ventricle is normal in size. There is no left ventricular hypertrophy. Left ventricular systolic function is normal. EF = 57% (2D biplane)  -The right ventricle is mildly dilated. Right ventricular systolic function is normal. The estimated right ventricular systolic pressure is 28 mmHg. The right atrial pressure is 3 mmHg. -The left atrial size is mildly enlarged.  -The right atrial cavity is mildly dilated. -There is mild tricuspid valve regurgitation.  -There is mild aortic valve regurgitation.    -No prior echocardiographic exam available for comparison. Electronically signed by Nellie Olson on 1/16/2020 at 4:11:36 PM.    Assessment:        1. Paroxysmal atrial fibrillation  - Discovered 2011.   - Highly symptomatic.   - [id number]= 5; On Xarelto 15 mg.  - Current medical therapy includes Lopressor and Flecainide  - No prior DC-CV or ablations  - Flecainide started 5/23/2016.  - Breakthrough AF more likely than SVT on 1/16/20; spontaneously converted and Lopressor increased. - Presents in SB with stable QRS. - Re-education on importance of well controlled HTN (goal BP < 130/80), adequate weight control (goal BMI of < 27), physical activity consisting of moderate cardiopulmonary exercise up to a goal of 250 min/wk, daily compliance with CPAP in treating sleep apena, smoking cessation and limited ETOH intake.      2. Sinus bradycardia  - Asymptomatic.  - On Flecainide and Lopressor.  - Will continue to monitor. 3. JANETTE  - Sleep study 6/2016: severe sleep apnea   - Sleep study 3/3/20: moderate sleep apnea  - Started on BIPAP but noncompliants. - Encouraged treatment. 4. Hypertension  - Well controlled. - On Diovan-HCT and Lopressor      5. Chronic diastolic CHF  - Euvolemic and compensated. - Follows with Cardiology.      6. Hypothyroidism  - On Levothroid. 7. OA  - On Celebrex.      8. Moderate AI  -  Followed by Cardiology. 9. Peripheral neuropathy    Plan:     1. Continue Flecainide 50 mg bid. 2. Continue Lopressor 25 mg bid. 3. Continue Xarelto 15 mg daily. 4. Life style modifications and encouraged treatment of JANETTE  5. EKG, CBC and CMP every 6 months. 6. Monitor BP and HR at home 2-3x a week and advise to call if HR goes below 50 bpm.   7. If she develops symptomatic bradycardia and requires current treatment for AF would then consider PPM insertion, there is no indication at this time. 8. Follow-up in 6 months. Encouraged the patient to call the office for any questions or concerns. Thank you for allowing me to participate in their care.      I have spent a total of 40 minutes with the patient and the family reviewing the above stated recommendations.   And a total of >50% of that time involved face-to-face time providing counseling and or coordination of care with the other providers.     Yo Cutler MD  Cardiac Electrophysiology  [facility]) Phys[facility]  [facility] [place]: [facility]  11:26 AM  11/23/2020

## 2020-11-23 ENCOUNTER — OFFICE VISIT (OUTPATIENT)
Dept: NON INVASIVE DIAGNOSTICS | Age: 85
End: 2020-11-23
Payer: MEDICARE

## 2020-11-23 VITALS
WEIGHT: 127 LBS | HEART RATE: 54 BPM | DIASTOLIC BLOOD PRESSURE: 72 MMHG | TEMPERATURE: 97.4 F | RESPIRATION RATE: 18 BRPM | BODY MASS INDEX: 24.94 KG/M2 | HEIGHT: 60 IN | SYSTOLIC BLOOD PRESSURE: 124 MMHG

## 2020-11-23 PROCEDURE — 99215 OFFICE O/P EST HI 40 MIN: CPT | Performed by: INTERNAL MEDICINE

## 2020-11-23 PROCEDURE — 93000 ELECTROCARDIOGRAM COMPLETE: CPT | Performed by: INTERNAL MEDICINE

## 2020-11-23 RX ORDER — FLECAINIDE ACETATE 50 MG/1
50 TABLET ORAL 2 TIMES DAILY
Qty: 180 TABLET | Refills: 3 | Status: ON HOLD
Start: 2020-11-23 | End: 2021-03-09 | Stop reason: HOSPADM

## 2020-11-25 ENCOUNTER — OFFICE VISIT (OUTPATIENT)
Dept: CARDIOLOGY CLINIC | Age: 85
End: 2020-11-25
Payer: MEDICARE

## 2020-11-25 VITALS
SYSTOLIC BLOOD PRESSURE: 108 MMHG | WEIGHT: 128 LBS | DIASTOLIC BLOOD PRESSURE: 64 MMHG | RESPIRATION RATE: 16 BRPM | HEART RATE: 58 BPM | BODY MASS INDEX: 25.13 KG/M2 | HEIGHT: 60 IN

## 2020-11-25 PROCEDURE — 99213 OFFICE O/P EST LOW 20 MIN: CPT | Performed by: INTERNAL MEDICINE

## 2020-11-25 NOTE — PROGRESS NOTES
OUTPATIENT CARDIOLOGY FOLLOW-UP    Name: Fransisco Villarreal    Age: 80 y.o. Primary Care Physician: Stephanie Bhandari MD    Date of Service: 11/25/2020    Chief Complaint: Follow-up for chest pain, atrial fibrillation    Interim History:  Previously followed by Dr. Hans Alegre; she established care with me in 3/2019. She also follows with EP (Dr. Serjio Bobby). No new cardiac complaints since last office visit. Inpatient evaluation in 2/2019 for CP -- negative stress test. No further chest pain. No worsening SOB, palpitations, PND, orthopnea, or syncope. Functional capacity limited by arthritis / she ambulates with a walker or wheelchair. Sinus on 11/23/2020 EKG.     Review of Systems:   Cardiac: As per HPI  General: No fever, chills  Pulmonary: As per HPI  HEENT: No visual disturbances, difficult swallowing  GI: No nausea, vomiting  : No dysuria, hematuria  Endocrine: +hypothyroidism, no DM  Musculoskeletal: FELIPE x 4, +arthritis  Skin: Intact, no rashes  Neuro: No headache, seizures  Psych: Currently with no depression, anxiety    Past Medical History:  Past Medical History:   Diagnosis Date    Anticoagulated     takes Xarelto    Atrial fibrillation ([facility] [address]     follows with Dr. 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    Wears dentures        Past Surgical History:  Past Surgical History:   Procedure Laterality Date    CARDIAC CATHETERIZATION      CARPAL TUNNEL RELEASE Bilateral      SECTION      COLONOSCOPY  2011    Dr Mitzy Gutierrez; diverticular disease; repeat in     COLONOSCOPY N/A 2020    COLONOSCOPY WITH BIOPSY performed by Opal Davenport MD at [address] [place] [address], TOTAL ABDOMINAL  early     TOTAL KNEE ARTHROPLASTY  2007    bilateral    UPPER GASTROINTESTINAL ENDOSCOPY N/A 2020    EGD BIOPSY [facility] by Opal Dav[facility]port [facility] at [facility] ENDOSCOPY       Family History:  Family History   Problem Relation Age of Onset    Heart Attack Mother 58    Other Father         suicide, depression, leg trouble    Other Sister         Dementia    Hypertension Sister     Hypertension Sister     Hypertension Brother     Thyroid Disease Sister     Rheum Arthritis Sister     Rheum Arthritis Brother     Other Daughter         hemochromatosis     Other Son         hemachromatosis        Social History:  Social History     Socioeconomic History    Marital status:      Spouse name: Not on file    Number of children: Not on file    Years of education: Not on file    Highest education level: Not on file   Occupational History    Occupation: retired- farm   Social Needs    Financial resource strain: Not on file    Food insecurity     Worry: Not on file     Inability: Not on file   [employer] needs     Medical: Not on file     Non-medical: Not on file   Tobacco Use    Smoking status: Never Smoker    Smokeless tobacco: Never Used   Substance and Sexual Activity    Alcohol use:  No  Drug use: Never    Sexual activity: Not on file   Lifestyle    Physical activity     Days per week: Not on file     Minutes per session: Not on file    Stress: Not on file   Relationships    Social connections     Talks on phone: Not on file     Gets together: Not on file     Attends Zoroastrian service: Not on file     Active member of club or organization: Not on file     Attends meetings of clubs or organizations: Not on file     Relationship status:     Intimate partner violence     Fear of current or ex partner: Not on file     Emotionally abused: Not on file     Physically abused: Not on file     Forced sexual activity: Not on file   Other Topics Concern    Not on file   Social History Narrative    Not on file       Allergies: Allergies   Allergen Reactions    Amoxicillin      GI ill effects       Current Medications:  Current Outpatient Medications   Medication Sig Dispense Refill    metoprolol tartrate (LOPRESSOR) 25 MG tablet Take 1 tablet by mouth 2 times daily 180 tablet 3    flecainide (TAMBOCOR) 50 MG tablet Take 1 tablet by mouth 2 times daily 180 tablet 3    rivaroxaban (XARELTO) 15 MG TABS tablet Take 1 tablet by mouth daily (with breakfast) 90 tablet 3    valsartan-hydroCHLOROthiazide (DIOVAN-HCT) 160-25 MG per tablet Take 1 tablet by mouth daily 90 tablet 1    gabapentin (NEURONTIN) 100 MG capsule Take 1 capsule by mouth 2 times daily for 90 days.  180 capsule 0    DULoxetine (CYMBALTA) 20 MG extended release capsule Take 1 capsule by mouth daily 90 capsule 1    celecoxib (CELEBREX) 200 MG capsule TAKE 1 CAPSULE DAILY 90 capsule 3    ferrous sulfate (IRON 325) 325 (65 Fe) MG tablet Take 1 tablet by mouth every other day 30 tablet 5    levothyroxine (LEVOTHROID) 100 MCG tablet Take 1 tablet by mouth Daily 90 tablet 1    montelukast (SINGULAIR) 10 MG tablet TAKE 1 TABLET NIGHTLY 90 tablet 1    docusate sodium (COLACE, DULCOLAX) 100 MG CAPS Take 100 mg by mouth daily 30 capsule 0    vitamin B-12 (CYANOCOBALAMIN) 1000 MCG tablet Take 1,000 mcg by mouth daily      omeprazole (PRILOSEC) 40 MG delayed release capsule Take 1 capsule by mouth 2 times daily 180 capsule 3    famotidine (PEPCID) 40 MG tablet Take 1 tablet by mouth nightly 90 tablet 3    ondansetron (ZOFRAN) 4 MG tablet Take 1 tablet by mouth every 8 hours as needed for Nausea or Vomiting 30 tablet 5    polyethyl glycol-propyl glycol 0.4-0.3 % (SYSTANE) 0.4-0.3 % ophthalmic solution 1 drop 2 times daily as needed for Dry Eyes       fluticasone (FLONASE) 50 MCG/ACT nasal spray 2 sprays by Nasal route daily 2 sprays in each nostril daily 1 Bottle 11    Probiotic Product (ACIDOPHILUS PROBIOTIC) CAPS capsule Take 1 capsule by mouth daily      fexofenadine (ALLEGRA) 180 MG tablet Take 180 mg by mouth daily.  leucovorin calcium (WELLCOVORIN) 5 MG tablet Take 25 mg by mouth once a week Takes every Tuesday      methotrexate 2.5 MG tablet Take by mouth once a week 2 tabs at lunch and 4 tabs  in the pm once a week on Monday       No current facility-administered medications for this visit. Physical Exam:  /64   Pulse 58   Resp 16   Ht 5' (1.524 m)   Wt 128 lb (58.1 kg)   BMI 25.00 kg/m²   Wt Readings from Last 3 Encounters:   11/25/20 128 lb (58.1 kg)   11/23/20 127 lb (57.6 kg)   11/18/20 126 lb (57.2 kg)     Appearance: Awake, alert and oriented x 3, no acute respiratory distress  Skin: Intact, no rash  Head: Normocephalic, atraumatic  Eyes: EOMI, no conjunctival erythema  ENMT: No pharyngeal erythema, MMM, no rhinorrhea  Neck: Supple, no carotid bruits  Lungs: Clear to auscultation bilaterally. No wheezes, rales, or rhonchi.   Cardiac: Bradycardic, regular rhythm, 2/6 systolic murmur  Abdomen: Soft, nontender, +bowel sounds  Extremities: Moves all extremities x 4, trace lower extremity edema  Neurologic: No focal motor deficits apparent, normal mood and affect, alert and oriented x 3    Laboratory Tests:  Lab Results   Component Value Date    CREATININE 1.1 (H) 09/10/2020    BUN 21 09/10/2020     09/10/2020    K 4.0 09/10/2020     09/10/2020    CO2 25 09/10/2020     Lab Results   Component Value Date    MG 1.7 07/12/2020     Lab Results   Component Value Date    WBC 6.2 09/10/2020    HGB 11.7 09/10/2020    HCT 36.3 09/10/2020    MCV 96.5 09/10/2020     09/10/2020     Lab Results   Component Value Date    ALT 16 09/10/2020    AST 21 09/10/2020    ALKPHOS 72 09/10/2020    BILITOT 0.2 09/10/2020     Lab Results   Component Value Date    CKTOTAL 105 07/12/2020    CKMB 1.9 03/21/2016    TROPONINI <0.01 02/08/2019    TROPONINI <0.01 02/07/2019    TROPONINI 0.01 03/22/2016     Lab Results   Component Value Date    INR 1.2 07/16/2020    INR 1.0 07/15/2020    INR 1.1 07/14/2020    PROTIME 14.4 (H) 07/16/2020    PROTIME 12.4 07/15/2020    PROTIME 13.3 (H) 07/14/2020     Lab Results   Component Value Date    TSH 5.410 (H) 06/10/2019     No results found for: LABA1C  No results found for: EAG  Lab Results   Component Value Date    CHOL 182 10/22/2019    CHOL 174 06/10/2019    CHOL 175 03/22/2016     Lab Results   Component Value Date    TRIG 140 10/22/2019    TRIG 129 06/10/2019    TRIG 82 03/22/2016     Lab Results   Component Value Date    HDL 44 10/22/2019    HDL 49 06/10/2019    HDL 53 03/22/2016     Lab Results   Component Value Date    LDLCALC 110 (H) 10/22/2019    LDLCALC 99 06/10/2019    LDLCALC 106 (H) 03/22/2016     Lab Results   Component Value Date    LABVLDL 28 10/22/2019    LABVLDL 26 06/10/2019    LABVLDL 16 03/22/2016     No results found for: CHOLHDLRATIO  No results for input(s): PROBNP in the last 72 hours. Cardiac Tests:  ECG (11/23/2020): SB, rate 54, NSSTT changes, QRS 88 ms, QTc 448 ms    Lexiscan nuclear stress test: 2/8/19  No evidence of stress-induced left ventricular myocardial ischemia.   Gated study shows normal left ventricular wall motion and   myocardial thickening during systole. Resting left ventricular   ejection fraction is calculated at 95%. ASSESSMENT / PLAN:  1. Osteoporosis. 2. Thyroid abnormality. 3. Hypertension -- BP today 108/64 (BP at prior office visits 100/60 and 92/60)  4. Palpitations with negative ambulatory monitor, . 5. Exercise MPS, 2003. 8 METS, EF 68%, normal perfusion. 6. Echo, 2004. Sclerotic mildly insufficient AV, mild MR, TR and PI. RVSP 29, normal systolic and diastolic function. 7. Carotid US, 2004. Minimal LICA plaque with <43% narrowing, normal SHAUNA, normal vertebral flow bilaterally.    8. Family history positive. Mother had MI at age 79 and  at 80 of CHF. Sister requiring MVR for MR and prolapse. 9. Lifetime nonsmoker. 10. No known drug allergies. 11. Event monitor, 2006. Short run of nonsustained SVT at 150 bpm.    12. Echo, 2006. Normal EF, no LVH, normal diastolic function, E/E 12, mild MR, RVSP 43, mild dilatation of the right heart chambers. 13. Ambulatory monitor demonstrated nonsustained SVT at 150 bpm, correlating with symptoms, metoprolol dose doubled to 50 mg bid, 2007. 14. Bilateral knee replacements 2007, Dr. Kendall Sox. Well tolerated. 15. Dipyridamole stress MPS, 11/10/2008. No TID, normal perfusion, mild soft tissue attenuation, EF 89%. No ischemia. Low risk/low probability. 16. Echo, 11/10/2008. Stage I diastolic dysfunction, borderline/mild LAE. EF normal. Mild-moderate AR. Mild PHTN with RVSP 43.    17. Large hiatal hernia requiring Nissen fundoplication, [place], 2011.    18. Rheumatoid arthritis documented 2010. Patient being treated with methotrexate. 19. Echo, 2011. Normal LV size, wall thickening and EF. Stage I diastolic dysfunction. Normal LV filling pressures. Mild ANDREAS. MAC with mild MR, mild-moderate TR, moderately elevated RVSP 50-55 mmHg.    20. Lexiscan MPS, 2011. Normal. No TID. Wall motion normal, EF above 70%.    21.  [facility] admission, 06/02/2011 with anorexia, nausea, weight loss, dyspnea. EKG sinus rhythm, borderline low voltage, minimal nonspecific ST-T abnormalities. , BMP normal. Hb 10, WBC and platelet count normal. T4 elevated at 13, free T4 elevated at 2.16, TSH low at 0.079.    22. Periop AF while at [facility] for Nissen fundoplication. Treated with amiodarone. 23. Sharp chest pain, 07/2011. CT chest negative for PE.    24. 24-hour Holter monitor, 11/07/2012. Sinus rhythm with occasional isolated PVC's, frequent PAC's and several short runs of SVT up to 31 beats in duration. No prolonged pauses and no symptoms.    25. [facility] admission, 02/04/2013 with two to three weeks of worsening diarrhea. She had been on OP antibiotic therapy for a nasal infection. CBC, LFT's, BMP normal except Cr 1.4. CXR negative except for mild cardiomegaly. EKG revealed sinus rhythm with possible old inferior infarction. Cardiac enzymes negative.    26. Chronic back pain and leg pain which may be due to lumbar spinal stenosis. The patient receiving epidural injections which require temporarily stopping her anticoagulant therapies.    27. Echo, 09/08/2014. Normal LV size, wall thickness, regional wall motion and EF. Stage II diastolic dysfunction. E/E' 16. Moderate LAE, mild HEATHER. Mild MAC, mild MR and TR. RVSP 45-50 mmHg. 28. Lexiscan MPS, 03/31/2015. Normal perfusion. Normal EF. No TID. Low risk/low probability. 29. Hospitalization, 03/2016, with recurrent atrial fibrillation.    30. Hospitalization in [place], [place], mid April 2016, records pending. Patient reportedly had catheterization, which showed no significant coronary disease. 31. Flecainide started 05/23/2016. Prior EP evaluation. Maintaining SR. Chronic anticoagulation with xarelto.   32. 48 hour Holter monitor from 11/16-11/18/2017 showed sinus rhythm with sinus arrhythmia, short runs of paroxysmal atrial tachycardia, up to 7 beats in duration, but no AV block, atrial fibrillation or symptoms. 33. Obstructive sleep apnea.  Patient evaluated by Dr. Eve Nowak and refuses CPAP therapy.    34. Echocardiogram, 05/02/2017, normal left ventricular size, wall thickness and systolic function with ejection fraction 27%, stage 1 diastolic relaxation abnormalities, mild left atrial enlargement, normal mitral valve structure and function, mild tricuspid insufficiency with mildly elevated right ventricular systolic pressure at 40 mmHg. Aortic sclerosis without stenosis but with mild aortic insufficiency. 35. Chronic HFpEF  36.  Negative stress test in 2/2019 (see results above)    - Continue current medications (incuding BB, xarelto, and flecainide)  - Results of 2/2019 stress test reviewed with the patient  - Case discussed on the telephone with the patient's daughter today    Belle Lamar MD  [facility]) Cardiology

## 2021-01-04 DIAGNOSIS — I48.0 PAROXYSMAL ATRIAL FIBRILLATION (HCC): ICD-10-CM

## 2021-01-04 DIAGNOSIS — I10 ESSENTIAL HYPERTENSION: ICD-10-CM

## 2021-01-06 ENCOUNTER — OFFICE VISIT (OUTPATIENT)
Dept: FAMILY MEDICINE CLINIC | Age: 86
End: 2021-01-06
Payer: MEDICARE

## 2021-01-06 VITALS
DIASTOLIC BLOOD PRESSURE: 60 MMHG | SYSTOLIC BLOOD PRESSURE: 120 MMHG | TEMPERATURE: 97.2 F | OXYGEN SATURATION: 96 % | HEART RATE: 63 BPM | RESPIRATION RATE: 16 BRPM

## 2021-01-06 DIAGNOSIS — I48.0 PAROXYSMAL ATRIAL FIBRILLATION (HCC): ICD-10-CM

## 2021-01-06 DIAGNOSIS — E06.9 THYROIDITIS: ICD-10-CM

## 2021-01-06 DIAGNOSIS — M05.79 RHEUMATOID ARTHRITIS INVOLVING MULTIPLE SITES WITH POSITIVE RHEUMATOID FACTOR (HCC): ICD-10-CM

## 2021-01-06 DIAGNOSIS — R20.2 PARESTHESIA OF LOWER EXTREMITY: ICD-10-CM

## 2021-01-06 DIAGNOSIS — F43.21 ADJUSTMENT DISORDER WITH DEPRESSED MOOD: ICD-10-CM

## 2021-01-06 DIAGNOSIS — R26.9 GAIT DIFFICULTY: ICD-10-CM

## 2021-01-06 DIAGNOSIS — R20.2 PARESTHESIA OF LOWER EXTREMITY: Primary | ICD-10-CM

## 2021-01-06 LAB
T4 FREE: 1.65 NG/DL (ref 0.93–1.7)
TSH SERPL DL<=0.05 MIU/L-ACNC: 4.87 UIU/ML (ref 0.27–4.2)

## 2021-01-06 PROCEDURE — 99214 OFFICE O/P EST MOD 30 MIN: CPT | Performed by: FAMILY MEDICINE

## 2021-01-06 PROCEDURE — 96372 THER/PROPH/DIAG INJ SC/IM: CPT | Performed by: FAMILY MEDICINE

## 2021-01-06 RX ORDER — CYANOCOBALAMIN 1000 UG/ML
1000 INJECTION INTRAMUSCULAR; SUBCUTANEOUS
Qty: 10 ML | Refills: 1 | Status: SHIPPED | OUTPATIENT
Start: 2021-01-06 | End: 2021-02-23

## 2021-01-06 RX ORDER — CYANOCOBALAMIN 1000 UG/ML
1000 INJECTION INTRAMUSCULAR; SUBCUTANEOUS ONCE
Status: COMPLETED | OUTPATIENT
Start: 2021-01-06 | End: 2021-01-06

## 2021-01-06 RX ADMIN — CYANOCOBALAMIN 1000 MCG: 1000 INJECTION INTRAMUSCULAR; SUBCUTANEOUS at 15:40

## 2021-01-06 ASSESSMENT — PATIENT HEALTH QUESTIONNAIRE - PHQ9
2. FEELING DOWN, DEPRESSED OR HOPELESS: 0
SUM OF ALL RESPONSES TO PHQ QUESTIONS 1-9: 0
SUM OF ALL RESPONSES TO PHQ9 QUESTIONS 1 & 2: 0
SUM OF ALL RESPONSES TO PHQ QUESTIONS 1-9: 0
1. LITTLE INTEREST OR PLEASURE IN DOING THINGS: 0

## 2021-01-06 NOTE — PROGRESS NOTES
chronic; 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  / at finger, and feet, legs    JANETTE (obstructive sleep apnea)     not using cpap or bipap    Osteoporosis     PAF (paroxysmal atrial fibrillation) (HCC)     anticoagulation per cardiology  / follows with Dr. 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    Wears dentures      Social History     Tobacco Use    Smoking status: Never Smoker    Smokeless tobacco: Never Used   Substance Use Topics    Alcohol use: No    Drug use: Never         PE:  VS:  /60   Pulse 63   Temp 97.2 °F (36.2 °C) (Temporal)   Resp 16   SpO2 96%   Physical Exam  General: Well nourished, well developed, no acute distress  Neck:  Supple   No palpable cervical lymphoadenopathy   Thyroid without mass or enlargement  Resp: Lungs CTAB   Equal and adequate air exchange without accessory muscle use   No rales, rhonchi or wheeze  CV: S1S2 RRR, no ectopy   +70/0 systolic murmur   Intact distal pulses   Trace B lower extremity edema  GI: Abdomen Soft, non tender, non distended   Normoactive bowel sounds  MS: Nonambulatory, sitting in wheelchair   Lower extremity strength 3/5 on left, 3/5 on right   Intact distal pulses   No clubbing or cyanosis   Skin Warm and dry; no rash or lesion  Neuro: Alert and oriented; normal and intact dtr's   Psych: Euthymic mood, congruent affect, logical thought processes      Assessment (including specific orders/meds/labs):      Elinor Baum was seen today for extremity weakness. Diagnoses and all orders for this visit:    Paresthesia of lower extremity  -     cyanocobalamin injection 1,000 mcg  -     cyanocobalamin 1000 MCG/ML injection;  Inject 1 mL into the muscle every 30 days Dispense with syringes 1.5 inch 25 G needles  -     TSH without Reflex; Future  -     T4, Free; Future    Gait difficulty    Thyroiditis  -     TSH without Reflex; Future  -     T4, Free; Future    Adjustment disorder with depressed mood    Paroxysmal atrial fibrillation (HCC)    Rheumatoid arthritis involving multiple sites with positive rheumatoid factor (HCC)        Plan:     Paresthesia and gait dysfunction remain. I am doing a therapeutic trial of a B12 shot along with home dosing of B12 on a monthly basis. Patient states she has had this before and it has helped. I see no harm although I cannot be assured of success. Strongly encouraged her to pursue seeing PMR. She is contemplative of this at this point. Adjustment issues are present, appear stable to me. Family with some concerns of her memory loss but the patient is dismissive. I did explain the process of further work-up to involve neuropsych evaluation. Atrial fibrillation is ongoing, stable. Follow-up per cardiology. Will check TSH today. Call or go to ED immediately if symptoms worsen or persist. Advised patient to call with any new medication issues, and, as applicable, read all Rx info from pharmacy to assure aware ofall possible risks and side effects of medication before taking. Patient and/or guardian given opportunity to ask questions/raise concerns. She and her daughter verbalized comfort and understanding of instructions. Follow Up:     Return in about 3 months (around 4/6/2021), or if symptoms worsen or fail to improve, for neuropathy and mood , 30 min. or sooner if the above issues change unexpectedly or new issues develop     This document was prepared at least partially through the use ofvoice recognition software. Although effort is taken to assure the accuracy of this document, it is possible that grammatical, syntax,  or spelling errors may occur.       Electronically signed by Indira Patel MD [facility]

## 2021-01-28 ENCOUNTER — IMMUNIZATION (OUTPATIENT)
Dept: PRIMARY CARE CLINIC | Age: 86
End: 2021-01-28
Payer: MEDICARE

## 2021-01-28 PROCEDURE — 0001A COVID-19, PFIZER VACCINE 30MCG/0.3ML DOSE: CPT | Performed by: NURSE PRACTITIONER

## 2021-01-28 PROCEDURE — 91300 COVID-19, PFIZER VACCINE 30MCG/0.3ML DOSE: CPT | Performed by: NURSE PRACTITIONER

## 2021-02-07 ENCOUNTER — APPOINTMENT (OUTPATIENT)
Dept: CT IMAGING | Age: 86
DRG: 177 | End: 2021-02-07
Payer: MEDICARE

## 2021-02-07 ENCOUNTER — HOSPITAL ENCOUNTER (INPATIENT)
Age: 86
LOS: 8 days | Discharge: HOME HEALTH | DRG: 177 | End: 2021-02-16
Attending: EMERGENCY MEDICINE | Admitting: FAMILY MEDICINE
Payer: MEDICARE

## 2021-02-07 DIAGNOSIS — U07.1 COVID-19: ICD-10-CM

## 2021-02-07 DIAGNOSIS — J96.01 ACUTE RESPIRATORY FAILURE WITH HYPOXIA (HCC): Primary | ICD-10-CM

## 2021-02-07 LAB
ALBUMIN SERPL-MCNC: 2.9 G/DL (ref 3.5–5.2)
ALP BLD-CCNC: 102 U/L (ref 35–104)
ALT SERPL-CCNC: 11 U/L (ref 0–32)
ANION GAP SERPL CALCULATED.3IONS-SCNC: 11 MMOL/L (ref 7–16)
AST SERPL-CCNC: 45 U/L (ref 0–31)
BASOPHILS ABSOLUTE: 0.02 E9/L (ref 0–0.2)
BASOPHILS RELATIVE PERCENT: 0.2 % (ref 0–2)
BILIRUB SERPL-MCNC: 0.5 MG/DL (ref 0–1.2)
BUN BLDV-MCNC: 31 MG/DL (ref 8–23)
CALCIUM SERPL-MCNC: 8.7 MG/DL (ref 8.6–10.2)
CHLORIDE BLD-SCNC: 96 MMOL/L (ref 98–107)
CO2: 27 MMOL/L (ref 22–29)
CREAT SERPL-MCNC: 0.8 MG/DL (ref 0.5–1)
EKG ATRIAL RATE: 67 BPM
EKG P AXIS: 5 DEGREES
EKG P-R INTERVAL: 146 MS
EKG Q-T INTERVAL: 462 MS
EKG QRS DURATION: 80 MS
EKG QTC CALCULATION (BAZETT): 488 MS
EKG R AXIS: 2 DEGREES
EKG T AXIS: 51 DEGREES
EKG VENTRICULAR RATE: 67 BPM
EOSINOPHILS ABSOLUTE: 0.11 E9/L (ref 0.05–0.5)
EOSINOPHILS RELATIVE PERCENT: 1.3 % (ref 0–6)
GFR AFRICAN AMERICAN: >60
GFR NON-AFRICAN AMERICAN: >60 ML/MIN/1.73
GLUCOSE BLD-MCNC: 118 MG/DL (ref 74–99)
HCT VFR BLD CALC: 35.5 % (ref 34–48)
HEMOGLOBIN: 11.9 G/DL (ref 11.5–15.5)
IMMATURE GRANULOCYTES #: 0.17 E9/L
IMMATURE GRANULOCYTES %: 2 % (ref 0–5)
LACTIC ACID, SEPSIS: 1.7 MMOL/L (ref 0.5–1.9)
LYMPHOCYTES ABSOLUTE: 0.37 E9/L (ref 1.5–4)
LYMPHOCYTES RELATIVE PERCENT: 4.3 % (ref 20–42)
MCH RBC QN AUTO: 33 PG (ref 26–35)
MCHC RBC AUTO-ENTMCNC: 33.5 % (ref 32–34.5)
MCV RBC AUTO: 98.3 FL (ref 80–99.9)
MONOCYTES ABSOLUTE: 0.68 E9/L (ref 0.1–0.95)
MONOCYTES RELATIVE PERCENT: 7.9 % (ref 2–12)
NEUTROPHILS ABSOLUTE: 7.23 E9/L (ref 1.8–7.3)
NEUTROPHILS RELATIVE PERCENT: 84.3 % (ref 43–80)
OVALOCYTES: ABNORMAL
PDW BLD-RTO: 13.8 FL (ref 11.5–15)
PLATELET # BLD: 363 E9/L (ref 130–450)
PMV BLD AUTO: 10 FL (ref 7–12)
POIKILOCYTES: ABNORMAL
POLYCHROMASIA: ABNORMAL
POTASSIUM SERPL-SCNC: 5.2 MMOL/L (ref 3.5–5)
PRO-BNP: 2160 PG/ML (ref 0–450)
RBC # BLD: 3.61 E12/L (ref 3.5–5.5)
SODIUM BLD-SCNC: 134 MMOL/L (ref 132–146)
TOTAL PROTEIN: 6.7 G/DL (ref 6.4–8.3)
TROPONIN: <0.01 NG/ML (ref 0–0.03)
WBC # BLD: 8.6 E9/L (ref 4.5–11.5)

## 2021-02-07 PROCEDURE — 83880 ASSAY OF NATRIURETIC PEPTIDE: CPT

## 2021-02-07 PROCEDURE — 99284 EMERGENCY DEPT VISIT MOD MDM: CPT

## 2021-02-07 PROCEDURE — 80053 COMPREHEN METABOLIC PANEL: CPT

## 2021-02-07 PROCEDURE — 85025 COMPLETE CBC W/AUTO DIFF WBC: CPT

## 2021-02-07 PROCEDURE — 6370000000 HC RX 637 (ALT 250 FOR IP): Performed by: STUDENT IN AN ORGANIZED HEALTH CARE EDUCATION/TRAINING PROGRAM

## 2021-02-07 PROCEDURE — 83605 ASSAY OF LACTIC ACID: CPT

## 2021-02-07 PROCEDURE — 93010 ELECTROCARDIOGRAM REPORT: CPT | Performed by: INTERNAL MEDICINE

## 2021-02-07 PROCEDURE — 84484 ASSAY OF TROPONIN QUANT: CPT

## 2021-02-07 PROCEDURE — 6360000004 HC RX CONTRAST MEDICATION: Performed by: RADIOLOGY

## 2021-02-07 PROCEDURE — 93005 ELECTROCARDIOGRAM TRACING: CPT | Performed by: STUDENT IN AN ORGANIZED HEALTH CARE EDUCATION/TRAINING PROGRAM

## 2021-02-07 PROCEDURE — 71275 CT ANGIOGRAPHY CHEST: CPT

## 2021-02-07 RX ORDER — SODIUM CHLORIDE 0.9 % (FLUSH) 0.9 %
10 SYRINGE (ML) INJECTION EVERY 12 HOURS SCHEDULED
Status: DISCONTINUED | OUTPATIENT
Start: 2021-02-07 | End: 2021-02-12 | Stop reason: SDUPTHER

## 2021-02-07 RX ORDER — SODIUM CHLORIDE 0.9 % (FLUSH) 0.9 %
10 SYRINGE (ML) INJECTION PRN
Status: DISCONTINUED | OUTPATIENT
Start: 2021-02-07 | End: 2021-02-12 | Stop reason: SDUPTHER

## 2021-02-07 RX ORDER — ASPIRIN 81 MG/1
324 TABLET, CHEWABLE ORAL ONCE
Status: COMPLETED | OUTPATIENT
Start: 2021-02-07 | End: 2021-02-07

## 2021-02-07 RX ORDER — 0.9 % SODIUM CHLORIDE 0.9 %
500 INTRAVENOUS SOLUTION INTRAVENOUS ONCE
Status: DISCONTINUED | OUTPATIENT
Start: 2021-02-07 | End: 2021-02-12

## 2021-02-07 RX ADMIN — ASPIRIN 324 MG: 81 TABLET, CHEWABLE ORAL at 21:27

## 2021-02-07 RX ADMIN — IOPAMIDOL 70 ML: 755 INJECTION, SOLUTION INTRAVENOUS at 23:15

## 2021-02-07 ASSESSMENT — ENCOUNTER SYMPTOMS
COUGH: 1
ABDOMINAL PAIN: 0
COUGH: 0
SHORTNESS OF BREATH: 0
DIARRHEA: 0
NAUSEA: 0
WHEEZING: 0
COLOR CHANGE: 0
SORE THROAT: 1
BACK PAIN: 0
SORE THROAT: 0
EYE DISCHARGE: 0
EYE REDNESS: 0
EYE ITCHING: 0
VOMITING: 0
EYE PAIN: 0
RHINORRHEA: 0
SHORTNESS OF BREATH: 1
BLOOD IN STOOL: 0
CONSTIPATION: 0
CHEST TIGHTNESS: 1

## 2021-02-07 ASSESSMENT — PAIN DESCRIPTION - PAIN TYPE: TYPE: ACUTE PAIN

## 2021-02-07 ASSESSMENT — PAIN SCALES - GENERAL: PAINLEVEL_OUTOF10: 5

## 2021-02-07 ASSESSMENT — PAIN DESCRIPTION - LOCATION: LOCATION: RIB CAGE

## 2021-02-08 ENCOUNTER — APPOINTMENT (OUTPATIENT)
Dept: GENERAL RADIOLOGY | Age: 86
DRG: 177 | End: 2021-02-08
Payer: MEDICARE

## 2021-02-08 PROBLEM — J96.01 ACUTE RESPIRATORY FAILURE WITH HYPOXIA (HCC): Status: ACTIVE | Noted: 2021-02-08

## 2021-02-08 PROBLEM — U07.1 COVID-19: Status: ACTIVE | Noted: 2021-02-08

## 2021-02-08 LAB
ADENOVIRUS BY PCR: NOT DETECTED
ALBUMIN SERPL-MCNC: 2.6 G/DL (ref 3.5–5.2)
ALP BLD-CCNC: 103 U/L (ref 35–104)
ALT SERPL-CCNC: 9 U/L (ref 0–32)
ANION GAP SERPL CALCULATED.3IONS-SCNC: 13 MMOL/L (ref 7–16)
APTT: 31.8 SEC (ref 24.5–35.1)
AST SERPL-CCNC: 26 U/L (ref 0–31)
ATYPICAL LYMPHOCYTE RELATIVE PERCENT: 0.9 % (ref 0–4)
BASOPHILS ABSOLUTE: 0 E9/L (ref 0–0.2)
BASOPHILS RELATIVE PERCENT: 0 % (ref 0–2)
BILIRUB SERPL-MCNC: 0.5 MG/DL (ref 0–1.2)
BORDETELLA PARAPERTUSSIS BY PCR: NOT DETECTED
BORDETELLA PERTUSSIS BY PCR: NOT DETECTED
BUN BLDV-MCNC: 27 MG/DL (ref 8–23)
C-REACTIVE PROTEIN: 22.7 MG/DL (ref 0–0.4)
CALCIUM SERPL-MCNC: 8.6 MG/DL (ref 8.6–10.2)
CHLAMYDOPHILIA PNEUMONIAE BY PCR: NOT DETECTED
CHLORIDE BLD-SCNC: 100 MMOL/L (ref 98–107)
CO2: 22 MMOL/L (ref 22–29)
CORONAVIRUS 229E BY PCR: NOT DETECTED
CORONAVIRUS HKU1 BY PCR: NOT DETECTED
CORONAVIRUS NL63 BY PCR: NOT DETECTED
CORONAVIRUS OC43 BY PCR: NOT DETECTED
CREAT SERPL-MCNC: 0.8 MG/DL (ref 0.5–1)
D DIMER: 419 NG/ML DDU
EOSINOPHILS ABSOLUTE: 0.2 E9/L (ref 0.05–0.5)
EOSINOPHILS RELATIVE PERCENT: 2.6 % (ref 0–6)
FERRITIN: 425 NG/ML
FIBRINOGEN: >700 MG/DL (ref 225–540)
GFR AFRICAN AMERICAN: >60
GFR NON-AFRICAN AMERICAN: >60 ML/MIN/1.73
GLUCOSE BLD-MCNC: 112 MG/DL (ref 74–99)
HCT VFR BLD CALC: 33.9 % (ref 34–48)
HEMOGLOBIN: 11.4 G/DL (ref 11.5–15.5)
HUMAN METAPNEUMOVIRUS BY PCR: NOT DETECTED
HUMAN RHINOVIRUS/ENTEROVIRUS BY PCR: NOT DETECTED
INFLUENZA A BY PCR: NOT DETECTED
INFLUENZA B BY PCR: NOT DETECTED
INR BLD: 1.6
LACTATE DEHYDROGENASE: 472 U/L (ref 135–214)
LACTIC ACID: 1.4 MMOL/L (ref 0.5–2.2)
LYMPHOCYTES ABSOLUTE: 0.31 E9/L (ref 1.5–4)
LYMPHOCYTES RELATIVE PERCENT: 3.5 % (ref 20–42)
MCH RBC QN AUTO: 32.9 PG (ref 26–35)
MCHC RBC AUTO-ENTMCNC: 33.6 % (ref 32–34.5)
MCV RBC AUTO: 98 FL (ref 80–99.9)
MONOCYTES ABSOLUTE: 0.47 E9/L (ref 0.1–0.95)
MONOCYTES RELATIVE PERCENT: 6.1 % (ref 2–12)
MYCOPLASMA PNEUMONIAE BY PCR: NOT DETECTED
NEUTROPHILS ABSOLUTE: 6.79 E9/L (ref 1.8–7.3)
NEUTROPHILS RELATIVE PERCENT: 86.9 % (ref 43–80)
NUCLEATED RED BLOOD CELLS: 0 /100 WBC
OVALOCYTES: ABNORMAL
PARAINFLUENZA VIRUS 1 BY PCR: NOT DETECTED
PARAINFLUENZA VIRUS 2 BY PCR: NOT DETECTED
PARAINFLUENZA VIRUS 3 BY PCR: NOT DETECTED
PARAINFLUENZA VIRUS 4 BY PCR: NOT DETECTED
PDW BLD-RTO: 13.9 FL (ref 11.5–15)
PLATELET # BLD: 376 E9/L (ref 130–450)
PMV BLD AUTO: 9.8 FL (ref 7–12)
POIKILOCYTES: ABNORMAL
POLYCHROMASIA: ABNORMAL
POTASSIUM REFLEX MAGNESIUM: 3.8 MMOL/L (ref 3.5–5)
PROCALCITONIN: 0.73 NG/ML (ref 0–0.08)
PROTHROMBIN TIME: 18.8 SEC (ref 9.3–12.4)
RBC # BLD: 3.46 E12/L (ref 3.5–5.5)
RESPIRATORY SYNCYTIAL VIRUS BY PCR: NOT DETECTED
SARS-COV-2, PCR: DETECTED
SODIUM BLD-SCNC: 135 MMOL/L (ref 132–146)
TOTAL PROTEIN: 6.4 G/DL (ref 6.4–8.3)
TROPONIN: <0.01 NG/ML (ref 0–0.03)
TROPONIN: <0.01 NG/ML (ref 0–0.03)
VITAMIN D 25-HYDROXY: 23 NG/ML (ref 30–100)
WBC # BLD: 7.8 E9/L (ref 4.5–11.5)

## 2021-02-08 PROCEDURE — 85610 PROTHROMBIN TIME: CPT

## 2021-02-08 PROCEDURE — 84484 ASSAY OF TROPONIN QUANT: CPT

## 2021-02-08 PROCEDURE — 86140 C-REACTIVE PROTEIN: CPT

## 2021-02-08 PROCEDURE — 85384 FIBRINOGEN ACTIVITY: CPT

## 2021-02-08 PROCEDURE — 82728 ASSAY OF FERRITIN: CPT

## 2021-02-08 PROCEDURE — 2700000000 HC OXYGEN THERAPY PER DAY

## 2021-02-08 PROCEDURE — 82306 VITAMIN D 25 HYDROXY: CPT

## 2021-02-08 PROCEDURE — 6360000002 HC RX W HCPCS: Performed by: FAMILY MEDICINE

## 2021-02-08 PROCEDURE — 0202U NFCT DS 22 TRGT SARS-COV-2: CPT

## 2021-02-08 PROCEDURE — 97530 THERAPEUTIC ACTIVITIES: CPT

## 2021-02-08 PROCEDURE — 80053 COMPREHEN METABOLIC PANEL: CPT

## 2021-02-08 PROCEDURE — 2580000003 HC RX 258: Performed by: FAMILY MEDICINE

## 2021-02-08 PROCEDURE — 83615 LACTATE (LD) (LDH) ENZYME: CPT

## 2021-02-08 PROCEDURE — 6370000000 HC RX 637 (ALT 250 FOR IP): Performed by: FAMILY MEDICINE

## 2021-02-08 PROCEDURE — 99222 1ST HOSP IP/OBS MODERATE 55: CPT | Performed by: FAMILY MEDICINE

## 2021-02-08 PROCEDURE — 84145 PROCALCITONIN (PCT): CPT

## 2021-02-08 PROCEDURE — 36415 COLL VENOUS BLD VENIPUNCTURE: CPT

## 2021-02-08 PROCEDURE — 83605 ASSAY OF LACTIC ACID: CPT

## 2021-02-08 PROCEDURE — XW033E5 INTRODUCTION OF REMDESIVIR ANTI-INFECTIVE INTO PERIPHERAL VEIN, PERCUTANEOUS APPROACH, NEW TECHNOLOGY GROUP 5: ICD-10-PCS | Performed by: FAMILY MEDICINE

## 2021-02-08 PROCEDURE — 85730 THROMBOPLASTIN TIME PARTIAL: CPT

## 2021-02-08 PROCEDURE — 94640 AIRWAY INHALATION TREATMENT: CPT

## 2021-02-08 PROCEDURE — 85378 FIBRIN DEGRADE SEMIQUANT: CPT

## 2021-02-08 PROCEDURE — 2060000000 HC ICU INTERMEDIATE R&B

## 2021-02-08 PROCEDURE — 71111 X-RAY EXAM RIBS/CHEST4/> VWS: CPT

## 2021-02-08 PROCEDURE — 2500000003 HC RX 250 WO HCPCS: Performed by: FAMILY MEDICINE

## 2021-02-08 PROCEDURE — 85025 COMPLETE CBC W/AUTO DIFF WBC: CPT

## 2021-02-08 PROCEDURE — 97161 PT EVAL LOW COMPLEX 20 MIN: CPT

## 2021-02-08 RX ORDER — 0.9 % SODIUM CHLORIDE 0.9 %
30 INTRAVENOUS SOLUTION INTRAVENOUS PRN
Status: DISCONTINUED | OUTPATIENT
Start: 2021-02-08 | End: 2021-02-16 | Stop reason: HOSPADM

## 2021-02-08 RX ORDER — GUAIFENESIN/DEXTROMETHORPHAN 100-10MG/5
5 SYRUP ORAL EVERY 4 HOURS PRN
Status: DISCONTINUED | OUTPATIENT
Start: 2021-02-08 | End: 2021-02-16 | Stop reason: HOSPADM

## 2021-02-08 RX ORDER — IPRATROPIUM BROMIDE AND ALBUTEROL SULFATE 2.5; .5 MG/3ML; MG/3ML
1 SOLUTION RESPIRATORY (INHALATION)
Status: DISCONTINUED | OUTPATIENT
Start: 2021-02-08 | End: 2021-02-08 | Stop reason: CLARIF

## 2021-02-08 RX ORDER — PROMETHAZINE HYDROCHLORIDE 25 MG/1
12.5 TABLET ORAL EVERY 6 HOURS PRN
Status: DISCONTINUED | OUTPATIENT
Start: 2021-02-08 | End: 2021-02-16 | Stop reason: HOSPADM

## 2021-02-08 RX ORDER — VALSARTAN 160 MG/1
160 TABLET ORAL DAILY
Status: DISCONTINUED | OUTPATIENT
Start: 2021-02-08 | End: 2021-02-08

## 2021-02-08 RX ORDER — DULOXETIN HYDROCHLORIDE 20 MG/1
20 CAPSULE, DELAYED RELEASE ORAL DAILY
Status: DISCONTINUED | OUTPATIENT
Start: 2021-02-08 | End: 2021-02-12

## 2021-02-08 RX ORDER — VALSARTAN AND HYDROCHLOROTHIAZIDE 160; 25 MG/1; MG/1
1 TABLET ORAL DAILY
Status: DISCONTINUED | OUTPATIENT
Start: 2021-02-08 | End: 2021-02-08 | Stop reason: CLARIF

## 2021-02-08 RX ORDER — DEXAMETHASONE 4 MG/1
6 TABLET ORAL DAILY
Status: DISCONTINUED | OUTPATIENT
Start: 2021-02-09 | End: 2021-02-08

## 2021-02-08 RX ORDER — CELECOXIB 100 MG/1
200 CAPSULE ORAL DAILY
Status: DISCONTINUED | OUTPATIENT
Start: 2021-02-08 | End: 2021-02-08

## 2021-02-08 RX ORDER — VALSARTAN 320 MG/1
320 TABLET ORAL DAILY
Status: DISCONTINUED | OUTPATIENT
Start: 2021-02-09 | End: 2021-02-11

## 2021-02-08 RX ORDER — ASCORBIC ACID 500 MG
500 TABLET ORAL DAILY
Status: DISCONTINUED | OUTPATIENT
Start: 2021-02-08 | End: 2021-02-16 | Stop reason: HOSPADM

## 2021-02-08 RX ORDER — FLECAINIDE ACETATE 50 MG/1
50 TABLET ORAL 2 TIMES DAILY
Status: DISCONTINUED | OUTPATIENT
Start: 2021-02-08 | End: 2021-02-16 | Stop reason: HOSPADM

## 2021-02-08 RX ORDER — FAMOTIDINE 20 MG/1
40 TABLET, FILM COATED ORAL NIGHTLY
Status: DISCONTINUED | OUTPATIENT
Start: 2021-02-08 | End: 2021-02-08 | Stop reason: DRUGHIGH

## 2021-02-08 RX ORDER — POLYETHYLENE GLYCOL 3350 17 G/17G
17 POWDER, FOR SOLUTION ORAL DAILY PRN
Status: DISCONTINUED | OUTPATIENT
Start: 2021-02-08 | End: 2021-02-10

## 2021-02-08 RX ORDER — LEUCOVORIN CALCIUM 5 MG/1
25 TABLET ORAL WEEKLY
Status: DISCONTINUED | OUTPATIENT
Start: 2021-02-09 | End: 2021-02-16 | Stop reason: HOSPADM

## 2021-02-08 RX ORDER — BUDESONIDE 0.5 MG/2ML
0.5 INHALANT ORAL 2 TIMES DAILY
Status: DISCONTINUED | OUTPATIENT
Start: 2021-02-08 | End: 2021-02-08 | Stop reason: CLARIF

## 2021-02-08 RX ORDER — DEXAMETHASONE 4 MG/1
6 TABLET ORAL DAILY
Status: DISCONTINUED | OUTPATIENT
Start: 2021-02-08 | End: 2021-02-16 | Stop reason: HOSPADM

## 2021-02-08 RX ORDER — HYDROCHLOROTHIAZIDE 25 MG/1
50 TABLET ORAL DAILY
Status: DISCONTINUED | OUTPATIENT
Start: 2021-02-09 | End: 2021-02-11

## 2021-02-08 RX ORDER — FERROUS SULFATE 325(65) MG
325 TABLET ORAL EVERY OTHER DAY
Status: DISCONTINUED | OUTPATIENT
Start: 2021-02-08 | End: 2021-02-16 | Stop reason: HOSPADM

## 2021-02-08 RX ORDER — GABAPENTIN 100 MG/1
100 CAPSULE ORAL 2 TIMES DAILY
Status: DISCONTINUED | OUTPATIENT
Start: 2021-02-08 | End: 2021-02-16 | Stop reason: HOSPADM

## 2021-02-08 RX ORDER — LIDOCAINE 4 G/G
1 PATCH TOPICAL DAILY
Status: DISCONTINUED | OUTPATIENT
Start: 2021-02-08 | End: 2021-02-16 | Stop reason: HOSPADM

## 2021-02-08 RX ORDER — PANTOPRAZOLE SODIUM 40 MG/1
40 TABLET, DELAYED RELEASE ORAL
Status: DISCONTINUED | OUTPATIENT
Start: 2021-02-08 | End: 2021-02-16 | Stop reason: HOSPADM

## 2021-02-08 RX ORDER — MONTELUKAST SODIUM 10 MG/1
TABLET ORAL
Qty: 90 TABLET | Refills: 1 | Status: SHIPPED
Start: 2021-02-08 | End: 2021-02-16 | Stop reason: HOSPADM

## 2021-02-08 RX ORDER — ONDANSETRON 2 MG/ML
4 INJECTION INTRAMUSCULAR; INTRAVENOUS EVERY 6 HOURS PRN
Status: DISCONTINUED | OUTPATIENT
Start: 2021-02-08 | End: 2021-02-16 | Stop reason: HOSPADM

## 2021-02-08 RX ORDER — CETIRIZINE HYDROCHLORIDE 10 MG/1
10 TABLET ORAL DAILY
Status: DISCONTINUED | OUTPATIENT
Start: 2021-02-08 | End: 2021-02-16 | Stop reason: HOSPADM

## 2021-02-08 RX ORDER — VITAMIN B COMPLEX
2000 TABLET ORAL DAILY
Status: DISCONTINUED | OUTPATIENT
Start: 2021-02-08 | End: 2021-02-16 | Stop reason: HOSPADM

## 2021-02-08 RX ORDER — VALSARTAN 160 MG/1
160 TABLET ORAL ONCE
Status: COMPLETED | OUTPATIENT
Start: 2021-02-08 | End: 2021-02-08

## 2021-02-08 RX ORDER — SODIUM CHLORIDE 0.9 % (FLUSH) 0.9 %
10 SYRINGE (ML) INJECTION PRN
Status: DISCONTINUED | OUTPATIENT
Start: 2021-02-08 | End: 2021-02-16 | Stop reason: HOSPADM

## 2021-02-08 RX ORDER — SODIUM CHLORIDE 0.9 % (FLUSH) 0.9 %
10 SYRINGE (ML) INJECTION EVERY 12 HOURS SCHEDULED
Status: DISCONTINUED | OUTPATIENT
Start: 2021-02-08 | End: 2021-02-16 | Stop reason: HOSPADM

## 2021-02-08 RX ORDER — BUDESONIDE AND FORMOTEROL FUMARATE DIHYDRATE 160; 4.5 UG/1; UG/1
2 AEROSOL RESPIRATORY (INHALATION) 2 TIMES DAILY
Status: DISCONTINUED | OUTPATIENT
Start: 2021-02-08 | End: 2021-02-16 | Stop reason: HOSPADM

## 2021-02-08 RX ORDER — FAMOTIDINE 20 MG/1
20 TABLET, FILM COATED ORAL NIGHTLY
Status: DISCONTINUED | OUTPATIENT
Start: 2021-02-08 | End: 2021-02-16 | Stop reason: HOSPADM

## 2021-02-08 RX ORDER — HYDRALAZINE HYDROCHLORIDE 20 MG/ML
10 INJECTION INTRAMUSCULAR; INTRAVENOUS EVERY 4 HOURS PRN
Status: DISCONTINUED | OUTPATIENT
Start: 2021-02-08 | End: 2021-02-14

## 2021-02-08 RX ORDER — HYDROCHLOROTHIAZIDE 25 MG/1
25 TABLET ORAL DAILY
Status: DISCONTINUED | OUTPATIENT
Start: 2021-02-08 | End: 2021-02-08

## 2021-02-08 RX ORDER — HYDROCHLOROTHIAZIDE 25 MG/1
25 TABLET ORAL ONCE
Status: COMPLETED | OUTPATIENT
Start: 2021-02-08 | End: 2021-02-08

## 2021-02-08 RX ORDER — CELECOXIB 100 MG/1
200 CAPSULE ORAL 2 TIMES DAILY
Status: DISCONTINUED | OUTPATIENT
Start: 2021-02-08 | End: 2021-02-16 | Stop reason: HOSPADM

## 2021-02-08 RX ORDER — ARFORMOTEROL TARTRATE 15 UG/2ML
15 SOLUTION RESPIRATORY (INHALATION) 2 TIMES DAILY
Status: DISCONTINUED | OUTPATIENT
Start: 2021-02-08 | End: 2021-02-08 | Stop reason: CLARIF

## 2021-02-08 RX ORDER — ACETAMINOPHEN 325 MG/1
650 TABLET ORAL EVERY 6 HOURS PRN
Status: DISCONTINUED | OUTPATIENT
Start: 2021-02-08 | End: 2021-02-16 | Stop reason: HOSPADM

## 2021-02-08 RX ORDER — DOCUSATE SODIUM 100 MG/1
100 CAPSULE, LIQUID FILLED ORAL DAILY
Status: DISCONTINUED | OUTPATIENT
Start: 2021-02-08 | End: 2021-02-10

## 2021-02-08 RX ORDER — MONTELUKAST SODIUM 10 MG/1
10 TABLET ORAL NIGHTLY
Status: DISCONTINUED | OUTPATIENT
Start: 2021-02-08 | End: 2021-02-16 | Stop reason: HOSPADM

## 2021-02-08 RX ORDER — LEVOTHYROXINE SODIUM 0.1 MG/1
100 TABLET ORAL DAILY
Status: DISCONTINUED | OUTPATIENT
Start: 2021-02-08 | End: 2021-02-16 | Stop reason: HOSPADM

## 2021-02-08 RX ORDER — LANOLIN ALCOHOL/MO/W.PET/CERES
100 CREAM (GRAM) TOPICAL DAILY
Status: DISCONTINUED | OUTPATIENT
Start: 2021-02-08 | End: 2021-02-16 | Stop reason: HOSPADM

## 2021-02-08 RX ORDER — ACETAMINOPHEN 650 MG/1
650 SUPPOSITORY RECTAL EVERY 6 HOURS PRN
Status: DISCONTINUED | OUTPATIENT
Start: 2021-02-08 | End: 2021-02-16 | Stop reason: HOSPADM

## 2021-02-08 RX ADMIN — ACETAMINOPHEN 650 MG: 325 TABLET ORAL at 09:46

## 2021-02-08 RX ADMIN — HYDRALAZINE HYDROCHLORIDE 10 MG: 20 INJECTION INTRAMUSCULAR; INTRAVENOUS at 17:40

## 2021-02-08 RX ADMIN — METHOTREXATE 5 MG: 2.5 TABLET ORAL at 12:28

## 2021-02-08 RX ADMIN — ZINC SULFATE 220 MG (50 MG) CAPSULE 50 MG: CAPSULE at 09:44

## 2021-02-08 RX ADMIN — SODIUM CHLORIDE, PRESERVATIVE FREE 10 ML: 5 INJECTION INTRAVENOUS at 17:39

## 2021-02-08 RX ADMIN — SODIUM CHLORIDE, PRESERVATIVE FREE 10 ML: 5 INJECTION INTRAVENOUS at 21:17

## 2021-02-08 RX ADMIN — MONTELUKAST SODIUM 10 MG: 10 TABLET, FILM COATED ORAL at 21:16

## 2021-02-08 RX ADMIN — METOPROLOL TARTRATE 25 MG: 25 TABLET, FILM COATED ORAL at 09:45

## 2021-02-08 RX ADMIN — FLECAINIDE ACETATE 50 MG: 50 TABLET ORAL at 09:47

## 2021-02-08 RX ADMIN — HYDRALAZINE HYDROCHLORIDE 10 MG: 20 INJECTION INTRAMUSCULAR; INTRAVENOUS at 02:14

## 2021-02-08 RX ADMIN — Medication 10 ML: at 09:46

## 2021-02-08 RX ADMIN — BUDESONIDE AND FORMOTEROL FUMARATE DIHYDRATE 2 PUFF: 160; 4.5 AEROSOL RESPIRATORY (INHALATION) at 07:54

## 2021-02-08 RX ADMIN — PANTOPRAZOLE SODIUM 40 MG: 40 TABLET, DELAYED RELEASE ORAL at 17:30

## 2021-02-08 RX ADMIN — HYDROCHLOROTHIAZIDE 25 MG: 25 TABLET ORAL at 12:11

## 2021-02-08 RX ADMIN — CELECOXIB 200 MG: 100 CAPSULE ORAL at 21:16

## 2021-02-08 RX ADMIN — Medication 2000 UNITS: at 09:44

## 2021-02-08 RX ADMIN — LEVOTHYROXINE SODIUM 100 MCG: 100 TABLET ORAL at 06:35

## 2021-02-08 RX ADMIN — GABAPENTIN 100 MG: 100 CAPSULE ORAL at 09:45

## 2021-02-08 RX ADMIN — OXYCODONE HYDROCHLORIDE AND ACETAMINOPHEN 500 MG: 500 TABLET ORAL at 09:45

## 2021-02-08 RX ADMIN — FAMOTIDINE 20 MG: 20 TABLET ORAL at 21:16

## 2021-02-08 RX ADMIN — CELECOXIB 200 MG: 100 CAPSULE ORAL at 09:44

## 2021-02-08 RX ADMIN — DULOXETINE HYDROCHLORIDE 20 MG: 20 CAPSULE, DELAYED RELEASE ORAL at 09:44

## 2021-02-08 RX ADMIN — DEXAMETHASONE 6 MG: 4 TABLET ORAL at 12:09

## 2021-02-08 RX ADMIN — RIVAROXABAN 15 MG: 15 TABLET, FILM COATED ORAL at 09:45

## 2021-02-08 RX ADMIN — Medication 10 ML: at 21:17

## 2021-02-08 RX ADMIN — SODIUM CHLORIDE 200 MG: 9 INJECTION, SOLUTION INTRAVENOUS at 03:16

## 2021-02-08 RX ADMIN — SODIUM CHLORIDE, PRESERVATIVE FREE 10 ML: 5 INJECTION INTRAVENOUS at 17:40

## 2021-02-08 RX ADMIN — Medication 100 MG: at 09:44

## 2021-02-08 RX ADMIN — VALSARTAN 160 MG: 160 TABLET, FILM COATED ORAL at 12:11

## 2021-02-08 RX ADMIN — BUDESONIDE AND FORMOTEROL FUMARATE DIHYDRATE 2 PUFF: 160; 4.5 AEROSOL RESPIRATORY (INHALATION) at 20:43

## 2021-02-08 RX ADMIN — HYDROCHLOROTHIAZIDE 25 MG: 25 TABLET ORAL at 09:44

## 2021-02-08 RX ADMIN — SODIUM CHLORIDE, PRESERVATIVE FREE 10 ML: 5 INJECTION INTRAVENOUS at 09:46

## 2021-02-08 RX ADMIN — CETIRIZINE HYDROCHLORIDE 10 MG: 10 TABLET, FILM COATED ORAL at 09:45

## 2021-02-08 RX ADMIN — FLECAINIDE ACETATE 50 MG: 50 TABLET ORAL at 21:16

## 2021-02-08 RX ADMIN — PANTOPRAZOLE SODIUM 40 MG: 40 TABLET, DELAYED RELEASE ORAL at 06:35

## 2021-02-08 RX ADMIN — GABAPENTIN 100 MG: 100 CAPSULE ORAL at 21:16

## 2021-02-08 RX ADMIN — METOPROLOL TARTRATE 25 MG: 25 TABLET, FILM COATED ORAL at 21:16

## 2021-02-08 RX ADMIN — VALSARTAN 160 MG: 160 TABLET, FILM COATED ORAL at 09:44

## 2021-02-08 RX ADMIN — METHOTREXATE 10 MG: 2.5 TABLET ORAL at 21:42

## 2021-02-08 RX ADMIN — FERROUS SULFATE TAB 325 MG (65 MG ELEMENTAL FE) 325 MG: 325 (65 FE) TAB at 09:46

## 2021-02-08 RX ADMIN — DOCUSATE SODIUM 100 MG: 100 CAPSULE, LIQUID FILLED ORAL at 09:45

## 2021-02-08 ASSESSMENT — PAIN SCALES - GENERAL
PAINLEVEL_OUTOF10: 0
PAINLEVEL_OUTOF10: 9
PAINLEVEL_OUTOF10: 0
PAINLEVEL_OUTOF10: 10
PAINLEVEL_OUTOF10: 3

## 2021-02-08 ASSESSMENT — PAIN DESCRIPTION - ORIENTATION
ORIENTATION: LEFT
ORIENTATION: LEFT

## 2021-02-08 ASSESSMENT — PAIN DESCRIPTION - DESCRIPTORS
DESCRIPTORS: ACHING;DISCOMFORT
DESCRIPTORS: ACHING;DISCOMFORT

## 2021-02-08 ASSESSMENT — PAIN DESCRIPTION - LOCATION
LOCATION: RIB CAGE
LOCATION: RIB CAGE

## 2021-02-08 ASSESSMENT — PAIN DESCRIPTION - PAIN TYPE: TYPE: ACUTE PAIN

## 2021-02-08 ASSESSMENT — PAIN DESCRIPTION - FREQUENCY
FREQUENCY: CONTINUOUS
FREQUENCY: INTERMITTENT

## 2021-02-08 NOTE — CARE COORDINATION
Social Work/Discharge Planning:  Social Work consult noted. COVID Positive 2/8. Remdesivir day 1. This worker attempted to call patient and no answer. Called patient son Melissa Uribe (ph: [phone]) and completed initial assessment. Explained Social Work role and discussed transition of care/discharge planning. Patient lives with her son and daughter-in-law in a two story house. She states on the first floor. PTA patient is wheelchair bound and uses a beti steady for transfers. She has caregivers twenty-four hours a day for seven days a week provided through [employer]. Patient has a wheelchair, extended shower bench, hand hospital bed. Patient has a [facility]. Patient has a snf history at [facility]. Patient is currently requiring three liters of oxygen but does not have oxygen at home. Informed Melisas Uribe that therapy to evaluate his mother. Melissa Uribe states plan is home versus a skilled nursing facility. Melissa Uribe states to contact his sister Edward Blake for home health care or snf choice if needed at discharge. Will continue to follow and assist with discharge planning.    Electronically signed by JU Amaya on 2/8/2021 at 11:50 AM

## 2021-02-08 NOTE — CARE COORDINATION
Social Work/Discharge Planning:  Called patient daughter Maria D Vásquez (ph: [phone]) and reviewed patient therapy score indicating need for a skilled nursing facility. Maria D Vásquez states she will discuss snf option with her siblings and update this worker on discharge plan.   Electronically signed by JU Milan on 2/8/2021 at 3:58 PM

## 2021-02-08 NOTE — PROGRESS NOTES
assist   Transfers Sit to stand: NT  Stand to sit: NT  Stand pivot: NT  Max assist   Ambulation    NT  N/A   Stair Negotiation  Ascended and descended  NT   N/A   LE strength     2/5    3-/5   balance           AM-PAC Raw score               8/24         Pt is alert and Oriented   LE ROM: WFL  Sensation: intact  Edema: none       ASSESSMENT  Pt displays functional ability as noted in the objective portion of this evaluation. Patient education  Pt educated on PT objectives    Patient response to education:   Pt verbalized understanding Pt demonstrated skill Pt requires further education in this area   yes         ASSESSMENT:    Comments/treatment. Pt in bed upon arrival to room. C/o severe L side rib pain and pain all over. Pt had been incontinent of urine. Pt assisted to roll and with hygiene. Pt with increased time to complete bed mobility due to pain. Pt instructed on splinting L side with rolled blanket during mobility. Pt unable to sit up to edge of bed at this time due to pain. Extensive time spend repositioning pt in bed to make her comfortable. Patient and or family understand(s) diagnosis, prognosis, and plan of care. PLAN OF CARE:    Current Treatment Recommendations     [x] Strengthening     [x] ROM   [x] Balance Training   [x] Endurance Training   [x] Transfer Training   [] Gait Training   [] Stair Training   [x] Positioning   [x] Safety and Education Training   [x] Patient/Caregiver Education   [] HEP  [] Other     Frequency of treatments: 2-5x/week x 10.days    Time in  0915  Time out  0940    Total Treatment Time  15 minutes plus evaluation    Evaluation Time includes thorough review of current medical information, gathering information on past medical history/social history and prior level of function, completion of standardized testing/informal observation of tasks, assessment of data and education on plan of care and goals.     CPT codes:  [x] Low Complexity PT evaluation 36454 [] Moderate Complexity PT evaluation 53950  [] High Complexity PT evaluation [id number]  [] PT Re-evaluation [id number]  [] Gait training 76989 minutes  [] Manual therapy 45222 minutes  [x] Therapeutic activities 27328 minutes  [] Therapeutic exercises 26441 minutes  [] Neuromuscular reeducation 49023 minutes     [facility] PSYCHIATRY PT [id number]

## 2021-02-08 NOTE — ED PROVIDER NOTES
Patient is an 70-year-old female with a history of hypothyroidism, atrial fibrillation, hypertension that presents emergency department for evaluation of shortness of breath, left-sided chest pain. Patient states that 6 days ago she started having symptoms of Covid including fatigue, mild shortness of breath and cough. She tested positive for days ago. Over the last 4 days she has been feeling more fatigue and started having left-sided chest pain. She describes it as a dull aching sensation worse with palpation, certain positions and with deep inspiration. Nothing seems to make it better. No pain medication was taken prior to arrival.  It has been constant and moderate in severity. Patient's family has been monitoring her blood pressure and pulse ox at home and are concerned that they are going in the wrong direction and they called EMS to bring her in for further evaluation. Patient admits to some mild shortness of breath but states it is no worse than it was several days ago. She denies fever, lightheadedness, dizziness, abdominal pain, nausea, vomiting, diarrhea. The history is provided by the patient and the EMS personnel. Review of Systems   Constitutional: Positive for activity change, appetite change, chills and fatigue. Negative for diaphoresis and fever. HENT: Negative for congestion, rhinorrhea and sore throat. Eyes: Negative for discharge, redness and itching. Respiratory: Positive for cough, chest tightness and shortness of breath. Negative for wheezing. Cardiovascular: Positive for chest pain. Negative for palpitations and leg swelling. Gastrointestinal: Negative for abdominal pain, nausea and vomiting. Genitourinary: Negative for decreased urine volume, difficulty urinating, dysuria and hematuria. Musculoskeletal: Positive for myalgias. Negative for back pain. Skin: Negative for pallor and rash.    Neurological: Negative for dizziness, weakness, light-headedness obtain history from someone other than the patient: yes              --------------------------------------------- PAST HISTORY ---------------------------------------------  Past Medical History:  has a past medical history of Anticoagulated, Atrial fibrillation ([facility] [address] Bimalleolar fracture, Diastolic dysfunction, Difficulty walking, Diverticular disease, Elevated CA-125, Hiatal hernia, Hypertension, Hypothyroid, Meningocele spinal ([facility] [address] Neuropathy, JANETTE (obstructive sleep apnea), Osteoporosis, PAF (paroxysmal atrial fibrillation) ([facility] [address] Pain in both lower legs, Patellar fracture, Rheumatoid arthritis with rheumatoid factor ([facility] [address] Rheumatoid arthritis(714.0), Rib fractures, Seasonal allergies, Skin cancer of lip, and Wears dentures. Past Surgical History:  has a past surgical history that includes Gastric fundoplication (202);  section; Carpal tunnel release (Bilateral); Colonoscopy (2011); Total knee arthroplasty (2007); Cardiac catheterization; Hysterectomy, total abdominal (early ); Upper gastrointestinal endoscopy (N/A, 2020); and Colonoscopy (N/A, 2020). Social History:  reports that she has never smoked. She has never used smokeless tobacco. She reports that she does not drink alcohol or use drugs. Family History: family history includes Heart Attack (age of onset: 58) in her mother; Hypertension in her brother, sister, and sister; Other in her daughter, father, sister, and son; Rheum Arthritis in her brother and sister; Thyroid Disease in her sister. The patients home medications have been reviewed.     Allergies: Amoxicillin    -------------------------------------------------- RESULTS -------------------------------------------------    LABS:  Results for orders placed or performed during the hospital encounter of 21   CBC Auto Differential   Result Value Ref Range    WBC 8.6 4.5 - 11.5 E9/L    RBC 3.61 3.50 - 5.50 E12/L    Hemoglobin 11.9 11.5 - 15.5 g/dL Multifocal patchy and ground-glass airspace disease involving all lobes   consistent with multifocal pneumonia and suspicious for COVID-19 pneumonia. Cardiomegaly with nonspecific mediastinal nodes, unchanged. Intraspinal calcified mass at the level of T7, unchanged since the prior   examination. EKG: This EKG is signed and interpreted by me. Rate: 67  Rhythm: Sinus  Interpretation: Sinus rhythm with premature atrial complexes age-indeterminate inferior infarct  Comparison: stable as compared to patient's most recent EKG      ------------------------- NURSING NOTES AND VITALS REVIEWED ---------------------------  Date / Time Roomed:  2/7/2021  8:41 PM  ED Bed Assignment:  [id number]    The nursing notes within the ED encounter and vital signs as below have been reviewed.      Patient Vitals for the past 24 hrs:   BP Temp Temp src Pulse Resp SpO2 Height Weight   02/08/21 0115 (!) 176/78 97.5 °F (36.4 °C) Axillary 65 18      02/08/21 0015 (!) 164/106 98.4 °F (36.9 °C)  65 8      02/07/21 2345 (!) 180/90   63       02/07/21 2315 (!) 175/85          02/07/21 2300      94 %     02/07/21 2245 (!) 187/93   67       02/07/21 2230      (!) 89 %     02/07/21 2211      91 %     02/07/21 2200 (!) 174/86   62       02/07/21 2145 (!) 174/86   65       02/07/21 2130 (!) 175/74   80       02/07/21 2115 (!) 175/74   68       02/07/21 2100 (!) 175/74   68       02/07/21 2049 (!) 175/74 98 °F (36.7 °C) Oral 71 18 92 % 5' (1.524 m) 130 lb (59 kg)       Oxygen Saturation Interpretation: Abnormal    ------------------------------------------ PROGRESS NOTES ------------------------------------------  Re-evaluation(s):  Time: 2245  Patients symptoms show no change  Repeat physical examination is not changed    Counseling:  I have spoken with the patient and discussed todays results, in addition to providing specific details for the plan of care and counseling regarding the diagnosis and prognosis. Their questions are answered at this time and they are agreeable with the plan of admission.    --------------------------------- ADDITIONAL PROVIDER NOTES ---------------------------------  Consultations:  Spoke with family medicine team discussed case. They will admit the patient. This patient's ED course included: a personal history and physicial examination, re-evaluation prior to disposition, multiple bedside re-evaluations, IV medications, cardiac monitoring and continuous pulse oximetry    This patient has remained hemodynamically stable during their ED course. Diagnosis:  1. Acute respiratory failure with hypoxia (HCC)    2. COVID-19        Disposition:  Patient's disposition: Admit to telemetry  Patient's condition is stable.          Lauryn Ulloa., DO  Resident  02/08/21 8338

## 2021-02-08 NOTE — CONSULTS
[address] [place] [address] [facility]    Consultation Note     Admit Date: 2/7/2021  8:41 PM    Reason for Consult:   [place]    Attending Physician:  Madhavi Piper MD     Chief Complaint: Arthralgias and myalgias    HISTORY OF PRESENT ILLNESS:     The patient is a 80 y.o.  female states that 1 week ago she began to develop arthralgias and myalgias anorexia headache low-grade fever and diarrhea. She was concerned about developing Covid. She began to develop pain in her left chest which was pleuritic in nature and moderate in severity. She had a Covid test last Thursday which was positive. A pulse oximetry was obtained home and was low. In the emergency room her pulse oximetry on room air was 92% dropped to 89% with exertion. A CT of the chest revealed groundglass opacities consistent with Covid. She continues to complain of anorexia arthralgias and myalgias. She denies any shortness of breath. She has a lifelong non-smoker but does have a history of rheumatoid arthritis as well as atrial fibrillation.     Past Medical History:          Diagnosis Date    Anticoagulated     takes Xarelto    Atrial fibrillation ([facility] [address]     follows with Dr. Carol Quevedo fracture     Diastolic dysfunction     stage I    Difficulty walking     uses walker    Diverticular disease 2011    identified on colonoscopy in 9/2011    Elevated CA-125     referred to Dr Hugo Geller; no work up planned     Hiatal hernia     s/p Nissen with recurrence; Dr Jernigan Memory    Hypertension     Hypothyroid     Thyroiditis noted on labs in 2011    Meningocele spinal [facility])     thoracic; Dr Isi Todd; no plans for surgery    Neuropathy     chronic; 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  / at finger, and feet, legs    JANETTE (obstructive sleep apnea)     not using cpap or bipap    Osteoporosis     PAF ([facility]oxysmal atrial fibrillation) ([facility])     anticoagulation per cardiology  / follows with Dr. Wong Falling    Pain in both lower legs 5/7/2020    Patellar fracture     Rheumatoid arthritis with rheumatoid factor (HCC)     Rheumatoid arthritis(714.0)     Dr Howard Fajardo; on DMD Tacey Blowers)    Rib fractures 2014    Seasonal allergies     Skin cancer of lip     squamous cell    Wears dentures      Past Surgical History:          Procedure Laterality Date    CARDIAC CATHETERIZATION      CARPAL TUNNEL RELEASE Bilateral      SECTION      COLONOSCOPY  2011    Dr Raquel Malin; diverticular disease; repeat in 2016    COLONOSCOPY N/A 2020    COLONOSCOPY WITH BIOPSY performed by Maria Elena Dela Cruz MD at [address] [facility], TOTAL ABDOMINAL  early 's    TOTAL KNEE ARTHROPLASTY  2007    bilateral    UPPER GASTROINTESTINAL ENDOSCOPY N/A 2020    EGD BIOPSY performed by [facility] Elena Del[facility] MD at [facility] ENDOSCOPY     Current Medications:     Scheduled Meds:   docusate sodium  100 mg Oral Daily    DULoxetine  20 mg Oral Daily    ferrous sulfate  325 mg Oral Every Other Day    cetirizine  10 mg Oral Daily    flecainide  50 mg Oral BID    gabapentin  100 mg Oral BID    levothyroxine  100 mcg Oral Daily    methotrexate  10 mg Oral Once    metoprolol tartrate  25 mg Oral BID    montelukast  10 mg Oral Nightly    pantoprazole  40 mg Oral BID AC    rivaroxaban  15 mg Oral Daily with breakfast    sodium chloride flush  10 mL Intravenous 2 times per day    Vitamin D  2,000 Units Oral Daily    thiamine  100 mg Oral Daily    ascorbic acid  500 mg Oral Daily    zinc sulfate  50 mg Oral Daily    [START ON 2021] leucovorin calcium  25 mg Oral Weekly    famotidine  20 mg Oral Nightly    budesonide-formoterol  2 puff Inhalation BID    [START ON 2021] remdesivir IVPB  100 mg Intravenous Q24H    lidocaine  1 patch Transdermal Daily    dexamethasone  6 mg Oral Daily    [START ON 2021] valsartan  320 mg Oral Daily    And    [START ON 2021] hydroCHLOROthiazide  50 mg Oral Daily    celecoxib  200 mg Oral BID    sodium chloride flush  10 mL Intravenous 2 times per day    sodium chloride  500 mL Intravenous Once       Allergies:  Amoxicillin    Social History:     Social History     Socioeconomic History    Marital status:      Spouse name: None    Number of children: None    Years of education: None    Highest education level: None   Occupational History    Occupation: retired- farm   Social Needs    Financial resource strain: None    Food insecurity     Worry: None     Inability: None    Transportation needs     Medical: None     Non-medical: None   Tobacco Use    Smoking status: Never Smoker    Smokeless tobacco: Never Used   Substance and Sexual Activity    Alcohol use: No    Drug use: Never    Sexual activity: None   Lifestyle    Physical activity     Days per week: None     Minutes per session: None    Stress: None   Relationships    Social connections     Talks on phone: None     Gets together: None     Attends Shinto service: None     Active member of club or organization: None     Attends meetings of clubs or organizations: None     Relationship status:      Intimate partner violence     Fear of current or ex partner: None     Emotionally abused: None     Physically abused: None     Forced sexual activity: None   Other Topics Concern    None   Social History Narrative    None         Family History:         Problem Relation Age of Onset    Heart Attack Mother 58    Other Father         suicide, depression, leg trouble    Other Sister         Dementia    Hypertension Sister     Hypertension Sister     Hypertension Brother     Thyroid Disease Sister     Rheum Arthritis Sister     Rheum Arthritis Brother     Other Daughter         hemochromatosis     Other Son         hemachromatosis      REVIEW OF SYSTEMS:      Constitutional: positive for anorexia, fatigue and fevers, negative for weight loss  Eyes: negative for icterus and redness  Ears, nose, mouth, throat, and face: negative for epistaxis, hearing loss, nasal congestion, sore mouth, sore throat and tinnitus  Respiratory: positive for pleurisy/chest pain, negative for cough, shortness of breath, sputum and wheezing  Cardiovascular: positive for chest pain, negative for lower extremity edema  Gastrointestinal: positive for diarrhea, negative for abdominal pain  Genitourinary:negative for dysuria and frequency  Integument/breast: negative for pruritus and rash  Hematologic/lymphatic: negative for bleeding and easy bruising  Musculoskeletal:positive for arthralgias and myalgias, negative for back pain  Neurological: negative for dizziness, gait problems, headaches and memory problems  Behavioral/Psych: negative for anxiety and depression  Endocrine: negative for temperature intolerance  Allergic/Immunologic: negative for anaphylaxis and angioedema    PHYSICAL EXAM:      Vitals:      BP (!) 147/78   Pulse 79   Temp 98 °F (36.7 °C) (Axillary)   Resp 20   Ht 5' (1.524 m)   Wt 123 lb 9.6 oz (56.1 kg)   SpO2 96%   BMI 24.14 kg/m²      Constitutional: The patient is awake, alert, and oriented. Skin: Warm and dry. No rashes were noted. No jaundice. HEENT: Eyes show round, and reactive pupils. Moist mucous membranes, no ulcerations, no thrush. Neck: Supple to movements. No lymphadenopathy. Chest: Inspiratory crackles both bases, no wheezing  Cardiovascular: Regular rate and rhythm. No murmurs appreciated. Abdomen: Positive bowel sounds to auscultation. Benign to palpation. No masses felt. No hepatosplenomegaly. Extremities: No clubbing, no cyanosis, no edema.   Neurological: No focal neurologic deficits        CBC+dif:    Recent Labs     02/07/21 2118 02/08/21  0335   WBC 8.6 7.8   HGB 11.9 11.4*   HCT 35.5 33.9*   MCV 98.3 98.0    376   NEUTROABS 7.23 6.79     Lab Results   Component Value Date    CRP 22.7 (H) 02/08/2021    CRP 0.3 09/10/2020    CRP <0.1 10/22/2019 No results found for: [facility]  Lab Results   Component Value Date    SEDRATE 29 (H) 09/10/2020    SEDRATE 17 10/22/2019    SEDRATE 26 (H) 03/26/2013     Lab Results   Component Value Date    ALT 9 02/08/2021    AST 26 02/08/2021    ALKPHOS 103 02/08/2021    BILITOT 0.5 02/08/2021     Lab Results   Component Value Date     02/08/2021    K 3.8 02/08/2021     02/08/2021    CO2 22 02/08/2021    BUN 27 02/08/2021    CREATININE 0.8 02/08/2021    GFRAA >60 02/08/2021    LABGLOM >60 02/08/2021    GLUCOSE 112 02/08/2021    GLUCOSE 79 04/24/2012    PROT 6.4 02/08/2021    LABALBU 2.6 02/08/2021    LABALBU 4.0 04/24/2012    CALCIUM 8.6 02/08/2021    BILITOT 0.5 02/08/2021    ALKPHOS 103 02/08/2021    AST 26 02/08/2021    ALT 9 02/08/2021       Lab Results   Component Value Date    PROTIME 18.8 02/08/2021    PROTIME 15.8 05/12/2014    INR 1.6 02/08/2021       Lab Results   Component Value Date    TSH 4.870 01/06/2021       Lab Results   Component Value Date    NITRITE negative 06/04/2019    COLORU Yellow 06/04/2019    PHUR 5.0 06/04/2019    WBCUA 10-20 06/04/2019    WBCUA NONE 04/24/2012    RBCUA NONE 06/04/2019    RBCUA 2-5 03/23/2014    YEAST FEW 06/04/2019    BACTERIA FEW 06/04/2019    CLARITYU Clear 06/04/2019    SPECGRAV 1.025 06/04/2019    LEUKOCYTESUR TRACE 06/04/2019    UROBILINOGEN 0.2 06/04/2019    BILIRUBINUR Negative 06/04/2019    BILIRUBINUR negative 06/04/2019    BILIRUBINUR NEGATIVE 04/24/2012    BLOODU Negative 06/04/2019    GLUCOSEU Negative 06/04/2019    GLUCOSEU NEGATIVE 04/24/2012     Results for Gisel Burroughs (MRN [id number]) as of 2/8/2021 14:20   Ref. Range 2/8/2021 04:10   D-Dimer, Nia Bentley Latest Units: ng/mL      Radiology:    CTA CHEST W CONTRAST   Final Result   No evidence of pulmonary emboli. Multifocal patchy and ground-glass airspace disease involving all lobes   consistent with multifocal pneumonia and suspicious for COVID-19 pneumonia.    Cardiomegaly with nonspecific mediastinal nodes, unchanged. Intraspinal calcified mass at the level of T7, unchanged since the prior   examination. XR RIBS BILATERAL (3 VIEWS)    (Results Pending)       Microbiology:    SARS-CoV-2, PCR DETECTEDAbnormal   Not Detected Final 02/08/2021  4:10 AM     Legionella and pneumococcal urinary antigens pending      Problem list:    Principal Problem:    Acute respiratory failure with hypoxia (HCC)  Active Problems:    Hypothyroidism    Rheumatoid arthritis ([facility] [address]    A-fib ([facility] [address]    Essential hypertension    COVID-19  Resolved Problems:    * No resolved hospital problems. *      Assessment:    · COVID-19 viral pneumonia  · Atrial fibrillation  · Rheumatoid arthritis    Plan:      · Continue remdesivir  · Continue Decadron  · Monitor oxygenation status closely  · Check cultures  · Continue vitamin D, zinc, vitamin C  · Baseline CRP and procalcitonin noted  · Monitor labs  · Discussed with Dr Shoaib Vigil  · Will follow with you    Thank you for having us see this patient in consultation. I will be discussing this case with the treating physicians.       Electronically signed by Ijeoma Faith DO on 2/8/2021 at 2:10 PM

## 2021-02-08 NOTE — PROGRESS NOTES
[facility]  Progress Note  CC: Hypoxia    Subjective:  Ms. Luciana Jones reports left-sided rib pain this morning. She thinks this is due to her family members and home health nurses lifting her up at home. She denies chest pain, palpitations, shortness of breath, abdominal pain, and N/V. Past medical, surgical, family and social history were reviewed, non-contributory, and unchanged unless otherwise stated. Objective:    BP (!) 176/78   Pulse 65   Temp 97.5 °F (36.4 °C) (Axillary)   Resp 18   Ht 5' (1.524 m)   Wt 123 lb 9.6 oz (56.1 kg)   SpO2 95%   BMI 24.14 kg/m²     General: In no acute distress. Lying in bed. Heart:  RRR. No murmur. Lungs:  Decreased breath sounds. No wheezing or rhonci. No respiratory distress. On 1 L O2. Abd: Bowel sounds present. Nontender, nondistended, and no masses. Extrem:  No clubbing, cyanosis, or edema.     CBC with Differential:    Lab Results   Component Value Date    WBC 7.8 02/08/2021    RBC 3.46 02/08/2021    HGB 11.4 02/08/2021    HCT 33.9 02/08/2021     02/08/2021    MCV 98.0 02/08/2021    MCH 32.9 02/08/2021    MCHC 33.6 02/08/2021    RDW 13.9 02/08/2021    NRBC 0.0 02/08/2021    SEGSPCT 66 03/26/2013    LYMPHOPCT 3.5 02/08/2021    MONOPCT 6.1 02/08/2021    MYELOPCT 0.9 06/16/2020    BASOPCT 0.0 02/08/2021    MONOSABS 0.47 02/08/2021    LYMPHSABS 0.31 02/08/2021    EOSABS 0.20 02/08/2021    BASOSABS 0.00 02/08/2021     CMP:    Lab Results   Component Value Date     02/08/2021    K 3.8 02/08/2021     02/08/2021    CO2 22 02/08/2021    BUN 27 02/08/2021    CREATININE 0.8 02/08/2021    GFRAA >60 02/08/2021    LABGLOM >60 02/08/2021    GLUCOSE 112 02/08/2021    GLUCOSE 79 04/24/2012    PROT 6.4 02/08/2021    LABALBU 2.6 02/08/2021    LABALBU 4.0 04/24/2012    CALCIUM 8.6 02/08/2021    BILITOT 0.5 02/08/2021    ALKPHOS 103 02/08/2021    AST 26 02/08/2021    ALT 9 02/08/2021     Troponin:    Lab Results Component Value Date    TROPONINI <0.01 02/08/2021        Assessment:    Active Hospital Problems    Diagnosis Date Noted    [facility] [J96.01] 02/08/2021    COVID-19 [U07.1] 02/08/2021    Essential hypertension [I10] 05/23/2016    Hypothyroidism [E03.9] 06/10/2011    Rheumatoid arthritis ([facility] [M06.9] 06/10/2011    A-fib ([facility] [I48.91] 06/09/2011       Plan:  Acute respiratory failure with hypoxia secondary to COVID-19   - Telemetry   - Droplet + isolation  - Vitals q 4 hours    - D-dimer 419. Fibrinogen >700. - Micro: Legionella urine antigen, Strep pneumoniae urine antigen, and respiratory film array all pending   - Start vitamin C 500 mg QD, thiamine 100 mg QD, vitamin D 2000 IU QD, and zinc 50 mg QD   - Start dexamethasone 6 mg QD. Day 1.    - On remdesivir.  Day 1.   - Inhalers   - Robitussin DM 5 ml q 4 hrs PRN for cough   - Daily weights and I/Os  - Consult Social Work for discharge planning, as patient may need O2 upon discharge   - Consult PT/OT      Essential HTN   - Vitals q 4 hours to monitor BP, as it is elevated   - Continue home Lopressor 25 mg BID   - Continue home valsartan-HCTZ 160-25 mg QD   - Start IV hydralazine 10 mg q 4 hrs PRN for SBP> 160 or DBP>100     Hypothyroidism  - Continue home Synthroid 100 mcg QD      RA  - Continue Celebrex 200 mg QD  - Continue methotrexate 5 mg Monday at lunch and 10 mg Monday evening on weekly basis      Atrial fibrillation   - Continue home flecainide 50 mg BID   - Continue home Lopressor 25 mg BID  - Continue home Xarelto 15 mg QD      Left rib pain  - Lidocaine patch      Continue additional home medications:   - Allegra 180 mg QD substituted with cetirizine 10 mg QD  - Colace 100 mg QD   - Cymbalta 20 mg QD   - Pepcid 40 mg HS  - Ferrous sulfate 325 mg every other day   - Gabapentin 100 mg BID   - Leucovorin calcium 25 mg every Tuesday   - Singulair 10 mg HS   - Omeprazole 40 mg BID substituted with pantoprazole 40 mg BID      Diet: General   Code: Full   DVT prophylaxis: Xarelto 15 mg QD       Electronically signed by Marck Contreras DO on 2/8/2021 at 7:46 AM

## 2021-02-08 NOTE — ED NOTES
Bed: 06  Expected date:   Expected time:   Means of arrival:   Comments:  Ems       Kenyetta Hogue RN  02/07/21 2041

## 2021-02-08 NOTE — ED NOTES
Per daughter pt is a fall risk and can not walk.  Staff and DR notified       Isabel Pineda, RN  02/07/21 6621

## 2021-02-08 NOTE — H&P
[address]  History and Physical      CHIEF COMPLAINT:  Hypoxia     History of Present Illness:   Ms. Sravan Marion is an 80year-old patient of Dr. Isaias Fallon with a PMHx notable for RA, HTN, hypothyroidism, atrial fibrillation, stage I diastolic dysfunction, JANETTE (not on CPAP or BiPap) who presented to [facility] yesterday evening for hypoxia. Patient states that she was diagnosed with COVID-19 this past Thursday. She states that her family member is a nurse practitioner and took her pulse ox this evening. She was found to be hypoxic at home, so EMS was called and patient was brought to ED. Upon ROS, she denies chest pain, shortness of breath, cough, fever, chills, visual changes, abdominal pain, N/V/D/C, lightheadedness and dizziness. In the ED, vitals upon presentation were T 98, RR 18, HR 71, /74, and SpO2 92% on room air at rest. She desaturated to 89% on room air at rest and required 2 L O2. BP improved slightly to 164/106 over time. EKG showed NSR with PACs. CTA showed diffuse ground glass opacities in all lung lobes but was negative for PE. Labs were notable for K+ 5.2 (hemolyzed), pro-BNP= 2,160, troponin <0.01, and WBC count WNL. She received 500 ml IVF bolus and  mg x 1 in ED.        Past Medical History:   Diagnosis Date    Anticoagulated     takes Xarelto    Atrial fibrillation ([facility]     follows with Dr. 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 Naida Weiss    Pain in both lower legs 2020    Patellar fracture     Rheumatoid arthritis with rheumatoid factor ([facility] [address]     Rheumatoid arthritis(714.0)     Dr Tom Chun; on DMD Keesha Griffin)    Rib fractures 2014    Seasonal allergies     Skin cancer of lip     squamous cell    Wears dentures          Past Surgical History:   Procedure Laterality Date    CARDIAC CATHETERIZATION      CARPAL TUNNEL RELEASE Bilateral      SECTION      COLONOSCOPY  2011    Dr Chaparrita Pizarro; diverticular disease; repeat in 2016    COLONOSCOPY N/A 2020    COLONOSCOPY WITH BIOPSY performed by Randee Hurd MD at [address] [place] [address]  early     TOTAL KNEE ARTHROPLASTY  2007    bilateral    UPPER GASTROINTESTINAL ENDOSCOPY N/A 2020    EGD BIOPSY performed by Randee Hurd MD at [facility] ENDOSCOPY       Medications Prior to Admission:    Not in a hospital admission. Allergies:    Amoxicillin    Social History:    reports that she has never smoked. She has never used smokeless tobacco. She reports that she does not drink alcohol or use drugs. Family History:   family history includes Heart Attack (age of onset: 58) in her mother; Hypertension in her brother, sister, and sister; Other in her daughter, father, sister, and son; Rheum Arthritis in her brother and sister; Thyroid Disease in her sister. REVIEW OF SYSTEMS:  Review of Systems   Constitutional: Negative for chills and fever. HENT: Positive for sore throat. Negative for congestion, ear pain and rhinorrhea. Eyes: Negative for pain and visual disturbance. Respiratory: Negative for cough, shortness of breath and wheezing. Cardiovascular: Negative for chest pain and palpitations. General: No focal deficit present. Mental Status: She is alert. Psychiatric:         Attention and Perception: Attention normal.         Mood and Affect: Mood normal.         Speech: Speech normal.         Behavior: Behavior normal.         Thought Content: Thought content normal.         Cognition and Memory: Cognition normal.         Judgment: Judgment normal.           LABS:  CBC with Differential:    Lab Results   Component Value Date    WBC 8.6 02/07/2021    RBC 3.61 02/07/2021    HGB 11.9 02/07/2021    HCT 35.5 02/07/2021     02/07/2021    MCV 98.3 02/07/2021    MCH 33.0 02/07/2021    MCHC 33.5 02/07/2021    RDW 13.8 02/07/2021    SEGSPCT 66 03/26/2013    LYMPHOPCT 4.3 02/07/2021    MONOPCT 7.9 02/07/2021    MYELOPCT 0.9 06/16/2020    BASOPCT 0.2 02/07/2021    MONOSABS 0.68 02/07/2021    LYMPHSABS 0.37 02/07/2021    EOSABS 0.11 02/07/2021    BASOSABS 0.02 02/07/2021     CMP:    Lab Results   Component Value Date     02/07/2021    K 5.2 02/07/2021    K 4.2 07/16/2020    CL 96 02/07/2021    CO2 27 02/07/2021    BUN 31 02/07/2021    CREATININE 0.8 02/07/2021    GFRAA >60 02/07/2021    LABGLOM >60 02/07/2021    GLUCOSE 118 02/07/2021    GLUCOSE 79 04/24/2012    PROT 6.7 02/07/2021    LABALBU 2.9 02/07/2021    LABALBU 4.0 04/24/2012    CALCIUM 8.7 02/07/2021    BILITOT 0.5 02/07/2021    ALKPHOS 102 02/07/2021    AST 45 02/07/2021    ALT 11 02/07/2021     Troponin:    Lab Results   Component Value Date    TROPONINI <0.01 02/07/2021     IMAGING:  CTA CHEST W CONTRAST   Final Result   No evidence of pulmonary emboli. Multifocal patchy and ground-glass airspace disease involving all lobes   consistent with multifocal pneumonia and suspicious for COVID-19 pneumonia. Cardiomegaly with nonspecific mediastinal nodes, unchanged. Intraspinal calcified mass at the level of T7, unchanged since the prior   examination.           ASSESSMENT:      Active Hospital Problems    Diagnosis Date Noted    Acute respiratory failure with hypoxia (HCC) [J96.01] 02/08/2021    COVID-19 [U07.1] 02/08/2021    Essential hypertension [I10] 05/23/2016    Hypothyroidism [E03.9] 06/10/2011    Rheumatoid arthritis ([facility] [M06.9] 06/10/2011    A-fib ([facility] [I48.91] 06/09/2011       PLAN:  Acute respiratory failure with hypoxia secondary to COVID-19   - Telemetry   - Droplet + isolation  - Vitals q 4 hours    - Labs: PTT, CRP, CBC, CMP, D-dimer, ferritin, fibrinogen, LDH, lactic acid, procalcitonin, PT/INR, troponin q 6 hours x 2, and vitamin D   - Micro: Legionella urine antigen, Strep pneumoniae urine antigen, and respiratory film array   - Start vitamin C 500 mg QD, thiamine 100 mg QD, vitamin D 2000 IU QD, and zinc 50 mg QD   - Start dexamethasone 6 mg QD  - Start remdesivir. Consult Pharmacy to dose.    - Duoneb, Pulmicort, and Brovana nebulizers   - Robitussin DM 5 ml q 4 hrs PRN for cough   - Daily weights and I/Os  - Consult Social Work for discharge planning, as patient may need O2 upon discharge   - Consult PT/OT     Essential HTN   - Vitals q 4 hours to monitor BP, as it is elevated   - Continue home Lopressor 25 mg BID   - Continue home valsartan-HCTZ 160-25 mg QD   - Start IV hydralazine 10 mg q 4 hrs PRN for SBP> 160 or DBP>100    Hypothyroidism  - Continue home Synthroid 100 mcg QD     RA  - Continue Celebrex 200 mg QD  - Continue methotrexate 5 mg Monday at lunch and 10 mg Monday evening on weekly basis     Atrial fibrillation   - Continue home flecainide 50 mg BID   - Continue home Lopressor 25 mg BID  - Continue home Xarelto 15 mg QD      Continue additional home medications:   - Allegra 180 mg QD substituted with cetirizine 10 mg QD  - Colace 100 mg QD   - Cymbalta 20 mg QD   - Pepcid 40 mg HS  - Ferrous sulfate 325 mg every other day   - Gabapentin 100 mg BID   - Leucovorin calcium 25 mg every Tuesday   - Singulair 10 mg HS   - Omeprazole 40 mg BID substituted with pantoprazole 40 mg BID     Diet: General Code: Full   DVT prophylaxis: Xarelto 15 mg QD     Discussed with on-call attending Family Medicine physician, Dr. Sherley Hassan.     Deanne Rudolph DO, PGY-3   12:43 AM  2/8/2021

## 2021-02-09 PROBLEM — J02.9 SORE THROAT: Status: ACTIVE | Noted: 2021-02-09

## 2021-02-09 LAB
ALBUMIN SERPL-MCNC: 2.8 G/DL (ref 3.5–5.2)
ALP BLD-CCNC: 104 U/L (ref 35–104)
ALT SERPL-CCNC: 11 U/L (ref 0–32)
ANION GAP SERPL CALCULATED.3IONS-SCNC: 12 MMOL/L (ref 7–16)
APTT: 34.6 SEC (ref 24.5–35.1)
AST SERPL-CCNC: 26 U/L (ref 0–31)
BASOPHILS ABSOLUTE: 0 E9/L (ref 0–0.2)
BASOPHILS RELATIVE PERCENT: 0 % (ref 0–2)
BILIRUB SERPL-MCNC: 0.4 MG/DL (ref 0–1.2)
BUN BLDV-MCNC: 27 MG/DL (ref 8–23)
C-REACTIVE PROTEIN: 17.4 MG/DL (ref 0–0.4)
CALCIUM SERPL-MCNC: 8.5 MG/DL (ref 8.6–10.2)
CHLORIDE BLD-SCNC: 95 MMOL/L (ref 98–107)
CO2: 23 MMOL/L (ref 22–29)
CREAT SERPL-MCNC: 0.7 MG/DL (ref 0.5–1)
D DIMER: 1782 NG/ML DDU
EOSINOPHILS ABSOLUTE: 0 E9/L (ref 0.05–0.5)
EOSINOPHILS RELATIVE PERCENT: 0 % (ref 0–6)
FIBRINOGEN: 659 MG/DL (ref 225–540)
GFR AFRICAN AMERICAN: >60
GFR NON-AFRICAN AMERICAN: >60 ML/MIN/1.73
GLUCOSE BLD-MCNC: 131 MG/DL (ref 74–99)
HCT VFR BLD CALC: 34.4 % (ref 34–48)
HEMOGLOBIN: 11.3 G/DL (ref 11.5–15.5)
INR BLD: 1.7
LYMPHOCYTES ABSOLUTE: 0.41 E9/L (ref 1.5–4)
LYMPHOCYTES RELATIVE PERCENT: 5.2 % (ref 20–42)
MCH RBC QN AUTO: 32.3 PG (ref 26–35)
MCHC RBC AUTO-ENTMCNC: 32.8 % (ref 32–34.5)
MCV RBC AUTO: 98.3 FL (ref 80–99.9)
MONOCYTES ABSOLUTE: 0.33 E9/L (ref 0.1–0.95)
MONOCYTES RELATIVE PERCENT: 3.5 % (ref 2–12)
NEUTROPHILS ABSOLUTE: 7.46 E9/L (ref 1.8–7.3)
NEUTROPHILS RELATIVE PERCENT: 91.3 % (ref 43–80)
NUCLEATED RED BLOOD CELLS: 0.9 /100 WBC
PDW BLD-RTO: 13.7 FL (ref 11.5–15)
PLATELET # BLD: 455 E9/L (ref 130–450)
PMV BLD AUTO: 9.5 FL (ref 7–12)
POLYCHROMASIA: ABNORMAL
POTASSIUM REFLEX MAGNESIUM: 4.2 MMOL/L (ref 3.5–5)
PROTHROMBIN TIME: 20.7 SEC (ref 9.3–12.4)
RBC # BLD: 3.5 E12/L (ref 3.5–5.5)
SODIUM BLD-SCNC: 130 MMOL/L (ref 132–146)
TOTAL PROTEIN: 6.1 G/DL (ref 6.4–8.3)
WBC # BLD: 8.2 E9/L (ref 4.5–11.5)

## 2021-02-09 PROCEDURE — 85378 FIBRIN DEGRADE SEMIQUANT: CPT

## 2021-02-09 PROCEDURE — 2580000003 HC RX 258: Performed by: FAMILY MEDICINE

## 2021-02-09 PROCEDURE — 36415 COLL VENOUS BLD VENIPUNCTURE: CPT

## 2021-02-09 PROCEDURE — 85610 PROTHROMBIN TIME: CPT

## 2021-02-09 PROCEDURE — 2700000000 HC OXYGEN THERAPY PER DAY

## 2021-02-09 PROCEDURE — 2060000000 HC ICU INTERMEDIATE R&B

## 2021-02-09 PROCEDURE — 80053 COMPREHEN METABOLIC PANEL: CPT

## 2021-02-09 PROCEDURE — 85384 FIBRINOGEN ACTIVITY: CPT

## 2021-02-09 PROCEDURE — 6370000000 HC RX 637 (ALT 250 FOR IP): Performed by: FAMILY MEDICINE

## 2021-02-09 PROCEDURE — 97165 OT EVAL LOW COMPLEX 30 MIN: CPT

## 2021-02-09 PROCEDURE — 85025 COMPLETE CBC W/AUTO DIFF WBC: CPT

## 2021-02-09 PROCEDURE — 2500000003 HC RX 250 WO HCPCS: Performed by: FAMILY MEDICINE

## 2021-02-09 PROCEDURE — 97530 THERAPEUTIC ACTIVITIES: CPT

## 2021-02-09 PROCEDURE — 85730 THROMBOPLASTIN TIME PARTIAL: CPT

## 2021-02-09 PROCEDURE — 6360000002 HC RX W HCPCS: Performed by: FAMILY MEDICINE

## 2021-02-09 PROCEDURE — 86140 C-REACTIVE PROTEIN: CPT

## 2021-02-09 PROCEDURE — 94640 AIRWAY INHALATION TREATMENT: CPT

## 2021-02-09 PROCEDURE — 99232 SBSQ HOSP IP/OBS MODERATE 35: CPT | Performed by: FAMILY MEDICINE

## 2021-02-09 RX ADMIN — LEUCOVORIN CALCIUM 25 MG: 5 TABLET ORAL at 10:25

## 2021-02-09 RX ADMIN — DULOXETINE HYDROCHLORIDE 20 MG: 20 CAPSULE, DELAYED RELEASE ORAL at 10:26

## 2021-02-09 RX ADMIN — PANTOPRAZOLE SODIUM 40 MG: 40 TABLET, DELAYED RELEASE ORAL at 16:31

## 2021-02-09 RX ADMIN — GABAPENTIN 100 MG: 100 CAPSULE ORAL at 10:37

## 2021-02-09 RX ADMIN — BENZOCAINE AND MENTHOL 1 LOZENGE: 15; 3.6 LOZENGE ORAL at 10:24

## 2021-02-09 RX ADMIN — OXYCODONE HYDROCHLORIDE AND ACETAMINOPHEN 500 MG: 500 TABLET ORAL at 10:25

## 2021-02-09 RX ADMIN — PANTOPRAZOLE SODIUM 40 MG: 40 TABLET, DELAYED RELEASE ORAL at 06:00

## 2021-02-09 RX ADMIN — Medication 2000 UNITS: at 10:26

## 2021-02-09 RX ADMIN — Medication 100 MG: at 10:26

## 2021-02-09 RX ADMIN — CELECOXIB 200 MG: 100 CAPSULE ORAL at 10:25

## 2021-02-09 RX ADMIN — LEVOTHYROXINE SODIUM 100 MCG: 100 TABLET ORAL at 06:00

## 2021-02-09 RX ADMIN — Medication 10 ML: at 22:03

## 2021-02-09 RX ADMIN — SODIUM CHLORIDE, PRESERVATIVE FREE 10 ML: 5 INJECTION INTRAVENOUS at 10:27

## 2021-02-09 RX ADMIN — BUDESONIDE AND FORMOTEROL FUMARATE DIHYDRATE 2 PUFF: 160; 4.5 AEROSOL RESPIRATORY (INHALATION) at 08:22

## 2021-02-09 RX ADMIN — FAMOTIDINE 20 MG: 20 TABLET ORAL at 22:02

## 2021-02-09 RX ADMIN — VALSARTAN 320 MG: 320 TABLET ORAL at 10:25

## 2021-02-09 RX ADMIN — MONTELUKAST SODIUM 10 MG: 10 TABLET, FILM COATED ORAL at 22:02

## 2021-02-09 RX ADMIN — CELECOXIB 200 MG: 100 CAPSULE ORAL at 22:02

## 2021-02-09 RX ADMIN — CETIRIZINE HYDROCHLORIDE 10 MG: 10 TABLET, FILM COATED ORAL at 10:26

## 2021-02-09 RX ADMIN — BUDESONIDE AND FORMOTEROL FUMARATE DIHYDRATE 2 PUFF: 160; 4.5 AEROSOL RESPIRATORY (INHALATION) at 20:17

## 2021-02-09 RX ADMIN — METOPROLOL TARTRATE 25 MG: 25 TABLET, FILM COATED ORAL at 22:02

## 2021-02-09 RX ADMIN — DOCUSATE SODIUM 100 MG: 100 CAPSULE, LIQUID FILLED ORAL at 10:26

## 2021-02-09 RX ADMIN — DEXAMETHASONE 6 MG: 4 TABLET ORAL at 10:25

## 2021-02-09 RX ADMIN — RIVAROXABAN 15 MG: 15 TABLET, FILM COATED ORAL at 10:26

## 2021-02-09 RX ADMIN — ZINC SULFATE 220 MG (50 MG) CAPSULE 50 MG: CAPSULE at 10:25

## 2021-02-09 RX ADMIN — GABAPENTIN 100 MG: 100 CAPSULE ORAL at 22:02

## 2021-02-09 RX ADMIN — FLECAINIDE ACETATE 50 MG: 50 TABLET ORAL at 22:01

## 2021-02-09 RX ADMIN — Medication 10 ML: at 10:37

## 2021-02-09 RX ADMIN — SODIUM CHLORIDE 100 MG: 9 INJECTION, SOLUTION INTRAVENOUS at 10:24

## 2021-02-09 RX ADMIN — METOPROLOL TARTRATE 25 MG: 25 TABLET, FILM COATED ORAL at 10:26

## 2021-02-09 RX ADMIN — SODIUM CHLORIDE, PRESERVATIVE FREE 10 ML: 5 INJECTION INTRAVENOUS at 22:02

## 2021-02-09 RX ADMIN — HYDROCHLOROTHIAZIDE 50 MG: 25 TABLET ORAL at 10:26

## 2021-02-09 RX ADMIN — FLECAINIDE ACETATE 50 MG: 50 TABLET ORAL at 10:26

## 2021-02-09 ASSESSMENT — PAIN DESCRIPTION - LOCATION: LOCATION: FINGER (COMMENT WHICH ONE);TOE (COMMENT WHICH ONE)

## 2021-02-09 ASSESSMENT — PAIN DESCRIPTION - FREQUENCY: FREQUENCY: CONTINUOUS

## 2021-02-09 ASSESSMENT — PAIN DESCRIPTION - PAIN TYPE: TYPE: CHRONIC PAIN

## 2021-02-09 ASSESSMENT — PAIN SCALES - GENERAL
PAINLEVEL_OUTOF10: 0
PAINLEVEL_OUTOF10: 6
PAINLEVEL_OUTOF10: 3

## 2021-02-09 NOTE — PROGRESS NOTES
Physical Therapy  Facility/Department: [address] INTERMEDIATE 1  Daily Treatment Note  NAME: Bruna Rojo  : 1935  MRN: [id number]    Date of Service: 2021          Patient Diagnosis(es): The primary encounter diagnosis was Acute respiratory failure with hypoxia ([address] A diagnosis of COVID-19 was also pertinent to this visit. has a past medical history of Anticoagulated, Atrial fibrillation ([address] Bimalleolar fracture, Diastolic dysfunction, Difficulty walking, Diverticular disease, Elevated CA-125, Hiatal hernia, Hypertension, Hypothyroid, Meningocele spinal ([address] Neuropathy, JANETTE (obstructive sleep apnea), Osteoporosis, PAF (paroxysmal atrial fibrillation) ([address] Pain in both lower legs, Patellar fracture, Rheumatoid arthritis with rheumatoid factor ([address] Rheumatoid arthritis(714.0), Rib fractures, Seasonal allergies, Skin cancer of lip, and Wears dentures. has a past surgical history that includes Gastric fundoplication (9489);  section; Carpal tunnel release (Bilateral); Colonoscopy (2011); Total knee arthroplasty (2007); Cardiac catheterization; Hysterectomy, total abdominal (early ); Upper gastrointestinal endoscopy (N/A, 2020); and Colonoscopy (N/A, 2020).    Referring Provider:  Miguel Angel Linda DO        Evaluating Therapist: Morelia Wasserman PT     Room #:425  DIAGNOSIS: Acute Respiratory failure  Additional Pertinent History:RA  PRECAUTIONS: falls, O2, droplet plus isolation     Social:  Pt lives with son in a 1 floor plan. Prior to admission Pt non-ambulatory, w/c level, states transfers to chair with a \"sit to stand\" that lifts her up from sated posistion       Initial Evaluation  Date: 21 Treatment     2021 Short Term/ Long Term   Goals   Was pt agreeable to Eval/treatment? yes Yes       Does pt have pain?  10/10 ribs L side, also c/o B LE pain  L rib pain      Bed Mobility  Rolling: max assist  Supine to sit: pt unable secondary to pain  Sit to supine:   Scooting: max assist  Supine <> sit : dep assist   Scooting : dep assist x 2  Min assist   Transfers Sit to stand: NT  Stand to sit: NT  Stand pivot: NT  Sit <> stand : dep assist x 2  Max assist   Ambulation    NT   N/A   Stair Negotiation  Ascended and descended  NT    N/A   LE strength     2/5     3-/5   balance             AM-PAC Raw score               8/24 6/ 24              ASSESSMENT  Pt displays functional ability as noted in the objective portion of this treatment      Patient education  Pt educated on PT objectives, safe and proper technique with mobility , splinting for comfort      Patient response to education:   Pt verbalized understanding Pt demonstrated skill Pt requires further education in this area     x  x      ASSESSMENT:     Comments/treatment. Pt instructed on splinting L side with rolled blanket during mobility. Pt on O3@ 3 LNC . Pulse ox 91% at rest, decreased to 83%, recovered to 90%. Instructed on proper breathing technique. Instructed on use of incentive spirometer.                  PLAN OF CARE:     Current Treatment Recommendations      [x]? Strengthening     [x]? ROM   [x]? Balance Training   [x]? Endurance Training   [x]? Transfer Training   []? Gait Training   []? Stair Training   [x]? Positioning   [x]? Safety and Education Training   [x]? Patient/Caregiver Education   []? HEP  []? Other      Frequency of treatments: 2-5x/week x 10.days     Time in  1535  Time out  1550     Total Treatment Time  10 minutes     Con't with PT POC      CPT codes:  []? Low Complexity PT evaluation [id number]  []? Moderate Complexity PT evaluation 64162  []? High Complexity PT evaluation [id number]  []? PT Re-evaluation [id number]  []? Gait training 65717 minutes  []? Manual therapy 34060 minutes  [x]? Therapeutic activities 10551 10 minutes  []? Therapeutic exercises 41477 minutes  []?  Neuromuscular reeducation 93807 minutes        Mary Beck   PT 0615

## 2021-02-09 NOTE — PROGRESS NOTES
[address]  Progress Note    CC: Hypoxia     Subjective:  Ms. Ashley Payton states that her rib pain is slightly better today. She was found to have multiple rib fractures on X-ray yesterday. She states that she slept well overnight. She denies shortness of breath, wheezing, coughing, palpitations, abdominal pain, and N/V. Does report a sore throat this morning that she states she has had for a few weeks. Past medical, surgical, family and social history were reviewed, non-contributory, and unchanged unless otherwise stated. Objective:    /79   Pulse 79   Temp 99 °F (37.2 °C) (Rectal)   Resp 18   Ht 5' (1.524 m)   Wt 126 lb (57.2 kg)   SpO2 91%   BMI 24.61 kg/m²     General: In no acute distress. Lying in bed. Pleasant and cooperative. Heart:  RRR. No murmurs, gallops, or rubs. Lungs:  Coarse breath sounds. No wheezing or rhonci appreciated. On 2 L O2 via nasal cannula. Abd: Bowel sounds present. Nontender, nondistended, and no masses. Extrem:  No clubbing, cyanosis, or edema.     CBC with Differential:    Lab Results   Component Value Date    WBC 8.2 02/09/2021    RBC 3.50 02/09/2021    HGB 11.3 02/09/2021    HCT 34.4 02/09/2021     02/09/2021    MCV 98.3 02/09/2021    MCH 32.3 02/09/2021    MCHC 32.8 02/09/2021    RDW 13.7 02/09/2021    NRBC 0.9 02/09/2021    SEGSPCT 66 03/26/2013    LYMPHOPCT 5.2 02/09/2021    MONOPCT 3.5 02/09/2021    MYELOPCT 0.9 06/16/2020    BASOPCT 0.0 02/09/2021    MONOSABS 0.33 02/09/2021    LYMPHSABS 0.41 02/09/2021    EOSABS 0.00 02/09/2021    BASOSABS 0.00 02/09/2021     CMP:    Lab Results   Component Value Date     02/09/2021    K 4.2 02/09/2021    CL 95 02/09/2021    CO2 23 02/09/2021    BUN 27 02/09/2021    CREATININE 0.7 02/09/2021    GFRAA >60 02/09/2021    LABGLOM >60 02/09/2021    GLUCOSE 131 02/09/2021    GLUCOSE 79 04/24/2012    PROT 6.1 02/09/2021    LABALBU 2.8 02/09/2021    LABALBU 4.0 04/24/2012 CALCIUM 8.5 02/09/2021    BILITOT 0.4 02/09/2021    ALKPHOS 104 02/09/2021    AST 26 02/09/2021    ALT 11 02/09/2021     Magnesium:    Lab Results   Component Value Date    MG 1.7 07/12/2020     Troponin:    Lab Results   Component Value Date    TROPONINI <0.01 02/08/2021      IMAGING:  XR RIBS BILATERAL (MIN 4 VIEWS)   Final Result   Acute left   10 8th posterolateral rib fractures. Healed remote left 9th through 11th rib fractures. Probable remote right 6   rib fracture. Diffuse and bilateral airspace opacities. CTA CHEST W CONTRAST   Final Result   No evidence of pulmonary emboli. Multifocal patchy and ground-glass airspace disease involving all lobes   consistent with multifocal pneumonia and suspicious for COVID-19 pneumonia. Cardiomegaly with nonspecific mediastinal nodes, unchanged. Intraspinal calcified mass at the level of T7, unchanged since the prior   examination. Assessment:  Active Hospital Problems    Diagnosis Date Noted    Sore throat [J02.9] 02/09/2021    Acute respiratory failure with hypoxia ([facility] [J96.01] 02/08/2021    COVID-19 [U07.1] 02/08/2021    Essential hypertension [I10] 05/23/2016    Multiple closed fractures of ribs of both sides with routine healing [S22.43XD] 12/16/2014    Hypothyroidism [E03.9] 06/10/2011    Rheumatoid arthritis ([facility] [M06.9] 06/10/2011    A-fib ([facility] [I48.91] 06/09/2011       Plan:  Acute respiratory failure with hypoxia secondary to COVID-19   - Telemetry   - Droplet + isolation  - Vitals q 4 hours    - On 2 L O2. Wean as tolerated. - ID following   - D-dimer 419 --> 1782. CTA chest in ED on 02/07 was negative for PE.   - Fibrinogen >700 --> 659  - Respiratory panel from 02/08 was COVID +  - Legionella urine antigen and Strep pneumoniae urine antigen pending  - On vitamin C 500 mg QD, thiamine 100 mg QD, vitamin D 2000 IU QD, and zinc 50 mg QD   - On dexamethasone 6 mg QD. Day 2.    - On remdesivir.  Day 2.   - Inhalers   - Robitussin DM 5 ml q 4 hrs PRN for cough   - Daily weights and I/Os. - Social Work on case for discharge planning, as patient may need O2 upon discharge   - PT/OT following. AM-PAC score 8/24 from PT.      Essential HTN   - Vitals q 4 hours   - Continue home Lopressor 25 mg BID   - Increased home valsartan-HCTZ 160-25 mg QD to 320-50 mg QD on 02/08  - On IV hydralazine 10 mg q 4 hrs PRN for SBP> 160 or DBP>100     Hypothyroidism  - Continue home Synthroid 100 mcg QD      RA  - Increased home Celebrex 200 mg QD to 200 mg BID to help with pain from multiple rib fractures  - Continue methotrexate 5 mg Monday at lunch and 10 mg Monday evening on weekly basis      Atrial fibrillation   - Continue home flecainide 50 mg BID   - Continue home Lopressor 25 mg BID  - Continue home Xarelto 15 mg QD       Multiple rib fractures   X-ray from 02/07 showed acute left 7th and 8th posterolateral rib fractures. Healed remote left 9th-11th rib fractures. Probable remote 6th rib fracture.     - Increased home Celebrex 200 mg QD to 200 mg BID to help with pain from multiple rib fractures  - Tylenol 650 mg q 6 hrs PRN for pain  - Lidocaine patch for pain    Sore throat  - Cepacol throat lozenges q 2 hrs PRN      Continue additional home medications:   - Allegra 180 mg QD substituted with cetirizine 10 mg QD  - Colace 100 mg QD   - Cymbalta 20 mg QD   - Pepcid 40 mg HS  - Ferrous sulfate 325 mg every other day   - Gabapentin 100 mg BID   - Leucovorin calcium 25 mg every Tuesday   - Singulair 10 mg HS   - Omeprazole 40 mg BID substituted with pantoprazole 40 mg BID      Diet: General   Code: Full   DVT prophylaxis: Xarelto 15 mg QD          Electronically signed by Jesus Romero DO on 2/9/2021 at 7:36 AM

## 2021-02-09 NOTE — PROGRESS NOTES
Updated patient's daughter, Stephanie Ortiz, on status and treatment plan. Daughter appreciative of update.

## 2021-02-09 NOTE — PROGRESS NOTES
Occupational Therapy  OCCUPATIONAL THERAPY INITIAL EVALUATION      Date:2021  Patient Name: Diana Siddiqi  MRN: [id number]  : 1935  Room: [address] [place] [address]    Referring Provider: Jose Gill DO    Evaluating OT: Bhavana Ordoñez OTR/L [id number]    AM-PAC Daily Activity Raw Score:     Recommended Adaptive Equipment: TBD    Diagnosis: acute respiratory failure with hypoxia. Imaging: multiple rib fractures L side   Pertinent Medical History: HTN, RA, neuropathy, PAF, afib   Precautions:  Falls, droplet plus isolation COVID, O2, splinting L side d/t rib fxs     Home Living: Pt lives with son in a single story home. Bathroom setup: tub/shower combo with extended tub bench, has 3:1     Prior Level of Function: pd caregiver and family assist  to assist with ADL/IADLs; completed functional mobility wc level with use of UEs for mobility. Youlanda Villagomez for all transfers at home. Hospital bed. Pt reports she has assist sponge bathing several times a week and approx 1 time a week her granddaughter will assist her in the tub for bathing. Use of Youlanda Villagomez in/out of bathroom for commode transfer. Able to complete grooming, UB dressing and self feeding tasks on her own. Pain Level: L sided pain, good follow through of splinting techniques    Cognition: A&O: 4/4. Pt is alert and oriented but easily distracted   Problem solving:  fair   Judgement/safety:  fair     Functional Assessment:   Initial Eval Status  Date: 21 Treatment session:  Short Term Goals     Feeding Set up     Grooming Min A  Set up   UB Dressing Mod A  Management of hospital gown  Set up   LB Dressing Dependent  Management of B socks  Max A    Bathing Max A  Mod A   Toileting Dependent  Mod A   Bed Mobility  Supine <> sit: Dependent  Rolling:  Max A     Functional Transfers STS: Dependent x2 for partial stand/scoot at EOB  Max A   Functional Mobility NA     Balance Sitting: varying levels from poor to fair minus at EOB    Standing: poor unable to achieve full upright stand     Activity Tolerance Poor  3L O2  Restin%  With activity: 83%  Mod recovery time in supine with education on pursed lip breathing to 90%  standing lelo x1-2 min with poor plus balance during self care tasks             Treatment: Patient educated on techniques for completion of ADL, safe functional transfers and functional mobility. Patient required cues for follow through with proper hand/foot placement, pacing, safety, compensatory strategies, breathing and technique in bed mobility, functional transfers and LB dressing in preparation for maximum independence in all self care tasks.      Hand Dominance: Right []  Left []   Strength ROM Additional Info:    RUE  3+/5 WFL   RA changes moderate at hand fair  and FMC/dexterity noted during ADL tasks     LUE 3+/5 WFL limited overhead shoulder motions  RA changes minimal at hand fair  and FMC/dexterity noted during ADL tasks         Hearing: WFL   Vision: WFL   Sensation:  No c/o numbness or tingling   Tone: WFL   Edema: none                             Long Term Goal (1-3 wks): Pt will maximize functional performance in all self care tasks/functional transfers with good follow through of all trained techniques for safe transition to next level of care    Assessment of current deficits   Functional mobility [x]  ADLs [x] Strength [x]  Cognition []  Functional transfers  [x] IADLs [x] Safety Awareness [x]  Endurance [x]  Fine Motor Coordination [] Balance [x] Vision/perception [] Sensation []   Gross Motor Coordination [] ROM [] Delirium []                  Motor Control []    Plan of Care: 1-3 days/week for 1-2 weeks PRN   [x]ADL retraining/adaptive techniques and AE recommendations to increase functional independence within precautions                    [x]Energy conservation techniques to improve tolerance for ADL/IADLs  [x]Functional transfer/mobility training/DME recommendations for increased independence, safety and fall prevention         [x]Patient/family education to increase safety and functional independence during daily routine          [x]Environmental modifications for safe mobility and completion of ADLs                             []Cognitive retraining to improve problem solving skills & safe participation in ADLs/IADLs     []Sensory re-education techniques to improve extremity awareness, maintain skin integrity and improve hand function                             []Visual/Perceptual retraining to improve body awareness and safety during transfers and ADLs  []Splinting/positioning needs to maintain joint/skin integrity and contracture prevention  [x]Therapeutic activity to improve functional performance during ADLs                                        [x]Therapeutic exercise to improve tolerance and functional strength for ADLs   [x]Balance retraining/tolerance tasks for facilitation of postural control with dynamic challenges during ADLs  []Neuromuscular re-education to facilitate righting/equilibrium reactions, midline orientation, scapular stability/mobility, normalize muscle tone and facilitate active functional movement                        []Delirium prevention/treatment    [x]Positioning to improve functional independence and decrease risk of skin breakdown  []Other:     Rehab Potential: Fair for established goals     Patient/Family Goal: To feel better. Patient and/or family were instructed on functional diagnosis, prognosis/goals and OT plan of care. Pt verbalized understanding. Upon arrival, patient supine in bed. At end of session, patient supine in bed with call light and phone within reach, all lines and tubes intact. Pt would benefit from continued skilled OT to increase safety and independence with completion of ADL/IADL tasks for functional independence and quality of life.  Bed/chair alarm: ON    Low Evaluation 96211  Time In: 1535   Time Out: 1550      Evaluation time includes

## 2021-02-09 NOTE — PROGRESS NOTES
[address] [place] [address] [facility]  Progress Note      Chief Complaint   Patient presents with    Shortness of Breath       SUBJECTIVE:      Patient is tolerating medications. No reported adverse drug reactions. No problems overnight. Feels about the same. Still severe fatigue. Denies cough continues complain of bilateral rib pain. Currently on nasal cannula 2 L with pulse oximetry of 90% at rest    Review of systems:    As stated above in the chief complaint, otherwise negative.     Medications:    Scheduled Meds:   docusate sodium  100 mg Oral Daily    DULoxetine  20 mg Oral Daily    ferrous sulfate  325 mg Oral Every Other Day    cetirizine  10 mg Oral Daily    flecainide  50 mg Oral BID    gabapentin  100 mg Oral BID    levothyroxine  100 mcg Oral Daily    metoprolol tartrate  25 mg Oral BID    montelukast  10 mg Oral Nightly    pantoprazole  40 mg Oral BID AC    rivaroxaban  15 mg Oral Daily with breakfast    sodium chloride flush  10 mL Intravenous 2 times per day    Vitamin D  2,000 Units Oral Daily    thiamine  100 mg Oral Daily    ascorbic acid  500 mg Oral Daily    zinc sulfate  50 mg Oral Daily    leucovorin calcium  25 mg Oral Weekly    famotidine  20 mg Oral Nightly    budesonide-formoterol  2 puff Inhalation BID    remdesivir IVPB  100 mg Intravenous Q24H    lidocaine  1 patch Transdermal Daily    dexamethasone  6 mg Oral Daily    valsartan  320 mg Oral Daily    And    hydroCHLOROthiazide  50 mg Oral Daily    celecoxib  200 mg Oral BID    sodium chloride flush  10 mL Intravenous 2 times per day    sodium chloride  500 mL Intravenous Once     Continuous Infusions:  PRN Meds:benzocaine-menthol, sodium chloride flush, promethazine **OR** ondansetron, polyethylene glycol, acetaminophen **OR** acetaminophen, guaiFENesin-dextromethorphan, hydrALAZINE, albuterol-ipratropium, sodium chloride, sodium chloride flush  Prior to Admission medications    Medication Sig Start Date End Date Taking? Authorizing Provider   metoprolol tartrate (LOPRESSOR) 25 MG tablet TAKE 1 TABLET TWICE A DAY 1/4/21  Yes Svetlana Chaudhary MD   flecainide (TAMBOCOR) 50 MG tablet Take 1 tablet by mouth 2 times daily 11/23/20  Yes Svetlana Chaudhary MD   rivaroxaban (XARELTO) 15 MG TABS tablet Take 1 tablet by mouth daily (with breakfast) 11/23/20  Yes Svetlana Chaudhary MD   valsartan-hydroCHLOROthiazide (DIOVAN-HCT) 160-25 MG per tablet Take 1 tablet by mouth daily 10/28/20  Yes Arlin Guillen MD   gabapentin (NEURONTIN) 100 MG capsule Take 1 capsule by mouth 2 times daily for 90 days. 10/8/20 2/8/21 Yes Arlin Guillen MD   DULoxetine (CYMBALTA) 20 MG extended release capsule Take 1 capsule by mouth daily 10/8/20  Yes Arlin Guillen MD   celecoxib (CELEBREX) 200 MG capsule TAKE 1 CAPSULE DAILY 9/17/20  Yes Arlin Guillen MD   levothyroxine (LEVOTHROID) 100 MCG tablet Take 1 tablet by mouth Daily 9/17/20  Yes Arlin Guillen MD   vitamin B-12 (CYANOCOBALAMIN) 1000 MCG tablet Take 1,000 mcg by mouth daily   Yes Historical Provider, MD   omeprazole (PRILOSEC) 40 MG delayed release capsule Take 1 capsule by mouth 2 times daily 4/16/20  Yes Arlin Guillen MD   famotidine (PEPCID) 40 MG tablet Take 1 tablet by mouth nightly 3/31/20  Yes Arlin Guillen MD   fexofenadine (ALLEGRA) 180 MG tablet Take 180 mg by mouth daily.    Yes Historical Provider, MD   leucovorin calcium (WELLCOVORIN) 5 MG tablet Take 25 mg by mouth once a week Takes every Tuesday 7/19/13  Yes Historical Provider, MD   methotrexate 2.5 MG tablet Take by mouth once a week 2 tabs at lunch and 4 tabs  in the pm once a week on Monday   Yes Historical Provider, MD   montelukast (SINGULAIR) 10 MG tablet TAKE 1 TABLET NIGHTLY 2/8/21   Arlin Guillen MD   cyanocobalamin 1000 MCG/ML injection Inject 1 mL into the muscle every 30 days Dispense with syringes 1.5 inch 25 G needles 1/6/21   Arlin Guillen MD   ferrous sulfate (IRON 325) 325 (65 Fe) MG tablet Take 1 tablet by mouth every other day 20   Lety Pierce MD   docusate sodium (COLACE, DULCOLAX) 100 MG CAPS Take 100 mg by mouth daily 20   Toney Herrera MD   ondansetron [facility]) 4 MG tablet Take 1 tablet by mouth every 8 hours as needed for Nausea or Vomiting 19   Lety Pierce MD   polyethyl glycol-propyl glycol 0.4-0.3 % (SYSTANE) 0.4-0.3 % ophthalmic solution 1 drop 2 times daily as needed for Dry Eyes     Historical Provider, MD   fluticasone (FLONASE) 50 MCG/ACT nasal spray 2 sprays by Nasal route daily 2 sprays in each nostril daily 18   Lety Pierce MD   Probiotic Product (ACIDOPHILUS PROBIOTIC) CAPS capsule Take 1 capsule by mouth daily    Historical Provider, MD       OBJECTIVE:  BP (!) 160/80   Pulse 78   Temp 99 °F (37.2 °C) (Rectal)   Resp 18   Ht 5' (1.524 m)   Wt 126 lb (57.2 kg)   SpO2 90%   BMI 24.61 kg/m²   Temp  Av.6 °F (36.4 °C)  Min: 96.1 °F (35.6 °C)  Max: 99 °F (37.2 °C)  General appearance: Resting in bed in no apparent distress. Skin: Warm and dry. No rashes were noted. HEENT: Round and reactive pupils. Moist mucous membranes. No ulcerations or thrush. Neck: Supple to movements. Chest: Fine bibasilar inspiratory rales. No wheezing. Good effort. Cardiovascular: Regular rate and rhythm. No murmurs gallops, or rubs appreciated. Abdomen: Bowel sounds present, nontender, nondistended, no masses or hepatosplenomegaly. Extremities: No clubbing, no cyanosis, no edema. Lines: peripheral  Neuro: No significant sensory or motor deficits noted    I/O last 3 completed shifts:   In: 360 [P.O.:360]  Out: -       Laboratory and Tests Review:      Lab Results   Component Value Date    WBC 8.2 2021    WBC 7.8 2021    WBC 8.6 2021    HGB 11.3 (L) 2021    HCT 34.4 2021    MCV 98.3 2021     (H) 2021     Lab Results   Component Value Date    NEUTROABS 7.46 (H) 02/09/2021    NEUTROABS 6.79 02/08/2021    NEUTROABS 7.23 02/07/2021     No results found for: CRPHS  Lab Results   Component Value Date    ALT 11 02/09/2021    AST 26 02/09/2021    ALKPHOS 104 02/09/2021    BILITOT 0.4 02/09/2021     Lab Results   Component Value Date     02/09/2021    K 4.2 02/09/2021    CL 95 02/09/2021    CO2 23 02/09/2021    BUN 27 02/09/2021    CREATININE 0.7 02/09/2021    CREATININE 0.8 02/08/2021    CREATININE 0.8 02/07/2021    GFRAA >60 02/09/2021    LABGLOM >60 02/09/2021    GLUCOSE 131 02/09/2021    GLUCOSE 79 04/24/2012    PROT 6.1 02/09/2021    LABALBU 2.8 02/09/2021    LABALBU 4.0 04/24/2012    CALCIUM 8.5 02/09/2021    BILITOT 0.4 02/09/2021    ALKPHOS 104 02/09/2021    AST 26 02/09/2021    ALT 11 02/09/2021     Lab Results   Component Value Date    CRP 17.4 (H) 02/09/2021    CRP 22.7 (H) 02/08/2021    CRP 0.3 09/10/2020     Lab Results   Component Value Date    SEDRATE 29 (H) 09/10/2020    SEDRATE 17 10/22/2019    SEDRATE 26 (H) 03/26/2013     Results for Guru Zarate (MRN [id number]) as of 2/9/2021 08:58   Ref. Range 2/9/2021 04:53   D-Dimer, Ankita Angst Latest Units: ng/mL DDU 8509     Radiology:    XR RIBS BILATERAL (MIN 4 VIEWS)   Final Result   Acute left   10 8th posterolateral rib fractures. Healed remote left 9th through 11th rib fractures. Probable remote right 6   rib fracture. Diffuse and bilateral airspace opacities. CTA CHEST W CONTRAST   Final Result   No evidence of pulmonary emboli. Multifocal patchy and ground-glass airspace disease involving all lobes   consistent with multifocal pneumonia and suspicious for COVID-19 pneumonia. Cardiomegaly with nonspecific mediastinal nodes, unchanged. Intraspinal calcified mass at the level of T7, unchanged since the prior   examination.           Microbiology:   Lab Results   Component Value Date    [facility] Micrococcus 09/30/2020    ORG Staphylococcus aureus 09/09/2020    ORG Staphylococcus coagulase-negative 09/09/2020     Lab Results   Component Value Date    ORG Micrococcus 09/30/2020    ORG Staphylococcus aureus 09/09/2020    ORG Staphylococcus coagulase-negative 09/09/2020     WOUND/ABSCESS   Date Value Ref Range Status   09/30/2020   Final    Heavy growth  Validated susceptibility testing methods do not exist for  this isolate. 09/09/2020   Final    Light growth  Methicillin resistant Staph aureus isolated. Most Methicillin  resistant Staphylococcus are usually resistant to multiple  antibiotics including other B-Lactams, Aminoglycosides,  Macrolides, Clindamycin and Tetracycline. Contact isolation  is indicated. This isolate is presumed to be resistant based on the  detection of inducible Clindamycin resistance. Clindamycin  may still be effective in some patients. 09/09/2020 One colony  Final     No results found for: RESPSMEAR  No results found for: MPNEUMO, CLAMYDCU, LABLEGI, AFBCX, FUNGSM, LABFUNG  No results found for: CULTRESP  No results found for: CXCATHTIP  No results found for: BFCS  No results found for: CXSURG  Urine Culture, Routine   Date Value Ref Range Status   06/04/2019 <10,000 CFU/mL  Gram negative rods    Final   07/17/2014 25,000 CFU/ml  Final   07/17/2014   Final    <25,000 CFU/ml  Vancomycin resistant Enterococci isolated       No results found for: [facility]    Problem list:    Principal Problem:    Acute respiratory failure with hypoxia ([address]  Active Problems:    Hypothyroidism    Rheumatoid arthritis ([address]    Multiple closed fractures of ribs of both sides with routine healing    A-fib ([address]    Essential hypertension    COVID-19    Sore throat  Resolved Problems:    * No resolved hospital problems.  *      ASSESSMENT:    · COVID-19 viral pneumonia  · Atrial fibrillation  · Rheumatoid arthritis    PLAN:    · Continue remdesivir  · Continue Decadron  · Continue to monitor oxygenation status closely  · Continue vitamin D, zinc, vitamin C  · D-dimer noted  · Monitor labs  · Low threshold for use of tocilizumab  · Will follow with you         Fidelina Presley    8:57 AM  2/9/2021

## 2021-02-09 NOTE — CARE COORDINATION
Social Work/Discharge Planning:  Chart reviewed. Day 2 Remdesivir. Patient on two liters of oxygen. Called patient daughter Rishabh Fortune (ph: [phone]) to follow up on snf choice. Rishabh Fortune states her first choice is [facility] and requested a snf list.  Called liaison Renate from [facility] and confirmed facility is not accepting COVID positive patients. This worker emailed snf list and requested additional snf choices. Will continue to follow.   Electronically signed by JU Barlow on 2/9/2021 at 10:49 AM

## 2021-02-09 NOTE — PLAN OF CARE
Problem: Skin Integrity:  Goal: Will show no infection signs and symptoms  Description: Will show no infection signs and symptoms  2/9/2021 1233 by Rex Lockett RN  Outcome: Met This Shift     Problem: Skin Integrity:  Goal: Absence of new skin breakdown  Description: Absence of new skin breakdown  2/9/2021 1233 by Rex Lockett RN  Outcome: Met This Shift     Problem: Falls - Risk of:  Goal: Will remain free from falls  Description: Will remain free from falls  Outcome: Met This Shift     Problem: Falls - Risk of:  Goal: Absence of physical injury  Description: Absence of physical injury  Outcome: Met This Shift     Problem: Pain:  Goal: Pain level will decrease  Description: Pain level will decrease  Outcome: Met This Shift     Problem: Pain:  Goal: Control of acute pain  Description: Control of acute pain  Outcome: Met This Shift     Problem: Pain:  Goal: Control of chronic pain  Description: Control of chronic pain  Outcome: Met This Shift     Problem: Airway Clearance - Ineffective  Goal: Achieve or maintain patent airway  Outcome: Met This Shift     Problem: Gas Exchange - Impaired  Goal: Absence of hypoxia  Outcome: Met This Shift     Problem: Gas Exchange - Impaired  Goal: Promote optimal lung function  Outcome: Met This Shift     Problem: Breathing Pattern - Ineffective  Goal: Ability to achieve and maintain a regular respiratory rate  Outcome: Met This Shift       Problem: Isolation Precautions - Risk of Spread of Infection  Goal: Prevent transmission of infection  Outcome: Met This Shift

## 2021-02-10 ENCOUNTER — APPOINTMENT (OUTPATIENT)
Dept: CT IMAGING | Age: 86
DRG: 177 | End: 2021-02-10
Payer: MEDICARE

## 2021-02-10 ENCOUNTER — APPOINTMENT (OUTPATIENT)
Dept: GENERAL RADIOLOGY | Age: 86
DRG: 177 | End: 2021-02-10
Payer: MEDICARE

## 2021-02-10 LAB
ALBUMIN SERPL-MCNC: 2.9 G/DL (ref 3.5–5.2)
ALP BLD-CCNC: 103 U/L (ref 35–104)
ALT SERPL-CCNC: 35 U/L (ref 0–32)
ANION GAP SERPL CALCULATED.3IONS-SCNC: 10 MMOL/L (ref 7–16)
APTT: 40 SEC (ref 24.5–35.1)
AST SERPL-CCNC: 89 U/L (ref 0–31)
ATYPICAL LYMPHOCYTE RELATIVE PERCENT: 1 % (ref 0–4)
BASOPHILS ABSOLUTE: 0 E9/L (ref 0–0.2)
BASOPHILS RELATIVE PERCENT: 0 % (ref 0–2)
BILIRUB SERPL-MCNC: 0.5 MG/DL (ref 0–1.2)
BUN BLDV-MCNC: 32 MG/DL (ref 8–23)
C-REACTIVE PROTEIN: 8.2 MG/DL (ref 0–0.4)
CALCIUM SERPL-MCNC: 8.6 MG/DL (ref 8.6–10.2)
CHLORIDE BLD-SCNC: 97 MMOL/L (ref 98–107)
CO2: 23 MMOL/L (ref 22–29)
CREAT SERPL-MCNC: 0.7 MG/DL (ref 0.5–1)
D DIMER: 689 NG/ML DDU
EOSINOPHILS ABSOLUTE: 0 E9/L (ref 0.05–0.5)
EOSINOPHILS RELATIVE PERCENT: 0 % (ref 0–6)
FIBRINOGEN: 563 MG/DL (ref 225–540)
GFR AFRICAN AMERICAN: >60
GFR NON-AFRICAN AMERICAN: >60 ML/MIN/1.73
GLUCOSE BLD-MCNC: 123 MG/DL (ref 74–99)
HCT VFR BLD CALC: 33.3 % (ref 34–48)
HEMOGLOBIN: 10.9 G/DL (ref 11.5–15.5)
INR BLD: 2.3
LYMPHOCYTES ABSOLUTE: 0.26 E9/L (ref 1.5–4)
LYMPHOCYTES RELATIVE PERCENT: 1 % (ref 20–42)
MCH RBC QN AUTO: 31.9 PG (ref 26–35)
MCHC RBC AUTO-ENTMCNC: 32.7 % (ref 32–34.5)
MCV RBC AUTO: 97.4 FL (ref 80–99.9)
MONOCYTES ABSOLUTE: 0.26 E9/L (ref 0.1–0.95)
MONOCYTES RELATIVE PERCENT: 2 % (ref 2–12)
NEUTROPHILS ABSOLUTE: 12.67 E9/L (ref 1.8–7.3)
NEUTROPHILS RELATIVE PERCENT: 96 % (ref 43–80)
PDW BLD-RTO: 13.7 FL (ref 11.5–15)
PLATELET # BLD: 501 E9/L (ref 130–450)
PMV BLD AUTO: 9.6 FL (ref 7–12)
POIKILOCYTES: ABNORMAL
POLYCHROMASIA: ABNORMAL
POTASSIUM REFLEX MAGNESIUM: 4.1 MMOL/L (ref 3.5–5)
PROCALCITONIN: 0.26 NG/ML (ref 0–0.08)
PROTHROMBIN TIME: 27 SEC (ref 9.3–12.4)
RBC # BLD: 3.42 E12/L (ref 3.5–5.5)
SCHISTOCYTES: ABNORMAL
SODIUM BLD-SCNC: 130 MMOL/L (ref 132–146)
TOTAL PROTEIN: 6 G/DL (ref 6.4–8.3)
WBC # BLD: 13.2 E9/L (ref 4.5–11.5)

## 2021-02-10 PROCEDURE — 6370000000 HC RX 637 (ALT 250 FOR IP): Performed by: FAMILY MEDICINE

## 2021-02-10 PROCEDURE — 94640 AIRWAY INHALATION TREATMENT: CPT

## 2021-02-10 PROCEDURE — 85384 FIBRINOGEN ACTIVITY: CPT

## 2021-02-10 PROCEDURE — 71045 X-RAY EXAM CHEST 1 VIEW: CPT

## 2021-02-10 PROCEDURE — 84145 PROCALCITONIN (PCT): CPT

## 2021-02-10 PROCEDURE — 2500000003 HC RX 250 WO HCPCS: Performed by: FAMILY MEDICINE

## 2021-02-10 PROCEDURE — 2060000000 HC ICU INTERMEDIATE R&B

## 2021-02-10 PROCEDURE — 71250 CT THORAX DX C-: CPT

## 2021-02-10 PROCEDURE — 6360000002 HC RX W HCPCS: Performed by: FAMILY MEDICINE

## 2021-02-10 PROCEDURE — 85378 FIBRIN DEGRADE SEMIQUANT: CPT

## 2021-02-10 PROCEDURE — 2700000000 HC OXYGEN THERAPY PER DAY

## 2021-02-10 PROCEDURE — 80053 COMPREHEN METABOLIC PANEL: CPT

## 2021-02-10 PROCEDURE — 87449 NOS EACH ORGANISM AG IA: CPT

## 2021-02-10 PROCEDURE — 97530 THERAPEUTIC ACTIVITIES: CPT

## 2021-02-10 PROCEDURE — 85730 THROMBOPLASTIN TIME PARTIAL: CPT

## 2021-02-10 PROCEDURE — 85610 PROTHROMBIN TIME: CPT

## 2021-02-10 PROCEDURE — 2580000003 HC RX 258: Performed by: FAMILY MEDICINE

## 2021-02-10 PROCEDURE — 85025 COMPLETE CBC W/AUTO DIFF WBC: CPT

## 2021-02-10 PROCEDURE — 86140 C-REACTIVE PROTEIN: CPT

## 2021-02-10 PROCEDURE — 36415 COLL VENOUS BLD VENIPUNCTURE: CPT

## 2021-02-10 PROCEDURE — 99232 SBSQ HOSP IP/OBS MODERATE 35: CPT | Performed by: FAMILY MEDICINE

## 2021-02-10 RX ORDER — POLYETHYLENE GLYCOL 3350 17 G/17G
17 POWDER, FOR SOLUTION ORAL DAILY
Status: DISCONTINUED | OUTPATIENT
Start: 2021-02-10 | End: 2021-02-16 | Stop reason: HOSPADM

## 2021-02-10 RX ORDER — DOCUSATE SODIUM 100 MG/1
200 CAPSULE, LIQUID FILLED ORAL DAILY
Status: DISCONTINUED | OUTPATIENT
Start: 2021-02-10 | End: 2021-02-16 | Stop reason: HOSPADM

## 2021-02-10 RX ADMIN — BUDESONIDE AND FORMOTEROL FUMARATE DIHYDRATE 2 PUFF: 160; 4.5 AEROSOL RESPIRATORY (INHALATION) at 09:04

## 2021-02-10 RX ADMIN — PANTOPRAZOLE SODIUM 40 MG: 40 TABLET, DELAYED RELEASE ORAL at 06:46

## 2021-02-10 RX ADMIN — HYDROCHLOROTHIAZIDE 50 MG: 25 TABLET ORAL at 11:00

## 2021-02-10 RX ADMIN — CELECOXIB 200 MG: 100 CAPSULE ORAL at 20:34

## 2021-02-10 RX ADMIN — HYDRALAZINE HYDROCHLORIDE 10 MG: 20 INJECTION INTRAMUSCULAR; INTRAVENOUS at 06:48

## 2021-02-10 RX ADMIN — FLECAINIDE ACETATE 50 MG: 50 TABLET ORAL at 11:01

## 2021-02-10 RX ADMIN — OXYCODONE HYDROCHLORIDE AND ACETAMINOPHEN 500 MG: 500 TABLET ORAL at 11:00

## 2021-02-10 RX ADMIN — Medication 2000 UNITS: at 11:01

## 2021-02-10 RX ADMIN — Medication 100 MG: at 11:01

## 2021-02-10 RX ADMIN — PANTOPRAZOLE SODIUM 40 MG: 40 TABLET, DELAYED RELEASE ORAL at 16:20

## 2021-02-10 RX ADMIN — BUDESONIDE AND FORMOTEROL FUMARATE DIHYDRATE 2 PUFF: 160; 4.5 AEROSOL RESPIRATORY (INHALATION) at 21:24

## 2021-02-10 RX ADMIN — DULOXETINE HYDROCHLORIDE 20 MG: 20 CAPSULE, DELAYED RELEASE ORAL at 11:01

## 2021-02-10 RX ADMIN — VALSARTAN 320 MG: 320 TABLET ORAL at 11:01

## 2021-02-10 RX ADMIN — METOPROLOL TARTRATE 25 MG: 25 TABLET, FILM COATED ORAL at 20:35

## 2021-02-10 RX ADMIN — DOCUSATE SODIUM 200 MG: 100 CAPSULE, LIQUID FILLED ORAL at 11:02

## 2021-02-10 RX ADMIN — DEXAMETHASONE 6 MG: 4 TABLET ORAL at 11:01

## 2021-02-10 RX ADMIN — FERROUS SULFATE TAB 325 MG (65 MG ELEMENTAL FE) 325 MG: 325 (65 FE) TAB at 11:02

## 2021-02-10 RX ADMIN — MONTELUKAST SODIUM 10 MG: 10 TABLET, FILM COATED ORAL at 20:35

## 2021-02-10 RX ADMIN — SODIUM CHLORIDE, PRESERVATIVE FREE 10 ML: 5 INJECTION INTRAVENOUS at 10:56

## 2021-02-10 RX ADMIN — SODIUM CHLORIDE, PRESERVATIVE FREE 10 ML: 5 INJECTION INTRAVENOUS at 20:35

## 2021-02-10 RX ADMIN — LEVOTHYROXINE SODIUM 100 MCG: 100 TABLET ORAL at 06:46

## 2021-02-10 RX ADMIN — POLYETHYLENE GLYCOL 3350 17 G: 17 POWDER, FOR SOLUTION ORAL at 11:00

## 2021-02-10 RX ADMIN — FLECAINIDE ACETATE 50 MG: 50 TABLET ORAL at 20:35

## 2021-02-10 RX ADMIN — GABAPENTIN 100 MG: 100 CAPSULE ORAL at 11:02

## 2021-02-10 RX ADMIN — SODIUM CHLORIDE 100 MG: 9 INJECTION, SOLUTION INTRAVENOUS at 10:55

## 2021-02-10 RX ADMIN — ZINC SULFATE 220 MG (50 MG) CAPSULE 50 MG: CAPSULE at 11:02

## 2021-02-10 RX ADMIN — METOPROLOL TARTRATE 25 MG: 25 TABLET, FILM COATED ORAL at 11:01

## 2021-02-10 RX ADMIN — ACETAMINOPHEN 650 MG: 325 TABLET ORAL at 16:20

## 2021-02-10 RX ADMIN — RIVAROXABAN 15 MG: 15 TABLET, FILM COATED ORAL at 11:02

## 2021-02-10 RX ADMIN — FAMOTIDINE 20 MG: 20 TABLET ORAL at 20:35

## 2021-02-10 RX ADMIN — GABAPENTIN 100 MG: 100 CAPSULE ORAL at 20:35

## 2021-02-10 RX ADMIN — CELECOXIB 200 MG: 100 CAPSULE ORAL at 11:02

## 2021-02-10 RX ADMIN — CETIRIZINE HYDROCHLORIDE 10 MG: 10 TABLET, FILM COATED ORAL at 11:00

## 2021-02-10 ASSESSMENT — PAIN SCALES - GENERAL
PAINLEVEL_OUTOF10: 10
PAINLEVEL_OUTOF10: 0
PAINLEVEL_OUTOF10: 10
PAINLEVEL_OUTOF10: 3

## 2021-02-10 NOTE — PROGRESS NOTES
[facility]  Progress Note    CC: Hypoxia     Subjective:  Ms. Forrester Credit states that she slept well overnight. She denies shortness of breath, wheezing, coughing, palpitations, abdominal pain, and N/V. She still reports rib pain, but does not want additional medications added at this time for pain control (ie opioids). She states that she has not had a BM since being admitted. She normally has a BM daily. She is also requesting a bath this morning. Past medical, surgical, family and social history were reviewed, non-contributory, and unchanged unless otherwise stated. Objective:    BP (!) 177/83   Pulse 72   Temp 97.8 °F (36.6 °C) (Oral)   Resp 16   Ht 5' (1.524 m)   Wt 126 lb (57.2 kg)   SpO2 95%   BMI 24.61 kg/m²     General: In no acute distress. Lying in bed. Pleasant and cooperative. Heart:  RRR. No murmurs, gallops, or rubs. Lungs:  Coarse breath sounds. No wheezing or rhonci appreciated. On 4 L O2 via nasal cannula. Abd: Bowel sounds present. Nontender, nondistended, and no masses. Extrem:  No clubbing, cyanosis, or edema.     CBC with Differential:    Lab Results   Component Value Date    WBC 13.2 02/10/2021    RBC 3.42 02/10/2021    HGB 10.9 02/10/2021    HCT 33.3 02/10/2021     02/10/2021    MCV 97.4 02/10/2021    MCH 31.9 02/10/2021    MCHC 32.7 02/10/2021    RDW 13.7 02/10/2021    NRBC 0.9 02/09/2021    SEGSPCT 66 03/26/2013    LYMPHOPCT 5.2 02/09/2021    MONOPCT 3.5 02/09/2021    MYELOPCT 0.9 06/16/2020    BASOPCT 0.0 02/09/2021    MONOSABS 0.33 02/09/2021    LYMPHSABS 0.41 02/09/2021    EOSABS 0.00 02/09/2021    BASOSABS 0.00 02/09/2021     CMP:    Lab Results   Component Value Date     02/09/2021    K 4.2 02/09/2021    CL 95 02/09/2021    CO2 23 02/09/2021    BUN 27 02/09/2021    CREATININE 0.7 02/09/2021    GFRAA >60 02/09/2021    LABGLOM >60 02/09/2021    GLUCOSE 131 02/09/2021    GLUCOSE 79 04/24/2012    PROT 6.1 02/09/2021 02/08 was COVID +  - Legionella urine antigen and Strep pneumoniae urine antigen pending  - On vitamin C 500 mg QD, thiamine 100 mg QD, vitamin D 2000 IU QD, and zinc 50 mg QD   - On dexamethasone 6 mg QD. Day 3.    - On remdesivir. Day 3.   - Inhalers   - Robitussin DM 5 ml q 4 hrs PRN for cough   - Daily weights and I/Os  - Social Work on case for discharge planning. Family is pursuing placement at this time. I spoke with daughter, Lilly Holland, regarding this matter on 02/09.    - PT/OT following. AM-PAC score 6/24 from PT. AM-PAC score 11/24 from OT.     Essential HTN   - Vitals q 4 hours   - Continue home Lopressor 25 mg BID   - Increased home valsartan-HCTZ 160-25 mg QD to 320-50 mg QD on 02/08  - On IV hydralazine 10 mg q 4 hrs PRN for SBP> 160 or DBP>100     Hypothyroidism  - Continue home Synthroid 100 mcg QD      RA  - Increased home Celebrex 200 mg QD to 200 mg BID to help with pain from multiple rib fractures  - Continue methotrexate 5 mg Monday at lunch and 10 mg Monday evening on weekly basis      Atrial fibrillation   - Continue home flecainide 50 mg BID   - Continue home Lopressor 25 mg BID  - Continue home Xarelto 15 mg QD       Multiple rib fractures   X-ray from 02/07 showed acute left 7th and 8th posterolateral rib fractures. Healed remote left 9th-11th rib fractures. Probable remote 6th rib fracture.     - Abdominal binder to be placed over ribs with movement (ie changing positions, working with PT, etc.)   - Increased home Celebrex 200 mg QD to 200 mg BID to help with pain from multiple rib fractures  - Tylenol 650 mg q 6 hrs PRN for pain  - Lidocaine patch q 12 hours for pain    Sore throat  - Cepacol throat lozenges q 2 hrs PRN      Constipation  - Increased home Colace 100 mg QD to 200 mg QD  - Start Miralax 17 g QD today   - Soap suds enema PRN       Continue additional home medications:   - Allegra 180 mg QD substituted with cetirizine 10 mg QD  - Cymbalta 20 mg QD   - Pepcid 40 mg HS  - Ferrous sulfate 325 mg every other day   - Gabapentin 100 mg BID   - Leucovorin calcium 25 mg every Tuesday   - Singulair 10 mg HS   - Omeprazole 40 mg BID substituted with pantoprazole 40 mg BID      Diet: General   Code: Full   DVT prophylaxis: Xarelto 15 mg QD          Electronically signed by DO Kaiden on 2/10/2021 at 7:28 AM

## 2021-02-10 NOTE — CARE COORDINATION
Social Work/Discharge Planning:  Patient daughter Sharon Weldon states family is investigating home with caregivers. She states they do not have a definitive discharge plan. Sharon Fatemeh states she is waiting for call from Dr. Eddie Moya. Informed Sharon Weldon of need for discharge plan and reviewed updated therapy notes. Patient is Day 3 Remdesivir. She is requiring four liters of oxygen. Discussed DME choices and Sharon Weldon will use [facility] if they accept her mothers insurance. Sharon Lopezpelon states she prefers to use [facility]. Sharonmarky Lopezpelon states she will notify Social Work of discharge plan. Called vishnu Noble with [facility] and she will confirm if they are in-network with patient insurance. [facility] (ph: [phone]) and they are currently NOT accepting COVID positive patients. Will continue to follow.   Electronically signed by JU Shook on 2/10/2021 at 10:00 AM

## 2021-02-10 NOTE — PROGRESS NOTES
[address] [place] [address] [facility]  DANISHIDA  Progress Note      Chief Complaint   Patient presents with    Shortness of Breath       SUBJECTIVE:      Patient is tolerating medications. No reported adverse drug reactions. No problems overnight. Remains extremely fatigued. Still pleuritic left chest pain. Review of systems:    As stated above in the chief complaint, otherwise negative. Medications:    Scheduled Meds:   docusate sodium  200 mg Oral Daily    polyethylene glycol  17 g Oral Daily    DULoxetine  20 mg Oral Daily    ferrous sulfate  325 mg Oral Every Other Day    cetirizine  10 mg Oral Daily    flecainide  50 mg Oral BID    gabapentin  100 mg Oral BID    levothyroxine  100 mcg Oral Daily    metoprolol tartrate  25 mg Oral BID    montelukast  10 mg Oral Nightly    pantoprazole  40 mg Oral BID AC    rivaroxaban  15 mg Oral Daily with breakfast    sodium chloride flush  10 mL Intravenous 2 times per day    Vitamin D  2,000 Units Oral Daily    thiamine  100 mg Oral Daily    ascorbic acid  500 mg Oral Daily    zinc sulfate  50 mg Oral Daily    leucovorin calcium  25 mg Oral Weekly    famotidine  20 mg Oral Nightly    budesonide-formoterol  2 puff Inhalation BID    remdesivir IVPB  100 mg Intravenous Q24H    lidocaine  1 patch Transdermal Daily    dexamethasone  6 mg Oral Daily    valsartan  320 mg Oral Daily    And    hydroCHLOROthiazide  50 mg Oral Daily    celecoxib  200 mg Oral BID    sodium chloride flush  10 mL Intravenous 2 times per day    sodium chloride  500 mL Intravenous Once     Continuous Infusions:  PRN Meds:benzocaine-menthol, sodium chloride flush, promethazine **OR** ondansetron, acetaminophen **OR** acetaminophen, guaiFENesin-dextromethorphan, hydrALAZINE, albuterol-ipratropium, sodium chloride, sodium chloride flush  Prior to Admission medications    Medication Sig Start Date End Date Taking?  Authorizing Provider   metoprolol tartrate (LOPRESSOR) 25 MG tablet TAKE 1 TABLET TWICE A DAY 1/4/21  Yes Giovanni Leventhal, MD   flecainide (TAMBOCOR) 50 MG tablet Take 1 tablet by mouth 2 times daily 11/23/20  Yes Giovanni Leventhal, MD   rivaroxaban (XARELTO) 15 MG TABS tablet Take 1 tablet by mouth daily (with breakfast) 11/23/20  Yes Giovanni Leventhal, MD   valsartan-hydroCHLOROthiazide (DIOVAN-HCT) 160-25 MG per tablet Take 1 tablet by mouth daily 10/28/20  Yes Asya Smiley MD   gabapentin (NEURONTIN) 100 MG capsule Take 1 capsule by mouth 2 times daily for 90 days. 10/8/20 2/8/21 Yes Asya Smiley MD   DULoxetine (CYMBALTA) 20 MG extended release capsule Take 1 capsule by mouth daily 10/8/20  Yes Asya Smiley MD   celecoxib (CELEBREX) 200 MG capsule TAKE 1 CAPSULE DAILY 9/17/20  Yes Asya Smiley MD   levothyroxine (LEVOTHROID) 100 MCG tablet Take 1 tablet by mouth Daily 9/17/20  Yes Asya Smiley MD   vitamin B-12 (CYANOCOBALAMIN) 1000 MCG tablet Take 1,000 mcg by mouth daily   Yes Historical Provider, MD   omeprazole (PRILOSEC) 40 MG delayed release capsule Take 1 capsule by mouth 2 times daily 4/16/20  Yes Asya Smiley MD   famotidine (PEPCID) 40 MG tablet Take 1 tablet by mouth nightly 3/31/20  Yes Asya Smiley MD   fexofenadine (ALLEGRA) 180 MG tablet Take 180 mg by mouth daily.    Yes Historical Provider, MD   leucovorin calcium (WELLCOVORIN) 5 MG tablet Take 25 mg by mouth once a week Takes every Tuesday 7/19/13  Yes Historical Provider, MD   methotrexate 2.5 MG tablet Take by mouth once a week 2 tabs at lunch and 4 tabs  in the pm once a week on Monday   Yes Historical Provider, MD   montelukast (SINGULAIR) 10 MG tablet TAKE 1 TABLET NIGHTLY 2/8/21   Asya Smiley MD   cyanocobalamin 1000 MCG/ML injection Inject 1 mL into the muscle every 30 days Dispense with syringes 1.5 inch 25 G needles 1/6/21   Asya Smiley MD   ferrous sulfate (IRON 325) 325 (65 Fe) MG tablet Take 1 tablet by mouth every other day 20   Bridget Layton MD   docusate sodium (COLACE, DULCOLAX) 100 MG CAPS Take 100 mg by mouth daily 20   Odilon Rodgers MD   ondansetron [facility]) 4 MG tablet Take 1 tablet by mouth every 8 hours as needed for Nausea or Vomiting 19   Bridget Layton MD   polyethyl glycol-propyl glycol 0.4-0.3 % (SYSTANE) 0.4-0.3 % ophthalmic solution 1 drop 2 times daily as needed for Dry Eyes     Historical Provider, MD   fluticasone (FLONASE) 50 MCG/ACT nasal spray 2 sprays by Nasal route daily 2 sprays in each nostril daily 18   Bridget Layton MD   Probiotic Product (ACIDOPHILUS PROBIOTIC) CAPS capsule Take 1 capsule by mouth daily    Historical Provider, MD       OBJECTIVE:  BP (!) 111/54   Pulse 84   Temp 98.2 °F (36.8 °C) (Oral)   Resp 18   Ht 5' (1.524 m)   Wt 126 lb (57.2 kg)   SpO2 93%   BMI 24.61 kg/m²   Temp  Av °F (36.7 °C)  Min: 97.3 °F (36.3 °C)  Max: 98.6 °F (37 °C)    General appearance: Resting in bed in no apparent distress. Skin: Warm and dry. No rashes were noted. HEENT: Round and reactive pupils. Moist mucous membranes. No ulcerations or thrush. Neck: Supple to movements. Chest: Fine bibasilar inspiratory rales left greater than right. No wheezing. Good effort. Cardiovascular: Regular rate and rhythm. No murmurs gallops, or rubs appreciated. Abdomen: Bowel sounds present, nontender, nondistended, no masses or hepatosplenomegaly. Extremities: No clubbing, no cyanosis, no edema. Lines: peripheral  Neuro: No significant sensory or motor deficits noted    I/O last 3 completed shifts:   In: 5 [P.O.:420]  Out: 400 [Urine:400]      Laboratory and Tests Review:      Lab Results   Component Value Date    WBC 13.2 (H) 02/10/2021    WBC 8.2 2021    WBC 7.8 2021    HGB 10.9 (L) 02/10/2021    HCT 33.3 (L) 02/10/2021    MCV 97.4 02/10/2021     (H) 02/10/2021     Lab Results   Component Value Date    NEUTROABS 12.67 (H) 02/10/2021    NEUTROABS 7.46 (H) 02/09/2021    NEUTROABS 6.79 02/08/2021     No results found for: CRPHS  Lab Results   Component Value Date    ALT 35 (H) 02/10/2021    AST 89 (H) 02/10/2021    ALKPHOS 103 02/10/2021    BILITOT 0.5 02/10/2021     Lab Results   Component Value Date     02/10/2021    K 4.1 02/10/2021    CL 97 02/10/2021    CO2 23 02/10/2021    BUN 32 02/10/2021    CREATININE 0.7 02/10/2021    CREATININE 0.7 02/09/2021    CREATININE 0.8 02/08/2021    GFRAA >60 02/10/2021    LABGLOM >60 02/10/2021    GLUCOSE 123 02/10/2021    GLUCOSE 79 04/24/2012    PROT 6.0 02/10/2021    LABALBU 2.9 02/10/2021    LABALBU 4.0 04/24/2012    CALCIUM 8.6 02/10/2021    BILITOT 0.5 02/10/2021    ALKPHOS 103 02/10/2021    AST 89 02/10/2021    ALT 35 02/10/2021     Lab Results   Component Value Date    CRP 8.2 (H) 02/10/2021    CRP 17.4 (H) 02/09/2021    CRP 22.7 (H) 02/08/2021     Lab Results   Component Value Date    SEDRATE 29 (H) 09/10/2020    SEDRATE 17 10/22/2019    SEDRATE 26 (H) 03/26/2013       Radiology:    XR RIBS BILATERAL (MIN 4 VIEWS)   Final Result   Acute left   10 8th posterolateral rib fractures. Healed remote left 9th through 11th rib fractures. Probable remote right 6   rib fracture. Diffuse and bilateral airspace opacities. CTA CHEST W CONTRAST   Final Result   No evidence of pulmonary emboli. Multifocal patchy and ground-glass airspace disease involving all lobes   consistent with multifocal pneumonia and suspicious for COVID-19 pneumonia. Cardiomegaly with nonspecific mediastinal nodes, unchanged. Intraspinal calcified mass at the level of T7, unchanged since the prior   examination.       XR CHEST PORTABLE    (Results Pending)       Microbiology:   Lab Results   Component Value Date    ORG Micrococcus 09/30/2020    ORG Staphylococcus aureus 09/09/2020    ORG Staphylococcus coagulase-negative 09/09/2020     Lab Results   Component Value Date    ORG Micrococcus 09/30/2020    ORG Staphylococcus aureus 09/09/2020 ORG Staphylococcus coagulase-negative 09/09/2020     WOUND/ABSCESS   Date Value Ref Range Status   09/30/2020   Final    Heavy growth  Validated susceptibility testing methods do not exist for  this isolate. 09/09/2020   Final    Light growth  Methicillin resistant Staph aureus isolated. Most Methicillin  resistant Staphylococcus are usually resistant to multiple  antibiotics including other B-Lactams, Aminoglycosides,  Macrolides, Clindamycin and Tetracycline. Contact isolation  is indicated. This isolate is presumed to be resistant based on the  detection of inducible Clindamycin resistance. Clindamycin  may still be effective in some patients. 09/09/2020 One colony  Final     No results found for: RESPSMEAR  No results found for: MPNEUMO, CLAMYDCU, LABLEGI, AFBCX, FUNGSM, LABFUNG  No results found for: CULTRESP  No results found for: CXCATHTIP  No results found for: BFCS  No results found for: CXSURG  Urine Culture, Routine   Date Value Ref Range Status   06/04/2019 <10,000 CFU/mL  Gram negative rods    Final   07/17/2014 25,000 CFU/ml  Final   07/17/2014   Final    <25,000 CFU/ml  Vancomycin resistant Enterococci isolated       No results found for: [facility]    Problem list:    Principal Problem:    Acute respiratory failure with hypoxia ([facility]  Active Problems:    Hypothyroidism    Rheumatoid arthritis ([facility]    Multiple closed fractures of ribs of both sides with routine healing    A-fib ([facility]    Essential hypertension    COVID-19    Sore throat  Resolved Problems:    * No resolved hospital problems.  *      ASSESSMENT:    · COVID-19 viral pneumonia  · Atrial fibrillation  · Rheumatoid arthritis  · Leukocytosis possibly secondary to Decadron    PLAN:    · Continue remdesivir  · Continue Decadron  · Continue to monitor oxygenation status closely  · Continue vitamin D, zinc, vitamin C  · Procalcitonin noted  · Monitor labs  · Await repeat chest x-ray  · Will follow with you      Kevan Mcmahon 11:31 AM  2/10/2021

## 2021-02-10 NOTE — PROGRESS NOTES
Physical Therapy  Facility/Department: [address] INTERMEDIATE 1  Daily Treatment Note  NAME: Rene Palacios  : 1935  MRN: [id number]    Date of Service: 2/10/2021    Patient Diagnosis(es): The primary encounter diagnosis was Acute respiratory failure with hypoxia ([address] A diagnosis of COVID-19 was also pertinent to this visit. has a past medical history of Anticoagulated, Atrial fibrillation ([address] Bimalleolar fracture, Diastolic dysfunction, Difficulty walking, Diverticular disease, Elevated CA-125, Hiatal hernia, Hypertension, Hypothyroid, Meningocele spinal ([address] Neuropathy, JANETTE (obstructive sleep apnea), Osteoporosis, PAF (paroxysmal atrial fibrillation) ([address] Pain in both lower legs, Patellar fracture, Rheumatoid arthritis with rheumatoid factor ([address] Rheumatoid arthritis(714.0), Rib fractures, Seasonal allergies, Skin cancer of lip, and Wears dentures. has a past surgical history that includes Gastric fundoplication ();  section; Carpal tunnel release (Bilateral); Colonoscopy (2011); Total knee arthroplasty (2007); Cardiac catheterization; Hysterectomy, total abdominal (early ); Upper gastrointestinal endoscopy (N/A, 2020); and Colonoscopy (N/A, 2020).    Referring Katy Valentin DO        Evaluating Therapist: [facility]     Room #:425  DIAGNOSIS: Acute Respiratory failure  Additional Pertinent History:RA  PRECAUTIONS: falls, O2, droplet plus isolation     Social:  Pt lives with son in a 1 floor plan. Prior to admission Pt non-ambulatory, w/c level, states transfers to chair with a \"sit to stand\" that lifts her up from sated posistion       Initial Evaluation  Date: 21 Treatment     2/10/2021 Short Term/ Long Term   Goals   Was pt agreeable to Eval/treatment? yes Yes       Does pt have pain?  10/10 ribs L side, also c/o B LE pain  L rib pain      Bed Mobility  Rolling: max assist  Supine to sit: pt unable secondary to pain  Sit to supine:   Scooting: max assist  Supine <> sit : max assist  Scooting : dep assist  Min assist   Transfers Sit to stand: NT  Stand to sit: NT  Stand pivot: NT  NT Max assist   Ambulation    NT   N/A   Stair Negotiation  Ascended and descended  NT    N/A   LE strength     2/5     3-/5   balance             AM-PAC Raw score               8/24 8/ 24           Patient education  Pt was educated on importance of mobiltiy    Patient response to education:   Pt verbalized understanding Pt demonstrated skill Pt requires further education in this area   yes         Additional Comments/treatment: Functional mobilty as above. Pt does not move legs to assist with bed mobility. States she can't move her legs due to neuropathy. Pt sat edge of bed SBA. Demonstrated 2/5 LE strength edge of bed performed AA LAQ and ankle pumps. Pt was left in bed with call light left by patient. Time in: 100  Time out: 115    Total treatment time 15 minutes    Pt is making limited progress toward established Physical Therapy goals. Continue with physical therapy current plan of care.     Venessa PT [id number]      CPT codes:  [] Low Complexity PT evaluation 29718  [] Moderate Complexity PT evaluation 66930  [] High Complexity PT evaluation 95547  [] PT Re-evaluation 04843  [] Gait training 21382  minutes  [] Manual therapy 24327    minutes  [x] Therapeutic activities 84811  15  minutes  [] Therapeutic exercises 03811     minutes  [] Neuromuscular reeducation 71223     minutes

## 2021-02-10 NOTE — CARE COORDINATION
Social Work/Discharge Planning: Sameer Celis with [facility] states they are out of network and patient would be responsible for 15 percent of 85/15. Called patient daughter Ayanna Gomez and informed her [facility] is not accepting COVID positive patients at this time. Ayanna Gomez requested a home health care list.  Ayanna Gomez states plan is home with [facility] and family to provide caregivers for twenty-four hours. Ayanna Gomez states referral can be made to [facility]. Referral made to Ranulfo White with [facility] and patient will need a oxygen order. Cancelled referral to [facility].  Patient will need a home health care order. Will continue to follow.   Electronically signed by JU Crawford on 2/10/2021 at 3:38 PM

## 2021-02-10 NOTE — PLAN OF CARE
Problem: Skin Integrity:  Goal: Will show no infection signs and symptoms  Description: Will show no infection signs and symptoms  Outcome: Met This Shift  Goal: Absence of new skin breakdown  Description: Absence of new skin breakdown  Outcome: Met This Shift     Problem: Falls - Risk of:  Goal: Will remain free from falls  Description: Will remain free from falls  Outcome: Met This Shift  Goal: Absence of physical injury  Description: Absence of physical injury  Outcome: Met This Shift     Problem: Pain:  Goal: Pain level will decrease  Description: Pain level will decrease  Outcome: Met This Shift  Goal: Control of acute pain  Description: Control of acute pain  Outcome: Met This Shift  Goal: Control of chronic pain  Description: Control of chronic pain  Outcome: Met This Shift     Problem: Airway Clearance - Ineffective  Goal: Achieve or maintain patent airway  Outcome: Met This Shift     Problem: Gas Exchange - Impaired  Goal: Absence of hypoxia  Outcome: Met This Shift  Goal: Promote optimal lung function  Outcome: Met This Shift     Problem: Breathing Pattern - Ineffective  Goal: Ability to achieve and maintain a regular respiratory rate  Outcome: Met This Shift     Problem:  Body Temperature -  Risk of, Imbalanced  Goal: Ability to maintain a body temperature within defined limits  Outcome: Met This Shift  Goal: Will regain or maintain usual level of consciousness  Outcome: Met This Shift  Goal: Complications related to the disease process, condition or treatment will be avoided or minimized  Outcome: Met This Shift     Problem: Isolation Precautions - Risk of Spread of Infection  Goal: Prevent transmission of infection  Outcome: Met This Shift     Problem: Nutrition Deficits  Goal: Optimize nutritional status  Outcome: Met This Shift     Problem: Risk for Fluid Volume Deficit  Goal: Maintain normal heart rhythm  Outcome: Met This Shift  Goal: Maintain absence of muscle cramping  Outcome: Met This Shift Goal: Maintain normal serum potassium, sodium, calcium, phosphorus, and pH  Outcome: Met This Shift     Problem: Loneliness or Risk for Loneliness  Goal: Demonstrate positive use of time alone when socialization is not possible  Outcome: Met This Shift     Problem: Fatigue  Goal: Verbalize increase energy and improved vitality  Outcome: Met This Shift     Problem: Patient Education: Go to Patient Education Activity  Goal: Patient/Family Education  Outcome: Met This Shift

## 2021-02-10 NOTE — PROGRESS NOTES
Updated patient's daughter, Sawstevo Gomez, of patient's status and plan of treatment. All questions answered. Ayanna Gomez appreciative of update.

## 2021-02-10 NOTE — PLAN OF CARE
Problem: Skin Integrity:  Goal: Will show no infection signs and symptoms  Description: Will show no infection signs and symptoms  2/10/2021 1428 by Diedra Simmonds, RN  Outcome: Ongoing     Problem: Skin Integrity:  Goal: Absence of new skin breakdown  Description: Absence of new skin breakdown  2/10/2021 1428 by Diedra Simmonds, RN  Outcome: Ongoing     Problem: Falls - Risk of:  Goal: Will remain free from falls  Description: Will remain free from falls  2/10/2021 1428 by Diedra Simmonds, RN  Outcome: Ongoing     Problem: Falls - Risk of:  Goal: Absence of physical injury  Description: Absence of physical injury  2/10/2021 1428 by Diedra Simmonds, RN  Outcome: Ongoing     Problem: Pain:  Goal: Pain level will decrease  Description: Pain level will decrease  2/10/2021 1428 by Diedra Simmonds, RN  Outcome: Ongoing     Problem: Pain:  Goal: Control of acute pain  Description: Control of acute pain  2/10/2021 1428 by Diedra Simmonds, RN  Outcome: Ongoing     Problem: Pain:  Goal: Control of chronic pain  Description: Control of chronic pain  2/10/2021 1428 by Diedra Simmonds, RN  Outcome: Ongoing     Problem: Airway Clearance - Ineffective  Goal: Achieve or maintain patent airway  2/10/2021 1428 by Diedra Simmonds, RN  Outcome: Ongoing     Problem: Gas Exchange - Impaired  Goal: Absence of hypoxia  2/10/2021 1428 by Diedra Simmonds, RN  Outcome: Ongoing     Problem: Gas Exchange - Impaired  Goal: Promote optimal lung function  2/10/2021 1428 by Diedra Simmonds, RN  Outcome: Ongoing     Problem: Breathing Pattern - Ineffective  Goal: Ability to achieve and maintain a regular respiratory rate  2/10/2021 1428 by Diedra Simmonds, RN  Outcome: Ongoing     Problem: Body Temperature -  Risk of, Imbalanced  Goal: Ability to maintain a body temperature within defined limits  2/10/2021 1428 by Diedra Simmonds, RN  Outcome: Ongoing    Problem:  Body Temperature -  Risk of, Imbalanced  Goal: Complications related to the disease process, condition or treatment will be avoided or minimized  2/10/2021 1428 by Yong Rivas RN  Outcome: Ongoing     Problem: Isolation Precautions - Risk of Spread of Infection  Goal: Prevent transmission of infection  2/10/2021 1428 by Yong Rivas RN  Outcome: Ongoing     Problem: Nutrition Deficits  Goal: Optimize nutritional status  2/10/2021 1428 by Yong Rivas RN  Outcome: Ongoing     Problem: Risk for Fluid Volume Deficit  Goal: Maintain absence of muscle cramping  2/10/2021 1428 by Yong Rivas RN  Outcome: Ongoing     Problem: Loneliness or Risk for Loneliness  Goal: Demonstrate positive use of time alone when socialization is not possible  2/10/2021 1428 by Yong Rivas RN  Outcome: Ongoing     Problem: Fatigue  Goal: Verbalize increase energy and improved vitality  2/10/2021 1428 by Yong Rivas RN  Outcome: Ongoing     Problem: Patient Education: Go to Patient Education Activity  Goal: Patient/Family Education  2/10/2021 1428 by Yong Rivas RN  Outcome: Ongoing

## 2021-02-11 LAB
ALBUMIN SERPL-MCNC: 2.5 G/DL (ref 3.5–5.2)
ALP BLD-CCNC: 92 U/L (ref 35–104)
ALT SERPL-CCNC: 37 U/L (ref 0–32)
ANION GAP SERPL CALCULATED.3IONS-SCNC: 9 MMOL/L (ref 7–16)
ANISOCYTOSIS: ABNORMAL
APTT: 33.8 SEC (ref 24.5–35.1)
AST SERPL-CCNC: 58 U/L (ref 0–31)
BASOPHILS ABSOLUTE: 0.01 E9/L (ref 0–0.2)
BASOPHILS RELATIVE PERCENT: 0.1 % (ref 0–2)
BILIRUB SERPL-MCNC: 0.4 MG/DL (ref 0–1.2)
BUN BLDV-MCNC: 46 MG/DL (ref 8–23)
C-REACTIVE PROTEIN: 4.9 MG/DL (ref 0–0.4)
CALCIUM SERPL-MCNC: 8.4 MG/DL (ref 8.6–10.2)
CHLORIDE BLD-SCNC: 97 MMOL/L (ref 98–107)
CHLORIDE URINE RANDOM: 53 MMOL/L
CO2: 23 MMOL/L (ref 22–29)
CREAT SERPL-MCNC: 1 MG/DL (ref 0.5–1)
D DIMER: 651 NG/ML DDU
EOSINOPHILS ABSOLUTE: 0 E9/L (ref 0.05–0.5)
EOSINOPHILS RELATIVE PERCENT: 0 % (ref 0–6)
FIBRINOGEN: 538 MG/DL (ref 225–540)
GFR AFRICAN AMERICAN: >60
GFR NON-AFRICAN AMERICAN: 53 ML/MIN/1.73
GLUCOSE BLD-MCNC: 117 MG/DL (ref 74–99)
HCT VFR BLD CALC: 31.2 % (ref 34–48)
HEMOGLOBIN: 10.4 G/DL (ref 11.5–15.5)
IMMATURE GRANULOCYTES #: 0.27 E9/L
IMMATURE GRANULOCYTES %: 2.3 % (ref 0–5)
INR BLD: 1.8
L. PNEUMOPHILA SEROGP 1 UR AG: NORMAL
LYMPHOCYTES ABSOLUTE: 0.35 E9/L (ref 1.5–4)
LYMPHOCYTES RELATIVE PERCENT: 3 % (ref 20–42)
MCH RBC QN AUTO: 32.6 PG (ref 26–35)
MCHC RBC AUTO-ENTMCNC: 33.3 % (ref 32–34.5)
MCV RBC AUTO: 97.8 FL (ref 80–99.9)
MONOCYTES ABSOLUTE: 0.67 E9/L (ref 0.1–0.95)
MONOCYTES RELATIVE PERCENT: 5.8 % (ref 2–12)
NEUTROPHILS ABSOLUTE: 10.19 E9/L (ref 1.8–7.3)
NEUTROPHILS RELATIVE PERCENT: 88.8 % (ref 43–80)
OSMOLALITY URINE: 672 MOSM/KG (ref 300–900)
OSMOLALITY: 289 MOSM/KG (ref 285–310)
OVALOCYTES: ABNORMAL
PDW BLD-RTO: 13.9 FL (ref 11.5–15)
PLATELET # BLD: 548 E9/L (ref 130–450)
PMV BLD AUTO: 9.7 FL (ref 7–12)
POIKILOCYTES: ABNORMAL
POTASSIUM REFLEX MAGNESIUM: 4.1 MMOL/L (ref 3.5–5)
POTASSIUM, UR: 37.7 MMOL/L
PROTHROMBIN TIME: 20.3 SEC (ref 9.3–12.4)
RBC # BLD: 3.19 E12/L (ref 3.5–5.5)
SODIUM BLD-SCNC: 129 MMOL/L (ref 132–146)
SODIUM URINE: 77 MMOL/L
STREP PNEUMONIAE ANTIGEN, URINE: NORMAL
TOTAL PROTEIN: 5.6 G/DL (ref 6.4–8.3)
WBC # BLD: 11.5 E9/L (ref 4.5–11.5)

## 2021-02-11 PROCEDURE — 83930 ASSAY OF BLOOD OSMOLALITY: CPT

## 2021-02-11 PROCEDURE — 2580000003 HC RX 258: Performed by: FAMILY MEDICINE

## 2021-02-11 PROCEDURE — 2500000003 HC RX 250 WO HCPCS: Performed by: FAMILY MEDICINE

## 2021-02-11 PROCEDURE — 85384 FIBRINOGEN ACTIVITY: CPT

## 2021-02-11 PROCEDURE — 85025 COMPLETE CBC W/AUTO DIFF WBC: CPT

## 2021-02-11 PROCEDURE — 6360000002 HC RX W HCPCS: Performed by: FAMILY MEDICINE

## 2021-02-11 PROCEDURE — 85610 PROTHROMBIN TIME: CPT

## 2021-02-11 PROCEDURE — 94640 AIRWAY INHALATION TREATMENT: CPT

## 2021-02-11 PROCEDURE — 84300 ASSAY OF URINE SODIUM: CPT

## 2021-02-11 PROCEDURE — 85730 THROMBOPLASTIN TIME PARTIAL: CPT

## 2021-02-11 PROCEDURE — 2060000000 HC ICU INTERMEDIATE R&B

## 2021-02-11 PROCEDURE — 83935 ASSAY OF URINE OSMOLALITY: CPT

## 2021-02-11 PROCEDURE — 97110 THERAPEUTIC EXERCISES: CPT

## 2021-02-11 PROCEDURE — 82436 ASSAY OF URINE CHLORIDE: CPT

## 2021-02-11 PROCEDURE — 85378 FIBRIN DEGRADE SEMIQUANT: CPT

## 2021-02-11 PROCEDURE — 80053 COMPREHEN METABOLIC PANEL: CPT

## 2021-02-11 PROCEDURE — 36415 COLL VENOUS BLD VENIPUNCTURE: CPT

## 2021-02-11 PROCEDURE — 99232 SBSQ HOSP IP/OBS MODERATE 35: CPT | Performed by: FAMILY MEDICINE

## 2021-02-11 PROCEDURE — 86140 C-REACTIVE PROTEIN: CPT

## 2021-02-11 PROCEDURE — 97530 THERAPEUTIC ACTIVITIES: CPT

## 2021-02-11 PROCEDURE — 84133 ASSAY OF URINE POTASSIUM: CPT

## 2021-02-11 PROCEDURE — 6370000000 HC RX 637 (ALT 250 FOR IP): Performed by: FAMILY MEDICINE

## 2021-02-11 PROCEDURE — 2700000000 HC OXYGEN THERAPY PER DAY

## 2021-02-11 RX ORDER — SODIUM CHLORIDE 9 MG/ML
INJECTION, SOLUTION INTRAVENOUS CONTINUOUS
Status: DISCONTINUED | OUTPATIENT
Start: 2021-02-11 | End: 2021-02-12

## 2021-02-11 RX ORDER — VALSARTAN 320 MG/1
320 TABLET ORAL DAILY
Status: DISCONTINUED | OUTPATIENT
Start: 2021-02-12 | End: 2021-02-14

## 2021-02-11 RX ORDER — HYDROCHLOROTHIAZIDE 25 MG/1
25 TABLET ORAL DAILY
Status: DISCONTINUED | OUTPATIENT
Start: 2021-02-12 | End: 2021-02-14

## 2021-02-11 RX ADMIN — OXYCODONE HYDROCHLORIDE AND ACETAMINOPHEN 500 MG: 500 TABLET ORAL at 09:16

## 2021-02-11 RX ADMIN — SODIUM CHLORIDE, PRESERVATIVE FREE 10 ML: 5 INJECTION INTRAVENOUS at 09:17

## 2021-02-11 RX ADMIN — SODIUM CHLORIDE, PRESERVATIVE FREE 10 ML: 5 INJECTION INTRAVENOUS at 21:30

## 2021-02-11 RX ADMIN — FLECAINIDE ACETATE 50 MG: 50 TABLET ORAL at 21:31

## 2021-02-11 RX ADMIN — FLECAINIDE ACETATE 50 MG: 50 TABLET ORAL at 09:16

## 2021-02-11 RX ADMIN — Medication 100 MG: at 09:16

## 2021-02-11 RX ADMIN — ZINC SULFATE 220 MG (50 MG) CAPSULE 50 MG: CAPSULE at 09:17

## 2021-02-11 RX ADMIN — POLYETHYLENE GLYCOL 3350 17 G: 17 POWDER, FOR SOLUTION ORAL at 09:17

## 2021-02-11 RX ADMIN — FAMOTIDINE 20 MG: 20 TABLET ORAL at 21:31

## 2021-02-11 RX ADMIN — DULOXETINE HYDROCHLORIDE 20 MG: 20 CAPSULE, DELAYED RELEASE ORAL at 09:16

## 2021-02-11 RX ADMIN — LEVOTHYROXINE SODIUM 100 MCG: 100 TABLET ORAL at 06:14

## 2021-02-11 RX ADMIN — METOPROLOL TARTRATE 25 MG: 25 TABLET, FILM COATED ORAL at 10:23

## 2021-02-11 RX ADMIN — BUDESONIDE AND FORMOTEROL FUMARATE DIHYDRATE 2 PUFF: 160; 4.5 AEROSOL RESPIRATORY (INHALATION) at 19:53

## 2021-02-11 RX ADMIN — GABAPENTIN 100 MG: 100 CAPSULE ORAL at 09:16

## 2021-02-11 RX ADMIN — MONTELUKAST SODIUM 10 MG: 10 TABLET, FILM COATED ORAL at 21:31

## 2021-02-11 RX ADMIN — CETIRIZINE HYDROCHLORIDE 10 MG: 10 TABLET, FILM COATED ORAL at 09:16

## 2021-02-11 RX ADMIN — RIVAROXABAN 15 MG: 15 TABLET, FILM COATED ORAL at 09:17

## 2021-02-11 RX ADMIN — DEXAMETHASONE 6 MG: 4 TABLET ORAL at 09:16

## 2021-02-11 RX ADMIN — Medication 10 ML: at 21:36

## 2021-02-11 RX ADMIN — Medication 2000 UNITS: at 09:16

## 2021-02-11 RX ADMIN — BUDESONIDE AND FORMOTEROL FUMARATE DIHYDRATE 2 PUFF: 160; 4.5 AEROSOL RESPIRATORY (INHALATION) at 09:14

## 2021-02-11 RX ADMIN — PANTOPRAZOLE SODIUM 40 MG: 40 TABLET, DELAYED RELEASE ORAL at 16:21

## 2021-02-11 RX ADMIN — SODIUM CHLORIDE 100 MG: 9 INJECTION, SOLUTION INTRAVENOUS at 10:23

## 2021-02-11 RX ADMIN — ACETAMINOPHEN 650 MG: 325 TABLET ORAL at 21:31

## 2021-02-11 RX ADMIN — PANTOPRAZOLE SODIUM 40 MG: 40 TABLET, DELAYED RELEASE ORAL at 06:14

## 2021-02-11 RX ADMIN — CELECOXIB 200 MG: 100 CAPSULE ORAL at 09:17

## 2021-02-11 RX ADMIN — HYDROCHLOROTHIAZIDE 50 MG: 25 TABLET ORAL at 09:16

## 2021-02-11 RX ADMIN — VALSARTAN 320 MG: 320 TABLET ORAL at 09:17

## 2021-02-11 RX ADMIN — METOPROLOL TARTRATE 25 MG: 25 TABLET, FILM COATED ORAL at 21:31

## 2021-02-11 RX ADMIN — GABAPENTIN 100 MG: 100 CAPSULE ORAL at 21:31

## 2021-02-11 RX ADMIN — CELECOXIB 200 MG: 100 CAPSULE ORAL at 21:31

## 2021-02-11 RX ADMIN — DOCUSATE SODIUM 200 MG: 100 CAPSULE, LIQUID FILLED ORAL at 09:16

## 2021-02-11 ASSESSMENT — PAIN SCALES - GENERAL
PAINLEVEL_OUTOF10: 5
PAINLEVEL_OUTOF10: 3
PAINLEVEL_OUTOF10: 0
PAINLEVEL_OUTOF10: 5

## 2021-02-11 NOTE — PROGRESS NOTES
Occupational Therapy  OT BEDSIDE TREATMENT NOTE      Date:2021  Patient Name: Diana Siddiqi  MRN: [id number]  : 1935  Room: [address] [place] [address]     Referring Provider: DO Camelia Thomas OT: Bhavana Ordoñez OTR/L [id number]     AM-PAC Daily Activity Raw Score:      Recommended Adaptive Equipment: TBD     Diagnosis: acute respiratory failure with hypoxia. Imaging: multiple rib fractures L side   Pertinent Medical History: HTN, RA, neuropathy, PAF, afib   Precautions:  Falls, droplet plus isolation COVID, O2, splinting L side d/t rib fxs     Home Living: Pt lives with son in a single story home. Bathroom setup: tub/shower combo with extended tub bench, has 3:1      Prior Level of Function: pd caregiver and family assist  to assist with ADL/IADLs; completed functional mobility wc level with use of UEs for mobility. [address] for all transfers at home. Hospital bed. Pt reports she has assist sponge bathing several times a week and approx 1 time a week her granddaughter will assist her in the tub for bathing. Use of [address] in/out of bathroom for commode transfer. Able to complete grooming, UB dressing and self feeding tasks on her own.     Pain Level: L side- Moderate     Cognition: A&O: 4/4                Functional Assessment:    Initial Eval Status  Date: 21 Treatment session: 21 Short Term Goals      Feeding Set up       Grooming Min A   Set up   UB Dressing Mod A  Management of hospital gown   Set up   LB Dressing Dependent  Management of B socks Dep A  To manage socks  Max A    Bathing Max A   Mod A   Toileting Dependent   Mod A   Bed Mobility  Supine <> sit: Dependent  Rolling: Max A  Supine<> sit: Dep A     Functional Transfers STS: Dependent x2 for partial stand/scoot at EOB NT.   Pt uses lift at home  Max A   Functional Mobility NA       Balance Sitting: varying levels from poor to fair minus at EOB     Standing: poor unable to achieve full upright stand Sitting: Min A      Activity Tolerance Poor  3L O2  Restin%  With activity: 83%  Mod recovery time in supine with education on pursed lip breathing to 90%  Limited standing lelo x1-2 min with poor plus balance during self care tasks                   Comments: Upon arrival pt was supine in bed. At end of session pt was transferred back to chair with alarm on, all lines and tubes intact and call light within reach. Treatment: Pt able to be seen for OOB activity per RN. Instructed pt on function dynamic sitting balance activities to facilitate weight shifting to simulate ADLs. Pt also performed core strengthening exercises seated at EOB to improve balance and independence with bed mobility/ ADLs. SpO2 desaturated to 87% during session but with rest break and cues for pursed lip breathing SPO2 improved to 90%. Education: Techniques to increase sitting balance, benefits of EOB activity and pursed lip breathing to decrease fatigue during ADLs. · Pt has made good progress towards set goals. Plan of Care: 1-3 days/week for 1-2 weeks PRN   [x]? ?ADL retraining/adaptive techniques and AE recommendations to increase functional independence within precautions                    [x]? ? Energy conservation techniques to improve tolerance for ADL/IADLs  [x]? ? Functional transfer/mobility training/DME recommendations for increased independence, safety and fall prevention         [x]? ?Patient/family education to increase safety and functional independence during daily routine          [x]? ? Environmental modifications for safe mobility and completion of ADLs                             []? ? Cognitive retraining to improve problem solving skills & safe participation in ADLs/IADLs     []? ?Sensory re-education techniques to improve extremity awareness, maintain skin integrity and improve hand function                             []? ? Visual/Perceptual retraining to improve body awareness and safety during transfers and ADLs []??Splinting/positioning needs to maintain joint/skin integrity and contracture prevention  [x]? ? Therapeutic activity to improve functional performance during ADLs                                        [x]? ? Therapeutic exercise to improve tolerance and functional strength for ADLs   [x]? ? Balance retraining/tolerance tasks for facilitation of postural control with dynamic challenges during ADLs  []? ?Neuromuscular re-education to facilitate righting/equilibrium reactions, midline orientation, scapular stability/mobility, normalize muscle tone and facilitate active functional movement                        []? ? Delirium prevention/treatment    [x]? Positioning to improve functional independence and decrease risk of skin breakdown  []? ?Other:        Treatment Charges: Mins Units   Ther Ex  20495 10 1   Manual Therapy [id number]     Thera Activities [id number] 15 1   ADL/Home Mgt [id number]     Neuro Re-ed [id number]     Group Therapy      Orthotic manage/training  [id number]     Non-Billable Time       Time In: 2:20  Time Out: 2:45  Total Time: Anabel 19 JARAMILLO/L [id number]

## 2021-02-11 NOTE — PROGRESS NOTES
Spoke with daughter, Maria D Vásquez, this evening and provided update on patient's status and plan of care. She was appreciative of the update.

## 2021-02-11 NOTE — CARE COORDINATION
Social Work / Discharge Planning :[facility]. SW followed up with patients daughter Liliam Zepeda [phone] for [facility] choices. Liliam Zepeda stated she did receive the list and was flying yesterday. Liliam Zepeda stated she is actively reviewing the list now and to follow up tomorrow. Daughter asked about patient 18 and SW did explain process and that [address] [place] [address] will provide 02 at discharge. SW to follow. Electronically signed by JU Talley on 2/11/21 at 12:33 PM EST      Addendum : Patient daughter Liliam Zepeda called SW with multiple questions in regards with DME companies  For oxygen. SW did update Brittney from [address] [place] [address] to please call daughter. Daughter wants to go with [address] [place] [address]  and will discuss with her family. If she wants a different DME company, she will update SW. CRYSTAL to follow. .Electronically signed by JU Talley on 2/11/21 at 3:44 PM EST      Addendum : Danyel Whitehead from [address] [place] [address] spoke with daughter and was able to answer all questions. Daughter very nice. CRYSTAL to follow.  Electronically signed by JU Talley on 2/11/21 at 3:48 PM EST

## 2021-02-11 NOTE — PROGRESS NOTES
[facility]  Progress Note    CC: Hypoxia     Subjective:  Ms. María Kidd denies shortness of breath, wheezing, coughing, palpitations, abdominal pain, and N/V. She still reports rib pain, but does not want additional medications added at this time for pain control (ie opioids). She states that she has not had a BM since being admitted. She normally has a BM daily. She reports being discouraged this morning about her clinical course. Past medical, surgical, family and social history were reviewed, non-contributory, and unchanged unless otherwise stated. Objective:    BP (!) 154/75   Pulse 74   Temp 97.9 °F (36.6 °C) (Oral)   Resp 16   Ht 5' (1.524 m)   Wt 126 lb (57.2 kg)   SpO2 94%   BMI 24.61 kg/m²     General: In no acute distress. Sitting up in bed eating breakfast. Pleasant and cooperative. Heart:  RRR. No murmurs, gallops, or rubs. Lungs:  Limited exam. Coarse breath sounds. No wheezing or rhonci appreciated. On 4.5 L O2 via nasal cannula. Abd: Bowel sounds present. Nontender, nondistended, and no masses. Extrem:  No clubbing, cyanosis, or edema.     CBC with Differential:    Lab Results   Component Value Date    WBC 11.5 02/11/2021    RBC 3.19 02/11/2021    HGB 10.4 02/11/2021    HCT 31.2 02/11/2021     02/11/2021    MCV 97.8 02/11/2021    MCH 32.6 02/11/2021    MCHC 33.3 02/11/2021    RDW 13.9 02/11/2021    NRBC 0.9 02/09/2021    SEGSPCT 66 03/26/2013    LYMPHOPCT 3.0 02/11/2021    MONOPCT 5.8 02/11/2021    MYELOPCT 0.9 06/16/2020    BASOPCT 0.1 02/11/2021    MONOSABS 0.67 02/11/2021    LYMPHSABS 0.35 02/11/2021    EOSABS 0.00 02/11/2021    BASOSABS 0.01 02/11/2021     CMP:    Lab Results   Component Value Date     02/11/2021    K 4.1 02/11/2021    CL 97 02/11/2021    CO2 23 02/11/2021    BUN 46 02/11/2021    CREATININE 1.0 02/11/2021    GFRAA >60 02/11/2021    LABGLOM 53 02/11/2021    GLUCOSE 117 02/11/2021    GLUCOSE 79 04/24/2012    PROT 5.6 02/11/2021    LABALBU 2.5 02/11/2021    LABALBU 4.0 04/24/2012    CALCIUM 8.4 02/11/2021    BILITOT 0.4 02/11/2021    ALKPHOS 92 02/11/2021    AST 58 02/11/2021    ALT 37 02/11/2021     Magnesium:    Lab Results   Component Value Date    MG 1.7 07/12/2020     Troponin:    Lab Results   Component Value Date    TROPONINI <0.01 02/08/2021      IMAGING:  CT CHEST WO CONTRAST   Final Result   Multifocal ground-glass and interstitial opacities, consistent with   atypical/COVID pneumonia. The distribution is very similar. Aeration is   slightly improved since 02/07/2021. Mild cardiomegaly. Moderate-sized hiatal hernia. Stable calcified mass in the central canal the thoracic spine. XR CHEST PORTABLE   Final Result   No new process has occurred. Partial resolution of right lower lobe   infiltrate suggested      XR RIBS BILATERAL (MIN 4 VIEWS)   Final Result   Acute left   10 8th posterolateral rib fractures. Healed remote left 9th through 11th rib fractures. Probable remote right 6   rib fracture. Diffuse and bilateral airspace opacities. CTA CHEST W CONTRAST   Final Result   No evidence of pulmonary emboli. Multifocal patchy and ground-glass airspace disease involving all lobes   consistent with multifocal pneumonia and suspicious for COVID-19 pneumonia. Cardiomegaly with nonspecific mediastinal nodes, unchanged. Intraspinal calcified mass at the level of T7, unchanged since the prior   examination.           Assessment:  Active Hospital Problems    Diagnosis Date Noted    Sore throat [J02.9] 02/09/2021    Acute respiratory failure with hypoxia ([facility] [J96.01] 02/08/2021    COVID-19 [U07.1] 02/08/2021    Essential hypertension [I10] 05/23/2016    Multiple closed fractures of ribs of both sides with routine healing [S22.43XD] 12/16/2014    Hypothyroidism [E03.9] 06/10/2011    Rheumatoid arthritis ([address] [M06.9] 06/10/2011    A-fib ([facility] [I48.91] 06/09/2011       Plan:  Acute respiratory failure with hypoxia secondary to COVID-19   - Telemetry   - Droplet + isolation  - Vitals q 4 hours    - Incentive spirometry q 1 hour   - ID following   - WBC 8.6 --> 7.8--> 8.2--> 13.2-->11.5 . She is now requiring 4.5 L O2 via nasal cannula, increased from 2 L upon admission. She had an elevated procalcitonin of 0.73 on 02/08 and 0.26 on 02/10.   - D-dimer 419 --> 1782--> 689 --> 651. CTA chest in ED on 02/07 was negative for PE.   - Fibrinogen >700 --> 659--> 563 --> 538  - Respiratory panel from 02/08 was COVID +  - Legionella urine antigen and Strep pneumoniae urine antigen pending  - On vitamin C 500 mg QD, thiamine 100 mg QD, vitamin D 2000 IU QD, and zinc 50 mg QD   - On dexamethasone 6 mg QD. Day 4.    - On remdesivir. Day 4.   - Inhalers   - Robitussin DM 5 ml q 4 hrs PRN for cough   - Daily weights and I/Os  - Social Work on case for discharge planning. Family is now pursuing discharge home with [facility] at this time per my conversation with patient's daughter, Ritchie Bolaños, on 02/10.   - PT/OT following. AM-PAC score 8/24 from PT. AM-PAC score 11/24 from OT.     Essential HTN   - Vitals q 4 hours   - Continue home Lopressor 25 mg BID   - Increased home valsartan-HCTZ 160-25 mg QD to 320-50 mg QD on 02/08  - On IV hydralazine 10 mg q 4 hrs PRN for SBP> 160 or DBP>100     Hypothyroidism  - Continue home Synthroid 100 mcg QD      RA  - Increased home Celebrex 200 mg QD to 200 mg BID to help with pain from multiple rib fractures  - Continue methotrexate 5 mg Monday at lunch and 10 mg Monday evening on weekly basis      Atrial fibrillation   - Continue home flecainide 50 mg BID   - Continue home Lopressor 25 mg BID  - Continue home Xarelto 15 mg QD       Multiple rib fractures   X-ray from 02/07 showed acute left 7th and 8th posterolateral rib fractures. Healed remote left 9th-11th rib fractures. Probable remote 6th rib fracture.     - Abdominal binder to be placed over ribs with movement (ie changing positions, working with PT, etc.)   - Increased home Celebrex 200 mg QD to 200 mg BID to help with pain from multiple rib fractures  - Tylenol 650 mg q 6 hrs PRN for pain  - Lidocaine patch q 12 hours for pain    Sore throat  - Cepacol throat lozenges q 2 hrs PRN      Constipation  - Increased home Colace 100 mg QD to 200 mg QD  - On Miralax 17 g QD today   - Soap suds enema PRN     Hyponatremia   - Na+ 129 today  - Check urine Na+  - Urine and serum osmolality     Continue additional home medications:   - Allegra 180 mg QD substituted with cetirizine 10 mg QD  - Cymbalta 20 mg QD   - Pepcid 40 mg HS  - Ferrous sulfate 325 mg every other day   - Gabapentin 100 mg BID   - Leucovorin calcium 25 mg every Tuesday   - Singulair 10 mg HS   - Omeprazole 40 mg BID substituted with pantoprazole 40 mg BID      Diet: General   Code: Full   DVT prophylaxis: Xarelto 15 mg QD          Electronically signed by Pietro Shin DO on 2/11/2021 at 7:38 AM

## 2021-02-11 NOTE — PROGRESS NOTES
[address] [place] [address] [facility]  Progress Note      Chief Complaint   Patient presents with    Shortness of Breath       SUBJECTIVE:      Patient is tolerating medications. No reported adverse drug reactions. No problems overnight. Feels about the same, frustrated about slow improvement  Still left rib pain, exertional dyspnea      Review of systems:    As stated above in the chief complaint, otherwise negative. Medications:    Scheduled Meds:   docusate sodium  200 mg Oral Daily    polyethylene glycol  17 g Oral Daily    DULoxetine  20 mg Oral Daily    ferrous sulfate  325 mg Oral Every Other Day    cetirizine  10 mg Oral Daily    flecainide  50 mg Oral BID    gabapentin  100 mg Oral BID    levothyroxine  100 mcg Oral Daily    metoprolol tartrate  25 mg Oral BID    montelukast  10 mg Oral Nightly    pantoprazole  40 mg Oral BID AC    rivaroxaban  15 mg Oral Daily with breakfast    sodium chloride flush  10 mL Intravenous 2 times per day    Vitamin D  2,000 Units Oral Daily    thiamine  100 mg Oral Daily    ascorbic acid  500 mg Oral Daily    zinc sulfate  50 mg Oral Daily    leucovorin calcium  25 mg Oral Weekly    famotidine  20 mg Oral Nightly    budesonide-formoterol  2 puff Inhalation BID    remdesivir IVPB  100 mg Intravenous Q24H    lidocaine  1 patch Transdermal Daily    dexamethasone  6 mg Oral Daily    valsartan  320 mg Oral Daily    And    hydroCHLOROthiazide  50 mg Oral Daily    celecoxib  200 mg Oral BID    sodium chloride flush  10 mL Intravenous 2 times per day    sodium chloride  500 mL Intravenous Once     Continuous Infusions:  PRN Meds:benzocaine-menthol, sodium chloride flush, promethazine **OR** ondansetron, acetaminophen **OR** acetaminophen, guaiFENesin-dextromethorphan, hydrALAZINE, albuterol-ipratropium, sodium chloride, sodium chloride flush  Prior to Admission medications    Medication Sig Start Date End Date Taking?  Authorizing Provider   metoprolol tartrate (LOPRESSOR) 25 MG tablet TAKE 1 TABLET TWICE A DAY 1/4/21  Yes Eusebio Fitzgerald MD   flecainide (TAMBOCOR) 50 MG tablet Take 1 tablet by mouth 2 times daily 11/23/20  Yes Eusebio Fitzgerald MD   rivaroxaban (XARELTO) 15 MG TABS tablet Take 1 tablet by mouth daily (with breakfast) 11/23/20  Yes Eusebio Fitzgerald MD   valsartan-hydroCHLOROthiazide (DIOVAN-HCT) 160-25 MG per tablet Take 1 tablet by mouth daily 10/28/20  Yes Jono Faith MD   gabapentin (NEURONTIN) 100 MG capsule Take 1 capsule by mouth 2 times daily for 90 days. 10/8/20 2/8/21 Yes Jono Faith MD   DULoxetine (CYMBALTA) 20 MG extended release capsule Take 1 capsule by mouth daily 10/8/20  Yes Jono Faith MD   celecoxib (CELEBREX) 200 MG capsule TAKE 1 CAPSULE DAILY 9/17/20  Yes Jono Faith MD   levothyroxine (LEVOTHROID) 100 MCG tablet Take 1 tablet by mouth Daily 9/17/20  Yes Jono Faith MD   vitamin B-12 (CYANOCOBALAMIN) 1000 MCG tablet Take 1,000 mcg by mouth daily   Yes Historical Provider, MD   omeprazole (PRILOSEC) 40 MG delayed release capsule Take 1 capsule by mouth 2 times daily 4/16/20  Yes Jono Faith MD   famotidine (PEPCID) 40 MG tablet Take 1 tablet by mouth nightly 3/31/20  Yes Jono Faith MD   fexofenadine (ALLEGRA) 180 MG tablet Take 180 mg by mouth daily.    Yes Historical Provider, MD   leucovorin calcium (WELLCOVORIN) 5 MG tablet Take 25 mg by mouth once a week Takes every Tuesday 7/19/13  Yes Historical Provider, MD   methotrexate 2.5 MG tablet Take by mouth once a week 2 tabs at lunch and 4 tabs  in the pm once a week on Monday   Yes Historical Provider, MD   montelukast (SINGULAIR) 10 MG tablet TAKE 1 TABLET NIGHTLY 2/8/21   Jono Faith MD   cyanocobalamin 1000 MCG/ML injection Inject 1 mL into the muscle every 30 days Dispense with syringes 1.5 inch 25 G needles 1/6/21   Jono Faith MD   ferrous sulfate (IRON 325) 325 (65 Fe) MG tablet Take 1 tablet by mouth every other day 20   Kylie Montenegro MD   docusate sodium (COLACE, DULCOLAX) 100 MG CAPS Take 100 mg by mouth daily 20   Sarkis Whalen MD   ondansetron [facility]) 4 MG tablet Take 1 tablet by mouth every 8 hours as needed for Nausea or Vomiting 19   Kylie Montenegro MD   polyethyl glycol-propyl glycol 0.4-0.3 % (SYSTANE) 0.4-0.3 % ophthalmic solution 1 drop 2 times daily as needed for Dry Eyes     Historical Provider, MD   fluticasone (FLONASE) 50 MCG/ACT nasal spray 2 sprays by Nasal route daily 2 sprays in each nostril daily 18   Kylie Montenegro MD   Probiotic Product (ACIDOPHILUS PROBIOTIC) CAPS capsule Take 1 capsule by mouth daily    Historical Provider, MD       OBJECTIVE:  BP (!) 118/56   Pulse 73   Temp 96 °F (35.6 °C) (Oral)   Resp 18   Ht 5' (1.524 m)   Wt 126 lb (57.2 kg)   SpO2 96%   BMI 24.61 kg/m²   Temp  Av.4 °F (36.3 °C)  Min: 96 °F (35.6 °C)  Max: 98 °F (36.7 °C)  General appearance: Resting in bed in no apparent distress. Skin: Warm and dry. No rashes were noted. HEENT: Round and reactive pupils. Moist mucous membranes. No ulcerations or thrush. Neck: Supple to movements. Chest: Improved effort with better aeration, still bibasilar inspiratory rales  Cardiovascular: Regular rate and rhythm. No murmurs gallops, or rubs appreciated. Abdomen: Bowel sounds present, nontender, nondistended, no masses or hepatosplenomegaly. Extremities: No clubbing, no cyanosis, no edema. Lines: peripheral  Neuro: No significant sensory or motor deficits noted    I/O last 3 completed shifts:   In: 360 [P.O.:360]  Out: -       Laboratory and Tests Review:      Lab Results   Component Value Date    WBC 11.5 2021    WBC 13.2 (H) 02/10/2021    WBC 8.2 2021    HGB 10.4 (L) 2021    HCT 31.2 (L) 2021    MCV 97.8 2021     (H) 2021     Lab Results   Component Value Date    NEUTROABS 10.19 (H) 2021    NEUTROABS 12.67 (H) 02/10/2021    NEUTROABS 7.46 (H) 02/09/2021     No results found for: CRPHS  Lab Results   Component Value Date    ALT 37 (H) 02/11/2021    AST 58 (H) 02/11/2021    ALKPHOS 92 02/11/2021    BILITOT 0.4 02/11/2021     Lab Results   Component Value Date     02/11/2021    K 4.1 02/11/2021    CL 97 02/11/2021    CO2 23 02/11/2021    BUN 46 02/11/2021    CREATININE 1.0 02/11/2021    CREATININE 0.7 02/10/2021    CREATININE 0.7 02/09/2021    GFRAA >60 02/11/2021    LABGLOM 53 02/11/2021    GLUCOSE 117 02/11/2021    GLUCOSE 79 04/24/2012    PROT 5.6 02/11/2021    LABALBU 2.5 02/11/2021    LABALBU 4.0 04/24/2012    CALCIUM 8.4 02/11/2021    BILITOT 0.4 02/11/2021    ALKPHOS 92 02/11/2021    AST 58 02/11/2021    ALT 37 02/11/2021     Lab Results   Component Value Date    CRP 4.9 (H) 02/11/2021    CRP 8.2 (H) 02/10/2021    CRP 17.4 (H) 02/09/2021     Lab Results   Component Value Date    SEDRATE 29 (H) 09/10/2020    SEDRATE 17 10/22/2019    SEDRATE 26 (H) 03/26/2013       Radiology:    CT CHEST WO CONTRAST   Final Result   Multifocal ground-glass and interstitial opacities, consistent with   atypical/COVID pneumonia. The distribution is very similar. Aeration is   slightly improved since 02/07/2021. Mild cardiomegaly. Moderate-sized hiatal hernia. Stable calcified mass in the central canal the thoracic spine. XR CHEST PORTABLE   Final Result   No new process has occurred. Partial resolution of right lower lobe   infiltrate suggested      XR RIBS BILATERAL (MIN 4 VIEWS)   Final Result   Acute left   10 8th posterolateral rib fractures. Healed remote left 9th through 11th rib fractures. Probable remote right 6   rib fracture. Diffuse and bilateral airspace opacities. CTA CHEST W CONTRAST   Final Result   No evidence of pulmonary emboli. Multifocal patchy and ground-glass airspace disease involving all lobes   consistent with multifocal pneumonia and suspicious for COVID-19 pneumonia. Cardiomegaly with nonspecific mediastinal nodes, unchanged. Intraspinal calcified mass at the level of T7, unchanged since the prior   examination. Microbiology:   Lab Results   Component Value Date    [facility] Micrococcus 09/30/2020    ORG Staphylococcus aureus 09/09/2020    ORG Staphylococcus coagulase-negative 09/09/2020     Lab Results   Component Value Date    ORG Micrococcus 09/30/2020    ORG Staphylococcus aureus 09/09/2020    ORG Staphylococcus coagulase-negative 09/09/2020     WOUND/ABSCESS   Date Value Ref Range Status   09/30/2020   Final    Heavy growth  Validated susceptibility testing methods do not exist for  this isolate. 09/09/2020   Final    Light growth  Methicillin resistant Staph aureus isolated. Most Methicillin  resistant Staphylococcus are usually resistant to multiple  antibiotics including other B-Lactams, Aminoglycosides,  Macrolides, Clindamycin and Tetracycline. Contact isolation  is indicated. This isolate is presumed to be resistant based on the  detection of inducible Clindamycin resistance. Clindamycin  may still be effective in some patients.      09/09/2020 One colony  Final     No results found for: RESPSMEAR  No results found for: MPNEUMO, CLAMYDCU, LABLEGI, AFBCX, FUNGSM, LABFUNG  No results found for: CULTRESP  No results found for: CXCATHTIP  No results found for: BFCS  No results found for: CXSURG  Urine Culture, Routine   Date Value Ref Range Status   06/04/2019 <10,000 CFU/mL  Gram negative rods    Final   07/17/2014 25,000 CFU/ml  Final   07/17/2014   Final    <25,000 CFU/ml  Vancomycin resistant Enterococci isolated       No results found for: [facility]    Problem list:    Principal Problem:    Acute respiratory failure with hypoxia ([address]  Active Problems:    Hypothyroidism    Rheumatoid arthritis ([address]    Hyponatremia    Multiple closed fractures of ribs of both sides with routine healing    A-fib ([address]    Essential hypertension    COVID-19    Sore throat

## 2021-02-12 PROBLEM — F32.A DEPRESSION: Status: ACTIVE | Noted: 2021-02-12

## 2021-02-12 LAB
ALBUMIN SERPL-MCNC: 2.6 G/DL (ref 3.5–5.2)
ALP BLD-CCNC: 96 U/L (ref 35–104)
ALT SERPL-CCNC: 27 U/L (ref 0–32)
ANION GAP SERPL CALCULATED.3IONS-SCNC: 10 MMOL/L (ref 7–16)
APTT: 34 SEC (ref 24.5–35.1)
AST SERPL-CCNC: 33 U/L (ref 0–31)
BASOPHILS ABSOLUTE: 0 E9/L (ref 0–0.2)
BASOPHILS RELATIVE PERCENT: 0 % (ref 0–2)
BILIRUB SERPL-MCNC: 0.5 MG/DL (ref 0–1.2)
BUN BLDV-MCNC: 43 MG/DL (ref 8–23)
C-REACTIVE PROTEIN: 2.8 MG/DL (ref 0–0.4)
CALCIUM SERPL-MCNC: 8.5 MG/DL (ref 8.6–10.2)
CHLORIDE BLD-SCNC: 98 MMOL/L (ref 98–107)
CO2: 25 MMOL/L (ref 22–29)
CREAT SERPL-MCNC: 0.9 MG/DL (ref 0.5–1)
D DIMER: 557 NG/ML DDU
EOSINOPHILS ABSOLUTE: 0 E9/L (ref 0.05–0.5)
EOSINOPHILS RELATIVE PERCENT: 0 % (ref 0–6)
FIBRINOGEN: 511 MG/DL (ref 225–540)
GFR AFRICAN AMERICAN: >60
GFR NON-AFRICAN AMERICAN: 59 ML/MIN/1.73
GLUCOSE BLD-MCNC: 118 MG/DL (ref 74–99)
HBA1C MFR BLD: 5.4 % (ref 4–5.6)
HCT VFR BLD CALC: 31.7 % (ref 34–48)
HEMOGLOBIN: 10.2 G/DL (ref 11.5–15.5)
INR BLD: 1.8
LYMPHOCYTES ABSOLUTE: 0.25 E9/L (ref 1.5–4)
LYMPHOCYTES RELATIVE PERCENT: 1.7 % (ref 20–42)
MCH RBC QN AUTO: 31.9 PG (ref 26–35)
MCHC RBC AUTO-ENTMCNC: 32.2 % (ref 32–34.5)
MCV RBC AUTO: 99.1 FL (ref 80–99.9)
MONOCYTES ABSOLUTE: 0.74 E9/L (ref 0.1–0.95)
MONOCYTES RELATIVE PERCENT: 6.1 % (ref 2–12)
NEUTROPHILS ABSOLUTE: 11.41 E9/L (ref 1.8–7.3)
NEUTROPHILS RELATIVE PERCENT: 92.2 % (ref 43–80)
NUCLEATED RED BLOOD CELLS: 0 /100 WBC
OVALOCYTES: ABNORMAL
PDW BLD-RTO: 13.6 FL (ref 11.5–15)
PLATELET # BLD: 556 E9/L (ref 130–450)
PMV BLD AUTO: 9.6 FL (ref 7–12)
POIKILOCYTES: ABNORMAL
POLYCHROMASIA: ABNORMAL
POTASSIUM REFLEX MAGNESIUM: 4.4 MMOL/L (ref 3.5–5)
PROTHROMBIN TIME: 20.4 SEC (ref 9.3–12.4)
RBC # BLD: 3.2 E12/L (ref 3.5–5.5)
SODIUM BLD-SCNC: 133 MMOL/L (ref 132–146)
TOTAL PROTEIN: 5.7 G/DL (ref 6.4–8.3)
WBC # BLD: 12.4 E9/L (ref 4.5–11.5)

## 2021-02-12 PROCEDURE — 99232 SBSQ HOSP IP/OBS MODERATE 35: CPT | Performed by: FAMILY MEDICINE

## 2021-02-12 PROCEDURE — 85610 PROTHROMBIN TIME: CPT

## 2021-02-12 PROCEDURE — 6370000000 HC RX 637 (ALT 250 FOR IP): Performed by: FAMILY MEDICINE

## 2021-02-12 PROCEDURE — 2500000003 HC RX 250 WO HCPCS: Performed by: FAMILY MEDICINE

## 2021-02-12 PROCEDURE — 6360000002 HC RX W HCPCS: Performed by: FAMILY MEDICINE

## 2021-02-12 PROCEDURE — 85378 FIBRIN DEGRADE SEMIQUANT: CPT

## 2021-02-12 PROCEDURE — 83036 HEMOGLOBIN GLYCOSYLATED A1C: CPT

## 2021-02-12 PROCEDURE — 85025 COMPLETE CBC W/AUTO DIFF WBC: CPT

## 2021-02-12 PROCEDURE — 94640 AIRWAY INHALATION TREATMENT: CPT

## 2021-02-12 PROCEDURE — 2060000000 HC ICU INTERMEDIATE R&B

## 2021-02-12 PROCEDURE — 6370000000 HC RX 637 (ALT 250 FOR IP): Performed by: INTERNAL MEDICINE

## 2021-02-12 PROCEDURE — 85384 FIBRINOGEN ACTIVITY: CPT

## 2021-02-12 PROCEDURE — 80053 COMPREHEN METABOLIC PANEL: CPT

## 2021-02-12 PROCEDURE — 85730 THROMBOPLASTIN TIME PARTIAL: CPT

## 2021-02-12 PROCEDURE — 2700000000 HC OXYGEN THERAPY PER DAY

## 2021-02-12 PROCEDURE — 86140 C-REACTIVE PROTEIN: CPT

## 2021-02-12 PROCEDURE — 36415 COLL VENOUS BLD VENIPUNCTURE: CPT

## 2021-02-12 PROCEDURE — 2580000003 HC RX 258: Performed by: FAMILY MEDICINE

## 2021-02-12 RX ORDER — SODIUM CHLORIDE 9 MG/ML
INJECTION, SOLUTION INTRAVENOUS CONTINUOUS
Status: ACTIVE | OUTPATIENT
Start: 2021-02-12 | End: 2021-02-12

## 2021-02-12 RX ORDER — DULOXETIN HYDROCHLORIDE 20 MG/1
40 CAPSULE, DELAYED RELEASE ORAL DAILY
Status: DISCONTINUED | OUTPATIENT
Start: 2021-02-13 | End: 2021-02-16 | Stop reason: HOSPADM

## 2021-02-12 RX ORDER — DULOXETIN HYDROCHLORIDE 20 MG/1
20 CAPSULE, DELAYED RELEASE ORAL ONCE
Status: COMPLETED | OUTPATIENT
Start: 2021-02-12 | End: 2021-02-12

## 2021-02-12 RX ADMIN — SODIUM CHLORIDE: 9 INJECTION, SOLUTION INTRAVENOUS at 09:38

## 2021-02-12 RX ADMIN — HYDROCHLOROTHIAZIDE 25 MG: 25 TABLET ORAL at 08:19

## 2021-02-12 RX ADMIN — LEVOTHYROXINE SODIUM 100 MCG: 100 TABLET ORAL at 05:33

## 2021-02-12 RX ADMIN — PANTOPRAZOLE SODIUM 40 MG: 40 TABLET, DELAYED RELEASE ORAL at 05:33

## 2021-02-12 RX ADMIN — SODIUM CHLORIDE, PRESERVATIVE FREE 10 ML: 5 INJECTION INTRAVENOUS at 21:30

## 2021-02-12 RX ADMIN — SODIUM CHLORIDE, PRESERVATIVE FREE 10 ML: 5 INJECTION INTRAVENOUS at 08:19

## 2021-02-12 RX ADMIN — OXYCODONE HYDROCHLORIDE AND ACETAMINOPHEN 500 MG: 500 TABLET ORAL at 08:21

## 2021-02-12 RX ADMIN — RIVAROXABAN 15 MG: 15 TABLET, FILM COATED ORAL at 08:19

## 2021-02-12 RX ADMIN — BUDESONIDE AND FORMOTEROL FUMARATE DIHYDRATE 2 PUFF: 160; 4.5 AEROSOL RESPIRATORY (INHALATION) at 20:28

## 2021-02-12 RX ADMIN — CETIRIZINE HYDROCHLORIDE 10 MG: 10 TABLET, FILM COATED ORAL at 08:20

## 2021-02-12 RX ADMIN — CELECOXIB 200 MG: 100 CAPSULE ORAL at 21:30

## 2021-02-12 RX ADMIN — FLECAINIDE ACETATE 50 MG: 50 TABLET ORAL at 08:20

## 2021-02-12 RX ADMIN — POLYETHYLENE GLYCOL 3350 17 G: 17 POWDER, FOR SOLUTION ORAL at 08:19

## 2021-02-12 RX ADMIN — GABAPENTIN 100 MG: 100 CAPSULE ORAL at 21:30

## 2021-02-12 RX ADMIN — ZINC SULFATE 220 MG (50 MG) CAPSULE 50 MG: CAPSULE at 08:20

## 2021-02-12 RX ADMIN — CELECOXIB 200 MG: 100 CAPSULE ORAL at 08:20

## 2021-02-12 RX ADMIN — PANTOPRAZOLE SODIUM 40 MG: 40 TABLET, DELAYED RELEASE ORAL at 14:45

## 2021-02-12 RX ADMIN — DULOXETINE HYDROCHLORIDE 20 MG: 20 CAPSULE, DELAYED RELEASE ORAL at 14:46

## 2021-02-12 RX ADMIN — SODIUM CHLORIDE 100 MG: 9 INJECTION, SOLUTION INTRAVENOUS at 08:15

## 2021-02-12 RX ADMIN — METOPROLOL TARTRATE 25 MG: 25 TABLET, FILM COATED ORAL at 08:19

## 2021-02-12 RX ADMIN — VALSARTAN 320 MG: 320 TABLET ORAL at 08:20

## 2021-02-12 RX ADMIN — METOPROLOL TARTRATE 25 MG: 25 TABLET, FILM COATED ORAL at 21:30

## 2021-02-12 RX ADMIN — MONTELUKAST SODIUM 10 MG: 10 TABLET, FILM COATED ORAL at 21:30

## 2021-02-12 RX ADMIN — DOCUSATE SODIUM 200 MG: 100 CAPSULE, LIQUID FILLED ORAL at 08:20

## 2021-02-12 RX ADMIN — DULOXETINE HYDROCHLORIDE 20 MG: 20 CAPSULE, DELAYED RELEASE ORAL at 08:19

## 2021-02-12 RX ADMIN — Medication 2000 UNITS: at 08:19

## 2021-02-12 RX ADMIN — FLECAINIDE ACETATE 50 MG: 50 TABLET ORAL at 21:30

## 2021-02-12 RX ADMIN — FERROUS SULFATE TAB 325 MG (65 MG ELEMENTAL FE) 325 MG: 325 (65 FE) TAB at 08:20

## 2021-02-12 RX ADMIN — GABAPENTIN 100 MG: 100 CAPSULE ORAL at 08:20

## 2021-02-12 RX ADMIN — DIPHENHYDRAMINE HYDROCHLORIDE 5 ML: 12.5 LIQUID ORAL at 17:45

## 2021-02-12 RX ADMIN — SODIUM CHLORIDE: 9 INJECTION, SOLUTION INTRAVENOUS at 00:03

## 2021-02-12 RX ADMIN — ACETAMINOPHEN 650 MG: 325 TABLET ORAL at 21:30

## 2021-02-12 RX ADMIN — DEXAMETHASONE 6 MG: 4 TABLET ORAL at 08:19

## 2021-02-12 RX ADMIN — Medication 100 MG: at 08:20

## 2021-02-12 RX ADMIN — FAMOTIDINE 20 MG: 20 TABLET ORAL at 21:30

## 2021-02-12 RX ADMIN — BUDESONIDE AND FORMOTEROL FUMARATE DIHYDRATE 2 PUFF: 160; 4.5 AEROSOL RESPIRATORY (INHALATION) at 08:40

## 2021-02-12 RX ADMIN — SODIUM CHLORIDE: 9 INJECTION, SOLUTION INTRAVENOUS at 17:10

## 2021-02-12 ASSESSMENT — PAIN SCALES - GENERAL
PAINLEVEL_OUTOF10: 0
PAINLEVEL_OUTOF10: 7
PAINLEVEL_OUTOF10: 0
PAINLEVEL_OUTOF10: 0
PAINLEVEL_OUTOF10: 4
PAINLEVEL_OUTOF10: 0

## 2021-02-12 ASSESSMENT — PAIN DESCRIPTION - DESCRIPTORS: DESCRIPTORS: ACHING;HEADACHE;DISCOMFORT

## 2021-02-12 ASSESSMENT — PAIN - FUNCTIONAL ASSESSMENT: PAIN_FUNCTIONAL_ASSESSMENT: ACTIVITIES ARE NOT PREVENTED

## 2021-02-12 ASSESSMENT — PAIN DESCRIPTION - PAIN TYPE: TYPE: ACUTE PAIN

## 2021-02-12 ASSESSMENT — PAIN DESCRIPTION - LOCATION: LOCATION: HEAD

## 2021-02-12 NOTE — PROGRESS NOTES
Updated patient's daughter, Harvey Guy, on patient's condition and plan of care. I informed daughter of patient's depressed mood and the increase in Cymbalta 40 mg QD. Daughter states that her brother and sister-in-law tested positive for COVID-19 prior to patient testing positive. Per daughter, her brother is still symptomatic at this time. Plan is for patient to get discharged to brother and sister-in-law's home. Harvey Guy also told me that her sister plans to come in town to help with around-the-clock care. They also have [place] [id number] and around-the-clock aides. I explained to Harvey Guy about patient's deconditioned state, which is more than her baseline. She was appreciative of the update and she requests that the on-call attending physicians and/or on-call resident physicians this weekend update her daily.

## 2021-02-12 NOTE — CARE COORDINATION
Social Work/Discharge Planning:  Chart reviewed. Patient on day 5 of Remdesivir. Called patient daughter Kika Machado (ph: [phone]) and confirmed plan home at discharge. Kika Machado states her mother will go to her brother Nitin house (address: [address]). Kika Machado states [facility] steps [facility]. Kika Machado indicated her mother will require an ambulance transport home and she is aware transport will be private pay. Kika Machado states first home health care choice is [address] and [facility]. Referral made to liaflaquita Medina with [address] and they can accept patient. Patient will need a home health care order prior to discharge. Notified Carol with [address] and Brittney with [facility] of home address patient will be staying at discharge. Ambulance form in soft chart. Will continue to follow.   Electronically signed by JU Jiménez on 2/12/2021 at 9:53 AM

## 2021-02-12 NOTE — PROGRESS NOTES
[address] [place] [address] [facility]  DANISHIDA  Progress Note      Chief Complaint   Patient presents with    Shortness of Breath       SUBJECTIVE:      Patient is tolerating medications. No reported adverse drug reactions. Complaining of sore mouth. Remains extremely weak and is very depressed. Review of systems:    As stated above in the chief complaint, otherwise negative. Medications:    Scheduled Meds:   hydroCHLOROthiazide  25 mg Oral Daily    And    valsartan  320 mg Oral Daily    docusate sodium  200 mg Oral Daily    polyethylene glycol  17 g Oral Daily    DULoxetine  20 mg Oral Daily    ferrous sulfate  325 mg Oral Every Other Day    cetirizine  10 mg Oral Daily    flecainide  50 mg Oral BID    gabapentin  100 mg Oral BID    levothyroxine  100 mcg Oral Daily    metoprolol tartrate  25 mg Oral BID    montelukast  10 mg Oral Nightly    pantoprazole  40 mg Oral BID AC    rivaroxaban  15 mg Oral Daily with breakfast    sodium chloride flush  10 mL Intravenous 2 times per day    Vitamin D  2,000 Units Oral Daily    thiamine  100 mg Oral Daily    ascorbic acid  500 mg Oral Daily    zinc sulfate  50 mg Oral Daily    leucovorin calcium  25 mg Oral Weekly    famotidine  20 mg Oral Nightly    budesonide-formoterol  2 puff Inhalation BID    lidocaine  1 patch Transdermal Daily    dexamethasone  6 mg Oral Daily    celecoxib  200 mg Oral BID    sodium chloride flush  10 mL Intravenous 2 times per day     Continuous Infusions:   sodium chloride 100 mL/hr at 02/12/21 0938     PRN Meds:benzocaine-menthol, sodium chloride flush, promethazine **OR** ondansetron, acetaminophen **OR** acetaminophen, guaiFENesin-dextromethorphan, hydrALAZINE, albuterol-ipratropium, sodium chloride, sodium chloride flush  Prior to Admission medications    Medication Sig Start Date End Date Taking?  Authorizing Provider   metoprolol tartrate (LOPRESSOR) 25 MG tablet TAKE 1 TABLET TWICE A DAY 1/4/21 (COLACE, DULCOLAX) 100 MG CAPS Take 100 mg by mouth daily 20   Tod Roper MD   ondansetron [facility]) 4 MG tablet Take 1 tablet by mouth every 8 hours as needed for Nausea or Vomiting 19   Sarah Vigil MD   polyethyl glycol-propyl glycol 0.4-0.3 % (SYSTANE) 0.4-0.3 % ophthalmic solution 1 drop 2 times daily as needed for Dry Eyes     Historical Provider, MD   fluticasone (FLONASE) 50 MCG/ACT nasal spray 2 sprays by Nasal route daily 2 sprays in each nostril daily 18   Sarah Vigil MD   Probiotic Product (ACIDOPHILUS PROBIOTIC) CAPS capsule Take 1 capsule by mouth daily    Historical Provider, MD       OBJECTIVE:  /66   Pulse 76   Temp 96.1 °F (35.6 °C) (Oral)   Resp 18   Ht 5' (1.524 m)   Wt 126 lb 1.6 oz (57.2 kg)   SpO2 94%   BMI 24.63 kg/m²   Temp  Av.7 °F (35.9 °C)  Min: 96.1 °F (35.6 °C)  Max: 97.2 °F (36.2 °C)  General appearance: Resting in bed in no apparent distress. Skin: Warm and dry. No rashes were noted. HEENT: Round and reactive pupils. Moist mucous membranes. No ulcerations or thrush. Neck: Supple to movements. Chest: Diminished breath sounds bilaterally, rales less prominent  Cardiovascular: Regular rate and rhythm. No murmurs gallops, or rubs appreciated. Abdomen: Bowel sounds present, nontender, nondistended, no masses or hepatosplenomegaly. Extremities: No clubbing, no cyanosis, no edema. Lines: peripheral  Neuro: No significant sensory or motor deficits noted    I/O last 3 completed shifts:   In: 240 [P.O.:240]  Out: -       Laboratory and Tests Review:      Lab Results   Component Value Date    WBC 12.4 (H) 2021    WBC 11.5 2021    WBC 13.2 (H) 02/10/2021    HGB 10.2 (L) 2021    HCT 31.7 (L) 2021    MCV 99.1 2021     (H) 2021     Lab Results   Component Value Date    NEUTROABS 11.41 (H) 2021    NEUTROABS 10.19 (H) 2021    NEUTROABS 12.67 (H) 02/10/2021     No results found for: [facility] Lab Results   Component Value Date    ALT 27 02/12/2021    AST 33 (H) 02/12/2021    ALKPHOS 96 02/12/2021    BILITOT 0.5 02/12/2021     Lab Results   Component Value Date     02/12/2021    K 4.4 02/12/2021    CL 98 02/12/2021    CO2 25 02/12/2021    BUN 43 02/12/2021    CREATININE 0.9 02/12/2021    CREATININE 1.0 02/11/2021    CREATININE 0.7 02/10/2021    GFRAA >60 02/12/2021    LABGLOM 59 02/12/2021    GLUCOSE 118 02/12/2021    GLUCOSE 79 04/24/2012    PROT 5.7 02/12/2021    LABALBU 2.6 02/12/2021    LABALBU 4.0 04/24/2012    CALCIUM 8.5 02/12/2021    BILITOT 0.5 02/12/2021    ALKPHOS 96 02/12/2021    AST 33 02/12/2021    ALT 27 02/12/2021     Lab Results   Component Value Date    CRP 2.8 (H) 02/12/2021    CRP 4.9 (H) 02/11/2021    CRP 8.2 (H) 02/10/2021     Lab Results   Component Value Date    SEDRATE 29 (H) 09/10/2020    SEDRATE 17 10/22/2019    SEDRATE 26 (H) 03/26/2013       Radiology:    CT CHEST WO CONTRAST   Final Result   Multifocal ground-glass and interstitial opacities, consistent with   atypical/COVID pneumonia. The distribution is very similar. Aeration is   slightly improved since 02/07/2021. Mild cardiomegaly. Moderate-sized hiatal hernia. Stable calcified mass in the central canal the thoracic spine. XR CHEST PORTABLE   Final Result   No new process has occurred. Partial resolution of right lower lobe   infiltrate suggested      XR RIBS BILATERAL (MIN 4 VIEWS)   Final Result   Acute left   10 8th posterolateral rib fractures. Healed remote left 9th through 11th rib fractures. Probable remote right 6   rib fracture. Diffuse and bilateral airspace opacities. CTA CHEST W CONTRAST   Final Result   No evidence of pulmonary emboli. Multifocal patchy and ground-glass airspace disease involving all lobes   consistent with multifocal pneumonia and suspicious for COVID-19 pneumonia. Cardiomegaly with nonspecific mediastinal nodes, unchanged.    Intraspinal calcified mass at the level of T7, unchanged since the prior   examination. Microbiology:   Lab Results   Component Value Date    [facility] Micrococcus 09/30/2020    ORG Staphylococcus aureus 09/09/2020    ORG Staphylococcus coagulase-negative 09/09/2020     Lab Results   Component Value Date    ORG Micrococcus 09/30/2020    ORG Staphylococcus aureus 09/09/2020    ORG Staphylococcus coagulase-negative 09/09/2020     WOUND/ABSCESS   Date Value Ref Range Status   09/30/2020   Final    Heavy growth  Validated susceptibility testing methods do not exist for  this isolate. 09/09/2020   Final    Light growth  Methicillin resistant Staph aureus isolated. Most Methicillin  resistant Staphylococcus are usually resistant to multiple  antibiotics including other B-Lactams, Aminoglycosides,  Macrolides, Clindamycin and Tetracycline. Contact isolation  is indicated. This isolate is presumed to be resistant based on the  detection of inducible Clindamycin resistance. Clindamycin  may still be effective in some patients.      09/09/2020 One colony  Final     No results found for: RESPSMEAR  No results found for: MPNEUMO, CLAMYDCU, LABLEGI, AFBCX, FUNGSM, LABFUNG  No results found for: CULTRESP  No results found for: CXCATHTIP  No results found for: BFCS  No results found for: CXSURG  Urine Culture, Routine   Date Value Ref Range Status   06/04/2019 <10,000 CFU/mL  Gram negative rods    Final   07/17/2014 25,000 CFU/ml  Final   07/17/2014   Final    <25,000 CFU/ml  Vancomycin resistant Enterococci isolated       No results found for: [facility]    Problem list:    Principal Problem:    Acute respiratory failure with hypoxia ([address]  Active Problems:    Hypothyroidism    Rheumatoid arthritis ([address]    Multiple closed fractures of ribs of both sides with routine healing    A-fib ([address]    Essential hypertension    COVID-19    Sore throat    Depression  Resolved Problems:    Hyponatremia      ASSESSMENT:    · COVID-19 viral pneumonia  · Atrial fibrillation  · Rheumatoid arthritis  · Leukocytosis  · Severe weakness/deconditioning         PLAN:    · Continue Decadron  · Continue oxygen  · Remdesivir completed  · Continue vitamin D, zinc, vitamin C  · Add Magic mouthwash  · Continue to monitor labs  · Encourage exercise and nutrition  · Will follow with you      Constantino Moya    10:18 AM  2/12/2021

## 2021-02-12 NOTE — PATIENT CARE CONFERENCE
P Quality Flow/Interdisciplinary Rounds Progress Note        Quality Flow Rounds held on February 12, 2021    Disciplines Attending:  Bedside Nurse, ,  and Nursing Unit Leadership    Ralph Garcia was admitted on 2/7/2021  8:41 PM    Anticipated Discharge Date:  Expected Discharge Date: 02/12/21    Disposition:    Sung Score:  Sung Scale Score: 15    Readmission Score:         Discussed patient goal for the day, patient clinical progression, and barriers to discharge.   The following Goal(s) of the Day/Commitment(s) have been identified:    discharge planning      Rose Marie Haas  February 12, 2021

## 2021-02-12 NOTE — PROGRESS NOTES
6.1 02/12/2021    MYELOPCT 0.9 06/16/2020    BASOPCT 0.0 02/12/2021    MONOSABS 0.74 02/12/2021    LYMPHSABS 0.25 02/12/2021    EOSABS 0.00 02/12/2021    BASOSABS 0.00 02/12/2021     CMP:    Lab Results   Component Value Date     02/12/2021    K 4.4 02/12/2021    CL 98 02/12/2021    CO2 25 02/12/2021    BUN 43 02/12/2021    CREATININE 0.9 02/12/2021    GFRAA >60 02/12/2021    LABGLOM 59 02/12/2021    GLUCOSE 118 02/12/2021    GLUCOSE 79 04/24/2012    PROT 5.7 02/12/2021    LABALBU 2.6 02/12/2021    LABALBU 4.0 04/24/2012    CALCIUM 8.5 02/12/2021    BILITOT 0.5 02/12/2021    ALKPHOS 96 02/12/2021    AST 33 02/12/2021    ALT 27 02/12/2021     Magnesium:    Lab Results   Component Value Date    MG 1.7 07/12/2020     Troponin:    Lab Results   Component Value Date    TROPONINI <0.01 02/08/2021      IMAGING:  CT CHEST WO CONTRAST   Final Result   Multifocal ground-glass and interstitial opacities, consistent with   atypical/COVID pneumonia. The distribution is very similar. Aeration is   slightly improved since 02/07/2021. Mild cardiomegaly. Moderate-sized hiatal hernia. Stable calcified mass in the central canal the thoracic spine. XR CHEST PORTABLE   Final Result   No new process has occurred. Partial resolution of right lower lobe   infiltrate suggested      XR RIBS BILATERAL (MIN 4 VIEWS)   Final Result   Acute left   10 8th posterolateral rib fractures. Healed remote left 9th through 11th rib fractures. Probable remote right 6   rib fracture. Diffuse and bilateral airspace opacities. CTA CHEST W CONTRAST   Final Result   No evidence of pulmonary emboli. Multifocal patchy and ground-glass airspace disease involving all lobes   consistent with multifocal pneumonia and suspicious for COVID-19 pneumonia. Cardiomegaly with nonspecific mediastinal nodes, unchanged. Intraspinal calcified mass at the level of T7, unchanged since the prior   examination.           Assessment:  Active Hospital Problems    Diagnosis Date Noted    Sore throat [J02.9] 02/09/2021    Acute respiratory failure with hypoxia ([facility] [J96.01] 02/08/2021    COVID-19 [U07.1] 02/08/2021    Essential hypertension [I10] 05/23/2016    Multiple closed fractures of ribs of both sides with routine healing [S22.43XD] 12/16/2014    Hyponatremia [E87.1] 03/25/2014    Hypothyroidism [E03.9] 06/10/2011    Rhe[facility] ([facility] [M06.9] 06/10/2011    A-fib ([facility] [I48.91] 06/09/2011       Plan:  Acute respiratory failure with hypoxia secondary to COVID-19   - Telemetry   - Droplet + isolation  - Vitals q 4 hours    - Incentive spirometry q 1 hour   - ID following   - WBC 8.6 --> 7.8--> 8.2--> 13.2-->11.5-->12.4 . She is on 2 L O2 via nasal cannula. She had an elevated procalcitonin of 0.73 on 02/08 and 0.26 on 02/10.   - D-dimer 419--> 1782--> 689 --> 651 --> 557. CTA chest in ED on 02/07 was negative for PE.   - Fibrinogen >700 --> 659--> 563 --> 538--> 511  - Respiratory panel from 02/08 was COVID +  - Legionella urine antigen and Strep pneumoniae urine antigen are negative  - On vitamin C 500 mg QD, thiamine 100 mg QD, vitamin D 2000 IU QD, and zinc 50 mg QD   - On dexamethasone 6 mg QD. Day 5.    - On remdesivir. Day 5.   - Inhalers   - Robitussin DM 5 ml q 4 hrs PRN for cough   - Daily weights and I/Os  - Social Work on case for discharge planning. Family is now pursuing discharge home with [facility] at this time per my conversation with patient's daughter, Beatrice Frank.   - PT/OT following. AM-PAC score 8/24 from PT.  AM-PAC score 11/24 from OT.     Essential HTN   - Vitals q 4 hours   - Continue home Lopressor 25 mg BID   - Decreased HCTZ 50 mg QD back to home dose of 25 mg QD on 02/11 due to hyponatremia   - Increased home valsartan 160 mg QD to 320 mg QD on 02/08 for better BP control  - On IV hydralazine 10 mg q 4 hrs PRN for SBP> 160 or DBP>100     Hypothyroidism  - Continue home Synthroid 100 mcg QD      RA  - Increased home Celebrex 200 mg QD to 200 mg BID to help with pain from multiple rib fractures  - Continue methotrexate 5 mg Monday at lunch and 10 mg Monday evening on weekly basis      Atrial fibrillation   - Continue home flecainide 50 mg BID   - Continue home Lopressor 25 mg BID  - Continue home Xarelto 15 mg QD       Multiple rib fractures   X-ray from 02/07 showed acute left 7th and 8th posterolateral rib fractures. Healed remote left 9th-11th rib fractures. Probable remote 6th rib fracture.     - Abdominal binder to be placed over ribs with movement (ie changing positions, working with PT, etc.)   - Increased home Celebrex 200 mg QD to 200 mg BID to help with pain from multiple rib fractures  - Tylenol 650 mg q 6 hrs PRN for pain  - Lidocaine patch q 12 hours for pain    Sore throat  - Cepacol throat lozenges q 2 hrs PRN      Constipation  - Increased home Colace 100 mg QD to 200 mg QD  - On Miralax 17 g QD today   - Soap suds enema PRN     Hyponatremia   - Resolved  - Decreased HCTZ 50 mg QD back to home dose of 25 mg QD on 02/11 due to hyponatremia     Depression  Patient does not want her Cymbalta 20 mg QD increased   - Continue 20 mg QD    Continue additional home medications:   - Allegra 180 mg QD substituted with cetirizine 10 mg QD  - Cymbalta 20 mg QD   - Pepcid 40 mg HS  - Ferrous sulfate 325 mg every other day   - Gabapentin 100 mg BID   - Leucovorin calcium 25 mg every Tuesday   - Singulair 10 mg HS   - Omeprazole 40 mg BID substituted with pantoprazole 40 mg BID      Diet: General   Code: Full   DVT prophylaxis: Xarelto 15 mg QD          Electronically signed by Morena Page DO on 2/12/2021 at 7:37 AM

## 2021-02-13 LAB
ALBUMIN SERPL-MCNC: 2.6 G/DL (ref 3.5–5.2)
ALP BLD-CCNC: 89 U/L (ref 35–104)
ALT SERPL-CCNC: 22 U/L (ref 0–32)
ANION GAP SERPL CALCULATED.3IONS-SCNC: 7 MMOL/L (ref 7–16)
APTT: 33.2 SEC (ref 24.5–35.1)
AST SERPL-CCNC: 24 U/L (ref 0–31)
BASOPHILS ABSOLUTE: 0.03 E9/L (ref 0–0.2)
BASOPHILS RELATIVE PERCENT: 0.2 % (ref 0–2)
BILIRUB SERPL-MCNC: 0.5 MG/DL (ref 0–1.2)
BUN BLDV-MCNC: 37 MG/DL (ref 8–23)
C-REACTIVE PROTEIN: 1.8 MG/DL (ref 0–0.4)
CALCIUM SERPL-MCNC: 8.4 MG/DL (ref 8.6–10.2)
CHLORIDE BLD-SCNC: 101 MMOL/L (ref 98–107)
CO2: 24 MMOL/L (ref 22–29)
CREAT SERPL-MCNC: 0.8 MG/DL (ref 0.5–1)
D DIMER: 616 NG/ML DDU
EOSINOPHILS ABSOLUTE: 0 E9/L (ref 0.05–0.5)
EOSINOPHILS RELATIVE PERCENT: 0 % (ref 0–6)
FIBRINOGEN: 480 MG/DL (ref 225–540)
GFR AFRICAN AMERICAN: >60
GFR NON-AFRICAN AMERICAN: >60 ML/MIN/1.73
GLUCOSE BLD-MCNC: 102 MG/DL (ref 74–99)
HCT VFR BLD CALC: 31.5 % (ref 34–48)
HEMOGLOBIN: 10.2 G/DL (ref 11.5–15.5)
IMMATURE GRANULOCYTES #: 0.68 E9/L
IMMATURE GRANULOCYTES %: 4.4 % (ref 0–5)
INR BLD: 1.3
LYMPHOCYTES ABSOLUTE: 0.45 E9/L (ref 1.5–4)
LYMPHOCYTES RELATIVE PERCENT: 2.9 % (ref 20–42)
MCH RBC QN AUTO: 32.4 PG (ref 26–35)
MCHC RBC AUTO-ENTMCNC: 32.4 % (ref 32–34.5)
MCV RBC AUTO: 100 FL (ref 80–99.9)
MONOCYTES ABSOLUTE: 1.4 E9/L (ref 0.1–0.95)
MONOCYTES RELATIVE PERCENT: 9 % (ref 2–12)
NEUTROPHILS ABSOLUTE: 13.03 E9/L (ref 1.8–7.3)
NEUTROPHILS RELATIVE PERCENT: 83.5 % (ref 43–80)
OVALOCYTES: ABNORMAL
PDW BLD-RTO: 14 FL (ref 11.5–15)
PLATELET # BLD: 591 E9/L (ref 130–450)
PMV BLD AUTO: 9.4 FL (ref 7–12)
POIKILOCYTES: ABNORMAL
POLYCHROMASIA: ABNORMAL
POTASSIUM REFLEX MAGNESIUM: 4 MMOL/L (ref 3.5–5)
PROTHROMBIN TIME: 16 SEC (ref 9.3–12.4)
RBC # BLD: 3.15 E12/L (ref 3.5–5.5)
SODIUM BLD-SCNC: 132 MMOL/L (ref 132–146)
TOTAL PROTEIN: 5.5 G/DL (ref 6.4–8.3)
WBC # BLD: 15.6 E9/L (ref 4.5–11.5)

## 2021-02-13 PROCEDURE — 85384 FIBRINOGEN ACTIVITY: CPT

## 2021-02-13 PROCEDURE — 2060000000 HC ICU INTERMEDIATE R&B

## 2021-02-13 PROCEDURE — 85378 FIBRIN DEGRADE SEMIQUANT: CPT

## 2021-02-13 PROCEDURE — 2700000000 HC OXYGEN THERAPY PER DAY

## 2021-02-13 PROCEDURE — 80053 COMPREHEN METABOLIC PANEL: CPT

## 2021-02-13 PROCEDURE — 6370000000 HC RX 637 (ALT 250 FOR IP): Performed by: FAMILY MEDICINE

## 2021-02-13 PROCEDURE — 36415 COLL VENOUS BLD VENIPUNCTURE: CPT

## 2021-02-13 PROCEDURE — 99232 SBSQ HOSP IP/OBS MODERATE 35: CPT | Performed by: FAMILY MEDICINE

## 2021-02-13 PROCEDURE — 85610 PROTHROMBIN TIME: CPT

## 2021-02-13 PROCEDURE — 85730 THROMBOPLASTIN TIME PARTIAL: CPT

## 2021-02-13 PROCEDURE — 6360000002 HC RX W HCPCS: Performed by: FAMILY MEDICINE

## 2021-02-13 PROCEDURE — 85025 COMPLETE CBC W/AUTO DIFF WBC: CPT

## 2021-02-13 PROCEDURE — 86140 C-REACTIVE PROTEIN: CPT

## 2021-02-13 PROCEDURE — 2580000003 HC RX 258: Performed by: FAMILY MEDICINE

## 2021-02-13 PROCEDURE — 94640 AIRWAY INHALATION TREATMENT: CPT

## 2021-02-13 RX ADMIN — BUDESONIDE AND FORMOTEROL FUMARATE DIHYDRATE 2 PUFF: 160; 4.5 AEROSOL RESPIRATORY (INHALATION) at 19:57

## 2021-02-13 RX ADMIN — DOCUSATE SODIUM 200 MG: 100 CAPSULE, LIQUID FILLED ORAL at 08:51

## 2021-02-13 RX ADMIN — CELECOXIB 200 MG: 100 CAPSULE ORAL at 08:52

## 2021-02-13 RX ADMIN — PANTOPRAZOLE SODIUM 40 MG: 40 TABLET, DELAYED RELEASE ORAL at 15:23

## 2021-02-13 RX ADMIN — FLECAINIDE ACETATE 50 MG: 50 TABLET ORAL at 08:51

## 2021-02-13 RX ADMIN — SODIUM CHLORIDE, PRESERVATIVE FREE 10 ML: 5 INJECTION INTRAVENOUS at 22:02

## 2021-02-13 RX ADMIN — FAMOTIDINE 20 MG: 20 TABLET ORAL at 22:01

## 2021-02-13 RX ADMIN — RIVAROXABAN 15 MG: 15 TABLET, FILM COATED ORAL at 08:52

## 2021-02-13 RX ADMIN — CELECOXIB 200 MG: 100 CAPSULE ORAL at 22:01

## 2021-02-13 RX ADMIN — PANTOPRAZOLE SODIUM 40 MG: 40 TABLET, DELAYED RELEASE ORAL at 06:08

## 2021-02-13 RX ADMIN — DULOXETINE HYDROCHLORIDE 40 MG: 20 CAPSULE, DELAYED RELEASE ORAL at 08:51

## 2021-02-13 RX ADMIN — VALSARTAN 320 MG: 320 TABLET ORAL at 08:51

## 2021-02-13 RX ADMIN — HYDROCHLOROTHIAZIDE 25 MG: 25 TABLET ORAL at 08:51

## 2021-02-13 RX ADMIN — METOPROLOL TARTRATE 25 MG: 25 TABLET, FILM COATED ORAL at 22:01

## 2021-02-13 RX ADMIN — ACETAMINOPHEN 650 MG: 325 TABLET ORAL at 22:01

## 2021-02-13 RX ADMIN — GABAPENTIN 100 MG: 100 CAPSULE ORAL at 08:52

## 2021-02-13 RX ADMIN — FLECAINIDE ACETATE 50 MG: 50 TABLET ORAL at 22:02

## 2021-02-13 RX ADMIN — SODIUM CHLORIDE, PRESERVATIVE FREE 10 ML: 5 INJECTION INTRAVENOUS at 08:52

## 2021-02-13 RX ADMIN — METOPROLOL TARTRATE 25 MG: 25 TABLET, FILM COATED ORAL at 08:51

## 2021-02-13 RX ADMIN — Medication 100 MG: at 08:51

## 2021-02-13 RX ADMIN — CETIRIZINE HYDROCHLORIDE 10 MG: 10 TABLET, FILM COATED ORAL at 08:52

## 2021-02-13 RX ADMIN — HYDRALAZINE HYDROCHLORIDE 10 MG: 20 INJECTION INTRAMUSCULAR; INTRAVENOUS at 04:41

## 2021-02-13 RX ADMIN — ZINC SULFATE 220 MG (50 MG) CAPSULE 50 MG: CAPSULE at 08:52

## 2021-02-13 RX ADMIN — LEVOTHYROXINE SODIUM 100 MCG: 100 TABLET ORAL at 06:09

## 2021-02-13 RX ADMIN — POLYETHYLENE GLYCOL 3350 17 G: 17 POWDER, FOR SOLUTION ORAL at 08:51

## 2021-02-13 RX ADMIN — GABAPENTIN 100 MG: 100 CAPSULE ORAL at 22:01

## 2021-02-13 RX ADMIN — DEXAMETHASONE 6 MG: 4 TABLET ORAL at 08:52

## 2021-02-13 RX ADMIN — BUDESONIDE AND FORMOTEROL FUMARATE DIHYDRATE 2 PUFF: 160; 4.5 AEROSOL RESPIRATORY (INHALATION) at 08:30

## 2021-02-13 RX ADMIN — Medication 2000 UNITS: at 08:51

## 2021-02-13 RX ADMIN — OXYCODONE HYDROCHLORIDE AND ACETAMINOPHEN 500 MG: 500 TABLET ORAL at 08:52

## 2021-02-13 RX ADMIN — MONTELUKAST SODIUM 10 MG: 10 TABLET, FILM COATED ORAL at 22:01

## 2021-02-13 ASSESSMENT — PAIN DESCRIPTION - FREQUENCY: FREQUENCY: CONTINUOUS

## 2021-02-13 ASSESSMENT — PAIN SCALES - WONG BAKER: WONGBAKER_NUMERICALRESPONSE: 0

## 2021-02-13 ASSESSMENT — PAIN DESCRIPTION - LOCATION: LOCATION: HEAD

## 2021-02-13 ASSESSMENT — PAIN SCALES - GENERAL
PAINLEVEL_OUTOF10: 0
PAINLEVEL_OUTOF10: 3

## 2021-02-13 ASSESSMENT — PAIN DESCRIPTION - DESCRIPTORS: DESCRIPTORS: HEADACHE;ACHING;DISCOMFORT

## 2021-02-13 ASSESSMENT — PAIN DESCRIPTION - PAIN TYPE: TYPE: ACUTE PAIN

## 2021-02-13 NOTE — PROGRESS NOTES
Specimen: Urine, clean catch; Urine (20)   Result Value Ref Range    L. pneumophila Serogp 1 Ur Ag       Presumptive Negative -suggesting no recent or current infections  with Legionella pneumophila serogroup 1. Infection to Legionella cannot be ruled out since other serogroups  and species may cause infection, antigen may not be present in  early infection, or level of antigen may be below the  detection limit.   Normal Range: Presumptive Negative     CBC Auto Differential    Collection Time: 02/10/21  7:05 AM   Result Value Ref Range    WBC 13.2 (H) 4.5 - 11.5 E9/L    RBC 3.42 (L) 3.50 - 5.50 E12/L    Hemoglobin 10.9 (L) 11.5 - 15.5 g/dL    Hematocrit 33.3 (L) 34.0 - 48.0 %    MCV 97.4 80.0 - 99.9 fL    MCH 31.9 26.0 - 35.0 pg    MCHC 32.7 32.0 - 34.5 %    RDW 13.7 11.5 - 15.0 fL    Platelets 690 (H) 055 - 450 E9/L    MPV 9.6 7.0 - 12.0 fL    Neutrophils % 96.0 (H) 43.0 - 80.0 %    Lymphocytes % 1.0 (L) 20.0 - 42.0 %    Monocytes % 2.0 2.0 - 12.0 %    Eosinophils % 0.0 0.0 - 6.0 %    Basophils % 0.0 0.0 - 2.0 %    Neutrophils Absolute 12.67 (H) 1.80 - 7.30 E9/L    Lymphocytes Absolute 0.26 (L) 1.50 - 4.00 E9/L    Monocytes Absolute 0.26 0.10 - 0.95 E9/L    Eosinophils Absolute 0.00 (L) 0.05 - 0.50 E9/L    Basophils Absolute 0.00 0.00 - 0.20 E9/L    Atypical Lymphocytes Relative 1.0 0.0 - 4.0 %    Polychromasia 1+     Poikilocytes 1+     Schistocytes 1+    Comprehensive Metabolic Panel w/ Reflex to MG    Collection Time: 02/10/21  7:05 AM   Result Value Ref Range    Sodium 130 (L) 132 - 146 mmol/L    Potassium reflex Magnesium 4.1 3.5 - 5.0 mmol/L    Chloride 97 (L) 98 - 107 mmol/L    CO2 23 22 - 29 mmol/L    Anion Gap 10 7 - 16 mmol/L    Glucose 123 (H) 74 - 99 mg/dL    BUN 32 (H) 8 - 23 mg/dL    CREATININE 0.7 0.5 - 1.0 mg/dL    GFR Non-African American >60 >=60 mL/min/1.73    GFR African American >60     Calcium 8.6 8.6 - 10.2 mg/dL    Total Protein 6.0 (L) 6.4 - 8.3 g/dL    Albumin 2.9 (L) 3.5 - 5.2 g/dL Total Bilirubin 0.5 0.0 - 1.2 mg/dL    Alkaline Phosphatase 103 35 - 104 U/L    ALT 35 (H) 0 - 32 U/L    AST 89 (H) 0 - 31 U/L   Protime-INR    Collection Time: 02/10/21  7:05 AM   Result Value Ref Range    Protime 27.0 (H) 9.3 - 12.4 sec    INR 2.3    APTT    Collection Time: 02/10/21  7:05 AM   Result Value Ref Range    aPTT 40.0 (H) 24.5 - 35.1 sec   Fibrinogen    Collection Time: 02/10/21  7:05 AM   Result Value Ref Range    Fibrinogen 563 (H) 225 - 540 mg/dL   C-Reactive Protein    Collection Time: 02/10/21  7:05 AM   Result Value Ref Range    CRP 8.2 (H) 0.0 - 0.4 mg/dL   D-Dimer, Quantitative    Collection Time: 02/10/21  7:05 AM   Result Value Ref Range    D-Dimer, Quant 689 ng/mL DDU   Procalcitonin    Collection Time: 02/10/21  7:05 AM   Result Value Ref Range    Procalcitonin 0.26 (H) 0.00 - 0.08 ng/mL   CBC Auto Differential    Collection Time: 02/11/21  4:56 AM   Result Value Ref Range    WBC 11.5 4.5 - 11.5 E9/L    RBC 3.19 (L) 3.50 - 5.50 E12/L    Hemoglobin 10.4 (L) 11.5 - 15.5 g/dL    Hematocrit 31.2 (L) 34.0 - 48.0 %    MCV 97.8 80.0 - 99.9 fL    MCH 32.6 26.0 - 35.0 pg    MCHC 33.3 32.0 - 34.5 %    RDW 13.9 11.5 - 15.0 fL    Platelets 254 (H) 917 - 450 E9/L    MPV 9.7 7.0 - 12.0 fL    Neutrophils % 88.8 (H) 43.0 - 80.0 %    Immature Granulocytes % 2.3 0.0 - 5.0 %    Lymphocytes % 3.0 (L) 20.0 - 42.0 %    Monocytes % 5.8 2.0 - 12.0 %    Eosinophils % 0.0 0.0 - 6.0 %    Basophils % 0.1 0.0 - 2.0 %    Neutrophils Absolute 10.19 (H) 1.80 - 7.30 E9/L    Immature Granulocytes # 0.27 E9/L    Lymphocytes Absolute 0.35 (L) 1.50 - 4.00 E9/L    Monocytes Absolute 0.67 0.10 - 0.95 E9/L    Eosinophils Absolute 0.00 (L) 0.05 - 0.50 E9/L    Basophils Absolute 0.01 0.00 - 0.20 E9/L    Anisocytosis 1+     Poikilocytes 1+     Ovalocytes 1+    Comprehensive Metabolic Panel w/ Reflex to MG    Collection Time: 02/11/21  4:56 AM   Result Value Ref Range    Sodium 129 (L) 132 - 146 mmol/L    Potassium reflex Magnesium 4.1 3.5 - 5.0 mmol/L    Chloride 97 (L) 98 - 107 mmol/L    CO2 23 22 - 29 mmol/L    Anion Gap 9 7 - 16 mmol/L    Glucose 117 (H) 74 - 99 mg/dL    BUN 46 (H) 8 - 23 mg/dL    CREATININE 1.0 0.5 - 1.0 mg/dL    GFR Non-African American 53 >=60 mL/min/1.73    GFR African American >60     Calcium 8.4 (L) 8.6 - 10.2 mg/dL    Total Protein 5.6 (L) 6.4 - 8.3 g/dL    Albumin 2.5 (L) 3.5 - 5.2 g/dL    Total Bilirubin 0.4 0.0 - 1.2 mg/dL    Alkaline Phosphatase 92 35 - 104 U/L    ALT 37 (H) 0 - 32 U/L    AST 58 (H) 0 - 31 U/L   Protime-INR    Collection Time: 02/11/21  4:56 AM   Result Value Ref Range    Protime 20.3 (H) 9.3 - 12.4 sec    INR 1.8    APTT    Collection Time: 02/11/21  4:56 AM   Result Value Ref Range    aPTT 33.8 24.5 - 35.1 sec   Fibrinogen    Collection Time: 02/11/21  4:56 AM   Result Value Ref Range    Fibrinogen 538 225 - 540 mg/dL   C-Reactive Protein    Collection Time: 02/11/21  4:56 AM   Result Value Ref Range    CRP 4.9 (H) 0.0 - 0.4 mg/dL   D-Dimer, Quantitative    Collection Time: 02/11/21  4:56 AM   Result Value Ref Range    D-Dimer, Quant 651 ng/mL DDU   OSMOLALITY, SERUM    Collection Time: 02/11/21  9:44 AM   Result Value Ref Range    Osmolality 289 285 - 310 mOsm/Kg   Osmolality, urine    Collection Time: 02/11/21 10:40 AM   Result Value Ref Range    Osmolality, Ur 672 300 - 900 mOsm/kg   URINE ELECTROLYTES    Collection Time: 02/11/21 10:40 AM   Result Value Ref Range    Sodium, Ur 77 Not Established mmol/L    Potassium, Ur 37.7 Not Established mmol/L    Chloride 53 Not Established mmol/L   CBC Auto Differential    Collection Time: 02/12/21  3:30 AM   Result Value Ref Range    WBC 12.4 (H) 4.5 - 11.5 E9/L    RBC 3.20 (L) 3.50 - 5.50 E12/L    Hemoglobin 10.2 (L) 11.5 - 15.5 g/dL    Hematocrit 31.7 (L) 34.0 - 48.0 %    MCV 99.1 80.0 - 99.9 fL    MCH 31.9 26.0 - 35.0 pg    MCHC 32.2 32.0 - 34.5 %    RDW 13.6 11.5 - 15.0 fL    Platelets 529 (H) 372 - 450 E9/L    MPV 9.6 7.0 - 12.0 fL    Neutrophils % 92.2 (H) 43.0 - 80.0 %    Lymphocytes % 1.7 (L) 20.0 - 42.0 %    Monocytes % 6.1 2.0 - 12.0 %    Eosinophils % 0.0 0.0 - 6.0 %    Basophils % 0.0 0.0 - 2.0 %    Neutrophils Absolute 11.41 (H) 1.80 - 7.30 E9/L    Lymphocytes Absolute 0.25 (L) 1.50 - 4.00 E9/L    Monocytes Absolute 0.74 0.10 - 0.95 E9/L    Eosinophils Absolute 0.00 (L) 0.05 - 0.50 E9/L    Basophils Absolute 0.00 0.00 - 0.20 E9/L    nRBC 0.0 /100 WBC    Polychromasia 1+     Poikilocytes 1+     Ovalocytes 1+    Comprehensive Metabolic Panel w/ Reflex to MG    Collection Time: 02/12/21  3:30 AM   Result Value Ref Range    Sodium 133 132 - 146 mmol/L    Potassium reflex Magnesium 4.4 3.5 - 5.0 mmol/L    Chloride 98 98 - 107 mmol/L    CO2 25 22 - 29 mmol/L    Anion Gap 10 7 - 16 mmol/L    Glucose 118 (H) 74 - 99 mg/dL    BUN 43 (H) 8 - 23 mg/dL    CREATININE 0.9 0.5 - 1.0 mg/dL    GFR Non-African American 59 >=60 mL/min/1.73    GFR African American >60     Calcium 8.5 (L) 8.6 - 10.2 mg/dL    Total Protein 5.7 (L) 6.4 - 8.3 g/dL    Albumin 2.6 (L) 3.5 - 5.2 g/dL    Total Bilirubin 0.5 0.0 - 1.2 mg/dL    Alkaline Phosphatase 96 35 - 104 U/L    ALT 27 0 - 32 U/L    AST 33 (H) 0 - 31 U/L   Protime-INR    Collection Time: 02/12/21  3:30 AM   Result Value Ref Range    Protime 20.4 (H) 9.3 - 12.4 sec    INR 1.8    APTT    Collection Time: 02/12/21  3:30 AM   Result Value Ref Range    aPTT 34.0 24.5 - 35.1 sec   Fibrinogen    Collection Time: 02/12/21  3:30 AM   Result Value Ref Range    Fibrinogen 511 225 - 540 mg/dL   C-Reactive Protein    Collection Time: 02/12/21  3:30 AM   Result Value Ref Range    CRP 2.8 (H) 0.0 - 0.4 mg/dL   D-Dimer, Quantitative    Collection Time: 02/12/21  3:30 AM   Result Value Ref Range    D-Dimer, Quant 557 ng/mL DDU   Hemoglobin A1C    Collection Time: 02/12/21  3:30 AM   Result Value Ref Range    Hemoglobin A1C 5.4 4.0 - 5.6 %   CBC Auto Differential    Collection Time: 02/13/21  4:30 AM   Result Value Ref Range    WBC 15.6 (H) 4.5 - 11.5 E9/L    RBC 3.15 (L) 3.50 - 5.50 E12/L    Hemoglobin 10.2 (L) 11.5 - 15.5 g/dL    Hematocrit 31.5 (L) 34.0 - 48.0 %    .0 (H) 80.0 - 99.9 fL    MCH 32.4 26.0 - 35.0 pg    MCHC 32.4 32.0 - 34.5 %    RDW 14.0 11.5 - 15.0 fL    Platelets 401 (H) 212 - 450 E9/L    MPV 9.4 7.0 - 12.0 fL    Neutrophils % 83.5 (H) 43.0 - 80.0 %    Immature Granulocytes % 4.4 0.0 - 5.0 %    Lymphocytes % 2.9 (L) 20.0 - 42.0 %    Monocytes % 9.0 2.0 - 12.0 %    Eosinophils % 0.0 0.0 - 6.0 %    Basophils % 0.2 0.0 - 2.0 %    Neutrophils Absolute 13.03 (H) 1.80 - 7.30 E9/L    Immature Granulocytes # 0.68 E9/L    Lymphocytes Absolute 0.45 (L) 1.50 - 4.00 E9/L    Monocytes Absolute 1.40 (H) 0.10 - 0.95 E9/L    Eosinophils Absolute 0.00 (L) 0.05 - 0.50 E9/L    Basophils Absolute 0.03 0.00 - 0.20 E9/L    Polychromasia 1+     Poikilocytes 1+     Ovalocytes 1+    Comprehensive Metabolic Panel w/ Reflex to MG    Collection Time: 02/13/21  4:30 AM   Result Value Ref Range    Sodium 132 132 - 146 mmol/L    Potassium reflex Magnesium 4.0 3.5 - 5.0 mmol/L    Chloride 101 98 - 107 mmol/L    CO2 24 22 - 29 mmol/L    Anion Gap 7 7 - 16 mmol/L    Glucose 102 (H) 74 - 99 mg/dL    BUN 37 (H) 8 - 23 mg/dL    CREATININE 0.8 0.5 - 1.0 mg/dL    GFR Non-African American >60 >=60 mL/min/1.73    GFR African American >60     Calcium 8.4 (L) 8.6 - 10.2 mg/dL    Total Protein 5.5 (L) 6.4 - 8.3 g/dL    Albumin 2.6 (L) 3.5 - 5.2 g/dL    Total Bilirubin 0.5 0.0 - 1.2 mg/dL    Alkaline Phosphatase 89 35 - 104 U/L    ALT 22 0 - 32 U/L    AST 24 0 - 31 U/L   Protime-INR    Collection Time: 02/13/21  4:30 AM   Result Value Ref Range    Protime 16.0 (H) 9.3 - 12.4 sec    INR 1.3    APTT    Collection Time: 02/13/21  4:30 AM   Result Value Ref Range    aPTT 33.2 24.5 - 35.1 sec   Fibrinogen    Collection Time: 02/13/21  4:30 AM   Result Value Ref Range    Fibrinogen 480 225 - 540 mg/dL   C-Reactive Protein    Collection Time: 02/13/21  4:30 AM   Result Value Ref Range    CRP 1.8 (H) 0.0 - 0.4 mg/dL   D-Dimer, Quantitative    Collection Time: 02/13/21  4:30 AM   Result Value Ref Range    D-Dimer, Quant 616 ng/mL DDU     CT CHEST WO CONTRAST   Final Result   Multifocal ground-glass and interstitial opacities, consistent with   atypical/COVID pneumonia. The distribution is very similar. Aeration is   slightly improved since 02/07/2021. Mild cardiomegaly. Moderate-sized hiatal hernia. Stable calcified mass in the central canal the thoracic spine. XR CHEST PORTABLE   Final Result   No new process has occurred. Partial resolution of right lower lobe   infiltrate suggested      XR RIBS BILATERAL (MIN 4 VIEWS)   Final Result   Acute left   10 8th posterolateral rib fractures. Healed remote left 9th through 11th rib fractures. Probable remote right 6   rib fracture. Diffuse and bilateral airspace opacities. CTA CHEST W CONTRAST   Final Result   No evidence of pulmonary emboli. Multifocal patchy and ground-glass airspace disease involving all lobes   consistent with multifocal pneumonia and suspicious for COVID-19 pneumonia. Cardiomegaly with nonspecific mediastinal nodes, unchanged. Intraspinal calcified mass at the level of T7, unchanged since the prior   examination.         Assessment:    Active Hospital Problems    Diagnosis Date Noted    Depression [F32.9] 02/12/2021    Sore throat [J02.9] 02/09/2021    Acute respiratory failure with hypoxia ([facility] [place] [facility] [J96.01] 02/08/2021    COVID-19 [U07.1] 02/08/2021    Essential hypertension [I10] 05/23/2016    Multiple closed fractures of ribs of both sides with routine healing [S22.43XD] 12/16/2014    Hypothyroidism [E03.9] 06/10/2011    Rheumatoid arthritis ([facility] [M06.9] 06/10/2011    [facility]fib ([facility] [I48.91] 06/09/2011       Plan:  Acute respiratory failure with hypoxia secondary to COVID-19   - Telemetry   - Droplet + isolation  - Vitals q 4 hours    - Incentive spirometry q 1 hour   - ID following   - WBC 8.6 --> 7.8--> 8.2--> 13.2-->11.5-->12.4 . She is on 2 L O2 via nasal cannula. She had an elevated procalcitonin of 0.73 on 02/08 and 0.26 on 02/10.   - D-dimer 419--> 1782--> 689 --> 651 --> 557. CTA chest in ED on 02/07 was negative for PE.   - Fibrinogen >700 --> 659--> 563 --> 538--> 511  - Respiratory panel from 02/08 was COVID +  - Legionella urine antigen and Strep pneumoniae urine antigen are negative  - On vitamin C 500 mg QD, thiamine 100 mg QD, vitamin D 2000 IU QD, and zinc 50 mg QD   - On dexamethasone 6 mg QD. Day 5.    - On remdesivir. Day 5.   - Inhalers   - Robitussin DM 5 ml q 4 hrs PRN for cough   - Daily weights and I/Os  - Social Work on case for discharge planning. Family is now pursuing discharge home with Meghan Chappell at this time per my conversation with patient's daughter, Paul.   - PT/OT following. AM-PAC score 8/24 from PT. AM-PAC score 11/24 from OT.     Essential HTN   - Vitals q 4 hours   - Continue home Lopressor 25 mg BID   - Decreased HCTZ 50 mg QD back to home dose of 25 mg QD on 02/11 due to hyponatremia   - Increased home valsartan 160 mg QD to 320 mg QD on 02/08 for better BP control  - On IV hydralazine 10 mg q 4 hrs PRN for SBP> 160 or DBP>100     Hypothyroidism  - Continue home Synthroid 100 mcg QD      RA  - Increased home Celebrex 200 mg QD to 200 mg BID to help with pain from multiple rib fractures  - Continue methotrexate 5 mg Monday at lunch and 10 mg Monday evening on weekly basis      Atrial fibrillation   - Continue home flecainide 50 mg BID   - Continue home Lopressor 25 mg BID  - Continue home Xarelto 15 mg QD       Multiple rib fractures   X-ray from 02/07 showed acute left 7th and 8th posterolateral rib fractures. Healed remote left 9th-11th rib fractures. Probable remote 6th rib fracture.     - Abdominal binder to be placed over ribs with movement (ie changing positions, working with PT, etc.)   - Increased home Celebrex 200 mg QD to 200 mg BID to help with pain from multiple rib fractures  - Tylenol 650 mg q 6 hrs PRN for pain  - Lidocaine patch q 12 hours for pain     Sore throat  - Cepacol throat lozenges q 2 hrs PRN      Constipation  - Increased home Colace 100 mg QD to 200 mg QD  - On Miralax 17 g QD today   - Soap suds enema PRN      Hyponatremia   - Resolved  - Decreased HCTZ 50 mg QD back to home dose of 25 mg QD on 02/11 due to hyponatremia      Depression  Patient does not want her Cymbalta 20 mg QD increased   - Continue 20 mg QD     Continue additional home medications:   - Allegra 180 mg QD substituted with cetirizine 10 mg QD  - Cymbalta 20 mg QD   - Pepcid 40 mg HS  - Ferrous sulfate 325 mg every other day   - Gabapentin 100 mg BID   - Leucovorin calcium 25 mg every Tuesday   - Singulair 10 mg HS   - Omeprazole 40 mg BID substituted with pantoprazole 40 mg BID      Diet: General   Code: Full   DVT prophylaxis: Xarelto 15 mg QD     Today : We also spoke with her family member regarding her health status and our plan to discharge over the weekend.   Possibly will be sending home tomorrow under home health coverage       Electronically signed by Cortney Norton MD on 2/13/2021 at 7:44 AM

## 2021-02-13 NOTE — PROGRESS NOTES
Spoke with daughter Fide Barrera who communicated that she would like transport set up for discharge tentatively 2/14 between [id number]. Information passed on to [facility] [address] [place] [address].

## 2021-02-13 NOTE — PROGRESS NOTES
[address] [place] [address] [facility]  DANISHIDA  Progress Note      Chief Complaint   Patient presents with    Shortness of Breath       SUBJECTIVE:      Patient states she feels \"terrible\"    Feels she is worsening on admission    Denies shortness of breath or cough    Complains of generalized myalgias and profound weakness      Review of systems:    As stated above in the chief complaint, otherwise negative. Medications:    Scheduled Meds:   DULoxetine  40 mg Oral Daily    hydroCHLOROthiazide  25 mg Oral Daily    And    valsartan  320 mg Oral Daily    docusate sodium  200 mg Oral Daily    polyethylene glycol  17 g Oral Daily    ferrous sulfate  325 mg Oral Every Other Day    cetirizine  10 mg Oral Daily    flecainide  50 mg Oral BID    gabapentin  100 mg Oral BID    levothyroxine  100 mcg Oral Daily    metoprolol tartrate  25 mg Oral BID    montelukast  10 mg Oral Nightly    pantoprazole  40 mg Oral BID AC    rivaroxaban  15 mg Oral Daily with breakfast    sodium chloride flush  10 mL Intravenous 2 times per day    Vitamin D  2,000 Units Oral Daily    thiamine  100 mg Oral Daily    ascorbic acid  500 mg Oral Daily    zinc sulfate  50 mg Oral Daily    leucovorin calcium  25 mg Oral Weekly    famotidine  20 mg Oral Nightly    budesonide-formoterol  2 puff Inhalation BID    lidocaine  1 patch Transdermal Daily    dexamethasone  6 mg Oral Daily    celecoxib  200 mg Oral BID     Continuous Infusions:  PRN Meds:magic (miracle) mouthwash, benzocaine-menthol, sodium chloride flush, promethazine **OR** ondansetron, acetaminophen **OR** acetaminophen, guaiFENesin-dextromethorphan, hydrALAZINE, albuterol-ipratropium, sodium chloride  Prior to Admission medications    Medication Sig Start Date End Date Taking?  Authorizing Provider   metoprolol tartrate (LOPRESSOR) 25 MG tablet TAKE 1 TABLET TWICE A DAY 1/4/21  Yes María Elena Collado MD   flecainide (TAMBOCOR) 50 MG tablet Take 1 tablet by mouth 2 times daily 11/23/20  Yes Ariane Mcleod MD   rivaroxaban (XARELTO) 15 MG TABS tablet Take 1 tablet by mouth daily (with breakfast) 11/23/20  Yes Ariane Mcleod MD   valsartan-hydroCHLOROthiazide (DIOVAN-HCT) 160-25 MG per tablet Take 1 tablet by mouth daily 10/28/20  Yes Alma Rosa MD Patricio   gabapentin (NEURONTIN) 100 MG capsule Take 1 capsule by mouth 2 times daily for 90 days. 10/8/20 2/8/21 Yes Alma Rosa MD Patricio   DULoxetine (CYMBALTA) 20 MG extended release capsule Take 1 capsule by mouth daily 10/8/20  Yes Alma Rosa MD Patricio   celecoxib (CELEBREX) 200 MG capsule TAKE 1 CAPSULE DAILY 9/17/20  Yes Alma Rosa MD Patricio   levothyroxine (LEVOTHROID) 100 MCG tablet Take 1 tablet by mouth Daily 9/17/20  Yes Alma Rosa Plan, MD   vitamin B-12 (CYANOCOBALAMIN) 1000 MCG tablet Take 1,000 mcg by mouth daily   Yes Historical Provider, MD   omeprazole (PRILOSEC) 40 MG delayed release capsule Take 1 capsule by mouth 2 times daily 4/16/20  Yes Alma Rosa Plan, MD   famotidine (PEPCID) 40 MG tablet Take 1 tablet by mouth nightly 3/31/20  Yes Alma Rosa Plan, MD   fexofenadine (ALLEGRA) 180 MG tablet Take 180 mg by mouth daily.    Yes Historical Provider, MD   leucovorin calcium (WELLCOVORIN) 5 MG tablet Take 25 mg by mouth once a week Takes every Tuesday 7/19/13  Yes Historical Provider, MD   methotrexate 2.5 MG tablet Take by mouth once a week 2 tabs at lunch and 4 tabs  in the pm once a week on Monday   Yes Historical Provider, MD   montelukast (SINGULAIR) 10 MG tablet TAKE 1 TABLET NIGHTLY 2/8/21   Alma Rosa Plan, MD   cyanocobalamin 1000 MCG/ML injection Inject 1 mL into the muscle every 30 days Dispense with syringes 1.5 inch 25 G needles 1/6/21   Alma Rosa Curry MD   ferrous sulfate (IRON 325) 325 (65 Fe) MG tablet Take 1 tablet by mouth every other day 9/17/20   Alma Rosa Curry MD   docusate sodium (COLACE, DULCOLAX) 100 MG CAPS Take 100 mg by mouth daily 7/17/20   Jeremy Mendez MD ALKPHOS 89 02/13/2021    BILITOT 0.5 02/13/2021     Lab Results   Component Value Date     02/13/2021    K 4.0 02/13/2021     02/13/2021    CO2 24 02/13/2021    BUN 37 02/13/2021    CREATININE 0.8 02/13/2021    CREATININE 0.9 02/12/2021    CREATININE 1.0 02/11/2021    GFRAA >60 02/13/2021    LABGLOM >60 02/13/2021    GLUCOSE 102 02/13/2021    GLUCOSE 79 04/24/2012    PROT 5.5 02/13/2021    LABALBU 2.6 02/13/2021    LABALBU 4.0 04/24/2012    CALCIUM 8.4 02/13/2021    BILITOT 0.5 02/13/2021    ALKPHOS 89 02/13/2021    AST 24 02/13/2021    ALT 22 02/13/2021     Lab Results   Component Value Date    CRP 1.8 (H) 02/13/2021    CRP 2.8 (H) 02/12/2021    CRP 4.9 (H) 02/11/2021     Lab Results   Component Value Date    SEDRATE 29 (H) 09/10/2020    SEDRATE 17 10/22/2019    SEDRATE 26 (H) 03/26/2013       Radiology:    CT CHEST WO CONTRAST   Final Result   Multifocal ground-glass and interstitial opacities, consistent with   atypical/COVID pneumonia. The distribution is very similar. Aeration is   slightly improved since 02/07/2021. Mild cardiomegaly. Moderate-sized hiatal hernia. Stable calcified mass in the central canal the thoracic spine. XR CHEST PORTABLE   Final Result   No new process has occurred. Partial resolution of right lower lobe   infiltrate suggested      XR RIBS BILATERAL (MIN 4 VIEWS)   Final Result   Acute left   10 8th posterolateral rib fractures. Healed remote left 9th through 11th rib fractures. Probable remote right 6   rib fracture. Diffuse and bilateral airspace opacities. CTA CHEST W CONTRAST   Final Result   No evidence of pulmonary emboli. Multifocal patchy and ground-glass airspace disease involving all lobes   consistent with multifocal pneumonia and suspicious for COVID-19 pneumonia. Cardiomegaly with nonspecific mediastinal nodes, unchanged. Intraspinal calcified mass at the level of T7, unchanged since the prior   examination.           Microbiology: Lab Results   Component Value Date    ORG Micrococcus 09/30/2020    ORG Staphylococcus aureus 09/09/2020    ORG Staphylococcus coagulase-negative 09/09/2020     Lab Results   Component Value Date    ORG Micrococcus 09/30/2020    ORG Staphylococcus aureus 09/09/2020    ORG Staphylococcus coagulase-negative 09/09/2020     WOUND/ABSCESS   Date Value Ref Range Status   09/30/2020   Final    Heavy growth  Validated susceptibility testing methods do not exist for  this isolate. 09/09/2020   Final    Light growth  Methicillin resistant Staph aureus isolated. Most Methicillin  resistant Staphylococcus are usually resistant to multiple  antibiotics including other B-Lactams, Aminoglycosides,  Macrolides, Clindamycin and Tetracycline. Contact isolation  is indicated. This isolate is presumed to be resistant based on the  detection of inducible Clindamycin resistance. Clindamycin  may still be effective in some patients.      09/09/2020 One colony  Final     No results found for: RESPSMEAR  No results found for: MPNEUMO, CLAMYDCU, LABLEGI, AFBCX, FUNGSM, LABFUNG  No results found for: CULTRESP  No results found for: CXCATHTIP  No results found for: BFCS  No results found for: CXSURG  Urine Culture, Routine   Date Value Ref Range Status   06/04/2019 <10,000 CFU/mL  Gram negative rods    Final   07/17/2014 25,000 CFU/ml  Final   07/17/2014   Final    <25,000 CFU/ml  Vancomycin resistant Enterococci isolated       No results found for: [facility]    Problem list:    Principal Problem:    Acute respiratory failure with hypoxia ([address]  Active Problems:    Hypothyroidism    Rheumatoid arthritis ([address]    Multiple closed fractures of ribs of both sides with routine healing    A-fib ([facility]    Essential hypertension    COVID-19    Sore throat    Depression  Resolved Problems:    Hyponatremia      ASSESSMENT:    · COVID-19 viral pneumonia  · Atrial fibrillation  · Rheumatoid arthritis  · Leukocytosis  · Severe weakness/deconditioning    PLAN:    · Continue Decadron  · Continue oxygen  · Continue vitamin D, zinc, vitamin C  · Continue Magic mouthwash  · Continue to monitor labs-CRP improving  · Encourage exercise and nutrition  · Will follow with jeanette Peña Setting    11:44 AM  2/13/2021

## 2021-02-14 ENCOUNTER — APPOINTMENT (OUTPATIENT)
Dept: GENERAL RADIOLOGY | Age: 86
DRG: 177 | End: 2021-02-14
Payer: MEDICARE

## 2021-02-14 LAB
ALBUMIN SERPL-MCNC: 2.7 G/DL (ref 3.5–5.2)
ALP BLD-CCNC: 96 U/L (ref 35–104)
ALT SERPL-CCNC: 18 U/L (ref 0–32)
ANION GAP SERPL CALCULATED.3IONS-SCNC: 9 MMOL/L (ref 7–16)
ANISOCYTOSIS: ABNORMAL
APTT: 33.3 SEC (ref 24.5–35.1)
AST SERPL-CCNC: 22 U/L (ref 0–31)
BASOPHILS ABSOLUTE: 0.04 E9/L (ref 0–0.2)
BASOPHILS RELATIVE PERCENT: 0.2 % (ref 0–2)
BILIRUB SERPL-MCNC: 0.6 MG/DL (ref 0–1.2)
BUN BLDV-MCNC: 41 MG/DL (ref 8–23)
C-REACTIVE PROTEIN: 3.4 MG/DL (ref 0–0.4)
CALCIUM SERPL-MCNC: 8.6 MG/DL (ref 8.6–10.2)
CHLORIDE BLD-SCNC: 99 MMOL/L (ref 98–107)
CO2: 26 MMOL/L (ref 22–29)
CREAT SERPL-MCNC: 0.9 MG/DL (ref 0.5–1)
D DIMER: 605 NG/ML DDU
EOSINOPHILS ABSOLUTE: 0.01 E9/L (ref 0.05–0.5)
EOSINOPHILS RELATIVE PERCENT: 0.1 % (ref 0–6)
FIBRINOGEN: 542 MG/DL (ref 225–540)
GFR AFRICAN AMERICAN: >60
GFR NON-AFRICAN AMERICAN: 59 ML/MIN/1.73
GLUCOSE BLD-MCNC: 104 MG/DL (ref 74–99)
HCT VFR BLD CALC: 33.4 % (ref 34–48)
HEMOGLOBIN: 10.5 G/DL (ref 11.5–15.5)
IMMATURE GRANULOCYTES #: 0.83 E9/L
IMMATURE GRANULOCYTES %: 4.6 % (ref 0–5)
INR BLD: 1.5
LYMPHOCYTES ABSOLUTE: 0.44 E9/L (ref 1.5–4)
LYMPHOCYTES RELATIVE PERCENT: 2.4 % (ref 20–42)
MCH RBC QN AUTO: 31.8 PG (ref 26–35)
MCHC RBC AUTO-ENTMCNC: 31.4 % (ref 32–34.5)
MCV RBC AUTO: 101.2 FL (ref 80–99.9)
MONOCYTES ABSOLUTE: 1.2 E9/L (ref 0.1–0.95)
MONOCYTES RELATIVE PERCENT: 6.7 % (ref 2–12)
NEUTROPHILS ABSOLUTE: 15.44 E9/L (ref 1.8–7.3)
NEUTROPHILS RELATIVE PERCENT: 86 % (ref 43–80)
PDW BLD-RTO: 14.2 FL (ref 11.5–15)
PLATELET # BLD: 626 E9/L (ref 130–450)
PMV BLD AUTO: 9.5 FL (ref 7–12)
POTASSIUM REFLEX MAGNESIUM: 4.5 MMOL/L (ref 3.5–5)
PROCALCITONIN: 0.1 NG/ML (ref 0–0.08)
PROTHROMBIN TIME: 17.4 SEC (ref 9.3–12.4)
RBC # BLD: 3.3 E12/L (ref 3.5–5.5)
SODIUM BLD-SCNC: 134 MMOL/L (ref 132–146)
TOTAL PROTEIN: 5.7 G/DL (ref 6.4–8.3)
WBC # BLD: 18 E9/L (ref 4.5–11.5)

## 2021-02-14 PROCEDURE — 2060000000 HC ICU INTERMEDIATE R&B

## 2021-02-14 PROCEDURE — 85025 COMPLETE CBC W/AUTO DIFF WBC: CPT

## 2021-02-14 PROCEDURE — 80053 COMPREHEN METABOLIC PANEL: CPT

## 2021-02-14 PROCEDURE — 6370000000 HC RX 637 (ALT 250 FOR IP): Performed by: INTERNAL MEDICINE

## 2021-02-14 PROCEDURE — 2580000003 HC RX 258: Performed by: FAMILY MEDICINE

## 2021-02-14 PROCEDURE — 94640 AIRWAY INHALATION TREATMENT: CPT

## 2021-02-14 PROCEDURE — 6360000002 HC RX W HCPCS: Performed by: FAMILY MEDICINE

## 2021-02-14 PROCEDURE — 2580000003 HC RX 258: Performed by: INTERNAL MEDICINE

## 2021-02-14 PROCEDURE — 86140 C-REACTIVE PROTEIN: CPT

## 2021-02-14 PROCEDURE — 85610 PROTHROMBIN TIME: CPT

## 2021-02-14 PROCEDURE — 71045 X-RAY EXAM CHEST 1 VIEW: CPT

## 2021-02-14 PROCEDURE — 84145 PROCALCITONIN (PCT): CPT

## 2021-02-14 PROCEDURE — 36415 COLL VENOUS BLD VENIPUNCTURE: CPT

## 2021-02-14 PROCEDURE — 2700000000 HC OXYGEN THERAPY PER DAY

## 2021-02-14 PROCEDURE — 6370000000 HC RX 637 (ALT 250 FOR IP): Performed by: FAMILY MEDICINE

## 2021-02-14 PROCEDURE — 85378 FIBRIN DEGRADE SEMIQUANT: CPT

## 2021-02-14 PROCEDURE — 85730 THROMBOPLASTIN TIME PARTIAL: CPT

## 2021-02-14 PROCEDURE — 85384 FIBRINOGEN ACTIVITY: CPT

## 2021-02-14 PROCEDURE — 99232 SBSQ HOSP IP/OBS MODERATE 35: CPT | Performed by: FAMILY MEDICINE

## 2021-02-14 RX ORDER — DEXAMETHASONE 6 MG/1
6 TABLET ORAL DAILY
Qty: 3 TABLET | Refills: 0 | OUTPATIENT
Start: 2021-02-15 | End: 2021-02-18

## 2021-02-14 RX ORDER — LANOLIN ALCOHOL/MO/W.PET/CERES
100 CREAM (GRAM) TOPICAL DAILY
Qty: 30 TABLET | Refills: 3 | OUTPATIENT
Start: 2021-02-15

## 2021-02-14 RX ORDER — CLOTRIMAZOLE 10 MG/1
10 LOZENGE ORAL; TOPICAL 4 TIMES DAILY
Status: DISCONTINUED | OUTPATIENT
Start: 2021-02-14 | End: 2021-02-16

## 2021-02-14 RX ORDER — DEXTROSE AND SODIUM CHLORIDE 5; .45 G/100ML; G/100ML
INJECTION, SOLUTION INTRAVENOUS CONTINUOUS
Status: DISCONTINUED | OUTPATIENT
Start: 2021-02-14 | End: 2021-02-15

## 2021-02-14 RX ORDER — VALSARTAN 160 MG/1
160 TABLET ORAL DAILY
Status: DISCONTINUED | OUTPATIENT
Start: 2021-02-14 | End: 2021-02-16 | Stop reason: HOSPADM

## 2021-02-14 RX ORDER — PREDNISONE 10 MG/1
TABLET ORAL
Qty: 18 TABLET | Refills: 0 | OUTPATIENT
Start: 2021-02-14

## 2021-02-14 RX ORDER — FLUCONAZOLE 100 MG/1
200 TABLET ORAL DAILY
Status: DISCONTINUED | OUTPATIENT
Start: 2021-02-14 | End: 2021-02-16 | Stop reason: HOSPADM

## 2021-02-14 RX ORDER — CHOLECALCIFEROL (VITAMIN D3) 50 MCG
2000 TABLET ORAL DAILY
Qty: 60 TABLET | Refills: 0 | OUTPATIENT
Start: 2021-02-15

## 2021-02-14 RX ORDER — DULOXETINE 40 MG/1
40 CAPSULE, DELAYED RELEASE ORAL DAILY
Qty: 60 CAPSULE | Refills: 3 | OUTPATIENT
Start: 2021-02-15

## 2021-02-14 RX ORDER — CLOTRIMAZOLE 10 MG/1
10 LOZENGE ORAL; TOPICAL 4 TIMES DAILY
Qty: 20 TABLET | Refills: 0 | OUTPATIENT
Start: 2021-02-14 | End: 2021-02-19

## 2021-02-14 RX ORDER — LIDOCAINE 4 G/G
1 PATCH TOPICAL DAILY
Qty: 30 PATCH | Refills: 0 | OUTPATIENT
Start: 2021-02-15

## 2021-02-14 RX ORDER — ASCORBIC ACID 500 MG
500 TABLET ORAL DAILY
Qty: 30 TABLET | Refills: 3 | OUTPATIENT
Start: 2021-02-15

## 2021-02-14 RX ORDER — HYDROCHLOROTHIAZIDE 25 MG/1
25 TABLET ORAL DAILY
Status: DISCONTINUED | OUTPATIENT
Start: 2021-02-14 | End: 2021-02-16 | Stop reason: HOSPADM

## 2021-02-14 RX ADMIN — CLOTRIMAZOLE 10 MG: 10 LOZENGE ORAL; TOPICAL at 20:36

## 2021-02-14 RX ADMIN — METOPROLOL TARTRATE 25 MG: 25 TABLET, FILM COATED ORAL at 20:36

## 2021-02-14 RX ADMIN — DEXTROSE AND SODIUM CHLORIDE: 5; 450 INJECTION, SOLUTION INTRAVENOUS at 12:05

## 2021-02-14 RX ADMIN — OXYCODONE HYDROCHLORIDE AND ACETAMINOPHEN 500 MG: 500 TABLET ORAL at 09:01

## 2021-02-14 RX ADMIN — CELECOXIB 200 MG: 100 CAPSULE ORAL at 20:35

## 2021-02-14 RX ADMIN — SODIUM CHLORIDE, PRESERVATIVE FREE 10 ML: 5 INJECTION INTRAVENOUS at 20:36

## 2021-02-14 RX ADMIN — PANTOPRAZOLE SODIUM 40 MG: 40 TABLET, DELAYED RELEASE ORAL at 05:14

## 2021-02-14 RX ADMIN — SODIUM CHLORIDE, PRESERVATIVE FREE 10 ML: 5 INJECTION INTRAVENOUS at 09:02

## 2021-02-14 RX ADMIN — HYDRALAZINE HYDROCHLORIDE 10 MG: 20 INJECTION INTRAMUSCULAR; INTRAVENOUS at 05:17

## 2021-02-14 RX ADMIN — FERROUS SULFATE TAB 325 MG (65 MG ELEMENTAL FE) 325 MG: 325 (65 FE) TAB at 09:01

## 2021-02-14 RX ADMIN — Medication 2000 UNITS: at 09:01

## 2021-02-14 RX ADMIN — Medication 100 MG: at 09:01

## 2021-02-14 RX ADMIN — FAMOTIDINE 20 MG: 20 TABLET ORAL at 20:36

## 2021-02-14 RX ADMIN — DULOXETINE HYDROCHLORIDE 40 MG: 20 CAPSULE, DELAYED RELEASE ORAL at 09:01

## 2021-02-14 RX ADMIN — BUDESONIDE AND FORMOTEROL FUMARATE DIHYDRATE 2 PUFF: 160; 4.5 AEROSOL RESPIRATORY (INHALATION) at 08:24

## 2021-02-14 RX ADMIN — MONTELUKAST SODIUM 10 MG: 10 TABLET, FILM COATED ORAL at 20:36

## 2021-02-14 RX ADMIN — DEXAMETHASONE 6 MG: 4 TABLET ORAL at 09:01

## 2021-02-14 RX ADMIN — CELECOXIB 200 MG: 100 CAPSULE ORAL at 09:01

## 2021-02-14 RX ADMIN — CETIRIZINE HYDROCHLORIDE 10 MG: 10 TABLET, FILM COATED ORAL at 09:01

## 2021-02-14 RX ADMIN — LEVOTHYROXINE SODIUM 100 MCG: 100 TABLET ORAL at 05:14

## 2021-02-14 RX ADMIN — FLECAINIDE ACETATE 50 MG: 50 TABLET ORAL at 20:46

## 2021-02-14 RX ADMIN — BUDESONIDE AND FORMOTEROL FUMARATE DIHYDRATE 2 PUFF: 160; 4.5 AEROSOL RESPIRATORY (INHALATION) at 21:00

## 2021-02-14 RX ADMIN — RIVAROXABAN 15 MG: 15 TABLET, FILM COATED ORAL at 09:00

## 2021-02-14 RX ADMIN — CLOTRIMAZOLE 10 MG: 10 LOZENGE ORAL; TOPICAL at 12:05

## 2021-02-14 RX ADMIN — DOCUSATE SODIUM 200 MG: 100 CAPSULE, LIQUID FILLED ORAL at 09:01

## 2021-02-14 RX ADMIN — GABAPENTIN 100 MG: 100 CAPSULE ORAL at 09:01

## 2021-02-14 RX ADMIN — PANTOPRAZOLE SODIUM 40 MG: 40 TABLET, DELAYED RELEASE ORAL at 15:58

## 2021-02-14 RX ADMIN — FLUCONAZOLE 200 MG: 100 TABLET ORAL at 12:05

## 2021-02-14 RX ADMIN — GABAPENTIN 100 MG: 100 CAPSULE ORAL at 20:36

## 2021-02-14 RX ADMIN — ACETAMINOPHEN 650 MG: 325 TABLET ORAL at 05:16

## 2021-02-14 RX ADMIN — ZINC SULFATE 220 MG (50 MG) CAPSULE 50 MG: CAPSULE at 09:00

## 2021-02-14 RX ADMIN — CLOTRIMAZOLE 10 MG: 10 LOZENGE ORAL; TOPICAL at 15:58

## 2021-02-14 ASSESSMENT — PAIN SCALES - GENERAL
PAINLEVEL_OUTOF10: 4
PAINLEVEL_OUTOF10: 0
PAINLEVEL_OUTOF10: 4
PAINLEVEL_OUTOF10: 0
PAINLEVEL_OUTOF10: 4

## 2021-02-14 ASSESSMENT — PAIN DESCRIPTION - FREQUENCY: FREQUENCY: INTERMITTENT

## 2021-02-14 ASSESSMENT — PAIN DESCRIPTION - DESCRIPTORS
DESCRIPTORS: ACHING
DESCRIPTORS: ACHING;HEADACHE;DISCOMFORT

## 2021-02-14 ASSESSMENT — PAIN DESCRIPTION - PAIN TYPE
TYPE: ACUTE PAIN
TYPE: ACUTE PAIN

## 2021-02-14 ASSESSMENT — PAIN DESCRIPTION - LOCATION
LOCATION: HEAD
LOCATION: HEAD

## 2021-02-14 ASSESSMENT — PAIN DESCRIPTION - ONSET: ONSET: ON-GOING

## 2021-02-14 NOTE — PLAN OF CARE
Problem: Skin Integrity:  Goal: Will show no infection signs and symptoms  Description: Will show no infection signs and symptoms  Outcome: Met This Shift  Goal: Absence of new skin breakdown  Description: Absence of new skin breakdown  Outcome: Met This Shift     Problem: Falls - Risk of:  Goal: Will remain free from falls  Description: Will remain free from falls  Outcome: Met This Shift  Goal: Absence of physical injury  Description: Absence of physical injury  Outcome: Met This Shift     Problem: Pain:  Goal: Pain level will decrease  Description: Pain level will decrease  Outcome: Met This Shift  Goal: Control of acute pain  Description: Control of acute pain  Outcome: Met This Shift     Problem: Airway Clearance - Ineffective  Goal: Achieve or maintain patent airway  Outcome: Met This Shift     Problem: Breathing Pattern - Ineffective  Goal: Ability to achieve and maintain a regular respiratory rate  Outcome: Met This Shift

## 2021-02-14 NOTE — CARE COORDINATION
Chase Neighbours  from [place] [address] and Otilio Rubin from [address] [place] [address] aware of pt's discharge being held today by ID.  Cinthia GomezRN,CM./

## 2021-02-14 NOTE — PROGRESS NOTES
[facility]  Progress Note    Subjective:  Patient feels mostly unchanged from yesterday. Felt like she may have had a pill stuck in the throat and was taking water to try to wash it down. No SOB while using oxygen. Still having some rib pain. Denies any nausea. She is frustrated with the amount of pills she has to take daily. Per ID note, discharge being held today    Objective:  Patient was awake alert, not in acute distress, was not oxygen saturation maintained 93%, not in severe pain  BP (!) 91/41   Pulse 75   Temp 98.2 °F (36.8 °C) (Oral)   Resp 18   Ht 5' (1.524 m)   Wt 136 lb (61.7 kg)   SpO2 94%   BMI 26.56 kg/m²   Heart:  RRR, no murmurs, gallops, or rubs.   Lungs:  CTA bilaterally, no wheeze, rales or rhonchi  Abd: bowel sounds present, nontender, nondistended, no masses  Extrem:  No clubbing, cyanosis, or edema    Recent Results (from the past 96 hour(s))   CBC Auto Differential    Collection Time: 02/11/21  4:56 AM   Result Value Ref Range    WBC 11.5 4.5 - 11.5 E9/L    RBC 3.19 (L) 3.50 - 5.50 E12/L    Hemoglobin 10.4 (L) 11.5 - 15.5 g/dL    Hematocrit 31.2 (L) 34.0 - 48.0 %    MCV 97.8 80.0 - 99.9 fL    MCH 32.6 26.0 - 35.0 pg    MCHC 33.3 32.0 - 34.5 %    RDW 13.9 11.5 - 15.0 fL    Platelets 888 (H) 248 - 450 E9/L    MPV 9.7 7.0 - 12.0 fL    Neutrophils % 88.8 (H) 43.0 - 80.0 %    Immature Granulocytes % 2.3 0.0 - 5.0 %    Lymphocytes % 3.0 (L) 20.0 - 42.0 %    Monocytes % 5.8 2.0 - 12.0 %    Eosinophils % 0.0 0.0 - 6.0 %    Basophils % 0.1 0.0 - 2.0 %    Neutrophils Absolute 10.19 (H) 1.80 - 7.30 E9/L    Immature Granulocytes # 0.27 E9/L    Lymphocytes Absolute 0.35 (L) 1.50 - 4.00 E9/L    Monocytes Absolute 0.67 0.10 - 0.95 E9/L    Eosinophils Absolute 0.00 (L) 0.05 - 0.50 E9/L    Basophils Absolute 0.01 0.00 - 0.20 E9/L    Anisocytosis 1+     Poikilocytes 1+     Ovalocytes 1+    Comprehensive Metabolic Panel w/ Reflex to MG    Collection Time: 02/11/21 4:56 AM   Result Value Ref Range    Sodium 129 (L) 132 - 146 mmol/L    Potassium reflex Magnesium 4.1 3.5 - 5.0 mmol/L    Chloride 97 (L) 98 - 107 mmol/L    CO2 23 22 - 29 mmol/L    Anion Gap 9 7 - 16 mmol/L    Glucose 117 (H) 74 - 99 mg/dL    BUN 46 (H) 8 - 23 mg/dL    CREATININE 1.0 0.5 - 1.0 mg/dL    GFR Non-African American 53 >=60 mL/min/1.73    GFR African American >60     Calcium 8.4 (L) 8.6 - 10.2 mg/dL    Total Protein 5.6 (L) 6.4 - 8.3 g/dL    Albumin 2.5 (L) 3.5 - 5.2 g/dL    Total Bilirubin 0.4 0.0 - 1.2 mg/dL    Alkaline Phosphatase 92 35 - 104 U/L    ALT 37 (H) 0 - 32 U/L    AST 58 (H) 0 - 31 U/L   Protime-INR    Collection Time: 02/11/21  4:56 AM   Result Value Ref Range    Protime 20.3 (H) 9.3 - 12.4 sec    INR 1.8    APTT    Collection Time: 02/11/21  4:56 AM   Result Value Ref Range    aPTT 33.8 24.5 - 35.1 sec   Fibrinogen    Collection Time: 02/11/21  4:56 AM   Result Value Ref Range    Fibrinogen 538 225 - 540 mg/dL   C-Reactive Protein    Collection Time: 02/11/21  4:56 AM   Result Value Ref Range    CRP 4.9 (H) 0.0 - 0.4 mg/dL   D-Dimer, Quantitative    Collection Time: 02/11/21  4:56 AM   Result Value Ref Range    D-Dimer, Quant 651 ng/mL DDU   OSMOLALITY, SERUM    Collection Time: 02/11/21  9:44 AM   Result Value Ref Range    Osmolality 289 285 - 310 mOsm/Kg   Osmolality, urine    Collection Time: 02/11/21 10:40 AM   Result Value Ref Range    Osmolality, Ur 672 300 - 900 mOsm/kg   URINE ELECTROLYTES    Collection Time: 02/11/21 10:40 AM   Result Value Ref Range    Sodium, Ur 77 Not Established mmol/L    Potassium, Ur 37.7 Not Established mmol/L    Chloride 53 Not Established mmol/L   CBC Auto Differential    Collection Time: 02/12/21  3:30 AM   Result Value Ref Range    WBC 12.4 (H) 4.5 - 11.5 E9/L    RBC 3.20 (L) 3.50 - 5.50 E12/L    Hemoglobin 10.2 (L) 11.5 - 15.5 g/dL    Hematocrit 31.7 (L) 34.0 - 48.0 %    MCV 99.1 80.0 - 99.9 fL    MCH 31.9 26.0 - 35.0 pg    MCHC 32.2 32.0 - 34.5 % CBC Auto Differential    Collection Time: 02/13/21  4:30 AM   Result Value Ref Range    WBC 15.6 (H) 4.5 - 11.5 E9/L    RBC 3.15 (L) 3.50 - 5.50 E12/L    Hemoglobin 10.2 (L) 11.5 - 15.5 g/dL    Hematocrit 31.5 (L) 34.0 - 48.0 %    .0 (H) 80.0 - 99.9 fL    MCH 32.4 26.0 - 35.0 pg    MCHC 32.4 32.0 - 34.5 %    RDW 14.0 11.5 - 15.0 fL    Platelets 038 (H) 867 - 450 E9/L    MPV 9.4 7.0 - 12.0 fL    Neutrophils % 83.5 (H) 43.0 - 80.0 %    Immature Granulocytes % 4.4 0.0 - 5.0 %    Lymphocytes % 2.9 (L) 20.0 - 42.0 %    Monocytes % 9.0 2.0 - 12.0 %    Eosinophils % 0.0 0.0 - 6.0 %    Basophils % 0.2 0.0 - 2.0 %    Neutrophils Absolute 13.03 (H) 1.80 - 7.30 E9/L    Immature Granulocytes # 0.68 E9/L    Lymphocytes Absolute 0.45 (L) 1.50 - 4.00 E9/L    Monocytes Absolute 1.40 (H) 0.10 - 0.95 E9/L    Eosinophils Absolute 0.00 (L) 0.05 - 0.50 E9/L    Basophils Absolute 0.03 0.00 - 0.20 E9/L    Polychromasia 1+     Poikilocytes 1+     Ovalocytes 1+    Comprehensive Metabolic Panel w/ Reflex to MG    Collection Time: 02/13/21  4:30 AM   Result Value Ref Range    Sodium 132 132 - 146 mmol/L    Potassium reflex Magnesium 4.0 3.5 - 5.0 mmol/L    Chloride 101 98 - 107 mmol/L    CO2 24 22 - 29 mmol/L    Anion Gap 7 7 - 16 mmol/L    Glucose 102 (H) 74 - 99 mg/dL    BUN 37 (H) 8 - 23 mg/dL    CREATININE 0.8 0.5 - 1.0 mg/dL    GFR Non-African American >60 >=60 mL/min/1.73    GFR African American >60     Calcium 8.4 (L) 8.6 - 10.2 mg/dL    Total Protein 5.5 (L) 6.4 - 8.3 g/dL    Albumin 2.6 (L) 3.5 - 5.2 g/dL    Total Bilirubin 0.5 0.0 - 1.2 mg/dL    Alkaline Phosphatase 89 35 - 104 U/L    ALT 22 0 - 32 U/L    AST 24 0 - 31 U/L   Protime-INR    Collection Time: 02/13/21  4:30 AM   Result Value Ref Range    Protime 16.0 (H) 9.3 - 12.4 sec    INR 1.3    APTT    Collection Time: 02/13/21  4:30 AM   Result Value Ref Range    aPTT 33.2 24.5 - 35.1 sec   Fibrinogen    Collection Time: 02/13/21  4:30 AM   Result Value Ref Range Fibrinogen 480 225 - 540 mg/dL   C-Reactive Protein    Collection Time: 02/13/21  4:30 AM   Result Value Ref Range    CRP 1.8 (H) 0.0 - 0.4 mg/dL   D-Dimer, Quantitative    Collection Time: 02/13/21  4:30 AM   Result Value Ref Range    D-Dimer, Quant 616 ng/mL DDU   CBC Auto Differential    Collection Time: 02/14/21  5:30 AM   Result Value Ref Range    WBC 18.0 (H) 4.5 - 11.5 E9/L    RBC 3.30 (L) 3.50 - 5.50 E12/L    Hemoglobin 10.5 (L) 11.5 - 15.5 g/dL    Hematocrit 33.4 (L) 34.0 - 48.0 %    .2 (H) 80.0 - 99.9 fL    MCH 31.8 26.0 - 35.0 pg    MCHC 31.4 (L) 32.0 - 34.5 %    RDW 14.2 11.5 - 15.0 fL    Platelets 987 (H) 855 - 450 E9/L    MPV 9.5 7.0 - 12.0 fL    Neutrophils % 86.0 (H) 43.0 - 80.0 %    Immature Granulocytes % 4.6 0.0 - 5.0 %    Lymphocytes % 2.4 (L) 20.0 - 42.0 %    Monocytes % 6.7 2.0 - 12.0 %    Eosinophils % 0.1 0.0 - 6.0 %    Basophils % 0.2 0.0 - 2.0 %    Neutrophils Absolute 15.44 (H) 1.80 - 7.30 E9/L    Immature Granulocytes # 0.83 E9/L    Lymphocytes Absolute 0.44 (L) 1.50 - 4.00 E9/L    Monocytes Absolute 1.20 (H) 0.10 - 0.95 E9/L    Eosinophils Absolute 0.01 (L) 0.05 - 0.50 E9/L    Basophils Absolute 0.04 0.00 - 0.20 E9/L    Anisocytosis 1+    Comprehensive Metabolic Panel w/ Reflex to MG    Collection Time: 02/14/21  5:30 AM   Result Value Ref Range    Sodium 134 132 - 146 mmol/L    Potassium reflex Magnesium 4.5 3.5 - 5.0 mmol/L    Chloride 99 98 - 107 mmol/L    CO2 26 22 - 29 mmol/L    Anion Gap 9 7 - 16 mmol/L    Glucose 104 (H) 74 - 99 mg/dL    BUN 41 (H) 8 - 23 mg/dL    CREATININE 0.9 0.5 - 1.0 mg/dL    GFR Non-African American 59 >=60 mL/min/1.73    GFR African American >60     Calcium 8.6 8.6 - 10.2 mg/dL    Total Protein 5.7 (L) 6.4 - 8.3 g/dL    Albumin 2.7 (L) 3.5 - 5.2 g/dL    Total Bilirubin 0.6 0.0 - 1.2 mg/dL    Alkaline Phosphatase 96 35 - 104 U/L    ALT 18 0 - 32 U/L    AST 22 0 - 31 U/L   Protime-INR    Collection Time: 02/14/21  5:30 AM   Result Value Ref Range Protime 17.4 (H) 9.3 - 12.4 sec    INR 1.5    APTT    Collection Time: 02/14/21  5:30 AM   Result Value Ref Range    aPTT 33.3 24.5 - 35.1 sec   Fibrinogen    Collection Time: 02/14/21  5:30 AM   Result Value Ref Range    Fibrinogen 542 (H) 225 - 540 mg/dL   D-Dimer, Quantitative    Collection Time: 02/14/21  5:30 AM   Result Value Ref Range    D-Dimer, Quant 605 ng/mL DDU   C-Reactive Protein    Collection Time: 02/14/21  5:55 AM   Result Value Ref Range    CRP 3.4 (H) 0.0 - 0.4 mg/dL     XR CHEST PORTABLE   Preliminary Result   Multifocal bilateral pulmonary infiltrates most consistent with COVID   pneumonia. CT CHEST WO CONTRAST   Final Result   Multifocal ground-glass and interstitial opacities, consistent with   atypical/COVID pneumonia. The distribution is very similar. Aeration is   slightly improved since 02/07/2021. Mild cardiomegaly. Moderate-sized hiatal hernia. Stable calcified mass in the central canal the thoracic spine. XR CHEST PORTABLE   Final Result   No new process has occurred. Partial resolution of right lower lobe   infiltrate suggested      XR RIBS BILATERAL (MIN 4 VIEWS)   Final Result   Acute left   10 8th posterolateral rib fractures. Healed remote left 9th through 11th rib fractures. Probable remote right 6   rib fracture. Diffuse and bilateral airspace opacities. CTA CHEST W CONTRAST   Final Result   No evidence of pulmonary emboli. Multifocal patchy and ground-glass airspace disease involving all lobes   consistent with multifocal pneumonia and suspicious for COVID-19 pneumonia. Cardiomegaly with nonspecific mediastinal nodes, unchanged. Intraspinal calcified mass at the level of T7, unchanged since the prior   examination.         Assessment:    Active Hospital Problems    Diagnosis Date Noted    Depression [F32.9] 02/12/2021    Sore throat [J02.9] 02/09/2021    Acute respiratory failure with hypoxia ([address] [J96.01] 02/08/2021    COVID-19 [U07.1] 02/08/2021    Essential hypertension [I10] 05/23/2016    Multiple closed fractures of ribs of both sides with routine healing [S22.43XD] 12/16/2014    Hypothyroidism [E03.9] 06/10/2011    Rheumatoid arthritis ([facility] [M06.9] 06/10/2011    A-fib ([facility] [I48.91] 06/09/2011       Plan:  Acute respiratory failure with hypoxia secondary to COVID-19   - Telemetry   - Droplet + isolation  - Vitals q 4 hours    - Incentive spirometry q 1 hour   - ID following   - WBC 8.6 --> 7.8--> 8.2--> 13.2-->11.5-->12.4->18 . She is on 3 L O2 via nasal cannula. - D-dimer 419--> 1782--> 689 --> 651 --> 557-->616-->605. CTA chest in ED on 02/07 was negative for PE.   - Fibrinogen >700 --> 659--> 563 --> 538--> 511  - Respiratory panel from 02/08 was COVID +  - Legionella urine antigen and Strep pneumoniae urine antigen are negative  - On vitamin C 500 mg QD, thiamine 100 mg QD, vitamin D 2000 IU QD, and zinc 50 mg QD   - On dexamethasone 6 mg QD. Day 6    - Remdesivir course finished   - Inhalers   - Robitussin DM 5 ml q 4 hrs PRN for cough   - Daily weights and I/Os  - Social Work on case for discharge planning. Family is now pursuing discharge home with Meghan Chappell.  - ID requesting to hold discharge at this time. [facility] and [facility] notified. ]  - PT/OT following. AM-PAC score 8/24 from PT. AM-PAC score 11/24 from OT.     Essential HTN   - Vitals q 4 hours   - Continue home Lopressor 25 mg BID   - After episode of hypotension, BP meds were decreased back to 160 valsartan 25 hctz-she did receive 10 mg hydralazine before low BP reading.  Asymptomatic    Hypothyroidism  - Continue home Synthroid 100 mcg QD      RA  - Increased home Celebrex 200 mg QD to 200 mg BID to help with pain from multiple rib fractures  - Continue methotrexate 5 mg Monday at lunch and 10 mg Monday evening on weekly basis      Atrial fibrillation   - Continue home flecainide 50 mg BID   - Continue home Lopressor 25 mg BID  - Continue home Xarelto 15 mg QD       Multiple rib fractures   X-ray from 02/07 showed acute left 7th and 8th posterolateral rib fractures. Healed remote left 9th-11th rib fractures. Probable remote 6th rib fracture. - Abdominal binder to be placed over ribs with movement (ie changing positions, working with PT, etc.)   - Increased home Celebrex 200 mg QD to 200 mg BID to help with pain from multiple rib fractures  - Tylenol 650 mg q 6 hrs PRN for pain  - Lidocaine patch q 12 hours for pain     Sore throat  - Cepacol throat lozenges q 2 hrs PRN      Constipation  - Increased home Colace 100 mg QD to 200 mg QD  - On Miralax 17 g QD today   - Soap suds enema PRN      Hyponatremia   - Resolved  - Decreased HCTZ 50 mg QD back to home dose of 25 mg QD on 02/11 due to hyponatremia      Depression  Patient does not want her Cymbalta 20 mg QD increased   - Continue 20 mg QD     Continue additional home medications:   - Allegra 180 mg QD substituted with cetirizine 10 mg QD  - Cymbalta 20 mg QD   - Pepcid 40 mg HS  - Ferrous sulfate 325 mg every other day   - Gabapentin 100 mg BID   - Leucovorin calcium 25 mg every Tuesday   - Singulair 10 mg HS   - Omeprazole 40 mg BID substituted with pantoprazole 40 mg BID      Diet: General   Code: Full   DVT prophylaxis: Xarelto 15 mg QD     Today :Spoke with Roslyn Martinez, informed of holding discharge today.  All questions answered                                                                                                           Electronically signed by Cheryl Sutherland MD on 2/14/2021 at 11:11 AM

## 2021-02-15 LAB
ALBUMIN SERPL-MCNC: 2.9 G/DL (ref 3.5–5.2)
ALP BLD-CCNC: 104 U/L (ref 35–104)
ALT SERPL-CCNC: 16 U/L (ref 0–32)
ANION GAP SERPL CALCULATED.3IONS-SCNC: 10 MMOL/L (ref 7–16)
APTT: 27.6 SEC (ref 24.5–35.1)
AST SERPL-CCNC: 19 U/L (ref 0–31)
BACTERIA: ABNORMAL /HPF
BASOPHILS ABSOLUTE: 0.05 E9/L (ref 0–0.2)
BASOPHILS RELATIVE PERCENT: 0.2 % (ref 0–2)
BILIRUB SERPL-MCNC: 0.6 MG/DL (ref 0–1.2)
BILIRUBIN URINE: NEGATIVE
BLOOD, URINE: NEGATIVE
BUN BLDV-MCNC: 39 MG/DL (ref 8–23)
C-REACTIVE PROTEIN: 1.5 MG/DL (ref 0–0.4)
CALCIUM SERPL-MCNC: 8.1 MG/DL (ref 8.6–10.2)
CHLORIDE BLD-SCNC: 95 MMOL/L (ref 98–107)
CLARITY: ABNORMAL
CO2: 23 MMOL/L (ref 22–29)
COLOR: YELLOW
CREAT SERPL-MCNC: 0.9 MG/DL (ref 0.5–1)
D DIMER: 582 NG/ML DDU
EKG ATRIAL RATE: 71 BPM
EKG P AXIS: 56 DEGREES
EKG P-R INTERVAL: 174 MS
EKG Q-T INTERVAL: 442 MS
EKG QRS DURATION: 82 MS
EKG QTC CALCULATION (BAZETT): 480 MS
EKG R AXIS: -14 DEGREES
EKG T AXIS: 23 DEGREES
EKG VENTRICULAR RATE: 71 BPM
EOSINOPHILS ABSOLUTE: 0 E9/L (ref 0.05–0.5)
EOSINOPHILS RELATIVE PERCENT: 0 % (ref 0–6)
EPITHELIAL CELLS, UA: ABNORMAL /HPF
FIBRINOGEN: 544 MG/DL (ref 225–540)
GFR AFRICAN AMERICAN: >60
GFR NON-AFRICAN AMERICAN: 59 ML/MIN/1.73
GLUCOSE BLD-MCNC: 147 MG/DL (ref 74–99)
GLUCOSE URINE: NEGATIVE MG/DL
HCT VFR BLD CALC: 35.9 % (ref 34–48)
HEMOGLOBIN: 11.4 G/DL (ref 11.5–15.5)
IMMATURE GRANULOCYTES #: 0.93 E9/L
IMMATURE GRANULOCYTES %: 4.5 % (ref 0–5)
INR BLD: 1.1
KETONES, URINE: NEGATIVE MG/DL
LEUKOCYTE ESTERASE, URINE: ABNORMAL
LYMPHOCYTES ABSOLUTE: 0.45 E9/L (ref 1.5–4)
LYMPHOCYTES RELATIVE PERCENT: 2.2 % (ref 20–42)
MCH RBC QN AUTO: 32 PG (ref 26–35)
MCHC RBC AUTO-ENTMCNC: 31.8 % (ref 32–34.5)
MCV RBC AUTO: 100.8 FL (ref 80–99.9)
MONOCYTES ABSOLUTE: 1.31 E9/L (ref 0.1–0.95)
MONOCYTES RELATIVE PERCENT: 6.3 % (ref 2–12)
NEUTROPHILS ABSOLUTE: 18.04 E9/L (ref 1.8–7.3)
NEUTROPHILS RELATIVE PERCENT: 86.8 % (ref 43–80)
NITRITE, URINE: NEGATIVE
OVALOCYTES: ABNORMAL
PDW BLD-RTO: 14.4 FL (ref 11.5–15)
PH UA: >=9 (ref 5–9)
PLATELET # BLD: 692 E9/L (ref 130–450)
PMV BLD AUTO: 9.5 FL (ref 7–12)
POIKILOCYTES: ABNORMAL
POLYCHROMASIA: ABNORMAL
POTASSIUM REFLEX MAGNESIUM: 4.2 MMOL/L (ref 3.5–5)
PROTEIN UA: 30 MG/DL
PROTHROMBIN TIME: 12.9 SEC (ref 9.3–12.4)
RBC # BLD: 3.56 E12/L (ref 3.5–5.5)
RBC UA: ABNORMAL /HPF (ref 0–2)
SODIUM BLD-SCNC: 128 MMOL/L (ref 132–146)
SPECIFIC GRAVITY UA: 1.01 (ref 1–1.03)
TEAR DROP CELLS: ABNORMAL
TOTAL PROTEIN: 5.9 G/DL (ref 6.4–8.3)
UROBILINOGEN, URINE: 0.2 E.U./DL
WBC # BLD: 20.8 E9/L (ref 4.5–11.5)
WBC UA: ABNORMAL /HPF (ref 0–5)

## 2021-02-15 PROCEDURE — 36415 COLL VENOUS BLD VENIPUNCTURE: CPT

## 2021-02-15 PROCEDURE — 80053 COMPREHEN METABOLIC PANEL: CPT

## 2021-02-15 PROCEDURE — 85730 THROMBOPLASTIN TIME PARTIAL: CPT

## 2021-02-15 PROCEDURE — 85610 PROTHROMBIN TIME: CPT

## 2021-02-15 PROCEDURE — 6370000000 HC RX 637 (ALT 250 FOR IP): Performed by: FAMILY MEDICINE

## 2021-02-15 PROCEDURE — 2580000003 HC RX 258: Performed by: FAMILY MEDICINE

## 2021-02-15 PROCEDURE — 93005 ELECTROCARDIOGRAM TRACING: CPT | Performed by: FAMILY MEDICINE

## 2021-02-15 PROCEDURE — 2700000000 HC OXYGEN THERAPY PER DAY

## 2021-02-15 PROCEDURE — 6370000000 HC RX 637 (ALT 250 FOR IP): Performed by: STUDENT IN AN ORGANIZED HEALTH CARE EDUCATION/TRAINING PROGRAM

## 2021-02-15 PROCEDURE — 85025 COMPLETE CBC W/AUTO DIFF WBC: CPT

## 2021-02-15 PROCEDURE — 85378 FIBRIN DEGRADE SEMIQUANT: CPT

## 2021-02-15 PROCEDURE — 86140 C-REACTIVE PROTEIN: CPT

## 2021-02-15 PROCEDURE — 85384 FIBRINOGEN ACTIVITY: CPT

## 2021-02-15 PROCEDURE — 6360000002 HC RX W HCPCS: Performed by: FAMILY MEDICINE

## 2021-02-15 PROCEDURE — 99232 SBSQ HOSP IP/OBS MODERATE 35: CPT | Performed by: FAMILY MEDICINE

## 2021-02-15 PROCEDURE — 93010 ELECTROCARDIOGRAM REPORT: CPT | Performed by: INTERNAL MEDICINE

## 2021-02-15 PROCEDURE — 2060000000 HC ICU INTERMEDIATE R&B

## 2021-02-15 PROCEDURE — 94640 AIRWAY INHALATION TREATMENT: CPT

## 2021-02-15 PROCEDURE — 81001 URINALYSIS AUTO W/SCOPE: CPT

## 2021-02-15 PROCEDURE — 87186 SC STD MICRODIL/AGAR DIL: CPT

## 2021-02-15 PROCEDURE — 6370000000 HC RX 637 (ALT 250 FOR IP): Performed by: INTERNAL MEDICINE

## 2021-02-15 PROCEDURE — 2580000003 HC RX 258: Performed by: INTERNAL MEDICINE

## 2021-02-15 PROCEDURE — 87088 URINE BACTERIA CULTURE: CPT

## 2021-02-15 RX ADMIN — MONTELUKAST SODIUM 10 MG: 10 TABLET, FILM COATED ORAL at 21:13

## 2021-02-15 RX ADMIN — FLUCONAZOLE 200 MG: 100 TABLET ORAL at 09:37

## 2021-02-15 RX ADMIN — HYDROCHLOROTHIAZIDE 25 MG: 25 TABLET ORAL at 09:36

## 2021-02-15 RX ADMIN — GABAPENTIN 100 MG: 100 CAPSULE ORAL at 09:37

## 2021-02-15 RX ADMIN — CETIRIZINE HYDROCHLORIDE 10 MG: 10 TABLET, FILM COATED ORAL at 09:36

## 2021-02-15 RX ADMIN — FLECAINIDE ACETATE 50 MG: 50 TABLET ORAL at 21:14

## 2021-02-15 RX ADMIN — GABAPENTIN 100 MG: 100 CAPSULE ORAL at 21:14

## 2021-02-15 RX ADMIN — DOCUSATE SODIUM 200 MG: 100 CAPSULE, LIQUID FILLED ORAL at 09:37

## 2021-02-15 RX ADMIN — VALSARTAN 160 MG: 160 TABLET, FILM COATED ORAL at 09:36

## 2021-02-15 RX ADMIN — DEXTROSE AND SODIUM CHLORIDE: 5; 450 INJECTION, SOLUTION INTRAVENOUS at 12:45

## 2021-02-15 RX ADMIN — Medication 100 MG: at 09:37

## 2021-02-15 RX ADMIN — CLOTRIMAZOLE 10 MG: 10 LOZENGE ORAL; TOPICAL at 09:38

## 2021-02-15 RX ADMIN — OXYCODONE HYDROCHLORIDE AND ACETAMINOPHEN 500 MG: 500 TABLET ORAL at 09:38

## 2021-02-15 RX ADMIN — BUDESONIDE AND FORMOTEROL FUMARATE DIHYDRATE 2 PUFF: 160; 4.5 AEROSOL RESPIRATORY (INHALATION) at 19:54

## 2021-02-15 RX ADMIN — METOPROLOL TARTRATE 25 MG: 25 TABLET, FILM COATED ORAL at 09:38

## 2021-02-15 RX ADMIN — LEVOTHYROXINE SODIUM 100 MCG: 100 TABLET ORAL at 06:01

## 2021-02-15 RX ADMIN — PANTOPRAZOLE SODIUM 40 MG: 40 TABLET, DELAYED RELEASE ORAL at 16:44

## 2021-02-15 RX ADMIN — METOPROLOL TARTRATE 25 MG: 25 TABLET, FILM COATED ORAL at 21:13

## 2021-02-15 RX ADMIN — CLOTRIMAZOLE 10 MG: 10 LOZENGE ORAL; TOPICAL at 16:45

## 2021-02-15 RX ADMIN — CELECOXIB 200 MG: 100 CAPSULE ORAL at 09:38

## 2021-02-15 RX ADMIN — SODIUM CHLORIDE, PRESERVATIVE FREE 10 ML: 5 INJECTION INTRAVENOUS at 21:13

## 2021-02-15 RX ADMIN — CLOTRIMAZOLE 10 MG: 10 LOZENGE ORAL; TOPICAL at 21:13

## 2021-02-15 RX ADMIN — BUDESONIDE AND FORMOTEROL FUMARATE DIHYDRATE 2 PUFF: 160; 4.5 AEROSOL RESPIRATORY (INHALATION) at 08:28

## 2021-02-15 RX ADMIN — Medication 2000 UNITS: at 09:37

## 2021-02-15 RX ADMIN — DULOXETINE HYDROCHLORIDE 40 MG: 20 CAPSULE, DELAYED RELEASE ORAL at 09:37

## 2021-02-15 RX ADMIN — RIVAROXABAN 15 MG: 15 TABLET, FILM COATED ORAL at 09:37

## 2021-02-15 RX ADMIN — DEXAMETHASONE 6 MG: 4 TABLET ORAL at 09:37

## 2021-02-15 RX ADMIN — DEXTROSE AND SODIUM CHLORIDE: 5; 450 INJECTION, SOLUTION INTRAVENOUS at 01:04

## 2021-02-15 RX ADMIN — POLYETHYLENE GLYCOL 3350 17 G: 17 POWDER, FOR SOLUTION ORAL at 09:37

## 2021-02-15 RX ADMIN — FLECAINIDE ACETATE 50 MG: 50 TABLET ORAL at 09:37

## 2021-02-15 RX ADMIN — CELECOXIB 200 MG: 100 CAPSULE ORAL at 21:13

## 2021-02-15 RX ADMIN — ZINC SULFATE 220 MG (50 MG) CAPSULE 50 MG: CAPSULE at 09:38

## 2021-02-15 RX ADMIN — PANTOPRAZOLE SODIUM 40 MG: 40 TABLET, DELAYED RELEASE ORAL at 06:01

## 2021-02-15 RX ADMIN — FAMOTIDINE 20 MG: 20 TABLET ORAL at 21:14

## 2021-02-15 ASSESSMENT — PAIN SCALES - GENERAL
PAINLEVEL_OUTOF10: 0
PAINLEVEL_OUTOF10: 4
PAINLEVEL_OUTOF10: 0
PAINLEVEL_OUTOF10: 0

## 2021-02-15 NOTE — CARE COORDINATION
Social Work/Discharge Planning:  Chart reviewed. Patient discharge was held on 2/14. Called Brittney with [facility] and confirmed patient oxygen was delivered yesterday to her home. Hanna Saunders requested updated therapy notes. Notified RN. Called patient daughter Nasir Fischer (ph: [phone]) and confirmed plan remains home at discharge with [address] Patient will need an ambulance for transport home and form in soft chart. Will continue to follow.   Electronically signed by JU Rodrigues on 2/15/2021 at 11:14 AM

## 2021-02-15 NOTE — PROGRESS NOTES
2/15/2021  1:29 PM      Comprehensive Nutrition Assessment    Type and Reason for Visit:  Initial    Nutrition Recommendations/Plan: Continue current diet and ONS, as tolerated    Nutrition Assessment:  NA-LOS, pt admit 2/2 SOB & L sided chest pain, dx w/ COVID-19. Will start Magic Cup BID & Ensure Enlive daily to meet increased needs. Malnutrition Assessment:  Malnutrition Status: At risk for malnutrition (Comment)    Context:  Acute Illness     Findings of the 6 clinical characteristics of malnutrition:  Energy Intake:  7 - 50% or less of estimated energy requirements for 5 or more days  Weight Loss:  No significant weight loss     Body Fat Loss:  Unable to assess     Muscle Mass Loss:  Unable to assess    Fluid Accumulation:  1 - Mild     Strength:  Not Performed    Estimated Daily Nutrient Needs:  Energy (kcal):  4351-7148; Weight Used for Energy Requirements:  Current     Protein (g):  60-70(1.3-1.5g/kg IBW); Weight Used for Protein Requirements:  Ideal        Fluid (ml/day):  5275-2643; Method Used for Fluid Requirements:  1 ml/kcal(1.3-1.5g/kg IBW)      Nutrition Related Findings:  +I&Os (+3L), A&Ox4, constipated, multiple rib fx, redness of buttocks, +1 non pitting edema, U/L dentures, active BS      Wounds:  None       Current Nutrition Therapies:    DIET GENERAL; Anthropometric Measures:  · Height: 5' (152.4 cm)  · Current Body Weight: 137 lb (62.1 kg)(2/15 per EMR, bed scale)   · Admission Body Weight: 123 lb (55.8 kg)(2/8, bed scale)    · Usual Body Weight: 128 lb (58.1 kg)(7/12, bed scale)     · Ideal Body Weight: 100 lbs; % Ideal Body Weight 137 %   · BMI: 26.8  · BMI Categories: Overweight (BMI 25.0-29. 9)       Nutrition Diagnosis:   · Increased nutrient needs related to increase demand for energy/nutrients, impaired respiratory function as evidenced by intake 26-50%, intake 51-75%, poor intake prior to admission      Nutrition Interventions:   Food and/or Nutrient Delivery:  Continue Current Diet, Start Oral Nutrition Supplement  Nutrition Education/Counseling:  Education not indicated   Coordination of Nutrition Care:  No recommendation at this time    Goals:  Pt will consume ~75% meals/ONS       Nutrition Monitoring and Evaluation:   Behavioral-Environmental Outcomes:   None identified  Food/Nutrient Intake Outcomes:  Food and Nutrient Intake, Supplement Intake  Physical Signs/Symptoms Outcomes:  Biochemical Data, Constipation, Fluid Status or Edema, Meal Time Behavior, Nutrition Focused Physical Findings, Skin, Weight     Discharge Planning:     Too soon to determine     Electronically signed by Radha Juárez RD, CNSC, LD on 2/15/21 at 1:30 PM EST    [phone]

## 2021-02-15 NOTE — PLAN OF CARE
Problem: Skin Integrity:  Goal: Will show no infection signs and symptoms  Description: Will show no infection signs and symptoms  2/15/2021 0014 by Rose Camara RN  Outcome: Met This Shift     Problem: Skin Integrity:  Goal: Absence of new skin breakdown  Description: Absence of new skin breakdown  2/15/2021 0014 by Rose Camara RN  Outcome: Met This Shift

## 2021-02-15 NOTE — PROGRESS NOTES
- Continue home Synthroid 100 mcg QD      RA  - Increased home Celebrex 200 mg QD to 200 mg BID to help with pain from multiple rib fractures  - Continue methotrexate 5 mg Monday at lunch and 10 mg Monday evening on weekly basis      Atrial fibrillation   - Continue home flecainide 50 mg BID   - Continue home Lopressor 25 mg BID  - Continue home Xarelto 15 mg QD       Multiple rib fractures   X-ray from 02/07 showed acute left 7th and 8th posterolateral rib fractures. Healed remote left 9th-11th rib fractures. Probable remote 6th rib fracture.    - Abdominal binder to be placed over ribs with movement (ie changing positions, working with PT, etc.)   - Increased home Celebrex 200 mg QD to 200 mg BID to help with pain from multiple rib fractures  - Tylenol 650 mg q 6 hrs PRN for pain  - Lidocaine patch q 12 hours for pain     Sore throat  - Cepacol throat lozenges q 2 hrs PRN      Constipation  - Increased home Colace 100 mg QD to 200 mg QD  - On Miralax 17 g QD today   - Soap suds enema PRN      Hyponatremia   - Resolved  - Decreased HCTZ 50 mg QD back to home dose of 25 mg QD on 02/11 due to hyponatremia      Depression  Patient does not want her Cymbalta 20 mg QD increased   - Continue 20 mg QD    Oral yeast infection   - Clotrimazole 10 mg QID. Day 2.  - Fluconazole 200 mg QD.  Day 2.       Poor oral intake  - On D5 and 0.45% NaCl at 75 cc/hr     Continue additional home medications:   - Allegra 180 mg QD substituted with cetirizine 10 mg QD  - Cymbalta 20 mg QD   - Pepcid 40 mg HS  - Ferrous sulfate 325 mg every other day   - Gabapentin 100 mg BID   - Leucovorin calcium 25 mg every Tuesday   - Singulair 10 mg HS   - Omeprazole 40 mg BID substituted with pantoprazole 40 mg BID      Diet: General   Code: Full   DVT prophylaxis: Xarelto 15 mg QD       Electronically signed by Umer Garnett DO on 2/15/2021 at 7:42 AM

## 2021-02-15 NOTE — CARE COORDINATION
CASE MANAGEMENT. .. Chart reviewed. Per conversation with Dr Brandi Barnes from [facility], patient is not ready for discharge today. Patient is afebrile, but WBC are increasing. Will cont with current medical treatment and monitor labs. Spoke with patient over the phone. She has no questions for me at this time. Will cont to follow along with ss.

## 2021-02-15 NOTE — PROGRESS NOTES
[address] [place] [address] [facility]  DANISHIDA  Progress Note      Chief Complaint   Patient presents with    Shortness of Breath       SUBJECTIVE:      Remains extremely weak and fatigued    Denies any significant shortness of breath or cough    Had to have an enema due to constipation    Patient has been functionally incontinent with pure wick in place    No diarrhea nausea or vomiting    Mouth pain slightly improved with Mycelex and fluconazole      Review of systems:    As stated above in the chief complaint, otherwise negative.     Medications:    Scheduled Meds:   valsartan  160 mg Oral Daily    And    hydroCHLOROthiazide  25 mg Oral Daily    clotrimazole  10 mg Oral 4x daily    fluconazole  200 mg Oral Daily    DULoxetine  40 mg Oral Daily    docusate sodium  200 mg Oral Daily    polyethylene glycol  17 g Oral Daily    ferrous sulfate  325 mg Oral Every Other Day    cetirizine  10 mg Oral Daily    flecainide  50 mg Oral BID    gabapentin  100 mg Oral BID    levothyroxine  100 mcg Oral Daily    metoprolol tartrate  25 mg Oral BID    montelukast  10 mg Oral Nightly    pantoprazole  40 mg Oral BID AC    rivaroxaban  15 mg Oral Daily with breakfast    sodium chloride flush  10 mL Intravenous 2 times per day    Vitamin D  2,000 Units Oral Daily    thiamine  100 mg Oral Daily    ascorbic acid  500 mg Oral Daily    zinc sulfate  50 mg Oral Daily    leucovorin calcium  25 mg Oral Weekly    famotidine  20 mg Oral Nightly    budesonide-formoterol  2 puff Inhalation BID    lidocaine  1 patch Transdermal Daily    dexamethasone  6 mg Oral Daily    celecoxib  200 mg Oral BID     Continuous Infusions:   dextrose 5 % and 0.45 % NaCl 75 mL/hr at 02/15/21 0104     PRN Meds:sodium chloride flush, promethazine **OR** ondansetron, acetaminophen **OR** acetaminophen, guaiFENesin-dextromethorphan, albuterol-ipratropium, sodium chloride  Prior to Admission medications    Medication Sig Start Date End Date Taking? Authorizing Provider   metoprolol tartrate (LOPRESSOR) 25 MG tablet TAKE 1 TABLET TWICE A DAY 1/4/21  Yes John Gaffney MD   flecainide (TAMBOCOR) 50 MG tablet Take 1 tablet by mouth 2 times daily 11/23/20  Yes John Gaffney MD   rivaroxaban (XARELTO) 15 MG TABS tablet Take 1 tablet by mouth daily (with breakfast) 11/23/20  Yes John Gaffney MD   valsartan-hydroCHLOROthiazide (DIOVAN-HCT) 160-25 MG per tablet Take 1 tablet by mouth daily 10/28/20  Yes Liv Woodall MD   gabapentin (NEURONTIN) 100 MG capsule Take 1 capsule by mouth 2 times daily for 90 days. 10/8/20 2/8/21 Yes Liv Woodall MD   DULoxetine (CYMBALTA) 20 MG extended release capsule Take 1 capsule by mouth daily 10/8/20  Yes Liv Woodall MD   celecoxib (CELEBREX) 200 MG capsule TAKE 1 CAPSULE DAILY 9/17/20  Yes Liv Woodall MD   levothyroxine (LEVOTHROID) 100 MCG tablet Take 1 tablet by mouth Daily 9/17/20  Yes Liv Woodall MD   vitamin B-12 (CYANOCOBALAMIN) 1000 MCG tablet Take 1,000 mcg by mouth daily   Yes Historical Provider, MD   omeprazole (PRILOSEC) 40 MG delayed release capsule Take 1 capsule by mouth 2 times daily 4/16/20  Yes Liv Woodall MD   famotidine (PEPCID) 40 MG tablet Take 1 tablet by mouth nightly 3/31/20  Yes Liv Woodall MD   fexofenadine (ALLEGRA) 180 MG tablet Take 180 mg by mouth daily.    Yes Historical Provider, MD   leucovorin calcium (WELLCOVORIN) 5 MG tablet Take 25 mg by mouth once a week Takes every Tuesday 7/19/13  Yes Historical Provider, MD   methotrexate 2.5 MG tablet Take by mouth once a week 2 tabs at lunch and 4 tabs  in the pm once a week on Monday   Yes Historical Provider, MD   montelukast (SINGULAIR) 10 MG tablet TAKE 1 TABLET NIGHTLY 2/8/21   Liv Woodall MD   cyanocobalamin 1000 MCG/ML injection Inject 1 mL into the muscle every 30 days Dispense with syringes 1.5 inch 25 G needles 1/6/21   Liv Woodall MD   ferrous sulfate (IRON 325) 325 (65 Fe) MG tablet Take 1 tablet by mouth every other day 20   Arsenio Zhang MD   docusate sodium (COLACE, DULCOLAX) 100 MG CAPS Take 100 mg by mouth daily 20   Waleska Cervantes MD   ondansetron [facility]) 4 MG tablet Take 1 tablet by mouth every 8 hours as needed for Nausea or Vomiting 19   Arsenio Zhang MD   polyethyl glycol-propyl glycol 0.4-0.3 % (SYSTANE) 0.4-0.3 % ophthalmic solution 1 drop 2 times daily as needed for Dry Eyes     Historical Provider, MD   fluticasone (FLONASE) 50 MCG/ACT nasal spray 2 sprays by Nasal route daily 2 sprays in each nostril daily 18   Arsenio Zhang MD   Probiotic Product (ACIDOPHILUS PROBIOTIC) CAPS capsule Take 1 capsule by mouth daily    Historical Provider, MD       OBJECTIVE:  /61   Pulse 73   Temp 96.1 °F (35.6 °C) (Oral)   Resp 18   Ht 5' (1.524 m)   Wt 137 lb 1 oz (62.2 kg)   SpO2 97%   BMI 26.77 kg/m²   Temp  Av.7 °F (35.9 °C)  Min: 96.1 °F (35.6 °C)  Max: 97 °F (36.1 °C)  General appearance: Awake and alert but fatigued, no respiratory distress  Skin: Warm and dry. No rashes were noted. HEENT: Round and reactive pupils. Moist mucous membranes. No ulcerations or thrush. Neck: Supple to movements. Chest: Improved inspiratory effort with bibasilar rhonchi noted, no wheezing  Cardiovascular: Regular rate and rhythm. No murmurs gallops, or rubs appreciated. Abdomen: Bowel sounds present, nontender, nondistended, no masses or hepatosplenomegaly. Extremities: No clubbing, no cyanosis, no edema. Lines: peripheral  Neuro: No significant sensory or motor deficits noted    I/O last 3 completed shifts:   In: 360 [P.O.:360]  Out: -       Laboratory and Tests Review:      Lab Results   Component Value Date    WBC 20.8 (H) 02/15/2021    WBC 18.0 (H) 2021    WBC 15.6 (H) 2021    HGB 11.4 (L) 02/15/2021    HCT 35.9 02/15/2021    .8 (H) 02/15/2021     (H) 02/15/2021     Lab Results   Component Value Date    NEUTROABS 15.44 (H) 02/14/2021    NEUTROABS 13.03 (H) 02/13/2021    NEUTROABS 11.41 (H) 02/12/2021     No results found for: CRP  Lab Results   Component Value Date    ALT 16 02/15/2021    AST 19 02/15/2021    ALKPHOS 104 02/15/2021    BILITOT 0.6 02/15/2021     Lab Results   Component Value Date     02/15/2021    K 4.2 02/15/2021    CL 95 02/15/2021    CO2 23 02/15/2021    BUN 39 02/15/2021    CREATININE 0.9 02/15/2021    CREATININE 0.9 02/14/2021    CREATININE 0.8 02/13/2021    GFRAA >60 02/15/2021    LABGLOM 59 02/15/2021    GLUCOSE 147 02/15/2021    GLUCOSE 79 04/24/2012    PROT 5.9 02/15/2021    LABALBU 2.9 02/15/2021    LABALBU 4.0 04/24/2012    CALCIUM 8.1 02/15/2021    BILITOT 0.6 02/15/2021    ALKPHOS 104 02/15/2021    AST 19 02/15/2021    ALT 16 02/15/2021     Lab Results   Component Value Date    CRP 3.4 (H) 02/14/2021    CRP 1.8 (H) 02/13/2021    CRP 2.8 (H) 02/12/2021     Lab Results   Component Value Date    SEDRATE 29 (H) 09/10/2020    SEDRATE 17 10/22/2019    SEDRATE 26 (H) 03/26/2013       Radiology:    XR CHEST PORTABLE   Final Result   Multifocal bilateral pulmonary infiltrates most consistent with COVID   pneumonia. CT CHEST WO CONTRAST   Final Result   Multifocal ground-glass and interstitial opacities, consistent with   atypical/COVID pneumonia. The distribution is very similar. Aeration is   slightly improved since 02/07/2021. Mild cardiomegaly. Moderate-sized hiatal hernia. Stable calcified mass in the central canal the thoracic spine. XR CHEST PORTABLE   Final Result   No new process has occurred. Partial resolution of right lower lobe   infiltrate suggested      XR RIBS BILATERAL (MIN 4 VIEWS)   Final Result   Acute left   10 8th posterolateral rib fractures. Healed remote left 9th through 11th rib fractures. Probable remote right 6   rib fracture. Diffuse and bilateral airspace opacities.       CTA CHEST W CONTRAST   Final Result   No evidence of pulmonary emboli. Multifocal patchy and ground-glass airspace disease involving all lobes   consistent with multifocal pneumonia and suspicious for COVID-19 pneumonia. Cardiomegaly with nonspecific mediastinal nodes, unchanged. Intraspinal calcified mass at the level of T7, unchanged since the prior   examination. Microbiology:   Lab Results   Component Value Date    [facility] Micrococcus 09/30/2020    ORG Staphylococcus aureus 09/09/2020    ORG Staphylococcus coagulase-negative 09/09/2020     Lab Results   Component Value Date    ORG Micrococcus 09/30/2020    ORG Staphylococcus aureus 09/09/2020    ORG Staphylococcus coagulase-negative 09/09/2020     WOUND/ABSCESS   Date Value Ref Range Status   09/30/2020   Final    Heavy growth  Validated susceptibility testing methods do not exist for  this isolate. 09/09/2020   Final    Light growth  Methicillin resistant Staph aureus isolated. Most Methicillin  resistant Staphylococcus are usually resistant to multiple  antibiotics including other B-Lactams, Aminoglycosides,  Macrolides, Clindamycin and Tetracycline. Contact isolation  is indicated. This isolate is presumed to be resistant based on the  detection of inducible Clindamycin resistance. Clindamycin  may still be effective in some patients.      09/09/2020 One colony  Final     No results found for: RESPSMEAR  No results found for: MPNEUMO, CLAMYDCU, LABLEGI, AFBCX, FUNGSM, LABFUNG  No results found for: CULTRESP  No results found for: CXCATHTIP  No results found for: BFCS  No results found for: CXSURG  Urine Culture, Routine   Date Value Ref Range Status   06/04/2019 <10,000 CFU/mL  Gram negative rods    Final   07/17/2014 25,000 CFU/ml  Final   07/17/2014   Final    <25,000 CFU/ml  Vancomycin resistant Enterococci isolated       No results found for: [facility]    Problem list:    Principal Problem:    Acute respiratory failure with hypoxia ([address]  Active Problems:    Hypothyroidism    Rheumatoid arthritis ([facility]    Multiple closed fractures of ribs of both sides with routine healing    [facility]fib ([facility]    Essential hypertension    COVID-19    Sore throat    Depression    Oral yeast infection  Resolved Problems:    Hyponatremia      ASSESSMENT:    · COVID-19 viral pneumonia  · Paroxysmal atrial fibrillation  · Rheumatoid arthritis  · Multiple left rib fractures  · Leukocytosis-worse again today but remains afebrile  · Severe weakness/deconditioning  · Prerenal azotemia consistent with dehydration likely due to poor oral intake  · Oral candidiasis   · Depression  · Hyponatremia    PLAN:    · Continue Decadron per protocol  · Continue Mycelex troches and fluconazole  · Continue gentle intravenous hydration   · Straight cath for urinalysis and urine culture  · Not ready for discharge   · Continue oxygen  · Continue vitamin D, zinc, vitamin C  · Continue to monitor WBC count daily, recheck inflammatory markers  · Encourage oral intake  · Will follow with you      Raheel Mario    11:16 AM  2/15/2021

## 2021-02-15 NOTE — PROGRESS NOTES
Pt straight cathed at this time for urine sample. No urine in bladder at this time. Will attempt at a later time.

## 2021-02-15 NOTE — PLAN OF CARE
Problem: Gas Exchange - Impaired  Goal: Absence of hypoxia  Outcome: Met This Shift     Problem: Airway Clearance - Ineffective  Goal: Achieve or maintain patent airway  Outcome: Met This Shift     Problem: Falls - Risk of:  Goal: Will remain free from falls  Description: Will remain free from falls  Outcome: Met This Shift     Problem: Skin Integrity:  Goal: Absence of new skin breakdown  Description: Absence of new skin breakdown  Outcome: Met This Shift

## 2021-02-15 NOTE — PROGRESS NOTES
Updated daughter, Sarah Castaneda, on patient status and plan of care. Explained that Hem/Onc is being consulted for worsening thrombocytosis. Will continue to monitor WBC. Sarah Hedrickman was appreciative of update.

## 2021-02-15 NOTE — CONSULTS
[facility]  Hematology/Oncology  Consult      Patient Name: Irene Hewitt  YOB: 1935  PCP: Ebony Raines MD   Referring Provider:      Reason for Consultation:   Chief Complaint   Patient presents with    Shortness of Breath        History of Present Illness: This is an 24-year-old female patient of Dr. Kiara Darden who was following for mild elevation in her tumor markers CEA and Ca-125 as well as macrocytosis. Her work up was ultimately negative and these abnormalities were likely related to the methotrexate that she was being treated with for her rheumatoid arthritis. She was also mildly anemic related to her CKD and she was being monitored for the need for EPO injections. She has not been seen since November of 2015. She also has a PMH of hypothyroidism, atrial fibrillation, and hypertension who presented to the emergency department via EMS for evaluation of shortness of breath, left-sided chest pain with moderate tenderness palpation of the left sided chest wall. CT of the chest was performed which showed no evidence of pulmonary embolism however did show findings consistent with patient's diagnosis of COVID. Her blood work significant for high CPR, prolactin. Liver enzymes with low albumin and total protein. CBC with leukocytosis anemia and thrombocytosis. Consult for worsening thrombocytosis in COVID patient. Diagnostic Data:     Past Medical History:   Diagnosis Date    Anticoagulated     takes Xarelto    Atrial fibrillation ([address]     follows with Dr. 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    Wears dentures        Patient Active Problem List    Diagnosis Date Noted    Oral yeast infection     Depression 02/12/2021    Sore throat 02/09/2021    Acute respiratory failure with hypoxia ([address] 02/08/2021    COVID-19 02/08/2021    Bimalleolar fracture     Non-pressure chronic ulcer of other part of right lower leg with fat layer exposed ([address] 08/19/2020    Bimalleolar fracture, left, closed, initial encounter 07/12/2020    Osteoporosis 07/12/2020    Pain in both lower legs 05/07/2020    Patellar fracture 2020    Delayed gastric emptying 04/20/2018    Closed fracture of patella 04/19/2018    Palpitations 11/16/2017    Fatigue 11/16/2017    Laceration of lower limb 06/25/2017    Adjustment disorder with anxious mood 07/20/2016    Obstructive sleep apnea syndrome 05/23/2016    Essential hypertension 05/23/2016    Multiple closed fractures of ribs of both sides with routine healing 12/16/2014    Thoracic spinal stenosis with mass at T6-T7 12/16/2014    Thoracic spine tumor 12/16/2014    Thoracic spine fracture ([address] 12/16/2014    Anemia 07/26/2014    Lumbar radicular pain 03/19/2014    Paresthesia of lower extremity 48/72/4627    Diastolic dysfunction     Skin cancer of lip     Diverticular disease     Elevated CA-125 06/20/2011    Hiatal hernia 06/10/2011    Hypothyroidism 06/10/2011    Rheumatoid arthritis ([address] 06/10/2011    A-fib ([address] 2011        Past Surgical History:   Procedure Laterality Date    CARDIAC CATHETERIZATION      CARPAL TUNNEL RELEASE Bilateral      SECTION      COLONOSCOPY  2011    Dr Renuka Johansen; diverticular disease; repeat in 2016    COLONOSCOPY N/A 2020    COLONOSCOPY WITH BIOPSY performed by Fiona Juarez MD at [id number] [facility], TOTAL ABDOMINAL  early 's    TOTAL KNEE ARTHROPLASTY  2007    bilateral    UPPER GASTROINTESTINAL ENDOSCOPY N/A 2020    EGD BIOPSY performed by Fiona [facility]arez MD at [facility] ENDOSCOPY       Family History  Family History   Problem Relation Age of Onset    Heart Attack Mother 58    Other Father         suicide, depression, leg trouble    Other Sister         Dementia    Hypertension Sister     Hypertension Sister     Hypertension Brother     Thyroid Disease Sister     Rheum Arthritis Sister     Rheum Arthritis Brother     Other Daughter         hemochromatosis     Other Son         hemachromatosis        Social History    TOBACCO:   reports that she has never smoked. She has never used smokeless tobacco.  ETOH:   reports no history of alcohol use. Home Medications  Prior to Admission medications    Medication Sig Start Date End Date Taking? Authorizing Provider   metoprolol tartrate (LOPRESSOR) 25 MG tablet TAKE 1 TABLET TWICE A DAY 21  Yes Ariane Mcleod MD   flecainide (TAMBOCOR) 50 MG tablet Take 1 tablet by mouth 2 times daily 20  Yes Ariane Mcleod MD   rivaroxaban (XARELTO) 15 MG TABS tablet Take 1 tablet by mouth daily (with breakfast) 20  Yes Ariane Mcleod MD   valsartan-hydroCHLOROthiazide (DIOVAN-HCT) 160-25 MG per tablet Take 1 tablet by mouth daily 10/28/20  Yes Alma Rosa Curry MD   gabapentin (NEURONTIN) 100 MG capsule Take 1 capsule by mouth 2 times daily for 90 days.  10/8/20 2/8/21 Yes Alma Rosa Curry MD DULoxetine (CYMBALTA) 20 MG extended release capsule Take 1 capsule by mouth daily 10/8/20  Yes Ernie Jara MD   celecoxib (CELEBREX) 200 MG capsule TAKE 1 CAPSULE DAILY 9/17/20  Yes Ernie Jara MD   levothyroxine (LEVOTHROID) 100 MCG tablet Take 1 tablet by mouth Daily 9/17/20  Yes Ernie Jara MD   vitamin B-12 (CYANOCOBALAMIN) 1000 MCG tablet Take 1,000 mcg by mouth daily   Yes Historical Provider, MD   omeprazole (PRILOSEC) 40 MG delayed release capsule Take 1 capsule by mouth 2 times daily 4/16/20  Yes Ernie Jara MD   famotidine (PEPCID) 40 MG tablet Take 1 tablet by mouth nightly 3/31/20  Yes Ernie Jara MD   fexofenadine (ALLEGRA) 180 MG tablet Take 180 mg by mouth daily.    Yes Historical Provider, MD   leucovorin calcium (WELLCOVORIN) 5 MG tablet Take 25 mg by mouth once a week Takes every Tuesday 7/19/13  Yes Historical Provider, MD   methotrexate 2.5 MG tablet Take by mouth once a week 2 tabs at lunch and 4 tabs  in the pm once a week on Monday   Yes Historical Provider, MD   montelukast (SINGULAIR) 10 MG tablet TAKE 1 TABLET NIGHTLY 2/8/21   Ernie Jara MD   cyanocobalamin 1000 MCG/ML injection Inject 1 mL into the muscle every 30 days Dispense with syringes 1.5 inch 25 G needles 1/6/21   Ernie Jara MD   ferrous sulfate (IRON 325) 325 (65 Fe) MG tablet Take 1 tablet by mouth every other day 9/17/20   Ernie Jara MD   docusate sodium (COLACE, DULCOLAX) 100 MG CAPS Take 100 mg by mouth daily 7/17/20   Tang Aldridge MD   ondansetron [facility]) 4 MG tablet Take 1 tablet by mouth every 8 hours as needed for Nausea or Vomiting 7/23/19   Ernie Jara MD   polyethyl glycol-propyl glycol 0.4-0.3 % (SYSTANE) 0.4-0.3 % ophthalmic solution 1 drop 2 times daily as needed for Dry Eyes     Historical Provider, MD   fluticasone [facility]) 50 MCG/ACT nasal spray 2 sprays by Nasal route daily 2 sprays in each nostril daily 7/19/18   Ernie Jara MD   Probiotic Product (ACIDOPHILUS PROBIOTIC) CAPS capsule Take 1 capsule by mouth daily    Historical Provider, MD       Allergies  Allergies   Allergen Reactions    Amoxicillin      GI ill effects       Review of Systems: +fatigue, weakness, MONTENEGRO, malaise. Objective  BP (!) 149/66   Pulse 63   Temp 96.3 °F (35.7 °C) (Oral)   Resp 18   Ht 5' (1.524 m)   Wt 137 lb 1 oz (62.2 kg)   SpO2 97%   BMI 26.77 kg/m²     Physical Exam:   Performance Status:  General: AAO to person, place, time, in no acute distress,   Head and neck : PERRLA, EOMI . Sclera non icteric. Oropharynx : Clear  Neck: no JVD,  no adenopathy  Heart: Regular rate and regular rhythm, no murmur  Lungs: inspiratory rhonchi noted. Extremities: +1 edema lower extremites,no cyanosis, no clubbing. Abdomen: Soft, non-tender;no masses, no organomegaly  Skin:  No rash  Neurologic:Cranial nerves grossly intact. No focal motor or sensory deficits .     Recent Laboratory Data-   Lab Results   Component Value Date    WBC 20.8 (H) 02/15/2021    HGB 11.4 (L) 02/15/2021    HCT 35.9 02/15/2021    .8 (H) 02/15/2021     (H) 02/15/2021    LYMPHOPCT 2.2 (L) 02/15/2021    RBC 3.56 02/15/2021    MCH 32.0 02/15/2021    MCHC 31.8 (L) 02/15/2021    RDW 14.4 02/15/2021    NEUTOPHILPCT 86.8 (H) 02/15/2021    MONOPCT 6.3 02/15/2021    BASOPCT 0.2 02/15/2021    NEUTROABS 18.04 (H) 02/15/2021    LYMPHSABS 0.45 (L) 02/15/2021    MONOSABS 1.31 (H) 02/15/2021    EOSABS 0.00 (L) 02/15/2021    BASOSABS 0.05 02/15/2021       Lab Results   Component Value Date     (L) 02/15/2021    K 4.2 02/15/2021    CL 95 (L) 02/15/2021    CO2 23 02/15/2021    BUN 39 (H) 02/15/2021    CREATININE 0.9 02/15/2021    GLUCOSE 147 (H) 02/15/2021    CALCIUM 8.1 (L) 02/15/2021    PROT 5.9 (L) 02/15/2021    LABALBU 2.9 (L) 02/15/2021    BILITOT 0.6 02/15/2021    ALKPHOS 104 02/15/2021    AST 19 02/15/2021    ALT 16 02/15/2021    LABGLOM 59 02/15/2021    GFRAA >60 02/15/2021       Lab Results Component Value Date    IRON 41 06/16/2020    TIBC 359 06/14/2019    FERRITIN 425 02/08/2021           Radiology-    Xr Ribs Bilateral (min 4 Views)    Result Date: 2/8/2021  EXAMINATION: XRAY VIEWS OF THE BILATERAL RIBS WITH FRONTAL XRAY VIEW OF THE CHEST 2/8/2021 2:36 pm COMPARISON: None. HISTORY: ORDERING SYSTEM PROVIDED HISTORY: Bilateral rib pain TECHNOLOGIST PROVIDED HISTORY: Reason for exam:->Bilateral rib pain FINDINGS: Diffuse and bilateral airspace opacities. Cardiomegaly. Acute left 7th and 8th posterolateral rib fractures. Healed remote left 9th through 11th rib fractures. Probable remote right 6th rib fracture. Acute left 10 8th posterolateral rib fractures. Healed remote left 9th through 11th rib fractures. Probable remote right 6 rib fracture. Diffuse and bilateral airspace opacities. Ct Chest Wo Contrast    Result Date: 2/10/2021  EXAMINATION: CT OF THE CHEST WITHOUT CONTRAST 2/10/2021 2:29 pm TECHNIQUE: CT of the chest was performed without the administration of intravenous contrast. Multiplanar reformatted images are provided for review. Dose modulation, iterative reconstruction, and/or weight based adjustment of the mA/kV was utilized to reduce the radiation dose to as low as reasonably achievable. COMPARISON: CT chest, 02/07/2021 HISTORY: ORDERING SYSTEM PROVIDED HISTORY: increasing O2 demand in covid TECHNOLOGIST PROVIDED HISTORY: Reason for exam:->increasing O2 demand in covid FINDINGS: Mediastinum: There is mild cardiomegaly. No mediastinal or hilar adenopathy. Stable moderate-sized hiatal hernia. Lungs/pleura: Bilaterally, there are multifocal ground-glass opacities with features of interlobular septal thickening. The aeration is mildly improved. No pleural effusion or pneumothorax is identified. Upper Abdomen: The adrenal glands are unremarkable. There is a partially visualized 1.8 cm cyst of the right kidney. No follow-up is recommended.  Soft Tissues/Bones: A chronic, densely calcified ovoid mass is in the central canal at C6-7, resulting in severe narrowing of the central canal.  There are old left rib fractures. The chest wall is unremarkable. Multifocal ground-glass and interstitial opacities, consistent with atypical/COVID pneumonia. The distribution is very similar. Aeration is slightly improved since 02/07/2021. Mild cardiomegaly. Moderate-sized hiatal hernia. Stable calcified mass in the central canal the thoracic spine. Xr Chest Portable    Result Date: 2/14/2021  EXAMINATION: ONE XRAY VIEW OF THE CHEST 2/14/2021 8:54 am COMPARISON: Previous CT scan of 02/10/2021 HISTORY: ORDERING SYSTEM PROVIDED HISTORY: leukocytosis r/o pneumonia TECHNOLOGIST PROVIDED HISTORY: Reason for exam:->leukocytosis r/o pneumonia FINDINGS: The heart is enlarged. There are multifocal bilateral pulmonary infiltrates more prominent within the lower lobes and unchanged when compared to the prior CT scan. Multifocal bilateral pulmonary infiltrates most consistent with COVID pneumonia. Xr Chest Portable    Result Date: 2/10/2021  EXAMINATION: ONE XRAY VIEW OF THE CHEST 2/10/2021 11:11 am COMPARISON: 02/08/2021 HISTORY: ORDERING SYSTEM PROVIDED HISTORY: covid-19+, now having elevated WBC and increased O2 demands TECHNOLOGIST PROVIDED HISTORY: Reason for exam:->covid-19+, now having elevated WBC and increased O2 demands FINDINGS: Significant bilateral parenchymal and interstitial fibrosis is again identified with some interval clearing of right lower lobe infiltrate. Heart size is borderline prominent. There are no new infiltrates or effusions. There is bilateral apical pleural thickening. Prior left-sided lateral rib fractures are suspected. No new process has occurred.   Partial resolution of right lower lobe infiltrate suggested    Cta Chest W Contrast    Result Date: 2/7/2021  EXAMINATION: CTA OF THE CHEST 2/7/2021 10:15 pm TECHNIQUE: CTA of the chest was performed after the administration of intravenous contrast.  Multiplanar reformatted images are provided for review. MIP images are provided for review. Dose modulation, iterative reconstruction, and/or weight based adjustment of the mA/kV was utilized to reduce the radiation dose to as low as reasonably achievable. COMPARISON: February 2019 HISTORY: ORDERING SYSTEM PROVIDED HISTORY: left sided chest pain, evaluate for PE TECHNOLOGIST PROVIDED HISTORY: Reason for exam:->left sided chest pain, evaluate for PE Decision Support Exception->Emergency Medical Condition (MA) FINDINGS: Pulmonary Arteries: Pulmonary arteries are adequately opacified for evaluation. No evidence of intraluminal filling defect to suggest pulmonary embolism. Main pulmonary artery is normal in caliber. Mediastinum: Nonspecific mediastinal nodes, no significantly changed since the prior examination. No aortic dissection or aneurysmal dilatation. Prominent atherosclerotic calcification along the thoracic aorta. No pericardial effusion. Cardiomegaly. Lungs/pleura: Multifocal patchy and ground-glass airspace disease involving all lobes, consistent with multifocal pneumonia and suspicious for COVID-19 pneumonia. Images are degraded by respiratory motion artifact. There is no evidence of pleural effusions. Upper Abdomen: Large hiatal hernia. Prominent atherosclerotic peripheral vascular disease. Soft Tissues/Bones: No evidence of acute bony pathology. Intraspinal calcified mass at the level of T7, unchanged and occupying almost the complete with of the spinal canal.    No evidence of pulmonary emboli. Multifocal patchy and ground-glass airspace disease involving all lobes consistent with multifocal pneumonia and suspicious for COVID-19 pneumonia. Cardiomegaly with nonspecific mediastinal nodes, unchanged. Intraspinal calcified mass at the level of T7, unchanged since the prior examination.         ASSESSMENT/PLAN :  80year-old    Hypothyroidism, atrial fibrillation, and hypertension   Elevation in her tumor markers CEA and Ca-125 as well as macrocytosis. Her work up was ultimately negative and these abnormalities were likely related to the methotrexate that she was being treated with for her rheumatoid arthritis. She was also mildly anemic related to her CKD being monitored for the need of EPO injections. CT of the chest was performed which showed no evidence of pulmonary embolism however did show findings consistent with patient's diagnosis of COVID    - Currently undergoing covid therapy with dexamethasone  - Cont Xarelto for a fib  - CBC with slowly progressive thrombocytosis. This is likely reactive in the setting of increased inflammation from acute infection  - Continue to trend CBC  - No indication for cytoreductive therapy at this time  - Check smear  - Mild macrocytic anemia likely related to methotrexate use      FRAN Servin - CNP  Electronically signed 2/15/2021 at 1:53 PM  Pt seen and examined.  Note updated  Wiliam Bamberger, MD

## 2021-02-16 VITALS
RESPIRATION RATE: 18 BRPM | HEART RATE: 64 BPM | TEMPERATURE: 97.2 F | SYSTOLIC BLOOD PRESSURE: 140 MMHG | BODY MASS INDEX: 28.06 KG/M2 | HEIGHT: 60 IN | WEIGHT: 142.9 LBS | OXYGEN SATURATION: 94 % | DIASTOLIC BLOOD PRESSURE: 61 MMHG

## 2021-02-16 LAB
ALBUMIN SERPL-MCNC: 2.6 G/DL (ref 3.5–5.2)
ALP BLD-CCNC: 82 U/L (ref 35–104)
ALT SERPL-CCNC: 14 U/L (ref 0–32)
ANION GAP SERPL CALCULATED.3IONS-SCNC: 7 MMOL/L (ref 7–16)
APTT: 27.3 SEC (ref 24.5–35.1)
AST SERPL-CCNC: 18 U/L (ref 0–31)
BASOPHILS ABSOLUTE: 0 E9/L (ref 0–0.2)
BASOPHILS RELATIVE PERCENT: 0 % (ref 0–2)
BILIRUB SERPL-MCNC: 0.6 MG/DL (ref 0–1.2)
BUN BLDV-MCNC: 31 MG/DL (ref 8–23)
C-REACTIVE PROTEIN: 0.8 MG/DL (ref 0–0.4)
CALCIUM SERPL-MCNC: 8.3 MG/DL (ref 8.6–10.2)
CHLORIDE BLD-SCNC: 97 MMOL/L (ref 98–107)
CO2: 24 MMOL/L (ref 22–29)
CREAT SERPL-MCNC: 0.8 MG/DL (ref 0.5–1)
D DIMER: 513 NG/ML DDU
EOSINOPHILS ABSOLUTE: 0 E9/L (ref 0.05–0.5)
EOSINOPHILS RELATIVE PERCENT: 0 % (ref 0–6)
FIBRINOGEN: 465 MG/DL (ref 225–540)
GFR AFRICAN AMERICAN: >60
GFR NON-AFRICAN AMERICAN: >60 ML/MIN/1.73
GLUCOSE BLD-MCNC: 100 MG/DL (ref 74–99)
HCT VFR BLD CALC: 31.6 % (ref 34–48)
HEMOGLOBIN: 9.9 G/DL (ref 11.5–15.5)
INR BLD: 1.2
LYMPHOCYTES ABSOLUTE: 0.52 E9/L (ref 1.5–4)
LYMPHOCYTES RELATIVE PERCENT: 3.5 % (ref 20–42)
MCH RBC QN AUTO: 31.9 PG (ref 26–35)
MCHC RBC AUTO-ENTMCNC: 31.3 % (ref 32–34.5)
MCV RBC AUTO: 101.9 FL (ref 80–99.9)
METAMYELOCYTES RELATIVE PERCENT: 0.9 % (ref 0–1)
MONOCYTES ABSOLUTE: 1.31 E9/L (ref 0.1–0.95)
MONOCYTES RELATIVE PERCENT: 10.4 % (ref 2–12)
NEUTROPHILS ABSOLUTE: 11.27 E9/L (ref 1.8–7.3)
NEUTROPHILS RELATIVE PERCENT: 85.2 % (ref 43–80)
NUCLEATED RED BLOOD CELLS: 0 /100 WBC
OVALOCYTES: ABNORMAL
PATHOLOGIST REVIEW: NORMAL
PDW BLD-RTO: 14.4 FL (ref 11.5–15)
PLATELET # BLD: 567 E9/L (ref 130–450)
PMV BLD AUTO: 9.6 FL (ref 7–12)
POIKILOCYTES: ABNORMAL
POTASSIUM REFLEX MAGNESIUM: 4.5 MMOL/L (ref 3.5–5)
PROCALCITONIN: 0.07 NG/ML (ref 0–0.08)
PROTHROMBIN TIME: 14.4 SEC (ref 9.3–12.4)
RBC # BLD: 3.1 E12/L (ref 3.5–5.5)
SEDIMENTATION RATE, ERYTHROCYTE: 33 MM/HR (ref 0–20)
SODIUM BLD-SCNC: 128 MMOL/L (ref 132–146)
TOTAL PROTEIN: 5.3 G/DL (ref 6.4–8.3)
WBC # BLD: 13.1 E9/L (ref 4.5–11.5)

## 2021-02-16 PROCEDURE — 6360000002 HC RX W HCPCS: Performed by: FAMILY MEDICINE

## 2021-02-16 PROCEDURE — 97530 THERAPEUTIC ACTIVITIES: CPT

## 2021-02-16 PROCEDURE — 85610 PROTHROMBIN TIME: CPT

## 2021-02-16 PROCEDURE — 1800000000 HC LEAVE OF ABSENCE

## 2021-02-16 PROCEDURE — 99238 HOSP IP/OBS DSCHRG MGMT 30/<: CPT | Performed by: FAMILY MEDICINE

## 2021-02-16 PROCEDURE — 80053 COMPREHEN METABOLIC PANEL: CPT

## 2021-02-16 PROCEDURE — 6370000000 HC RX 637 (ALT 250 FOR IP): Performed by: FAMILY MEDICINE

## 2021-02-16 PROCEDURE — 36415 COLL VENOUS BLD VENIPUNCTURE: CPT

## 2021-02-16 PROCEDURE — 85730 THROMBOPLASTIN TIME PARTIAL: CPT

## 2021-02-16 PROCEDURE — 85651 RBC SED RATE NONAUTOMATED: CPT

## 2021-02-16 PROCEDURE — 86140 C-REACTIVE PROTEIN: CPT

## 2021-02-16 PROCEDURE — 6370000000 HC RX 637 (ALT 250 FOR IP): Performed by: INTERNAL MEDICINE

## 2021-02-16 PROCEDURE — 2580000003 HC RX 258: Performed by: FAMILY MEDICINE

## 2021-02-16 PROCEDURE — 6370000000 HC RX 637 (ALT 250 FOR IP): Performed by: STUDENT IN AN ORGANIZED HEALTH CARE EDUCATION/TRAINING PROGRAM

## 2021-02-16 PROCEDURE — 85384 FIBRINOGEN ACTIVITY: CPT

## 2021-02-16 PROCEDURE — 84145 PROCALCITONIN (PCT): CPT

## 2021-02-16 PROCEDURE — 85378 FIBRIN DEGRADE SEMIQUANT: CPT

## 2021-02-16 PROCEDURE — 85025 COMPLETE CBC W/AUTO DIFF WBC: CPT

## 2021-02-16 PROCEDURE — 2700000000 HC OXYGEN THERAPY PER DAY

## 2021-02-16 RX ORDER — FOLIC ACID 1 MG/1
1 TABLET ORAL DAILY
Qty: 30 TABLET | Refills: 0 | Status: SHIPPED
Start: 2021-02-17 | End: 2021-05-19 | Stop reason: ALTCHOICE

## 2021-02-16 RX ORDER — FOLIC ACID 1 MG/1
1 TABLET ORAL DAILY
Status: DISCONTINUED | OUTPATIENT
Start: 2021-02-16 | End: 2021-02-16 | Stop reason: HOSPADM

## 2021-02-16 RX ORDER — ALBUTEROL SULFATE 90 UG/1
2 AEROSOL, METERED RESPIRATORY (INHALATION) 4 TIMES DAILY PRN
Qty: 1 INHALER | Refills: 0 | Status: SHIPPED | OUTPATIENT
Start: 2021-02-16 | End: 2021-02-17 | Stop reason: SDUPTHER

## 2021-02-16 RX ORDER — DULOXETINE 40 MG/1
40 CAPSULE, DELAYED RELEASE ORAL DAILY
Qty: 30 CAPSULE | Refills: 3 | Status: SHIPPED | OUTPATIENT
Start: 2021-02-17 | End: 2021-02-17 | Stop reason: SDUPTHER

## 2021-02-16 RX ORDER — FLUCONAZOLE 200 MG/1
200 TABLET ORAL DAILY
Qty: 7 TABLET | Refills: 0 | Status: SHIPPED | OUTPATIENT
Start: 2021-02-16 | End: 2021-02-23

## 2021-02-16 RX ORDER — ASCORBIC ACID 500 MG
500 TABLET ORAL DAILY
Qty: 30 TABLET | Refills: 0 | Status: SHIPPED
Start: 2021-02-17 | End: 2021-05-19 | Stop reason: ALTCHOICE

## 2021-02-16 RX ORDER — LIDOCAINE 4 G/G
1 PATCH TOPICAL DAILY
Qty: 3 BOX | Refills: 0 | Status: SHIPPED | OUTPATIENT
Start: 2021-02-17 | End: 2021-03-19

## 2021-02-16 RX ORDER — LANOLIN ALCOHOL/MO/W.PET/CERES
100 CREAM (GRAM) TOPICAL DAILY
Qty: 30 TABLET | Refills: 0 | Status: SHIPPED
Start: 2021-02-17 | End: 2021-05-19 | Stop reason: ALTCHOICE

## 2021-02-16 RX ORDER — BUDESONIDE AND FORMOTEROL FUMARATE DIHYDRATE 160; 4.5 UG/1; UG/1
2 AEROSOL RESPIRATORY (INHALATION) 2 TIMES DAILY
Qty: 1 INHALER | Refills: 1 | Status: ON HOLD | OUTPATIENT
Start: 2021-02-16 | End: 2021-04-14 | Stop reason: HOSPADM

## 2021-02-16 RX ORDER — CHOLECALCIFEROL (VITAMIN D3) 50 MCG
2000 TABLET ORAL DAILY
Qty: 30 TABLET | Refills: 0 | Status: SHIPPED
Start: 2021-02-17 | End: 2021-05-19 | Stop reason: ALTCHOICE

## 2021-02-16 RX ORDER — DEXAMETHASONE 6 MG/1
6 TABLET ORAL DAILY
Qty: 1 TABLET | Refills: 0 | Status: SHIPPED | OUTPATIENT
Start: 2021-02-17 | End: 2021-02-18

## 2021-02-16 RX ADMIN — FERROUS SULFATE TAB 325 MG (65 MG ELEMENTAL FE) 325 MG: 325 (65 FE) TAB at 10:05

## 2021-02-16 RX ADMIN — Medication 100 MG: at 10:05

## 2021-02-16 RX ADMIN — RIVAROXABAN 15 MG: 15 TABLET, FILM COATED ORAL at 10:06

## 2021-02-16 RX ADMIN — OXYCODONE HYDROCHLORIDE AND ACETAMINOPHEN 500 MG: 500 TABLET ORAL at 10:05

## 2021-02-16 RX ADMIN — ACETAMINOPHEN 650 MG: 325 TABLET ORAL at 10:04

## 2021-02-16 RX ADMIN — METHOTREXATE 5 MG: 2.5 TABLET ORAL at 13:52

## 2021-02-16 RX ADMIN — NYSTATIN 500000 UNITS: 100000 SUSPENSION ORAL at 13:53

## 2021-02-16 RX ADMIN — Medication 2000 UNITS: at 10:05

## 2021-02-16 RX ADMIN — CETIRIZINE HYDROCHLORIDE 10 MG: 10 TABLET, FILM COATED ORAL at 10:06

## 2021-02-16 RX ADMIN — PANTOPRAZOLE SODIUM 40 MG: 40 TABLET, DELAYED RELEASE ORAL at 05:10

## 2021-02-16 RX ADMIN — FOLIC ACID 1 MG: 1 TABLET ORAL at 10:06

## 2021-02-16 RX ADMIN — CELECOXIB 200 MG: 100 CAPSULE ORAL at 10:05

## 2021-02-16 RX ADMIN — FLUCONAZOLE 200 MG: 100 TABLET ORAL at 10:05

## 2021-02-16 RX ADMIN — GABAPENTIN 100 MG: 100 CAPSULE ORAL at 10:04

## 2021-02-16 RX ADMIN — HYDROCHLOROTHIAZIDE 25 MG: 25 TABLET ORAL at 10:05

## 2021-02-16 RX ADMIN — DULOXETINE HYDROCHLORIDE 40 MG: 20 CAPSULE, DELAYED RELEASE ORAL at 10:04

## 2021-02-16 RX ADMIN — LEUCOVORIN CALCIUM 25 MG: 5 TABLET ORAL at 10:04

## 2021-02-16 RX ADMIN — FLECAINIDE ACETATE 50 MG: 50 TABLET ORAL at 10:05

## 2021-02-16 RX ADMIN — LEVOTHYROXINE SODIUM 100 MCG: 100 TABLET ORAL at 05:09

## 2021-02-16 RX ADMIN — POLYETHYLENE GLYCOL 3350 17 G: 17 POWDER, FOR SOLUTION ORAL at 10:04

## 2021-02-16 RX ADMIN — ACETAMINOPHEN 650 MG: 325 TABLET ORAL at 02:26

## 2021-02-16 RX ADMIN — ZINC SULFATE 220 MG (50 MG) CAPSULE 50 MG: CAPSULE at 10:06

## 2021-02-16 RX ADMIN — VALSARTAN 160 MG: 160 TABLET, FILM COATED ORAL at 10:05

## 2021-02-16 RX ADMIN — METOPROLOL TARTRATE 25 MG: 25 TABLET, FILM COATED ORAL at 10:06

## 2021-02-16 RX ADMIN — DOCUSATE SODIUM 200 MG: 100 CAPSULE, LIQUID FILLED ORAL at 10:06

## 2021-02-16 RX ADMIN — SODIUM CHLORIDE, PRESERVATIVE FREE 10 ML: 5 INJECTION INTRAVENOUS at 10:06

## 2021-02-16 RX ADMIN — DEXAMETHASONE 6 MG: 4 TABLET ORAL at 10:05

## 2021-02-16 RX ADMIN — BUDESONIDE AND FORMOTEROL FUMARATE DIHYDRATE 2 PUFF: 160; 4.5 AEROSOL RESPIRATORY (INHALATION) at 09:12

## 2021-02-16 ASSESSMENT — PAIN SCALES - GENERAL
PAINLEVEL_OUTOF10: 0
PAINLEVEL_OUTOF10: 5
PAINLEVEL_OUTOF10: 0
PAINLEVEL_OUTOF10: 3
PAINLEVEL_OUTOF10: 0
PAINLEVEL_OUTOF10: 3
PAINLEVEL_OUTOF10: 0

## 2021-02-16 ASSESSMENT — PAIN DESCRIPTION - PROGRESSION: CLINICAL_PROGRESSION: GRADUALLY WORSENING

## 2021-02-16 ASSESSMENT — PAIN DESCRIPTION - PAIN TYPE: TYPE: ACUTE PAIN

## 2021-02-16 ASSESSMENT — PAIN DESCRIPTION - DESCRIPTORS: DESCRIPTORS: ACHING;HEADACHE;DISCOMFORT

## 2021-02-16 ASSESSMENT — PAIN - FUNCTIONAL ASSESSMENT: PAIN_FUNCTIONAL_ASSESSMENT: PREVENTS OR INTERFERES SOME ACTIVE ACTIVITIES AND ADLS

## 2021-02-16 ASSESSMENT — PAIN DESCRIPTION - FREQUENCY: FREQUENCY: CONTINUOUS

## 2021-02-16 ASSESSMENT — PAIN DESCRIPTION - LOCATION: LOCATION: HEAD

## 2021-02-16 ASSESSMENT — PAIN DESCRIPTION - ONSET: ONSET: ON-GOING

## 2021-02-16 NOTE — PROGRESS NOTES
Spoke to resident regarding the need for 2 prescriptions, ventolin hfa and duloxetine  [facility] [place].

## 2021-02-16 NOTE — PROGRESS NOTES
Spoke with Dr. Liseth Donovan regarding positive urine culture. He is aware and pt is okay for discharge.

## 2021-02-16 NOTE — DISCHARGE SUMMARY
Physician Discharge Summary         Patient ID:  Sneha Oconnell  [id number]  80 y.o.  1935    Admit date: 2/7/2021    Discharge date and time: 02/16/2021     Admission Diagnoses:   Patient Active Problem List   Diagnosis    Elevated CA-125    Hiatal hernia    Hypothyroidism    Diverticular disease    Skin cancer of lip    Rheumatoid arthritis ([facility]    Diastolic dysfunction    Paresthesia of lower extremity    Lumbar radicular pain    Anemia    Multiple closed fractures of ribs of both sides with routine healing    Thoracic spinal stenosis with mass at T6-T7    Thoracic spine tumor    Thoracic spine fracture (HCC)    A-fib (HCC)    Obstructive sleep apnea syndrome    Essential hypertension    Adjustment disorder with anxious mood    Laceration of lower limb    Palpitations    Fatigue    Closed fracture of patella    Delayed gastric emptying    Pain in both lower legs    Bimalleolar fracture, left, closed, initial encounter    Osteoporosis    Non-pressure chronic ulcer of other part of right lower leg with fat layer exposed ([facility] [address] [facility]    Bimalleolar fracture    Patellar fracture    Acute respiratory failure with hypoxia (HCC)    COVID-19    Sore throat    Depression    Oral yeast infection       Discharge Diagnoses: COVID-19, thrombocytosis, macrocytic anemia, oral yeast infection, depression, asymptomatic bacteriuria      Consults: ID, Hematology/Oncology    Procedures: None     Hospital Course: Ms Genevieve Cameron is an 80year-old female patient of Dr. Jennifer Roberson who was admitted on 02/07/2021 with acute respiratory failure with hypoxia secondary to COVID-19 infection. CTA in the ED showed diffuse ground glass opacities in all lung lobes but no evidence of a PE. She required O2 to maintain appropriate O2 saturation, with the highest O2 requirement being 4.5 L via nasal cannula. Infectious Disease was consulted.  She completed a full course of remdesivir during her hospitalization and received PO Decadron 6 mg QD for 9 days while admitted. She received vitamin C, zinc, thiamine, and vitamin D supplements daily. Over the course of her admission, her O2 requirement was weaned to 3 L O2 via nasal cannula. She was deemed stable [facility] 02/16/2021. She was prescribed 1 more day of PO Decadron 6 mg QD upon discharge to complete a full course (ie 10 days) of treatment. She was continued on vitamin C, zinc, thiamine, and vitamin D supplements upon discharge. Inhalers (albuterol and Symbicort) were continued upon discharge to help with breathing. In addition to her principal diagnosis of COVID-19 infection, additional problems were addressed during her hospitalization as follows:    Depression: Patient exhibited signs of significant worsening depression during her hospitalization. She denied SI and HI. Her home Cymbalta 20 mg QD was increased to 40 mg QD and continued at this higher dose upon discharge. Asymptomatic bacteriuria: Infectious Disease checked a UA and urine culture for worsening leukocytosis during her hospitalization. Patient denied any dysuria or hematuria. Urine culture grew > 100,000 cfu Proteus mirabilis. She was not discharged on any antibiotics, as she was asymptomatic, afebrile, and had an improvement in her leukocytosis. Multiple rib fractures: Patient was found to have multiple rib fractures upon admission. Her Celebrex was increased during her hospitalization from 200 mg QD to 200 mg BID. She received daily lidocaine patches as well. She did not want any opioids for better pain control. She reported an improvement in her rib pain upon discharge, so her Celebrex was decreased back to home dose of 200 mg QD. Lidocaine patches were prescribed upon discharge.      Oral yeast infection: Patient was treated for an oral yeast infection by Infectious Disease with clotrimazole QID, Diflucan 200 mg QD, and nystatin suspension QID while admitted. She was prescribed Diflucan and nystatin upon discharge for a short-term course. Macrocytic anemia and thrombocytosis: Hematology/Oncology was consulted for worsening macrocytic anemia and thrombocytosis. Hematology/Oncology attributed her macrocytic anemia to her methotrexate use for her RA. Her thrombocytosis was deemed reactive secondary to increased inflammation from acute infection. Patient was started on folic acid supplementation per [facility] Medicine Inpatient Service team. A peripheral blood smear was ordered by Hematology/Oncology. The results of this smear will need to be addressed with PCP in follow-up. Hematology/Oncology is recommending outpatient follow-up in 1-2 weeks with Dr. Emely Cruz. Discharge Exam:  General: In no acute distress. Lying in bed. Heart:  RRR. No murmurs, gallops, or rubs. Lungs:  Diminished breath sounds. No wheeze, rales, or rhonchi. Abd: Bowel sounds present, nontender, nondistended, and no masses. Extrem:  No clubbing, cyanosis, or edema. Psych: Blunted affect.      Disposition: Home with [facility]    Patient Instructions:   Current Discharge Medication List      START taking these medications    Details   fluconazole (DIFLUCAN) 200 MG tablet Take 1 tablet by mouth daily for 7 days  Qty: 7 tablet, Refills: 0      nystatin (MYCOSTATIN) 150368 UNIT/ML suspension Take 5 mLs by mouth 4 times daily for 10 days  Qty: 200 mL, Refills: 0      budesonide-formoterol (SYMBICORT) 160-4.5 MCG/ACT AERO Inhale 2 puffs into the lungs 2 times daily  Qty: 1 Inhaler, Refills: 1      albuterol sulfate HFA (VENTOLIN HFA) 108 (90 Base) MCG/ACT inhaler Inhale 2 puffs into the lungs 4 times daily as needed for Wheezing  Qty: 1 Inhaler, Refills: 0      dexamethasone (DECADRON) 6 MG tablet Take 1 tablet by mouth daily for 1 day  Qty: 1 tablet, Refills: 0      lidocaine 4 % external patch Place 1 patch onto the skin daily  Qty: 3 box, Refills: 0      folic acid (FOLVITE) 1 MG tablet Take 1 tablet by mouth daily  Qty: 30 tablet, Refills: 0      zinc sulfate (ZINCATE) 50 MG CAPS capsule Take 1 capsule by mouth daily  Qty: 30 capsule, Refills: 0      ascorbic acid (VITAMIN C) 500 MG tablet Take 1 tablet by mouth daily  Qty: 30 tablet, Refills: 0      thiamine 100 MG tablet Take 1 tablet by mouth daily  Qty: 30 tablet, Refills: 0      Vitamin D (CHOLECALCIFEROL) 50 MCG (2000 UT) TABS tablet Take 1 tablet by mouth daily  Qty: 30 tablet, Refills: 0    Comments: Labeling may look different. 25 mcg=1000 Units. Please double check dosages. CONTINUE these medications which have CHANGED    Details   DULoxetine HCl 40 MG CPEP Take 40 mg by mouth daily  Qty: 30 capsule, Refills: 3         CONTINUE these medications which have NOT CHANGED    Details   metoprolol tartrate (LOPRESSOR) 25 MG tablet TAKE 1 TABLET TWICE A DAY  Qty: 180 tablet, Refills: 3    Associated Diagnoses: Paroxysmal atrial fibrillation ([address] Essential hypertension      flecainide (TAMBOCOR) 50 MG tablet Take 1 tablet by mouth 2 times daily  Qty: 180 tablet, Refills: 3    Associated Diagnoses: Paroxysmal atrial fibrillation (HCC)      rivaroxaban (XARELTO) 15 MG TABS tablet Take 1 tablet by mouth daily (with breakfast)  Qty: 90 tablet, Refills: 3    Associated Diagnoses: Paroxysmal atrial fibrillation (HCC)      valsartan-hydroCHLOROthiazide (DIOVAN-HCT) 160-25 MG per tablet Take 1 tablet by mouth daily  Qty: 90 tablet, Refills: 1      gabapentin (NEURONTIN) 100 MG capsule Take 1 capsule by mouth 2 times daily for 90 days.   Qty: 180 capsule, Refills: 0    Associated Diagnoses: Gait difficulty; Paresthesia of lower extremity      celecoxib (CELEBREX) 200 MG capsule TAKE 1 CAPSULE DAILY  Qty: 90 capsule, Refills: 3    Associated Diagnoses: Medication management      levothyroxine (LEVOTHROID) 100 MCG tablet Take 1 tablet by mouth Daily  Qty: 90 tablet, Refills: 1      vitamin B-12 (CYANOCOBALAMIN) 1000 MCG tablet Take 1,000 mcg by mouth daily      omeprazole (PRILOSEC) 40 MG delayed release capsule Take 1 capsule by mouth 2 times daily  Qty: 180 capsule, Refills: 3    Associated Diagnoses: Medication management; Hiatal hernia      famotidine (PEPCID) 40 MG tablet Take 1 tablet by mouth nightly  Qty: 90 tablet, Refills: 3      fexofenadine (ALLEGRA) 180 MG tablet Take 180 mg by mouth daily. leucovorin calcium (WELLCOVORIN) 5 MG tablet Take 25 mg by mouth once a week Takes every Tuesday    Associated Diagnoses: Tachyarrhythmia; Hypertension; Diastolic dysfunction      methotrexate 2.5 MG tablet Take by mouth once a week 2 tabs at lunch and 4 tabs  in the pm once a week on Monday      cyanocobalamin 1000 MCG/ML injection Inject 1 mL into the muscle every 30 days Dispense with syringes 1.5 inch 25 G needles  Qty: 10 mL, Refills: 1    Associated Diagnoses: Paresthesia of lower extremity      ferrous sulfate (IRON 325) 325 (65 Fe) MG tablet Take 1 tablet by mouth every other day  Qty: 30 tablet, Refills: 5    Associated Diagnoses: Medication management      docusate sodium (COLACE, DULCOLAX) 100 MG CAPS Take 100 mg by mouth daily  Qty: 30 capsule, Refills: 0      ondansetron (ZOFRAN) 4 MG tablet Take 1 tablet by mouth every 8 hours as needed for Nausea or Vomiting  Qty: 30 tablet, Refills: 5      polyethyl glycol-propyl glycol 0.4-0.3 % (SYSTANE) 0.4-0.3 % ophthalmic solution 1 drop 2 times daily as needed for Dry Eyes       fluticasone (FLONASE) 50 MCG/ACT nasal spray 2 sprays by Nasal route daily 2 sprays in each nostril daily  Qty: 1 Bottle, Refills: 11    Associated Diagnoses: Medication management      Probiotic Product (ACIDOPHILUS PROBIOTIC) CAPS capsule Take 1 capsule by mouth daily         STOP taking these medications       montelukast (SINGULAIR) 10 MG tablet Comments:   Reason for Stopping:             Activity: activity as tolerated  Diet: regular diet    Follow-up with Dr. Ebony Raines on 02/23/2021 at 8:45 AM via virtual visit. Follow-up with Infectious Disease as an outpatient. Follow-up with Hematology/Oncology as an outpatient.         Signed:  Noelle Mccoy DO, PGY-3   2/16/2021  12:40 PM

## 2021-02-16 NOTE — PROGRESS NOTES
[address] [place] [address] [facility]  Progress Note      Chief Complaint   Patient presents with    Shortness of Breath       SUBJECTIVE:      Patient states she has lost her sense of taste-everything taste like cardboard    Requesting mouth rinse which she would prefer over Mycelex troches    Denies shortness of breath or cough    No nausea or vomiting    Remains on low-flow oxygen by nasal cannula    Still has not been out of bed      Review of systems:    As stated above in the chief complaint, otherwise negative.     Medications:    Scheduled Meds:   folic acid  1 mg Oral Daily    nystatin  5 mL Oral 4x Daily    methotrexate  5 mg Oral Once    valsartan  160 mg Oral Daily    And    hydroCHLOROthiazide  25 mg Oral Daily    fluconazole  200 mg Oral Daily    DULoxetine  40 mg Oral Daily    docusate sodium  200 mg Oral Daily    polyethylene glycol  17 g Oral Daily    ferrous sulfate  325 mg Oral Every Other Day    cetirizine  10 mg Oral Daily    flecainide  50 mg Oral BID    gabapentin  100 mg Oral BID    levothyroxine  100 mcg Oral Daily    metoprolol tartrate  25 mg Oral BID    montelukast  10 mg Oral Nightly    pantoprazole  40 mg Oral BID AC    rivaroxaban  15 mg Oral Daily with breakfast    sodium chloride flush  10 mL Intravenous 2 times per day    Vitamin D  2,000 Units Oral Daily    thiamine  100 mg Oral Daily    ascorbic acid  500 mg Oral Daily    zinc sulfate  50 mg Oral Daily    leucovorin calcium  25 mg Oral Weekly    famotidine  20 mg Oral Nightly    budesonide-formoterol  2 puff Inhalation BID    lidocaine  1 patch Transdermal Daily    dexamethasone  6 mg Oral Daily    celecoxib  200 mg Oral BID     Continuous Infusions:  PRN Meds:sodium chloride flush, promethazine **OR** ondansetron, acetaminophen **OR** acetaminophen, guaiFENesin-dextromethorphan, albuterol-ipratropium, sodium chloride  Prior to Admission medications    Medication Sig Start Date End Date Taking?  Authorizing Provider   fluconazole (DIFLUCAN) 200 MG tablet Take 1 tablet by mouth daily for 7 days 2/16/21 2/23/21 Yes Angeles Cruz DO   nystatin (MYCOSTATIN) 216688 UNIT/ML suspension Take 5 mLs by mouth 4 times daily for 10 days 2/16/21 2/26/21 Yes Angeles Cruz DO   budesonide-formoterol [facility]) 160-4.5 MCG/ACT AERO Inhale 2 puffs into the lungs 2 times daily 2/16/21  Yes Carlos Rader DO   albuterol sulfate HFA (VENTOLIN HFA) 108 (90 Base) MCG/ACT inhaler Inhale 2 puffs into the lungs 4 times daily as needed for Wheezing 2/16/21  Yes Carlos Rader DO   DULoxetine HCl 40 MG CPEP Take 40 mg by mouth daily 2/17/21  Yes Carlos Rader DO   dexamethasone (DECADRON) 6 MG tablet Take 1 tablet by mouth daily for 1 day 2/17/21 2/18/21 Yes Carlos Rader DO   lidocaine 4 % external patch Place 1 patch onto the skin daily 2/17/21  Yes Carlos Rader DO   folic acid (FOLVITE) 1 MG tablet Take 1 tablet by mouth daily 2/17/21  Yes Carlos Rader DO   zinc sulfate (ZINCATE) 50 MG CAPS capsule Take 1 capsule by mouth daily 2/17/21  Yes Carlos Rader DO   ascorbic acid (VITAMIN C) 500 MG tablet Take 1 tablet by mouth daily 2/17/21  Yes DO Garo   thiamine 100 MG tablet Take 1 tablet by mouth daily 2/17/21  Yes DO Garo   Vitamin D (CHOLECALCIFEROL) 50 MCG (2000 UT) TABS tablet Take 1 tablet by mouth daily 2/17/21  Yes Carlos Rader DO   metoprolol tartrate (LOPRESSOR) 25 MG tablet TAKE 1 TABLET TWICE A DAY 1/4/21  Yes Ant Kaiser MD   flecainide (TAMBOCOR) 50 MG tablet Take 1 tablet by mouth 2 times daily 11/23/20  Yes Ant Kaiser MD   rivaroxaban (XARELTO) 15 MG TABS tablet Take 1 tablet by mouth daily (with breakfast) 11/23/20  Yes Ant Kaiser MD   valsartan-hydroCHLOROthiazide (DIOVAN-HCT) 160-25 MG per tablet Take 1 tablet by mouth daily 10/28/20  Yes Primitivo Juarez MD   gabapentin (NEURONTIN) 100 MG capsule Take 1 capsule by mouth 2 times daily for 90 days. 10/8/20 2/8/21 Yes Arlin Guillen MD   celecoxib (CELEBREX) 200 MG capsule TAKE 1 CAPSULE DAILY 9/17/20  Yes Arlin Guillen MD   levothyroxine (LEVOTHROID) 100 MCG tablet Take 1 tablet by mouth Daily 9/17/20  Yes Arlin Guillen MD   vitamin B-12 (CYANOCOBALAMIN) 1000 MCG tablet Take 1,000 mcg by mouth daily   Yes Historical Provider, MD   omeprazole (PRILOSEC) 40 MG delayed release capsule Take 1 capsule by mouth 2 times daily 4/16/20  Yes Arlin Guillen MD   famotidine (PEPCID) 40 MG tablet Take 1 tablet by mouth nightly 3/31/20  Yes Arlin Guillen MD   fexofenadine (ALLEGRA) 180 MG tablet Take 180 mg by mouth daily.    Yes Historical Provider, MD   leucovorin calcium (WELLCOVORIN) 5 MG tablet Take 25 mg by mouth once a week Takes every Tuesday 7/19/13  Yes Historical Provider, MD   methotrexate 2.5 MG tablet Take by mouth once a week 2 tabs at lunch and 4 tabs  in the pm once a week on Monday   Yes Historical Provider, MD   cyanocobalamin 1000 MCG/ML injection Inject 1 mL into the muscle every 30 days Dispense with syringes 1.5 inch 25 G needles 1/6/21   Arlin Guillen MD   ferrous sulfate (IRON 325) 325 (65 Fe) MG tablet Take 1 tablet by mouth every other day 9/17/20   Arlin Guillen MD   docusate sodium (COLACE, DULCOLAX) 100 MG CAPS Take 100 mg by mouth daily 7/17/20   Juliano Herrera MD   ondansetron [facility]) 4 MG tablet Take 1 tablet by mouth every 8 hours as needed for Nausea or Vomiting 7/23/19   Arlin Guillen MD   polyethyl glycol-propyl glycol 0.4-0.3 % (SYSTANE) 0.4-0.3 % ophthalmic solution 1 drop 2 times daily as needed for Dry Eyes     Historical Provider, MD   fluticasone (FLONASE) 50 MCG/ACT nasal spray 2 sprays by Nasal route daily 2 sprays in each nostril daily 7/19/18   Arlin Guillen MD   Probiotic Product (ACIDOPHILUS PROBIOTIC) CAPS capsule Take 1 capsule by mouth daily    Historical Provider, MD OBJECTIVE:  BP (!) 140/61   Pulse 64   Temp 97.2 °F (36.2 °C) (Oral)   Resp 18   Ht 5' (1.524 m)   Wt 142 lb 14.4 oz (64.8 kg)   SpO2 94%   BMI 27.91 kg/m²   Temp  Av.8 °F (36 °C)  Min: 96.6 °F (35.9 °C)  Max: 97.2 °F (36.2 °C)  General appearance: Awake and alert but fatigued, no respiratory distress  Skin: Warm and dry. No rashes were noted. HEENT: Round and reactive pupils. Moist mucous membranes. No ulcerations or thrush. Neck: Supple to movements. Chest: Improved inspiratory effort with bibasilar rhonchi noted, no wheezing  Cardiovascular: Regular rate and rhythm. No murmurs gallops, or rubs appreciated. Abdomen: Bowel sounds present, nontender, nondistended, no masses or hepatosplenomegaly. Extremities: No clubbing, no cyanosis, no edema.   Lines: peripheral  Neuro: No significant sensory or motor deficits noted    I/O last 3 completed shifts:  In: -   Out: 200 [Urine:200]      Laboratory and Tests Review:      Lab Results   Component Value Date    WBC 13.1 (H) 2021    WBC 20.8 (H) 02/15/2021    WBC 18.0 (H) 2021    HGB 9.9 (L) 2021    HCT 31.6 (L) 2021    .9 (H) 2021     (H) 2021     Lab Results   Component Value Date    NEUTROABS 11.27 (H) 2021    NEUTROABS 18.04 (H) 02/15/2021    NEUTROABS 15.44 (H) 2021     No results found for: [facility]  Lab Results   Component Value Date    ALT 14 2021    AST 18 2021    ALKPHOS 82 2021    BILITOT 0.6 2021     Lab Results   Component Value Date     2021    K 4.5 2021    CL 97 2021    CO2 24 2021    BUN 31 2021    CREATININE 0.8 2021    CREATININE 0.9 02/15/2021    CREATININE 0.9 2021    GFRAA >60 2021    LABGLOM >60 2021    GLUCOSE 100 2021    GLUCOSE 79 2012    PROT 5.3 2021    LABALBU 2.6 2021    LABALBU 4.0 2012    CALCIUM 8.3 2021    BILITOT 0.6 2021    ALKPHOS 82 fractures. Healed remote left 9th through 11th rib fractures. Probable remote right 6   rib fracture. Diffuse and bilateral airspace opacities. CTA CHEST W CONTRAST   Final Result   Addendum 1 of 1   ADDENDUM:   There are bilateral old healed rib fractures, of no acute clinical   significance. No evidence is seen of acute rib cage fracture on this examination. Final          Microbiology:   Lab Results   Component Value Date    ORG Proteus mirabilis 02/15/2021    ORG Micrococcus 09/30/2020    ORG Staphylococcus aureus 09/09/2020    ORG Staphylococcus coagulase-negative 09/09/2020     Lab Results   Component Value Date    ORG Proteus mirabilis 02/15/2021    ORG Micrococcus 09/30/2020    ORG Staphylococcus aureus 09/09/2020    ORG Staphylococcus coagulase-negative 09/09/2020     WOUND/ABSCESS   Date Value Ref Range Status   09/30/2020   Final    Heavy growth  Validated susceptibility testing methods do not exist for  this isolate. 09/09/2020   Final    Light growth  Methicillin resistant Staph aureus isolated. Most Methicillin  resistant Staphylococcus are usually resistant to multiple  antibiotics including other B-Lactams, Aminoglycosides,  Macrolides, Clindamycin and Tetracycline. Contact isolation  is indicated. This isolate is presumed to be resistant based on the  detection of inducible Clindamycin resistance. Clindamycin  may still be effective in some patients.      09/09/2020 One colony  Final     No results found for: RESPSMEAR  No results found for: MPNEUMO, CLAMYDCU, LABLEGI, AFBCX, FUNGSM, LABFUNG  No results found for: CULTRESP  No results found for: CXCATHTIP  No results found for: BFCS  No results found for: CXSURG  Urine Culture, Routine   Date Value Ref Range Status   02/15/2021 >100,000 CFU/ml  Sensitivity to follow    Preliminary   06/04/2019 <10,000 CFU/mL  Gram negative rods    Final   07/17/2014 25,000 CFU/ml  Final   07/17/2014   Final    <25,000 CFU/ml  Vancomycin resistant Enterococci isolated       Culture, Urine [[id number]] (Abnormal)    Collected: 02/15/21 1115    Updated: 02/16/21 1232    Specimen Source: Urine voided     Organism Proteus mirabilisAbnormal     Urine Culture, Routine --    >100,000 CFU/ml   Sensitivity to follow          Problem list:    Principal Problem:    Acute respiratory failure with hypoxia ([facility])  Active Problems:    Hypothyroidism    Rheumatoid arthritis ([address]    Multiple closed fractures of ribs of both sides with routine healing    A-fib ([facility])    Essential hypertension    COVID-19    Sore throat    Depression    Oral yeast infection  Resolved Problems:    Hyponatremia      ASSESSMENT:    · COVID-19 viral pneumonia, stable on low-flow nasal cannula oxygen  · Severe dysgeusia secondary to Covid  · Paroxysmal atrial fibrillation  · Rheumatoid arthritis  · Multiple left rib fractures  · Leukocytosis-improved today  · Severe weakness/deconditioning  · Age-related physical debility  · Prerenal azotemia consistent with dehydration likely due to poor oral intake  · Oral candidiasis   · Depression  · Hyponatremia  · Urinalysis relatively unremarkable with only 2-5 WBCs  · Asymptomatic bacteriuria      PLAN:    · Complete Decadron per protocol  · Continue  fluconazole  · Stop Mycelex troches and begin nystatin suspension  · No treatment for asymptomatic bacteriuria  · Continue low-flow oxygen therapy  · Continue vitamin D, zinc, vitamin C  · Encourage oral intake  · Physical therapy       Aware of Proteus in urine; patient afebrile, not symptomatic and WBC count improving; would not treat but if patient develops fever or urinary tract symptoms wound institute antibiotic therapy; ok for discharge; case discussed with Dr Daiana David    1:43 PM  2/16/2021

## 2021-02-16 NOTE — PROGRESS NOTES
Physical Therapy  Facility/Department: [address] INTERMEDIATE 1  Daily Treatment Note  NAME: Phyllis Metz  : 1935  MRN: [id number]    Date of Service: 2021    Patient Diagnosis(es): The primary encounter diagnosis was Acute respiratory failure with hypoxia ([address] A diagnosis of COVID-19 was also pertinent to this visit. has a past medical history of Anticoagulated, Atrial fibrillation ([address] Bimalleolar fracture, Diastolic dysfunction, Difficulty walking, Diverticular disease, Elevated CA-125, Hiatal hernia, Hypertension, Hypothyroid, Meningocele spinal ([address] Neuropathy, JANETTE (obstructive sleep apnea), Osteoporosis, PAF (paroxysmal atrial fibrillation) ([address] Pain in both lower legs, Patellar fracture, Rheumatoid arthritis with rheumatoid factor ([address] Rheumatoid arthritis(714.0), Rib fractures, Seasonal allergies, Skin cancer of lip, and Wears dentures. has a past surgical history that includes Gastric fundoplication ();  section; Carpal tunnel release (Bilateral); Colonoscopy (2011); Total knee arthroplasty (2007); Cardiac catheterization; Hysterectomy, total abdominal (early ); Upper gastrointestinal endoscopy (N/A, 2020); and Colonoscopy (N/A, 2020).    Referring Samia Dalton DO        Evaluating Therapist: Jose Casas PT     Room #:425  DIAGNOSIS: Acute Respiratory failure  Additional Pertinent History:RA  PRECAUTIONS: falls, O2, droplet plus isolation     Social:  Pt lives with son in a 1 floor plan. Prior to admission Pt non-ambulatory, w/c level, states transfers to chair with a \"sit to stand\" that lifts her up from sated posistion       Initial Evaluation  Date: 21 Treatment     2021 Short Term/ Long Term   Goals   Was pt agreeable to Eval/treatment? yes Yes       Does pt have pain?  10/10 ribs L side, also c/o B LE pain  L rib pain      Bed Mobility  Rolling: max assist  Supine to sit: pt unable secondary to pain  Sit to supine:   Scooting: max assist  Supine <> sit : max assist  Scooting : dep assist  Min assist   Transfers Sit to stand: NT  Stand to sit: NT  Stand pivot: NT Sit <> stand max assist.  Max assist   Ambulation    NT   N/A   Stair Negotiation  Ascended and descended  NT    N/A   LE strength     2/5     3-/5   balance             AM-PAC Raw score               8/24  10/ 24            Patient education  Pt was educated on importance of mobility    Patient response to education:   Pt verbalized understanding Pt demonstrated skill Pt requires further education in this area   yes         Additional Comments/treatment: Functional mobility as above. C/o dizziness sitting edge of bed. Min assist for sitting balance. Pt was left in bed with call light left by patient. Time in: 1055  Time out: 1112    Total treatment time 17 minutes    Pt is making  progress toward established Physical Therapy goals. Continue with physical therapy current plan of care.     Venessa PT [id number]      CPT codes:  [] Low Complexity PT evaluation 39962  [] Moderate Complexity PT evaluation 50736  [] High Complexity PT evaluation 07208  [] PT Re-evaluation 15471  [] Gait training 18513  minutes  [] Manual therapy 64041    minutes  [x] Therapeutic activities 02806  17  minutes  [] Therapeutic exercises 90021     minutes  [] Neuromuscular reeducation 86308     minutes

## 2021-02-16 NOTE — PROGRESS NOTES
Spoke with pts daughter Diane Donaldson regarding discharge instructions she will have family call nurse to discuss discharge instructions over the phone when pt arrives home. Both pt and daughter Diane Donaldson agreeable for this RN to go over discharge instructions with family when pt arrives at home.

## 2021-02-16 NOTE — PROGRESS NOTES
Discharge instructions went over with daughter Davis Villagomez and pts via phone per their request.

## 2021-02-16 NOTE — CARE COORDINATION
Social Work/Discharge Planning:  Called patient daughter Maria D Vásquez (ph: [phone]) and informed her of possible discharge. Maria D Vásquez confirmed her mother will have caregivers at the home at 2:00pm and requesting ambulance transport for 1:30pm.  Called [facility] (ph: [phone]) and arranged Ambulance for 1:30pm.  Notified RN. Notified Martita Current with [facility] and Carol with [facility] of discharge time. Notified patient daughter Maria D Vásquez of discharge time. Maria D Vásquez is aware her mother is bedridden. She states it will be a challenge, but they will have caregivers twenty-four hours a day for her mother. Maria D Vásquez states they the home set up like a nursing home for her mother. Maria D Vásquez states she will notify her brother [place] Billing of discharge time. Will continue to follow.   Electronically signed by JU Milan on 2/16/2021 at 9:54 AM

## 2021-02-16 NOTE — PROGRESS NOTES
[address]  Progress Note    CC: Hypoxia     Subjective:  Ms. Taco Garcia reports improved pain of her ribs. She denies shortness of breath, chest pain, palpitations, abdominal pain, N/V/D/C, fevers, and chills. She has only had 1 BM since being admitted. Past medical, surgical, family and social history were reviewed, non-contributory, and unchanged unless otherwise stated. Objective:  BP (!) 145/68   Pulse 68   Temp 96.7 °F (35.9 °C) (Oral)   Resp 18   Ht 5' (1.524 m)   Wt 142 lb 14.4 oz (64.8 kg)   SpO2 95%   BMI 27.91 kg/m²     General: In no acute distress. Lying in bed. Heart:  RRR. No murmurs, gallops, or rubs. Lungs:  Diminished breath sounds. No wheeze, rales, or rhonchi. Abd: Bowel sounds present, nontender, nondistended, and no masses. Extrem:  No clubbing, cyanosis, or edema. Psych: Blunted affect.      CBC with Differential:    Lab Results   Component Value Date    WBC 13.1 02/16/2021    RBC 3.10 02/16/2021    HGB 9.9 02/16/2021    HCT 31.6 02/16/2021     02/16/2021    .9 02/16/2021    MCH 31.9 02/16/2021    MCHC 31.3 02/16/2021    RDW 14.4 02/16/2021    NRBC 0.0 02/16/2021    SEGSPCT 66 03/26/2013    METASPCT 0.9 02/16/2021    LYMPHOPCT 3.5 02/16/2021    MONOPCT 10.4 02/16/2021    MYELOPCT 0.9 06/16/2020    BASOPCT 0.0 02/16/2021    MONOSABS 1.31 02/16/2021    LYMPHSABS 0.52 02/16/2021    EOSABS 0.00 02/16/2021    BASOSABS 0.00 02/16/2021     CMP:    Lab Results   Component Value Date     02/16/2021    K 4.5 02/16/2021    CL 97 02/16/2021    CO2 24 02/16/2021    BUN 31 02/16/2021    CREATININE 0.8 02/16/2021    GFRAA >60 02/16/2021    LABGLOM >60 02/16/2021    GLUCOSE 100 02/16/2021    GLUCOSE 79 04/24/2012    PROT 5.3 02/16/2021    LABALBU 2.6 02/16/2021    LABALBU 4.0 04/24/2012    CALCIUM 8.3 02/16/2021    BILITOT 0.6 02/16/2021    ALKPHOS 82 02/16/2021    AST 18 02/16/2021    ALT 14 02/16/2021     Troponin:    Lab Results Component Value Date    TROPONINI <0.01 02/08/2021        Assessment:  Active Hospital Problems    Diagnosis Date Noted    Oral yeast infection [B37.0]     Depression [F32.9] 02/12/2021    Sore throat [J02.9] 02/09/2021    Acute respiratory failure with hypoxia ([facility] [J96.01] 02/08/2021    COVID-19 [U07.1] 02/08/2021    Essential hypertension [I10] 05/23/2016    Multiple closed fractures of ribs of both sides with routine healing [S22.43XD] 12/16/2014    Hypothyroidism [E03.9] 06/10/2011    Rheumatoid arthritis ([facility] [M06.9] 06/10/2011    A-fib ([facility] [I48.91] 06/09/2011       Plan:  Acute respiratory failure with hypoxia secondary to COVID-19   - Telemetry   - Droplet + isolation  - Vitals q 4 hours    - Incentive spirometry q 1 hour   - ID following   - WBC 8.6 --> 7.8--> 8.2--> 13.2-->11.5-->12.4->18--> 20.8-->13.1 . She is on 3 L O2 via nasal cannula. - D-dimer 419--> 1782--> 689 --> 651 --> 557-->616-->605--> 582--> 513. CTA chest in ED on 02/07 was negative for PE.   - Fibrinogen >700 --> 659--> 563 --> 538--> 511--> 480-->542-->544-->465  - Respiratory panel from 02/08 was COVID +  - Legionella urine antigen and Strep pneumoniae urine antigen are negative  - On vitamin C 500 mg QD, thiamine 100 mg QD, vitamin D 2000 IU QD, and zinc 50 mg QD   - On dexamethasone 6 mg QD. [address] course finished on 02/12.   - Inhalers   - Robitussin DM 5 ml q 4 hrs PRN for cough   - Daily weights and I/Os  - Social Work on case for discharge planning. Family is now pursuing discharge home with Meghan Chappell.  - PT/OT following. AM-PAC score 8/24 from PT.  AM-PAC score 11/24 from OT.     Essential HTN   - Vitals q 4 hours   - Continue home Lopressor 25 mg BID   - After episode of hypotension on 02/14, BP medications were decreased back to valsartan 160 mg QD and HCTZ 25 mg QD.     Hypothyroidism  - Continue home Synthroid 100 mcg QD      RA  - Increased home Celebrex 200 mg QD to 200 mg BID to help with pain from multiple rib fractures  - Continue methotrexate 5 mg Monday at lunch and 10 mg Monday evening on weekly basis      Atrial fibrillation   - Continue home flecainide 50 mg BID   - Continue home Lopressor 25 mg BID  - Continue home Xarelto 15 mg QD       Multiple rib fractures   X-ray from 02/07 showed acute left 7th and 8th posterolateral rib fractures. Healed remote left 9th-11th rib fractures. Probable remote 6th rib fracture.    - Abdominal binder to be placed over ribs with movement (ie changing positions, working with PT, etc.)   - Increased home Celebrex 200 mg QD to 200 mg BID to help with pain from multiple rib fractures  - Tylenol 650 mg q 6 hrs PRN for pain  - Lidocaine patch q 12 hours for pain     Sore throat  - Cepacol throat lozenges q 2 hrs PRN      Constipation  - Increased home Colace 100 mg QD to 200 mg QD  - On Miralax 17 g QD today   - Soap suds enema PRN      Hyponatremia   - Decreased HCTZ 50 mg QD back to home dose of 25 mg QD on 02/11 due to hyponatremia   - Continue to monitor      Depression  - Increased home Cymbalta from 20 mg QD to 40 mg QD    Oral yeast infection   - Clotrimazole 10 mg QID. Day 2.  - Fluconazole 200 mg QD. Day 2.       Poor oral intake  - Stop IVFs due to hyponatremia     Macrocytic anemia  - Hem/Onc following  - Start folic acid    Thrombocytosis  - Hem/Onc following. Thought to be reactive in nature.      Continue additional home medications:   - Allegra 180 mg QD substituted with cetirizine 10 mg QD  - Pepcid 40 mg HS  - Ferrous sulfate 325 mg every other day   - Gabapentin 100 mg BID   - Leucovorin calcium 25 mg every Tuesday   - Singulair 10 mg HS   - Omeprazole 40 mg BID substituted with pantoprazole 40 mg BID      Diet: General   Code: Full   DVT prophylaxis: Xarelto 15 mg QD       Electronically signed by Daiana Lane DO on 2/16/2021 at 7:38 AM

## 2021-02-16 NOTE — PROGRESS NOTES
[facility]  Hematology/Oncology  Consult      Patient Name: Paula Stubbs  YOB: 1935  PCP: Yoseph Baca MD   Referring Provider:      Reason for Consultation:   Chief Complaint   Patient presents with    Shortness of Breath      Subjective: Feels ok today but continues with the fatigue. To be discharged. History of Present Illness: This is an 80-year-old female patient of Dr. Luan Reyes who was following for mild elevation in her tumor markers CEA and Ca-125 as well as macrocytosis. Her work up was ultimately negative and these abnormalities were likely related to the methotrexate that she was being treated with for her rheumatoid arthritis. She was also mildly anemic related to her CKD and she was being monitored for the need for EPO injections. She has not been seen since November of 2015. She also has a PMH of hypothyroidism, atrial fibrillation, and hypertension who presented to the emergency department via EMS for evaluation of shortness of breath, left-sided chest pain with moderate tenderness palpation of the left sided chest wall. CT of the chest was performed which showed no evidence of pulmonary embolism however did show findings consistent with patient's diagnosis of COVID. Her blood work significant for high CPR, prolactin. Liver enzymes with low albumin and total protein. CBC with leukocytosis anemia and thrombocytosis. Consult for worsening thrombocytosis in COVID patient.      Diagnostic Data:     Past Medical History:   Diagnosis Date    Anticoagulated     takes Xarelto    Atrial fibrillation ([facility]     follows with Dr. Sony Murphy fracture     Diastolic dysfunction     stage I    Difficulty walking     uses walker    Diverticular disease 2011    identified on colonoscopy in 9/2011    Elevated CA-125     referred to Dr Luan Reyes; no work up planned     Hiatal hernia     s/p Nissen with recurrence; Dr Araceli Dunaway    Hypertension     Hypothyroid hernia 06/10/2011    Hypothyroidism 06/10/2011    Rheumatoid arthritis ([facility] 06/10/2011    A-fib ([facility] 2011        Past Surgical History:   Procedure Laterality Date    CARDIAC CATHETERIZATION      CARPAL TUNNEL RELEASE Bilateral      SECTION      COLONOSCOPY  2011    Dr Martha Norman; diverticular disease; repeat in 2016    COLONOSCOPY N/A 2020    COLONOSCOPY WITH BIOPSY performed by Miah Mccullough MD at [address] [facility], TOTAL ABDOMINAL  early 's    TOTAL KNEE ARTHROPLASTY  2007    bilateral    UPPER GASTROINTESTINAL ENDOSCOPY N/A 2020    EGD BIOPSY performed by Miah Mccullough MD at [facility] ENDOSCOPY       Family History  Family History   Problem Relation Age of Onset    Heart Attack Mother 58    Other Father         suicide, depression, leg trouble    Other Sister         Dementia    Hypertension Sister     Hypertension Sister     Hypertension Brother     Thyroid Disease Sister     Rheum Arthritis Sister     Rheum Arthritis Brother     Other Daughter         hemochromatosis     Other Son         hemachromatosis        Social History    TOBACCO:   reports that she has never smoked. She has never used smokeless tobacco.  ETOH:   reports no history of alcohol use. Home Medications  Prior to Admission medications    Medication Sig Start Date End Date Taking?  Authorizing Provider   fluconazole (DIFLUCAN) 200 MG tablet Take 1 tablet by mouth daily for 7 days 21 Yes Ny Purpura, DO   nystatin (MYCOSTATIN) 518720 UNIT/ML suspension Take 5 mLs by mouth 4 times daily for 10 days 21 Yes Ny Purpura, DO   metoprolol tartrate (LOPRESSOR) 25 MG tablet TAKE 1 TABLET TWICE A DAY 21  Yes Martha Dumont MD   flecainide (TAMBOCOR) 50 MG tablet Take 1 tablet by mouth 2 times daily 20  Yes Martha Dumont MD   rivaroxaban (XARELTO) 15 MG TABS tablet Take 1 tablet by mouth daily (with breakfast) 11/23/20  Yes Nima Levine MD   valsartan-hydroCHLOROthiazide (DIOVAN-HCT) 160-25 MG per tablet Take 1 tablet by mouth daily 10/28/20  Yes Jayden Nielsen MD   gabapentin (NEURONTIN) 100 MG capsule Take 1 capsule by mouth 2 times daily for 90 days. 10/8/20 2/8/21 Yes Jayden Nielsen MD   DULoxetine (CYMBALTA) 20 MG extended release capsule Take 1 capsule by mouth daily 10/8/20  Yes Jayden Nielsen MD   celecoxib (CELEBREX) 200 MG capsule TAKE 1 CAPSULE DAILY 9/17/20  Yes Jayden Nielsen MD   levothyroxine (LEVOTHROID) 100 MCG tablet Take 1 tablet by mouth Daily 9/17/20  Yes Jayden Nielsen MD   vitamin B-12 (CYANOCOBALAMIN) 1000 MCG tablet Take 1,000 mcg by mouth daily   Yes Historical Provider, MD   omeprazole (PRILOSEC) 40 MG delayed release capsule Take 1 capsule by mouth 2 times daily 4/16/20  Yes Jayden Nielsen MD   famotidine (PEPCID) 40 MG tablet Take 1 tablet by mouth nightly 3/31/20  Yes Jayden Nielsen MD   fexofenadine (ALLEGRA) 180 MG tablet Take 180 mg by mouth daily.    Yes Historical Provider, MD   leucovorin calcium (WELLCOVORIN) 5 MG tablet Take 25 mg by mouth once a week Takes every Tuesday 7/19/13  Yes Historical Provider, MD   methotrexate 2.5 MG tablet Take by mouth once a week 2 tabs at lunch and 4 tabs  in the pm once a week on Monday   Yes Historical Provider, MD   montelukast (SINGULAIR) 10 MG tablet TAKE 1 TABLET NIGHTLY 2/8/21   Jayedn Nielsen MD   cyanocobalamin 1000 MCG/ML injection Inject 1 mL into the muscle every 30 days Dispense with syringes 1.5 inch 25 G needles 1/6/21   Jayden Nielsen MD   ferrous sulfate (IRON 325) 325 (65 Fe) MG tablet Take 1 tablet by mouth every other day 9/17/20   Jayden Nielsen MD   docusate sodium (COLACE, DULCOLAX) 100 MG CAPS Take 100 mg by mouth daily 7/17/20   Allan Carlos MD   ondansetron [facility]) 4 MG tablet Take 1 tablet by mouth every 8 hours as needed for Nausea or Vomiting 7/23/19 Brandan Cabezas MD   polyethyl glycol-propyl glycol 0.4-0.3 % (SYSTANE) 0.4-0.3 % ophthalmic solution 1 drop 2 times daily as needed for Dry Eyes     Historical Provider, MD   fluticasone (FLONASE) 50 MCG/ACT nasal spray 2 sprays by Nasal route daily 2 sprays in each nostril daily 7/19/18   Brandan Cabezas MD   Probiotic Product (ACIDOPHILUS PROBIOTIC) CAPS capsule Take 1 capsule by mouth daily    Historical Provider, MD       Allergies  Allergies   Allergen Reactions    Amoxicillin      GI ill effects       Review of Systems: +fatigue, weakness, MONTENEGRO, malaise. Objective  BP (!) 140/61   Pulse 64   Temp 97.2 °F (36.2 °C) (Oral)   Resp 18   Ht 5' (1.524 m)   Wt 142 lb 14.4 oz (64.8 kg)   SpO2 94%   BMI 27.91 kg/m²     Physical Exam:   Performance Status:  General: AAO to person, place, time, in no acute distress,   Head and neck : PERRLA, EOMI . Sclera non icteric. Oropharynx : Clear  Neck: no JVD,  no adenopathy  Heart: Regular rate and regular rhythm, no murmur  Lungs: inspiratory rhonchi noted. Extremities: +1 edema lower extremites,no cyanosis, no clubbing. Abdomen: Soft, non-tender;no masses, no organomegaly  Skin:  No rash  Neurologic:Cranial nerves grossly intact. No focal motor or sensory deficits .     Recent Laboratory Data-   Lab Results   Component Value Date    WBC 13.1 (H) 02/16/2021    HGB 9.9 (L) 02/16/2021    HCT 31.6 (L) 02/16/2021    .9 (H) 02/16/2021     (H) 02/16/2021    LYMPHOPCT 3.5 (L) 02/16/2021    RBC 3.10 (L) 02/16/2021    MCH 31.9 02/16/2021    MCHC 31.3 (L) 02/16/2021    RDW 14.4 02/16/2021    NEUTOPHILPCT 85.2 (H) 02/16/2021    MONOPCT 10.4 02/16/2021    BASOPCT 0.0 02/16/2021    NEUTROABS 11.27 (H) 02/16/2021    LYMPHSABS 0.52 (L) 02/16/2021    MONOSABS 1.31 (H) 02/16/2021    EOSABS 0.00 (L) 02/16/2021    BASOSABS 0.00 02/16/2021       Lab Results   Component Value Date     (L) 02/16/2021    K 4.5 02/16/2021    CL 97 (L) 02/16/2021    CO2 24 02/16/2021    BUN 31 (H) 02/16/2021    CREATININE 0.8 02/16/2021    GLUCOSE 100 (H) 02/16/2021    CALCIUM 8.3 (L) 02/16/2021    PROT 5.3 (L) 02/16/2021    LABALBU 2.6 (L) 02/16/2021    BILITOT 0.6 02/16/2021    ALKPHOS 82 02/16/2021    AST 18 02/16/2021    ALT 14 02/16/2021    LABGLOM >60 02/16/2021    GFRAA >60 02/16/2021       Lab Results   Component Value Date    IRON 41 06/16/2020    TIBC 359 06/14/2019    FERRITIN 425 02/08/2021           Radiology-    Xr Ribs Bilateral (min 4 Views)    Result Date: 2/8/2021  EXAMINATION: XRAY VIEWS OF THE BILATERAL RIBS WITH FRONTAL XRAY VIEW OF THE CHEST 2/8/2021 2:36 pm COMPARISON: None. HISTORY: ORDERING SYSTEM PROVIDED HISTORY: Bilateral rib pain TECHNOLOGIST PROVIDED HISTORY: Reason for exam:->Bilateral rib pain FINDINGS: Diffuse and bilateral airspace opacities. Cardiomegaly. Acute left 7th and 8th posterolateral rib fractures. Healed remote left 9th through 11th rib fractures. Probable remote right 6th rib fracture. Acute left 10 8th posterolateral rib fractures. Healed remote left 9th through 11th rib fractures. Probable remote right 6 rib fracture. Diffuse and bilateral airspace opacities. Ct Chest Wo Contrast    Result Date: 2/10/2021  EXAMINATION: CT OF THE CHEST WITHOUT CONTRAST 2/10/2021 2:29 pm TECHNIQUE: CT of the chest was performed without the administration of intravenous contrast. Multiplanar reformatted images are provided for review. Dose modulation, iterative reconstruction, and/or weight based adjustment of the mA/kV was utilized to reduce the radiation dose to as low as reasonably achievable. COMPARISON: CT chest, 02/07/2021 HISTORY: ORDERING SYSTEM PROVIDED HISTORY: increasing O2 demand in covid TECHNOLOGIST PROVIDED HISTORY: Reason for exam:->increasing O2 demand in covid FINDINGS: Mediastinum: There is mild cardiomegaly. No mediastinal or hilar adenopathy. Stable moderate-sized hiatal hernia.  Lungs/pleura: Bilaterally, there are There is bilateral apical pleural thickening. Prior left-sided lateral rib fractures are suspected. No new process has occurred. Partial resolution of right lower lobe infiltrate suggested    Cta Chest W Contrast    Result Date: 2/7/2021  EXAMINATION: CTA OF THE CHEST 2/7/2021 10:15 pm TECHNIQUE: CTA of the chest was performed after the administration of intravenous contrast.  Multiplanar reformatted images are provided for review. MIP images are provided for review. Dose modulation, iterative reconstruction, and/or weight based adjustment of the mA/kV was utilized to reduce the radiation dose to as low as reasonably achievable. COMPARISON: February 2019 HISTORY: ORDERING SYSTEM PROVIDED HISTORY: left sided chest pain, evaluate for PE TECHNOLOGIST PROVIDED HISTORY: Reason for exam:->left sided chest pain, evaluate for PE Decision Support Exception->Emergency Medical Condition (MA) FINDINGS: Pulmonary Arteries: Pulmonary arteries are adequately opacified for evaluation. No evidence of intraluminal filling defect to suggest pulmonary embolism. Main pulmonary artery is normal in caliber. Mediastinum: Nonspecific mediastinal nodes, no significantly changed since the prior examination. No aortic dissection or aneurysmal dilatation. Prominent atherosclerotic calcification along the thoracic aorta. No pericardial effusion. Cardiomegaly. Lungs/pleura: Multifocal patchy and ground-glass airspace disease involving all lobes, consistent with multifocal pneumonia and suspicious for COVID-19 pneumonia. Images are degraded by respiratory motion artifact. There is no evidence of pleural effusions. Upper Abdomen: Large hiatal hernia. Prominent atherosclerotic peripheral vascular disease. Soft Tissues/Bones: No evidence of acute bony pathology. Intraspinal calcified mass at the level of T7, unchanged and occupying almost the complete with of the spinal canal.    No evidence of pulmonary emboli.  Multifocal patchy and ground-glass airspace disease involving all lobes consistent with multifocal pneumonia and suspicious for COVID-19 pneumonia. Cardiomegaly with nonspecific mediastinal nodes, unchanged. Intraspinal calcified mass at the level of T7, unchanged since the prior examination. ASSESSMENT/PLAN :  80year-old    Hypothyroidism, atrial fibrillation, and hypertension   Elevation in her tumor markers CEA and Ca-125 as well as macrocytosis. Her work up was ultimately negative and these abnormalities were likely related to the methotrexate that she was being treated with for her rheumatoid arthritis. She was also mildly anemic related to her CKD being monitored for the need of EPO injections. CT of the chest was performed which showed no evidence of pulmonary embolism however did show findings consistent with patient's diagnosis of COVID    - Currently undergoing covid therapy with dexamethasone  - Cont Xarelto for a fib  - CBC with slowly progressive thrombocytosis. This is likely reactive in the setting of increased inflammation from acute infection  - Continue to trend CBC  - No indication for cytoreductive therapy at this time  - Check smear  - Mild macrocytic anemia likely related to methotrexate use    2/16/21  - CBC with improving thrombocytosis with continued anemia likely related to methotrexate use  - No indication for cytoreductive therapy at this time  - Smear pending  - Follow up outpatient in 1 to 2 weeks with Dr. Eve Branch.     FRAN Holbrook - CNP  Electronically signed 2/16/2021 at 11:39 AM

## 2021-02-17 LAB
ORGANISM: ABNORMAL
URINE CULTURE, ROUTINE: ABNORMAL

## 2021-02-17 PROCEDURE — 1800000000 HC LEAVE OF ABSENCE

## 2021-02-17 RX ORDER — DULOXETINE 40 MG/1
40 CAPSULE, DELAYED RELEASE ORAL DAILY
Qty: 90 CAPSULE | Refills: 1 | Status: SHIPPED
Start: 2021-02-17 | End: 2021-05-19

## 2021-02-17 RX ORDER — ALBUTEROL SULFATE 90 UG/1
2 AEROSOL, METERED RESPIRATORY (INHALATION) 4 TIMES DAILY PRN
Qty: 1 INHALER | Refills: 1 | Status: SHIPPED | OUTPATIENT
Start: 2021-02-17

## 2021-02-18 PROCEDURE — 1800000000 HC LEAVE OF ABSENCE

## 2021-02-19 PROCEDURE — 1800000000 HC LEAVE OF ABSENCE

## 2021-02-20 PROCEDURE — 1800000000 HC LEAVE OF ABSENCE

## 2021-02-21 PROCEDURE — 1800000000 HC LEAVE OF ABSENCE

## 2021-02-22 PROCEDURE — 1800000000 HC LEAVE OF ABSENCE

## 2021-02-23 ENCOUNTER — VIRTUAL VISIT (OUTPATIENT)
Dept: FAMILY MEDICINE CLINIC | Age: 86
End: 2021-02-23
Payer: MEDICARE

## 2021-02-23 ENCOUNTER — TELEPHONE (OUTPATIENT)
Dept: FAMILY MEDICINE CLINIC | Age: 86
End: 2021-02-23

## 2021-02-23 DIAGNOSIS — U07.1 COVID-19: Primary | ICD-10-CM

## 2021-02-23 DIAGNOSIS — S22.43XD MULTIPLE CLOSED FRACTURES OF RIBS OF BOTH SIDES WITH ROUTINE HEALING: ICD-10-CM

## 2021-02-23 DIAGNOSIS — F43.21 ADJUSTMENT DISORDER WITH DEPRESSED MOOD: ICD-10-CM

## 2021-02-23 DIAGNOSIS — D75.838 REACTIVE THROMBOCYTOSIS: ICD-10-CM

## 2021-02-23 PROCEDURE — 99213 OFFICE O/P EST LOW 20 MIN: CPT | Performed by: FAMILY MEDICINE

## 2021-02-23 PROCEDURE — 1800000000 HC LEAVE OF ABSENCE

## 2021-02-23 RX ORDER — LOTEPREDNOL ETABONATE 5 MG/ML
SUSPENSION/ DROPS OPHTHALMIC
COMMUNITY
Start: 2021-01-20 | End: 2021-05-19 | Stop reason: ALTCHOICE

## 2021-02-23 ASSESSMENT — ENCOUNTER SYMPTOMS
CONSTIPATION: 0
COUGH: 0
VOMITING: 0
WHEEZING: 0
CHEST TIGHTNESS: 0
ABDOMINAL PAIN: 0
TROUBLE SWALLOWING: 0
SHORTNESS OF BREATH: 1
DIARRHEA: 0
NAUSEA: 0

## 2021-02-23 NOTE — PROGRESS NOTES
2021    TELEHEALTH EVALUATION -- Audio/Visual (During [id number] public health emergency)    HPI:    Ezekiel Mooney (:  1935) has requested an audio/video evaluation. Consent obtained per MA note, with attention to limitations inherent to a video visit for the following concern(s):    COVID-19 with respiratory failure  Hospital follow-up  In addition to Covid, was diagnosed with acute rib fractures probably related to either coughing or positioning  Also dealing with some significant depression/anhedonia relating to her overall declining health  Went home with oxygen  Also noted to have asymptomatic bacteriuria, not treated  Treated for thrush with Diflucan and nystatin  Home on inhalers steroids and lidocaine patches for the ribs along with vitamin supplements of zinc vitamin C and vitamin D  She is accompanied today by her daughter-in-law as well as her daughter who is a physician assistant Clif Becker who called into the visit. Since coming home, she reports feeling a little bit better. Being in her home environment has helped her spirits. She is feeling less depressed. Shortness of breath is improved, but still not back to baseline  Remains on oxygen, 3 L/min nasal cannula. Is reporting daytime pulse ox is in the mid to high 90s. Evenings will drop into the low 90s. No significant cough or wheezing  Reports compliance with medications  No fevers  Rib pain has improved  Still does not want to try and move. She states that she feels her mobility is where it is going to be  Home health is involved  Family is reporting improved disposition  They have questions about her second Covid vaccine  Family questions if she needs to go see ID and hematology. Hematology saw her in the hospital for her thrombocytosis which is thought to be reactive in relation to her Covid infection      Review of Systems   Constitutional: Positive for appetite change (Slowly improving) and fatigue.  Negative for chills and fever.   HENT: Negative for trouble swallowing. Respiratory: Positive for shortness of breath. Negative for cough, chest tightness and wheezing. Cardiovascular: Negative for chest pain and palpitations. Gastrointestinal: Negative for abdominal pain, constipation, diarrhea, nausea and vomiting. Genitourinary: Negative for difficulty urinating. Musculoskeletal: Positive for gait problem. Prior to Visit Medications    Medication Sig Taking?  Authorizing Provider   albuterol sulfate HFA (VENTOLIN HFA) 108 (90 Base) MCG/ACT inhaler Inhale 2 puffs into the lungs 4 times daily as needed for Wheezing Yes Carlos Rader DO   DULoxetine HCl 40 MG CPEP Take 40 mg by mouth daily Yes Carlos Rader DO   nystatin (MYCOSTATIN) 262979 UNIT/ML suspension Take 5 mLs by mouth 4 times daily for 10 days Yes Dilcia Fernandez DO   budesonide-formoterol (SYMBICORT) 160-4.5 MCG/ACT AERO Inhale 2 puffs into the lungs 2 times daily Yes Carlos Rader DO   lidocaine 4 % external patch Place 1 patch onto the skin daily Yes Garo, DO   folic acid (FOLVITE) 1 MG tablet Take 1 tablet by mouth daily Yes Garo, DO   zinc sulfate (ZINCATE) 50 MG CAPS capsule Take 1 capsule by mouth daily Yes Garo, DO   ascorbic acid (VITAMIN C) 500 MG tablet Take 1 tablet by mouth daily Yes Garo, DO   thiamine 100 MG tablet Take 1 tablet by mouth daily Yes Garo, DO   Vitamin D (CHOLECALCIFEROL) 50 MCG (2000 UT) TABS tablet Take 1 tablet by mouth daily Yes Carlos Rader DO   metoprolol tartrate (LOPRESSOR) 25 MG tablet TAKE 1 TABLET TWICE A DAY Yes Howie Soto MD   flecainide (TAMBOCOR) 50 MG tablet Take 1 tablet by mouth 2 times daily Yes Howie Soto MD   rivaroxaban (XARELTO) 15 MG TABS tablet Take 1 tablet by mouth daily (with breakfast) Yes Howie Soto MD   valsartan-hydroCHLOROthiazide (DIOVAN-HCT) 160-25 MG per tablet Take 1 tablet by mouth daily Yes Huey Zamora MD   gabapentin (NEURONTIN) 100 MG capsule Take 1 capsule by mouth 2 times daily for 90 days. Yes Huey Zamora MD   celecoxib (CELEBREX) 200 MG capsule TAKE 1 CAPSULE DAILY Yes Huey Zamora MD   ferrous sulfate (IRON 325) 325 (65 Fe) MG tablet Take 1 tablet by mouth every other day Yes Huey Zamora MD   levothyroxine (LEVOTHROID) 100 MCG tablet Take 1 tablet by mouth Daily Yes Huey Zamora MD   docusate sodium (COLACE, DULCOLAX) 100 MG CAPS Take 100 mg by mouth daily Yes Candy Aburto MD   vitamin B-12 (CYANOCOBALAMIN) 1000 MCG tablet Take 1,000 mcg by mouth daily Yes Historical Provider, MD   omeprazole (PRILOSEC) 40 MG delayed release capsule Take 1 capsule by mouth 2 times daily Yes Huey Zamora MD   famotidine (PEPCID) 40 MG tablet Take 1 tablet by mouth nightly Yes Huey Zamora MD   ondansetron (ZOFRAN) 4 MG tablet Take 1 tablet by mouth every 8 hours as needed for Nausea or Vomiting Yes Huey Zamora MD   polyethyl glycol-propyl glycol 0.4-0.3 % (SYSTANE) 0.4-0.3 % ophthalmic solution 1 drop 2 times daily as needed for Dry Eyes  Yes Historical Provider, MD   fluticasone (FLONASE) 50 MCG/ACT nasal spray 2 sprays by Nasal route daily 2 sprays in each nostril daily Yes Huey Zamora MD   Probiotic Product (ACIDOPHILUS PROBIOTIC) CAPS capsule Take 1 capsule by mouth daily Yes Historical Provider, MD   fexofenadine (ALLEGRA) 180 MG tablet Take 180 mg by mouth daily.  Yes Historical Provider, MD   leucovorin calcium (WELLCOVORIN) 5 MG tablet Take 25 mg by mouth once a week Takes every Tuesday Yes Historical Provider, MD   methotrexate 2.5 MG tablet Take by mouth once a week 2 tabs at lunch and 4 tabs  in the pm once a week on Monday Yes Historical Provider, MD   loteprednol (LOTEMAX) 0.5 % ophthalmic suspension   Historical Provider, MD   fluconazole (DIFLUCAN) 200 MG tablet Take 1 tablet by mouth daily for 7 days  Patient not taking: Reported on 2/23/2021  Carmelina Kirk DO       Social History     Tobacco Use    Smoking status: Never Smoker    Smokeless tobacco: Never Used   Substance Use Topics    Alcohol use: No    Drug use: Never        Past Medical History:   Diagnosis Date    Anticoagulated     takes Xarelto    Atrial fibrillation ([facility] [address]     follows with Dr. 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 Funmilayo Bowen    Pain in both lower legs 5/7/2020    Patellar fracture 2020    Rheumatoid arthritis with rheumatoid factor (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 Psychiatric:         Attention and Perception: Attention normal.         Mood and Affect: Affect normal. Mood is depressed. Speech: Speech normal.         Thought Content:  Thought content normal.         Judgment: Judgment normal.            Other pertinent observable physical exam findings-     Due to this being a TeleHealth encounter, evaluation of the following organ systems is limited: Vitals/Constitutional/EENT/Resp/CV/GI//MS/Neuro/Skin/Heme-Lymph-Imm. ASSESSMENT/PLAN:  Kulwant Rhodes was seen today for follow-up. Diagnoses and all orders for this visit:    COVID-19  -     CBC Auto Differential; Future  -     Comprehensive Metabolic Panel; Future    Reactive thrombocytosis  -     CBC Auto Differential; Future  -     Comprehensive Metabolic Panel; Future    Adjustment disorder with depressed mood    Multiple closed fractures of ribs of both sides with routine healing        Plan    As they are reporting acceptable daytime SPO2's on 3 L, will have them decrease to 2 L/min and see how she does during the day. We still need 3 L/min at night    Family is saying trying to transport her is a great burden at this time. They are asking if we can hold off on ID and hematology. Most likely reasonable but I am asking that we check a CBC and CMP through the home health nursing. They are agreeable to this. Mood issues are ongoing. Seems a little better per the family and per herself. We will continue to monitor. Certainly situationally she has been through a lot in the past year. Rib pain is controlled. Continue to monitor. Return for keep next appt as scheduled or see sooner if neded. An  electronic signature was used to authenticate this note. --Indira Patel MD on 2/23/2021 at 1:53 PM        Pursuant to the emergency declaration under the [facility] waiver authority and the [employer] Act, this Virtual  Visit was conducted, with patient's consent, to reduce the patient's risk of exposure to COVID-19 and provide continuity of care for an established patient. Services were provided through a video synchronous discussion virtually to substitute for in-person clinic visit.

## 2021-02-23 NOTE — TELEPHONE ENCOUNTER
Laurie with [address] updating that at today's she was unable to draw the lab. Will try again on Thursday. Family will push fluids to better hydrate. She heard fluid in both lower lobes. Wasn't sure if you would want a mobile in home xray. She had been on 2L during the day and 3L at night. She read 86% so they put her back on 3L. She went to 91%.

## 2021-02-24 ENCOUNTER — HOSPITAL ENCOUNTER (INPATIENT)
Age: 86
LOS: 13 days | Discharge: HOME HEALTH | DRG: 177 | End: 2021-03-09
Attending: EMERGENCY MEDICINE | Admitting: FAMILY MEDICINE
Payer: MEDICARE

## 2021-02-24 ENCOUNTER — APPOINTMENT (OUTPATIENT)
Dept: CT IMAGING | Age: 86
DRG: 177 | End: 2021-02-24
Payer: MEDICARE

## 2021-02-24 ENCOUNTER — APPOINTMENT (OUTPATIENT)
Dept: GENERAL RADIOLOGY | Age: 86
DRG: 177 | End: 2021-02-24
Payer: MEDICARE

## 2021-02-24 DIAGNOSIS — J96.21 ACUTE ON CHRONIC RESPIRATORY FAILURE WITH HYPOXIA (HCC): ICD-10-CM
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
## 2021-02-24 DIAGNOSIS — I10 ESSENTIAL HYPERTENSION: ICD-10-CM

## 2021-02-24 DIAGNOSIS — J96.01 ACUTE RESPIRATORY FAILURE WITH HYPOXIA (HCC): ICD-10-CM

## 2021-02-24 DIAGNOSIS — Z86.16 HISTORY OF 2019 NOVEL CORONAVIRUS DISEASE (COVID-19): ICD-10-CM

## 2021-02-24 DIAGNOSIS — J18.9 HAP (HOSPITAL-ACQUIRED PNEUMONIA): Primary | ICD-10-CM

## 2021-02-24 LAB
ADENOVIRUS BY PCR: NOT DETECTED
ALBUMIN SERPL-MCNC: 2.7 G/DL (ref 3.5–5.2)
ALP BLD-CCNC: 137 U/L (ref 35–104)
ALT SERPL-CCNC: 12 U/L (ref 0–32)
ANION GAP SERPL CALCULATED.3IONS-SCNC: 9 MMOL/L (ref 7–16)
ANISOCYTOSIS: ABNORMAL
APTT: 28.4 SEC (ref 24.5–35.1)
APTT: 28.9 SEC (ref 24.5–35.1)
AST SERPL-CCNC: 23 U/L (ref 0–31)
BACTERIA: ABNORMAL /HPF
BASOPHILS ABSOLUTE: 0.03 E9/L (ref 0–0.2)
BASOPHILS RELATIVE PERCENT: 0.2 % (ref 0–2)
BILIRUB SERPL-MCNC: 0.6 MG/DL (ref 0–1.2)
BILIRUBIN URINE: NEGATIVE
BLOOD, URINE: ABNORMAL
BORDETELLA PARAPERTUSSIS BY PCR: NOT DETECTED
BORDETELLA PERTUSSIS BY PCR: NOT DETECTED
BUN BLDV-MCNC: 22 MG/DL (ref 8–23)
C-REACTIVE PROTEIN: 33.2 MG/DL (ref 0–0.4)
CALCIUM SERPL-MCNC: 8.9 MG/DL (ref 8.6–10.2)
CHLAMYDOPHILIA PNEUMONIAE BY PCR: NOT DETECTED
CHLORIDE BLD-SCNC: 100 MMOL/L (ref 98–107)
CLARITY: CLEAR
CO2: 26 MMOL/L (ref 22–29)
COLOR: YELLOW
CORONAVIRUS 229E BY PCR: NOT DETECTED
CORONAVIRUS HKU1 BY PCR: NOT DETECTED
CORONAVIRUS NL63 BY PCR: NOT DETECTED
CORONAVIRUS OC43 BY PCR: NOT DETECTED
CREAT SERPL-MCNC: 0.8 MG/DL (ref 0.5–1)
D DIMER: 803 NG/ML DDU
EOSINOPHILS ABSOLUTE: 0.43 E9/L (ref 0.05–0.5)
EOSINOPHILS RELATIVE PERCENT: 2.7 % (ref 0–6)
FERRITIN: 412 NG/ML
FIBRINOGEN: >700 MG/DL (ref 225–540)
GFR AFRICAN AMERICAN: >60
GFR NON-AFRICAN AMERICAN: >60 ML/MIN/1.73
GLUCOSE BLD-MCNC: 99 MG/DL (ref 74–99)
GLUCOSE URINE: NEGATIVE MG/DL
HCT VFR BLD CALC: 34.8 % (ref 34–48)
HEMOGLOBIN: 10.6 G/DL (ref 11.5–15.5)
HUMAN METAPNEUMOVIRUS BY PCR: NOT DETECTED
HUMAN RHINOVIRUS/ENTEROVIRUS BY PCR: NOT DETECTED
IMMATURE GRANULOCYTES #: 0.3 E9/L
IMMATURE GRANULOCYTES %: 1.9 % (ref 0–5)
INFLUENZA A BY PCR: NOT DETECTED
INFLUENZA B BY PCR: NOT DETECTED
INR BLD: 1.5
INR BLD: 1.7
KETONES, URINE: NEGATIVE MG/DL
LACTATE DEHYDROGENASE: 557 U/L (ref 135–214)
LACTIC ACID, SEPSIS: 1.8 MMOL/L (ref 0.5–1.9)
LACTIC ACID, SEPSIS: 1.9 MMOL/L (ref 0.5–1.9)
LEUKOCYTE ESTERASE, URINE: ABNORMAL
LYMPHOCYTES ABSOLUTE: 0.44 E9/L (ref 1.5–4)
LYMPHOCYTES RELATIVE PERCENT: 2.8 % (ref 20–42)
MCH RBC QN AUTO: 31.9 PG (ref 26–35)
MCHC RBC AUTO-ENTMCNC: 30.5 % (ref 32–34.5)
MCV RBC AUTO: 104.8 FL (ref 80–99.9)
MONOCYTES ABSOLUTE: 1.01 E9/L (ref 0.1–0.95)
MONOCYTES RELATIVE PERCENT: 6.4 % (ref 2–12)
MYCOPLASMA PNEUMONIAE BY PCR: NOT DETECTED
NEUTROPHILS ABSOLUTE: 13.5 E9/L (ref 1.8–7.3)
NEUTROPHILS RELATIVE PERCENT: 86 % (ref 43–80)
NITRITE, URINE: NEGATIVE
PARAINFLUENZA VIRUS 1 BY PCR: NOT DETECTED
PARAINFLUENZA VIRUS 2 BY PCR: NOT DETECTED
PARAINFLUENZA VIRUS 3 BY PCR: NOT DETECTED
PARAINFLUENZA VIRUS 4 BY PCR: NOT DETECTED
PDW BLD-RTO: 15 FL (ref 11.5–15)
PH UA: 6.5 (ref 5–9)
PLATELET # BLD: 410 E9/L (ref 130–450)
PMV BLD AUTO: 9.5 FL (ref 7–12)
POLYCHROMASIA: ABNORMAL
POTASSIUM REFLEX MAGNESIUM: 4.6 MMOL/L (ref 3.5–5)
PRO-BNP: 3335 PG/ML (ref 0–450)
PROCALCITONIN: 0.25 NG/ML (ref 0–0.08)
PROCALCITONIN: 0.28 NG/ML (ref 0–0.08)
PROTEIN UA: NEGATIVE MG/DL
PROTHROMBIN TIME: 17.3 SEC (ref 9.3–12.4)
PROTHROMBIN TIME: 20 SEC (ref 9.3–12.4)
RBC # BLD: 3.32 E12/L (ref 3.5–5.5)
RBC UA: ABNORMAL /HPF (ref 0–2)
RESPIRATORY SYNCYTIAL VIRUS BY PCR: NOT DETECTED
SARS-COV-2, NAAT: NOT DETECTED
SARS-COV-2, PCR: NOT DETECTED
SODIUM BLD-SCNC: 135 MMOL/L (ref 132–146)
SPECIFIC GRAVITY UA: 1.01 (ref 1–1.03)
TOTAL PROTEIN: 6.5 G/DL (ref 6.4–8.3)
TROPONIN: <0.01 NG/ML (ref 0–0.03)
UROBILINOGEN, URINE: 0.2 E.U./DL
VITAMIN D 25-HYDROXY: 26 NG/ML (ref 30–100)
WBC # BLD: 15.7 E9/L (ref 4.5–11.5)
WBC UA: ABNORMAL /HPF (ref 0–5)
YEAST: PRESENT /HPF

## 2021-02-24 PROCEDURE — 87449 NOS EACH ORGANISM AG IA: CPT

## 2021-02-24 PROCEDURE — 6360000002 HC RX W HCPCS: Performed by: INTERNAL MEDICINE

## 2021-02-24 PROCEDURE — 0202U NFCT DS 22 TRGT SARS-COV-2: CPT

## 2021-02-24 PROCEDURE — 87088 URINE BACTERIA CULTURE: CPT

## 2021-02-24 PROCEDURE — 87635 SARS-COV-2 COVID-19 AMP PRB: CPT

## 2021-02-24 PROCEDURE — 2580000003 HC RX 258: Performed by: FAMILY MEDICINE

## 2021-02-24 PROCEDURE — 87040 BLOOD CULTURE FOR BACTERIA: CPT

## 2021-02-24 PROCEDURE — 85025 COMPLETE CBC W/AUTO DIFF WBC: CPT

## 2021-02-24 PROCEDURE — 82306 VITAMIN D 25 HYDROXY: CPT

## 2021-02-24 PROCEDURE — 2060000000 HC ICU INTERMEDIATE R&B

## 2021-02-24 PROCEDURE — 71275 CT ANGIOGRAPHY CHEST: CPT

## 2021-02-24 PROCEDURE — 71045 X-RAY EXAM CHEST 1 VIEW: CPT

## 2021-02-24 PROCEDURE — 85378 FIBRIN DEGRADE SEMIQUANT: CPT

## 2021-02-24 PROCEDURE — 83615 LACTATE (LD) (LDH) ENZYME: CPT

## 2021-02-24 PROCEDURE — 84145 PROCALCITONIN (PCT): CPT

## 2021-02-24 PROCEDURE — 6360000004 HC RX CONTRAST MEDICATION: Performed by: RADIOLOGY

## 2021-02-24 PROCEDURE — 36415 COLL VENOUS BLD VENIPUNCTURE: CPT

## 2021-02-24 PROCEDURE — 2580000003 HC RX 258: Performed by: STUDENT IN AN ORGANIZED HEALTH CARE EDUCATION/TRAINING PROGRAM

## 2021-02-24 PROCEDURE — 80053 COMPREHEN METABOLIC PANEL: CPT

## 2021-02-24 PROCEDURE — 99285 EMERGENCY DEPT VISIT HI MDM: CPT

## 2021-02-24 PROCEDURE — 6370000000 HC RX 637 (ALT 250 FOR IP): Performed by: FAMILY MEDICINE

## 2021-02-24 PROCEDURE — 83880 ASSAY OF NATRIURETIC PEPTIDE: CPT

## 2021-02-24 PROCEDURE — 83605 ASSAY OF LACTIC ACID: CPT

## 2021-02-24 PROCEDURE — 2580000003 HC RX 258: Performed by: RADIOLOGY

## 2021-02-24 PROCEDURE — 82728 ASSAY OF FERRITIN: CPT

## 2021-02-24 PROCEDURE — 85730 THROMBOPLASTIN TIME PARTIAL: CPT

## 2021-02-24 PROCEDURE — 81001 URINALYSIS AUTO W/SCOPE: CPT

## 2021-02-24 PROCEDURE — 96374 THER/PROPH/DIAG INJ IV PUSH: CPT

## 2021-02-24 PROCEDURE — 6360000002 HC RX W HCPCS: Performed by: STUDENT IN AN ORGANIZED HEALTH CARE EDUCATION/TRAINING PROGRAM

## 2021-02-24 PROCEDURE — 86140 C-REACTIVE PROTEIN: CPT

## 2021-02-24 PROCEDURE — 99222 1ST HOSP IP/OBS MODERATE 55: CPT | Performed by: FAMILY MEDICINE

## 2021-02-24 PROCEDURE — 85610 PROTHROMBIN TIME: CPT

## 2021-02-24 PROCEDURE — 84484 ASSAY OF TROPONIN QUANT: CPT

## 2021-02-24 PROCEDURE — 93005 ELECTROCARDIOGRAM TRACING: CPT | Performed by: STUDENT IN AN ORGANIZED HEALTH CARE EDUCATION/TRAINING PROGRAM

## 2021-02-24 PROCEDURE — 85384 FIBRINOGEN ACTIVITY: CPT

## 2021-02-24 PROCEDURE — 6360000002 HC RX W HCPCS: Performed by: FAMILY MEDICINE

## 2021-02-24 RX ORDER — LEUCOVORIN CALCIUM 5 MG/1
25 TABLET ORAL WEEKLY
Status: DISCONTINUED | OUTPATIENT
Start: 2021-03-02 | End: 2021-03-09 | Stop reason: HOSPADM

## 2021-02-24 RX ORDER — POLYETHYLENE GLYCOL 3350 17 G/17G
17 POWDER, FOR SOLUTION ORAL DAILY PRN
Status: DISCONTINUED | OUTPATIENT
Start: 2021-02-24 | End: 2021-02-27

## 2021-02-24 RX ORDER — ZINC SULFATE 50(220)MG
50 CAPSULE ORAL DAILY
Status: DISCONTINUED | OUTPATIENT
Start: 2021-02-24 | End: 2021-03-09 | Stop reason: HOSPADM

## 2021-02-24 RX ORDER — DEXAMETHASONE SODIUM PHOSPHATE 10 MG/ML
6 INJECTION, SOLUTION INTRAMUSCULAR; INTRAVENOUS ONCE
Status: COMPLETED | OUTPATIENT
Start: 2021-02-24 | End: 2021-02-24

## 2021-02-24 RX ORDER — LANOLIN ALCOHOL/MO/W.PET/CERES
1000 CREAM (GRAM) TOPICAL DAILY
Status: DISCONTINUED | OUTPATIENT
Start: 2021-02-24 | End: 2021-03-09 | Stop reason: HOSPADM

## 2021-02-24 RX ORDER — CELECOXIB 100 MG/1
200 CAPSULE ORAL DAILY
Status: DISCONTINUED | OUTPATIENT
Start: 2021-02-24 | End: 2021-03-09 | Stop reason: HOSPADM

## 2021-02-24 RX ORDER — FOLIC ACID 1 MG/1
1 TABLET ORAL DAILY
Status: DISCONTINUED | OUTPATIENT
Start: 2021-02-24 | End: 2021-03-09 | Stop reason: HOSPADM

## 2021-02-24 RX ORDER — ACETAMINOPHEN 325 MG/1
650 TABLET ORAL EVERY 4 HOURS PRN
Status: DISCONTINUED | OUTPATIENT
Start: 2021-02-24 | End: 2021-02-24 | Stop reason: SDUPTHER

## 2021-02-24 RX ORDER — FUROSEMIDE 10 MG/ML
40 INJECTION INTRAMUSCULAR; INTRAVENOUS 2 TIMES DAILY
Status: DISCONTINUED | OUTPATIENT
Start: 2021-02-24 | End: 2021-02-25

## 2021-02-24 RX ORDER — ALBUTEROL SULFATE 90 UG/1
2 AEROSOL, METERED RESPIRATORY (INHALATION) 4 TIMES DAILY PRN
Status: DISCONTINUED | OUTPATIENT
Start: 2021-02-24 | End: 2021-03-09 | Stop reason: HOSPADM

## 2021-02-24 RX ORDER — SODIUM CHLORIDE 0.9 % (FLUSH) 0.9 %
10 SYRINGE (ML) INJECTION EVERY 12 HOURS SCHEDULED
Status: DISCONTINUED | OUTPATIENT
Start: 2021-02-24 | End: 2021-02-24 | Stop reason: SDUPTHER

## 2021-02-24 RX ORDER — GABAPENTIN 100 MG/1
100 CAPSULE ORAL 2 TIMES DAILY
Status: DISCONTINUED | OUTPATIENT
Start: 2021-02-24 | End: 2021-03-09 | Stop reason: HOSPADM

## 2021-02-24 RX ORDER — PANTOPRAZOLE SODIUM 40 MG/1
40 TABLET, DELAYED RELEASE ORAL
Status: DISCONTINUED | OUTPATIENT
Start: 2021-02-24 | End: 2021-03-09 | Stop reason: HOSPADM

## 2021-02-24 RX ORDER — DEXAMETHASONE 4 MG/1
6 TABLET ORAL DAILY
Status: DISCONTINUED | OUTPATIENT
Start: 2021-02-25 | End: 2021-02-25

## 2021-02-24 RX ORDER — LEVOTHYROXINE SODIUM 0.1 MG/1
100 TABLET ORAL DAILY
Status: DISCONTINUED | OUTPATIENT
Start: 2021-02-24 | End: 2021-03-09 | Stop reason: HOSPADM

## 2021-02-24 RX ORDER — DOCUSATE SODIUM 100 MG/1
100 CAPSULE, LIQUID FILLED ORAL DAILY
Status: DISCONTINUED | OUTPATIENT
Start: 2021-02-24 | End: 2021-03-09 | Stop reason: HOSPADM

## 2021-02-24 RX ORDER — FUROSEMIDE 10 MG/ML
20 INJECTION INTRAMUSCULAR; INTRAVENOUS ONCE
Status: COMPLETED | OUTPATIENT
Start: 2021-02-24 | End: 2021-02-24

## 2021-02-24 RX ORDER — LIDOCAINE 4 G/G
1 PATCH TOPICAL DAILY
Status: DISCONTINUED | OUTPATIENT
Start: 2021-02-24 | End: 2021-03-09 | Stop reason: HOSPADM

## 2021-02-24 RX ORDER — FAMOTIDINE 20 MG/1
20 TABLET, FILM COATED ORAL NIGHTLY
Status: DISCONTINUED | OUTPATIENT
Start: 2021-02-24 | End: 2021-03-09 | Stop reason: HOSPADM

## 2021-02-24 RX ORDER — VALSARTAN AND HYDROCHLOROTHIAZIDE 160; 25 MG/1; MG/1
1 TABLET ORAL DAILY
Status: DISCONTINUED | OUTPATIENT
Start: 2021-02-24 | End: 2021-02-24 | Stop reason: CLARIF

## 2021-02-24 RX ORDER — SODIUM CHLORIDE 0.9 % (FLUSH) 0.9 %
10 SYRINGE (ML) INJECTION PRN
Status: DISCONTINUED | OUTPATIENT
Start: 2021-02-24 | End: 2021-02-24 | Stop reason: SDUPTHER

## 2021-02-24 RX ORDER — ASCORBIC ACID 500 MG
500 TABLET ORAL DAILY
Status: DISCONTINUED | OUTPATIENT
Start: 2021-02-24 | End: 2021-03-09 | Stop reason: HOSPADM

## 2021-02-24 RX ORDER — CHOLECALCIFEROL (VITAMIN D3) 50 MCG
2000 TABLET ORAL DAILY
Status: DISCONTINUED | OUTPATIENT
Start: 2021-02-24 | End: 2021-03-09 | Stop reason: HOSPADM

## 2021-02-24 RX ORDER — SODIUM CHLORIDE 0.9 % (FLUSH) 0.9 %
10 SYRINGE (ML) INJECTION ONCE
Status: COMPLETED | OUTPATIENT
Start: 2021-02-24 | End: 2021-02-24

## 2021-02-24 RX ORDER — SODIUM CHLORIDE 0.9 % (FLUSH) 0.9 %
10 SYRINGE (ML) INJECTION PRN
Status: DISCONTINUED | OUTPATIENT
Start: 2021-02-24 | End: 2021-03-09 | Stop reason: HOSPADM

## 2021-02-24 RX ORDER — FERROUS SULFATE 325(65) MG
325 TABLET ORAL EVERY OTHER DAY
Status: DISCONTINUED | OUTPATIENT
Start: 2021-02-24 | End: 2021-03-09 | Stop reason: HOSPADM

## 2021-02-24 RX ORDER — BUDESONIDE AND FORMOTEROL FUMARATE DIHYDRATE 160; 4.5 UG/1; UG/1
2 AEROSOL RESPIRATORY (INHALATION) 2 TIMES DAILY
Status: DISCONTINUED | OUTPATIENT
Start: 2021-02-24 | End: 2021-02-28

## 2021-02-24 RX ORDER — ACETAMINOPHEN 325 MG/1
650 TABLET ORAL EVERY 6 HOURS PRN
Status: DISCONTINUED | OUTPATIENT
Start: 2021-02-24 | End: 2021-03-09 | Stop reason: HOSPADM

## 2021-02-24 RX ORDER — LANOLIN ALCOHOL/MO/W.PET/CERES
100 CREAM (GRAM) TOPICAL DAILY
Status: DISCONTINUED | OUTPATIENT
Start: 2021-02-24 | End: 2021-03-09 | Stop reason: HOSPADM

## 2021-02-24 RX ORDER — HYDROCHLOROTHIAZIDE 25 MG/1
25 TABLET ORAL DAILY
Status: DISCONTINUED | OUTPATIENT
Start: 2021-02-24 | End: 2021-02-25

## 2021-02-24 RX ORDER — FLECAINIDE ACETATE 50 MG/1
50 TABLET ORAL 2 TIMES DAILY
Status: DISCONTINUED | OUTPATIENT
Start: 2021-02-24 | End: 2021-03-07

## 2021-02-24 RX ORDER — SODIUM CHLORIDE 0.9 % (FLUSH) 0.9 %
10 SYRINGE (ML) INJECTION EVERY 12 HOURS SCHEDULED
Status: DISCONTINUED | OUTPATIENT
Start: 2021-02-24 | End: 2021-03-09 | Stop reason: HOSPADM

## 2021-02-24 RX ORDER — SODIUM CHLORIDE 9 MG/ML
INJECTION, SOLUTION INTRAVENOUS EVERY 12 HOURS
Status: DISCONTINUED | OUTPATIENT
Start: 2021-02-25 | End: 2021-02-26

## 2021-02-24 RX ORDER — DULOXETIN HYDROCHLORIDE 20 MG/1
40 CAPSULE, DELAYED RELEASE ORAL DAILY
Status: DISCONTINUED | OUTPATIENT
Start: 2021-02-24 | End: 2021-03-09 | Stop reason: HOSPADM

## 2021-02-24 RX ORDER — VALSARTAN 160 MG/1
160 TABLET ORAL DAILY
Status: DISCONTINUED | OUTPATIENT
Start: 2021-02-24 | End: 2021-03-08

## 2021-02-24 RX ORDER — DEXAMETHASONE SODIUM PHOSPHATE 4 MG/ML
6 INJECTION, SOLUTION INTRA-ARTICULAR; INTRALESIONAL; INTRAMUSCULAR; INTRAVENOUS; SOFT TISSUE DAILY
Status: DISCONTINUED | OUTPATIENT
Start: 2021-02-25 | End: 2021-02-24 | Stop reason: ALTCHOICE

## 2021-02-24 RX ORDER — CETIRIZINE HYDROCHLORIDE 10 MG/1
10 TABLET ORAL DAILY
Status: DISCONTINUED | OUTPATIENT
Start: 2021-02-24 | End: 2021-03-09 | Stop reason: HOSPADM

## 2021-02-24 RX ORDER — ACETAMINOPHEN 650 MG/1
650 SUPPOSITORY RECTAL EVERY 6 HOURS PRN
Status: DISCONTINUED | OUTPATIENT
Start: 2021-02-24 | End: 2021-03-09 | Stop reason: HOSPADM

## 2021-02-24 RX ORDER — FAMOTIDINE 20 MG/1
40 TABLET, FILM COATED ORAL NIGHTLY
Status: DISCONTINUED | OUTPATIENT
Start: 2021-02-24 | End: 2021-02-24 | Stop reason: DRUGHIGH

## 2021-02-24 RX ADMIN — FERROUS SULFATE TAB 325 MG (65 MG ELEMENTAL FE) 325 MG: 325 (65 FE) TAB at 10:34

## 2021-02-24 RX ADMIN — CEFEPIME HYDROCHLORIDE 1000 MG: 1 INJECTION, POWDER, FOR SOLUTION INTRAMUSCULAR; INTRAVENOUS at 15:34

## 2021-02-24 RX ADMIN — CEFEPIME HYDROCHLORIDE 2000 MG: 2 INJECTION, POWDER, FOR SOLUTION INTRAVENOUS at 06:54

## 2021-02-24 RX ADMIN — HYDROCHLOROTHIAZIDE 25 MG: 25 TABLET ORAL at 10:34

## 2021-02-24 RX ADMIN — VALSARTAN 160 MG: 160 TABLET, FILM COATED ORAL at 10:35

## 2021-02-24 RX ADMIN — NYSTATIN 500000 UNITS: 500000 SUSPENSION ORAL at 17:06

## 2021-02-24 RX ADMIN — PANTOPRAZOLE SODIUM 40 MG: 40 TABLET, DELAYED RELEASE ORAL at 17:03

## 2021-02-24 RX ADMIN — PANTOPRAZOLE SODIUM 40 MG: 40 TABLET, DELAYED RELEASE ORAL at 10:34

## 2021-02-24 RX ADMIN — Medication 100 MG: at 10:55

## 2021-02-24 RX ADMIN — FAMOTIDINE 20 MG: 20 TABLET ORAL at 21:12

## 2021-02-24 RX ADMIN — DULOXETINE HYDROCHLORIDE 40 MG: 60 CAPSULE, DELAYED RELEASE ORAL at 10:55

## 2021-02-24 RX ADMIN — FUROSEMIDE 40 MG: 10 INJECTION, SOLUTION INTRAMUSCULAR; INTRAVENOUS at 21:01

## 2021-02-24 RX ADMIN — SODIUM CHLORIDE, PRESERVATIVE FREE 10 ML: 5 INJECTION INTRAVENOUS at 21:11

## 2021-02-24 RX ADMIN — ZINC SULFATE 220 MG (50 MG) CAPSULE 50 MG: CAPSULE at 10:35

## 2021-02-24 RX ADMIN — Medication 10 ML: at 05:42

## 2021-02-24 RX ADMIN — METOPROLOL TARTRATE 25 MG: 25 TABLET, FILM COATED ORAL at 21:02

## 2021-02-24 RX ADMIN — FLECAINIDE ACETATE 50 MG: 50 TABLET ORAL at 23:14

## 2021-02-24 RX ADMIN — SODIUM CHLORIDE, PRESERVATIVE FREE 10 ML: 5 INJECTION INTRAVENOUS at 10:33

## 2021-02-24 RX ADMIN — IOPAMIDOL 75 ML: 755 INJECTION, SOLUTION INTRAVENOUS at 05:42

## 2021-02-24 RX ADMIN — BUDESONIDE AND FORMOTEROL FUMARATE DIHYDRATE 2 PUFF: 160; 4.5 AEROSOL RESPIRATORY (INHALATION) at 21:04

## 2021-02-24 RX ADMIN — CYANOCOBALAMIN TAB 1000 MCG 1000 MCG: 1000 TAB at 10:35

## 2021-02-24 RX ADMIN — FOLIC ACID 1 MG: 1 TABLET ORAL at 10:34

## 2021-02-24 RX ADMIN — GABAPENTIN 100 MG: 100 CAPSULE ORAL at 21:02

## 2021-02-24 RX ADMIN — OXYCODONE HYDROCHLORIDE AND ACETAMINOPHEN 500 MG: 500 TABLET ORAL at 10:35

## 2021-02-24 RX ADMIN — CETIRIZINE HYDROCHLORIDE 10 MG: 10 TABLET, FILM COATED ORAL at 11:01

## 2021-02-24 RX ADMIN — FLECAINIDE ACETATE 50 MG: 50 TABLET ORAL at 10:55

## 2021-02-24 RX ADMIN — RIVAROXABAN 15 MG: 15 TABLET, FILM COATED ORAL at 10:55

## 2021-02-24 RX ADMIN — DEXAMETHASONE SODIUM PHOSPHATE 6 MG: 10 INJECTION, SOLUTION INTRAMUSCULAR; INTRAVENOUS at 05:28

## 2021-02-24 RX ADMIN — FUROSEMIDE 20 MG: 10 INJECTION, SOLUTION INTRAMUSCULAR; INTRAVENOUS at 10:33

## 2021-02-24 RX ADMIN — NYSTATIN 500000 UNITS: 500000 SUSPENSION ORAL at 12:13

## 2021-02-24 RX ADMIN — PANTOPRAZOLE SODIUM 40 MG: 40 TABLET, DELAYED RELEASE ORAL at 21:01

## 2021-02-24 RX ADMIN — DOCUSATE SODIUM 100 MG: 100 CAPSULE, LIQUID FILLED ORAL at 10:34

## 2021-02-24 RX ADMIN — METOPROLOL TARTRATE 25 MG: 25 TABLET, FILM COATED ORAL at 10:34

## 2021-02-24 RX ADMIN — GABAPENTIN 100 MG: 100 CAPSULE ORAL at 10:34

## 2021-02-24 RX ADMIN — VANCOMYCIN HYDROCHLORIDE 1250 MG: 10 INJECTION, POWDER, LYOPHILIZED, FOR SOLUTION INTRAVENOUS at 12:06

## 2021-02-24 RX ADMIN — Medication 2000 UNITS: at 10:34

## 2021-02-24 RX ADMIN — NYSTATIN 500000 UNITS: 500000 SUSPENSION ORAL at 21:01

## 2021-02-24 RX ADMIN — CELECOXIB 200 MG: 100 CAPSULE ORAL at 10:55

## 2021-02-24 RX ADMIN — LEVOTHYROXINE SODIUM 100 MCG: 100 TABLET ORAL at 10:34

## 2021-02-24 ASSESSMENT — ENCOUNTER SYMPTOMS
DIARRHEA: 0
SORE THROAT: 1
NAUSEA: 0
RHINORRHEA: 0
ABDOMINAL PAIN: 0
CONSTIPATION: 0
WHEEZING: 0
COUGH: 1
COLOR CHANGE: 0
ABDOMINAL DISTENTION: 0
COUGH: 0
VOMITING: 0
SHORTNESS OF BREATH: 1
BACK PAIN: 0
EYE PAIN: 0

## 2021-02-24 ASSESSMENT — PAIN SCALES - GENERAL
PAINLEVEL_OUTOF10: 0
PAINLEVEL_OUTOF10: 0
PAINLEVEL_OUTOF10: 5

## 2021-02-24 ASSESSMENT — PAIN SCALES - WONG BAKER
WONGBAKER_NUMERICALRESPONSE: 0
WONGBAKER_NUMERICALRESPONSE: 0

## 2021-02-24 NOTE — CARE COORDINATION
Social Work/Discharge Planning:  Social Work consult noted. Patient was recently discharged from hospital on 2/16/21. COVID pending 2/24. Called patient daughter Meche Kitchen (ph: [phone]) and completed initial assessment. Explained Social Work role and discussed transition of care/discharge planning. Patient lives with her son Casie Nguyen in a one story house. Patient has twenty-four caregivers. She has a wheelchair, extended shower bench, a hospital bed, beti steady and wears three liters of oxygen through [address] [place] [address]. Patient is active with [facility]. Patient PCP is Dr. Nelda Allen and pharmacy is [facility] in [place]. Meche Kitchen states plan is for her mother to return home pending her progress. Informed her therapy to evaluate. Patient will need a resume home care order prior to discharge. Will continue to follow and assist with discharge planning.   Electronically signed by JU Padilla on 2/24/2021 at 2:34 PM

## 2021-02-24 NOTE — ED NOTES
SBAR faxed to [phone] for confirmation of receipt.      Gunjan Mejia RN  02/24/21 [id number]

## 2021-02-24 NOTE — CONSULTS
Bridget Dumont M.D.,[facility]  Marlin Aguirre D.O., F.A.C.O.I., Tiffani Meneses M.D. Marco Billy M.D., Jacobo Billings M.D. Adelaida Doshi D.O. Patient:  Mary Martinez 80 y.o. female MRN: [id number]     Date of Service: 2/24/2021      PULMONARY CONSULTATION    Reason for Consultation:   Referring Physician:     Communication with the referring physician will be sent via the electronic medical record. Chief Complaint:     CODE STATUS:    SUBJECTIVE:  HPI:  Mary Martniez is a 80 y.o.  Female patient who was admitted on 02/07/2021 with acute respiratory failure with hypoxia secondary to COVID-19 infection. CTA in the ED showed diffuse ground glass opacities in all lung lobes but no evidence of a PE. She required O2 to maintain appropriate O2 saturation, with the highest O2 requirement being 4.5 L via nasal cannula. Infectious Disease was consulted. She completed a full course of remdesivir during her hospitalization and received PO Decadron 6 mg QD for 9 days while admitted. She was weaned down to 3lpm and discharged home. She was deemed stable for discharge to home with [address] and O2 on 02/16/2021. She was prescribed 1 more day of PO Decadron 6 mg QD upon discharge to complete 10 days. She was doing well but overnight she stated her breathing worsened so she was brought to the hospital for further evaluation. She was found to have a BNP > 3K. Repeat imaging illustrated bilateral ggo. She denies any fever, chills night sweats or productive cough. She has been started on cefepime.          Past Medical History:   Diagnosis Date    Anticoagulated     takes Xarelto    Atrial fibrillation ([facility]     follows with Dr. 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 Singh Quant    Pain in both lower legs 2020    Patellar fracture     Rheumatoid arthritis with rheumatoid factor ([address]     Rheumatoid arthritis(714.0)     Dr Byron Newell; on [facility])    Rib fractures 2014    Seasonal allergies     Skin cancer of lip     squamous cell    Wears dentures        Past Surgical History:   Procedure Laterality Date    CARDIAC CATHETERIZATION      CARPAL TUNNEL RELEASE Bilateral      SECTION      COLONOSCOPY  2011    Dr Ellyn Chen; diverticular disease; repeat in     COLONOSCOPY N/A 2020    COLONOSCOPY WITH BIOPSY performed by Camilo Marrero MD at [address] [place] [address], TOTAL ABDOMINAL  early     TOTAL KNEE ARTHROPLASTY  2007    bilateral    UPPER GASTROINTESTINAL ENDOSCOPY N/A 2020    EGD BIOPSY performed by [facility]ilo [facility]ro [facility] at [facility] ENDOSCOPY       Family History   Problem Relation Age of Onset    Heart Attack Mother 58    Other Father         suicide, depression, leg trouble    Other Sister         Dementia    Hypertension Sister     Hypertension Sister     Hypertension Brother     Thyroid Disease Sister     Rheum Arthritis Sister     Rheum Arthritis Brother     Other Daughter         hemochromatosis     Other Son         hemachromatosis        Social History:   Social History     Socioeconomic History    Marital status:       Spouse name: Not on file    Number of children: Not on file    Years of education: Not on file    Highest education level: Not on file   Occupational History    Occupation: retired- farm   Social Needs    Financial resource strain: Not on file    Food insecurity     Worry: Not on file     Inability: Not on file   [employer] needs     Medical: Not on file     Non-medical: Not on file   Tobacco Use    Smoking status: Never Smoker    Smokeless tobacco: Never Used   Substance and Sexual Activity    Alcohol use: No    Drug use: Never    Sexual activity: Not on file   Lifestyle    Physical activity     Days per week: Not on file     Minutes per session: Not on file    Stress: Not on file   Relationships    Social connections     Talks on phone: Not on file     Gets together: Not on file     Attends [employer] service: Not on file     Active member of club or organization: Not on file     Attends meetings of clubs or organizations: Not on file     Relationship status:      Intimate partner violence     Fear of current or ex partner: Not on file     Emotionally abused: Not on file     Physically abused: Not on file     Forced sexual activity: Not on file   Other Topics Concern    Not on file   Social History Narrative    Not on file       Immunization History   Administered Date(s) Administered    COVID-19, [employer], 30mcg/0.3ml Dose 01/28/2021    Influenza Virus Vaccine 10/06/2015, 10/21/2016    Influenza, High Dose (Fluzone 65 yrs and older) 09/25/2014, 10/06/2015, 10/21/2016, 10/02/2017, 10/05/2018    Influenza, Triv, inactivated, subunit, adjuvanted, IM (Fluad 65 yrs and older) 10/16/2019, 10/30/2020    Pneumococcal Conjugate 13-valent ([id number]) 10/22/2015    Pneumococcal Polysaccharide ([id number]) 01/23/2017    Tdap (Boostrix, Adacel) 11/17/2011    Zoster Live (Zostavax) 08/27/2012    Zoster Recombinant (Shingrix) 09/19/2018, 03/19/2019        Home Meds: Medications Prior to Admission: loteprednol (LOTEMAX) 0.5 % ophthalmic suspension,   albuterol sulfate HFA (VENTOLIN HFA) 108 (90 Base) MCG/ACT inhaler, Inhale 2 puffs into the lungs 4 times daily as needed for Wheezing  DULoxetine HCl 40 MG CPEP, Take 40 mg by mouth daily  nystatin (MYCOSTATIN) 048332 UNIT/ML suspension, Take 5 mLs by mouth 4 times daily for 10 days  budesonide-formoterol (SYMBICORT) 160-4.5 MCG/ACT AERO, Inhale 2 puffs into the lungs 2 times daily  lidocaine 4 % external patch, Place 1 patch onto the skin daily  folic acid (FOLVITE) 1 MG tablet, Take 1 tablet by mouth daily  zinc sulfate (ZINCATE) 50 MG CAPS capsule, Take 1 capsule by mouth daily  ascorbic acid (VITAMIN C) 500 MG tablet, Take 1 tablet by mouth daily  thiamine 100 MG tablet, Take 1 tablet by mouth daily  Vitamin D (CHOLECALCIFEROL) 50 MCG (2000 UT) TABS tablet, Take 1 tablet by mouth daily  metoprolol tartrate (LOPRESSOR) 25 MG tablet, TAKE 1 TABLET TWICE A DAY  flecainide (TAMBOCOR) 50 MG tablet, Take 1 tablet by mouth 2 times daily  rivaroxaban (XARELTO) 15 MG TABS tablet, Take 1 tablet by mouth daily (with breakfast)  valsartan-hydroCHLOROthiazide (DIOVAN-HCT) 160-25 MG per tablet, Take 1 tablet by mouth daily  gabapentin (NEURONTIN) 100 MG capsule, Take 1 capsule by mouth 2 times daily for 90 days.   celecoxib (CELEBREX) 200 MG capsule, TAKE 1 CAPSULE DAILY  ferrous sulfate (IRON 325) 325 (65 Fe) MG tablet, Take 1 tablet by mouth every other day  levothyroxine (LEVOTHROID) 100 MCG tablet, Take 1 tablet by mouth Daily  docusate sodium (COLACE, DULCOLAX) 100 MG CAPS, Take 100 mg by mouth daily  vitamin B-12 (CYANOCOBALAMIN) 1000 MCG tablet, Take 1,000 mcg by mouth daily  omeprazole (PRILOSEC) 40 MG delayed release capsule, Take 1 capsule by mouth 2 times daily  famotidine (PEPCID) 40 MG tablet, Take 1 tablet by mouth nightly  ondansetron (ZOFRAN) 4 MG tablet, Take 1 tablet by mouth every 8 hours as needed for Nausea or Vomiting  polyethyl glycol-propyl glycol 0.4-0.3 % (SYSTANE) 0.4-0.3 % ophthalmic solution, 1 drop 2 times daily as needed for Dry Eyes   fluticasone (FLONASE) 50 MCG/ACT nasal spray, 2 sprays by Nasal route daily 2 sprays in each nostril daily  Probiotic Product (ACIDOPHILUS PROBIOTIC) CAPS capsule, Take 1 capsule by mouth daily  fexofenadine (ALLEGRA) 180 MG tablet, Take 180 mg by mouth daily. leucovorin calcium (WELLCOVORIN) 5 MG tablet, Take 25 mg by mouth once a week Takes every Tuesday  methotrexate 2.5 MG tablet, Take by mouth once a week 2 tabs at lunch and 4 tabs  in the pm once a week on Monday    CURRENT MEDS :  Scheduled Meds:   budesonide-formoterol  2 puff Inhalation BID    celecoxib  200 mg Oral Daily    docusate sodium  100 mg Oral Daily    DULoxetine  40 mg Oral Daily    ferrous sulfate  325 mg Oral Every Other Day    cetirizine  10 mg Oral Daily    flecainide  50 mg Oral BID    folic acid  1 mg Oral Daily    ascorbic acid  500 mg Oral Daily    gabapentin  100 mg Oral BID    [START ON 3/2/2021] leucovorin calcium  25 mg Oral Weekly    levothyroxine  100 mcg Oral Daily    lidocaine  1 patch Transdermal Daily    [START ON 3/1/2021] methotrexate  5 mg Oral Weekly    metoprolol tartrate  25 mg Oral BID    nystatin  5 mL Oral 4x Daily    pantoprazole  40 mg Oral BID AC    rivaroxaban  15 mg Oral Daily with breakfast    thiamine  100 mg Oral Daily    vitamin B-12  1,000 mcg Oral Daily    vitamin D  2,000 Units Oral Daily    zinc sulfate  50 mg Oral Daily    sodium chloride flush  10 mL Intravenous 2 times per day    cefepime  1,000 mg Intravenous Q8H    famotidine  20 mg Oral Nightly    valsartan  160 mg Oral Daily    And    hydroCHLOROthiazide  25 mg Oral Daily    [START ON 2/25/2021] dexamethasone  6 mg Oral Daily    [START ON 3/1/2021] methotrexate  10 mg Oral Weekly       Continuous Infusions:       Allergies   Allergen Reactions    Amoxicillin      GI ill effects       REVIEW OF SYSTEMS:  Constitutional: Denies fever, weight loss, night sweats, and fatigue  Skin: Denies pigmentation, dark lesions, and rashes   HEENT: Denies hearing loss, tinnitus, ear drainage, epistaxis, sore throat, and hoarseness. Cardiovascular: Denies palpitations, chest pain, and chest pressure. Respiratory: Denies cough, dyspnea at rest, hemoptysis, apnea, and choking. Gastrointestinal: Denies nausea, vomiting, poor appetite, diarrhea, heartburn or reflux  Genitourinary: Denies dysuria, frequency, urgency or hematuria  Musculoskeletal: Denies myalgias, muscle weakness, and bone pain  Neurological: Denies dizziness, vertigo, headache, and focal weakness  Psychological: Denies anxiety and depression  Endocrine: Denies heat intolerance and cold intolerance  Hematopoietic/Lymphatic: Denies bleeding problems and blood transfusions    OBJECTIVE:   BP (!) 152/65   Pulse 78   Temp 98.2 °F (36.8 °C)   Resp 22   Ht 5' (1.524 m)   Wt 123 lb 3 oz (55.9 kg)   SpO2 93%   BMI 24.06 kg/m²   SpO2 Readings from Last 1 Encounters:   02/24/21 93%        I/O:  No intake or output data in the 24 hours ending 02/24/21 1553  Vent Information  SpO2: 93 %                Physical Exam:  General: The patient is lying in bed comfortably without any distress. Breathing is not labored  HEENT: Pupils are equal round and reactive to light, there are no oral lesions and no post-nasal drip   Neck: supple without adenopathy  Cardiovascular: regular rate and rhythm without murmur or gallop  Respiratory: Clear to auscultation bilaterally without wheezing or crackles.   Air entry is symmetric  Abdomen: soft, non-tender, non-distended, normal bowel sounds  Extremities: warm, no edema, no clubbing  Skin: no rash or lesion  Neurologic: CN II-XII grossly intact, no focal deficits        Imaging personally reviewed:    Ct chest reviewed   Cxr reviewed   Echocardiogram reviewed         Labs:  Lab Results   Component Value Date    WBC 15.7 02/24/2021    HGB 10.6 02/24/2021    HCT 34.8 02/24/2021    .8 02/24/2021    MCH 31.9 02/24/2021 MCHC 30.5 02/24/2021    RDW 15.0 02/24/2021     02/24/2021    MPV 9.5 02/24/2021     Lab Results   Component Value Date     02/24/2021    K 4.6 02/24/2021     02/24/2021    CO2 26 02/24/2021    BUN 22 02/24/2021    CREATININE 0.8 02/24/2021    LABALBU 2.7 02/24/2021    LABALBU 4.0 04/24/2012    CALCIUM 8.9 02/24/2021    GFRAA >60 02/24/2021    LABGLOM >60 02/24/2021     Lab Results   Component Value Date    PROTIME 17.3 02/24/2021    PROTIME 15.8 05/12/2014    INR 1.5 02/24/2021     Recent Labs     02/24/21  0448   PROBNP 3,335*     Recent Labs     02/24/21  0448   TROPONINI <0.01     Recent Labs     02/24/21  1210   PROCAL 0.28*     This SmartLink has not been configured with any valid records. Micro:  No results for input(s): CULTRESP in the last 72 hours. No results for input(s): LABGRAM in the last 72 hours. No results for input(s): LEGUR in the last 72 hours. No results for input(s): STREPNEUMAGU in the last 72 hours. No results for input(s): LP1UAG in the last 72 hours. Assessment:  1. Acute on chronic hypoxic respiratory failure   2. Hx of recent Covid 19 pneumonitis   3. Multilobar ground glass opacities likely multifactorial ? chf with pulmonary edema with superimposed previous ggo from covid. 4. Elevated bnp  5. Leukocytosis likely secondary to steroids   6. Hiatal hernia   7. hafsa   8. Multiple rib fractures   9. RA on mtx   10. Immunocompromised   11. Anemia       Plan:  1. Patient seems to be in chf exacerbation start lasix, check echo   2. Previous ggo from covid infection will need 6-8 weeks to clear at least.   3. procalcitonin is not suggestive of infection. leukocytosis is likely from steroids given prior evening   4. Stop abx will defer to ID, follow cultures   5. Cxr in am   6. Bronchodilators       Thank you for allowing me to participate in the care of Jurgen Lozada. Please feel free to call with questions. Hiwot Cm M.D.    Pulmonary/Critical Care Medicine

## 2021-02-24 NOTE — CONSULTS
[address] [place] [address] [facility]  [facility]    Consultation Note     Admit Date: 2/24/2021  4:31 AM    Reason for Consult:   Multifocal pneumonia, recent hospitalization for COVID    Attending Physician:  Araceli Mason MD     Chief Complaint: shortness of breath      HISTORY OF PRESENT ILLNESS:     The patient is a 80 y.o.  female known to the Infectious Diseases service. She was admitted here on 2/7/2021 with COVID-19. She had developed arthralgias myalgias anorexia headache low-grade fever and diarrhea. She also developed pleuritic left chest pain. She tested positive for Covid as an outpatient and because of persistent symptoms she presented emergency room and was found to be hypoxic on room air. Imaging revealed groundglass opacities consistent with Covid. She was treated with remdesivir and Decadron. She did develop oral candidiasis and was treated with nystatin suspension. She did have significant pleuritic chest pain and was treated with Celebrex. She also had significant leukocytosis as well as anemia and was seen by hematology. Her leukocytosis from proved on discharge. Since discharge she has been doing well until the last couple of days when she developed increasing fatigue and shortness of breath. She was seen by her PCP yesterday and was doing fairly well but overnight she developed increased shortness of breath and was brought to the emergency room for evaluation. She was noted to have a significant leukocytosis and chest CT revealed significant progression of her groundglass opacities suspicious for multifocal pneumonia. Cultures were obtained and she was placed on empiric cefepime therapy. Today she feels slightly better but continues to complain of a cough and severe exertional dyspnea. She also complains of generalized fatigue but no nausea or vomiting. She does state that her taste sensation remains impaired.     Past Medical History:          Diagnosis Date    Anticoagulated     takes Xarelto    Atrial fibrillation ([facility]     follows with Dr. 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 Jordan Kitchen    Pain in both lower legs 2020    Patellar fracture     Rheumatoid arthritis with rheumatoid factor ([facility]     Rheumatoid arthritis(714.0)     Dr Mili Knowles; on DMD Eli Rancher)    Rib fractures 2014    Seasonal allergies     Skin cancer of lip     squamous cell    Wears dentures      Past Surgical History:          Procedure Laterality Date    CARDIAC CATHETERIZATION      CARPAL TUNNEL RELEASE Bilateral      SECTION      COLONOSCOPY  2011    Dr Tatum Boyer; diverticular disease; repeat in 2016    COLONOSCOPY N/A 2020    COLONOSCOPY WITH BIOPSY performed by Nasima Martin MD at [address] [place] [address]  early     TOTAL KNEE ARTHROPLASTY  2007    bilateral    UPPER GASTROINTESTINAL ENDOSCOPY N/A 2020    EGD BIOPSY performed by Nasima Martin MD at [facility] ENDOSCOPY     Current Medications:     Scheduled Meds:   budesonide-formoterol  2 puff Inhalation BID    celecoxib  200 mg Oral Daily    docusate sodium  100 mg Oral Daily    DULoxetine  40 mg Oral Daily    ferrous sulfate  325 mg Oral Every Other Day    cetirizine  10 mg Oral Daily    flecainide  50 mg Oral BID    folic acid  1 mg Oral Daily    ascorbic acid  500 mg Oral Daily    gabapentin  100 mg Oral BID    [START ON 3/2/2021] leucovorin calcium  25 mg Oral Weekly    levothyroxine  100 mcg Oral Daily    lidocaine  1 patch Transdermal Daily    [START ON 3/1/2021] methotrexate  5 mg Oral Weekly    metoprolol tartrate  25 mg Oral BID    nystatin  5 mL Oral 4x Daily    pantoprazole  40 mg Oral BID AC    rivaroxaban  15 mg Oral Daily with breakfast    thiamine  100 mg Oral Daily    vitamin B-12  1,000 mcg Oral Daily    vitamin D  2,000 Units Oral Daily    zinc sulfate  50 mg Oral Daily    sodium chloride flush  10 mL Intravenous 2 times per day    cefepime  1,000 mg Intravenous Q8H    famotidine  20 mg Oral Nightly    valsartan  160 mg Oral Daily    And    hydroCHLOROthiazide  25 mg Oral Daily    [START ON 2/25/2021] dexamethasone  6 mg Oral Daily    [START ON 3/1/2021] methotrexate  10 mg Oral Weekly     Continuous Infusions:  PRN Meds:albuterol sulfate HFA, sodium chloride flush, polyethylene glycol, acetaminophen **OR** acetaminophen    Allergies:  Amoxicillin    Social History:     Social History     Socioeconomic History    Marital status:       Spouse name: None    Number of children: None    Years of education: None    Highest education level: None   Occupational History    Occupation: retired- farm   Social Needs    Financial resource strain: None    Food insecurity     Worry: None     Inability: None    Transportation needs     Medical: None     Non-medical: None   Tobacco Use    Smoking status: Never Smoker    Smokeless tobacco: Never Used   Substance and Sexual Activity    Alcohol use: No    Drug use: Never    Sexual activity: None   Lifestyle    Physical activity     Days per week: None     Minutes per session: None    Stress: None   Relationships    Social connections     Talks on phone: None     Gets together: None     Attends Faith service: None     Active member of club or organization: None     Attends meetings of clubs or organizations: None     Relationship status:      Intimate partner violence     Fear of current or ex partner: None     Emotionally abused: None     Physically abused: None     Forced sexual activity: None   Other Topics Concern    None   Social History Narrative    None         Family History:         Problem Relation Age of Onset    Heart Attack Mother 58    Other Father         suicide, depression, leg trouble    Other Sister         Dementia    Hypertension Sister     Hypertension Sister     Hypertension Brother     Thyroid Disease Sister     Rheum Arthritis Sister     Rheum Arthritis Brother     Other Daughter         hemochromatosis     Other Son         hemachromatosis      REVIEW OF SYSTEMS:      Constitutional: positive for anorexia, fatigue and malaise, negative for chills and fevers  Eyes: negative for icterus and redness  Ears, nose, mouth, throat, and face: positive for sore mouth, negative for epistaxis, hearing loss, nasal congestion and tinnitus  Respiratory: positive for cough and dyspnea on exertion, negative for hemoptysis  Cardiovascular: positive for irregular heart beat, negative for chest pain  Gastrointestinal: negative for abdominal pain and vomiting  Genitourinary:negative for dysuria and frequency  Integument/breast: negative for pruritus and rash  Hematologic/lymphatic: negative for bleeding and easy bruising  Musculoskeletal:negative for arthralgias and back pain  Neurological: negative for headaches and memory problems  Behavioral/Psych: negative for anxiety and depression  Endocrine: negative for temperature intolerance  Allergic/Immunologic: negative for anaphylaxis and angioedema    PHYSICAL EXAM:      Vitals:    BP (!) 152/65   Pulse 78   Temp 98.2 °F (36.8 °C)   Resp 22   Ht 5' (1.524 m)   Wt 123 lb 3 oz (55.9 kg)   SpO2 93%   BMI 24.06 kg/m²   Constitutional: The patient is awake, alert, and oriented. Thin weak and cachectic. Skin: Warm and dry. No rashes were noted. No jaundice. HEENT: Eyes show round, and reactive pupils. Oral mucosa dry but no plaques noted. Neck: Supple to movements. No lymphadenopathy. Chest: No wheezing but fine bibasilar inspiratory crackles noted posteriorly. Cardiovascular: Regular rate and rhythm. No murmurs appreciated. Abdomen: Positive bowel sounds to auscultation. Benign to palpation. No masses felt. No hepatosplenomegaly. Extremities: No clubbing, no cyanosis, trace peripheral edema.   Neurological: No focal neurologic deficits        CBC+dif:  Recent Labs     02/24/21  0448   WBC 15.7*   HGB 10.6*   HCT 34.8   .8*      NEUTROABS 13.50*     Lab Results   Component Value Date    CRP 0.8 (H) 02/16/2021    CRP 1.5 (H) 02/15/2021    CRP 3.4 (H) 02/14/2021     No results found for: CRPHS  Lab Results   Component Value Date    SEDRATE 33 (H) 02/16/2021    SEDRATE 29 (H) 09/10/2020    SEDRATE 17 10/22/2019     Lab Results   Component Value Date    ALT 12 02/24/2021    AST 23 02/24/2021    ALKPHOS 137 (H) 02/24/2021    BILITOT 0.6 02/24/2021     Lab Results   Component Value Date     02/24/2021    K 4.6 02/24/2021     02/24/2021    CO2 26 02/24/2021    BUN 22 02/24/2021    CREATININE 0.8 02/24/2021    GFRAA >60 02/24/2021    LABGLOM >60 02/24/2021    GLUCOSE 99 02/24/2021    GLUCOSE 79 04/24/2012    PROT 6.5 02/24/2021    LABALBU 2.7 02/24/2021    LABALBU 4.0 04/24/2012    CALCIUM 8.9 02/24/2021    BILITOT 0.6 02/24/2021    ALKPHOS 137 02/24/2021    AST 23 02/24/2021    ALT 12 02/24/2021       Lab Results   Component Value Date    PROTIME 17.3 02/24/2021    PROTIME 15.8 05/12/2014    INR 1.5 02/24/2021       Lab Results   Component Value Date    TSH 4.870 01/06/2021       Lab Results   Component Value Date    NITRITE negative 06/04/2019    COLORU Yellow A-fib ([facility]    Essential hypertension    HAP (hospital-acquired pneumonia)    COVID-19    Sore throat    Depression    Oral yeast infection  Resolved Problems:    * No resolved hospital problems. *      Assessment:    · Acute on chronic hypoxic respiratory failure  · Oral candidiasis  · Recent COVID-19 viral pneumonia  · Healthcare associated pneumonia  · Multiple rib fractures  · Rheumatoid arthritis  · Age-related physical debility  · Paroxysmal atrial fibrillation  · Depression    Plan:      · Continue cefepime  · Continue Decadron  · Continue oxygen supplementation  · Await pulmonary evaluation  · Await cultures  · Monitor labs  · Will follow with you    Thank you for having us see this patient in consultation. I will be discussing this case with the treating physicians.       Electronically signed by Daniel Velasquez DO on 2/24/2021 at 2:46 PM

## 2021-02-24 NOTE — H&P
[address]  History and Physical      CHIEF COMPLAINT:  Shortness of breath     History of Present Illness:   Ms. Rolando Salse is an 80year-old patient of Dr. Alma Rosa Curry with a PMHx of RA, HTN, HLD, hypothyroidism, SCC of skin, diverticulosis, paroxysmal atrial fibrillation (on Eliquis), HFpEF, rib fractures, and COVID-19 infection (diagnosed on 02/08/2021) who presents to [facility] ED this morning for shortness of breath. Patient was recently admitted to [facility] from 02/07/2021-02/16/2021 for acute respiratory failure with hypoxia secondary to COVID-19 infection. She completed a full course of remdesivir at that time. She also received Decadron daily during her admission. She was started on vitamin C, vitamin D, zinc, and thiamine per protocol. Her O2 was weaned to 3 L. She was deemed stable for discharge and discharged home with [place] [id number]. Patient had a virtual visit with her PCP, Dr. Candice Flores, yesterday and reported to him that she was doing better. Her SpO2 at home was consistently in the mid-to-high 90s. Dr. Candice Flores, patient, and patient's family discussed weaning from 3 L O2 to 2 L O2 at home. However, overnight patient developed shortness of breath. She was brought to the ED for evaluation. ROS is notable for shortness of breath and bilateral rib pain secondary to recent bilateral rib fractures. In the ED, patient's vitals were as follows: T 97.5 , RR 30, HR 88, /67 and SpO2 97% on 15L non-rebreather. She was able to be weaned to 8 L on high-flow nasal cannula in the ED. Labs were notable for pro-BNP= 3335, WBC= 15.7, Hgb=10.6 with YDF=064.8, and D-sfqtd=515. CXR showed grossly unchanged patchy bilateral airspace opacities compared with February 14 2021, consistent with multifocal pneumonia. CTA showed no evidence of PE but showed significant progression in the ground-glass opacities throughout both lungs, suspicious for multifocal pneumonia.  EKG showed NST with 1st degree AV block and prolonged QT. Patient was admitted for further medical management.        Past Medical History:   Diagnosis Date    Anticoagulated     takes Xarelto    Atrial fibrillation ([facility]     follows with Dr. 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 Wong Falling    Pain in both lower legs 2020    Patellar fracture     Rheumatoid arthritis with rheumatoid factor ([facility]     Rheumatoid arthritis(714.0)     Dr Barry Beck; on DMD Allen Angle)    Rib fractures 2014    Seasonal allergies     Skin cancer of lip     squamous cell    Wears dentures          Past Surgical History:   Procedure Laterality Date    CARDIAC CATHETERIZATION      CARPAL TUNNEL RELEASE Bilateral      SECTION      COLONOSCOPY  2011    Dr Briggs November; diverticular disease; repeat in 2016    COLONOSCOPY N/A 2020    COLONOSCOPY WITH BIOPSY performed by Domingo Gonzalez MD at [address] [place] [address]  early     TOTAL KNEE ARTHROPLASTY  2007    bilateral    UPPER GASTROINTESTINAL ENDOSCOPY N/A 2020    EGD BIOPSY performed by Domingo Gonzalez MD at [facility] ENDOSCOPY       Medications Prior to Admission:    Medications Prior to Admission: loteprednol (LOTEMAX) 0.5 % ophthalmic suspension,   albuterol sulfate HFA (VENTOLIN HFA) 108 (90 Base) MCG/ACT inhaler, Inhale 2 puffs into the lungs 4 times daily as needed for Wheezing  DULoxetine HCl 40 MG CPEP, Take 40 mg by mouth daily  nystatin (MYCOSTATIN) 140567 UNIT/ML suspension, Take 5 mLs by mouth 4 times daily for 10 days  budesonide-formoterol (SYMBICORT) 160-4.5 MCG/ACT AERO, Inhale 2 puffs into the lungs 2 times daily  lidocaine 4 % external patch, Place 1 patch onto the skin daily  folic acid (FOLVITE) 1 MG tablet, Take 1 tablet by mouth daily  zinc sulfate (ZINCATE) 50 MG CAPS capsule, Take 1 capsule by mouth daily  ascorbic acid (VITAMIN C) 500 MG tablet, Take 1 tablet by mouth daily  thiamine 100 MG tablet, Take 1 tablet by mouth daily  Vitamin D (CHOLECALCIFEROL) 50 MCG (2000 UT) TABS tablet, Take 1 tablet by mouth daily  metoprolol tartrate (LOPRESSOR) 25 MG tablet, TAKE 1 TABLET TWICE A DAY  flecainide (TAMBOCOR) 50 MG tablet, Take 1 tablet by mouth 2 times daily  rivaroxaban (XARELTO) 15 MG TABS tablet, Take 1 tablet by mouth daily (with breakfast)  valsartan-hydroCHLOROthiazide (DIOVAN-HCT) 160-25 MG per tablet, Take 1 tablet by mouth daily  gabapentin (NEURONTIN) 100 MG capsule, Take 1 capsule by mouth 2 times daily for 90 days.   celecoxib (CELEBREX) 200 MG capsule, TAKE 1 CAPSULE DAILY  ferrous sulfate (IRON 325) 325 (65 Fe) MG tablet, Take 1 tablet by mouth every other day  levothyroxine (LEVOTHROID) 100 MCG tablet, Take 1 tablet by mouth Daily  docusate sodium (COLACE, DULCOLAX) 100 MG CAPS, Take 100 mg by mouth daily  vitamin B-12 (CYANOCOBALAMIN) 1000 MCG tablet, Take 1,000 mcg by mouth daily  omeprazole (PRILOSEC) 40 MG delayed release capsule, Take 1 capsule by mouth 2 times daily  famotidine (PEPCID) 40 MG tablet, Take 1 tablet by mouth nightly  ondansetron (ZOFRAN) 4 MG tablet, Take 1 tablet by mouth every 8 hours as needed for Nausea or Vomiting  polyethyl glycol-propyl glycol 0.4-0.3 % (SYSTANE) 0.4-0.3 % ophthalmic solution, 1 drop 2 times daily as needed for Dry Eyes   fluticasone (FLONASE) 50 MCG/ACT nasal spray, 2 sprays by Nasal route daily 2 sprays in each nostril daily  Probiotic Product (ACIDOPHILUS PROBIOTIC) CAPS capsule, Take 1 capsule by mouth daily  fexofenadine (ALLEGRA) 180 MG tablet, Take 180 mg by mouth daily. leucovorin calcium (WELLCOVORIN) 5 MG tablet, Take 25 mg by mouth once a week Takes every Tuesday  methotrexate 2.5 MG tablet, Take by mouth once a week 2 tabs at lunch and 4 tabs  in the pm once a week on Monday    Allergies:    Amoxicillin    Social History:    reports that she has never smoked. She has never used smokeless tobacco. She reports that she does not drink alcohol or use drugs. Family History:   family history includes Heart Attack (age of onset: 58) in her mother; Hypertension in her brother, sister, and sister; Other in her daughter, father, sister, and son; Rheum Arthritis in her brother and sister; Thyroid Disease in her sister. REVIEW OF SYSTEMS:  Review of Systems   Constitutional: Negative for chills and fever. HENT: Positive for sore throat. Negative for congestion, ear pain and rhinorrhea. Eyes: Negative for pain and visual disturbance. Respiratory: Positive for shortness of breath. Negative for cough and wheezing. Cardiovascular: Negative for chest pain, palpitations and leg swelling. Gastrointestinal: Negative for abdominal pain, constipation, diarrhea, nausea and vomiting. Genitourinary: Negative for dysuria and hematuria. Musculoskeletal: Positive for myalgias (bilateral rib pain). Negative for joint swelling. Skin: Negative for color change and rash. Neurological: Negative for dizziness, syncope and light-headedness.        PHYSICAL EXAM:    Vitals:  BP (!) 152/65   Pulse 78   Temp 98.2 °F (36.8 °C)   Resp 22   Ht 5' (1.524 m)   Wt 123 lb 3 oz (55.9 kg)   SpO2 93%   BMI 24.06 kg/m²     Physical Exam Vitals signs reviewed. Constitutional:       General: She is awake. She is not in acute distress. Appearance: She is normal weight. She is ill-appearing. HENT:      Head: Normocephalic and atraumatic. Mouth/Throat:      Lips: Pink. Mouth: No oral lesions. Dentition: Has dentures. Tongue: No lesions. Pharynx: No oropharyngeal exudate or posterior oropharyngeal erythema. Eyes:      General: No scleral icterus. Conjunctiva/sclera:      Right eye: Right conjunctiva is not injected. Left eye: Left conjunctiva is not injected. Cardiovascular:      Rate and Rhythm: Normal rate and regular rhythm. Heart sounds: S1 normal and S2 normal. No murmur. Pulmonary:      Effort: No accessory muscle usage or respiratory distress. Breath sounds: Rales present. No wheezing or rhonchi. Abdominal:      General: Bowel sounds are normal.      Palpations: Abdomen is soft. Tenderness: There is no abdominal tenderness. There is no guarding or rebound. Musculoskeletal:      Right lower leg: She exhibits no deformity. 1+ Pitting Edema present. Left lower leg: She exhibits no deformity. 1+ Pitting Edema present. Skin:     General: Skin is warm and dry. Coloration: Skin is not cyanotic or jaundiced. Findings: No bruising. Neurological:      General: No focal deficit present. Mental Status: She is alert. Psychiatric:         Attention and Perception: Attention normal.         Mood and Affect: Mood normal.         Speech: Speech normal.         Behavior: Behavior is cooperative. Thought Content:  Thought content normal.         Cognition and Memory: Cognition normal.         Judgment: Judgment normal.         LABS:  CBC with Differential:    Lab Results   Component Value Date    WBC 15.7 02/24/2021    RBC 3.32 02/24/2021    HGB 10.6 02/24/2021    HCT 34.8 02/24/2021     02/24/2021    .8 02/24/2021    MCH 31.9 02/24/2021    MCHC 30.5 05/23/2016    Multiple closed fractures of ribs of both sides with routine healing [S22.43XD] 12/16/2014    Anemia [D64.9] 07/26/2014    Hypothyroidism [E03.9] 06/10/2011    Rheumatoid arthritis ([facility] [M06.9] 06/10/2011    A-fib ([facility] [I48.91] 06/09/2011       PLAN:  Acute on chronic respiratory failure with hypoxia secondary to recent COVID-19 infection and new HAP  Received 2000 mg dose of cefepime x 1 in ED. Received IV dose of Decadron 6 mg x 1 in ED.  - Droplet plus isolation   - Telemetry   - Labs: PTT, CRP, CBC, CMP, D-dimer, ferritin, fibrinogen, procalcitonin, INR, vitamin D   - Micro: blood cultures x 2, respiratory sputum culture, UA, urine culture, Legionella urine antigen, respiratory molecular panel, Strep pneumoniae urine antigen,   - Continue cefepime 1000 mg q 8 hours (adjusted for CrCl). Day 1.   - Start PO Decadron 6 mg QD   - On vitamin C, thiamine, vitamin D, and zinc QD   - Albuterol sulfate inhaler 2 puffs QID PRN and Symbicort inhaler 2 puffs BID   - SW consulted for discharge planning   - Dietary consulted for nutrition   - PT/OT   - I/Os every 8 hours     Volume overload with hx of HFpEF  - IV Lasix 20 mg x 1     HTN   - Continue home valsartan 160 mg QD and HCTZ 25 mg QD     Macrocytic anemia   Seen by Hem/ Onc during last admission for macrocytic anemia and thrombocytosis. Anemia thought to be due to methotrexate use. Was supposed to follow-up with Dr. Daniella Fuentes as an outpatient in 1-2 weeks after discharge.    - Continue home folic acid 1 mg QD and vitamin B12 1000 mcg QD   - Continue home ferrous sulfate 325 mg every other day    Atrial fibrillation  - Continue home flecainide 50 mg BID   - Continue home Lopressor 25 mg BID     Hypothyroidism   - Continue home Synthroid 100 mcg QD     RA  - Continue home Celebrex 200 mg QD   - Continue home Wellcovorin 25 mg tablet weekly (on Tuesdays)  - Continue home methotrexate weekly on Mondays ( 5 mg at lunch and 10 mg in evening)    Multiple closed fractures of both sides of ribs   - Lidocaine patch q 12 hours     Oral yeast infection   Treated by ID during last admission   - Continue Nystatin suspension QID for 3 more days to complete 10 day course     Depression   - Continue home Cymbalta 40 mg QD     Continue additional home medications:  - Colace 100 mg QD  - Pepcid 20 mg HS   - Pantoprazole 40 mg BID   - Gabapentin 100 mg BID   - Substitute home Allegra 180 mg QD with Zyrtec 10 mg QD     Code: Full  Diet: General   DVT prophylaxis: Xarelto 15 mg QD (due to atrial fibrillation)      Discussed with on-call attending physician, Dr. Sangeetha Morgan DO, PGY-3   9:53 AM  2/24/2021

## 2021-02-24 NOTE — ED PROVIDER NOTES
[facility]      Pt Name: Daniel Buckley  MRN: [id number]  [place] 1935  Date of evaluation: 2/24/2021      CHIEF COMPLAINT       Chief Complaint   Patient presents with    Shortness of Breath    Positive For Covid-19        HPI  Daniel Buckley is a 80 y.o. female tested positive for COVID-19 on 2/4/2021 was admitted to the hospital and discharged on 2/16/2021 on supplemental oxygen 2 L nasal cannula presents today with progressively worsening shortness of breath at home. Patient states that suddenly this morning she had worsening shortness of breath. Worsened with exertion. Alleviated with rest.  She has not taken any medications or measures alleviate her symptoms. Denies any fevers, chills, nausea, vomiting, chest pain, abdominal pain, flank pain, dysuria, hematuria, diarrhea, constipation, new rashes or sores. Except as noted above the remainder of the review of systems was reviewed and negative. Review of Systems   Constitutional: Negative for activity change, appetite change, diaphoresis, fatigue, fever and unexpected weight change. HENT: Negative for congestion. Eyes: Negative for visual disturbance. Respiratory: Positive for cough and shortness of breath. Negative for wheezing. Cardiovascular: Negative for chest pain, palpitations and leg swelling. Gastrointestinal: Negative for abdominal distention, abdominal pain, constipation, diarrhea, nausea and vomiting. Endocrine: Negative for polyphagia and polyuria. Genitourinary: Negative for dysuria and hematuria. Musculoskeletal: Negative for back pain. Skin: Negative for color change, pallor, rash and wound. Neurological: Negative for dizziness. Hematological: Negative for adenopathy. Does not bruise/bleed easily. Psychiatric/Behavioral: Negative for agitation. Physical Exam  Vitals signs and nursing note reviewed.    Constitutional:       General: She is not in acute distress. Appearance: Normal appearance. She is well-developed. She is not ill-appearing or diaphoretic. HENT:      Head: Normocephalic and atraumatic. Nose: Nose normal.   Eyes:      Extraocular Movements: Extraocular movements intact. Pupils: Pupils are equal, round, and reactive to light. Neck:      Musculoskeletal: Normal range of motion. Cardiovascular:      Rate and Rhythm: Normal rate and regular rhythm. Pulses: Normal pulses. Heart sounds: Normal heart sounds. No murmur. No friction rub. No gallop. Pulmonary:      Effort: Tachypnea, accessory muscle usage and respiratory distress present. Breath sounds: No stridor. Examination of the left-middle field reveals rhonchi. Examination of the right-lower field reveals rhonchi. Examination of the left-lower field reveals rhonchi. Rhonchi present. No decreased breath sounds, wheezing or rales. Chest:      Chest wall: No tenderness. Abdominal:      General: Bowel sounds are normal. There is no distension. Palpations: Abdomen is soft. There is no mass. Tenderness: There is no abdominal tenderness. There is no right CVA tenderness, left CVA tenderness, guarding or rebound. Negative signs include Crouch's sign, Rovsing's sign and McBurney's sign. Hernia: No hernia is present. Musculoskeletal: Normal range of motion. General: No deformity. Right lower leg: No edema. Left lower leg: No edema. Skin:     General: Skin is warm and dry. Capillary Refill: Capillary refill takes less than 2 seconds. Neurological:      General: No focal deficit present. Mental Status: She is alert and oriented to person, place, and time. Cranial Nerves: No cranial nerve deficit. Motor: No weakness.    Psychiatric:         Mood and Affect: Mood normal.          Procedures     MDM  Number of Diagnoses or Management Options  Acute respiratory distress syndrome in adult (HCC)  HAP (hospital-acquired pneumonia)  Diagnosis management comments: 51-year-old female with a pertinent past medical history of Covid pneumonia on 2/4/2021 was admitted and discharged on 2/16/2021 on supplemental oxygen presents with complaints of acute shortness of breath starting this morning. Vitals significant for tachypnea, saturating at 93% on 4 L nasal cannula. She is afebrile. On physical exam patient is in acute respiratory distress using accessory muscles to breathe. Normal S1-S2. Abdomen soft nontender. No rebound or guarding. Negative CVA tenderness bilaterally. Diagnostic labs and imaging reviewed. Significant for leukocytosis of 15.7. And a CTA showed no pulmonary embolism but suspected multifocal pneumonia. As patient recently had Covid pneumonia her symptoms could be stemming from a superimposed bacterial infection. She was given cefepime and vancomycin. Patient symptoms possibly arising from sequelae of Covid pneumonia. Patient informed of all the results of her evaluation. Spoke with patient at bedside she states that she is full code. Patient admitted to family medicine service to intermediate telemetry. Amount and/or Complexity of Data Reviewed  Clinical lab tests: reviewed  Tests in the radiology section of CPT®: reviewed  Tests in the medicine section of CPT®: reviewed  Decide to obtain previous medical records or to obtain history from someone other than the patient: yes      Despite being on 4 L patient continued to desaturate requiring high flow NC with improvement.     ED Course as of Feb 24 1309   Wed Feb 24, 2021   0649 CTA pulmonary revealed no pulmonary embolism.    [CB]      ED Course User Index  [CB] Clara Mckee MD           --------------------------------------------- PAST HISTORY ---------------------------------------------  Past Medical History:  has a past medical history of Anticoagulated, Atrial fibrillation ([address] Bimalleolar fracture, [id number], Diastolic dysfunction, Difficulty walking, Diverticular disease, Elevated CA-125, Hiatal hernia, Hypertension, Hypothyroid, Meningocele spinal ([facility] [address] Neuropathy, JANETTE (obstructive sleep apnea), Osteoporosis, PAF (paroxysmal atrial fibrillation) ([facility] [address] Pain in both lower legs, Patellar fracture, Rheumatoid arthritis with rheumatoid factor ([facility] [address] Rheumatoid arthritis(714.0), Rib fractures, Seasonal allergies, Skin cancer of lip, and Wears dentures. Past Surgical History:  has a past surgical history that includes Gastric fundoplication (8491);  section; Carpal tunnel release (Bilateral); Colonoscopy (2011); Total knee arthroplasty (2007); Cardiac catheterization; Hysterectomy, total abdominal (early ); Upper gastrointestinal endoscopy (N/A, 2020); and Colonoscopy (N/A, 2020). Social History:  reports that she has never smoked. She has never used smokeless tobacco. She reports that she does not drink alcohol or use drugs. Family History: family history includes Heart Attack (age of onset: 58) in her mother; Hypertension in her brother, sister, and sister; Other in her daughter, father, sister, and son; Rheum Arthritis in her brother and sister; Thyroid Disease in her sister. The patients home medications have been reviewed.     Allergies: Amoxicillin    -------------------------------------------------- RESULTS -------------------------------------------------    LABS:  Results for orders placed or performed during the hospital encounter of 21   COVID-19, Rapid   Result Value Ref Range    SARS-CoV-2, NAAT Not Detected Not Detected   CBC Auto Differential   Result Value Ref Range    WBC 15.7 (H) 4.5 - 11.5 E9/L    RBC 3.32 (L) 3.50 - 5.50 E12/L    Hemoglobin 10.6 (L) 11.5 - 15.5 g/dL    Hematocrit 34.8 34.0 - 48.0 %    .8 (H) 80.0 - 99.9 fL    MCH 31.9 26.0 - 35.0 pg    MCHC 30.5 (L) 32.0 - 34.5 %    RDW 15.0 11.5 - 15.0 fL    Platelets 884 306 - 410 E9/L    MPV 9.5 7.0 - 12.0 fL    Neutrophils % 86.0 (H) 43.0 - 80.0 %    Immature Granulocytes % 1.9 0.0 - 5.0 %    Lymphocytes % 2.8 (L) 20.0 - 42.0 %    Monocytes % 6.4 2.0 - 12.0 %    Eosinophils % 2.7 0.0 - 6.0 %    Basophils % 0.2 0.0 - 2.0 %    Neutrophils Absolute 13.50 (H) 1.80 - 7.30 E9/L    Immature Granulocytes # 0.30 E9/L    Lymphocytes Absolute 0.44 (L) 1.50 - 4.00 E9/L    Monocytes Absolute 1.01 (H) 0.10 - 0.95 E9/L    Eosinophils Absolute 0.43 0.05 - 0.50 E9/L    Basophils Absolute 0.03 0.00 - 0.20 E9/L    Anisocytosis 1+     Polychromasia 1+    Comprehensive Metabolic Panel w/ Reflex to MG   Result Value Ref Range    Sodium 135 132 - 146 mmol/L    Potassium reflex Magnesium 4.6 3.5 - 5.0 mmol/L    Chloride 100 98 - 107 mmol/L    CO2 26 22 - 29 mmol/L    Anion Gap 9 7 - 16 mmol/L    Glucose 99 74 - 99 mg/dL    BUN 22 8 - 23 mg/dL    CREATININE 0.8 0.5 - 1.0 mg/dL    GFR Non-African American >60 >=60 mL/min/1.73    GFR African American >60     Calcium 8.9 8.6 - 10.2 mg/dL    Total Protein 6.5 6.4 - 8.3 g/dL    Albumin 2.7 (L) 3.5 - 5.2 g/dL    Total Bilirubin 0.6 0.0 - 1.2 mg/dL    Alkaline Phosphatase 137 (H) 35 - 104 U/L    ALT 12 0 - 32 U/L    AST 23 0 - 31 U/L   Troponin   Result Value Ref Range    Troponin <0.01 0.00 - 0.03 ng/mL   Brain Natriuretic Peptide   Result Value Ref Range    Pro-BNP 3,335 (H) 0 - 450 pg/mL   Procalcitonin   Result Value Ref Range    Procalcitonin 0.25 (H) 0.00 - 0.08 ng/mL   LACTATE DEHYDROGENASE   Result Value Ref Range     (H) 135 - 214 U/L   D-Dimer, Quantitative   Result Value Ref Range    D-Dimer, Quant 803 ng/mL DDU   Protime-INR   Result Value Ref Range    Protime 20.0 (H) 9.3 - 12.4 sec    INR 1.7    APTT   Result Value Ref Range    aPTT 28.4 24.5 - 35.1 sec   Lactate, Sepsis   Result Value Ref Range    Lactic Acid, Sepsis 1.8 0.5 - 1.9 mmol/L   EKG 12 Lead   Result Value Ref Range    Ventricular Rate 83 BPM    Atrial Rate 83 BPM    P-R Interval 210 ms    QRS Duration 92 ms Q-T Interval 430 ms    QTc Calculation (Bazett) 505 ms    P Axis 18 degrees    R Axis -11 degrees    T Axis 9 degrees       RADIOLOGY:  CTA PULMONARY W CONTRAST   Final Result   No convincing evidence of a pulmonary embolism. Significant progression in the ground-glass opacities scattered throughout   both lungs when compared with February 10, 2021. Findings are suspicious for   multifocal pneumonia. Stable calcified mass in the central canal of the thoracic spine. Distended esophagus filled with undigested material, placing the patient at   risk for aspiration. Additional findings as described. XR CHEST PORTABLE   Final Result   Grossly unchanged patchy bilateral airspace opacities compared with February 14 2021. Findings may be on the basis of multifocal pneumonia. EKG: This EKG is signed and interpreted by me. Rate: Heart rate 83. Normal sinus rhythm. Left axis deviation. QTc prolonged. No ST elevation or depression. T wave inversions now inferior leads compared to previous EKG.      ------------------------- NURSING NOTES AND VITALS REVIEWED ---------------------------  Date / Time Roomed:  2/24/2021  4:31 AM  ED Bed Assignment:  [id number]    The nursing notes within the ED encounter and vital signs as below have been reviewed.      Patient Vitals for the past 24 hrs:   BP Temp Temp src Pulse Resp SpO2 Height Weight   02/24/21 0713 (!) 152/65 98.2 °F (36.8 °C)  78 22   123 lb 3 oz (55.9 kg)   02/24/21 0616 (!) 140/69 97.9 °F (36.6 °C)  83 24 93 %     02/24/21 0600 129/67   79  93 %     02/24/21 0528 129/67   84 28 91 %     02/24/21 0440 129/67 97.5 °F (36.4 °C) Axillary 88 30 97 % 5' (1.524 m) 140 lb (63.5 kg)       Oxygen Saturation Interpretation: Normal    ------------------------------------------ PROGRESS NOTES ------------------------------------------    Counseling:  I have spoken with the patient and discussed todays results, in addition to providing specific details for the plan of care and counseling regarding the diagnosis and prognosis. Their questions are answered at this time and they are agreeable with the plan of admission.    --------------------------------- ADDITIONAL PROVIDER NOTES ---------------------------------  Consultations:  Spoke with Family medicine service. Discussed case. They will admit the patient. This patient's ED course included: a personal history and physicial examination, re-evaluation prior to disposition, multiple bedside re-evaluations, IV medications, cardiac monitoring and continuous pulse oximetry    This patient has remained hemodynamically stable during their ED course. Diagnosis:  1. HAP (hospital-acquired pneumonia)    2. Acute respiratory distress syndrome in adult [facility])        Disposition:  Patient's disposition: Admit to telemetry  Patient's condition is fair.         Kenneth Azul MD  Resident  02/24/21 Gideon Deras DO  02/24/21 [id number]

## 2021-02-24 NOTE — PROGRESS NOTES
Physician Progress Note      PATIENT:               Annalisa Parr  CSN #:                  [id number]  :                       1935  ADMIT DATE:       2021 4:31 AM  [address] [place] DATE:  RESPONDING  PROVIDER #:        Harsh Brink MD          QUERY TEXT:    Dear Attending Physician,    Pt admitted with Acute on Chronic Hypoxic Respiratory Failure and Pneumonia . Pt noted to have history of COVID 2021 . If possible, please document in   progress notes and discharge summary if you are evaluating and/or treating any   of the following: The medical record reflects the following:  Risk Factors: COVID 19 Pneumonia 2021-2021  ,home o2  Clinical Indicators: Per H/P,\". .. Acute on chronic respiratory failure with   hypoxia secondary to recent COVID-19 infection and new HAP. .Multiple closed   fractures of both sides of ribs . Danna Burleson CXR showed grossly unchanged patchy   bilateral airspace opacities compared with 2021, consistent with   multifocal pneumonia. CTA showed no evidence of PE but showed significant   progression in the ground-glass opacities throughout both lungs, suspicious   for multifocal pneumonia. ? '  Lab  \"SARS-CoV-2, NAAT  Not Detected   Final 2021  4:48 AM   \"  Treatment: IV ATB ,Decadron 6 mg QD ,vitamin C, thiamine, vitamin D, and zinc   QD    Thank you,  Katie Knapp RN [facility]  Clinical Documentation Improvement Specialist  [phone]  Options provided:  -- Pneumonia is a sequela of prior COVID infection  -- Pneumonia is not a sequela of prior COVID infection  -- Other - I will add my own diagnosis  -- Disagree - Not applicable / Not valid  -- Disagree - Clinically unable to determine / Unknown  -- Refer to Clinical Documentation Reviewer    PROVIDER RESPONSE TEXT:    Pneumonia is a sequela of prior COVID infection.     Query created by: Minerva Garcia on 2021 2:28 PM      Electronically signed by:  Harsh Brink MD 2021 4:31 PM

## 2021-02-25 LAB
ALBUMIN SERPL-MCNC: 2.1 G/DL (ref 3.5–5.2)
ALP BLD-CCNC: 132 U/L (ref 35–104)
ALT SERPL-CCNC: 13 U/L (ref 0–32)
ANION GAP SERPL CALCULATED.3IONS-SCNC: 12 MMOL/L (ref 7–16)
ANION GAP SERPL CALCULATED.3IONS-SCNC: 9 MMOL/L (ref 7–16)
APTT: 29.9 SEC (ref 24.5–35.1)
AST SERPL-CCNC: 24 U/L (ref 0–31)
BASOPHILS ABSOLUTE: 0.01 E9/L (ref 0–0.2)
BASOPHILS RELATIVE PERCENT: 0.1 % (ref 0–2)
BILIRUB SERPL-MCNC: 0.6 MG/DL (ref 0–1.2)
BUN BLDV-MCNC: 22 MG/DL (ref 8–23)
BUN BLDV-MCNC: 26 MG/DL (ref 8–23)
CALCIUM SERPL-MCNC: 8.5 MG/DL (ref 8.6–10.2)
CALCIUM SERPL-MCNC: 8.8 MG/DL (ref 8.6–10.2)
CHLORIDE BLD-SCNC: 92 MMOL/L (ref 98–107)
CHLORIDE BLD-SCNC: 95 MMOL/L (ref 98–107)
CO2: 24 MMOL/L (ref 22–29)
CO2: 27 MMOL/L (ref 22–29)
CREAT SERPL-MCNC: 0.8 MG/DL (ref 0.5–1)
CREAT SERPL-MCNC: 0.9 MG/DL (ref 0.5–1)
D DIMER: 1109 NG/ML DDU
EOSINOPHILS ABSOLUTE: 0 E9/L (ref 0.05–0.5)
EOSINOPHILS RELATIVE PERCENT: 0 % (ref 0–6)
FERRITIN: 402 NG/ML
FIBRINOGEN: >700 MG/DL (ref 225–540)
GFR AFRICAN AMERICAN: >60
GFR AFRICAN AMERICAN: >60
GFR NON-AFRICAN AMERICAN: 59 ML/MIN/1.73
GFR NON-AFRICAN AMERICAN: >60 ML/MIN/1.73
GLUCOSE BLD-MCNC: 126 MG/DL (ref 74–99)
GLUCOSE BLD-MCNC: 127 MG/DL (ref 74–99)
HCT VFR BLD CALC: 31.7 % (ref 34–48)
HEMOGLOBIN: 10 G/DL (ref 11.5–15.5)
IMMATURE GRANULOCYTES #: 0.21 E9/L
IMMATURE GRANULOCYTES %: 1.5 % (ref 0–5)
INR BLD: 2
LV EF: 65 %
LVEF MODALITY: NORMAL
LYMPHOCYTES ABSOLUTE: 0.42 E9/L (ref 1.5–4)
LYMPHOCYTES RELATIVE PERCENT: 3.1 % (ref 20–42)
MCH RBC QN AUTO: 32.4 PG (ref 26–35)
MCHC RBC AUTO-ENTMCNC: 31.5 % (ref 32–34.5)
MCV RBC AUTO: 102.6 FL (ref 80–99.9)
MONOCYTES ABSOLUTE: 0.64 E9/L (ref 0.1–0.95)
MONOCYTES RELATIVE PERCENT: 4.7 % (ref 2–12)
NEUTROPHILS ABSOLUTE: 12.35 E9/L (ref 1.8–7.3)
NEUTROPHILS RELATIVE PERCENT: 90.6 % (ref 43–80)
PDW BLD-RTO: 14.6 FL (ref 11.5–15)
PLATELET # BLD: 401 E9/L (ref 130–450)
PMV BLD AUTO: 9.9 FL (ref 7–12)
POIKILOCYTES: ABNORMAL
POLYCHROMASIA: ABNORMAL
POTASSIUM REFLEX MAGNESIUM: 4.2 MMOL/L (ref 3.5–5)
POTASSIUM SERPL-SCNC: 4.1 MMOL/L (ref 3.5–5)
PROTHROMBIN TIME: 22.7 SEC (ref 9.3–12.4)
RBC # BLD: 3.09 E12/L (ref 3.5–5.5)
REASON FOR REJECTION: NORMAL
REJECTED TEST: NORMAL
ROULEAUX: ABNORMAL
SODIUM BLD-SCNC: 128 MMOL/L (ref 132–146)
SODIUM BLD-SCNC: 131 MMOL/L (ref 132–146)
TEAR DROP CELLS: ABNORMAL
TOTAL PROTEIN: 6.3 G/DL (ref 6.4–8.3)
TOXIC GRANULATION: ABNORMAL
WBC # BLD: 13.6 E9/L (ref 4.5–11.5)

## 2021-02-25 PROCEDURE — 85384 FIBRINOGEN ACTIVITY: CPT

## 2021-02-25 PROCEDURE — 85610 PROTHROMBIN TIME: CPT

## 2021-02-25 PROCEDURE — 94640 AIRWAY INHALATION TREATMENT: CPT

## 2021-02-25 PROCEDURE — 2580000003 HC RX 258: Performed by: FAMILY MEDICINE

## 2021-02-25 PROCEDURE — 36415 COLL VENOUS BLD VENIPUNCTURE: CPT

## 2021-02-25 PROCEDURE — 2700000000 HC OXYGEN THERAPY PER DAY

## 2021-02-25 PROCEDURE — 85730 THROMBOPLASTIN TIME PARTIAL: CPT

## 2021-02-25 PROCEDURE — 2060000000 HC ICU INTERMEDIATE R&B

## 2021-02-25 PROCEDURE — 80053 COMPREHEN METABOLIC PANEL: CPT

## 2021-02-25 PROCEDURE — 99232 SBSQ HOSP IP/OBS MODERATE 35: CPT | Performed by: FAMILY MEDICINE

## 2021-02-25 PROCEDURE — 6360000002 HC RX W HCPCS: Performed by: INTERNAL MEDICINE

## 2021-02-25 PROCEDURE — 93306 TTE W/DOPPLER COMPLETE: CPT

## 2021-02-25 PROCEDURE — 82728 ASSAY OF FERRITIN: CPT

## 2021-02-25 PROCEDURE — 80048 BASIC METABOLIC PNL TOTAL CA: CPT

## 2021-02-25 PROCEDURE — 6370000000 HC RX 637 (ALT 250 FOR IP): Performed by: FAMILY MEDICINE

## 2021-02-25 PROCEDURE — 94664 DEMO&/EVAL PT USE INHALER: CPT

## 2021-02-25 PROCEDURE — 85378 FIBRIN DEGRADE SEMIQUANT: CPT

## 2021-02-25 PROCEDURE — 2580000003 HC RX 258: Performed by: INTERNAL MEDICINE

## 2021-02-25 PROCEDURE — 85025 COMPLETE CBC W/AUTO DIFF WBC: CPT

## 2021-02-25 RX ORDER — FUROSEMIDE 40 MG/1
40 TABLET ORAL DAILY
Status: DISCONTINUED | OUTPATIENT
Start: 2021-02-25 | End: 2021-03-05

## 2021-02-25 RX ADMIN — PANTOPRAZOLE SODIUM 40 MG: 40 TABLET, DELAYED RELEASE ORAL at 14:30

## 2021-02-25 RX ADMIN — CEFEPIME HYDROCHLORIDE 1000 MG: 1 INJECTION, POWDER, FOR SOLUTION INTRAMUSCULAR; INTRAVENOUS at 14:29

## 2021-02-25 RX ADMIN — VALSARTAN 160 MG: 160 TABLET, FILM COATED ORAL at 10:06

## 2021-02-25 RX ADMIN — METOPROLOL TARTRATE 25 MG: 25 TABLET, FILM COATED ORAL at 10:06

## 2021-02-25 RX ADMIN — Medication 2000 UNITS: at 10:05

## 2021-02-25 RX ADMIN — GABAPENTIN 100 MG: 100 CAPSULE ORAL at 10:04

## 2021-02-25 RX ADMIN — PANTOPRAZOLE SODIUM 40 MG: 40 TABLET, DELAYED RELEASE ORAL at 06:29

## 2021-02-25 RX ADMIN — RIVAROXABAN 15 MG: 15 TABLET, FILM COATED ORAL at 10:04

## 2021-02-25 RX ADMIN — SODIUM CHLORIDE: 9 INJECTION, SOLUTION INTRAVENOUS at 17:36

## 2021-02-25 RX ADMIN — Medication 100 MG: at 10:04

## 2021-02-25 RX ADMIN — DULOXETINE HYDROCHLORIDE 40 MG: 60 CAPSULE, DELAYED RELEASE ORAL at 10:04

## 2021-02-25 RX ADMIN — OXYCODONE HYDROCHLORIDE AND ACETAMINOPHEN 500 MG: 500 TABLET ORAL at 10:06

## 2021-02-25 RX ADMIN — CELECOXIB 200 MG: 100 CAPSULE ORAL at 10:05

## 2021-02-25 RX ADMIN — GABAPENTIN 100 MG: 100 CAPSULE ORAL at 21:45

## 2021-02-25 RX ADMIN — BUDESONIDE AND FORMOTEROL FUMARATE DIHYDRATE 2 PUFF: 160; 4.5 AEROSOL RESPIRATORY (INHALATION) at 10:37

## 2021-02-25 RX ADMIN — FUROSEMIDE 40 MG: 40 TABLET ORAL at 17:35

## 2021-02-25 RX ADMIN — FAMOTIDINE 20 MG: 20 TABLET ORAL at 21:45

## 2021-02-25 RX ADMIN — SODIUM CHLORIDE, PRESERVATIVE FREE 10 ML: 5 INJECTION INTRAVENOUS at 21:45

## 2021-02-25 RX ADMIN — NYSTATIN 500000 UNITS: 500000 SUSPENSION ORAL at 10:06

## 2021-02-25 RX ADMIN — BUDESONIDE AND FORMOTEROL FUMARATE DIHYDRATE 2 PUFF: 160; 4.5 AEROSOL RESPIRATORY (INHALATION) at 20:56

## 2021-02-25 RX ADMIN — FLECAINIDE ACETATE 50 MG: 50 TABLET ORAL at 10:06

## 2021-02-25 RX ADMIN — DOCUSATE SODIUM 100 MG: 100 CAPSULE, LIQUID FILLED ORAL at 10:10

## 2021-02-25 RX ADMIN — CEFEPIME HYDROCHLORIDE 1000 MG: 1 INJECTION, POWDER, FOR SOLUTION INTRAMUSCULAR; INTRAVENOUS at 02:56

## 2021-02-25 RX ADMIN — LEVOTHYROXINE SODIUM 100 MCG: 100 TABLET ORAL at 06:29

## 2021-02-25 RX ADMIN — METOPROLOL TARTRATE 25 MG: 25 TABLET, FILM COATED ORAL at 21:45

## 2021-02-25 RX ADMIN — FOLIC ACID 1 MG: 1 TABLET ORAL at 10:06

## 2021-02-25 RX ADMIN — CYANOCOBALAMIN TAB 1000 MCG 1000 MCG: 1000 TAB at 10:07

## 2021-02-25 RX ADMIN — NYSTATIN 500000 UNITS: 500000 SUSPENSION ORAL at 17:36

## 2021-02-25 RX ADMIN — FLECAINIDE ACETATE 50 MG: 50 TABLET ORAL at 22:00

## 2021-02-25 RX ADMIN — NYSTATIN 500000 UNITS: 500000 SUSPENSION ORAL at 14:30

## 2021-02-25 RX ADMIN — NYSTATIN 500000 UNITS: 500000 SUSPENSION ORAL at 21:45

## 2021-02-25 RX ADMIN — SODIUM CHLORIDE: 9 INJECTION, SOLUTION INTRAVENOUS at 02:57

## 2021-02-25 RX ADMIN — ZINC SULFATE 220 MG (50 MG) CAPSULE 50 MG: CAPSULE at 10:05

## 2021-02-25 RX ADMIN — CETIRIZINE HYDROCHLORIDE 10 MG: 10 TABLET, FILM COATED ORAL at 10:05

## 2021-02-25 ASSESSMENT — PAIN SCALES - GENERAL
PAINLEVEL_OUTOF10: 0

## 2021-02-25 ASSESSMENT — PAIN SCALES - WONG BAKER: WONGBAKER_NUMERICALRESPONSE: 0

## 2021-02-25 NOTE — CARE COORDINATION
Social Work/Discharge Planning:  Chart reviewed. Patient currently requiring 8 liters of oxygen. Patient oxygen baseline is 3 liters. Plan is home at discharge pending her progress. Will continue to follow.   Electronically signed by JU Alicia on 2/25/2021 at 2:10 PM

## 2021-02-25 NOTE — PROGRESS NOTES
antibiotics as procalcitonin is 0.28. Attributing leukocytosis to steroids. Echocardiogram ordered. Patient started on IV Lasix 40 mg BID on 02/24.   - Strict I/Os    Hyponatremia  Likely secondary to IV diuretics   - Monitor Na+ level closely     HTN   - Continue home valsartan 160 mg QD and HCTZ 25 mg QD      Macrocytic anemia   Seen by Hem/ Onc during last admission for macrocytic anemia and thrombocytosis. Anemia thought to be due to methotrexate use. Needed follow-up with Dr. Patience Lake as an outpatient in 1-2 weeks after last discharge.    - Continue home folic acid 1 mg QD and vitamin B12 1000 mcg QD   - Continue home ferrous sulfate 325 mg every other day     Atrial fibrillation  - Continue home flecainide 50 mg BID   - Continue home Lopressor 25 mg BID      Hypothyroidism   - Continue home Synthroid 100 mcg QD      RA  - Continue home Celebrex 200 mg QD   - Continue home Wellcovorin 25 mg tablet weekly (on Tuesdays)  - Continue home methotrexate weekly on Mondays ( 5 mg at lunch and 10 mg in evening)     Multiple closed fractures of both sides of ribs   - Lidocaine patch q 12 hours      Oral yeast infection   Treated by ID during last admission   - Continue Nystatin suspension QID for 3 more days to complete 10 day course      Depression   - Continue home Cymbalta 40 mg QD      Continue additional home medications:  - Colace 100 mg QD  - Pepcid 20 mg HS   - Pantoprazole 40 mg BID   - Gabapentin 100 mg BID   - Substitute home Allegra 180 mg QD with Zyrtec 10 mg QD      Code: Full  Diet: General   DVT prophylaxis: Xarelto 15 mg QD (due to atrial fibrillation)         Electronically signed by Miguel Angel Linda DO on 2/25/2021 at 7:53 AM

## 2021-02-25 NOTE — PROGRESS NOTES
Spoke with patient daughter Elroy Jacobson and updated her on patient condition and plan of care. All questions answered and told her we would call if anything changed.

## 2021-02-25 NOTE — PROGRESS NOTES
Spoke to patient's daughter, Liliam Zepeda, who lives in [facility]. Updated on patient's condition and treatment plan. All questions answered. Liliam Zepeda appreciative of update.

## 2021-02-25 NOTE — PROGRESS NOTES
Notified on call  of negative COVID result. Reports  to evaluate if patient can be taken out of isolation tomorrow. Daughter updated.

## 2021-02-25 NOTE — PROGRESS NOTES
Nitin Campos M.D.,[facility]  José Miguel Caputo, D.O., F.A.C.OAmandaI., Farzda Diop M.D. Dony Sarmiento M.D., Tomy Pulido M.D. Dimitry Gallegos D.O. Daily Pulmonary Progress Note    Patient:  Esperanza Esposito 80 y.o. female MRN: [id number]     Date of Service: 2/25/2021      Synopsis     We are following patient for respiratory failure with hypoxia, recent COVID-19 pneumonia    \"CC\" shortness of breath    Code status: Full      Subjective      Patient was seen through window case discussed with staff in effort to conserve ppe and  Limit exposure to covid 19. Lying in bed in no acute distress. Oxygen up to 8 L high flow nasal cannula her baseline is 3 L at home. Occasional cough not producing sputum, dyspnea worse with exertion. No fevers, no nausea or vomiting. Rapid Covid test negative x2 awaiting PCR results. Review of Systems:  Constitutional: Denies fever, weight loss, night sweats, and fatigue  Skin: Denies pigmentation, dark lesions, and rashes   HEENT: Denies hearing loss, tinnitus, ear drainage, epistaxis, sore throat, and hoarseness. Cardiovascular: Denies palpitations, chest pain, and chest pressure. Respiratory: Denies   hemoptysis, apnea, and choking.   Gastrointestinal: Denies nausea, vomiting, poor appetite, diarrhea, heartburn or reflux  Genitourinary: Denies dysuria, frequency, urgency or hematuria  Musculoskeletal: Denies myalgias, muscle weakness, and bone pain  Neurological: Denies dizziness, vertigo, headache, and focal weakness  Psychological: Denies anxiety and depression  Endocrine: Denies heat intolerance and cold intolerance  Hematopoietic/Lymphatic: Denies bleeding problems and blood transfusions    24-hour events:  None    Objective   Vitals: BP (!) 141/68   Pulse 67   Temp 95.9 °F (35.5 °C) (Oral)   Resp 18   Ht 5' (1.524 m)   Wt 123 lb 3 oz (55.9 kg)   SpO2 93%   BMI 24.06 kg/m²     I/O:    Intake/Output Summary (Last 24 hours) at 2/25/2021 9052 Last data filed at 2/25/2021 1253  Gross per 24 hour   Intake 730 ml   Output 200 ml   Net 530 ml       Vent Information  Skin Assessment: Clean, dry, & intact  SpO2: 93 %                CURRENT MEDS :  Scheduled Meds:   budesonide-formoterol  2 puff Inhalation BID    celecoxib  200 mg Oral Daily    docusate sodium  100 mg Oral Daily    DULoxetine  40 mg Oral Daily    ferrous sulfate  325 mg Oral Every Other Day    cetirizine  10 mg Oral Daily    flecainide  50 mg Oral BID    folic acid  1 mg Oral Daily    ascorbic acid  500 mg Oral Daily    gabapentin  100 mg Oral BID    [START ON 3/2/2021] leucovorin calcium  25 mg Oral Weekly    levothyroxine  100 mcg Oral Daily    lidocaine  1 patch Transdermal Daily    [START ON 3/1/2021] methotrexate  5 mg Oral Weekly    metoprolol tartrate  25 mg Oral BID    nystatin  5 mL Oral 4x Daily    pantoprazole  40 mg Oral BID AC    rivaroxaban  15 mg Oral Daily with breakfast    thiamine  100 mg Oral Daily    vitamin B-12  1,000 mcg Oral Daily    vitamin D  2,000 Units Oral Daily    zinc sulfate  50 mg Oral Daily    sodium chloride flush  10 mL Intravenous 2 times per day    famotidine  20 mg Oral Nightly    valsartan  160 mg Oral Daily    [START ON 3/1/2021] methotrexate  10 mg Oral Weekly    furosemide  40 mg Intravenous BID    cefepime  1,000 mg Intravenous Q12H       Physical Exam:  Due to the current efforts to prevent transmission of COVID-19 and also the need to preserve PPE for other caregivers, a face-to-face encounter with the patient was not performed. That being said, all relevant records and diagnostic tests were reviewed, including laboratory results and imaging. Please reference any relevant documentation elsewhere. Care will be coordinated with the primary service.         Pertinent/ New Labs and Imaging Studies     Imaging Personally Reviewed:  FINDINGS:   There persistent patchy bilateral airspace opacities similar in appearance to February 14, 2021.  Difficult to exclude small left pleural effusion.  Heart   size is unable to be accurately assessed on this single portable view of the   chest but appears to be stable.  No evidence of a pneumothorax.  Bones are   diffusely osteopenic.       Impression   Grossly unchanged patchy bilateral airspace opacities compared with February 14 2021.  Findings may be on the basis of multifocal pneumonia.         CTA chest   No convincing evidence of a pulmonary embolism. Significant progression in the ground-glass opacities scattered throughout   both lungs when compared with February 10, 2021.  Findings are suspicious for   multifocal pneumonia. Stable calcified mass in the central canal of the thoracic spine. Distended esophagus filled with undigested material, placing the patient at   risk for aspiration. Additional findings as described.               ECHO   Conclusions      Summary   Left ventricular internal dimensions were normal in diastole and systole. No regional wall motion abnormalities seen. Normal left ventricular ejection fraction. The left atrium is borderline dilated. Moderate mitral annular calcification. Mild mitral regurgitation is present. The aortic valve appears mildly sclerotic. Mild aortic regurgitation is noted.          Labs:  Lab Results   Component Value Date    WBC 13.6 02/25/2021    HGB 10.0 02/25/2021    HCT 31.7 02/25/2021    .6 02/25/2021    MCH 32.4 02/25/2021    MCHC 31.5 02/25/2021    RDW 14.6 02/25/2021     02/25/2021    MPV 9.9 02/25/2021     Lab Results   Component Value Date     02/25/2021    K 4.1 02/25/2021    K 4.2 02/25/2021    CL 95 02/25/2021    CO2 27 02/25/2021    BUN 26 02/25/2021    CREATININE 0.9 02/25/2021    LABALBU 2.1 02/25/2021    LABALBU 4.0 04/24/2012    CALCIUM 8.5 02/25/2021    GFRAA >60 02/25/2021    LABGLOM 59 02/25/2021     Lab Results   Component Value Date    PROTIME 22.7 02/25/2021    PROTIME 15.8 05/12/2014    INR 2.0 02/25/2021     Recent Labs     02/24/21  0448   PROBNP 3,335*     Recent Labs     02/24/21  1210   PROCAL 0.28*     This SmartLink has not been configured with any valid records. Micro:  No results for input(s): CULTRESP in the last 72 hours. No results for input(s): LABGRAM in the last 72 hours. No results for input(s): LEGUR in the last 72 hours. No results for input(s): STREPNEUMAGU in the last 72 hours. No results for input(s): LP1UAG in the last 72 hours. Assessment:    1. Acute on chronic hypoxic respiratory failure   2. 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 Multilobar ground glass opacities likely multifactorial ? chf with pulmonary edema with superimposed previous ggo from covid. 5. Elevated bnp  6. Leukocytosis likely secondary to steroids   7. Hiatal hernia   8. hafsa   9. Multiple rib fractures   10. RA on mtx   11. Immunocompromised   12. Anemia   13. A fib  14. Oral candida      Plan:   1. Oxygen therapy 8 liters HF NC, baseline is 3 L nasal cannula  2. Add bipap prn for respiratory support  3. Elevation of proBNP  4. Antibiotics per infectious disease cefepime. Nystatin QID. Received 1 dose vancomycin  5. Continue vitamin protocol, completed dexamethasone and antiviral medication  6. Follow chest x-ray in am 2/26  7. Continue bronchodilators-Symbicort twice daily rinse and gargle, albuterol HFA as needed  8. Await Covid PCR test  9. Maintain isolation precautions until PCR test results known  10. DVT GI prophylaxis-Xarelto, Protonix    This plan of care was reviewed in collaboration with Dr. Abraham Taylor  Electronically signed by FRAN Pan - CNP on 2/25/2021 at 3:28 PM      I personally saw, examined, and cared for the patient. Labs, medications, radiographs reviewed. I agree with history exam and plans detailed in NP note.     Echo is normal?   Decrease lasix   No signs of infection would stop abx will need to discuss this with ID   Trend bnp   Trial steroids as the underlying ild maybe contributing. Follow up covid pcr     Kelton Brooks M.D.    Pulmonary/Critical Care Medicine

## 2021-02-25 NOTE — PROGRESS NOTES
[address] [place] [address] [facility]  Progress Note      Chief Complaint   Patient presents with    Shortness of Breath    Positive For Covid-19       SUBJECTIVE:      Patient is tolerating medications. No reported adverse drug reactions. No problems overnight. Still extremely weak, poor appetite, dry mouth      Review of systems:    As stated above in the chief complaint, otherwise negative. Medications:    Scheduled Meds:   budesonide-formoterol  2 puff Inhalation BID    celecoxib  200 mg Oral Daily    docusate sodium  100 mg Oral Daily    DULoxetine  40 mg Oral Daily    ferrous sulfate  325 mg Oral Every Other Day    cetirizine  10 mg Oral Daily    flecainide  50 mg Oral BID    folic acid  1 mg Oral Daily    ascorbic acid  500 mg Oral Daily    gabapentin  100 mg Oral BID    [START ON 3/2/2021] leucovorin calcium  25 mg Oral Weekly    levothyroxine  100 mcg Oral Daily    lidocaine  1 patch Transdermal Daily    [START ON 3/1/2021] methotrexate  5 mg Oral Weekly    metoprolol tartrate  25 mg Oral BID    nystatin  5 mL Oral 4x Daily    pantoprazole  40 mg Oral BID AC    rivaroxaban  15 mg Oral Daily with breakfast    thiamine  100 mg Oral Daily    vitamin B-12  1,000 mcg Oral Daily    vitamin D  2,000 Units Oral Daily    zinc sulfate  50 mg Oral Daily    sodium chloride flush  10 mL Intravenous 2 times per day    famotidine  20 mg Oral Nightly    valsartan  160 mg Oral Daily    [START ON 3/1/2021] methotrexate  10 mg Oral Weekly    furosemide  40 mg Intravenous BID    cefepime  1,000 mg Intravenous Q12H     Continuous Infusions:   sodium chloride 12.5 mL/hr at 02/25/21 0257     PRN Meds:albuterol sulfate HFA, sodium chloride flush, polyethylene glycol, acetaminophen **OR** acetaminophen, perflutren lipid microspheres  Prior to Admission medications    Medication Sig Start Date End Date Taking?  Authorizing Provider   loteprednol (LOTEMAX) 0.5 % ophthalmic suspension  1/20/21   Historical Provider, MD   albuterol sulfate HFA (VENTOLIN HFA) 108 (90 Base) MCG/ACT inhaler Inhale 2 puffs into the lungs 4 times daily as needed for Wheezing 2/17/21   [facility] DO Prasanth   DULoxetine HCl 40 MG CPEP Take 40 mg by mouth daily 2/17/21   [facility], DO   nystatin (MYCOSTATIN) 776834 UNIT/ML suspension Take 5 mLs by mouth 4 times daily for 10 days 2/16/21 2/26/21  Dali Valdez DO   budesonide-formoterol [facility]) 160-4.5 MCG/ACT AERO Inhale 2 puffs into the lungs 2 times daily 2/16/21   [facility], DO   lidocaine 4 % external patch Place 1 patch onto the skin daily 2/17/21   [facility], DO   folic acid (FOLVITE) 1 MG tablet Take 1 tablet by mouth daily 2/17/21   [facility], DO   zinc sulfate (ZINCATE) 50 MG CAPS capsule Take 1 capsule by mouth daily 2/17/21   [facility], DO   ascorbic acid (VITAMIN C) 500 MG tablet Take 1 tablet by mouth daily 2/17/21   [facility], DO   thiamine 100 MG tablet Take 1 tablet by mouth daily 2/17/21   [facility], DO   Vitamin D (CHOLECALCIFEROL) 50 MCG (2000 UT) TABS tablet Take 1 tablet by mouth daily 2/17/21   [facility] DO Prasanth   metoprolol tartrate (LOPRESSOR) 25 MG tablet TAKE 1 TABLET TWICE A DAY 1/4/21   Ed Whalen MD   flecainide (TAMBOCOR) 50 MG tablet Take 1 tablet by mouth 2 times daily 11/23/20   Ed Whalen MD   rivaroxaban (XARELTO) 15 MG TABS tablet Take 1 tablet by mouth daily (with breakfast) 11/23/20   Ed Whalen MD   valsartan-hydroCHLOROthiazide (DIOVAN-HCT) 160-25 MG per tablet Take 1 tablet by mouth daily 10/28/20   Isaias Fallon MD   gabapentin (NEURONTIN) 100 MG capsule Take 1 capsule by mouth 2 times daily for 90 days.  10/8/20 2/23/21  Isaias Fallon MD   celecoxib (CELEBREX) 200 MG capsule TAKE 1 CAPSULE DAILY 9/17/20   Isaias Fallon MD   ferrous sulfate (IRON 325) 325 (65 Fe) MG tablet Take 1 tablet by mouth every other day 20   Yoseph Baca MD   levothyroxine (LEVOTHROID) 100 MCG tablet Take 1 tablet by mouth Daily 20   Yoseph Baca MD   docusate sodium (COLACE, DULCOLAX) 100 MG CAPS Take 100 mg by mouth daily 20   Emiliana Cruz MD   vitamin B-12 (CYANOCOBALAMIN) 1000 MCG tablet Take 1,000 mcg by mouth daily    Historical Provider, MD   omeprazole (PRILOSEC) 40 MG delayed release capsule Take 1 capsule by mouth 2 times daily 20   Yoseph Baca MD   famotidine (PEPCID) 40 MG tablet Take 1 tablet by mouth nightly 3/31/20   Yoseph Baca MD   ondansetron [facility]) 4 MG tablet Take 1 tablet by mouth every 8 hours as needed for Nausea or Vomiting 19   Yoseph Baca MD   polyethyl glycol-propyl glycol 0.4-0.3 % (SYSTANE) 0.4-0.3 % ophthalmic solution 1 drop 2 times daily as needed for Dry Eyes     Historical Provider, MD   fluticasone (FLONASE) 50 MCG/ACT nasal spray 2 sprays by Nasal route daily 2 sprays in each nostril daily 18   Yoseph Baca MD   Probiotic Product (ACIDOPHILUS PROBIOTIC) CAPS capsule Take 1 capsule by mouth daily    Historical Provider, MD   fexofenadine (ALLEGRA) 180 MG tablet Take 180 mg by mouth daily. Historical Provider, MD   leucovorin calcium (WELLCOVORIN) 5 MG tablet Take 25 mg by mouth once a week Takes every 13   Historical Provider, MD   methotrexate 2.5 MG tablet Take by mouth once a week 2 tabs at lunch and 4 tabs  in the pm once a week on Monday    Historical Provider, MD       OBJECTIVE:  BP (!) 141/68   Pulse 67   Temp 95.9 °F (35.5 °C) (Oral)   Resp 18   Ht 5' (1.524 m)   Wt 123 lb 3 oz (55.9 kg)   SpO2 93%   BMI 24.06 kg/m²   Temp  Av.3 °F (36.3 °C)  Min: 95.9 °F (35.5 °C)  Max: 98.4 °F (36.9 °C)  General appearance: Resting in bed in no apparent distress. Skin: Warm and dry. No rashes were noted. HEENT: Round and reactive pupils. Moist mucous membranes. No ulcerations or thrush.   Neck: Supple to movements. Chest: Diminished breath sounds bilaterally  Cardiovascular: Regular rate and rhythm. No murmurs gallops, or rubs appreciated. Abdomen: Bowel sounds present, nontender, nondistended, no masses or hepatosplenomegaly. Extremities: No clubbing, no cyanosis, no edema. Lines: peripheral  Neuro: No significant sensory or motor deficits noted    I/O last 3 completed shifts: In: 250 [IV Piggyback:250]  Out: 200 [Urine:200]      Laboratory and Tests Review:      Lab Results   Component Value Date    WBC 13.6 (H) 02/25/2021    WBC 15.7 (H) 02/24/2021    WBC 13.1 (H) 02/16/2021    HGB 10.0 (L) 02/25/2021    HCT 31.7 (L) 02/25/2021    .6 (H) 02/25/2021     02/25/2021     Lab Results   Component Value Date    NEUTROABS 12.35 (H) 02/25/2021    NEUTROABS 13.50 (H) 02/24/2021    NEUTROABS 11.27 (H) 02/16/2021     No results found for: [facility]  Lab Results   Component Value Date    ALT 13 02/25/2021    AST 24 02/25/2021    ALKPHOS 132 (H) 02/25/2021    BILITOT 0.6 02/25/2021     Lab Results   Component Value Date     02/25/2021    K 4.2 02/25/2021    CL 92 02/25/2021    CO2 24 02/25/2021    BUN 22 02/25/2021    CREATININE 0.8 02/25/2021    CREATININE 0.8 02/24/2021    CREATININE 0.8 02/16/2021    GFRAA >60 02/25/2021    LABGLOM >60 02/25/2021    GLUCOSE 127 02/25/2021    GLUCOSE 79 04/24/2012    PROT 6.3 02/25/2021    LABALBU 2.1 02/25/2021    LABALBU 4.0 04/24/2012    CALCIUM 8.8 02/25/2021    BILITOT 0.6 02/25/2021    ALKPHOS 132 02/25/2021    AST 24 02/25/2021    ALT 13 02/25/2021     Lab Results   Component Value Date    CRP 33.2 (H) 02/24/2021    CRP 0.8 (H) 02/16/2021    CRP 1.5 (H) 02/15/2021     Lab Results   Component Value Date    SEDRATE 33 (H) 02/16/2021    SEDRATE 29 (H) 09/10/2020    SEDRATE 17 10/22/2019       Radiology:    CTA PULMONARY W CONTRAST   Final Result   No convincing evidence of a pulmonary embolism.    Significant progression in the ground-glass opacities scattered throughout   both lungs when compared with February 10, 2021. Findings are suspicious for   multifocal pneumonia. Stable calcified mass in the central canal of the thoracic spine. Distended esophagus filled with undigested material, placing the patient at   risk for aspiration. Additional findings as described. XR CHEST PORTABLE   Final Result   Grossly unchanged patchy bilateral airspace opacities compared with February 14 2021. Findings may be on the basis of multifocal pneumonia. Microbiology:   Lab Results   Component Value Date    BC 24 Hours no growth 02/24/2021    ORG Proteus mirabilis 02/15/2021    ORG Micrococcus 09/30/2020    ORG Staphylococcus aureus 09/09/2020    ORG Staphylococcus coagulase-negative 09/09/2020     Lab Results   Component Value Date    BLOODCULT2 24 Hours no growth 02/24/2021    ORG Proteus mirabilis 02/15/2021    ORG Micrococcus 09/30/2020    ORG Staphylococcus aureus 09/09/2020    ORG Staphylococcus coagulase-negative 09/09/2020     WOUND/ABSCESS   Date Value Ref Range Status   09/30/2020   Final    Heavy growth  Validated susceptibility testing methods do not exist for  this isolate. 09/09/2020   Final    Light growth  Methicillin resistant Staph aureus isolated. Most Methicillin  resistant Staphylococcus are usually resistant to multiple  antibiotics including other B-Lactams, Aminoglycosides,  Macrolides, Clindamycin and Tetracycline. Contact isolation  is indicated. This isolate is presumed to be resistant based on the  detection of inducible Clindamycin resistance. Clindamycin  may still be effective in some patients.      09/09/2020 One colony  Final     No results found for: RESPSMEAR  No results found for: MPNEUMO, CLAMYDCU, LABLEGI, AFBCX, FUNGSM, LABFUNG  No results found for: CULTRESP  No results found for: CXCATHTIP  No results found for: BFCS  No results found for: CXSURG  Urine Culture, Routine   Date Value Ref Range Status   02/15/2021 >100,000 CFU/ml  Final   06/04/2019 <10,000 CFU/mL  Gram negative rods    Final   07/17/2014 25,000 CFU/ml  Final   07/17/2014   Final    <25,000 CFU/ml  Vancomycin resistant Enterococci isolated       No results found for: [facility]    Problem list:    Principal Problem:    Acute on chronic respiratory failure with hypoxia ([address]  Active Problems:    Hypothyroidism    Rheumatoid arthritis (HCC)    Hyponatremia    Anemia    Multiple closed fractures of ribs of both sides with routine healing    A-fib (HCC)    Essential hypertension    HAP (hospital-acquired pneumonia)    COVID-19    Sore throat    Depression    Oral yeast infection  Resolved Problems:    * No resolved hospital problems.  *      ASSESSMENT:    · Acute on chronic hypoxic respiratory failure  · Oral candidiasis  · Recent COVID-19 viral pneumonia  · Healthcare associated pneumonia, post Covid  · Possible diastolic heart failure with elevated proBNP  · Elevated CRP, increased from 0.8 to 33.2 in 1 week  · Multiple rib fractures  · Rheumatoid arthritis  · Age-related physical debility  · Paroxysmal atrial fibrillation  · Depression       PLAN:    · Continue cefepime  · Continue Decadron  · Trial of diuretic therapy as per pulmonary  · Continue oxygen supplementation  · Consider cardiac evaluation  · Await  respiratory culture  · Monitor labs  · Discussed with primary service  · Will follow with you      Talisha Goldman    10:01 AM  2/25/2021

## 2021-02-26 ENCOUNTER — APPOINTMENT (OUTPATIENT)
Dept: GENERAL RADIOLOGY | Age: 86
DRG: 177 | End: 2021-02-26
Payer: MEDICARE

## 2021-02-26 PROBLEM — E44.1 MILD PROTEIN-CALORIE MALNUTRITION (HCC): Status: ACTIVE | Noted: 2021-02-26

## 2021-02-26 LAB
ALBUMIN SERPL-MCNC: 2.5 G/DL (ref 3.5–5.2)
ALP BLD-CCNC: 120 U/L (ref 35–104)
ALT SERPL-CCNC: 14 U/L (ref 0–32)
ANION GAP SERPL CALCULATED.3IONS-SCNC: 9 MMOL/L (ref 7–16)
APTT: 25.2 SEC (ref 24.5–35.1)
AST SERPL-CCNC: 37 U/L (ref 0–31)
BASOPHILS ABSOLUTE: 0.02 E9/L (ref 0–0.2)
BASOPHILS RELATIVE PERCENT: 0.1 % (ref 0–2)
BILIRUB SERPL-MCNC: 0.4 MG/DL (ref 0–1.2)
BILIRUB SERPL-MCNC: 0.5 MG/DL (ref 0–1.2)
BILIRUBIN DIRECT: 0.2 MG/DL (ref 0–0.3)
BILIRUBIN, INDIRECT: 0.3 MG/DL (ref 0–1)
BUN BLDV-MCNC: 30 MG/DL (ref 8–23)
C-REACTIVE PROTEIN: 9.3 MG/DL (ref 0–0.4)
CALCIUM SERPL-MCNC: 8.3 MG/DL (ref 8.6–10.2)
CHLORIDE BLD-SCNC: 96 MMOL/L (ref 98–107)
CO2: 29 MMOL/L (ref 22–29)
CREAT SERPL-MCNC: 0.9 MG/DL (ref 0.5–1)
D DIMER: 795 NG/ML DDU
EKG ATRIAL RATE: 83 BPM
EKG P AXIS: 18 DEGREES
EKG P-R INTERVAL: 210 MS
EKG Q-T INTERVAL: 430 MS
EKG QRS DURATION: 92 MS
EKG QTC CALCULATION (BAZETT): 505 MS
EKG R AXIS: -11 DEGREES
EKG T AXIS: 9 DEGREES
EKG VENTRICULAR RATE: 83 BPM
EOSINOPHILS ABSOLUTE: 0.04 E9/L (ref 0.05–0.5)
EOSINOPHILS RELATIVE PERCENT: 0.2 % (ref 0–6)
FERRITIN: 431 NG/ML
FIBRINOGEN: >700 MG/DL (ref 225–540)
GFR AFRICAN AMERICAN: >60
GFR NON-AFRICAN AMERICAN: 59 ML/MIN/1.73
GLUCOSE BLD-MCNC: 98 MG/DL (ref 74–99)
HAPTOGLOBIN: 334 MG/DL (ref 30–200)
HCT VFR BLD CALC: 28.4 % (ref 34–48)
HCT VFR BLD CALC: 30.3 % (ref 34–48)
HEMOGLOBIN: 8.8 G/DL (ref 11.5–15.5)
IMMATURE GRANULOCYTES #: 0.19 E9/L
IMMATURE GRANULOCYTES %: 1.1 % (ref 0–5)
IMMATURE RETIC FRACT: 20.3 % (ref 3–15.9)
INR BLD: 1.6
LACTATE DEHYDROGENASE: 466 U/L (ref 135–214)
LYMPHOCYTES ABSOLUTE: 0.73 E9/L (ref 1.5–4)
LYMPHOCYTES RELATIVE PERCENT: 4.4 % (ref 20–42)
MCH RBC QN AUTO: 31.9 PG (ref 26–35)
MCHC RBC AUTO-ENTMCNC: 31 % (ref 32–34.5)
MCV RBC AUTO: 102.9 FL (ref 80–99.9)
MONOCYTES ABSOLUTE: 1.16 E9/L (ref 0.1–0.95)
MONOCYTES RELATIVE PERCENT: 7 % (ref 2–12)
NEUTROPHILS ABSOLUTE: 14.53 E9/L (ref 1.8–7.3)
NEUTROPHILS RELATIVE PERCENT: 87.2 % (ref 43–80)
PDW BLD-RTO: 14.8 FL (ref 11.5–15)
PLATELET # BLD: 364 E9/L (ref 130–450)
PMV BLD AUTO: 9.8 FL (ref 7–12)
POTASSIUM REFLEX MAGNESIUM: 4.9 MMOL/L (ref 3.5–5)
PROTHROMBIN TIME: 18.3 SEC (ref 9.3–12.4)
RBC # BLD: 2.76 E12/L (ref 3.5–5.5)
RETIC HGB EQUIVALENT: 27.4 PG (ref 28.2–36.6)
RETICULOCYTE ABSOLUTE COUNT: 0.09 E12/L
RETICULOCYTE COUNT PCT: 3.2 % (ref 0.4–1.9)
SODIUM BLD-SCNC: 134 MMOL/L (ref 132–146)
TOTAL PROTEIN: 5.9 G/DL (ref 6.4–8.3)
URINE CULTURE, ROUTINE: NORMAL
VITAMIN B-12: >2000 PG/ML (ref 211–946)
WBC # BLD: 16.7 E9/L (ref 4.5–11.5)

## 2021-02-26 PROCEDURE — 82247 BILIRUBIN TOTAL: CPT

## 2021-02-26 PROCEDURE — 83615 LACTATE (LD) (LDH) ENZYME: CPT

## 2021-02-26 PROCEDURE — 82728 ASSAY OF FERRITIN: CPT

## 2021-02-26 PROCEDURE — 71045 X-RAY EXAM CHEST 1 VIEW: CPT

## 2021-02-26 PROCEDURE — 85045 AUTOMATED RETICULOCYTE COUNT: CPT

## 2021-02-26 PROCEDURE — 2580000003 HC RX 258: Performed by: INTERNAL MEDICINE

## 2021-02-26 PROCEDURE — 82248 BILIRUBIN DIRECT: CPT

## 2021-02-26 PROCEDURE — 85610 PROTHROMBIN TIME: CPT

## 2021-02-26 PROCEDURE — 2580000003 HC RX 258: Performed by: FAMILY MEDICINE

## 2021-02-26 PROCEDURE — 2060000000 HC ICU INTERMEDIATE R&B

## 2021-02-26 PROCEDURE — 97161 PT EVAL LOW COMPLEX 20 MIN: CPT

## 2021-02-26 PROCEDURE — 97165 OT EVAL LOW COMPLEX 30 MIN: CPT

## 2021-02-26 PROCEDURE — 6370000000 HC RX 637 (ALT 250 FOR IP): Performed by: FAMILY MEDICINE

## 2021-02-26 PROCEDURE — 80053 COMPREHEN METABOLIC PANEL: CPT

## 2021-02-26 PROCEDURE — 36415 COLL VENOUS BLD VENIPUNCTURE: CPT

## 2021-02-26 PROCEDURE — 83010 ASSAY OF HAPTOGLOBIN QUANT: CPT

## 2021-02-26 PROCEDURE — 6360000002 HC RX W HCPCS: Performed by: INTERNAL MEDICINE

## 2021-02-26 PROCEDURE — 94640 AIRWAY INHALATION TREATMENT: CPT

## 2021-02-26 PROCEDURE — 99232 SBSQ HOSP IP/OBS MODERATE 35: CPT | Performed by: FAMILY MEDICINE

## 2021-02-26 PROCEDURE — 85025 COMPLETE CBC W/AUTO DIFF WBC: CPT

## 2021-02-26 PROCEDURE — 85384 FIBRINOGEN ACTIVITY: CPT

## 2021-02-26 PROCEDURE — 82607 VITAMIN B-12: CPT

## 2021-02-26 PROCEDURE — 85730 THROMBOPLASTIN TIME PARTIAL: CPT

## 2021-02-26 PROCEDURE — 85378 FIBRIN DEGRADE SEMIQUANT: CPT

## 2021-02-26 PROCEDURE — 2700000000 HC OXYGEN THERAPY PER DAY

## 2021-02-26 PROCEDURE — 86140 C-REACTIVE PROTEIN: CPT

## 2021-02-26 PROCEDURE — 93010 ELECTROCARDIOGRAM REPORT: CPT | Performed by: INTERNAL MEDICINE

## 2021-02-26 PROCEDURE — 2500000003 HC RX 250 WO HCPCS: Performed by: INTERNAL MEDICINE

## 2021-02-26 RX ORDER — METHYLPREDNISOLONE SODIUM SUCCINATE 40 MG/ML
40 INJECTION, POWDER, LYOPHILIZED, FOR SOLUTION INTRAMUSCULAR; INTRAVENOUS EVERY 8 HOURS
Status: DISCONTINUED | OUTPATIENT
Start: 2021-02-26 | End: 2021-03-01

## 2021-02-26 RX ORDER — TRIAMCINOLONE ACETONIDE 0.1 %
PASTE (GRAM) DENTAL 3 TIMES DAILY
Status: DISCONTINUED | OUTPATIENT
Start: 2021-02-26 | End: 2021-03-09 | Stop reason: HOSPADM

## 2021-02-26 RX ADMIN — SODIUM CHLORIDE, PRESERVATIVE FREE 10 ML: 5 INJECTION INTRAVENOUS at 09:09

## 2021-02-26 RX ADMIN — BUDESONIDE AND FORMOTEROL FUMARATE DIHYDRATE 2 PUFF: 160; 4.5 AEROSOL RESPIRATORY (INHALATION) at 08:12

## 2021-02-26 RX ADMIN — FLECAINIDE ACETATE 50 MG: 50 TABLET ORAL at 21:52

## 2021-02-26 RX ADMIN — METOPROLOL TARTRATE 25 MG: 25 TABLET, FILM COATED ORAL at 21:52

## 2021-02-26 RX ADMIN — METOPROLOL TARTRATE 25 MG: 25 TABLET, FILM COATED ORAL at 09:00

## 2021-02-26 RX ADMIN — PANTOPRAZOLE SODIUM 40 MG: 40 TABLET, DELAYED RELEASE ORAL at 16:08

## 2021-02-26 RX ADMIN — DULOXETINE HYDROCHLORIDE 40 MG: 60 CAPSULE, DELAYED RELEASE ORAL at 09:00

## 2021-02-26 RX ADMIN — METHYLPREDNISOLONE SODIUM SUCCINATE 40 MG: 40 INJECTION, POWDER, LYOPHILIZED, FOR SOLUTION INTRAMUSCULAR; INTRAVENOUS at 11:24

## 2021-02-26 RX ADMIN — Medication 100 MG: at 09:00

## 2021-02-26 RX ADMIN — FLECAINIDE ACETATE 50 MG: 50 TABLET ORAL at 09:00

## 2021-02-26 RX ADMIN — NYSTATIN 500000 UNITS: 500000 SUSPENSION ORAL at 09:00

## 2021-02-26 RX ADMIN — GABAPENTIN 100 MG: 100 CAPSULE ORAL at 09:00

## 2021-02-26 RX ADMIN — SODIUM CHLORIDE: 9 INJECTION, SOLUTION INTRAVENOUS at 04:30

## 2021-02-26 RX ADMIN — CELECOXIB 200 MG: 100 CAPSULE ORAL at 08:58

## 2021-02-26 RX ADMIN — DOCUSATE SODIUM 100 MG: 100 CAPSULE, LIQUID FILLED ORAL at 08:59

## 2021-02-26 RX ADMIN — NYSTATIN 500000 UNITS: 500000 SUSPENSION ORAL at 13:22

## 2021-02-26 RX ADMIN — LEVOTHYROXINE SODIUM 100 MCG: 100 TABLET ORAL at 05:00

## 2021-02-26 RX ADMIN — CETIRIZINE HYDROCHLORIDE 10 MG: 10 TABLET, FILM COATED ORAL at 09:00

## 2021-02-26 RX ADMIN — BUDESONIDE AND FORMOTEROL FUMARATE DIHYDRATE 2 PUFF: 160; 4.5 AEROSOL RESPIRATORY (INHALATION) at 19:40

## 2021-02-26 RX ADMIN — FOLIC ACID 1 MG: 1 TABLET ORAL at 08:59

## 2021-02-26 RX ADMIN — CYANOCOBALAMIN TAB 1000 MCG 1000 MCG: 1000 TAB at 09:01

## 2021-02-26 RX ADMIN — GABAPENTIN 100 MG: 100 CAPSULE ORAL at 21:52

## 2021-02-26 RX ADMIN — TRIAMCINOLONE ACETONIDE: 1 PASTE DENTAL at 11:24

## 2021-02-26 RX ADMIN — ZINC SULFATE 220 MG (50 MG) CAPSULE 50 MG: CAPSULE at 09:00

## 2021-02-26 RX ADMIN — TRIAMCINOLONE ACETONIDE: 1 PASTE DENTAL at 13:22

## 2021-02-26 RX ADMIN — RIVAROXABAN 15 MG: 15 TABLET, FILM COATED ORAL at 09:00

## 2021-02-26 RX ADMIN — NYSTATIN 500000 UNITS: 500000 SUSPENSION ORAL at 16:08

## 2021-02-26 RX ADMIN — NYSTATIN 500000 UNITS: 500000 SUSPENSION ORAL at 21:52

## 2021-02-26 RX ADMIN — VALSARTAN 160 MG: 160 TABLET, FILM COATED ORAL at 08:59

## 2021-02-26 RX ADMIN — PANTOPRAZOLE SODIUM 40 MG: 40 TABLET, DELAYED RELEASE ORAL at 05:00

## 2021-02-26 RX ADMIN — FERROUS SULFATE TAB 325 MG (65 MG ELEMENTAL FE) 325 MG: 325 (65 FE) TAB at 08:59

## 2021-02-26 RX ADMIN — SODIUM CHLORIDE, PRESERVATIVE FREE 10 ML: 5 INJECTION INTRAVENOUS at 21:52

## 2021-02-26 RX ADMIN — METHYLPREDNISOLONE SODIUM SUCCINATE 40 MG: 40 INJECTION, POWDER, LYOPHILIZED, FOR SOLUTION INTRAMUSCULAR; INTRAVENOUS at 21:52

## 2021-02-26 RX ADMIN — Medication 2000 UNITS: at 09:01

## 2021-02-26 RX ADMIN — FAMOTIDINE 20 MG: 20 TABLET ORAL at 21:52

## 2021-02-26 RX ADMIN — FUROSEMIDE 40 MG: 40 TABLET ORAL at 08:59

## 2021-02-26 RX ADMIN — TRIAMCINOLONE ACETONIDE: 1 PASTE DENTAL at 21:55

## 2021-02-26 RX ADMIN — CEFEPIME HYDROCHLORIDE 1000 MG: 1 INJECTION, POWDER, FOR SOLUTION INTRAMUSCULAR; INTRAVENOUS at 04:30

## 2021-02-26 RX ADMIN — OXYCODONE HYDROCHLORIDE AND ACETAMINOPHEN 500 MG: 500 TABLET ORAL at 08:58

## 2021-02-26 ASSESSMENT — PAIN SCALES - GENERAL
PAINLEVEL_OUTOF10: 0
PAINLEVEL_OUTOF10: 0

## 2021-02-26 NOTE — PROGRESS NOTES
Physical Therapy  Facility/Department: [address] INTERMEDIATE 1  Daily Treatment Note  NAME: Annie Flor  : 1935  MRN: [id number]    Date of Service: 2021    Patient Diagnosis(es): The primary encounter diagnosis was HAP (hospital-acquired pneumonia). Diagnoses of Acute respiratory failure with hypoxia ([address] and History of 2019 novel coronavirus disease (COVID-19) were also pertinent to this visit. has a past medical history of Anticoagulated, Atrial fibrillation ([address] Bimalleolar fracture, VGVPA-28, Diastolic dysfunction, Difficulty walking, Diverticular disease, Elevated CA-125, Hiatal hernia, Hypertension, Hypothyroid, Meningocele spinal ([address] Neuropathy, JANETTE (obstructive sleep apnea), Osteoporosis, PAF (paroxysmal atrial fibrillation) ([address] Pain in both lower legs, Patellar fracture, Rheumatoid arthritis with rheumatoid factor ([address] Rheumatoid arthritis(714.0), Rib fractures, Seasonal allergies, Skin cancer of lip, and Wears dentures. has a past surgical history that includes Gastric fundoplication ();  section; Carpal tunnel release (Bilateral); Colonoscopy (2011); Total knee arthroplasty (2007); Cardiac catheterization; Hysterectomy, total abdominal (early ); Upper gastrointestinal endoscopy (N/A, 2020); and Colonoscopy (N/A, 2020). Referring Provider:  Denis Sherwood DO    Evaluating Therapist: Ravi Coombs PT    Room #:428  DIAGNOSIS: Acute on chronic respiratory failure  Additional Pertinent History:RA, Covid, Neuropathy, rib fx  PRECAUTIONS: falls, O2    Social:  Pt lives with family in a 1 floor plan. Prior to admission Pt w/c level, transfers to chair with use of sit to stand     Initial Evaluation  Date: 21 Treatment      Short Term/ Long Term   Goals   Was pt agreeable to Eval/treatment? yes     Does pt have pain?  No c/o pain     Bed Mobility  Rolling: max assist  Supine to sit: max assist  Sit to supine: dependent  Scooting: dependent  Mod assist   Transfers Sit <> stand attempted with use of STEADY dependent of 2, pt unable  Max assist to stand to STEAD   Ambulation    NT  N/A   Stair Negotiation  Ascended and descended  NT   N/A   LE strength     2+/5    3/5   balance      poor     AM-PAC Raw score               7/24         Pt is alert and Oriented   LE ROM: WFL  Sensation: decreased B LE's  Edema: none  Endurance: poor     ASSESSMENT  Pt displays functional ability as noted in the objective portion of this evaluation. Patient education  Pt educated on PT objectives    Patient response to education:   Pt verbalized understanding Pt demonstrated skill Pt requires further education in this area   yes         ASSESSMENT:    Comments:  Poor sitting balance edge of bed. Loss of balance to right and posterior. Pt agreeable to transfer to chair with use of STEADY. Attempted to stand to steady, dependent of 2 to attempt, pt unable to stand, sliding off bed with attempt to stand. Pt returned to supine dependently. SpO2 on 9L after activity 86%. Recovered to 92% once returned to bed and with  cues for breathing technique    Pt's/ family goals   1. To get stronger    Patient and or family understand(s) diagnosis, prognosis, and plan of care. PLAN OF CARE:    Current Treatment Recommendations     [x] Strengthening     [] ROM   [x] Balance Training   [x] Endurance Training   [x] Transfer Training   [] Gait Training   [] Stair Training   [x] Positioning   [x] Safety and Education Training   [x] Patient/Caregiver Education   [] HEP  [] Other     Frequency of treatments: 2-5x/week x 5.days    Time in  100  Time out  115      Evaluation Time includes thorough review of current medical information, gathering information on past medical history/social history and prior level of function, completion of standardized testing/informal observation of tasks, assessment of data and education on plan of care and goals.     CPT codes:  [x] Low

## 2021-02-26 NOTE — PROGRESS NOTES
Mitzi Shook M.D.,[facility]  Trupti Summers D.O., F.A.C.O.I., Asya Swann M.D. Isiah Torres M.D., Aliyah Calvillo M.D. Leigh Brown D.O. Daily Pulmonary Progress Note    Patient:  Eyal Thurston 80 y.o. female MRN: [id number]     Date of Service: 2/26/2021      Synopsis     We are following patient for respiratory failure with hypoxia, recent COVID-19 pneumonia    \"CC\" shortness of breath    Code status: Full      Subjective      Patient was personally seen and examined. Lying in bed in no acute distress. Oxygen remains at 8 L high flow nasal cannula her baseline is 3 L at home. Tolerating IV corticosteroids. Occasional cough not producing sputum, dyspnea worse with exertion. No fevers, no nausea or vomiting. Removed from isolation room Covid testing negative including PCR. Review of Systems:  Constitutional: Denies fever, weight loss, night sweats, and fatigue  Skin: Denies pigmentation, dark lesions, and rashes   HEENT: Denies hearing loss, tinnitus, ear drainage, epistaxis, sore throat, and hoarseness. Cardiovascular: Denies palpitations, chest pain, and chest pressure. Respiratory: Denies   hemoptysis, apnea, and choking.   Gastrointestinal: Denies nausea, vomiting, poor appetite, diarrhea, heartburn or reflux  Genitourinary: Denies dysuria, frequency, urgency or hematuria  Musculoskeletal: Denies myalgias, muscle weakness, and bone pain  Neurological: Denies dizziness, vertigo, headache, and focal weakness  Psychological: Denies anxiety and depression  Endocrine: Denies heat intolerance and cold intolerance  Hematopoietic/Lymphatic: Denies bleeding problems and blood transfusions    24-hour events:  None    Objective   Vitals: /65   Pulse 74   Temp 97.5 °F (36.4 °C) (Oral)   Resp 20   Ht 5' (1.524 m)   Wt 124 lb 9 oz (56.5 kg)   SpO2 94%   BMI 24.33 kg/m²     I/O:    Intake/Output Summary (Last 24 hours) at 2/26/2021 1351  Last data filed at 2/26/2021 1251  Gross per 24 hour   Intake 502.71 ml   Output    Net 502.71 ml       Vent Information  Skin Assessment: Clean, dry, & intact  SpO2: 94 %                CURRENT MEDS :  Scheduled Meds:   triamcinolone acetonide   Mouth/Throat TID    methylPREDNISolone  40 mg Intravenous Q8H    furosemide  40 mg Oral Daily    budesonide-formoterol  2 puff Inhalation BID    celecoxib  200 mg Oral Daily    docusate sodium  100 mg Oral Daily    DULoxetine  40 mg Oral Daily    ferrous sulfate  325 mg Oral Every Other Day    cetirizine  10 mg Oral Daily    flecainide  50 mg Oral BID    folic acid  1 mg Oral Daily    ascorbic acid  500 mg Oral Daily    gabapentin  100 mg Oral BID    [START ON 3/2/2021] leucovorin calcium  25 mg Oral Weekly    levothyroxine  100 mcg Oral Daily    lidocaine  1 patch Transdermal Daily    [START ON 3/1/2021] methotrexate  5 mg Oral Weekly    metoprolol tartrate  25 mg Oral BID    nystatin  5 mL Oral 4x Daily    pantoprazole  40 mg Oral BID AC    rivaroxaban  15 mg Oral Daily with breakfast    thiamine  100 mg Oral Daily    vitamin B-12  1,000 mcg Oral Daily    vitamin D  2,000 Units Oral Daily    zinc sulfate  50 mg Oral Daily    sodium chloride flush  10 mL Intravenous 2 times per day    famotidine  20 mg Oral Nightly    valsartan  160 mg Oral Daily    [START ON 3/1/2021] methotrexate  10 mg Oral Weekly       Physical Exam:  General Appearance: appears comfortable in no acute distress. HEENT: Normocephalic atraumatic without obvious abnormality   Neck: Lips, mucosa, and tongue normal.  Supple, symmetrical, trachea midline, no adenopathy;thyroid:  no enlargement/tenderness/nodules or JVD. Lung: Breath sounds diminished few basilar crackles. Respirations   unlabored. Symmetrical expansion. Heart: RRR, normal S1, S2. No MRG  Abdomen: Soft, NT, ND. BS present x 4 quadrants. No bruit or organomegaly. Extremities: Pedal pulses 2+ symmetric b/l.   Extremities normal, no cyanosis, clubbing, or edema. Musculokeletal: No joint swelling, no muscle tenderness. ROM normal in all joints of extremities. Neurologic: Mental status: Alert and Oriented X3 . Pertinent/ New Labs and Imaging Studies     Imaging Personally Reviewed:     Chest x-ray 2/26/2021  Multifocal infiltrates and interstitial changes are unchanged. Fleet Jeffery is   mildly enlarged.  There are no effusions.       Impression   No interval change         CTA chest   No convincing evidence of a pulmonary embolism. Significant progression in the ground-glass opacities scattered throughout   both lungs when compared with February 10, 2021.  Findings are suspicious for   multifocal pneumonia. Stable calcified mass in the central canal of the thoracic spine. Distended esophagus filled with undigested material, placing the patient at   risk for aspiration. Additional findings as described.               ECHO   Conclusions      Summary   Left ventricular internal dimensions were normal in diastole and systole. No regional wall motion abnormalities seen. Normal left ventricular ejection fraction. The left atrium is borderline dilated. Moderate mitral annular calcification. Mild mitral regurgitation is present. The aortic valve appears mildly sclerotic. Mild aortic regurgitation is noted.          Labs:  Lab Results   Component Value Date    WBC 16.7 02/26/2021    HGB 8.8 02/26/2021    HCT 30.3 02/26/2021    .9 02/26/2021    MCH 31.9 02/26/2021    MCHC 31.0 02/26/2021    RDW 14.8 02/26/2021     02/26/2021    MPV 9.8 02/26/2021     Lab Results   Component Value Date     02/26/2021    K 4.9 02/26/2021    CL 96 02/26/2021    CO2 29 02/26/2021    BUN 30 02/26/2021    CREATININE 0.9 02/26/2021    LABALBU 2.5 02/26/2021    LABALBU 4.0 04/24/2012    CALCIUM 8.3 02/26/2021    GFRAA >60 02/26/2021    LABGLOM 59 02/26/2021     Lab Results   Component Value Date    PROTIME 18.3 02/26/2021    PROTIME 15.8 05/12/2014    INR 1.6 02/26/2021     Recent Labs     02/24/21  0448   PROBNP 3,335*     Recent Labs     02/24/21  1210   PROCAL 0.28*     This SmartLink has not been configured with any valid records. Micro:  No results for input(s): CULTRESP in the last 72 hours. No results for input(s): LABGRAM in the last 72 hours. No results for input(s): LEGUR in the last 72 hours. No results for input(s): STREPNEUMAGU in the last 72 hours. No results for input(s): LP1UAG in the last 72 hours. Assessment:    1. Acute on chronic hypoxic respiratory failure   2. 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 Multilobar ground glass opacities likely multifactorial ? chf with pulmonary edema with superimposed previous ggo from covid. 5. Elevated bnp  6. Leukocytosis likely secondary to steroids   7. Hiatal hernia   8. hafsa   9. Multiple rib fractures   10. RA on mtx   11. Immunocompromised   12. Anemia   13. A fib  14. Oral candida      Plan:   1. Oxygen therapy 8 liters HF NC, baseline is 3 L nasal cannula  2. Will need repeat O2 testing prior to discharge  3. bipap prn for respiratory support-did not use last night  4. Trend proBNP, Lasix dosing-now on 40 mg p.o. daily  5. Antibiotics per infectious disease cefepime. Nystatin QID. Received 1 dose vancomycin  6. Continue vitamin protocol, completed dexamethasone and antiviral medication  7. Follow chest x-ray today 2/26  8. Started on trial of corticosteroids Solu-Medrol 40 mg IV every 8 hours, underlying ILD on CT chest imaging  9. Continue bronchodilators-Symbicort twice daily rinse and gargle, albuterol HFA as needed  10. Isolation removed negative Covid testing  11. DVT GI prophylaxis-Xarelto, Protonix    This plan of care was reviewed in collaboration with Dr. Jeoffrey Gottron  Electronically signed by FRAN Jiménez CNP on 2/26/2021 at 1:51 PM    I personally saw, examined, and cared for the patient.  Labs, medications, radiographs reviewed. I agree with history exam and plans detailed in NP note. Ct chest   Steroids   Discussed stopping antibiotics with ID and monitor symptoms. Johnie Ordonez M.D.    Pulmonary/Critical Care Medicine

## 2021-02-26 NOTE — PLAN OF CARE
Problem: Inadequate oral food/beverage intake (NI-2.1)  Goal: Food and/or Nutrient Delivery  Pt will consume >50% at meals/ONS  Description: Individualized approach for food/nutrient provision.   Outcome: Met This Shift

## 2021-02-26 NOTE — PROGRESS NOTES
Occupational Therapy  OCCUPATIONAL THERAPY INITIAL EVALUATION      Date:2021  Patient Name: Christiane Davis  MRN: [id number]  : 1935  Room: [address] [place] [address]    Referring Provider: Noelle Mccoy DO    Evaluating OT: Roberto Carlos Hodge OTR/L [id number]    AM-PAC Daily Activity Raw Score: 1324    Recommended Adaptive Equipment: TBD    Diagnosis: acute respiratory failure with hypoxia. Pt presents to ED from home with progressively worsening SOB. Recent hospitalization with discharge to home environment, multiple L rib fractures and COVID. Pertinent Medical History: HTN, RA, neuropathy, PAF, afib    Precautions:  falls     Home Living: Pt lives with son in a single story home. Bathroom setup: tub/shower combo with extended tub bench, has 3:1      Prior Level of Function: pd caregiver and family assist  to assist with ADL/IADLs; completed functional mobility wc level with use of UEs for mobility. [place]. for all transfers at home. Hospital bed. Pt reports she has assist sponge bathing several times a week and approx 1 time a week her granddaughter will assist her in the tub for bathing. Use of NuORDER in/out of bathroom for commode transfer. Able to complete grooming, UB dressing and self feeding tasks on her own. Pain Level: no reported pain    Cognition: A&O: 3-4/4. Pt is oriented to self, hospital and temporal concepts. Some confusion regarding situation. Problem solving:  fair   Judgement/safety:  fair     Functional Assessment:   Initial Eval Status  Date: 21 Treatment session:  Short Term Goals     Feeding Set up  Of lunch tray     Grooming Min A  Set up   UB Dressing Min A  Management of hospital gown  Set up   LB Dressing Max A  Management of B socks  Mod A    Bathing Max A  Mod A   Toileting Max A x2  Mod A   Bed Mobility  Supine to sit:  Max A  Sit to Supine: Dependent     Functional Transfers STS: Dependent at beti stedy, unable to tolerate STS, buckling at B LEs assisted back to bed for safety  Mod A   Functional Mobility NA     Balance Sitting: poor to poor plus at EOB    Standing: unable to tolerate     Activity Tolerance Poor  8L O2  Post activity: 86%  Min recovery time with pursed lip breathing to 92%  standing lelo x2-3 min with fair minus balance during self care tasks             Treatment: Patient educated on techniques for completion of ADL, safe functional transfers and functional mobility. Patient required cues for follow through with proper hand/foot placement, pacing, safety, compensatory strategies, breathing, attention, sequencing and technique in bed mobility, functional transfers and LB dressing in preparation for maximum independence in all self care tasks.      Hand Dominance: Right [x]  Left []   Strength ROM Additional Info:    RUE  3+/5 WFL   RA changes moderate at hand fair  and FMC/dexterity noted during ADL tasks     LUE 3+/5 WFL limited overhead shoulder motions  RA changes minimal at hand fair  and FMC/dexterity noted during ADL tasks         Hearing: WFL  Vision: WFL  Sensation:  No c/o numbness or tingling  Tone: WFL  Edema: none                             Long Term Goal (1-3 wks): Pt will maximize functional performance in all self care tasks/functional transfers with good follow through of all trained techniques for safe transition to next level of care    Assessment of current deficits   Functional mobility [x]  ADLs [x] Strength [x]  Cognition []  Functional transfers  [x] IADLs [x] Safety Awareness [x]  Endurance [x]  Fine Motor Coordination [] Balance [x] Vision/perception [] Sensation []   Gross Motor Coordination [] ROM [] Delirium []                  Motor Control []    Plan of Care: 2-5 days/week for 1-2 weeks PRN   [x]ADL retraining/adaptive techniques and AE recommendations to increase functional independence within precautions                    [x]Energy conservation techniques to improve tolerance for ADL/IADLs  [x]Functional transfer training/DME recommendations for increased independence, safety and fall prevention         [x]Patient/family education to increase safety and functional independence during daily routine          [x]Environmental modifications for safe mobility and completion of ADLs                             []Cognitive retraining to improve problem solving skills & safe participation in ADLs/IADLs     []Sensory re-education techniques to improve extremity awareness, maintain skin integrity and improve hand function                             []Visual/Perceptual retraining to improve body awareness and safety during transfers and ADLs  []Splinting/positioning needs to maintain joint/skin integrity and contracture prevention  [x]Therapeutic activity to improve functional performance during ADLs                                        [x]Therapeutic exercise to improve tolerance and functional strength for ADLs   [x]Balance retraining/tolerance tasks for facilitation of postural control with dynamic challenges during ADLs  []Neuromuscular re-education to facilitate righting/equilibrium reactions, midline orientation, scapular stability/mobility, normalize muscle tone and facilitate active functional movement                        []Delirium prevention/treatment    [x]Positioning to improve functional independence and decrease risk of skin breakdown  []Other:     Rehab Potential: Good for established goals     Patient/Family Goal: To get home. Patient and/or family were instructed on functional diagnosis, prognosis/goals and OT plan of care. Pt verbalized understanding. Upon arrival, patient supine in bed. At end of session, patient supine in bed with call light and phone within reach, all lines and tubes intact. Pt would benefit from continued skilled OT to increase safety and independence with completion of ADL/IADL tasks for functional independence and quality of life.  Bed/chair alarm: ON    Low Evaluation [id number]  Time In: 5753   Time Out: 1305      Evaluation time includes thorough review of current medical information, gathering information on past medical history/social history and prior level of function, completion of standardized testing/informal observation of tasks, assessment of data, and development of POC/Goals    Shayna Leos OTR/L  [id number]

## 2021-02-26 NOTE — PROGRESS NOTES
[address] [place] [address] [facility]  Progress Note      Chief Complaint   Patient presents with    Shortness of Breath    Positive For Covid-19       SUBJECTIVE:      Patient is tolerating medications. No reported adverse drug reactions. No problems overnight. Continues to complain of mouth pain. Review of systems:    As stated above in the chief complaint, otherwise negative. Medications:    Scheduled Meds:   furosemide  40 mg Oral Daily    budesonide-formoterol  2 puff Inhalation BID    celecoxib  200 mg Oral Daily    docusate sodium  100 mg Oral Daily    DULoxetine  40 mg Oral Daily    ferrous sulfate  325 mg Oral Every Other Day    cetirizine  10 mg Oral Daily    flecainide  50 mg Oral BID    folic acid  1 mg Oral Daily    ascorbic acid  500 mg Oral Daily    gabapentin  100 mg Oral BID    [START ON 3/2/2021] leucovorin calcium  25 mg Oral Weekly    levothyroxine  100 mcg Oral Daily    lidocaine  1 patch Transdermal Daily    [START ON 3/1/2021] methotrexate  5 mg Oral Weekly    metoprolol tartrate  25 mg Oral BID    nystatin  5 mL Oral 4x Daily    pantoprazole  40 mg Oral BID AC    rivaroxaban  15 mg Oral Daily with breakfast    thiamine  100 mg Oral Daily    vitamin B-12  1,000 mcg Oral Daily    vitamin D  2,000 Units Oral Daily    zinc sulfate  50 mg Oral Daily    sodium chloride flush  10 mL Intravenous 2 times per day    famotidine  20 mg Oral Nightly    valsartan  160 mg Oral Daily    [START ON 3/1/2021] methotrexate  10 mg Oral Weekly    cefepime  1,000 mg Intravenous Q12H     Continuous Infusions:   sodium chloride 12.5 mL/hr at 02/26/21 0430     PRN Meds:albuterol sulfate HFA, sodium chloride flush, polyethylene glycol, acetaminophen **OR** acetaminophen, perflutren lipid microspheres  Prior to Admission medications    Medication Sig Start Date End Date Taking?  Authorizing Provider   loteprednol (LOTEMAX) 0.5 % ophthalmic suspension  1/20/21 Historical Provider, MD   albuterol sulfate HFA (VENTOLIN HFA) 108 (90 Base) MCG/ACT inhaler Inhale 2 puffs into the lungs 4 times daily as needed for Wheezing 2/17/21   [facility] DO Prasanth   DULoxetine HCl 40 MG CPEP Take 40 mg by mouth daily 2/17/21   [facility] DO Prasanth   nystatin (MYCOSTATIN) 376494 UNIT/ML suspension Take 5 mLs by mouth 4 times daily for 10 days 2/16/21 2/26/21  Tye Knight, DO   budesonide-formoterol [facility]) 160-4.5 MCG/ACT AERO Inhale 2 puffs into the lungs 2 times daily 2/16/21   [facility], DO   lidocaine 4 % external patch Place 1 patch onto the skin daily 2/17/21   [facility], DO   folic acid (FOLVITE) 1 MG tablet Take 1 tablet by mouth daily 2/17/21   [facility], DO   zinc sulfate (ZINCATE) 50 MG CAPS capsule Take 1 capsule by mouth daily 2/17/21   [facility], DO   ascorbic acid (VITAMIN C) 500 MG tablet Take 1 tablet by mouth daily 2/17/21   [facility], DO   thiamine 100 MG tablet Take 1 tablet by mouth daily 2/17/21   [facility], DO   Vitamin D (CHOLECALCIFEROL) 50 MCG (2000 UT) TABS tablet Take 1 tablet by mouth daily 2/17/21   [facility] Prasanth,    metoprolol tartrate (LOPRESSOR) 25 MG tablet TAKE 1 TABLET TWICE A DAY 1/4/21   Dana De La Cruz MD   flecainide (TAMBOCOR) 50 MG tablet Take 1 tablet by mouth 2 times daily 11/23/20   Dana De La Cruz MD   rivaroxaban (XARELTO) 15 MG TABS tablet Take 1 tablet by mouth daily (with breakfast) 11/23/20   Dana De La Cruz MD   valsartan-hydroCHLOROthiazide (DIOVAN-HCT) 160-25 MG per tablet Take 1 tablet by mouth daily 10/28/20   Kylie Montenegro MD   gabapentin (NEURONTIN) 100 MG capsule Take 1 capsule by mouth 2 times daily for 90 days.  10/8/20 2/23/21  Kylie Montenegro MD   celecoxib (CELEBREX) 200 MG capsule TAKE 1 CAPSULE DAILY 9/17/20   Kylie Montenegro MD   ferrous sulfate (IRON 325) 325 (65 Fe) MG tablet Take 1 tablet by mouth every other day 9/17/20 Ebony Raines MD   levothyroxine (LEVOTHROID) 100 MCG tablet Take 1 tablet by mouth Daily 20   Ebony Raines MD   docusate sodium (COLACE, DULCOLAX) 100 MG CAPS Take 100 mg by mouth daily 20   Dmoingo Rizzo MD   vitamin B-12 (CYANOCOBALAMIN) 1000 MCG tablet Take 1,000 mcg by mouth daily    Historical Provider, MD   omeprazole (PRILOSEC) 40 MG delayed release capsule Take 1 capsule by mouth 2 times daily 20   Ebony Raines MD   famotidine (PEPCID) 40 MG tablet Take 1 tablet by mouth nightly 3/31/20   Ebony Raines MD   [facility]ansetron [facility]) 4 MG tablet Take 1 tablet by mouth every 8 hours as needed for Nausea or Vomiting 19   Ebony Raines MD   polyethyl glycol-propyl glycol 0.4-0.3 % (SYSTANE) 0.4-0.3 % ophthalmic solution 1 drop 2 times daily as needed for Dry Eyes     Historical Provider, MD   fluticasone (FLONASE) 50 MCG/ACT nasal spray 2 sprays by Nasal route daily 2 sprays in each nostril daily 18   Ebony Raines MD   Probiotic Product (ACIDOPHILUS PROBIOTIC) CAPS capsule Take 1 capsule by mouth daily    Historical Provider, MD   fexofenadine (ALLEGRA) 180 MG tablet Take 180 mg by mouth daily. Historical Provider, MD   leucovorin calcium (WELLCOVORIN) 5 MG tablet Take 25 mg by mouth once a week Takes every 13   Historical Provider, MD   methotrexate 2.5 MG tablet Take by mouth once a week 2 tabs at lunch and 4 tabs  in the pm once a week on Monday    Historical Provider, MD       OBJECTIVE:  BP (!) 110/57   Pulse 80   Temp 97.1 °F (36.2 °C) (Axillary)   Resp 20   Ht 5' (1.524 m)   Wt 124 lb 9 oz (56.5 kg)   SpO2 94%   BMI 24.33 kg/m²   Temp  Av.9 °F (36.1 °C)  Min: 96 °F (35.6 °C)  Max: 98.6 °F (37 °C)  General appearance: Resting in bed in no apparent distress. Skin: Warm and dry. No rashes were noted. HEENT: Multiple aphthous ulcers noted on soft palate  Neck: Supple to movements.    Chest: Diminished bilaterally with few fine inspiratory rales. Cardiovascular: Regular rate and rhythm. No murmurs gallops, or rubs appreciated. Abdomen: Bowel sounds present, nontender, nondistended, no masses or hepatosplenomegaly. Extremities: No clubbing, no cyanosis, no edema. Lines: peripheral  Neuro: No significant sensory or motor deficits noted    I/O last 3 completed shifts: In: 682.7 [P.O.:600; I.V.:10; IV Piggyback:72.7]  Out: -       Laboratory and Tests Review:      Lab Results   Component Value Date    WBC 16.7 (H) 02/26/2021    WBC 13.6 (H) 02/25/2021    WBC 15.7 (H) 02/24/2021    HGB 8.8 (L) 02/26/2021    HCT 28.4 (L) 02/26/2021    .9 (H) 02/26/2021     02/26/2021     Lab Results   Component Value Date    NEUTROABS 14.53 (H) 02/26/2021    NEUTROABS 12.35 (H) 02/25/2021    NEUTROABS 13.50 (H) 02/24/2021     No results found for: [facility]  Lab Results   Component Value Date    ALT 14 02/26/2021    AST 37 (H) 02/26/2021    ALKPHOS 120 (H) 02/26/2021    BILITOT 0.4 02/26/2021     Lab Results   Component Value Date     02/26/2021    K 4.9 02/26/2021    CL 96 02/26/2021    CO2 29 02/26/2021    BUN 30 02/26/2021    CREATININE 0.9 02/26/2021    CREATININE 0.9 02/25/2021    CREATININE 0.8 02/25/2021    GFRAA >60 02/26/2021    LABGLOM 59 02/26/2021    GLUCOSE 98 02/26/2021    GLUCOSE 79 04/24/2012    PROT 5.9 02/26/2021    LABALBU 2.5 02/26/2021    LABALBU 4.0 04/24/2012    CALCIUM 8.3 02/26/2021    BILITOT 0.4 02/26/2021    ALKPHOS 120 02/26/2021    AST 37 02/26/2021    ALT 14 02/26/2021     Lab Results   Component Value Date    CRP 9.3 (H) 02/26/2021    CRP 33.2 (H) 02/24/2021    CRP 0.8 (H) 02/16/2021     Lab Results   Component Value Date    SEDRATE 33 (H) 02/16/2021    SEDRATE 29 (H) 09/10/2020    SEDRATE 17 10/22/2019       Radiology:    XR CHEST PORTABLE   Final Result   No interval change      CTA PULMONARY W CONTRAST   Final Result   No convincing evidence of a pulmonary embolism.    Significant progression in the ground-glass opacities scattered throughout   both lungs when compared with February 10, 2021. Findings are suspicious for   multifocal pneumonia. Stable calcified mass in the central canal of the thoracic spine. Distended esophagus filled with undigested material, placing the patient at   risk for aspiration. Additional findings as described. XR CHEST PORTABLE   Final Result   Grossly unchanged patchy bilateral airspace opacities compared with February 14 2021. Findings may be on the basis of multifocal pneumonia. Microbiology:   Lab Results   Component Value Date    BC 24 Hours no growth 02/24/2021    ORG Proteus mirabilis 02/15/2021    ORG Micrococcus 09/30/2020    ORG Staphylococcus aureus 09/09/2020    ORG Staphylococcus coagulase-negative 09/09/2020     Lab Results   Component Value Date    BLOODCULT2 24 Hours no growth 02/24/2021    ORG Proteus mirabilis 02/15/2021    ORG Micrococcus 09/30/2020    ORG Staphylococcus aureus 09/09/2020    ORG Staphylococcus coagulase-negative 09/09/2020     WOUND/ABSCESS   Date Value Ref Range Status   09/30/2020   Final    Heavy growth  Validated susceptibility testing methods do not exist for  this isolate. 09/09/2020   Final    Light growth  Methicillin resistant Staph aureus isolated. Most Methicillin  resistant Staphylococcus are usually resistant to multiple  antibiotics including other B-Lactams, Aminoglycosides,  Macrolides, Clindamycin and Tetracycline. Contact isolation  is indicated. This isolate is presumed to be resistant based on the  detection of inducible Clindamycin resistance. Clindamycin  may still be effective in some patients.      09/09/2020 One colony  Final     No results found for: RESPSMEAR  No results found for: MPNEUMO, CLAMYDCU, LABLEGI, AFBCX, FUNGSM, LABFUNG  No results found for: CULTRESP  No results found for: CXCATHTIP  No results found for: BFCS  No results found for: CXSURG  Urine Culture, Routine   Date Value Ref Range Status   02/24/2021 <10,000 CFU/mL  Gram positive organism    Final   02/15/2021 >100,000 CFU/ml  Final   06/04/2019 <10,000 CFU/mL  Gram negative rods    Final     No results found for: [facility]    Problem list:    Principal Problem:    Acute on chronic respiratory failure with hypoxia ([address]  Active Problems:    Hypothyroidism    Rheumatoid arthritis (HCC)    Hyponatremia    Anemia    Multiple closed fractures of ribs of both sides with routine healing    A-fib (HCC)    Essential hypertension    HAP (hospital-acquired pneumonia)    COVID-19    Sore throat    Depression    Oral yeast infection  Resolved Problems:    * No resolved hospital problems.  *      ASSESSMENT:    · Acute on chronic hypoxic respiratory failure  · Oral aphthous stomatitis  · Recent COVID-19 viral pneumonia  · Healthcare associated pneumonia, post Covid  · Possible diastolic heart failure with elevated proBNP  · Elevated CRP, increased from 0.8 to 33.2 in 1 week  · Multiple rib fractures  · Rheumatoid arthritis  · Age-related physical debility  · Paroxysmal atrial fibrillation  · Depression  · Persistent leukocytosis    PLAN:      · Decadron discontinued  · Trial of diuretic therapy as per pulmonary  · Continue oxygen supplementation  · Add Kenalog with [employer]  · No respiratory culture obtained  · Monitor labs  · Discussed with pulmonary, will stop cefepime  · Will follow with you      Zoe Escalona    10:19 AM  2/26/2021

## 2021-02-26 NOTE — PROGRESS NOTES
[facility]  Progress Note  CC: shortness of breath    Subjective:   Ms. Denis Garcia states that she doesn't feel well this morning but is unable to elaborate. Denies shortness of breath and wheezing. Denies fevers and chills. Denies chest pain and palpitations. Denies abdominal pain and N/V. Past medical, surgical, family and social history were reviewed, non-contributory, and unchanged unless otherwise stated. Objective:    BP (!) 140/71   Pulse 77   Temp 96.3 °F (35.7 °C) (Axillary)   Resp 20   Ht 5' (1.524 m)   Wt 124 lb 9 oz (56.5 kg)   SpO2 94%   BMI 24.33 kg/m²     General: In no acute distress. Sitting up in bed. Heart:  RRR. No murmurs, gallops, or rubs. Lungs:  Decreased breath sounds. No rales, rhonci, or wheezing. On 8 L O2. Abd: Bowel sounds present. Nontender and nondistended. No rebound or guarding. Extrem:  No clubbing or cyanosis. Trace pitting edema bilaterally.        CBC with Differential:    Lab Results   Component Value Date    WBC 16.7 02/26/2021    RBC 2.76 02/26/2021    HGB 8.8 02/26/2021    HCT 28.4 02/26/2021     02/26/2021    .9 02/26/2021    MCH 31.9 02/26/2021    MCHC 31.0 02/26/2021    RDW 14.8 02/26/2021    NRBC 0.0 02/16/2021    SEGSPCT 66 03/26/2013    METASPCT 0.9 02/16/2021    LYMPHOPCT 4.4 02/26/2021    MONOPCT 7.0 02/26/2021    MYELOPCT 0.9 06/16/2020    BASOPCT 0.1 02/26/2021    MONOSABS 1.16 02/26/2021    LYMPHSABS 0.73 02/26/2021    EOSABS 0.04 02/26/2021    BASOSABS 0.02 02/26/2021     CMP:    Lab Results   Component Value Date     02/26/2021    K 4.9 02/26/2021    CL 96 02/26/2021    CO2 29 02/26/2021    BUN 30 02/26/2021    CREATININE 0.9 02/26/2021    GFRAA >60 02/26/2021    LABGLOM 59 02/26/2021    GLUCOSE 98 02/26/2021    GLUCOSE 79 04/24/2012    PROT 5.9 02/26/2021    LABALBU 2.5 02/26/2021    LABALBU 4.0 04/24/2012    CALCIUM 8.3 02/26/2021    BILITOT 0.4 02/26/2021    ALKPHOS 120 02/26/2021 AST 37 02/26/2021    ALT 14 02/26/2021        Assessment:  Active Hospital Problems    Diagnosis Date Noted    Acute on chronic respiratory failure with hypoxia ([facility] [address] [J96.21] 02/24/2021    Oral yeast infection [B37.0]     Depression [F32.9] 02/12/2021    Sore throat [J02.9] 02/09/2021    COVID-19 [U07.1] 02/08/2021    HAP (hospital-acquired pneumonia) [J18.9, Y95] 11/13/2018    Essential hypertension [I10] 05/23/2016    Multiple closed fractures of ribs of both sides with routine healing [S22.43XD] 12/16/2014    Anemia [D64.9] 07/26/2014    Hyponatremia [E87.1] 03/25/2014    Hypothyroidism [E03.9] 06/10/2011    Rheumatoid arthritis ([facility] [address] [M06.9] 06/10/2011    [facility]fib ([facility] [I48.91] 06/09/2011       Plan:  Acute on chronic respiratory failure with hypoxia secondary to recent COVID-19 infection, possible new HAP  Received 2000 mg dose of cefepime x 1 and IV 1250 mg dose of vancomycin x 1 in ED. Received IV dose of Decadron 6 mg x 1 in ED.  - Droplet plus isolation   - Telemetry   - Respiratory panel negative   - On cefepime 1000 mg q 12 hours for possible HAP. Day 3.    - On vitamin C, thiamine, vitamin D, and zinc QD   - Albuterol sulfate inhaler 2 puffs QID PRN and Symbicort inhaler 2 puffs BID   - SW consulted for discharge planning   - Dietary consulted for nutrition   - PT/OT   - Strict I/Os daily. Net +732.7.   - Pulmonology following. Does NOT think patient has HAP, but instead, HF causing volume overload. Recommending to stop antibiotics as procalcitonin is 0.28. Attributing leukocytosis to steroids. - Echocardiogram showed EF= 65%, indeterminate diastolic dysfunction, mild mitral regurgitation, and mild aortic regurgitation.  - On PO Lasix 40 mg QD.   - ID following. Does think patient has possible HAP. Advising to continue cefepime until cultures result.      Volume overload with hx of HFpEF  - Strict I/Os daily. Net +732.7.   - Pulmonology following.  Does NOT think patient has HAP, but instead, HF causing volume overload. Recommending to stop antibiotics as procalcitonin is 0.28. Attributing leukocytosis to steroids. - Echocardiogram showed EF= 65%, indeterminate diastolic dysfunction, mild mitral regurgitation, and mild aortic regurgitation.  - On PO Lasix 40 mg QD. Hyponatremia  - Resolved    HTN   - Stopped home HCTZ 25 mg QD on 02/25 due to hyponatremia (now resolved) and concurrent use of PO Lasix 40 mg QD  - Continue home valsartan 160 mg QD      Macrocytic anemia   Seen by Hem/ Onc during last admission for macrocytic anemia and thrombocytosis. Anemia thought to be due to methotrexate use. Needed follow-up with Dr. Maia Farrar as an outpatient in 1-2 weeks after last discharge.    - Continue home folic acid 1 mg QD and vitamin B12 1000 mcg QD   - Continue home ferrous sulfate 325 mg every other day     Atrial fibrillation  - Continue home flecainide 50 mg BID   - Continue home Lopressor 25 mg BID      Hypothyroidism   - Continue home Synthroid 100 mcg QD      RA  - Continue home Celebrex 200 mg QD   - Continue home Wellcovorin 25 mg tablet weekly (on Tuesdays)  - Continue home methotrexate weekly on Mondays ( 5 mg at lunch and 10 mg in evening)     Multiple closed fractures of both sides of ribs   - Lidocaine patch q 12 hours      Oral yeast infection   Treated by ID during last admission   - Continue Nystatin suspension QID for 3 more days to complete 10 day course      Depression   - Continue home Cymbalta 40 mg QD      Continue additional home medications:  - Colace 100 mg QD  - Pepcid 20 mg HS   - Pantoprazole 40 mg BID   - Gabapentin 100 mg BID   - Substitute home Allegra 180 mg QD with Zyrtec 10 mg QD      Code: Full  Diet: General   DVT prophylaxis: Xarelto 15 mg QD (due to atrial fibrillation)         Electronically signed by Anu Streeter DO on 2/26/2021 at 7:33 AM

## 2021-02-27 LAB
ALBUMIN SERPL-MCNC: 2.9 G/DL (ref 3.5–5.2)
ALP BLD-CCNC: 129 U/L (ref 35–104)
ALT SERPL-CCNC: 16 U/L (ref 0–32)
ANION GAP SERPL CALCULATED.3IONS-SCNC: 10 MMOL/L (ref 7–16)
APTT: 26.8 SEC (ref 24.5–35.1)
AST SERPL-CCNC: 23 U/L (ref 0–31)
BASOPHILS ABSOLUTE: 0.01 E9/L (ref 0–0.2)
BASOPHILS RELATIVE PERCENT: 0.1 % (ref 0–2)
BILIRUB SERPL-MCNC: 0.4 MG/DL (ref 0–1.2)
BUN BLDV-MCNC: 31 MG/DL (ref 8–23)
CALCIUM SERPL-MCNC: 8.7 MG/DL (ref 8.6–10.2)
CHLORIDE BLD-SCNC: 94 MMOL/L (ref 98–107)
CO2: 29 MMOL/L (ref 22–29)
CREAT SERPL-MCNC: 1 MG/DL (ref 0.5–1)
D DIMER: 645 NG/ML DDU
EOSINOPHILS ABSOLUTE: 0 E9/L (ref 0.05–0.5)
EOSINOPHILS RELATIVE PERCENT: 0 % (ref 0–6)
FERRITIN: 399 NG/ML
FIBRINOGEN: >700 MG/DL (ref 225–540)
GFR AFRICAN AMERICAN: >60
GFR NON-AFRICAN AMERICAN: 53 ML/MIN/1.73
GLUCOSE BLD-MCNC: 136 MG/DL (ref 74–99)
HCT VFR BLD CALC: 29.1 % (ref 34–48)
HEMOGLOBIN: 9 G/DL (ref 11.5–15.5)
IMMATURE GRANULOCYTES #: 0.15 E9/L
IMMATURE GRANULOCYTES %: 1.4 % (ref 0–5)
INR BLD: 1.5
LYMPHOCYTES ABSOLUTE: 0.32 E9/L (ref 1.5–4)
LYMPHOCYTES RELATIVE PERCENT: 2.9 % (ref 20–42)
MCH RBC QN AUTO: 31.1 PG (ref 26–35)
MCHC RBC AUTO-ENTMCNC: 30.9 % (ref 32–34.5)
MCV RBC AUTO: 100.7 FL (ref 80–99.9)
MONOCYTES ABSOLUTE: 0.24 E9/L (ref 0.1–0.95)
MONOCYTES RELATIVE PERCENT: 2.2 % (ref 2–12)
NEUTROPHILS ABSOLUTE: 10.36 E9/L (ref 1.8–7.3)
NEUTROPHILS RELATIVE PERCENT: 93.4 % (ref 43–80)
OVALOCYTES: ABNORMAL
PDW BLD-RTO: 14.6 FL (ref 11.5–15)
PLATELET # BLD: 348 E9/L (ref 130–450)
PMV BLD AUTO: 9.2 FL (ref 7–12)
POIKILOCYTES: ABNORMAL
POLYCHROMASIA: ABNORMAL
POTASSIUM REFLEX MAGNESIUM: 4 MMOL/L (ref 3.5–5)
PROTHROMBIN TIME: 17.8 SEC (ref 9.3–12.4)
RBC # BLD: 2.89 E12/L (ref 3.5–5.5)
SODIUM BLD-SCNC: 133 MMOL/L (ref 132–146)
TOTAL PROTEIN: 6.2 G/DL (ref 6.4–8.3)
WBC # BLD: 11.1 E9/L (ref 4.5–11.5)

## 2021-02-27 PROCEDURE — 85610 PROTHROMBIN TIME: CPT

## 2021-02-27 PROCEDURE — 6370000000 HC RX 637 (ALT 250 FOR IP): Performed by: STUDENT IN AN ORGANIZED HEALTH CARE EDUCATION/TRAINING PROGRAM

## 2021-02-27 PROCEDURE — 6370000000 HC RX 637 (ALT 250 FOR IP): Performed by: NURSE PRACTITIONER

## 2021-02-27 PROCEDURE — 87449 NOS EACH ORGANISM AG IA: CPT

## 2021-02-27 PROCEDURE — 2700000000 HC OXYGEN THERAPY PER DAY

## 2021-02-27 PROCEDURE — 2580000003 HC RX 258: Performed by: FAMILY MEDICINE

## 2021-02-27 PROCEDURE — 6360000002 HC RX W HCPCS: Performed by: INTERNAL MEDICINE

## 2021-02-27 PROCEDURE — 82728 ASSAY OF FERRITIN: CPT

## 2021-02-27 PROCEDURE — 36415 COLL VENOUS BLD VENIPUNCTURE: CPT

## 2021-02-27 PROCEDURE — 85384 FIBRINOGEN ACTIVITY: CPT

## 2021-02-27 PROCEDURE — 2060000000 HC ICU INTERMEDIATE R&B

## 2021-02-27 PROCEDURE — 80053 COMPREHEN METABOLIC PANEL: CPT

## 2021-02-27 PROCEDURE — 94640 AIRWAY INHALATION TREATMENT: CPT

## 2021-02-27 PROCEDURE — APPSS60 APP SPLIT SHARED TIME 46-60 MINUTES: Performed by: NURSE PRACTITIONER

## 2021-02-27 PROCEDURE — 85730 THROMBOPLASTIN TIME PARTIAL: CPT

## 2021-02-27 PROCEDURE — 2580000003 HC RX 258

## 2021-02-27 PROCEDURE — 85378 FIBRIN DEGRADE SEMIQUANT: CPT

## 2021-02-27 PROCEDURE — 99223 1ST HOSP IP/OBS HIGH 75: CPT | Performed by: NURSE PRACTITIONER

## 2021-02-27 PROCEDURE — 6370000000 HC RX 637 (ALT 250 FOR IP): Performed by: FAMILY MEDICINE

## 2021-02-27 PROCEDURE — 85025 COMPLETE CBC W/AUTO DIFF WBC: CPT

## 2021-02-27 PROCEDURE — 99223 1ST HOSP IP/OBS HIGH 75: CPT | Performed by: INTERNAL MEDICINE

## 2021-02-27 PROCEDURE — 99232 SBSQ HOSP IP/OBS MODERATE 35: CPT | Performed by: FAMILY MEDICINE

## 2021-02-27 RX ORDER — POLYETHYLENE GLYCOL 3350 17 G/17G
17 POWDER, FOR SOLUTION ORAL ONCE
Status: COMPLETED | OUTPATIENT
Start: 2021-02-27 | End: 2021-02-27

## 2021-02-27 RX ADMIN — WATER 1 ML: 1 INJECTION INTRAMUSCULAR; INTRAVENOUS; SUBCUTANEOUS at 18:06

## 2021-02-27 RX ADMIN — TRIAMCINOLONE ACETONIDE: 1 PASTE DENTAL at 21:17

## 2021-02-27 RX ADMIN — ZINC SULFATE 220 MG (50 MG) CAPSULE 50 MG: CAPSULE at 08:40

## 2021-02-27 RX ADMIN — BUDESONIDE AND FORMOTEROL FUMARATE DIHYDRATE 2 PUFF: 160; 4.5 AEROSOL RESPIRATORY (INHALATION) at 20:12

## 2021-02-27 RX ADMIN — DULOXETINE HYDROCHLORIDE 40 MG: 60 CAPSULE, DELAYED RELEASE ORAL at 08:39

## 2021-02-27 RX ADMIN — TRIAMCINOLONE ACETONIDE: 1 PASTE DENTAL at 08:40

## 2021-02-27 RX ADMIN — FUROSEMIDE 40 MG: 40 TABLET ORAL at 08:39

## 2021-02-27 RX ADMIN — VALSARTAN 160 MG: 160 TABLET, FILM COATED ORAL at 08:39

## 2021-02-27 RX ADMIN — GABAPENTIN 100 MG: 100 CAPSULE ORAL at 21:16

## 2021-02-27 RX ADMIN — SODIUM CHLORIDE, PRESERVATIVE FREE 10 ML: 5 INJECTION INTRAVENOUS at 21:17

## 2021-02-27 RX ADMIN — SODIUM CHLORIDE, PRESERVATIVE FREE 10 ML: 5 INJECTION INTRAVENOUS at 08:41

## 2021-02-27 RX ADMIN — PANTOPRAZOLE SODIUM 40 MG: 40 TABLET, DELAYED RELEASE ORAL at 05:33

## 2021-02-27 RX ADMIN — FLECAINIDE ACETATE 50 MG: 50 TABLET ORAL at 21:16

## 2021-02-27 RX ADMIN — PANTOPRAZOLE SODIUM 40 MG: 40 TABLET, DELAYED RELEASE ORAL at 16:35

## 2021-02-27 RX ADMIN — DOCUSATE SODIUM 100 MG: 100 CAPSULE, LIQUID FILLED ORAL at 08:40

## 2021-02-27 RX ADMIN — BUDESONIDE AND FORMOTEROL FUMARATE DIHYDRATE 2 PUFF: 160; 4.5 AEROSOL RESPIRATORY (INHALATION) at 07:59

## 2021-02-27 RX ADMIN — NYSTATIN 500000 UNITS: 500000 SUSPENSION ORAL at 16:35

## 2021-02-27 RX ADMIN — RIVAROXABAN 15 MG: 15 TABLET, FILM COATED ORAL at 08:40

## 2021-02-27 RX ADMIN — GABAPENTIN 100 MG: 100 CAPSULE ORAL at 08:39

## 2021-02-27 RX ADMIN — METHYLPREDNISOLONE SODIUM SUCCINATE 40 MG: 40 INJECTION, POWDER, LYOPHILIZED, FOR SOLUTION INTRAMUSCULAR; INTRAVENOUS at 11:04

## 2021-02-27 RX ADMIN — METOPROLOL TARTRATE 25 MG: 25 TABLET, FILM COATED ORAL at 21:16

## 2021-02-27 RX ADMIN — FOLIC ACID 1 MG: 1 TABLET ORAL at 08:40

## 2021-02-27 RX ADMIN — CETIRIZINE HYDROCHLORIDE 10 MG: 10 TABLET, FILM COATED ORAL at 08:39

## 2021-02-27 RX ADMIN — POLYETHYLENE GLYCOL 3350 17 G: 17 POWDER, FOR SOLUTION ORAL at 09:56

## 2021-02-27 RX ADMIN — METHYLPREDNISOLONE SODIUM SUCCINATE 40 MG: 40 INJECTION, POWDER, LYOPHILIZED, FOR SOLUTION INTRAMUSCULAR; INTRAVENOUS at 18:06

## 2021-02-27 RX ADMIN — LEVOTHYROXINE SODIUM 100 MCG: 100 TABLET ORAL at 05:33

## 2021-02-27 RX ADMIN — METOPROLOL TARTRATE 25 MG: 25 TABLET, FILM COATED ORAL at 08:39

## 2021-02-27 RX ADMIN — SODIUM CHLORIDE, PRESERVATIVE FREE 10 ML: 5 INJECTION INTRAVENOUS at 18:06

## 2021-02-27 RX ADMIN — Medication 2000 UNITS: at 08:42

## 2021-02-27 RX ADMIN — NYSTATIN 500000 UNITS: 500000 SUSPENSION ORAL at 08:39

## 2021-02-27 RX ADMIN — FLECAINIDE ACETATE 50 MG: 50 TABLET ORAL at 08:40

## 2021-02-27 RX ADMIN — OXYCODONE HYDROCHLORIDE AND ACETAMINOPHEN 500 MG: 500 TABLET ORAL at 08:39

## 2021-02-27 RX ADMIN — CYANOCOBALAMIN TAB 1000 MCG 1000 MCG: 1000 TAB at 08:39

## 2021-02-27 RX ADMIN — NYSTATIN 500000 UNITS: 500000 SUSPENSION ORAL at 21:16

## 2021-02-27 RX ADMIN — WATER 1 ML: 1 INJECTION INTRAMUSCULAR; INTRAVENOUS; SUBCUTANEOUS at 11:04

## 2021-02-27 RX ADMIN — CELECOXIB 200 MG: 100 CAPSULE ORAL at 08:39

## 2021-02-27 RX ADMIN — TRIAMCINOLONE ACETONIDE: 1 PASTE DENTAL at 13:12

## 2021-02-27 RX ADMIN — Medication 100 MG: at 08:39

## 2021-02-27 RX ADMIN — FAMOTIDINE 20 MG: 20 TABLET ORAL at 21:16

## 2021-02-27 RX ADMIN — METHYLPREDNISOLONE SODIUM SUCCINATE 40 MG: 40 INJECTION, POWDER, LYOPHILIZED, FOR SOLUTION INTRAMUSCULAR; INTRAVENOUS at 03:30

## 2021-02-27 ASSESSMENT — PAIN SCALES - GENERAL
PAINLEVEL_OUTOF10: 0

## 2021-02-27 NOTE — CONSULTS
Inpatient Cardiology Consultation      Reason for Consult:  Volume Overload    Consulting Physician: Dr. Arsalan Calle    Requesting Physician:  Dr. Ortega Bhagat    Date of Consultation: 2/27/2021    HISTORY OF PRESENT ILLNESS:   Ms. Che Bee is a 27-year-old  female who is known to Dr. Sung Alegria since 3/2019 (previously followed by Dr. Bell Noonan) and Dr Kenneth Nicole (). Patient was most recently seen in outpatient on 11/25/2020 with Dr. Sung Alegria for follow-up of chest pain and PAF. EKG: Sinus rhythm. Patient had no further complaints of chest pain or shortness of breath. She was continued on same home medications. Recent Admission:   2/7/2021-2/16/2021  Patient was admitted with acute hypoxic respiratory failure secondary to Covid-19 infection. CTA showed no evidence of PE which showed diffuse groundglass opacities. She was maintained on oxygen and was treated with remdesivir and Decadron. She was discharged to home with oxygen on 2/16/2021. During admission hematology was consulted for worsening macrocytic anemia and thrombocytosis --thought to be contributed to her methotrexate use for rheumatoid arthritis. Her thrombocytosis was deemed reactive secondary to increased inflammation from acute infection. She was started on folic acid blood smear was ordered and was scheduled to follow with Dr. Olga Laguna in outpatient. Discharged on Lopressor 25 mg twice daily, flecainide 50 mg twice daily, Xarelto 15 mg daily, Diovan daily. Patient was discharged to home, living with her son on the first floor and uses a wheelchair. She is able to take a \"few steps\" but has had generalized weakness and fatigue since she was discharged. [facility] on [facility]/24/2021 with complaints of worsening shortness of breath at home despite using home oxygen 2 L nasal cannula. Her shortness of breath was worsened with ambulation but continued to have it at rest.  Denies any chest pain, palpitations or feeling of her heart racing.   She 4. Palpitations with negative ambulatory monitor, . 5. Exercise MPS, 2003. 8 METS, EF 68%, normal perfusion. 6. Echo, 2004. Sclerotic mildly insufficient AV, mild MR, TR and PI. RVSP 29, normal systolic and diastolic function. 7. Carotid US, 2004. Minimal LICA plaque with <11% narrowing, normal SHAUNA, normal vertebral flow bilaterally.    8. Family history positive. Mother had MI at age 79 and  at 80 of CHF. Sister requiring MVR for MR and prolapse. 9. Lifetime nonsmoker. 10. No known drug allergies. 11. Event monitor, 2006. Short run of nonsustained SVT at 150 bpm.    12. Echo, 2006. Normal EF, no LVH, normal diastolic function, E/E 12, mild MR, RVSP 43, mild dilatation of the right heart chambers. 13. Ambulatory monitor demonstrated nonsustained SVT at 150 bpm, correlating with symptoms, metoprolol dose doubled to 50 mg bid, 2007. 14. Bilateral knee replacements 2007, Dr. Espinoza Howell. Well tolerated. 15. Dipyridamole stress MPS, 11/10/2008. No TID, normal perfusion, mild soft tissue attenuation, EF 89%. No ischemia. Low risk/low probability. 16. Echo, 11/10/2008. Stage I diastolic dysfunction, borderline/mild LAE. EF normal. Mild-moderate AR. Mild PHTN with RVSP 43.    17. Large hiatal hernia requiring Nissen fundoplication, Dema, 2011.    18. Rheumatoid arthritis documented 2010. Patient being treated with methotrexate. 19. Echo, 2011. Normal LV size, wall thickening and EF. Stage I diastolic dysfunction. Normal LV filling pressures. Mild ANDREAS. MAC with mild MR, mild-moderate TR, moderately elevated RVSP 50-55 mmHg.    20. Lexiscan MPS, 2011. Normal. No TID. Wall motion normal, EF above 70%.    21. [facility] admission, 2011 with anorexia, nausea, weight loss, dyspnea. EKG sinus rhythm, borderline low voltage, minimal nonspecific ST-T abnormalities.  , BMP normal. Hb 10, WBC and platelet count normal. T4 elevated at 13, free T4 diastolic relaxation abnormalities, mild left atrial enlargement, normal mitral valve structure and function, mild tricuspid insufficiency with mildly elevated right ventricular systolic pressure at 40 mmHg.  Aortic sclerosis without stenosis but with mild aortic insufficiency. 35. Chronic HFpEF  36. Negative stress test in 2/2019: No evidence of stress-induced left ventricular myocardial ischemia, LVEF 95%. 37. Evaluated (2/15/2021) by Hematology for Elevation in her tumor markers CEA and Ca-125 as well as macrocytosis. Her work up was ultimately negative and these abnormalities were likely related to the methotrexate that she was being treated with for her rheumatoid arthritis. She was also mildly anemic related to her CKD being monitored for the need of EPO injections. Peripheral smear pending --scheduled to follow-up in outpatient. 38. TTE: 2/25/2021: LVEF 65%, no wall motion abnormality, indeterminate diastolic function, normal RV function, moderate mitral annular calcification, mild MR, mild AR, fat pad versus small effusion cannot be ruled out. Medications Prior to admit:  Prior to Admission medications    Medication Sig Start Date End Date Taking?  Authorizing Provider   loteprednol (LOTEMAX) 0.5 % ophthalmic suspension  1/20/21   Historical Provider, MD   albuterol sulfate HFA (VENTOLIN HFA) 108 (90 Base) MCG/ACT inhaler Inhale 2 puffs into the lungs 4 times daily as needed for Wheezing 2/17/21   [facility] Prasanth, DO   DULoxetine HCl 40 MG CPEP Take 40 mg by mouth daily 2/17/21   [facility], DO   budesonide-formoterol (SYMBICORT) 160-4.5 MCG/ACT AERO Inhale 2 puffs into the lungs 2 times daily 2/16/21   [facility], DO   lidocaine 4 % external patch Place 1 patch onto the skin daily 2/17/21   [facility], DO   folic acid (FOLVITE) 1 MG tablet Take 1 tablet by mouth daily 2/17/21   [facility] Prasanth, DO   zinc sulfate (ZINCATE) 50 MG CAPS capsule Take 1 capsule by mouth daily 2/17/21   [facility] DO Prasanth   ascorbic acid (VITAMIN C) 500 MG tablet Take 1 tablet by mouth daily 2/17/21   [facility], DO   thiamine 100 MG tablet Take 1 tablet by mouth daily 2/17/21   [facility], DO   Vitamin D (CHOLECALCIFEROL) 50 MCG (2000 UT) TABS tablet Take 1 tablet by mouth daily 2/17/21   [facility] DO Prasanth   metoprolol tartrate (LOPRESSOR) 25 MG tablet TAKE 1 TABLET TWICE A DAY 1/4/21   Valeria Valdez MD   flecainide (TAMBOCOR) 50 MG tablet Take 1 tablet by mouth 2 times daily 11/23/20   Valeria Valdez MD   rivaroxaban (XARELTO) 15 MG TABS tablet Take 1 tablet by mouth daily (with breakfast) 11/23/20   Valeria Valdez MD   valsartan-hydroCHLOROthiazide (DIOVAN-HCT) 160-25 MG per tablet Take 1 tablet by mouth daily 10/28/20   Nelia Soriano MD   gabapentin (NEURONTIN) 100 MG capsule Take 1 capsule by mouth 2 times daily for 90 days.  10/8/20 2/23/21  Nelia Soriano MD   celecoxib (CELEBREX) 200 MG capsule TAKE 1 CAPSULE DAILY 9/17/20   Nelia Soriano MD   ferrous sulfate (IRON 325) 325 (65 Fe) MG tablet Take 1 tablet by mouth every other day 9/17/20   Nelia Soriano MD   levothyroxine (LEVOTHROID) 100 MCG tablet Take 1 tablet by mouth Daily 9/17/20   Nelia Soriano MD   docusate sodium (COLACE, DULCOLAX) 100 MG CAPS Take 100 mg by mouth daily 7/17/20   Teofilo Pham MD   vitamin B-12 (CYANOCOBALAMIN) 1000 MCG tablet Take 1,000 mcg by mouth daily    Taryn Talley MD   omeprazole (PRILOSEC) 40 MG delayed release capsule Take 1 capsule by mouth 2 times daily 4/16/20   Nelia Soriano MD   famotidine (PEPCID) 40 MG tablet Take 1 tablet by mouth nightly 3/31/20   Nelia Soriano MD   ondansetron [facility]) 4 MG tablet Take 1 tablet by mouth every 8 hours as needed for Nausea or Vomiting 7/23/19   Nelia Soriano MD   polyethyl glycol-propyl glycol 0.4-0.3 % (SYSTANE) 0.4-0.3 % ophthalmic solution 1 drop 2 times daily as needed for Dry Eyes     Historical Provider, MD   fluticasone (FLONASE) 50 MCG/ACT nasal spray 2 sprays by Nasal route daily 2 sprays in each nostril daily 7/19/18   Kylie Montenegro MD   Probiotic Product (ACIDOPHILUS PROBIOTIC) CAPS capsule Take 1 capsule by mouth daily    Historical Provider, MD   fexofenadine (ALLEGRA) 180 MG tablet Take 180 mg by mouth daily.     Historical Provider, MD   leucovorin calcium (WELLCOVORIN) 5 MG tablet Take 25 mg by mouth once a week Takes every Tuesday 7/19/13   Historical Provider, MD   methotrexate 2.5 MG tablet Take by mouth once a week 2 tabs at lunch and 4 tabs  in the pm once a week on Monday    Historical Provider, MD       Current Medications:    Current Facility-Administered Medications: triamcinolone acetonide (KENALOG) 0.1 % paste, , Mouth/Throat, TID  methylPREDNISolone sodium (SOLU-MEDROL) injection 40 mg, 40 mg, Intravenous, Q8H  furosemide (LASIX) tablet 40 mg, 40 mg, Oral, Daily  albuterol sulfate  (90 Base) MCG/ACT inhaler 2 puff, 2 puff, Inhalation, 4x Daily PRN  budesonide-formoterol (SYMBICORT) 160-4.5 MCG/ACT inhaler 2 puff, 2 puff, Inhalation, BID  celecoxib (CELEBREX) capsule 200 mg, 200 mg, Oral, Daily  docusate sodium (COLACE) capsule 100 mg, 100 mg, Oral, Daily  DULoxetine (CYMBALTA) extended release capsule 40 mg, 40 mg, Oral, Daily  ferrous sulfate (IRON 325) tablet 325 mg, 325 mg, Oral, Every Other Day  cetirizine (ZYRTEC) tablet 10 mg, 10 mg, Oral, Daily  flecainide (TAMBOCOR) tablet 50 mg, 50 mg, Oral, BID  folic acid (FOLVITE) tablet 1 mg, 1 mg, Oral, Daily  ascorbic acid (VITAMIN C) tablet 500 mg, 500 mg, Oral, Daily  gabapentin (NEURONTIN) capsule 100 mg, 100 mg, Oral, BID  [START ON 3/2/2021] leucovorin calcium (WELLCOVORIN) tablet 25 mg, 25 mg, Oral, Weekly  levothyroxine (SYNTHROID) tablet 100 mcg, 100 mcg, Oral, Daily  lidocaine 4 % external patch 1 patch, 1 patch, Transdermal, Daily  [START ON 3/1/2021] methotrexate (RHEUMATREX) chemo tablet 5 mg, 5 mg, Oral, Weekly  metoprolol tartrate (LOPRESSOR) tablet 25 mg, 25 mg, Oral, BID  pantoprazole (PROTONIX) tablet 40 mg, 40 mg, Oral, BID AC  rivaroxaban (XARELTO) tablet 15 mg, 15 mg, Oral, Daily with breakfast  thiamine tablet 100 mg, 100 mg, Oral, Daily  vitamin B-12 (CYANOCOBALAMIN) tablet 1,000 mcg, 1,000 mcg, Oral, Daily  vitamin D (CHOLECALCIFEROL) tablet 2,000 Units, 2,000 Units, Oral, Daily  zinc sulfate (ZINCATE) capsule 50 mg, 50 mg, Oral, Daily  sodium chloride flush 0.9 % injection 10 mL, 10 mL, Intravenous, 2 times per day  sodium chloride flush 0.9 % injection 10 mL, 10 mL, Intravenous, PRN  acetaminophen (TYLENOL) tablet 650 mg, 650 mg, Oral, Q6H PRN **OR** acetaminophen (TYLENOL) suppository 650 mg, 650 mg, Rectal, Q6H PRN  famotidine (PEPCID) tablet 20 mg, 20 mg, Oral, Nightly  valsartan (DIOVAN) tablet 160 mg, 160 mg, Oral, Daily **AND** [DISCONTINUED] hydroCHLOROthiazide (HYDRODIURIL) tablet 25 mg, 25 mg, Oral, Daily  [START ON 3/1/2021] methotrexate (RHEUMATREX) chemo tablet 10 mg, 10 mg, Oral, Weekly  perflutren lipid microspheres (DEFINITY) injection 1.65 mg, 1.5 mL, Intravenous, ONCE PRN    Allergies:  Amoxicillin (rash)    Social History:    Lifelong nonsmoker  Denies alcohol and illicit drug use. Currently lives with her son on the first floor. Uses a wheelchair to get around. She is able to take a few steps with a walker    Family History: Noncontributory due to advanced age. REVIEW OF SYSTEMS:     · Constitutional: +Fatigue. Denies fevers, chills or night sweats  · Eyes: Denies visual changes or drainage  · ENT: Denies headaches or hearing loss. No mouth sores. +sore throat. No epistaxis   · Cardiovascular: + See HPI. Denies chest pain, pressure or palpitations. + lower extremity swelling. · Respiratory: + MONTENEGRO, + moist cough, Denies orthopnea or PND. No hemoptysis   · Gastrointestinal: Denies hematemesis or anorexia.  No hematochezia or melena    · Genitourinary: Denies urgency, dysuria or hematuria. · Musculoskeletal: + gait disturbance (mostly uses a wheelchair / occasionally uses a walker), weakness. +chronic Bilateral knee pain. · Integumentary: Denies rash, hives or pruritis   · Neurological: Denies dizziness, headaches or seizures. No numbness or tingling  · Psychiatric: Denies anxiety or depression. · Endocrine: Denies temperature intolerance. No recent weight change. .  · Hematologic/Lymphatic: Denies abnormal bruising or bleeding. No swollen lymph nodes    PHYSICAL EXAM:   BP (!) 150/80   Pulse 85   Temp 97.8 °F (36.6 °C) (Axillary)   Resp 18   Ht 5' (1.524 m)   Wt 121 lb 8 oz (55.1 kg)   SpO2 90%   BMI 23.73 kg/m²   CONST:  Chronically ill appearing, Elderly female who appears of stated age. Awake, alert and cooperative. No apparent distress. HEENT:   Head- Normocephalic, atraumatic   Eyes- Conjunctivae pink, anicteric  Throat- Oral mucosa pink and moist  Neck-  No stridor, trachea midline, no jugular venous distention. No carotid bruit. CHEST: Chest symmetrical and non-tender to palpation. No accessory muscle use or intercostal retractions  RESPIRATORY: Lung sounds - clear throughout fields. On HF O2.    CARDIOVASCULAR:     Heart Inspection- shows no noted pulsations  Heart Palpation- no heaves or thrills; PMI is non-displaced   Heart Ausculation- Regular rate and rhythm, Faint 1/6 GERARD. No s3, s4 or rub   PV: trace lower extremity edema. + varicosities. Pedal pulses palpable, no clubbing or cyanosis   ABDOMEN: Soft, non-tender to light palpation. Bowel sounds present. No palpable masses no organomegaly; no abdominal bruit  MS: Good muscle strength and tone. No atrophy or abnormal movements. : Purwick catheter with scant amount of clear yellow urine. SKIN: Warm and dry no statis dermatitis or ulcers   NEURO / PSYCH: Oriented to person, place and time. Speech clear and appropriate. Follows all commands. Flat affect. DATA:    ECG: See HPI.    Tele strips:  (rate in 80's). Diagnostic:      Intake/Output Summary (Last 24 hours) at 2/27/2021 1139  Last data filed at 2/27/2021 1132  Gross per 24 hour   Intake 540 ml   Output    Net 540 ml       Labs:   CBC:   Recent Labs     02/26/21  0445 02/26/21  1052 02/27/21  0410   WBC 16.7*  --  11.1   HGB 8.8*  --  9.0*   HCT 28.4* 30.3* 29.1*     --  348     BMP:   Recent Labs     02/26/21  0445 02/27/21  0410    133   K 4.9 4.0   CO2 29 29   BUN 30* 31*   CREATININE 0.9 1.0   LABGLOM 59 53   CALCIUM 8.3* 8.7     Mag: No results for input(s): MG in the last 72 hours. Phos: No results for input(s): PHOS in the last 72 hours. TSH: No results for input(s): TSH in the last 72 hours. HgA1c:   Lab Results   Component Value Date    LABA1C 5.4 02/12/2021     No results found for: EAG  proBNP: No results for input(s): PROBNP in the last 72 hours. PT/INR:   Recent Labs     02/26/21  0445 02/27/21  0410   PROTIME 18.3* 17.8*   INR 1.6 1.5     APTT:  Recent Labs     02/26/21  0445 02/27/21  0410   APTT 25.2 26.8     CARDIAC ENZYMES:No results for input(s): CKTOTAL, CKMB, CKMBINDEX, TROPONINI in the last 72 hours. FASTING LIPID PANEL:  Lab Results   Component Value Date    CHOL 182 10/22/2019    HDL 44 10/22/2019    LDLCALC 110 10/22/2019    TRIG 140 10/22/2019     LIVER PROFILE:  Recent Labs     02/26/21  0445 02/27/21  0410   AST 37* 23   ALT 14 16   LABALBU 2.5* 2.9*     Chest x-ray: 2/26/2021  No interval change. CTA pulmonary with contrast: 2/24/2021  No convincing evidence of a pulmonary embolism. Significant progression in the ground-glass opacities scattered throughout   both lungs when compared with February 10, 2021.  Findings are suspicious for   multifocal pneumonia. Stable calcified mass in the central canal of the thoracic spine. Distended esophagus filled with undigested material, placing the patient at   risk for aspiration. Additional findings as described.      Chest x-ray: 2/24/2021:  Grossly unchanged patchy bilateral airspace opacities compared with February 14 2021.  Findings may be on the basis of multifocal pneumonia. Assessment/Plan as per Dr. Andrei Amado. Electronically signed by FRAN Hunter CNP on 2/27/21 at 12:00 PM EST    The above documentation has been prepared under my direction and personally reviewed by me in its entirety. I confirm that the note above accurately reflects all work, treatment, procedures, and medical decision making performed by me. The patient's history was independently obtained. The patient was independently examined. Electrocardiogram, prior and present cardiovascular assessment, and laboratory studies were reviewed. The patient is an 70-year-old white female known to [address] [place] [address] with primary cardiovascular care provided by Mani Adair as well as the electrophysiology service. She has a known history of paroxysmal atrial fibrillation presently maintaining sinus rhythm with antiarrhythmic therapy with flecainide and oral anticoagulation as well as that of chronic diastolic heart failure, rheumatoid arthritis maintained on immunosuppressive therapy, chronic diastolic heart failure and a previous history of obstructive sleep apnea resolved with weight reduction. She had most recently undergone objective assessment with a gated vasodilator myocardial perfusion imaging study in February, 2019 demonstrating no evidence of perfusion abnormalities and normal left ventricular systolic function suggesting a low risk of ischemic events and an echocardiogram of May, 2017 demonstrating evidence of a normal-sized left ventricular chamber with normal endocardial thickness and normal left ventricular systolic function with stage I diastolic dysfunction and no significant valvular pathology with mild pulmonary hypertension right ventricular systolic pressure of approximately 40 mmHg.   She was hospitalized earlier this month with a COVID-19 infection Jugular venous pressure appears normal with no identified carotid bruits. Lung fields demonstrate minimally diminished breath sounds with somewhat poor inspiratory effort and minimal basilar fibrotic rales. Cardiac examination is no for a regular rate and rhythm with no gallop rhythm or cardiac murmur. A benign abdominal examination is present with present evidence of trivial pedal edema. Diagnostic Assessment and Plan: On a clinical basis, the patient presents with persistent evidence of acute superimposed upon chronic hypoxic respiratory failure in the face of both a recent COVID-19 pneumonia and interstitial lung disease from her rheumatoid arthritis in addition to that of her paroxysmal atrial fibrillation. Her echocardiogram demonstrates no significant structural abnormalities with no documented arrhythmias nor symptoms suggestive of ischemia in the face of her most recent low risk perfusion imaging study. Presently in view of the mild elevation of her proBNP level which is nonspecific in the face of her recent COVID-19 infection small doses of diuretics may be appropriate with need of careful monitoring for volume status as well as that of her renal function and electrolytes. Hypoalbuminemia suggestive of nutritional deficiency is further present contributing to a component of her pedal edema. Presently, no additional cardiovascular assessment is indicated we will further evaluate her during hospitalization should additional cardiovascular difficulties or concerns arise with routine cardiovascular follow-up to be arranged on an outpatient basis with her primary cardiologist, Raoul Chow.  Additional present management will be deferred to primary care and the pulmonary service. Thank you for allowing me to participate in your patient's care. Please feel free to contact me if you have any questions or concerns. Henna Whitaker.  Maira Pickering, [address] [place] [address] Cardiology

## 2021-02-27 NOTE — PROGRESS NOTES
[address] [place] [address] [facility]  Progress Note      Chief Complaint   Patient presents with    Shortness of Breath    Positive For Covid-19       SUBJECTIVE:      Patient is tolerating medications. No reported adverse drug reactions. No problems overnight. Triamcinolone paste helping with mouth pain  Still no appetite      Review of systems:    As stated above in the chief complaint, otherwise negative. Medications:    Scheduled Meds:   triamcinolone acetonide   Mouth/Throat TID    methylPREDNISolone  40 mg Intravenous Q8H    furosemide  40 mg Oral Daily    budesonide-formoterol  2 puff Inhalation BID    celecoxib  200 mg Oral Daily    docusate sodium  100 mg Oral Daily    DULoxetine  40 mg Oral Daily    ferrous sulfate  325 mg Oral Every Other Day    cetirizine  10 mg Oral Daily    flecainide  50 mg Oral BID    folic acid  1 mg Oral Daily    ascorbic acid  500 mg Oral Daily    gabapentin  100 mg Oral BID    [START ON 3/2/2021] leucovorin calcium  25 mg Oral Weekly    levothyroxine  100 mcg Oral Daily    lidocaine  1 patch Transdermal Daily    [START ON 3/1/2021] methotrexate  5 mg Oral Weekly    metoprolol tartrate  25 mg Oral BID    pantoprazole  40 mg Oral BID AC    rivaroxaban  15 mg Oral Daily with breakfast    thiamine  100 mg Oral Daily    vitamin B-12  1,000 mcg Oral Daily    vitamin D  2,000 Units Oral Daily    zinc sulfate  50 mg Oral Daily    sodium chloride flush  10 mL Intravenous 2 times per day    famotidine  20 mg Oral Nightly    valsartan  160 mg Oral Daily    [START ON 3/1/2021] methotrexate  10 mg Oral Weekly     Continuous Infusions:  PRN Meds:albuterol sulfate HFA, sodium chloride flush, acetaminophen **OR** acetaminophen, perflutren lipid microspheres  Prior to Admission medications    Medication Sig Start Date End Date Taking?  Authorizing Provider   loteprednol (LOTEMAX) 0.5 % ophthalmic suspension  1/20/21   Historical Provider, MD albuterol sulfate HFA (VENTOLIN HFA) 108 (90 Base) MCG/ACT inhaler Inhale 2 puffs into the lungs 4 times daily as needed for Wheezing 2/17/21   [facility] DO Prasanth   DULoxetine HCl 40 MG CPEP Take 40 mg by mouth daily 2/17/21   Bogdan Gibobns,    budesonide-formoterol (SYMBICORT) 160-4.5 MCG/ACT AERO Inhale 2 puffs into the lungs 2 times daily 2/16/21   Bogdan Gibbons,    lidocaine 4 % external patch Place 1 patch onto the skin daily 2/17/21   Bogdan Gibbons, DO   folic acid (FOLVITE) 1 MG tablet Take 1 tablet by mouth daily 2/17/21   Bogdan Gibbons, DO   zinc sulfate (ZINCATE) 50 MG CAPS capsule Take 1 capsule by mouth daily 2/17/21   Bogdan Gibbons, DO   ascorbic acid (VITAMIN C) 500 MG tablet Take 1 tablet by mouth daily 2/17/21   Bogdan Gibbons, DO   thiamine 100 MG tablet Take 1 tablet by mouth daily 2/17/21   Bogdan Gibbons, DO   Vitamin D (CHOLECALCIFEROL) 50 MCG (2000 UT) TABS tablet Take 1 tablet by mouth daily 2/17/21   [facility] DO Prasanth   metoprolol tartrate (LOPRESSOR) 25 MG tablet TAKE 1 TABLET TWICE A DAY 1/4/21   Charo Toro MD   flecainide (TAMBOCOR) 50 MG tablet Take 1 tablet by mouth 2 times daily 11/23/20   Charo Toro MD   rivaroxaban (XARELTO) 15 MG TABS tablet Take 1 tablet by mouth daily (with breakfast) 11/23/20   Charo Toro MD   valsartan-hydroCHLOROthiazide (DIOVAN-HCT) 160-25 MG per tablet Take 1 tablet by mouth daily 10/28/20   Primitivo Juarez MD   gabapentin (NEURONTIN) 100 MG capsule Take 1 capsule by mouth 2 times daily for 90 days.  10/8/20 2/23/21  Primitivo Juarez MD   celecoxib (CELEBREX) 200 MG capsule TAKE 1 CAPSULE DAILY 9/17/20   Primitivo Juarez MD   ferrous sulfate (IRON 325) 325 (65 Fe) MG tablet Take 1 tablet by mouth every other day 9/17/20   Primitivo Juarez MD   levothyroxine (LEVOTHROID) 100 MCG tablet Take 1 tablet by mouth Daily 9/17/20   Primitivo Juarez MD   docusate sodium ([address] [facility]) 100 MG CAPS Take 100 mg by mouth daily 20   Soniya Barraza MD   vitamin B-12 (CYANOCOBALAMIN) 1000 MCG tablet Take 1,000 mcg by mouth daily    Historical Provider, MD   omeprazole (PRILOSEC) 40 MG delayed release capsule Take 1 capsule by mouth 2 times daily 20   Asya Smiley MD   famotidine (PEPCID) 40 MG tablet Take 1 tablet by mouth nightly 3/31/20   Asya Smiley MD   ondansetron [facility]) 4 MG tablet Take 1 tablet by mouth every 8 hours as needed for Nausea or Vomiting 19   Asya Smiley MD   polyethyl glycol-propyl glycol 0.4-0.3 % (SYSTANE) 0.4-0.3 % ophthalmic solution 1 drop 2 times daily as needed for Dry Eyes     Historical Provider, MD   fluticasone (FLONASE) 50 MCG/ACT nasal spray 2 sprays by Nasal route daily 2 sprays in each nostril daily 18   Asya Smiley MD   Probiotic Product (ACIDOPHILUS PROBIOTIC) CAPS capsule Take 1 capsule by mouth daily    Historical Provider, MD   fexofenadine (ALLEGRA) 180 MG tablet Take 180 mg by mouth daily. Historical Provider, MD   leucovorin calcium (WELLCOVORIN) 5 MG tablet Take 25 mg by mouth once a week Takes every 13   Historical Provider, MD   methotrexate 2.5 MG tablet Take by mouth once a week 2 tabs at lunch and 4 tabs  in the pm once a week on Monday    Historical Provider, MD       OBJECTIVE:  BP (!) 150/80   Pulse 85   Temp 97.8 °F (36.6 °C) (Axillary)   Resp 18   Ht 5' (1.524 m)   Wt 121 lb 8 oz (55.1 kg)   SpO2 90%   BMI 23.73 kg/m²   Temp  Av.6 °F (36.4 °C)  Min: 97.3 °F (36.3 °C)  Max: 97.8 °F (36.6 °C)  General appearance: Resting in bed in no apparent distress. Skin: Warm and dry. No rashes were noted. HEENT: Multiple aphthous ulcers noted on soft palate  Neck: Supple to movements. Chest: Diminished bilaterally with few fine inspiratory rales. Cardiovascular: Regular rate and rhythm. No murmurs gallops, or rubs appreciated. Abdomen:  Bowel sounds present, nontender, nondistended, no thoracic spine. Distended esophagus filled with undigested material, placing the patient at   risk for aspiration. Additional findings as described. XR CHEST PORTABLE   Final Result   Grossly unchanged patchy bilateral airspace opacities compared with February 14 2021. Findings may be on the basis of multifocal pneumonia. Microbiology:   Lab Results   Component Value Date    BC 24 Hours no growth 02/24/2021    ORG Proteus mirabilis 02/15/2021    ORG Micrococcus 09/30/2020    ORG Staphylococcus aureus 09/09/2020    ORG Staphylococcus coagulase-negative 09/09/2020     Lab Results   Component Value Date    BLOODCULT2 24 Hours no growth 02/24/2021    ORG Proteus mirabilis 02/15/2021    ORG Micrococcus 09/30/2020    ORG Staphylococcus aureus 09/09/2020    ORG Staphylococcus coagulase-negative 09/09/2020     WOUND/ABSCESS   Date Value Ref Range Status   09/30/2020   Final    Heavy growth  Validated susceptibility testing methods do not exist for  this isolate. 09/09/2020   Final    Light growth  Methicillin resistant Staph aureus isolated. Most Methicillin  resistant Staphylococcus are usually resistant to multiple  antibiotics including other B-Lactams, Aminoglycosides,  Macrolides, Clindamycin and Tetracycline. Contact isolation  is indicated. This isolate is presumed to be resistant based on the  detection of inducible Clindamycin resistance. Clindamycin  may still be effective in some patients.      09/09/2020 One colony  Final     No results found for: RESPSMEAR  No results found for: MPNEUMO, CLAMYDCU, LABLEGI, AFBCX, FUNGSM, LABFUNG  No results found for: CULTRESP  No results found for: CXCATHTIP  No results found for: BFCS  No results found for: CXSURG  Urine Culture, Routine   Date Value Ref Range Status   02/24/2021 <10,000 CFU/mL  Gram positive organism    Final   02/15/2021 >100,000 CFU/ml  Final   06/04/2019 <10,000 CFU/mL  Gram negative rods    Final     No results found for: [facility]

## 2021-02-27 NOTE — CONSULTS
Palliative Care Department  Palliative Care Initial Consult  Provider: Danyel JOSEPH  [phone]    Hospital Day: 4  Date of Initial Consult: 2/27/21  Referring Provider: Dr. Rosa Valiente was consulted for assistance with: Symptom Management and Family Support    Chief Complaint: Niels House is a 80 y.o. female with chief complaint of SOB    HPI:   Niels House is a 80 y.o. female with significant past medical history of HFpEF, A-fib, HTN, RA, and recent COVID infection requiring hospitalization from 2/7-2/16, d/c home with 3L supplemental oxygen and with C, who was admitted on 2/24/2021 with worsening shortness of breath. She was admitted for further management of acute respiratory failure with pulmonology and ID following closely. Palliative has been consulted to assist with symptom management and family support. ASSESSMENT/PLAN:     Pertinent Hospital Diagnoses:  Current medical issues leading to Palliative Medicine involvement include   Active Hospital Problems    Diagnosis Date Noted    [facility] [E44.1] 02/26/2021    History of 2019 novel coronavirus disease (COVID-19) [Z86.16]     Acute on chronic respiratory failure with hypoxia (HCC) [J96.21] 02/24/2021    Oral yeast infection [B37.0]     Depression [F32.9] 02/12/2021    Sore throat [J02.9] 02/09/2021    COVID-19 [U07.1] 02/08/2021    HAP (hospital-acquired pneumonia) [J18.9, Y95] 11/13/2018    Essential hypertension [I10] 05/23/2016    Multiple closed fractures of ribs of both sides with routine healing [S22.43XD] 12/16/2014    Anemia [D64.9] 07/26/2014    Hyponatremia [E87.1] 03/25/2014    Hypothyroidism [E03.9] 06/10/2011    Rheumatoid arthritis ([facility] [M06.9] 06/10/2011    A-fib ([facility] [I48.91] 06/09/2011     Palliative Care Encounter / Counseling Regarding Goals of Care:  Please see detailed goals of care discussion as below.    At this time, Niels House, Does have capacity for medical decision-making. Capacity is time limited and situation/question specific.  Outcome of goals of care meeting: continue current management   Code status: Full Code  o I reviewed with the Patient that given multiple medical co-morbidities and advanced age, in the event of cardiac arrest, the chances of surviving may likely be low. It was also reviewed that if they survived a cardiac arrest, a return to prior level of function also may be low.  Advanced Directives: Patient states she may have completed these but she is uncertain   Surrogate/Legal NOK:   Eduardo Ferrer ([id number]) is the patient's son  [facility] Will follow and continued to discuss goals of care pending clinical progression. Dyspnea with exertion:   -  Not persistent   -  Recovers with rest   -  If worsens could consider low dose oral morphine    Oral Sores/? Thrush?:   -  Nystatin swish and spit QID   -  Magic Mouthwash QID PRN    Referrals: none today    SUBJECTIVE:   Events/Discussions:  2/27/2021:   Drama seen at the bedside, no family present. She is alert and oriented, able voice needs concerns well. She complains of shortness of breath with exertion and speaking, but states that she recovers relatively quickly with rest, and her shortness of breath is not persistent. She states that she is excepting that this is likely her new norm, as she feels that her Covid infection has caused irreparable damage to her lungs, but is hopeful that she can make some improvement and return home where she has good assistance from family and caregivers. Her #1 complaint today is that of oral pain and sores, stating that this is causing her not to be able to eat due to the pain. She states that her appetite is good, however her intake has been decreased due to the pain caused by her oral sores. She states that she was on an oral solution at home which seem to be helping, upon review this appears to be nystatin.   Her mouth is noted to be significantly dry, several ulcers are noted, and there is a fair chance that this could be thrush she is agreeable to restarting nystatin. She additionally is agreeable to trial of Magic mouthwash which I encouraged her to utilize prior to meals to see if this reduces the pain allows her to eat better. As for her goals of care, she states she feels she does have advance directives completed, but she is unsure if she has actually completed these or not in the past.  She states that her #1 emergency contact would be her son Cherylene Leas whom she lives with. As for resuscitation she states she would wish to be a full resuscitation, and would like to continue with full supportive care at this time. She states that she would like to continue to pursue all aggressive forms of treatment as long as her mind continues to be sharp and she feels she has a fair chance of recovering to a decent quality of life. She appreciative the support was provided the palliative will continue to follow and provide ongoing support and symptomatic management as needed.     Past Medical History:   Diagnosis Date    Anticoagulated     takes Xarelto    Atrial fibrillation ([facility]     follows with Dr. 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 Ronnell Holland    Pain in both lower legs 2020    Patellar fracture     Rheumatoid arthritis with rheumatoid factor (HCC)     Rheumatoid arthritis(714.0)     Dr Connor Cons; on [facility] [employer])    Rib fractures 2014    Seasonal allergies     Skin cancer of lip     squamous cell    Wears dentures        Past Surgical History:   Procedure Laterality Date    CARDIAC CATHETERIZATION      CARPAL TUNNEL RELEASE Bilateral      SECTION      COLONOSCOPY  2011    Dr Aurelio Kingsley; diverticular disease; repeat in 2016    COLONOSCOPY N/A 2020    COLONOSCOPY WITH BIOPSY performed by Danish Donohue MD at [address] [facility], TOTAL ABDOMINAL  early 's    TOTAL KNEE ARTHROPLASTY  2007    bilateral    UPPER GASTROINTESTINAL ENDOSCOPY N/A 2020    EGD BIOPSY performed by Danish Donohue MD at [facility] ENDOSCOPY       Family History   Problem Relation Age of Onset    Heart Attack Mother 58    Other Father         suicide, depression, leg trouble    Other Sister         Dementia    Hypertension Sister     Hypertension Sister     Hypertension Brother     Thyroid Disease Sister     Rheum Arthritis Sister     Rheum Arthritis Brother     Other Daughter         hemochromatosis     Other Son         hemachromatosis        Allergies   Allergen Reactions    Amoxicillin      GI ill effects       ROS: UNLESS STATED ABOVE PATIENT DENIES:  CONSTITUTIONAL:  fever, chill, rigors, nausea, vomiting, fatigue. HEENT: blurry vision, double vision, hearing problem, tinnitus, hoarseness, dysphagia, odynophagia  RESPIRATORY: cough, shortness of breath, sputum expectoration. CARDIOVASCULAR:  Chest pain/pressure, palpitation, syncope, irregular beats  GASTROINTESTINAL:  abdominal or rectal pain, diarrhea, constipation, .   GENITOURINARY:  Burning, frequency, urgency, incontinence, discharge  INTEGUMENTARY: rash, wound, pruritis  HEMATOLOGIC/LYMPHATIC:  Swelling, sores, gum bleeding, easy bruising, pica. MUSCULOSKELETAL:  pain, edema, joint swelling or redness  NEUROLOGICAL:  light headed, dizziness, loss of consciousness, weakness, change in memory, seizures, tremors    OBJECTIVE:   Prognosis: unknown    Physical Exam:  BP (!) 150/80   Pulse 85   Temp 97.8 °F (36.6 °C) (Axillary)   Resp 18   Ht 5' (1.524 m)   Wt 121 lb 8 oz (55.1 kg)   SpO2 90%   BMI 23.73 kg/m²     Gen:  Alert, elderly, well nourished, in no acute distress  HEENT:  Normocephalic, conjunctiva pink, no drainage, mucosa dry with aphthous ulcers noted   Neck:  Supple  Lungs:  Breath sounds coarse  Heart: RRR, no murmur, rub, or gallop noted during exam  Abd:  Soft, non tender, non distended, BS+  M/S/Ext:  Moving all extremities, no edema, pulses present  Skin:  Warm and dry  Neuro:  PERRL, Alert, oriented x 3; following commands    Objective data reviewed: labs, images, records, medication use, vitals and chart    Time/Communication:  Greater than 50% of time spent, total 70 minutes in counseling and coordination of care at the bedside regarding goals of care, symptom management and see above. Tashi JOSEPH  Palliative Medicine    Patient and the plan of care discussed with the other IDT members of Palliative Care Team, and with patient, as appropriate and available. Thank you for allowing Palliative Medicine to participate in the care of Paula Stubbs. Note: This report was completed using computerize voiced recognition software. Every effort has been made to ensure accuracy; however, inadvertent computerized transcription errors may be present.

## 2021-02-27 NOTE — PROGRESS NOTES
[facility]  Progress Note  CC: shortness of breath    Subjective:   No significant overnight events. Denies fevers and chills. Denies chest pain and palpitations. Denies abdominal pain and N/V. Past medical, surgical, family and social history were reviewed, non-contributory, and unchanged unless otherwise stated. Objective:    BP (!) 163/77   Pulse 86   Temp 97.8 °F (36.6 °C) (Oral)   Resp 16   Ht 5' (1.524 m)   Wt 121 lb 8 oz (55.1 kg)   SpO2 91%   BMI 23.73 kg/m²     General: In no acute distress. Sitting up in bed. Heart:  RRR. No murmurs, gallops, or rubs. Lungs:  Coarse breath sounds b/l. On 8 L O2. Abd: Bowel sounds present. Nontender and nondistended. No rebound or guarding. Extrem:  No clubbing or cyanosis.        CBC with Differential:    Lab Results   Component Value Date    WBC 11.1 02/27/2021    RBC 2.89 02/27/2021    HGB 9.0 02/27/2021    HCT 29.1 02/27/2021     02/27/2021    .7 02/27/2021    MCH 31.1 02/27/2021    MCHC 30.9 02/27/2021    RDW 14.6 02/27/2021    NRBC 0.0 02/16/2021    SEGSPCT 66 03/26/2013    METASPCT 0.9 02/16/2021    LYMPHOPCT 2.9 02/27/2021    MONOPCT 2.2 02/27/2021    MYELOPCT 0.9 06/16/2020    BASOPCT 0.1 02/27/2021    MONOSABS 0.24 02/27/2021    LYMPHSABS 0.32 02/27/2021    EOSABS 0.00 02/27/2021    BASOSABS 0.01 02/27/2021     CMP:    Lab Results   Component Value Date     02/27/2021    K 4.0 02/27/2021    CL 94 02/27/2021    CO2 29 02/27/2021    BUN 31 02/27/2021    CREATININE 1.0 02/27/2021    GFRAA >60 02/27/2021    LABGLOM 53 02/27/2021    GLUCOSE 136 02/27/2021    GLUCOSE 79 04/24/2012    PROT 6.2 02/27/2021    LABALBU 2.9 02/27/2021    LABALBU 4.0 04/24/2012    CALCIUM 8.7 02/27/2021    BILITOT 0.4 02/27/2021    ALKPHOS 129 02/27/2021    AST 23 02/27/2021    ALT 16 02/27/2021        Assessment:  Active Hospital Problems    Diagnosis Date Noted    Mild protein-calorie malnutrition ([facility] [E44.1] 02/26/2021    History of 2019 novel coronavirus disease (COVID-19) [Z86.16]     Acute on chronic respiratory failure with hypoxia (HCC) [J96.21] 02/24/2021    Oral yeast infection [B37.0]     Depression [F32.9] 02/12/2021    Sore throat [J02.9] 02/09/2021    COVID-19 [U07.1] 02/08/2021    HAP (hospital-acquired pneumonia) [J18.9, Y95] 11/13/2018    Essential hypertension [I10] 05/23/2016    Multiple closed fractures of ribs of both sides with routine healing [S22.43XD] 12/16/2014    Anemia [D64.9] 07/26/2014    Hyponatremia [E87.1] 03/25/2014    Hypothyroidism [E03.9] 06/10/2011    Rheumatoid arthritis ([facility] [M06.9] 06/10/2011    A-fib ([facility] [I48.91] 06/09/2011       Plan:  Acute on chronic respiratory failure with hypoxia secondary to recent COVID-19 infection, possible new HAP  Received 2000 mg dose of cefepime x 1 and IV 1250 mg dose of vancomycin x 1 in ED. Received IV dose of Decadron 6 mg x 1 in ED.  - Telemetry   - Respiratory panel negative   - abx held    - vitamin protocol  - breathing tx  - steroids 40mg iv q8h  -lasix 40mg po daily  - Pulmonology following.  - ID following.      Volume overload with hx of HFpEF  - Echocardiogram showed EF= 65%, indeterminate diastolic dysfunction, mild mitral regurgitation, and mild aortic regurgitation.  - On PO Lasix 40 mg QD. Hyponatremia  - Resolved    HTN   - Stopped home HCTZ 25 mg QD on 02/25 due to hyponatremia (now resolved) and concurrent use of PO Lasix 40 mg QD  - Continue home valsartan 160 mg QD      Macrocytic anemia   Seen by Hem/ Onc during last admission for macrocytic anemia and thrombocytosis. Anemia thought to be due to methotrexate use. Needed follow-up with Dr. Alsyon Pisano as an outpatient in 1-2 weeks after last discharge.    - Continue home folic acid 1 mg QD and vitamin B12 1000 mcg QD   - Continue home ferrous sulfate 325 mg every other day     Atrial fibrillation  - Continue home flecainide 50 mg BID   - Continue home Lopressor 25 mg BID      Hypothyroidism   - Continue home Synthroid 100 mcg QD      RA  - Continue home Celebrex 200 mg QD   - Continue home Wellcovorin 25 mg tablet weekly (on Tuesdays)  - Continue home methotrexate weekly on Mondays ( 5 mg at lunch and 10 mg in evening)     Multiple closed fractures of both sides of ribs   - Lidocaine patch q 12 hours      Oral yeast infection   Treated by ID during last admission   - Continue Nystatin suspension QID for complete 10 day course      Depression   - Continue home Cymbalta 40 mg QD      Continue additional home medications:  - Colace 100 mg QD  - Pepcid 20 mg HS   - Pantoprazole 40 mg BID   - Gabapentin 100 mg BID   - Substitute home Allegra 180 mg QD with Zyrtec 10 mg QD      Code: Full  Diet: General   DVT prophylaxis: Xarelto 15 mg QD (due to atrial fibrillation)         Electronically signed by Rivka Major DO on 2/27/2021 at 8:24 AM

## 2021-02-28 LAB
ALBUMIN SERPL-MCNC: 2.5 G/DL (ref 3.5–5.2)
ALP BLD-CCNC: 105 U/L (ref 35–104)
ALT SERPL-CCNC: 16 U/L (ref 0–32)
ANION GAP SERPL CALCULATED.3IONS-SCNC: 12 MMOL/L (ref 7–16)
ANISOCYTOSIS: ABNORMAL
AST SERPL-CCNC: 20 U/L (ref 0–31)
BASOPHILS ABSOLUTE: 0.03 E9/L (ref 0–0.2)
BASOPHILS RELATIVE PERCENT: 0.2 % (ref 0–2)
BILIRUB SERPL-MCNC: 0.5 MG/DL (ref 0–1.2)
BUN BLDV-MCNC: 40 MG/DL (ref 8–23)
CALCIUM SERPL-MCNC: 8.4 MG/DL (ref 8.6–10.2)
CHLORIDE BLD-SCNC: 98 MMOL/L (ref 98–107)
CO2: 25 MMOL/L (ref 22–29)
CREAT SERPL-MCNC: 0.9 MG/DL (ref 0.5–1)
D DIMER: 632 NG/ML DDU
EOSINOPHILS ABSOLUTE: 0 E9/L (ref 0.05–0.5)
EOSINOPHILS RELATIVE PERCENT: 0 % (ref 0–6)
FERRITIN: 317 NG/ML
GFR AFRICAN AMERICAN: >60
GFR NON-AFRICAN AMERICAN: 59 ML/MIN/1.73
GLUCOSE BLD-MCNC: 128 MG/DL (ref 74–99)
HCT VFR BLD CALC: 28.2 % (ref 34–48)
HEMOGLOBIN: 8.5 G/DL (ref 11.5–15.5)
IMMATURE GRANULOCYTES #: 0.21 E9/L
IMMATURE GRANULOCYTES %: 1.4 % (ref 0–5)
INR BLD: 1.4
L. PNEUMOPHILA SEROGP 1 UR AG: NORMAL
LYMPHOCYTES ABSOLUTE: 0.4 E9/L (ref 1.5–4)
LYMPHOCYTES RELATIVE PERCENT: 2.7 % (ref 20–42)
MCH RBC QN AUTO: 31 PG (ref 26–35)
MCHC RBC AUTO-ENTMCNC: 30.1 % (ref 32–34.5)
MCV RBC AUTO: 102.9 FL (ref 80–99.9)
MONOCYTES ABSOLUTE: 0.6 E9/L (ref 0.1–0.95)
MONOCYTES RELATIVE PERCENT: 4 % (ref 2–12)
NEUTROPHILS ABSOLUTE: 13.71 E9/L (ref 1.8–7.3)
NEUTROPHILS RELATIVE PERCENT: 91.7 % (ref 43–80)
PDW BLD-RTO: 14.8 FL (ref 11.5–15)
PLATELET # BLD: 349 E9/L (ref 130–450)
PMV BLD AUTO: 9.6 FL (ref 7–12)
POLYCHROMASIA: ABNORMAL
POTASSIUM REFLEX MAGNESIUM: 3.6 MMOL/L (ref 3.5–5)
PROTHROMBIN TIME: 16.4 SEC (ref 9.3–12.4)
RBC # BLD: 2.74 E12/L (ref 3.5–5.5)
REASON FOR REJECTION: NORMAL
REJECTED TEST: NORMAL
SODIUM BLD-SCNC: 135 MMOL/L (ref 132–146)
STREP PNEUMONIAE ANTIGEN, URINE: NORMAL
TOTAL PROTEIN: 5.8 G/DL (ref 6.4–8.3)
WBC # BLD: 15 E9/L (ref 4.5–11.5)

## 2021-02-28 PROCEDURE — 6370000000 HC RX 637 (ALT 250 FOR IP): Performed by: FAMILY MEDICINE

## 2021-02-28 PROCEDURE — 99233 SBSQ HOSP IP/OBS HIGH 50: CPT | Performed by: FAMILY MEDICINE

## 2021-02-28 PROCEDURE — 2580000003 HC RX 258

## 2021-02-28 PROCEDURE — 6370000000 HC RX 637 (ALT 250 FOR IP): Performed by: NURSE PRACTITIONER

## 2021-02-28 PROCEDURE — 94660 CPAP INITIATION&MGMT: CPT

## 2021-02-28 PROCEDURE — 82728 ASSAY OF FERRITIN: CPT

## 2021-02-28 PROCEDURE — 2700000000 HC OXYGEN THERAPY PER DAY

## 2021-02-28 PROCEDURE — 6360000002 HC RX W HCPCS: Performed by: INTERNAL MEDICINE

## 2021-02-28 PROCEDURE — 85378 FIBRIN DEGRADE SEMIQUANT: CPT

## 2021-02-28 PROCEDURE — 36415 COLL VENOUS BLD VENIPUNCTURE: CPT

## 2021-02-28 PROCEDURE — 80053 COMPREHEN METABOLIC PANEL: CPT

## 2021-02-28 PROCEDURE — 2580000003 HC RX 258: Performed by: FAMILY MEDICINE

## 2021-02-28 PROCEDURE — 94664 DEMO&/EVAL PT USE INHALER: CPT

## 2021-02-28 PROCEDURE — 6370000000 HC RX 637 (ALT 250 FOR IP): Performed by: STUDENT IN AN ORGANIZED HEALTH CARE EDUCATION/TRAINING PROGRAM

## 2021-02-28 PROCEDURE — 85025 COMPLETE CBC W/AUTO DIFF WBC: CPT

## 2021-02-28 PROCEDURE — 2060000000 HC ICU INTERMEDIATE R&B

## 2021-02-28 PROCEDURE — 85610 PROTHROMBIN TIME: CPT

## 2021-02-28 PROCEDURE — 94640 AIRWAY INHALATION TREATMENT: CPT

## 2021-02-28 RX ORDER — BUDESONIDE 0.5 MG/2ML
500 INHALANT ORAL 2 TIMES DAILY
Status: DISCONTINUED | OUTPATIENT
Start: 2021-02-28 | End: 2021-03-09 | Stop reason: HOSPADM

## 2021-02-28 RX ORDER — ARFORMOTEROL TARTRATE 15 UG/2ML
15 SOLUTION RESPIRATORY (INHALATION) 2 TIMES DAILY
Status: DISCONTINUED | OUTPATIENT
Start: 2021-02-28 | End: 2021-03-09 | Stop reason: HOSPADM

## 2021-02-28 RX ORDER — POLYETHYLENE GLYCOL 3350 17 G/17G
17 POWDER, FOR SOLUTION ORAL DAILY
Status: DISCONTINUED | OUTPATIENT
Start: 2021-02-28 | End: 2021-03-09 | Stop reason: HOSPADM

## 2021-02-28 RX ADMIN — WATER 1 ML: 1 INJECTION INTRAMUSCULAR; INTRAVENOUS; SUBCUTANEOUS at 11:26

## 2021-02-28 RX ADMIN — PANTOPRAZOLE SODIUM 40 MG: 40 TABLET, DELAYED RELEASE ORAL at 07:29

## 2021-02-28 RX ADMIN — BUDESONIDE 500 MCG: 0.5 SUSPENSION RESPIRATORY (INHALATION) at 20:19

## 2021-02-28 RX ADMIN — NYSTATIN 500000 UNITS: 500000 SUSPENSION ORAL at 16:21

## 2021-02-28 RX ADMIN — CETIRIZINE HYDROCHLORIDE 10 MG: 10 TABLET, FILM COATED ORAL at 08:46

## 2021-02-28 RX ADMIN — TRIAMCINOLONE ACETONIDE: 1 PASTE DENTAL at 13:47

## 2021-02-28 RX ADMIN — FAMOTIDINE 20 MG: 20 TABLET ORAL at 21:42

## 2021-02-28 RX ADMIN — RIVAROXABAN 15 MG: 15 TABLET, FILM COATED ORAL at 08:47

## 2021-02-28 RX ADMIN — METOPROLOL TARTRATE 25 MG: 25 TABLET, FILM COATED ORAL at 21:42

## 2021-02-28 RX ADMIN — ARFORMOTEROL TARTRATE 15 MCG: 15 SOLUTION RESPIRATORY (INHALATION) at 11:44

## 2021-02-28 RX ADMIN — POLYETHYLENE GLYCOL 3350 17 G: 17 POWDER, FOR SOLUTION ORAL at 17:32

## 2021-02-28 RX ADMIN — Medication 2000 UNITS: at 08:47

## 2021-02-28 RX ADMIN — FLECAINIDE ACETATE 50 MG: 50 TABLET ORAL at 21:41

## 2021-02-28 RX ADMIN — NYSTATIN 500000 UNITS: 500000 SUSPENSION ORAL at 13:46

## 2021-02-28 RX ADMIN — DULOXETINE HYDROCHLORIDE 40 MG: 60 CAPSULE, DELAYED RELEASE ORAL at 08:47

## 2021-02-28 RX ADMIN — GABAPENTIN 100 MG: 100 CAPSULE ORAL at 21:42

## 2021-02-28 RX ADMIN — TRIAMCINOLONE ACETONIDE: 1 PASTE DENTAL at 21:42

## 2021-02-28 RX ADMIN — OXYCODONE HYDROCHLORIDE AND ACETAMINOPHEN 500 MG: 500 TABLET ORAL at 08:46

## 2021-02-28 RX ADMIN — ZINC SULFATE 220 MG (50 MG) CAPSULE 50 MG: CAPSULE at 08:47

## 2021-02-28 RX ADMIN — SODIUM CHLORIDE, PRESERVATIVE FREE 10 ML: 5 INJECTION INTRAVENOUS at 21:43

## 2021-02-28 RX ADMIN — LEVOTHYROXINE SODIUM 100 MCG: 100 TABLET ORAL at 07:29

## 2021-02-28 RX ADMIN — VALSARTAN 160 MG: 160 TABLET, FILM COATED ORAL at 08:46

## 2021-02-28 RX ADMIN — TRIAMCINOLONE ACETONIDE: 1 PASTE DENTAL at 08:47

## 2021-02-28 RX ADMIN — ACETAMINOPHEN 650 MG: 325 TABLET ORAL at 09:46

## 2021-02-28 RX ADMIN — FLECAINIDE ACETATE 50 MG: 50 TABLET ORAL at 08:46

## 2021-02-28 RX ADMIN — METHYLPREDNISOLONE SODIUM SUCCINATE 40 MG: 40 INJECTION, POWDER, LYOPHILIZED, FOR SOLUTION INTRAMUSCULAR; INTRAVENOUS at 04:10

## 2021-02-28 RX ADMIN — METHYLPREDNISOLONE SODIUM SUCCINATE 40 MG: 40 INJECTION, POWDER, LYOPHILIZED, FOR SOLUTION INTRAMUSCULAR; INTRAVENOUS at 11:26

## 2021-02-28 RX ADMIN — PANTOPRAZOLE SODIUM 40 MG: 40 TABLET, DELAYED RELEASE ORAL at 16:21

## 2021-02-28 RX ADMIN — ARFORMOTEROL TARTRATE 15 MCG: 15 SOLUTION RESPIRATORY (INHALATION) at 20:19

## 2021-02-28 RX ADMIN — SODIUM CHLORIDE, PRESERVATIVE FREE 10 ML: 5 INJECTION INTRAVENOUS at 08:47

## 2021-02-28 RX ADMIN — FERROUS SULFATE TAB 325 MG (65 MG ELEMENTAL FE) 325 MG: 325 (65 FE) TAB at 08:46

## 2021-02-28 RX ADMIN — FUROSEMIDE 40 MG: 40 TABLET ORAL at 08:46

## 2021-02-28 RX ADMIN — BUDESONIDE AND FORMOTEROL FUMARATE DIHYDRATE 2 PUFF: 160; 4.5 AEROSOL RESPIRATORY (INHALATION) at 08:08

## 2021-02-28 RX ADMIN — CYANOCOBALAMIN TAB 1000 MCG 1000 MCG: 1000 TAB at 08:47

## 2021-02-28 RX ADMIN — FOLIC ACID 1 MG: 1 TABLET ORAL at 08:46

## 2021-02-28 RX ADMIN — SODIUM CHLORIDE, PRESERVATIVE FREE 10 ML: 5 INJECTION INTRAVENOUS at 11:26

## 2021-02-28 RX ADMIN — Medication 100 MG: at 08:47

## 2021-02-28 RX ADMIN — NYSTATIN 500000 UNITS: 500000 SUSPENSION ORAL at 21:42

## 2021-02-28 RX ADMIN — WATER 1 ML: 1 INJECTION INTRAMUSCULAR; INTRAVENOUS; SUBCUTANEOUS at 18:18

## 2021-02-28 RX ADMIN — METOPROLOL TARTRATE 25 MG: 25 TABLET, FILM COATED ORAL at 08:47

## 2021-02-28 RX ADMIN — METHYLPREDNISOLONE SODIUM SUCCINATE 40 MG: 40 INJECTION, POWDER, LYOPHILIZED, FOR SOLUTION INTRAMUSCULAR; INTRAVENOUS at 18:18

## 2021-02-28 RX ADMIN — DOCUSATE SODIUM 100 MG: 100 CAPSULE, LIQUID FILLED ORAL at 08:46

## 2021-02-28 RX ADMIN — BUDESONIDE 500 MCG: 0.5 SUSPENSION RESPIRATORY (INHALATION) at 11:44

## 2021-02-28 RX ADMIN — CELECOXIB 200 MG: 100 CAPSULE ORAL at 08:47

## 2021-02-28 RX ADMIN — GABAPENTIN 100 MG: 100 CAPSULE ORAL at 08:46

## 2021-02-28 RX ADMIN — NYSTATIN 500000 UNITS: 500000 SUSPENSION ORAL at 08:47

## 2021-02-28 ASSESSMENT — PAIN SCALES - GENERAL
PAINLEVEL_OUTOF10: 3
PAINLEVEL_OUTOF10: 0

## 2021-02-28 NOTE — PROGRESS NOTES
[facility] [address]  Progress Note  CC: shortness of breath    Subjective:   Patient states her legs are not working and this has been a chronic worsening issue for her. Denies fevers and chills. Denies chest pain and palpitations. Denies abdominal pain and N/V. Past medical, surgical, family and social history were reviewed, non-contributory, and unchanged unless otherwise stated. Objective:    BP (!) 142/69   Pulse 70   Temp 97.4 °F (36.3 °C) (Axillary)   Resp 18   Ht 5' (1.524 m)   Wt 126 lb 14.4 oz (57.6 kg)   SpO2 97%   BMI 24.78 kg/m²     General: In no acute distress. Sitting up in bed. Heart:  RRR. No murmurs, gallops, or rubs. Lungs:  Coarse breath sounds b/l. On 12 L O2. Abd: Bowel sounds present. Nontender and nondistended. No rebound or guarding. Extrem:  No clubbing or cyanosis.        CBC with Differential:    Lab Results   Component Value Date    WBC 15.0 02/28/2021    RBC 2.74 02/28/2021    HGB 8.5 02/28/2021    HCT 28.2 02/28/2021     02/28/2021    .9 02/28/2021    MCH 31.0 02/28/2021    MCHC 30.1 02/28/2021    RDW 14.8 02/28/2021    NRBC 0.0 02/16/2021    SEGSPCT 66 03/26/2013    METASPCT 0.9 02/16/2021    LYMPHOPCT 2.7 02/28/2021    MONOPCT 4.0 02/28/2021    MYELOPCT 0.9 06/16/2020    BASOPCT 0.2 02/28/2021    MONOSABS 0.60 02/28/2021    LYMPHSABS 0.40 02/28/2021    EOSABS 0.00 02/28/2021    BASOSABS 0.03 02/28/2021     CMP:    Lab Results   Component Value Date     02/28/2021    K 3.6 02/28/2021    CL 98 02/28/2021    CO2 25 02/28/2021    BUN 40 02/28/2021    CREATININE 0.9 02/28/2021    GFRAA >60 02/28/2021    LABGLOM 59 02/28/2021    GLUCOSE 128 02/28/2021    GLUCOSE 79 04/24/2012    PROT 5.8 02/28/2021    LABALBU 2.5 02/28/2021    LABALBU 4.0 04/24/2012    CALCIUM 8.4 02/28/2021    BILITOT 0.5 02/28/2021    ALKPHOS 105 02/28/2021    AST 20 02/28/2021    ALT 16 02/28/2021        Assessment:  Active Hospital Problems Diagnosis Date Noted    Mild protein-calorie malnutrition ([facility] [address] [E44.1] 02/26/2021    History of 2019 novel coronavirus disease (COVID-19) [Z86.16]     Acute on chronic respiratory failure with hypoxia (HCC) [J96.21] 02/24/2021    Oral yeast infection [B37.0]     Depression [F32.9] 02/12/2021    Sore throat [J02.9] 02/09/2021    COVID-19 [U07.1] 02/08/2021    HAP (hospital-acquired pneumonia) [J18.9, Y95] 11/13/2018    Essential hypertension [I10] 05/23/2016    Multiple closed fractures of ribs of both sides with routine healing [S22.43XD] 12/16/2014    Anemia [D64.9] 07/26/2014    Hyponatremia [E87.1] 03/25/2014    Hypothyroidism [E03.9] 06/10/2011    Rheumatoid arthritis ([facility] [M06.9] 06/10/2011    A-fib ([facility] [I48.91] 06/09/2011       Plan:  Acute on chronic respiratory failure with hypoxia secondary to recent COVID-19 infection and RA   On 12 L, getting solumedrol, monitoring off abx for now   - Telemetry   - Respiratory panel negative   - vitamin protocol  - breathing tx  - steroids 40mg iv q8h  -lasix 40mg po daily  - Pulmonology following.  - ID following.      Volume overload with hx of HFpEF  - Echocardiogram showed EF= 65%, indeterminate diastolic dysfunction, mild mitral regurgitation, and mild aortic regurgitation.  - On PO Lasix 40 mg QD. HTN   - Stopped home HCTZ 25 mg QD on 02/25 due to hyponatremia (now resolved) and concurrent use of PO Lasix 40 mg QD  - Continue home valsartan 160 mg QD      Macrocytic anemia   Seen by Hem/ Onc during last admission for macrocytic anemia and thrombocytosis. Anemia thought to be due to methotrexate use. Needed follow-up with Dr. Jose Antonio Schwartz as an outpatient in 1-2 weeks after last discharge.    - Continue home folic acid 1 mg QD and vitamin B12 1000 mcg QD    - Continue home ferrous sulfate 325 mg every other day     Atrial fibrillation  - Continue home flecainide 50 mg BID   - Continue home Lopressor 25 mg BID      Hypothyroidism   - Continue home Synthroid 100 mcg QD      RA  - Continue home Celebrex 200 mg QD   - Continue home Wellcovorin 25 mg tablet weekly (on Tuesdays)  - Continue home methotrexate weekly on Mondays ( 5 mg at lunch and 10 mg in evening)     Multiple closed fractures of both sides of ribs   - Lidocaine patch q 12 hours      Oral yeast infection   Treated by ID during last admission   - Continue Nystatin suspension QID for complete 10 day course      Depression   - Continue home Cymbalta 40 mg QD      Continue additional home medications:  - Colace 100 mg QD  - Pepcid 20 mg HS   - Pantoprazole 40 mg BID   - Gabapentin 100 mg BID   - Substitute home Allegra 180 mg QD with Zyrtec 10 mg QD     Of note, palliative following, continues to be full code.      Code: Full  Diet: General   DVT prophylaxis: Xarelto 15 mg QD (due to atrial fibrillation)         Electronically signed by Blake Davies DO on 2/28/2021 at 5:52 AM

## 2021-02-28 NOTE — PROGRESS NOTES
Date: 2/27/2021    Time: 9:55 PM    Patient Placed On BIPAP/CPAP/ Non-Invasive Ventilation? No     Comments:  Pt. refusing the Bipap tonight. Machine remains on standby by bedside.  Pt. informed to call if desire to use it.      02/27/21 2154   NIV Type   Mode Bilevel  (pt. refusing, remains on standby)       Minus Pointer

## 2021-02-28 NOTE — PROGRESS NOTES
Jose Maria Valentine M.D.,[facility]  Carly Milan D.O., F.A.C.O.I., Nelly Mayorga M.D. Kelton Brooks M.D., Gentry Young M.D. Berto Gee D.O. Daily Pulmonary Progress Note    Patient:  Rene Palacios 80 y.o. female MRN: [id number]     Date of Service: 2/28/2021      Synopsis     We are following patient for respiratory failure with hypoxia, recent COVID-19 pneumonia    \"CC\" shortness of breath    Code status: Full      Subjective      Patient was personally seen and examined. She is sitting up in bed. Getting ready to eat lunch very weak and frail looking. Currently saturating 93% on 8 L. Review of Systems:  Constitutional: Denies fever, weight loss, night sweats, and fatigue  Skin: Denies pigmentation, dark lesions, and rashes   HEENT: Denies hearing loss, tinnitus, ear drainage, epistaxis, sore throat, and hoarseness. Cardiovascular: Denies palpitations, chest pain, and chest pressure. Respiratory: Denies   hemoptysis, apnea, and choking.   Gastrointestinal: Denies nausea, vomiting, poor appetite, diarrhea, heartburn or reflux  Genitourinary: Denies dysuria, frequency, urgency or hematuria  Musculoskeletal: Denies myalgias, muscle weakness, and bone pain  Neurological: Denies dizziness, vertigo, headache, and focal weakness  Psychological: Denies anxiety and depression  Endocrine: Denies heat intolerance and cold intolerance  Hematopoietic/Lymphatic: Denies bleeding problems and blood transfusions    24-hour events:  None    Objective   Vitals: BP (!) 175/74   Pulse 76   Temp 97.6 °F (36.4 °C) (Oral)   Resp 18   Ht 5' (1.524 m)   Wt 126 lb 14.4 oz (57.6 kg)   SpO2 95%   BMI 24.78 kg/m²     I/O:    Intake/Output Summary (Last 24 hours) at 2/28/2021 0910  Last data filed at 2/27/2021 1717  Gross per 24 hour   Intake 540 ml   Output    Net 540 ml       Vent Information  Skin Assessment: Clean, dry, & intact  SpO2: 95 %                CURRENT MEDS :  Scheduled Meds: New Labs and Imaging Studies     Imaging Personally Reviewed:     Chest x-ray 2/26/2021  Multifocal infiltrates and interstitial changes are unchanged. Lorrin Blow is   mildly enlarged.  There are no effusions.       Impression   No interval change         CTA chest   No convincing evidence of a pulmonary embolism. Significant progression in the ground-glass opacities scattered throughout   both lungs when compared with February 10, 2021.  Findings are suspicious for   multifocal pneumonia. Stable calcified mass in the central canal of the thoracic spine. Distended esophagus filled with undigested material, placing the patient at   risk for aspiration. Additional findings as described.               ECHO   Conclusions      Summary   Left ventricular internal dimensions were normal in diastole and systole. No regional wall motion abnormalities seen. Normal left ventricular ejection fraction. The left atrium is borderline dilated. Moderate mitral annular calcification. Mild mitral regurgitation is present. The aortic valve appears mildly sclerotic. Mild aortic regurgitation is noted. Labs:  Lab Results   Component Value Date    WBC 15.0 02/28/2021    HGB 8.5 02/28/2021    HCT 28.2 02/28/2021    .9 02/28/2021    MCH 31.0 02/28/2021    MCHC 30.1 02/28/2021    RDW 14.8 02/28/2021     02/28/2021    MPV 9.6 02/28/2021     Lab Results   Component Value Date     02/28/2021    K 3.6 02/28/2021    CL 98 02/28/2021    CO2 25 02/28/2021    BUN 40 02/28/2021    CREATININE 0.9 02/28/2021    LABALBU 2.5 02/28/2021    LABALBU 4.0 04/24/2012    CALCIUM 8.4 02/28/2021    GFRAA >60 02/28/2021    LABGLOM 59 02/28/2021     Lab Results   Component Value Date    PROTIME 16.4 02/28/2021    PROTIME 15.8 05/12/2014    INR 1.4 02/28/2021     No results for input(s): PROBNP in the last 72 hours. No results for input(s): PROCAL in the last 72 hours.   This SmartLink has not been configured with any valid records. Micro:  No results for input(s): CULTRESP in the last 72 hours. No results for input(s): LABGRAM in the last 72 hours. No results for input(s): LEGUR in the last 72 hours. Recent Labs     02/27/21  1510   STREPNEUMAGU Presumptive negative- suggests no current or recent  pneumococcal infection. Infection due to Strep pneumoniae cannot be  ruled out since the antigen present in the sample  may be below the detection limit of the test.  Normal Range:Presumptive Negative       Recent Labs     02/27/21  1510   LP1UAG Presumptive Negative -suggesting no recent or current infections  with Legionella pneumophila serogroup 1. Infection to Legionella cannot be ruled out since other serogroups  and species may cause infection, antigen may not be present in  early infection, or level of antigen may be below the  detection limit. Normal Range: Presumptive Negative            Assessment:    1. Acute on chronic hypoxic respiratory failure   2. 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 Multilobar ground glass opacities likely multifactorial ? chf with pulmonary edema with superimposed previous ggo from covid. 5. Elevated bnp  6. Leukocytosis likely secondary to steroids   7. Hiatal hernia   8. hafsa   9. Multiple rib fractures   10. RA on mtx   11. Immunocompromised   12. Anemia   13. A fib  14. Oral candida      Plan:   1. We FiO2 for saturations above 92%  2. Will need repeat O2 testing prior to discharge  3. bipap prn for respiratory support-did not use last night  4. Trend proBNP, Lasix dosing-now on 40 mg p.o. daily  5. Continue to monitor off antibiotics  6. Continue vitamin protocol, completed dexamethasone and antiviral medication  7. Continue IV steroids 40 mg every 8 hours  8. Now that she is out of Covid isolation we will switch her breathing treatments to nebs. 9. Isolation removed negative Covid testing  10.  DVT GI prophylaxis-Xarelto, Protonix Electronically signed by Sahra Grimm MD on 2/28/2021 at 9:10 AM

## 2021-02-28 NOTE — PROGRESS NOTES
Discussed with attending    Continue to monitor off antibiotics    ID will sign off, please call if needed

## 2021-03-01 LAB
ALBUMIN SERPL-MCNC: 3.2 G/DL (ref 3.5–5.2)
ALP BLD-CCNC: 114 U/L (ref 35–104)
ALT SERPL-CCNC: 17 U/L (ref 0–32)
ANION GAP SERPL CALCULATED.3IONS-SCNC: 11 MMOL/L (ref 7–16)
ANISOCYTOSIS: ABNORMAL
APTT: 23.4 SEC (ref 24.5–35.1)
AST SERPL-CCNC: 18 U/L (ref 0–31)
BASOPHILS ABSOLUTE: 0.03 E9/L (ref 0–0.2)
BASOPHILS RELATIVE PERCENT: 0.2 % (ref 0–2)
BILIRUB SERPL-MCNC: 0.4 MG/DL (ref 0–1.2)
BLOOD CULTURE, ROUTINE: NORMAL
BUN BLDV-MCNC: 52 MG/DL (ref 8–23)
CALCIUM SERPL-MCNC: 8.9 MG/DL (ref 8.6–10.2)
CHLORIDE BLD-SCNC: 99 MMOL/L (ref 98–107)
CO2: 31 MMOL/L (ref 22–29)
CREAT SERPL-MCNC: 1 MG/DL (ref 0.5–1)
CULTURE, BLOOD 2: NORMAL
D DIMER: 713 NG/ML DDU
EOSINOPHILS ABSOLUTE: 0 E9/L (ref 0.05–0.5)
EOSINOPHILS RELATIVE PERCENT: 0 % (ref 0–6)
FERRITIN: 309 NG/ML
FIBRINOGEN: 588 MG/DL (ref 225–540)
GFR AFRICAN AMERICAN: >60
GFR NON-AFRICAN AMERICAN: 53 ML/MIN/1.73
GLUCOSE BLD-MCNC: 131 MG/DL (ref 74–99)
HCT VFR BLD CALC: 33.4 % (ref 34–48)
HEMOGLOBIN: 9.9 G/DL (ref 11.5–15.5)
IMMATURE GRANULOCYTES #: 0.52 E9/L
IMMATURE GRANULOCYTES %: 2.8 % (ref 0–5)
INR BLD: 1.1
LYMPHOCYTES ABSOLUTE: 0.57 E9/L (ref 1.5–4)
LYMPHOCYTES RELATIVE PERCENT: 3.1 % (ref 20–42)
MCH RBC QN AUTO: 31 PG (ref 26–35)
MCHC RBC AUTO-ENTMCNC: 29.6 % (ref 32–34.5)
MCV RBC AUTO: 104.7 FL (ref 80–99.9)
MONOCYTES ABSOLUTE: 0.69 E9/L (ref 0.1–0.95)
MONOCYTES RELATIVE PERCENT: 3.8 % (ref 2–12)
NEUTROPHILS ABSOLUTE: 16.53 E9/L (ref 1.8–7.3)
NEUTROPHILS RELATIVE PERCENT: 90.1 % (ref 43–80)
OVALOCYTES: ABNORMAL
PDW BLD-RTO: 15 FL (ref 11.5–15)
PLATELET # BLD: 402 E9/L (ref 130–450)
PMV BLD AUTO: 9.5 FL (ref 7–12)
POIKILOCYTES: ABNORMAL
POLYCHROMASIA: 1
POTASSIUM REFLEX MAGNESIUM: 3.8 MMOL/L (ref 3.5–5)
PROTHROMBIN TIME: 12.2 SEC (ref 9.3–12.4)
RBC # BLD: 3.19 E12/L (ref 3.5–5.5)
SODIUM BLD-SCNC: 141 MMOL/L (ref 132–146)
TOTAL PROTEIN: 6.4 G/DL (ref 6.4–8.3)
WBC # BLD: 18.3 E9/L (ref 4.5–11.5)

## 2021-03-01 PROCEDURE — 6360000002 HC RX W HCPCS: Performed by: INTERNAL MEDICINE

## 2021-03-01 PROCEDURE — 2060000000 HC ICU INTERMEDIATE R&B

## 2021-03-01 PROCEDURE — 82728 ASSAY OF FERRITIN: CPT

## 2021-03-01 PROCEDURE — 85384 FIBRINOGEN ACTIVITY: CPT

## 2021-03-01 PROCEDURE — 85378 FIBRIN DEGRADE SEMIQUANT: CPT

## 2021-03-01 PROCEDURE — 36415 COLL VENOUS BLD VENIPUNCTURE: CPT

## 2021-03-01 PROCEDURE — 6370000000 HC RX 637 (ALT 250 FOR IP): Performed by: NURSE PRACTITIONER

## 2021-03-01 PROCEDURE — 80053 COMPREHEN METABOLIC PANEL: CPT

## 2021-03-01 PROCEDURE — 97530 THERAPEUTIC ACTIVITIES: CPT

## 2021-03-01 PROCEDURE — 99232 SBSQ HOSP IP/OBS MODERATE 35: CPT | Performed by: FAMILY MEDICINE

## 2021-03-01 PROCEDURE — 6360000002 HC RX W HCPCS: Performed by: FAMILY MEDICINE

## 2021-03-01 PROCEDURE — 94640 AIRWAY INHALATION TREATMENT: CPT

## 2021-03-01 PROCEDURE — 97110 THERAPEUTIC EXERCISES: CPT

## 2021-03-01 PROCEDURE — 2580000003 HC RX 258: Performed by: FAMILY MEDICINE

## 2021-03-01 PROCEDURE — 85730 THROMBOPLASTIN TIME PARTIAL: CPT

## 2021-03-01 PROCEDURE — 6370000000 HC RX 637 (ALT 250 FOR IP): Performed by: FAMILY MEDICINE

## 2021-03-01 PROCEDURE — 85025 COMPLETE CBC W/AUTO DIFF WBC: CPT

## 2021-03-01 PROCEDURE — 85610 PROTHROMBIN TIME: CPT

## 2021-03-01 PROCEDURE — 2700000000 HC OXYGEN THERAPY PER DAY

## 2021-03-01 PROCEDURE — 94660 CPAP INITIATION&MGMT: CPT

## 2021-03-01 PROCEDURE — 6370000000 HC RX 637 (ALT 250 FOR IP): Performed by: STUDENT IN AN ORGANIZED HEALTH CARE EDUCATION/TRAINING PROGRAM

## 2021-03-01 PROCEDURE — 97535 SELF CARE MNGMENT TRAINING: CPT

## 2021-03-01 RX ORDER — METHYLPREDNISOLONE SODIUM SUCCINATE 40 MG/ML
40 INJECTION, POWDER, LYOPHILIZED, FOR SOLUTION INTRAMUSCULAR; INTRAVENOUS EVERY 12 HOURS
Status: DISCONTINUED | OUTPATIENT
Start: 2021-03-01 | End: 2021-03-04

## 2021-03-01 RX ADMIN — ARFORMOTEROL TARTRATE 15 MCG: 15 SOLUTION RESPIRATORY (INHALATION) at 22:16

## 2021-03-01 RX ADMIN — FOLIC ACID 1 MG: 1 TABLET ORAL at 09:12

## 2021-03-01 RX ADMIN — FLECAINIDE ACETATE 50 MG: 50 TABLET ORAL at 09:12

## 2021-03-01 RX ADMIN — FAMOTIDINE 20 MG: 20 TABLET ORAL at 20:39

## 2021-03-01 RX ADMIN — METHOTREXATE 10 MG: 2.5 TABLET ORAL at 20:40

## 2021-03-01 RX ADMIN — PANTOPRAZOLE SODIUM 40 MG: 40 TABLET, DELAYED RELEASE ORAL at 05:39

## 2021-03-01 RX ADMIN — SODIUM CHLORIDE, PRESERVATIVE FREE 10 ML: 5 INJECTION INTRAVENOUS at 20:39

## 2021-03-01 RX ADMIN — Medication 2000 UNITS: at 09:18

## 2021-03-01 RX ADMIN — ZINC SULFATE 220 MG (50 MG) CAPSULE 50 MG: CAPSULE at 09:12

## 2021-03-01 RX ADMIN — METOPROLOL TARTRATE 25 MG: 25 TABLET, FILM COATED ORAL at 09:12

## 2021-03-01 RX ADMIN — CYANOCOBALAMIN TAB 1000 MCG 1000 MCG: 1000 TAB at 09:12

## 2021-03-01 RX ADMIN — TRIAMCINOLONE ACETONIDE: 1 PASTE DENTAL at 09:13

## 2021-03-01 RX ADMIN — LEVOTHYROXINE SODIUM 100 MCG: 100 TABLET ORAL at 05:39

## 2021-03-01 RX ADMIN — RIVAROXABAN 15 MG: 15 TABLET, FILM COATED ORAL at 09:12

## 2021-03-01 RX ADMIN — CETIRIZINE HYDROCHLORIDE 10 MG: 10 TABLET, FILM COATED ORAL at 09:12

## 2021-03-01 RX ADMIN — METHYLPREDNISOLONE SODIUM SUCCINATE 40 MG: 40 INJECTION, POWDER, LYOPHILIZED, FOR SOLUTION INTRAMUSCULAR; INTRAVENOUS at 11:52

## 2021-03-01 RX ADMIN — LIDOCAINE HYDROCHLORIDE 5 ML: 20 SOLUTION ORAL; TOPICAL at 15:56

## 2021-03-01 RX ADMIN — PANTOPRAZOLE SODIUM 40 MG: 40 TABLET, DELAYED RELEASE ORAL at 15:54

## 2021-03-01 RX ADMIN — DULOXETINE HYDROCHLORIDE 40 MG: 60 CAPSULE, DELAYED RELEASE ORAL at 09:12

## 2021-03-01 RX ADMIN — METOPROLOL TARTRATE 25 MG: 25 TABLET, FILM COATED ORAL at 20:39

## 2021-03-01 RX ADMIN — OXYCODONE HYDROCHLORIDE AND ACETAMINOPHEN 500 MG: 500 TABLET ORAL at 09:12

## 2021-03-01 RX ADMIN — POLYETHYLENE GLYCOL 3350 17 G: 17 POWDER, FOR SOLUTION ORAL at 09:13

## 2021-03-01 RX ADMIN — PETROLATUM: 420 OINTMENT TOPICAL at 23:22

## 2021-03-01 RX ADMIN — NYSTATIN 500000 UNITS: 500000 SUSPENSION ORAL at 09:13

## 2021-03-01 RX ADMIN — CELECOXIB 200 MG: 100 CAPSULE ORAL at 09:12

## 2021-03-01 RX ADMIN — VALSARTAN 160 MG: 160 TABLET, FILM COATED ORAL at 09:12

## 2021-03-01 RX ADMIN — METHYLPREDNISOLONE SODIUM SUCCINATE 40 MG: 40 INJECTION, POWDER, LYOPHILIZED, FOR SOLUTION INTRAMUSCULAR; INTRAVENOUS at 03:39

## 2021-03-01 RX ADMIN — DOCUSATE SODIUM 100 MG: 100 CAPSULE, LIQUID FILLED ORAL at 09:12

## 2021-03-01 RX ADMIN — NYSTATIN 500000 UNITS: 500000 SUSPENSION ORAL at 20:39

## 2021-03-01 RX ADMIN — FLECAINIDE ACETATE 50 MG: 50 TABLET ORAL at 20:44

## 2021-03-01 RX ADMIN — FUROSEMIDE 40 MG: 40 TABLET ORAL at 09:12

## 2021-03-01 RX ADMIN — METHOTREXATE 5 MG: 2.5 TABLET ORAL at 11:51

## 2021-03-01 RX ADMIN — BUDESONIDE 500 MCG: 0.5 SUSPENSION RESPIRATORY (INHALATION) at 08:47

## 2021-03-01 RX ADMIN — NYSTATIN 500000 UNITS: 500000 SUSPENSION ORAL at 15:55

## 2021-03-01 RX ADMIN — GABAPENTIN 100 MG: 100 CAPSULE ORAL at 20:39

## 2021-03-01 RX ADMIN — GABAPENTIN 100 MG: 100 CAPSULE ORAL at 09:12

## 2021-03-01 RX ADMIN — BUDESONIDE 500 MCG: 0.5 SUSPENSION RESPIRATORY (INHALATION) at 22:16

## 2021-03-01 RX ADMIN — ARFORMOTEROL TARTRATE 15 MCG: 15 SOLUTION RESPIRATORY (INHALATION) at 08:47

## 2021-03-01 RX ADMIN — TRIAMCINOLONE ACETONIDE: 1 PASTE DENTAL at 20:44

## 2021-03-01 RX ADMIN — Medication 100 MG: at 09:12

## 2021-03-01 RX ADMIN — SODIUM CHLORIDE, PRESERVATIVE FREE 10 ML: 5 INJECTION INTRAVENOUS at 09:23

## 2021-03-01 ASSESSMENT — PAIN SCALES - GENERAL
PAINLEVEL_OUTOF10: 0

## 2021-03-01 ASSESSMENT — ENCOUNTER SYMPTOMS
CHEST TIGHTNESS: 0
DIARRHEA: 0
NAUSEA: 0
ABDOMINAL PAIN: 0
COUGH: 0

## 2021-03-01 NOTE — CARE COORDINATION
CASE MANAGEMENT. .. Chart reviewed. COVID NEG 2/24, 2/25. Met with patient and daughter, Rishabh Fortune, at the bedside. Ms Hollie Carrasco is sitting up in the chair eating lunch. She continues to require hiflow o2 7lnc. Nursing to wean as tolerated. Baseline is 3lnc thru [employer]. Confirmed plan remains home with 24hr caregivers and [facility]. Resume HHC orders obtained. In the meantime, patient continues on iv solumedrol 40mg q8hrs and po lasix. Will cont to follow and assist with needs accordingly.

## 2021-03-01 NOTE — PROGRESS NOTES
[facility]    Chief complaint :  Chief Complaint   Patient presents with    Shortness of Breath    Positive For Covid-19     Subjective:    No overnight problems. Patient describes feeling okay. She denies chest pain, palpitations, SOB, nausea, vomiting, diarrhea. Past medical, surgical, family and social history were reviewed, non-contributory, and unchanged unless otherwise stated. Review of Systems   Constitutional: Negative for activity change and fever. Respiratory: Negative for cough and chest tightness. Cardiovascular: Negative for chest pain, palpitations and leg swelling. Gastrointestinal: Negative for abdominal pain, diarrhea and nausea. Musculoskeletal: Negative for arthralgias and myalgias. Neurological: Negative for dizziness, syncope, weakness and headaches. Psychiatric/Behavioral: Negative for agitation. The patient is not nervous/anxious. Objective:  BP (!) 162/80   Pulse 76   Temp 98.3 °F (36.8 °C) (Oral)   Resp 18   Ht 5' (1.524 m)   Wt 126 lb 14.4 oz (57.6 kg)   SpO2 99%   BMI 24.78 kg/m²     Physical Exam  Constitutional:       Appearance: Normal appearance. HENT:      Head: Normocephalic and atraumatic. Cardiovascular:      Rate and Rhythm: Normal rate and regular rhythm. Pulses: Normal pulses. Heart sounds: Normal heart sounds. Pulmonary:      Effort: Pulmonary effort is normal.      Breath sounds: Normal breath sounds. No wheezing or rales. Abdominal:      General: Bowel sounds are normal. There is no distension. Tenderness: There is no abdominal tenderness. Neurological:      General: No focal deficit present. Mental Status: She is alert and oriented to person, place, and time.    Psychiatric:         Mood and Affect: Mood normal.       Labs:  Recent Results (from the past 24 hour(s))   Comprehensive Metabolic Panel w/ Reflex to MG    Collection Time: 02/28/21  3:08 AM   Result 02/28/21  4:28 AM   Result Value Ref Range    D-Dimer, Quant 632 ng/mL DDU       Radiology and other tests reviewed:  XR CHEST PORTABLE   Final Result   No interval change      CTA PULMONARY W CONTRAST   Final Result   No convincing evidence of a pulmonary embolism. Significant progression in the ground-glass opacities scattered throughout   both lungs when compared with February 10, 2021. Findings are suspicious for   multifocal pneumonia. Stable calcified mass in the central canal of the thoracic spine. Distended esophagus filled with undigested material, placing the patient at   risk for aspiration. Additional findings as described. XR CHEST PORTABLE   Final Result   Grossly unchanged patchy bilateral airspace opacities compared with February 14 2021. Findings may be on the basis of multifocal pneumonia.           Assessment:  Active Hospital Problems    Diagnosis Date Noted    Apathy [R45.3]     Encounter for hospice care discussion [Z71.89]     Mild protein-calorie malnutrition ([facility] [E44.1] 02/26/2021    History of 2019 novel coronavirus disease (COVID-19) [Z86.16]     Acute on chronic respiratory failure with hypoxia (HCC) [J96.21] 02/24/2021    Oral yeast infection [B37.0]     Depression [F32.9] 02/12/2021    Sore throat [J02.9] 02/09/2021    COVID-19 [U07.1] 02/08/2021    HAP (hospital-acquired pneumonia) [J18.9, Y95] 11/13/2018    Essential hypertension [I10] 05/23/2016    Multiple closed fractures of ribs of both sides with routine healing [S22.43XD] 12/16/2014    Anemia [D64.9] 07/26/2014    Hyponatremia [E87.1] 03/25/2014    Hypothyroidism [E03.9] 06/10/2011    Rheumatoid arthritis ([facility] [M06.9] 06/10/2011    A-fib ([facility] [I48.91] 06/09/2011       Plan:    Acute on chronic respiratory failure with hypoxia   Likely secondary to recent COVID-19 infection, hx of ILD and CHF,Currently on 7L of oxygen, home baseline O2 3 L  -Solumedrol 40 mg Q8HR   -Telemetry monitoring -Respiratory panel negative   -Zinc 50 mg daily  -Thiamine 100 mg daily  -Vitamin C 500 mg daily  -CRP every other day  -CBC daily  -CMP daily  -D-dimer, Ferritin, Fibrinogen, INR daily  -Albuterol sulfate inhaler 2 puffs QID PRN and Symbicort inhaler 2 puffs BID    - PT/OT   -Wean as tolerated  -PT/OT ordered    Volume overload with hx of HFpEF  Echocardiogram showed EF 65%, indeterminate diastolic dysfunction, mild mitral regurgitation, and mild aortic regurgitation  -Lasix 40 mg daily     Hx of HTN   Stopped home HCTZ 25 mg QD on 02/25 due to hyponatremia (now resolved) and concurrent use of PO Lasix 40 mg QD  -Valsartan 160 mg daily     Macrocytic anemia   Seen by Hem/Onc during last admission, anemia likely due to methotrexate use.    -Folic acid 1 mg QD  -Vitamin B12 1000 mcg QD   -Iron 325 mg every other day     Hx of Atrial fibrillation  Rate controlled  -Flecainide 50 mg BID   -Lopressor 25 mg BID      Hx of Hypothyroidism   -Synthroid 100 mcg daily     Hx of Rheumatoid Arthritis  -Celebrex 200 mg daily   -Wellcovorin 25 mg tablet weekly (on Tuesdays)  -Methotrexate weekly on Mondays ( 5 mg at lunch and 10 mg in evening)     Multiple closed fractures of both sides of ribs   -Lidocaine patch Q12HR     Oral yeast infection   Treated by ID during last admission   -Nystatin suspension 500,000 units QID for 2 more days to complete 10 day course      Hx of Depression   -Cymbalta 40 mg daily     Continue additional home medications:  -Colace 100 mg daily  -Pepcid 20 mg nightly   -Gabapentin 100 mg BID   -Zyrtec 10 mg QD   -Vitamin D 2000 units daily    Pain Control: Tylenol 650 mg Q6HR PRN for mild pain  DVT ppx: Xarelto 15 mg daily   GI ppx: Protonix 40 mg daily  Code Status: Full  Diet: General diet      Abeba Joel MD  Family Medicine Resident PGY-1  02/28/21   9:27 PM

## 2021-03-01 NOTE — PROGRESS NOTES
Social visit    Case had been reviewed and signed out to the residency rounding team.  However I decided the past social visit today. Patient was noted to be sitting upright in a chair today, with a disposition that is more congruent  She reports feeling some shame over how she presented herself yesterday, but does reiterate that she is concerned about being a burden on other people  She continues to feel frustrated that she is not improving with regard to her physical health  Her oxygen requirements are higher than upon admission however lower than yesterday  She still feels short of breath, does not think this is getting much better  Denies fever sweats or chills  Exam is noted as per the resident note today  Impression is as listed in resident note  Plan is to continue current care. At this point she is not ready to commit to a de-escalation of care. Is willing to give herself more time to see how she progresses with this hospitalization. She does indicate down the road she may change her mind. Her questions as well as those of her daughter Shukri Mendieta were addressed.

## 2021-03-01 NOTE — PROGRESS NOTES
Date: 2/28/2021    Time: 8:22 PM    Patient Placed On BIPAP/CPAP/ Non-Invasive Ventilation? No     Comments:    Pt. refusing the Bipap tonight. Machine remains on standby at bedside. Pt.  Informed to call if desire to use it.      02/28/21 2022   NIV Type   Mode Bilevel  (refusing, remains on standby)     Marco Antonio Daugherty

## 2021-03-01 NOTE — PROGRESS NOTES
Physical Therapy  Facility/Department: [address] INTERMEDIATE 1  Daily Treatment Note  NAME: Porter Martinez  : 1935  MRN: [id number]    Date of Service: 3/1/2021    Patient Diagnosis(es): The primary encounter diagnosis was HAP (hospital-acquired pneumonia). Diagnoses of Acute respiratory failure with hypoxia ([address] and History of 2019 novel coronavirus disease (COVID-19) were also pertinent to this visit. has a past medical history of Anticoagulated, Atrial fibrillation ([address] Bimalleolar fracture, QZXTX-49, Diastolic dysfunction, Difficulty walking, Diverticular disease, Elevated CA-125, Hiatal hernia, Hypertension, Hypothyroid, Meningocele spinal ([address] Neuropathy, JANETTE (obstructive sleep apnea), Osteoporosis, PAF (paroxysmal atrial fibrillation) ([address] Pain in both lower legs, Patellar fracture, Rheumatoid arthritis with rheumatoid factor ([address] Rheumatoid arthritis(714.0), Rib fractures, Seasonal allergies, Skin cancer of lip, and Wears dentures. has a past surgical history that includes Gastric fundoplication ();  section; Carpal tunnel release (Bilateral); Colonoscopy (2011); Total knee arthroplasty (2007); Cardiac catheterization; Hysterectomy, total abdominal (early ); Upper gastrointestinal endoscopy (N/A, 2020); and Colonoscopy (N/A, 2020). Referring Provider:  Pietro Shin DO    Evaluating Therapist: Ventura Dwyer PT    Room #:428  DIAGNOSIS: Acute on chronic respiratory failure  Additional Pertinent History:RA, Covid, Neuropathy, rib fx  PRECAUTIONS: falls, O2    Social:  Pt lives with family in a 1 floor plan. Prior to admission Pt w/c level, transfers to chair with use of sit to stand     Initial Evaluation  Date: 21 Treatment  3/1/21    Short Term/ Long Term   Goals   Was pt agreeable to Eval/treatment? yes yes    Does pt have pain?  No c/o pain No complaints    Bed Mobility  Rolling: max assist  Supine to sit: max assist  Sit to supine: dependent  Scooting: dependent Rolling: Mod A  Supine to sit: Mod A  Scooting: Max A seated to EOB Mod assist   Transfers Sit <> stand attempted with use of STEADY dependent of 2, pt unable Sit to stand; Mod A of 2 bed to steady  Stand to sit: Max A of 2 steady to a bedside chair Max assist to stand to STEAD   Ambulation    NT  N/A   Stair Negotiation  Ascended and descended  NT   N/A   LE strength     2+/5    3/5   balance      poor     AM-PAC Raw score               7/24 9/24          Pt is alert and able to follow instruction  Balance: fair sitting EOB    Pt performed therapeutic exercise of the following: supine B ankle pumps, hip ABd/ADd x 20.; B heel slides 20 reps x 2 AA/PROM    Patient education  Pt was educated on exercise promoting circulation and strengthening, UE usage to pull on the steady while standing    Patient response to education:   Pt verbalized understanding Pt demonstrated skill Pt requires further education in this area   yes With instruction yes     ASSESSMENT:   Comments: Nurse ok with Rx. Pt found in bed on 10 L 02 NC, exercise performed, saturation 95% during exercise. Pt assisted to the EOB, placed on 15 L to transfer with steady bed to chair. After transfer, 02 saturation in mid 80's. Pt required approx 2 minutes to recover breath to low 90's. , remained on 10 L 02 as found, saturation 92% then at rest.    Treatment: Patient practiced and was instructed in the following treatment: exercise, participation with transfer using steady    Pt was left in a bedside chair with call light in reach    Chair/bed alarm: chair alarm active    Time in 0948   Time out 1020   Total Treatment Time 32 minutes   CPT codes:     Therapeutic activities 29016 15 minutes   Therapeutic exercises 64069 17 minutes       Pt is making good progress toward established Physical Therapy goals as per exercise participation and tolerance to transfer using the steady. Continue with physical therapy current plan of care. Azalea Hanley [facility]   License Number: [id number]

## 2021-03-01 NOTE — PROGRESS NOTES
Occupational Therapy  OT BEDSIDE TREATMENT NOTE      Date:3/1/2021  Patient Name: Didi Brody  MRN: [id number]  : 1935  Room: [address] [place] [address]     Referring Provider: Arlene Herrera DO     Evaluating OT: Lali OLGUIN/L [id number]     AM-PAC Daily Activity Raw Score:      Recommended Adaptive Equipment: TBD     Diagnosis: acute respiratory failure with hypoxia. Pt presents to ED from home with progressively worsening SOB. Recent hospitalization with discharge to home environment, multiple L rib fractures and COVID. Pertinent Medical History: HTN, RA, neuropathy, PAF, afib    Precautions:  falls     Home Living: Pt lives with son in a single story home. Bathroom setup: tub/shower combo with extended tub bench, has 3:1      Prior Level of Function: pd caregiver and family assist  to [address] [place] [address] ADL/IADLs; completed functional mobility wc level with use of UEs for mobility. Christobal Homme all transfers at home. Hospital bed. Pt reports she has assist sponge bathing several times a week and approx 1 time a week her granddaughter will assist her in the tub for bathing. Use of Eduardo Genin in/out of bathroom for commode transfer. Able to complete grooming, UB dressing and self feeding tasks on her own.     Pain Level: no c/o pain     Cognition: A&O: 4/4                Functional Assessment:    Initial Eval Status  Date: 21 Treatment session: 3/1/21 Short Term Goals      Feeding Set up  Of lunch tray       Grooming Min A   Set up   UB Dressing Min A  Management of hospital gown   Set up   LB Dressing Max A  Management of B socks Dep A  To manage socks  Mod A    Bathing Max A   Mod A   Toileting Max A x2 Dep A  Standing during brown care  Mod A   Bed Mobility  Supine to sit:  Max A  Sit to Supine: Dependent Supine to sit: Mod A      Functional Transfers STS: Dependent at beti stedy, unable to tolerate STS, buckling at B LEs assisted back to bed for safety  STS: Mod A x2 from EOB  SPT: steady lift utilized due to using lift for transfers prior to admission. Mod A   Functional Mobility NA       Balance Sitting: poor to poor plus at EOB     Standing: unable to tolerate Sitting: CGA  Standing: Mod A x2      Activity Tolerance Poor  8L O2  Post activity: 86%  Min recovery time with pursed lip breathing to 92% Poor  standing lelo x2-3 min with fair minus balance during self care tasks                   Comments: Upon arrival pt was supine in bed. At end of session pt was transferred to chair with alarm on, all lines and tubes intact and call light within reach. Treatment: Pt incontinent of urine upon standing requiring assistance with brown care. Assistance provided for B LE./ UE placement on lift in preparation for STS transfer. Education: Transfer training, benefits of OOB activity and safety techniques to maximize independence with ADLs. · Pt has made good progress towards set goals. Plan of Care: 2-5 days/week for 1-2 weeks PRN   [x]? ?ADL retraining/adaptive techniques and AE recommendations to increase functional independence within precautions                    [x]? ? Energy conservation techniques to improve tolerance for ADL/IADLs  [x]? ? Functional transfer training/DME recommendations for increased independence, safety and fall prevention         [x]? ?Patient/family education to increase safety and functional independence during daily routine          [x]? ? Environmental modifications for safe mobility and completion of ADLs                             []? ? Cognitive retraining to improve problem solving skills & safe participation in ADLs/IADLs     []? ?Sensory re-education techniques to improve extremity awareness, maintain skin integrity and improve hand function                             []? ? Visual/Perceptual retraining to improve body awareness and safety during transfers and ADLs  []? ? Splinting/positioning needs to maintain joint/skin integrity and contracture prevention [x]??Therapeutic activity to improve functional performance during ADLs                                        [x]? ? Therapeutic exercise to improve tolerance and functional strength for ADLs   [x]? ? Balance retraining/tolerance tasks for facilitation of postural control with dynamic challenges during ADLs  []? ?Neuromuscular re-education to facilitate righting/equilibrium reactions, midline orientation, scapular stability/mobility, normalize muscle tone and facilitate active functional movement                        []? ? Delirium prevention/treatment    [x]? Positioning to improve functional independence and decrease risk of skin breakdown  []? ?Other:        Treatment Charges: Mins Units   Ther Ex  [id number]     Manual Therapy [id number]     Thera Activities 92163 5 0   ADL/Home Mgt 52727 10 1   Neuro Re-ed [id number]     Group Therapy      Orthotic manage/training  [id number]     Non-Billable Time         Total Time: [address]/L [id number]

## 2021-03-02 LAB
ALBUMIN SERPL-MCNC: 3.3 G/DL (ref 3.5–5.2)
ALP BLD-CCNC: 101 U/L (ref 35–104)
ALT SERPL-CCNC: 19 U/L (ref 0–32)
ANION GAP SERPL CALCULATED.3IONS-SCNC: 10 MMOL/L (ref 7–16)
ANISOCYTOSIS: ABNORMAL
AST SERPL-CCNC: 20 U/L (ref 0–31)
BASOPHILS ABSOLUTE: 0 E9/L (ref 0–0.2)
BASOPHILS RELATIVE PERCENT: 0 % (ref 0–2)
BILIRUB SERPL-MCNC: 0.4 MG/DL (ref 0–1.2)
BUN BLDV-MCNC: 58 MG/DL (ref 8–23)
CALCIUM SERPL-MCNC: 9.4 MG/DL (ref 8.6–10.2)
CHLORIDE BLD-SCNC: 101 MMOL/L (ref 98–107)
CO2: 31 MMOL/L (ref 22–29)
CREAT SERPL-MCNC: 0.9 MG/DL (ref 0.5–1)
EOSINOPHILS ABSOLUTE: 0 E9/L (ref 0.05–0.5)
EOSINOPHILS RELATIVE PERCENT: 0 % (ref 0–6)
GFR AFRICAN AMERICAN: >60
GFR NON-AFRICAN AMERICAN: 59 ML/MIN/1.73
GLUCOSE BLD-MCNC: 117 MG/DL (ref 74–99)
HCT VFR BLD CALC: 33.2 % (ref 34–48)
HEMOGLOBIN: 10.1 G/DL (ref 11.5–15.5)
HYPOCHROMIA: ABNORMAL
LYMPHOCYTES ABSOLUTE: 1.22 E9/L (ref 1.5–4)
LYMPHOCYTES RELATIVE PERCENT: 7 % (ref 20–42)
MCH RBC QN AUTO: 32.2 PG (ref 26–35)
MCHC RBC AUTO-ENTMCNC: 30.4 % (ref 32–34.5)
MCV RBC AUTO: 105.7 FL (ref 80–99.9)
MONOCYTES ABSOLUTE: 0.7 E9/L (ref 0.1–0.95)
MONOCYTES RELATIVE PERCENT: 4.3 % (ref 2–12)
NEUTROPHILS ABSOLUTE: 15.49 E9/L (ref 1.8–7.3)
NEUTROPHILS RELATIVE PERCENT: 88.7 % (ref 43–80)
NUCLEATED RED BLOOD CELLS: 1.7 /100 WBC
PDW BLD-RTO: 15.2 FL (ref 11.5–15)
PLATELET # BLD: 440 E9/L (ref 130–450)
PMV BLD AUTO: 9.9 FL (ref 7–12)
POIKILOCYTES: ABNORMAL
POLYCHROMASIA: ABNORMAL
POTASSIUM REFLEX MAGNESIUM: 3.8 MMOL/L (ref 3.5–5)
RBC # BLD: 3.14 E12/L (ref 3.5–5.5)
SODIUM BLD-SCNC: 142 MMOL/L (ref 132–146)
STOMATOCYTES: ABNORMAL
TOTAL PROTEIN: 6.5 G/DL (ref 6.4–8.3)
WBC # BLD: 17.4 E9/L (ref 4.5–11.5)

## 2021-03-02 PROCEDURE — 97530 THERAPEUTIC ACTIVITIES: CPT

## 2021-03-02 PROCEDURE — 6370000000 HC RX 637 (ALT 250 FOR IP): Performed by: STUDENT IN AN ORGANIZED HEALTH CARE EDUCATION/TRAINING PROGRAM

## 2021-03-02 PROCEDURE — 6360000002 HC RX W HCPCS: Performed by: INTERNAL MEDICINE

## 2021-03-02 PROCEDURE — 36415 COLL VENOUS BLD VENIPUNCTURE: CPT

## 2021-03-02 PROCEDURE — 2580000003 HC RX 258: Performed by: FAMILY MEDICINE

## 2021-03-02 PROCEDURE — 80053 COMPREHEN METABOLIC PANEL: CPT

## 2021-03-02 PROCEDURE — 99232 SBSQ HOSP IP/OBS MODERATE 35: CPT | Performed by: FAMILY MEDICINE

## 2021-03-02 PROCEDURE — 2060000000 HC ICU INTERMEDIATE R&B

## 2021-03-02 PROCEDURE — 94640 AIRWAY INHALATION TREATMENT: CPT

## 2021-03-02 PROCEDURE — 85025 COMPLETE CBC W/AUTO DIFF WBC: CPT

## 2021-03-02 PROCEDURE — 6370000000 HC RX 637 (ALT 250 FOR IP): Performed by: NURSE PRACTITIONER

## 2021-03-02 PROCEDURE — 2700000000 HC OXYGEN THERAPY PER DAY

## 2021-03-02 PROCEDURE — 6370000000 HC RX 637 (ALT 250 FOR IP): Performed by: FAMILY MEDICINE

## 2021-03-02 RX ADMIN — FLECAINIDE ACETATE 50 MG: 50 TABLET ORAL at 21:02

## 2021-03-02 RX ADMIN — NYSTATIN 500000 UNITS: 500000 SUSPENSION ORAL at 08:15

## 2021-03-02 RX ADMIN — CYANOCOBALAMIN TAB 1000 MCG 1000 MCG: 1000 TAB at 08:16

## 2021-03-02 RX ADMIN — ARFORMOTEROL TARTRATE 15 MCG: 15 SOLUTION RESPIRATORY (INHALATION) at 08:50

## 2021-03-02 RX ADMIN — BUDESONIDE 500 MCG: 0.5 SUSPENSION RESPIRATORY (INHALATION) at 08:50

## 2021-03-02 RX ADMIN — METOPROLOL TARTRATE 25 MG: 25 TABLET, FILM COATED ORAL at 21:02

## 2021-03-02 RX ADMIN — BUDESONIDE 500 MCG: 0.5 SUSPENSION RESPIRATORY (INHALATION) at 19:42

## 2021-03-02 RX ADMIN — NYSTATIN 500000 UNITS: 500000 SUSPENSION ORAL at 21:08

## 2021-03-02 RX ADMIN — VALSARTAN 160 MG: 160 TABLET, FILM COATED ORAL at 08:16

## 2021-03-02 RX ADMIN — PANTOPRAZOLE SODIUM 40 MG: 40 TABLET, DELAYED RELEASE ORAL at 17:26

## 2021-03-02 RX ADMIN — TRIAMCINOLONE ACETONIDE: 1 PASTE DENTAL at 08:17

## 2021-03-02 RX ADMIN — Medication 2000 UNITS: at 08:14

## 2021-03-02 RX ADMIN — ARFORMOTEROL TARTRATE 15 MCG: 15 SOLUTION RESPIRATORY (INHALATION) at 19:42

## 2021-03-02 RX ADMIN — OXYCODONE HYDROCHLORIDE AND ACETAMINOPHEN 500 MG: 500 TABLET ORAL at 08:16

## 2021-03-02 RX ADMIN — CELECOXIB 200 MG: 100 CAPSULE ORAL at 08:16

## 2021-03-02 RX ADMIN — NYSTATIN 500000 UNITS: 500000 SUSPENSION ORAL at 17:26

## 2021-03-02 RX ADMIN — LEVOTHYROXINE SODIUM 100 MCG: 100 TABLET ORAL at 06:36

## 2021-03-02 RX ADMIN — FOLIC ACID 1 MG: 1 TABLET ORAL at 08:15

## 2021-03-02 RX ADMIN — FAMOTIDINE 20 MG: 20 TABLET ORAL at 21:02

## 2021-03-02 RX ADMIN — SODIUM CHLORIDE, PRESERVATIVE FREE 10 ML: 5 INJECTION INTRAVENOUS at 21:02

## 2021-03-02 RX ADMIN — METHYLPREDNISOLONE SODIUM SUCCINATE 40 MG: 40 INJECTION, POWDER, LYOPHILIZED, FOR SOLUTION INTRAMUSCULAR; INTRAVENOUS at 00:17

## 2021-03-02 RX ADMIN — GABAPENTIN 100 MG: 100 CAPSULE ORAL at 08:14

## 2021-03-02 RX ADMIN — SODIUM CHLORIDE, PRESERVATIVE FREE 10 ML: 5 INJECTION INTRAVENOUS at 00:17

## 2021-03-02 RX ADMIN — GABAPENTIN 100 MG: 100 CAPSULE ORAL at 21:02

## 2021-03-02 RX ADMIN — DULOXETINE HYDROCHLORIDE 40 MG: 60 CAPSULE, DELAYED RELEASE ORAL at 08:16

## 2021-03-02 RX ADMIN — LIDOCAINE HYDROCHLORIDE 5 ML: 20 SOLUTION ORAL; TOPICAL at 07:09

## 2021-03-02 RX ADMIN — FLECAINIDE ACETATE 50 MG: 50 TABLET ORAL at 08:16

## 2021-03-02 RX ADMIN — Medication 100 MG: at 08:15

## 2021-03-02 RX ADMIN — NYSTATIN 500000 UNITS: 500000 SUSPENSION ORAL at 11:55

## 2021-03-02 RX ADMIN — TRIAMCINOLONE ACETONIDE: 1 PASTE DENTAL at 15:00

## 2021-03-02 RX ADMIN — CETIRIZINE HYDROCHLORIDE 10 MG: 10 TABLET, FILM COATED ORAL at 08:15

## 2021-03-02 RX ADMIN — PANTOPRAZOLE SODIUM 40 MG: 40 TABLET, DELAYED RELEASE ORAL at 06:36

## 2021-03-02 RX ADMIN — METOPROLOL TARTRATE 25 MG: 25 TABLET, FILM COATED ORAL at 08:15

## 2021-03-02 RX ADMIN — LEUCOVORIN CALCIUM 25 MG: 5 TABLET ORAL at 08:16

## 2021-03-02 RX ADMIN — FERROUS SULFATE TAB 325 MG (65 MG ELEMENTAL FE) 325 MG: 325 (65 FE) TAB at 08:15

## 2021-03-02 RX ADMIN — FUROSEMIDE 40 MG: 40 TABLET ORAL at 08:14

## 2021-03-02 RX ADMIN — POLYETHYLENE GLYCOL 3350 17 G: 17 POWDER, FOR SOLUTION ORAL at 08:14

## 2021-03-02 RX ADMIN — DOCUSATE SODIUM 100 MG: 100 CAPSULE, LIQUID FILLED ORAL at 08:15

## 2021-03-02 RX ADMIN — PETROLATUM: 420 OINTMENT TOPICAL at 08:17

## 2021-03-02 RX ADMIN — METHYLPREDNISOLONE SODIUM SUCCINATE 40 MG: 40 INJECTION, POWDER, LYOPHILIZED, FOR SOLUTION INTRAMUSCULAR; INTRAVENOUS at 11:55

## 2021-03-02 RX ADMIN — RIVAROXABAN 15 MG: 15 TABLET, FILM COATED ORAL at 08:16

## 2021-03-02 RX ADMIN — METHYLPREDNISOLONE SODIUM SUCCINATE 40 MG: 40 INJECTION, POWDER, LYOPHILIZED, FOR SOLUTION INTRAMUSCULAR; INTRAVENOUS at 23:47

## 2021-03-02 RX ADMIN — TRIAMCINOLONE ACETONIDE: 1 PASTE DENTAL at 21:02

## 2021-03-02 RX ADMIN — SODIUM CHLORIDE, PRESERVATIVE FREE 10 ML: 5 INJECTION INTRAVENOUS at 08:17

## 2021-03-02 RX ADMIN — ZINC SULFATE 220 MG (50 MG) CAPSULE 50 MG: CAPSULE at 08:16

## 2021-03-02 RX ADMIN — PETROLATUM: 420 OINTMENT TOPICAL at 17:26

## 2021-03-02 ASSESSMENT — ENCOUNTER SYMPTOMS
COUGH: 0
CHEST TIGHTNESS: 0
ABDOMINAL PAIN: 0
NAUSEA: 0
DIARRHEA: 0

## 2021-03-02 ASSESSMENT — PAIN SCALES - GENERAL
PAINLEVEL_OUTOF10: 0

## 2021-03-02 NOTE — PROGRESS NOTES
Scooting: dependent Rolling: Mod A  Supine to sit: Mod A  Scooting: Max A seated to EOB Mod assist   Transfers Sit <> stand attempted with use of STEADY dependent of 2, pt unable Sit to stand; Max A  bed to steady  Stand to sit: Max A of 2 steady to a bedside chair Max assist to stand to STEAD   Ambulation    NT  N/A   Stair Negotiation  Ascended and descended  NT   N/A   LE strength     2+/5    3/5   balance      poor     AM-PAC Raw score               7/24 9/24          Pt is alert and able to follow instruction  Balance: fair sitting EOB    Pt performed therapeutic exercise of the following: NT  Patient education  Pt was educated on  UE usage to pull on the steady while standing    Patient response to education:   Pt verbalized understanding Pt demonstrated skill Pt requires further education in this area   yes With instruction yes     ASSESSMENT:   Comments: Nurse ok with Rx. Pt found in bed on 6 L 02 NC. Pt assisted to the EOB,  transfer with steady bed to chair. After transfer, 02 saturation 86%. Pt required approx 2 minutes to recover breath to low 90's. , remained on 6 L 02 as found, saturation 90% then at rest.    Treatment: Pt practiced and was instructed in the following treatment: participation with transfer using steady    Pt was left in a bedside chair with call light in reach    Chair/bed alarm: chair alarm active    Time in 1435   Time out 1455   Total Treatment Time 20 minutes   CPT codes:     Therapeutic activities 99960 0 minutes   Therapeutic exercises 03017 20 minutes       Pt is making fair progress toward established Physical Therapy goals as per tolerance to transfer using the steady. Pt required increased assist for sit to stand transfer today. Continue with physical therapy current plan of care.     Anil Roca PTA   License Number: [id number]

## 2021-03-02 NOTE — PROGRESS NOTES
[facility]  Progress Note    Chief complaint :  Chief Complaint   Patient presents with    Shortness of Breath    Positive For Covid-19     Subjective:    No overnight problems. Patient describes feeling okay. She denies chest pain, palpitations, SOB, nausea, vomiting, diarrhea. Past medical, surgical, family and social history were reviewed, non-contributory, and unchanged unless otherwise stated. Review of Systems   Constitutional: Negative for activity change and fever. Respiratory: Negative for cough and chest tightness. Cardiovascular: Negative for chest pain, palpitations and leg swelling. Gastrointestinal: Negative for abdominal pain, diarrhea and nausea. Musculoskeletal: Negative for arthralgias and myalgias. Neurological: Negative for dizziness, syncope, weakness and headaches. Psychiatric/Behavioral: Negative for agitation. The patient is not nervous/anxious. Objective:  /64   Pulse 80   Temp 97.4 °F (36.3 °C) (Oral)   Resp 20   Ht 5' (1.524 m)   Wt 126 lb 14.4 oz (57.6 kg)   SpO2 95%   BMI 24.78 kg/m²     Physical Exam  Constitutional:       Appearance: Normal appearance. HENT:      Head: Normocephalic and atraumatic. Cardiovascular:      Rate and Rhythm: Normal rate and regular rhythm. Pulses: Normal pulses. Heart sounds: Normal heart sounds. Pulmonary:      Effort: Pulmonary effort is normal.      Breath sounds: Normal breath sounds. No wheezing or rales. Abdominal:      General: Bowel sounds are normal. There is no distension. Tenderness: There is no abdominal tenderness. Neurological:      General: No focal deficit present. Mental Status: She is alert and oriented to person, place, and time.    Psychiatric:         Mood and Affect: Mood normal.       Labs:  Recent Results (from the past 24 hour(s))   Comprehensive Metabolic Panel w/ Reflex to MG    Collection Time: 03/01/21  4:10 AM   Result Value Ref Range    Sodium 141 132 - 146 mmol/L    Potassium reflex Magnesium 3.8 3.5 - 5.0 mmol/L    Chloride 99 98 - 107 mmol/L    CO2 31 (H) 22 - 29 mmol/L    Anion Gap 11 7 - 16 mmol/L    Glucose 131 (H) 74 - 99 mg/dL    BUN 52 (H) 8 - 23 mg/dL    CREATININE 1.0 0.5 - 1.0 mg/dL    GFR Non-African American 53 >=60 mL/min/1.73    GFR African American >60     Calcium 8.9 8.6 - 10.2 mg/dL    Total Protein 6.4 6.4 - 8.3 g/dL    Albumin 3.2 (L) 3.5 - 5.2 g/dL    Total Bilirubin 0.4 0.0 - 1.2 mg/dL    Alkaline Phosphatase 114 (H) 35 - 104 U/L    ALT 17 0 - 32 U/L    AST 18 0 - 31 U/L   CBC auto differential    Collection Time: 03/01/21  4:10 AM   Result Value Ref Range    WBC 18.3 (H) 4.5 - 11.5 E9/L    RBC 3.19 (L) 3.50 - 5.50 E12/L    Hemoglobin 9.9 (L) 11.5 - 15.5 g/dL    Hematocrit 33.4 (L) 34.0 - 48.0 %    .7 (H) 80.0 - 99.9 fL    MCH 31.0 26.0 - 35.0 pg    MCHC 29.6 (L) 32.0 - 34.5 %    RDW 15.0 11.5 - 15.0 fL    Platelets 219 469 - 864 E9/L    MPV 9.5 7.0 - 12.0 fL    Neutrophils % 90.1 (H) 43.0 - 80.0 %    Immature Granulocytes % 2.8 0.0 - 5.0 %    Lymphocytes % 3.1 (L) 20.0 - 42.0 %    Monocytes % 3.8 2.0 - 12.0 %    Eosinophils % 0.0 0.0 - 6.0 %    Basophils % 0.2 0.0 - 2.0 %    Neutrophils Absolute 16.53 (H) 1.80 - 7.30 E9/L    Immature Granulocytes # 0.52 E9/L    Lymphocytes Absolute 0.57 (L) 1.50 - 4.00 E9/L    Monocytes Absolute 0.69 0.10 - 0.95 E9/L    Eosinophils Absolute 0.00 (L) 0.05 - 0.50 E9/L    Basophils Absolute 0.03 0.00 - 0.20 E9/L    Anisocytosis 1+     Polychromasia 1     Poikilocytes 1+     Ovalocytes 1+    Protime-INR    Collection Time: 03/01/21  4:10 AM   Result Value Ref Range    Protime 12.2 9.3 - 12.4 sec    INR 1.1    APTT    Collection Time: 03/01/21  4:10 AM   Result Value Ref Range    aPTT 23.4 (L) 24.5 - 35.1 sec   Fibrinogen    Collection Time: 03/01/21  4:10 AM   Result Value Ref Range    Fibrinogen 588 (H) 225 - 540 mg/dL   Ferritin    Collection Time: 03/01/21  4:10 AM Result Value Ref Range    Ferritin 309 ng/mL   D-Dimer, Quantitative    Collection Time: 03/01/21  4:10 AM   Result Value Ref Range    D-Dimer, Quant 713 ng/mL DDU       Radiology and other tests reviewed:  XR CHEST PORTABLE   Final Result   No interval change      CTA PULMONARY W CONTRAST   Final Result   No convincing evidence of a pulmonary embolism. Significant progression in the ground-glass opacities scattered throughout   both lungs when compared with February 10, 2021. Findings are suspicious for   multifocal pneumonia. Stable calcified mass in the central canal of the thoracic spine. Distended esophagus filled with undigested material, placing the patient at   risk for aspiration. Additional findings as described. XR CHEST PORTABLE   Final Result   Grossly unchanged patchy bilateral airspace opacities compared with February 14 2021. Findings may be on the basis of multifocal pneumonia.           Assessment:  Active Hospital Problems    Diagnosis Date Noted    Apathy [R45.3]     Encounter for hospice care discussion [Z71.89]     Mild protein-calorie malnutrition ([facility] [address] [E44.1] 02/26/2021    History of 2019 novel coronavirus disease (COVID-19) [Z86.16]     Acute on chronic respiratory failure with hypoxia (HCC) [J96.21] 02/24/2021    Oral yeast infection [B37.0]     Depression [F32.9] 02/12/2021    Sore throat [J02.9] 02/09/2021    COVID-19 [U07.1] 02/08/2021    HAP (hospital-acquired pneumonia) [J18.9, Y95] 11/13/2018    Essential hypertension [I10] 05/23/2016    Multiple closed fractures of ribs of both sides with routine healing [S22.43XD] 12/16/2014    Anemia [D64.9] 07/26/2014    Hyponatremia [E87.1] 03/25/2014    Hypothyroidism [E03.9] 06/10/2011    Rheumatoid arthritis ([facility] [address] [M06.9] 06/10/2011    A-fib ([facility] [address] [I48.91] 06/09/2011     Plan:    Acute on chronic respiratory failure with hypoxia   Likely secondary to recent COVID-19 infection, hx of ILD,currently on 8L of oxygen, home baseline O2 3 L, PT/OT AM-PAC Score 9/24, 13/24 respectively  -Switched to Solumedrol 40 mg BID as per pulm   -Telemetry monitoring  -Respiratory panel negative   -Zinc 50 mg daily  -Thiamine 100 mg daily  -Vitamin C 500 mg daily  -CRP every other day  -CBC daily  -CMP daily  -Albuterol sulfate inhaler 2 puffs QID PRN and Symbicort inhaler 2 puffs BID    -Wean as tolerated    Volume overload with hx of HFpEF  Echocardiogram showed EF 65%, indeterminate diastolic dysfunction, mild mitral regurgitation, and mild aortic regurgitation  -Lasix 40 mg daily     Hx of HTN   Stopped home HCTZ 25 mg QD on 02/25 due to hyponatremia (now resolved) and concurrent use of PO Lasix 40 mg QD  -Valsartan 160 mg daily     Macrocytic anemia   Seen by Hem/Onc during last admission, anemia likely due to methotrexate use.    -Folic acid 1 mg QD  -Vitamin B12 1000 mcg QD   -Iron 325 mg every other day     Hx of Atrial fibrillation  Rate controlled  -Flecainide 50 mg BID   -Lopressor 25 mg BID      Hx of Hypothyroidism   -Synthroid 100 mcg daily     Hx of Rheumatoid Arthritis  -Celebrex 200 mg daily   -Wellcovorin 25 mg tablet weekly (on Tuesdays)  -Methotrexate weekly on Mondays ( 5 mg at lunch and 10 mg in evening)     Multiple closed fractures of both sides of ribs   -Lidocaine patch Q12HR     Oral yeast infection   Treated by ID during last admission   -Nystatin suspension 500,000 units QID for 2 more days to complete 10 day course      Hx of Depression   -Cymbalta 40 mg daily     Continue additional home medications:  -Colace 100 mg daily  -Pepcid 20 mg nightly   -Gabapentin 100 mg BID   -Zyrtec 10 mg QD   -Vitamin D 2000 units daily      Pain Control: Tylenol 650 mg Q6HR PRN for mild pain  DVT ppx: Xarelto 15 mg daily   GI ppx: Protonix 40 mg daily  Code Status: Full  Diet: General diet      Kenroy Horvath MD  Family Medicine Resident PGY-1  03/01/21   9:08 PM

## 2021-03-02 NOTE — PROGRESS NOTES
polyethylene glycol  17 g Oral Daily    nystatin  5 mL Oral 4x Daily    triamcinolone acetonide   Mouth/Throat TID    furosemide  40 mg Oral Daily    celecoxib  200 mg Oral Daily    docusate sodium  100 mg Oral Daily    DULoxetine  40 mg Oral Daily    ferrous sulfate  325 mg Oral Every Other Day    cetirizine  10 mg Oral Daily    flecainide  50 mg Oral BID    folic acid  1 mg Oral Daily    ascorbic acid  500 mg Oral Daily    gabapentin  100 mg Oral BID    leucovorin calcium  25 mg Oral Weekly    levothyroxine  100 mcg Oral Daily    lidocaine  1 patch Transdermal Daily    methotrexate  5 mg Oral Weekly    metoprolol tartrate  25 mg Oral BID    pantoprazole  40 mg Oral BID AC    rivaroxaban  15 mg Oral Daily with breakfast    thiamine  100 mg Oral Daily    vitamin B-12  1,000 mcg Oral Daily    vitamin D  2,000 Units Oral Daily    zinc sulfate  50 mg Oral Daily    sodium chloride flush  10 mL Intravenous 2 times per day    famotidine  20 mg Oral Nightly    valsartan  160 mg Oral Daily    methotrexate  10 mg Oral Weekly       Physical Exam:  General Appearance: appears comfortable in no acute distress. HEENT: Normocephalic atraumatic without obvious abnormality   Neck: Lips, mucosa, and tongue normal.  Supple, symmetrical, trachea midline, no adenopathy;thyroid:  no enlargement/tenderness/nodules or JVD. Lung: Breath sounds diminished few basilar crackles. Respirations   unlabored. Symmetrical expansion. Heart: RRR, normal S1, S2. No MRG  Abdomen: Soft, NT, ND. BS present x 4 quadrants. No bruit or organomegaly. Extremities: Pedal pulses 2+ symmetric b/l. Extremities normal, no cyanosis, clubbing, or edema. Musculokeletal: No joint swelling, no muscle tenderness. ROM normal in all joints of extremities. Neurologic: Mental status: Alert and Oriented X3 .     Pertinent/ New Labs and Imaging Studies     Imaging Personally Reviewed:     Chest x-ray 2/26/2021  Multifocal infiltrates and interstitial changes are unchanged.  Heart is   mildly enlarged.  There are no effusions.       Impression   No interval change         CTA chest   No convincing evidence of a pulmonary embolism. Significant progression in the ground-glass opacities scattered throughout   both lungs when compared with February 10, 2021.  Findings are suspicious for   multifocal pneumonia. Stable calcified mass in the central canal of the thoracic spine. Distended esophagus filled with undigested material, placing the patient at   risk for aspiration. Additional findings as described.               ECHO   Conclusions      Summary   Left ventricular internal dimensions were normal in diastole and systole. No regional wall motion abnormalities seen. Normal left ventricular ejection fraction. The left atrium is borderline dilated. Moderate mitral annular calcification. Mild mitral regurgitation is present. The aortic valve appears mildly sclerotic. Mild aortic regurgitation is noted. Labs:  Lab Results   Component Value Date    WBC 17.4 03/02/2021    HGB 10.1 03/02/2021    HCT 33.2 03/02/2021    .7 03/02/2021    MCH 32.2 03/02/2021    MCHC 30.4 03/02/2021    RDW 15.2 03/02/2021     03/02/2021    MPV 9.9 03/02/2021     Lab Results   Component Value Date     03/02/2021    K 3.8 03/02/2021     03/02/2021    CO2 31 03/02/2021    BUN 58 03/02/2021    CREATININE 0.9 03/02/2021    LABALBU 3.3 03/02/2021    LABALBU 4.0 04/24/2012    CALCIUM 9.4 03/02/2021    GFRAA >60 03/02/2021    LABGLOM 59 03/02/2021     Lab Results   Component Value Date    PROTIME 12.2 03/01/2021    PROTIME 15.8 05/12/2014    INR 1.1 03/01/2021     No results for input(s): PROBNP in the last 72 hours. No results for input(s): PROCAL in the last 72 hours. This SmartLink has not been configured with any valid records. Micro:  No results for input(s): CULTRESP in the last 72 hours.   No results for input(s): LABGRAM in the last 72 hours. No results for input(s): LEGUR in the last 72 hours. No results for input(s): STREPNEUMAGU in the last 72 hours. No results for input(s): LP1UAG in the last 72 hours. Assessment:    1. Acute on chronic hypoxic respiratory failure   2. Hx of recent Covid 19 pneumonitis 2/8/21 positive test with possible pneumonitis  3. 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 Multilobar ground glass opacities likely multifactorial ? chf with pulmonary edema with superimposed previous ggo from covid. 5. Elevated bnp  6. Leukocytosis likely secondary to steroids   7. Hiatal hernia   8. hafsa   9. Multiple rib fractures   10. RA on mtx   11. Immunocompromised   12. Anemia   13. A fib  14. Oral candida      Plan:   1. We FiO2 for saturations above 92%, now on 6 L   2. Will need repeat O2 testing prior to discharge  3. bipap prn for respiratory support-did not use last night  4. Trend proBNP, Lasix dosing-now on 40 mg p.o. daily  5. Continue to monitor off antibiotics  6. Continue vitamin protocol, completed dexamethasone and antiviral medication  7. Solu-Medrol IV steroids to 40 mg IV twice daily  8. DVT GI prophylaxis-Xarelto, Protonix  9. Add IS , add home nebulizer poor inspiratory effort due to COVID pneumonia with albuterol PRN      Electronically signed by FRAN Cuevsa on 3/2/2021 at 4:02 PM    I personally saw, examined, and cared for the patient. Labs, medications, radiographs reviewed. I agree with history exam and plans detailed in NP note.   Justine Chen MD

## 2021-03-02 NOTE — PROGRESS NOTES
[facility] 450  Progress Note    Chief complaint :  Chief Complaint   Patient presents with    Shortness of Breath    Positive For Covid-19     Subjective:    No overnight problems. Patient describes feeling tired. She was being transferred from the bed to the chair and was feeling a little short of breath. She denies chest pain, palpitations,nausea, vomiting, diarrhea. Past medical, surgical, family and social history were reviewed, non-contributory, and unchanged unless otherwise stated. Review of Systems   Constitutional: Negative for activity change and fever. Respiratory: Negative for cough and chest tightness. Cardiovascular: Negative for chest pain, palpitations and leg swelling. Gastrointestinal: Negative for abdominal pain, diarrhea and nausea. Musculoskeletal: Negative for arthralgias and myalgias. Neurological: Negative for dizziness, syncope, weakness and headaches. Psychiatric/Behavioral: Negative for agitation. The patient is not nervous/anxious. Objective:  BP (!) 182/82   Pulse 79   Temp 97.6 °F (36.4 °C) (Temporal)   Resp 16   Ht 5' (1.524 m)   Wt 139 lb 9.6 oz (63.3 kg)   SpO2 96%   BMI 27.26 kg/m²     Physical Exam  Constitutional:       Appearance: Normal appearance. HENT:      Head: Normocephalic and atraumatic. Cardiovascular:      Rate and Rhythm: Normal rate and regular rhythm. Pulses: Normal pulses. Heart sounds: Normal heart sounds. Pulmonary:      Effort: Pulmonary effort is normal.      Breath sounds: Normal breath sounds. No wheezing or rales. Abdominal:      General: Bowel sounds are normal. There is no distension. Tenderness: There is no abdominal tenderness. Neurological:      General: No focal deficit present. Mental Status: She is alert and oriented to person, place, and time.    Psychiatric:         Mood and Affect: Mood normal.       Labs:  Recent Results (from the past 24 hour(s)) Comprehensive Metabolic Panel w/ Reflex to MG    Collection Time: 03/02/21 11:45 AM   Result Value Ref Range    Sodium 142 132 - 146 mmol/L    Potassium reflex Magnesium 3.8 3.5 - 5.0 mmol/L    Chloride 101 98 - 107 mmol/L    CO2 31 (H) 22 - 29 mmol/L    Anion Gap 10 7 - 16 mmol/L    Glucose 117 (H) 74 - 99 mg/dL    BUN 58 (H) 8 - 23 mg/dL    CREATININE 0.9 0.5 - 1.0 mg/dL    GFR Non-African American 59 >=60 mL/min/1.73    GFR African American >60     Calcium 9.4 8.6 - 10.2 mg/dL    Total Protein 6.5 6.4 - 8.3 g/dL    Albumin 3.3 (L) 3.5 - 5.2 g/dL    Total Bilirubin 0.4 0.0 - 1.2 mg/dL    Alkaline Phosphatase 101 35 - 104 U/L    ALT 19 0 - 32 U/L    AST 20 0 - 31 U/L   CBC auto differential    Collection Time: 03/02/21 11:45 AM   Result Value Ref Range    WBC 17.4 (H) 4.5 - 11.5 E9/L    RBC 3.14 (L) 3.50 - 5.50 E12/L    Hemoglobin 10.1 (L) 11.5 - 15.5 g/dL    Hematocrit 33.2 (L) 34.0 - 48.0 %    .7 (H) 80.0 - 99.9 fL    MCH 32.2 26.0 - 35.0 pg    MCHC 30.4 (L) 32.0 - 34.5 %    RDW 15.2 (H) 11.5 - 15.0 fL    Platelets 229 914 - 848 E9/L    MPV 9.9 7.0 - 12.0 fL    Neutrophils % 88.7 (H) 43.0 - 80.0 %    Lymphocytes % 7.0 (L) 20.0 - 42.0 %    Monocytes % 4.3 2.0 - 12.0 %    Eosinophils % 0.0 0.0 - 6.0 %    Basophils % 0.0 0.0 - 2.0 %    Neutrophils Absolute 15.49 (H) 1.80 - 7.30 E9/L    Lymphocytes Absolute 1.22 (L) 1.50 - 4.00 E9/L    Monocytes Absolute 0.70 0.10 - 0.95 E9/L    Eosinophils Absolute 0.00 (L) 0.05 - 0.50 E9/L    Basophils Absolute 0.00 0.00 - 0.20 E9/L    nRBC 1.7 /100 WBC    Anisocytosis 1+     Polychromasia 1+     Hypochromia 1+     Poikilocytes 1+     Stomatocytes 1+      Radiology and other tests reviewed:  XR CHEST PORTABLE   Final Result   No interval change      CTA PULMONARY W CONTRAST   Final Result   No convincing evidence of a pulmonary embolism. Significant progression in the ground-glass opacities scattered throughout   both lungs when compared with February 10, 2021.   Findings are suspicious for   multifocal pneumonia. Stable calcified mass in the central canal of the thoracic spine. Distended esophagus filled with undigested material, placing the patient at   risk for aspiration. Additional findings as described. XR CHEST PORTABLE   Final Result   Grossly unchanged patchy bilateral airspace opacities compared with February 14 2021. Findings may be on the basis of multifocal pneumonia.         Assessment:  Active Hospital Problems    Diagnosis Date Noted    Apathy [R45.3]     Encounter for hospice care discussion [Z71.89]     Mild protein-calorie malnutrition ([facility] [E44.1] 02/26/2021    History of 2019 novel coronavirus disease (COVID-19) [Z86.16]     Acute on chronic respiratory failure with hypoxia (HCC) [J96.21] 02/24/2021    Oral yeast infection [B37.0]     Depression [F32.9] 02/12/2021    Sore throat [J02.9] 02/09/2021    COVID-19 [U07.1] 02/08/2021    HAP (hospital-acquired pneumonia) [J18.9, Y95] 11/13/2018    Essential hypertension [I10] 05/23/2016    Multiple closed fractures of ribs of both sides with routine healing [S22.43XD] 12/16/2014    Anemia [D64.9] 07/26/2014    Hyponatremia [E87.1] 03/25/2014    Hypothyroidism [E03.9] 06/10/2011    Rheumatoid arthritis ([facility] [M06.9] 06/10/2011    A-fib ([facility] [I48.91] 06/09/2011     Plan:    Acute on chronic respiratory failure with hypoxia   Likely secondary to recent COVID-19 infection, hx of ILD,currently on 4L of oxygen, home baseline O2 3 L, PT/OT AM-PAC Score 7/24, 13/24 respectively  -Solumedrol 40 mg BID as per pulm   -Telemetry monitoring  -Respiratory panel negative   -Zinc 50 mg daily  -Thiamine 100 mg daily  -Vitamin C 500 mg daily  -CRP every other day  -CBC daily  -CMP daily  -Albuterol sulfate inhaler 2 puffs QID PRN and Symbicort inhaler 2 puffs BID    -Wean as tolerated    Volume overload with hx of HFpEF  Echocardiogram showed EF 65%, indeterminate diastolic dysfunction, mild mitral regurgitation, and mild aortic regurgitation  -Lasix 40 mg daily     Hx of HTN   Stopped home HCTZ 25 mg QD on 02/25 due to hyponatremia (now resolved) and concurrent use of PO Lasix 40 mg QD  -Valsartan 160 mg daily     Macrocytic anemia   Seen by Hem/Onc during last admission, anemia likely due to methotrexate use.    -Folic acid 1 mg QD  -Vitamin B12 1000 mcg QD   -Iron 325 mg every other day     Hx of Atrial fibrillation  Rate controlled  -Flecainide 50 mg BID   -Lopressor 25 mg BID      Hx of Hypothyroidism   -Synthroid 100 mcg daily     Hx of Rheumatoid Arthritis  -Celebrex 200 mg daily   -Wellcovorin 25 mg tablet weekly (on Tuesdays)  -Methotrexate weekly on Mondays ( 5 mg at lunch and 10 mg in evening)     Multiple closed fractures of both sides of ribs   -Lidocaine patch Q12HR     Oral yeast infection   Treated by ID during last admission   -Nystatin suspension 500,000 units QID for 2 more days to complete 10 day course      Hx of Depression   -Cymbalta 40 mg daily     Continue additional home medications:  -Colace 100 mg daily  -Pepcid 20 mg nightly   -Gabapentin 100 mg BID   -Zyrtec 10 mg QD   -Vitamin D 2000 units daily      Pain Control: Tylenol 650 mg Q6HR PRN for mild pain  DVT ppx: Xarelto 15 mg daily   GI ppx: Protonix 40 mg daily  Code Status: Full  Diet: General diet      Ines Hernández MD  Family Medicine Resident PGY-1  03/02/21   5:37 PM

## 2021-03-02 NOTE — CARE COORDINATION
CASE MANAGEMENT. .. Chart reviewed. COVID NEG 2/24, 2/25. Patient continues to require hiflow o2. Increased from 7lnc to [address] hiflow. Nursing to wean as tolerated. Baseline is 3lnc thru [facility]. iv solumedrol 40mg decreased to q12hrs. Per our conversation yesterday, plan is home with 24hr caregivers and [facility]. Will cont to follow and assist with needs accordingly. OF NOTE. .. Patients home o2 tank can accommodate up to [address]. Confirmed with Brittney from [facility] that if patient has increased o2 requirements a portable o2 tank >5lnc can be obtained, but updated testing will be needed. Per conversation with family med, patient is not ready for discharge anytime soon.  Will follow

## 2021-03-03 LAB
ALBUMIN SERPL-MCNC: 2.9 G/DL (ref 3.5–5.2)
ALP BLD-CCNC: 87 U/L (ref 35–104)
ALT SERPL-CCNC: 18 U/L (ref 0–32)
ANION GAP SERPL CALCULATED.3IONS-SCNC: 10 MMOL/L (ref 7–16)
ANISOCYTOSIS: ABNORMAL
AST SERPL-CCNC: 17 U/L (ref 0–31)
BASOPHILS ABSOLUTE: 0.02 E9/L (ref 0–0.2)
BASOPHILS RELATIVE PERCENT: 0.1 % (ref 0–2)
BILIRUB SERPL-MCNC: 0.4 MG/DL (ref 0–1.2)
BUN BLDV-MCNC: 60 MG/DL (ref 8–23)
CALCIUM SERPL-MCNC: 9.2 MG/DL (ref 8.6–10.2)
CHLORIDE BLD-SCNC: 99 MMOL/L (ref 98–107)
CO2: 30 MMOL/L (ref 22–29)
CREAT SERPL-MCNC: 0.9 MG/DL (ref 0.5–1)
EOSINOPHILS ABSOLUTE: 0 E9/L (ref 0.05–0.5)
EOSINOPHILS RELATIVE PERCENT: 0 % (ref 0–6)
GFR AFRICAN AMERICAN: >60
GFR NON-AFRICAN AMERICAN: 59 ML/MIN/1.73
GLUCOSE BLD-MCNC: 137 MG/DL (ref 74–99)
HCT VFR BLD CALC: 29.4 % (ref 34–48)
HEMOGLOBIN: 8.9 G/DL (ref 11.5–15.5)
HYPOCHROMIA: ABNORMAL
IMMATURE GRANULOCYTES #: 0.36 E9/L
IMMATURE GRANULOCYTES %: 2.2 % (ref 0–5)
LYMPHOCYTES ABSOLUTE: 0.33 E9/L (ref 1.5–4)
LYMPHOCYTES RELATIVE PERCENT: 2.1 % (ref 20–42)
MCH RBC QN AUTO: 31.8 PG (ref 26–35)
MCHC RBC AUTO-ENTMCNC: 30.3 % (ref 32–34.5)
MCV RBC AUTO: 105 FL (ref 80–99.9)
MONOCYTES ABSOLUTE: 0.43 E9/L (ref 0.1–0.95)
MONOCYTES RELATIVE PERCENT: 2.7 % (ref 2–12)
NEUTROPHILS ABSOLUTE: 14.91 E9/L (ref 1.8–7.3)
NEUTROPHILS RELATIVE PERCENT: 92.9 % (ref 43–80)
PDW BLD-RTO: 15.3 FL (ref 11.5–15)
PLATELET # BLD: 358 E9/L (ref 130–450)
PMV BLD AUTO: 10.5 FL (ref 7–12)
POIKILOCYTES: ABNORMAL
POLYCHROMASIA: ABNORMAL
POTASSIUM REFLEX MAGNESIUM: 3.9 MMOL/L (ref 3.5–5)
PRO-BNP: 1508 PG/ML (ref 0–450)
RBC # BLD: 2.8 E12/L (ref 3.5–5.5)
SODIUM BLD-SCNC: 139 MMOL/L (ref 132–146)
TEAR DROP CELLS: ABNORMAL
TOTAL PROTEIN: 5.7 G/DL (ref 6.4–8.3)
WBC # BLD: 16.1 E9/L (ref 4.5–11.5)

## 2021-03-03 PROCEDURE — 2700000000 HC OXYGEN THERAPY PER DAY

## 2021-03-03 PROCEDURE — 6360000002 HC RX W HCPCS: Performed by: INTERNAL MEDICINE

## 2021-03-03 PROCEDURE — 85025 COMPLETE CBC W/AUTO DIFF WBC: CPT

## 2021-03-03 PROCEDURE — 99232 SBSQ HOSP IP/OBS MODERATE 35: CPT | Performed by: FAMILY MEDICINE

## 2021-03-03 PROCEDURE — 36415 COLL VENOUS BLD VENIPUNCTURE: CPT

## 2021-03-03 PROCEDURE — 6370000000 HC RX 637 (ALT 250 FOR IP): Performed by: FAMILY MEDICINE

## 2021-03-03 PROCEDURE — 2580000003 HC RX 258: Performed by: FAMILY MEDICINE

## 2021-03-03 PROCEDURE — 80053 COMPREHEN METABOLIC PANEL: CPT

## 2021-03-03 PROCEDURE — 94640 AIRWAY INHALATION TREATMENT: CPT

## 2021-03-03 PROCEDURE — 2060000000 HC ICU INTERMEDIATE R&B

## 2021-03-03 PROCEDURE — 94660 CPAP INITIATION&MGMT: CPT

## 2021-03-03 PROCEDURE — 83880 ASSAY OF NATRIURETIC PEPTIDE: CPT

## 2021-03-03 PROCEDURE — 6370000000 HC RX 637 (ALT 250 FOR IP): Performed by: STUDENT IN AN ORGANIZED HEALTH CARE EDUCATION/TRAINING PROGRAM

## 2021-03-03 PROCEDURE — 6370000000 HC RX 637 (ALT 250 FOR IP): Performed by: NURSE PRACTITIONER

## 2021-03-03 RX ADMIN — METHYLPREDNISOLONE SODIUM SUCCINATE 40 MG: 40 INJECTION, POWDER, LYOPHILIZED, FOR SOLUTION INTRAMUSCULAR; INTRAVENOUS at 23:42

## 2021-03-03 RX ADMIN — FAMOTIDINE 20 MG: 20 TABLET ORAL at 20:19

## 2021-03-03 RX ADMIN — NYSTATIN 500000 UNITS: 500000 SUSPENSION ORAL at 08:34

## 2021-03-03 RX ADMIN — DOCUSATE SODIUM 100 MG: 100 CAPSULE, LIQUID FILLED ORAL at 08:34

## 2021-03-03 RX ADMIN — PANTOPRAZOLE SODIUM 40 MG: 40 TABLET, DELAYED RELEASE ORAL at 06:23

## 2021-03-03 RX ADMIN — DULOXETINE HYDROCHLORIDE 40 MG: 60 CAPSULE, DELAYED RELEASE ORAL at 08:33

## 2021-03-03 RX ADMIN — NYSTATIN 500000 UNITS: 500000 SUSPENSION ORAL at 12:01

## 2021-03-03 RX ADMIN — ARFORMOTEROL TARTRATE 15 MCG: 15 SOLUTION RESPIRATORY (INHALATION) at 09:45

## 2021-03-03 RX ADMIN — SODIUM CHLORIDE, PRESERVATIVE FREE 10 ML: 5 INJECTION INTRAVENOUS at 20:19

## 2021-03-03 RX ADMIN — PETROLATUM: 420 OINTMENT TOPICAL at 16:52

## 2021-03-03 RX ADMIN — PETROLATUM: 420 OINTMENT TOPICAL at 08:34

## 2021-03-03 RX ADMIN — ARFORMOTEROL TARTRATE 15 MCG: 15 SOLUTION RESPIRATORY (INHALATION) at 20:25

## 2021-03-03 RX ADMIN — TRIAMCINOLONE ACETONIDE: 1 PASTE DENTAL at 14:46

## 2021-03-03 RX ADMIN — SODIUM CHLORIDE, PRESERVATIVE FREE 10 ML: 5 INJECTION INTRAVENOUS at 08:34

## 2021-03-03 RX ADMIN — METHYLPREDNISOLONE SODIUM SUCCINATE 40 MG: 40 INJECTION, POWDER, LYOPHILIZED, FOR SOLUTION INTRAMUSCULAR; INTRAVENOUS at 12:01

## 2021-03-03 RX ADMIN — BUDESONIDE 500 MCG: 0.5 SUSPENSION RESPIRATORY (INHALATION) at 20:25

## 2021-03-03 RX ADMIN — Medication 100 MG: at 08:33

## 2021-03-03 RX ADMIN — VALSARTAN 160 MG: 160 TABLET, FILM COATED ORAL at 08:33

## 2021-03-03 RX ADMIN — TRIAMCINOLONE ACETONIDE: 1 PASTE DENTAL at 20:19

## 2021-03-03 RX ADMIN — OXYCODONE HYDROCHLORIDE AND ACETAMINOPHEN 500 MG: 500 TABLET ORAL at 08:34

## 2021-03-03 RX ADMIN — FOLIC ACID 1 MG: 1 TABLET ORAL at 08:34

## 2021-03-03 RX ADMIN — NYSTATIN 500000 UNITS: 500000 SUSPENSION ORAL at 20:19

## 2021-03-03 RX ADMIN — CYANOCOBALAMIN TAB 1000 MCG 1000 MCG: 1000 TAB at 08:34

## 2021-03-03 RX ADMIN — RIVAROXABAN 15 MG: 15 TABLET, FILM COATED ORAL at 08:34

## 2021-03-03 RX ADMIN — TRIAMCINOLONE ACETONIDE: 1 PASTE DENTAL at 08:34

## 2021-03-03 RX ADMIN — POLYETHYLENE GLYCOL 3350 17 G: 17 POWDER, FOR SOLUTION ORAL at 08:33

## 2021-03-03 RX ADMIN — NYSTATIN 500000 UNITS: 500000 SUSPENSION ORAL at 16:52

## 2021-03-03 RX ADMIN — FLECAINIDE ACETATE 50 MG: 50 TABLET ORAL at 10:23

## 2021-03-03 RX ADMIN — Medication 2000 UNITS: at 08:49

## 2021-03-03 RX ADMIN — ZINC SULFATE 220 MG (50 MG) CAPSULE 50 MG: CAPSULE at 08:34

## 2021-03-03 RX ADMIN — GABAPENTIN 100 MG: 100 CAPSULE ORAL at 20:19

## 2021-03-03 RX ADMIN — BUDESONIDE 500 MCG: 0.5 SUSPENSION RESPIRATORY (INHALATION) at 09:46

## 2021-03-03 RX ADMIN — LEVOTHYROXINE SODIUM 100 MCG: 100 TABLET ORAL at 06:23

## 2021-03-03 RX ADMIN — FUROSEMIDE 40 MG: 40 TABLET ORAL at 08:34

## 2021-03-03 RX ADMIN — CETIRIZINE HYDROCHLORIDE 10 MG: 10 TABLET, FILM COATED ORAL at 08:34

## 2021-03-03 RX ADMIN — GABAPENTIN 100 MG: 100 CAPSULE ORAL at 08:33

## 2021-03-03 RX ADMIN — PANTOPRAZOLE SODIUM 40 MG: 40 TABLET, DELAYED RELEASE ORAL at 16:52

## 2021-03-03 RX ADMIN — FLECAINIDE ACETATE 50 MG: 50 TABLET ORAL at 20:19

## 2021-03-03 RX ADMIN — METOPROLOL TARTRATE 25 MG: 25 TABLET, FILM COATED ORAL at 08:34

## 2021-03-03 RX ADMIN — CELECOXIB 200 MG: 100 CAPSULE ORAL at 08:34

## 2021-03-03 RX ADMIN — METOPROLOL TARTRATE 25 MG: 25 TABLET, FILM COATED ORAL at 20:19

## 2021-03-03 ASSESSMENT — ENCOUNTER SYMPTOMS
NAUSEA: 0
DIARRHEA: 0
CHEST TIGHTNESS: 0
COUGH: 0
ABDOMINAL PAIN: 0

## 2021-03-03 ASSESSMENT — PAIN SCALES - GENERAL
PAINLEVEL_OUTOF10: 0

## 2021-03-03 NOTE — PROGRESS NOTES
Consulted to rotate this patient's IV site. Patient has very difficult IV access. Per INS standards and [facility] Policy/Procedure for changing IV sites, site will not be rotated at this time as it is asymptomatic. No redness or swelling noted, patient denies tenderness at site. IV site flushes easily. Patient denies any discomfort with flushing. Educated patient/family to inform staff if it becomes symptomatic and it will then need to be removed. Patient/family verbalized understanding. Floor staff notified. Sena D. Ardia Gottron

## 2021-03-03 NOTE — CARE COORDINATION
Social Work/Discharge Planning:  Nebulizer order noted. Met with patient and her daughter Kika Machado and confirmed they prefer to use [facility] for the nebulizer. Referral made to vishnu Jackson with [facility]. Patient is currently requiring 6 liters of oxygen and RN to wean as tolerated. Patient will need increase oxygen order if she is requiring more than baseline of 3 liters. Will continue to follow.   Electronically signed by JU Jiménez on 3/3/2021 at 10:32 AM

## 2021-03-03 NOTE — PROGRESS NOTES
Farhat Rooney M.D.,[facility]  Ronny Morales D.O., F.A.C.O.I., Kacie Valladares M.D. Nilsa Vines M.D., Liza Choi M.D. Gabriel Madsen D.O. Daily Pulmonary Progress Note    Patient:  Phyllis Metz 80 y.o. female MRN: [id number]     Date of Service: 3/3/2021      Synopsis     We are following patient for respiratory failure with hypoxia, recent COVID-19 pneumonia    \"CC\" shortness of breath    Code status: Full      Subjective      Patient was personally seen and examined. No acute events overnight. Patient saturating 96 on 2 L of oxygen. . Continue to wean oxygen laying in bed was up to chair this am  her daughter is at bedside. Patient complains of feeling more fatigued today. Review of Systems:  Constitutional: Refer severe fatigue and weakness  Skin: Denies pigmentation, dark lesions, and rashes   HEENT: Denies hearing loss, tinnitus, ear drainage, epistaxis, sore throat, and hoarseness. Cardiovascular: Denies palpitations, chest pain, and chest pressure. Respiratory: Denies   hemoptysis, apnea, and choking.   Gastrointestinal: Denies nausea, vomiting, poor appetite, diarrhea, heartburn or reflux  Genitourinary: Denies dysuria, frequency, urgency or hematuria  Musculoskeletal: Denies myalgias, muscle weakness, and bone pain    24-hour events:  None    Objective   Vitals: BP (!) 147/64   Pulse 77   Temp 98 °F (36.7 °C) (Temporal)   Resp 16   Ht 5' (1.524 m)   Wt 144 lb 9.6 oz (65.6 kg)   SpO2 99%   BMI 28.24 kg/m²     I/O:    Intake/Output Summary (Last 24 hours) at 3/3/2021 1604  Last data filed at 3/3/2021 1529  Gross per 24 hour   Intake 360 ml   Output 1100 ml   Net -740 ml       Vent Information  Skin Assessment: Clean, dry, & intact  SpO2: 99 %                CURRENT MEDS :  Scheduled Meds:   methylPREDNISolone  40 mg Intravenous Q12H    white petrolatum   Topical BID    budesonide  500 mcg Nebulization BID    Arformoterol Tartrate  15 mcg Nebulization BID    polyethylene glycol  17 g Oral Daily    nystatin  5 mL Oral 4x Daily    triamcinolone acetonide   Mouth/Throat TID    furosemide  40 mg Oral Daily    celecoxib  200 mg Oral Daily    docusate sodium  100 mg Oral Daily    DULoxetine  40 mg Oral Daily    ferrous sulfate  325 mg Oral Every Other Day    cetirizine  10 mg Oral Daily    flecainide  50 mg Oral BID    folic acid  1 mg Oral Daily    ascorbic acid  500 mg Oral Daily    gabapentin  100 mg Oral BID    leucovorin calcium  25 mg Oral Weekly    levothyroxine  100 mcg Oral Daily    lidocaine  1 patch Transdermal Daily    methotrexate  5 mg Oral Weekly    metoprolol tartrate  25 mg Oral BID    pantoprazole  40 mg Oral BID AC    rivaroxaban  15 mg Oral Daily with breakfast    thiamine  100 mg Oral Daily    vitamin B-12  1,000 mcg Oral Daily    vitamin D  2,000 Units Oral Daily    zinc sulfate  50 mg Oral Daily    sodium chloride flush  10 mL Intravenous 2 times per day    famotidine  20 mg Oral Nightly    valsartan  160 mg Oral Daily    methotrexate  10 mg Oral Weekly       Physical Exam:  General Appearance: appears comfortable in no acute distress. HEENT: Normocephalic atraumatic without obvious abnormality   Neck: Lips, mucosa, and tongue normal.  Supple, symmetrical, trachea midline, no adenopathy;thyroid:  no enlargement/tenderness/nodules or JVD. Lung: Breath sounds diminished few basilar crackles. Respirations   unlabored. Symmetrical expansion. Heart: RRR, normal S1, S2. No MRG  Abdomen: Soft, NT, ND. BS present x 4 quadrants. No bruit or organomegaly. Extremities: Pedal pulses 2+ symmetric b/l. Extremities normal, no cyanosis, clubbing, or edema. Musculokeletal: No joint swelling, no muscle tenderness. ROM normal in all joints of extremities. Neurologic: Mental status: Alert and Oriented X3 .     Pertinent/ New Labs and Imaging Studies     Imaging Personally Reviewed:     Chest x-ray 2/26/2021  Multifocal infiltrates and interstitial changes are unchanged.  Heart is   mildly enlarged.  There are no effusions.       Impression   No interval change         CTA chest   No convincing evidence of a pulmonary embolism. Significant progression in the ground-glass opacities scattered throughout   both lungs when compared with February 10, 2021.  Findings are suspicious for   multifocal pneumonia. Stable calcified mass in the central canal of the thoracic spine. Distended esophagus filled with undigested material, placing the patient at   risk for aspiration. Additional findings as described.               ECHO   Conclusions      Summary   Left ventricular internal dimensions were normal in diastole and systole. No regional wall motion abnormalities seen. Normal left ventricular ejection fraction. The left atrium is borderline dilated. Moderate mitral annular calcification. Mild mitral regurgitation is present. The aortic valve appears mildly sclerotic. Mild aortic regurgitation is noted. Labs:  Lab Results   Component Value Date    WBC 16.1 03/03/2021    HGB 8.9 03/03/2021    HCT 29.4 03/03/2021    .0 03/03/2021    MCH 31.8 03/03/2021    MCHC 30.3 03/03/2021    RDW 15.3 03/03/2021     03/03/2021    MPV 10.5 03/03/2021     Lab Results   Component Value Date     03/03/2021    K 3.9 03/03/2021    CL 99 03/03/2021    CO2 30 03/03/2021    BUN 60 03/03/2021    CREATININE 0.9 03/03/2021    LABALBU 2.9 03/03/2021    LABALBU 4.0 04/24/2012    CALCIUM 9.2 03/03/2021    GFRAA >60 03/03/2021    LABGLOM 59 03/03/2021     Lab Results   Component Value Date    PROTIME 12.2 03/01/2021    PROTIME 15.8 05/12/2014    INR 1.1 03/01/2021     Recent Labs     03/03/21  1250   PROBNP 1,508*     No results for input(s): PROCAL in the last 72 hours. This SmartLink has not been configured with any valid records. Micro:  No results for input(s): CULTRESP in the last 72 hours.   No results for input(s): LABGRAM in the last 72 hours. No results for input(s): LEGUR in the last 72 hours. No results for input(s): STREPNEUMAGU in the last 72 hours. No results for input(s): LP1UAG in the last 72 hours. Assessment:    1. Acute on chronic hypoxic respiratory failure   2. Hx of recent Covid 19 pneumonitis 2/8/21 positive test with possible pneumonitis  3. 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 Multilobar ground glass opacities likely multifactorial ? chf with pulmonary edema with superimposed previous ggo from covid. 5. Elevated bnp  6. Leukocytosis likely secondary to steroids   7. Hiatal hernia   8. hafsa   9. Multiple rib fractures   10. RA on mtx   11. Immunocompromised   12. Anemia   13. A fib  14. Oral candida      Plan:   1. We FiO2 for saturations above 92%, now on 4 L , she wears 3 L as her home liter flow  2. Will need repeat O2 testing prior to discharge  3. bipap prn for respiratory support-did not use last night  4. Trend proBNP today at 1508, Lasix dosing-now on 40 mg p.o. daily  5. Continue to monitor off antibiotics  6. Continue vitamin protocol, completed dexamethasone and antiviral medication  7. Solu-Medrol IV steroids to 40 mg IV twice daily  8. DVT GI prophylaxis-Xarelto, Protonix  9.  IS , add home nebulizer poor inspiratory effort due to COVID pneumonia with albuterol PRN      Electronically signed by FRAN Yu on 3/3/2021 at 4:04 PM    I personally saw, examined, and cared for the patient. Labs, medications, radiographs reviewed. I agree with history exam and plans detailed in NP note.   Reilly Allen MD

## 2021-03-03 NOTE — PROGRESS NOTES
Physician Progress Note      PATIENT:               Claudine Crespo  CSN #:                  [id number]  :                       1935  ADMIT DATE:       2021 4:31 AM  DISCH DATE:  RESPONDING  PROVIDER #:        Shu James MD          QUERY TEXT:    Dear Attending Physician,    Patient admitted with Acute Resp Failure. Noted documentation of Pneumonia in   PCP notes . In order to support the diagnosis of Pneumonia, please include   additional clinical indicators in your documentation. Or please document if   the diagnosis of Pneumonia has been ruled out after further study. The medical record reflects the following:  Risk Factors: Ac resp failure ,Hx recent COVID Pneumonia ,possible fibrosis   and ILD from RA  Clinical Indicators: Per H/P,\". .evidence of multifocal pneumonia on imaging-   on cefepime . Cloteal Alegria Acute on chronic respiratory failure with hypoxia secondary to   recent COVID-19 infection and new HAP   ? \"    Per PCP 3/2,\". .Impressions:   Principal Problem:. Cloteal Alegria Cloteal Alegria HAP (hospital-acquired pneumonia. Cloteal Raji Cloteal Raji HAP (hospital-acquired   pneumonia) 2018? \"  Treatment: IV ATB then continue to monitor off antibiotics    Thank you,  Chaz Tierney RN  CCDS  Clinical Documentation Improvement Specialist  [phone]  Options provided:  -- Pneumonia present as evidenced by, Please document evidence. -- Pneumonia was ruled out  -- Other - I will add my own diagnosis  -- Disagree - Not applicable / Not valid  -- Disagree - Clinically unable to determine / Unknown  -- Refer to Clinical Documentation Reviewer    PROVIDER RESPONSE TEXT:    Pneumonia was ruled out after study.     Query created by: Anneliese Mathew on 3/2/2021 4:41 PM      Electronically signed by:  Shu James MD 3/3/2021 11:22 AM

## 2021-03-03 NOTE — PROGRESS NOTES
03/03/21 0002   NIV Type   $NIV $Daily Charge   Mode Bilevel  (order is prn pt.  appears calm and comfortable)

## 2021-03-03 NOTE — PROGRESS NOTES
Occupational Therapy  Patient treatment attempted this AM.  Patient in chair eating breakfast and declined any activity at this time. Will try again later.                                                  Alethea JARAMILLO/PAULY [id number]

## 2021-03-03 NOTE — PROGRESS NOTES
[facility]  [facility]    Chief complaint :  Chief Complaint   Patient presents with    Shortness of Breath    Positive For Covid-19     Subjective:    She denies any overnight problems but still does not feel her best. She still insists on going home upon discharge. She denies chest pain, SOB, palpitations,nausea, vomiting, diarrhea. Past medical, surgical, family and social history were reviewed, non-contributory, and unchanged unless otherwise stated. Review of Systems   Constitutional: Negative for activity change and fever. Respiratory: Negative for cough and chest tightness. Cardiovascular: Negative for chest pain, palpitations and leg swelling. Gastrointestinal: Negative for abdominal pain, diarrhea and nausea. Musculoskeletal: Negative for arthralgias and myalgias. Neurological: Negative for dizziness, syncope, weakness and headaches. Psychiatric/Behavioral: Negative for agitation. The patient is not nervous/anxious. Objective:  BP (!) 147/64   Pulse 77   Temp 98 °F (36.7 °C) (Temporal)   Resp 16   Ht 5' (1.524 m)   Wt 144 lb 9.6 oz (65.6 kg)   SpO2 99%   BMI 28.24 kg/m²     Physical Exam  Constitutional:       Appearance: Normal appearance. HENT:      Head: Normocephalic and atraumatic. Cardiovascular:      Rate and Rhythm: Normal rate and regular rhythm. Pulses: Normal pulses. Heart sounds: Normal heart sounds. Pulmonary:      Effort: Pulmonary effort is normal.      Breath sounds: Rales (diffuse crackles) present. No wheezing. Abdominal:      General: Bowel sounds are normal. There is no distension. Tenderness: There is no abdominal tenderness. Neurological:      General: No focal deficit present. Mental Status: She is alert and oriented to person, place, and time.    Psychiatric:         Mood and Affect: Mood normal.       Labs:  Recent Results (from the past 24 hour(s))   Comprehensive Metabolic Panel w/ Reflex to MG    Collection Time: 03/03/21  3:32 AM   Result Value Ref Range    Sodium 139 132 - 146 mmol/L    Potassium reflex Magnesium 3.9 3.5 - 5.0 mmol/L    Chloride 99 98 - 107 mmol/L    CO2 30 (H) 22 - 29 mmol/L    Anion Gap 10 7 - 16 mmol/L    Glucose 137 (H) 74 - 99 mg/dL    BUN 60 (H) 8 - 23 mg/dL    CREATININE 0.9 0.5 - 1.0 mg/dL    GFR Non-African American 59 >=60 mL/min/1.73    GFR African American >60     Calcium 9.2 8.6 - 10.2 mg/dL    Total Protein 5.7 (L) 6.4 - 8.3 g/dL    Albumin 2.9 (L) 3.5 - 5.2 g/dL    Total Bilirubin 0.4 0.0 - 1.2 mg/dL    Alkaline Phosphatase 87 35 - 104 U/L    ALT 18 0 - 32 U/L    AST 17 0 - 31 U/L   CBC auto differential    Collection Time: 03/03/21  3:32 AM   Result Value Ref Range    WBC 16.1 (H) 4.5 - 11.5 E9/L    RBC 2.80 (L) 3.50 - 5.50 E12/L    Hemoglobin 8.9 (L) 11.5 - 15.5 g/dL    Hematocrit 29.4 (L) 34.0 - 48.0 %    .0 (H) 80.0 - 99.9 fL    MCH 31.8 26.0 - 35.0 pg    MCHC 30.3 (L) 32.0 - 34.5 %    RDW 15.3 (H) 11.5 - 15.0 fL    Platelets 884 409 - 806 E9/L    MPV 10.5 7.0 - 12.0 fL    Neutrophils % 92.9 (H) 43.0 - 80.0 %    Immature Granulocytes % 2.2 0.0 - 5.0 %    Lymphocytes % 2.1 (L) 20.0 - 42.0 %    Monocytes % 2.7 2.0 - 12.0 %    Eosinophils % 0.0 0.0 - 6.0 %    Basophils % 0.1 0.0 - 2.0 %    Neutrophils Absolute 14.91 (H) 1.80 - 7.30 E9/L    Immature Granulocytes # 0.36 E9/L    Lymphocytes Absolute 0.33 (L) 1.50 - 4.00 E9/L    Monocytes Absolute 0.43 0.10 - 0.95 E9/L    Eosinophils Absolute 0.00 (L) 0.05 - 0.50 E9/L    Basophils Absolute 0.02 0.00 - 0.20 E9/L    Anisocytosis 1+     Polychromasia 1+     Hypochromia 1+     Poikilocytes 1+     Tear Drop Cells 1+    Brain Natriuretic Peptide    Collection Time: 03/03/21 12:50 PM   Result Value Ref Range    Pro-BNP 1,508 (H) 0 - 450 pg/mL     Radiology and other tests reviewed:  XR CHEST PORTABLE   Final Result   No interval change      CTA PULMONARY W CONTRAST   Final Result   No convincing evidence of a pulmonary embolism. Significant progression in the ground-glass opacities scattered throughout   both lungs when compared with February 10, 2021. Findings are suspicious for   multifocal pneumonia. Stable calcified mass in the central canal of the thoracic spine. Distended esophagus filled with undigested material, placing the patient at   risk for aspiration. Additional findings as described. XR CHEST PORTABLE   Final Result   Grossly unchanged patchy bilateral airspace opacities compared with February 14 2021. Findings may be on the basis of multifocal pneumonia.         Assessment:  Active Hospital Problems    Diagnosis Date Noted    Apathy [R45.3]     Encounter for hospice care discussion [Z71.89]     Mild protein-calorie malnutrition ([facility] [address] [E44.1] 02/26/2021    History of 2019 novel coronavirus disease (COVID-19) [Z86.16]     Acute on chronic respiratory failure with hypoxia (HCC) [J96.21] 02/24/2021    Oral yeast infection [B37.0]     Depression [F32.9] 02/12/2021    Sore throat [J02.9] 02/09/2021    COVID-19 [U07.1] 02/08/2021    HAP (hospital-acquired pneumonia) [J18.9, Y95] 11/13/2018    Essential hypertension [I10] 05/23/2016    Multiple closed fractures of ribs of both sides with routine healing [S22.43XD] 12/16/2014    Anemia [D64.9] 07/26/2014    Hyponatremia [E87.1] 03/25/2014    Hypothyroidism [E03.9] 06/10/2011    Rheumatoid arthritis ([facility] [address] [M06.9] 06/10/2011    A-fib ([facility] [address] [I48.91] 06/09/2011     Plan:    Acute on chronic respiratory failure with hypoxia   Likely secondary to recent COVID-19 infection, hx of ILD,currently on 3 L of oxygen, home baseline O2 3 L, PT/OT AM-PAC Score 7/24, 13/24 respectively  -Solumedrol 40 mg BID as per pulm   -Telemetry monitoring  -Respiratory panel negative   -Zinc 50 mg daily  -Thiamine 100 mg daily  -Vitamin C 500 mg daily  -CRP every other day  -CBC daily  -CMP daily  -Albuterol sulfate inhaler 2 puffs QID PRN and Symbicort inhaler 2 puffs BID    -Wean as tolerated    Tachycardia   overnight, EKG showed sinus tachycardia with premature supraventricular complexes and fusion complexes, A fib w/ RVR was noted on telemetry, Lopressor 5 mg was given overnight, HR improved to 80s. Mg normal  -Repeat EKG ordered    Volume overload with hx of HFpEF  Echocardiogram showed EF 65%, indeterminate diastolic dysfunction, mild mitral regurgitation, and mild aortic regurgitation  -Lasix 40 mg daily     Hx of HTN   Stopped home HCTZ 25 mg QD on 02/25 due to hyponatremia (now resolved) and concurrent use of PO Lasix 40 mg QD  -Valsartan 160 mg daily     Macrocytic anemia   Seen by Hem/Onc during last admission, anemia likely due to methotrexate use.    -Folic acid 1 mg QD  -Vitamin B12 1000 mcg QD   -Iron 325 mg every other day     Hx of Atrial fibrillation  Rate controlled  -Flecainide 50 mg BID   -Lopressor 25 mg BID      Hx of Hypothyroidism   -Synthroid 100 mcg daily     Hx of Rheumatoid Arthritis  -Celebrex 200 mg daily   -Wellcovorin 25 mg tablet weekly (on Tuesdays)  -Methotrexate weekly on Mondays (5 mg at lunch and 10 mg in evening)     Multiple closed fractures of both sides of ribs   -Lidocaine patch Q12HR     Oral yeast infection   Treated by ID during last admission   -Nystatin suspension 500,000 units QID for 2 more days to complete 10 day course      Hx of Depression   -Cymbalta 40 mg daily     Continue additional home medications:  -Colace 100 mg daily  -Pepcid 20 mg nightly   -Gabapentin 100 mg BID   -Zyrtec 10 mg QD   -Vitamin D 2000 units daily      Pain Control: Tylenol 650 mg Q6HR PRN for mild pain  DVT ppx: Xarelto 15 mg daily   GI ppx: Protonix 40 mg daily  Code Status: Full  Diet: General diet      Chito Sandoval MD  Family Medicine Resident PGY-1  03/03/21   5:26 PM

## 2021-03-04 ENCOUNTER — APPOINTMENT (OUTPATIENT)
Dept: GENERAL RADIOLOGY | Age: 86
DRG: 177 | End: 2021-03-04
Payer: MEDICARE

## 2021-03-04 LAB
ALBUMIN SERPL-MCNC: 3.2 G/DL (ref 3.5–5.2)
ALP BLD-CCNC: 94 U/L (ref 35–104)
ALT SERPL-CCNC: 17 U/L (ref 0–32)
ANION GAP SERPL CALCULATED.3IONS-SCNC: 9 MMOL/L (ref 7–16)
ANISOCYTOSIS: ABNORMAL
AST SERPL-CCNC: 19 U/L (ref 0–31)
BASOPHILS ABSOLUTE: 0.04 E9/L (ref 0–0.2)
BASOPHILS RELATIVE PERCENT: 0.2 % (ref 0–2)
BILIRUB SERPL-MCNC: 0.4 MG/DL (ref 0–1.2)
BUN BLDV-MCNC: 55 MG/DL (ref 8–23)
CALCIUM SERPL-MCNC: 9.6 MG/DL (ref 8.6–10.2)
CHLORIDE BLD-SCNC: 101 MMOL/L (ref 98–107)
CO2: 34 MMOL/L (ref 22–29)
CREAT SERPL-MCNC: 0.8 MG/DL (ref 0.5–1)
EKG ATRIAL RATE: 101 BPM
EKG ATRIAL RATE: 104 BPM
EKG ATRIAL RATE: 74 BPM
EKG P AXIS: 18 DEGREES
EKG P AXIS: 62 DEGREES
EKG P AXIS: 63 DEGREES
EKG P-R INTERVAL: 154 MS
EKG P-R INTERVAL: 176 MS
EKG P-R INTERVAL: 184 MS
EKG Q-T INTERVAL: 340 MS
EKG Q-T INTERVAL: 368 MS
EKG Q-T INTERVAL: 384 MS
EKG QRS DURATION: 78 MS
EKG QRS DURATION: 90 MS
EKG QRS DURATION: 90 MS
EKG QTC CALCULATION (BAZETT): 426 MS
EKG QTC CALCULATION (BAZETT): 447 MS
EKG QTC CALCULATION (BAZETT): 513 MS
EKG R AXIS: -12 DEGREES
EKG R AXIS: -7 DEGREES
EKG R AXIS: -7 DEGREES
EKG T AXIS: -22 DEGREES
EKG T AXIS: -25 DEGREES
EKG T AXIS: -27 DEGREES
EKG VENTRICULAR RATE: 104 BPM
EKG VENTRICULAR RATE: 117 BPM
EKG VENTRICULAR RATE: 74 BPM
EOSINOPHILS ABSOLUTE: 0 E9/L (ref 0.05–0.5)
EOSINOPHILS RELATIVE PERCENT: 0 % (ref 0–6)
GFR AFRICAN AMERICAN: >60
GFR NON-AFRICAN AMERICAN: >60 ML/MIN/1.73
GLUCOSE BLD-MCNC: 155 MG/DL (ref 74–99)
HCT VFR BLD CALC: 30.4 % (ref 34–48)
HEMOGLOBIN: 9.3 G/DL (ref 11.5–15.5)
IMMATURE GRANULOCYTES #: 0.62 E9/L
IMMATURE GRANULOCYTES %: 3.7 % (ref 0–5)
LYMPHOCYTES ABSOLUTE: 0.33 E9/L (ref 1.5–4)
LYMPHOCYTES RELATIVE PERCENT: 1.9 % (ref 20–42)
MAGNESIUM: 2.2 MG/DL (ref 1.6–2.6)
MCH RBC QN AUTO: 31.8 PG (ref 26–35)
MCHC RBC AUTO-ENTMCNC: 30.6 % (ref 32–34.5)
MCV RBC AUTO: 104.1 FL (ref 80–99.9)
MONOCYTES ABSOLUTE: 0.35 E9/L (ref 0.1–0.95)
MONOCYTES RELATIVE PERCENT: 2.1 % (ref 2–12)
NEUTROPHILS ABSOLUTE: 15.62 E9/L (ref 1.8–7.3)
NEUTROPHILS RELATIVE PERCENT: 92.1 % (ref 43–80)
PDW BLD-RTO: 15.1 FL (ref 11.5–15)
PLATELET # BLD: 471 E9/L (ref 130–450)
PMV BLD AUTO: 10.2 FL (ref 7–12)
POTASSIUM REFLEX MAGNESIUM: 4.1 MMOL/L (ref 3.5–5)
PRO-BNP: 1348 PG/ML (ref 0–450)
RBC # BLD: 2.92 E12/L (ref 3.5–5.5)
SODIUM BLD-SCNC: 144 MMOL/L (ref 132–146)
TOTAL PROTEIN: 6.1 G/DL (ref 6.4–8.3)
WBC # BLD: 17 E9/L (ref 4.5–11.5)

## 2021-03-04 PROCEDURE — 85025 COMPLETE CBC W/AUTO DIFF WBC: CPT

## 2021-03-04 PROCEDURE — 2060000000 HC ICU INTERMEDIATE R&B

## 2021-03-04 PROCEDURE — 6370000000 HC RX 637 (ALT 250 FOR IP): Performed by: NURSE PRACTITIONER

## 2021-03-04 PROCEDURE — 36415 COLL VENOUS BLD VENIPUNCTURE: CPT

## 2021-03-04 PROCEDURE — 6360000002 HC RX W HCPCS: Performed by: INTERNAL MEDICINE

## 2021-03-04 PROCEDURE — 2580000003 HC RX 258: Performed by: FAMILY MEDICINE

## 2021-03-04 PROCEDURE — 2700000000 HC OXYGEN THERAPY PER DAY

## 2021-03-04 PROCEDURE — 93005 ELECTROCARDIOGRAM TRACING: CPT | Performed by: FAMILY MEDICINE

## 2021-03-04 PROCEDURE — 6370000000 HC RX 637 (ALT 250 FOR IP): Performed by: FAMILY MEDICINE

## 2021-03-04 PROCEDURE — 71045 X-RAY EXAM CHEST 1 VIEW: CPT

## 2021-03-04 PROCEDURE — 6370000000 HC RX 637 (ALT 250 FOR IP)

## 2021-03-04 PROCEDURE — 6370000000 HC RX 637 (ALT 250 FOR IP): Performed by: STUDENT IN AN ORGANIZED HEALTH CARE EDUCATION/TRAINING PROGRAM

## 2021-03-04 PROCEDURE — 99232 SBSQ HOSP IP/OBS MODERATE 35: CPT | Performed by: FAMILY MEDICINE

## 2021-03-04 PROCEDURE — 83880 ASSAY OF NATRIURETIC PEPTIDE: CPT

## 2021-03-04 PROCEDURE — 2500000003 HC RX 250 WO HCPCS: Performed by: FAMILY MEDICINE

## 2021-03-04 PROCEDURE — 94640 AIRWAY INHALATION TREATMENT: CPT

## 2021-03-04 PROCEDURE — 80053 COMPREHEN METABOLIC PANEL: CPT

## 2021-03-04 PROCEDURE — 93005 ELECTROCARDIOGRAM TRACING: CPT | Performed by: STUDENT IN AN ORGANIZED HEALTH CARE EDUCATION/TRAINING PROGRAM

## 2021-03-04 PROCEDURE — 97530 THERAPEUTIC ACTIVITIES: CPT

## 2021-03-04 PROCEDURE — 93010 ELECTROCARDIOGRAM REPORT: CPT | Performed by: INTERNAL MEDICINE

## 2021-03-04 PROCEDURE — 83735 ASSAY OF MAGNESIUM: CPT

## 2021-03-04 RX ORDER — METHYLPREDNISOLONE SODIUM SUCCINATE 40 MG/ML
40 INJECTION, POWDER, LYOPHILIZED, FOR SOLUTION INTRAMUSCULAR; INTRAVENOUS DAILY
Status: DISCONTINUED | OUTPATIENT
Start: 2021-03-05 | End: 2021-03-04

## 2021-03-04 RX ORDER — FAMOTIDINE 20 MG/1
TABLET, FILM COATED ORAL
Status: COMPLETED
Start: 2021-03-04 | End: 2021-03-04

## 2021-03-04 RX ORDER — PREDNISONE 20 MG/1
40 TABLET ORAL DAILY
Status: DISCONTINUED | OUTPATIENT
Start: 2021-03-05 | End: 2021-03-06

## 2021-03-04 RX ORDER — METOPROLOL TARTRATE 5 MG/5ML
5 INJECTION INTRAVENOUS
Status: DISPENSED | OUTPATIENT
Start: 2021-03-04 | End: 2021-03-04

## 2021-03-04 RX ADMIN — BUDESONIDE 500 MCG: 0.5 SUSPENSION RESPIRATORY (INHALATION) at 19:59

## 2021-03-04 RX ADMIN — DOCUSATE SODIUM 100 MG: 100 CAPSULE, LIQUID FILLED ORAL at 08:57

## 2021-03-04 RX ADMIN — TRIAMCINOLONE ACETONIDE: 1 PASTE DENTAL at 08:57

## 2021-03-04 RX ADMIN — TRIAMCINOLONE ACETONIDE: 1 PASTE DENTAL at 13:26

## 2021-03-04 RX ADMIN — NYSTATIN 500000 UNITS: 500000 SUSPENSION ORAL at 08:57

## 2021-03-04 RX ADMIN — CELECOXIB 200 MG: 100 CAPSULE ORAL at 10:22

## 2021-03-04 RX ADMIN — SODIUM CHLORIDE, PRESERVATIVE FREE 10 ML: 5 INJECTION INTRAVENOUS at 08:58

## 2021-03-04 RX ADMIN — PETROLATUM: 420 OINTMENT TOPICAL at 16:50

## 2021-03-04 RX ADMIN — TRIAMCINOLONE ACETONIDE: 1 PASTE DENTAL at 22:56

## 2021-03-04 RX ADMIN — SODIUM CHLORIDE, PRESERVATIVE FREE 10 ML: 5 INJECTION INTRAVENOUS at 22:57

## 2021-03-04 RX ADMIN — Medication 2000 UNITS: at 08:59

## 2021-03-04 RX ADMIN — LEVOTHYROXINE SODIUM 100 MCG: 100 TABLET ORAL at 05:11

## 2021-03-04 RX ADMIN — FAMOTIDINE 20 MG: 20 TABLET ORAL at 23:02

## 2021-03-04 RX ADMIN — FUROSEMIDE 40 MG: 40 TABLET ORAL at 08:57

## 2021-03-04 RX ADMIN — PANTOPRAZOLE SODIUM 40 MG: 40 TABLET, DELAYED RELEASE ORAL at 15:21

## 2021-03-04 RX ADMIN — FLECAINIDE ACETATE 50 MG: 50 TABLET ORAL at 10:22

## 2021-03-04 RX ADMIN — CYANOCOBALAMIN TAB 1000 MCG 1000 MCG: 1000 TAB at 10:22

## 2021-03-04 RX ADMIN — POLYETHYLENE GLYCOL 3350 17 G: 17 POWDER, FOR SOLUTION ORAL at 08:56

## 2021-03-04 RX ADMIN — METOPROLOL TARTRATE 25 MG: 25 TABLET, FILM COATED ORAL at 08:57

## 2021-03-04 RX ADMIN — OXYCODONE HYDROCHLORIDE AND ACETAMINOPHEN 500 MG: 500 TABLET ORAL at 08:58

## 2021-03-04 RX ADMIN — PANTOPRAZOLE SODIUM 40 MG: 40 TABLET, DELAYED RELEASE ORAL at 05:11

## 2021-03-04 RX ADMIN — GABAPENTIN 100 MG: 100 CAPSULE ORAL at 08:57

## 2021-03-04 RX ADMIN — FLECAINIDE ACETATE 50 MG: 50 TABLET ORAL at 23:44

## 2021-03-04 RX ADMIN — PETROLATUM: 420 OINTMENT TOPICAL at 08:56

## 2021-03-04 RX ADMIN — BUDESONIDE 500 MCG: 0.5 SUSPENSION RESPIRATORY (INHALATION) at 08:08

## 2021-03-04 RX ADMIN — FOLIC ACID 1 MG: 1 TABLET ORAL at 08:57

## 2021-03-04 RX ADMIN — GABAPENTIN 100 MG: 100 CAPSULE ORAL at 22:55

## 2021-03-04 RX ADMIN — ARFORMOTEROL TARTRATE 15 MCG: 15 SOLUTION RESPIRATORY (INHALATION) at 19:59

## 2021-03-04 RX ADMIN — METOPROLOL TARTRATE 25 MG: 25 TABLET, FILM COATED ORAL at 22:55

## 2021-03-04 RX ADMIN — METHYLPREDNISOLONE SODIUM SUCCINATE 40 MG: 40 INJECTION, POWDER, LYOPHILIZED, FOR SOLUTION INTRAMUSCULAR; INTRAVENOUS at 11:27

## 2021-03-04 RX ADMIN — Medication 100 MG: at 10:22

## 2021-03-04 RX ADMIN — CETIRIZINE HYDROCHLORIDE 10 MG: 10 TABLET, FILM COATED ORAL at 08:57

## 2021-03-04 RX ADMIN — NYSTATIN 500000 UNITS: 500000 SUSPENSION ORAL at 11:27

## 2021-03-04 RX ADMIN — DULOXETINE HYDROCHLORIDE 40 MG: 60 CAPSULE, DELAYED RELEASE ORAL at 08:56

## 2021-03-04 RX ADMIN — FAMOTIDINE 20 MG: 20 TABLET, FILM COATED ORAL at 23:02

## 2021-03-04 RX ADMIN — NYSTATIN 500000 UNITS: 500000 SUSPENSION ORAL at 22:55

## 2021-03-04 RX ADMIN — METOPROLOL TARTRATE 5 MG: 5 INJECTION INTRAVENOUS at 04:02

## 2021-03-04 RX ADMIN — VALSARTAN 160 MG: 160 TABLET, FILM COATED ORAL at 10:22

## 2021-03-04 RX ADMIN — ZINC SULFATE 220 MG (50 MG) CAPSULE 50 MG: CAPSULE at 10:22

## 2021-03-04 RX ADMIN — ARFORMOTEROL TARTRATE 15 MCG: 15 SOLUTION RESPIRATORY (INHALATION) at 08:08

## 2021-03-04 RX ADMIN — NYSTATIN 500000 UNITS: 500000 SUSPENSION ORAL at 16:50

## 2021-03-04 RX ADMIN — RIVAROXABAN 15 MG: 15 TABLET, FILM COATED ORAL at 10:22

## 2021-03-04 RX ADMIN — FERROUS SULFATE TAB 325 MG (65 MG ELEMENTAL FE) 325 MG: 325 (65 FE) TAB at 08:57

## 2021-03-04 ASSESSMENT — PAIN SCALES - GENERAL
PAINLEVEL_OUTOF10: 0

## 2021-03-04 ASSESSMENT — ENCOUNTER SYMPTOMS
COUGH: 0
DIARRHEA: 0
ABDOMINAL PAIN: 0
CHEST TIGHTNESS: 0
NAUSEA: 0

## 2021-03-04 NOTE — PROGRESS NOTES
[facility]    Chief complaint :  Chief Complaint   Patient presents with    Shortness of Breath    Positive For Covid-19     Subjective:    She denies any overnight problems. She denies chest pain, SOB, palpitations,nausea, vomiting, diarrhea. Past medical, surgical, family and social history were reviewed, non-contributory, and unchanged unless otherwise stated. Review of Systems   Constitutional: Negative for activity change and fever. Respiratory: Negative for cough and chest tightness. Cardiovascular: Negative for chest pain, palpitations and leg swelling. Gastrointestinal: Negative for abdominal pain, diarrhea and nausea. Musculoskeletal: Negative for arthralgias and myalgias. Neurological: Negative for dizziness, syncope, weakness and headaches. Psychiatric/Behavioral: Negative for agitation. The patient is not nervous/anxious. Objective:  BP (!) 146/70   Pulse 80   Temp 97.8 °F (36.6 °C) (Oral)   Resp 16   Ht 5' (1.524 m)   Wt 145 lb 11.6 oz (66.1 kg)   SpO2 93%   BMI 28.46 kg/m²     Physical Exam  Constitutional:       Appearance: Normal appearance. HENT:      Head: Normocephalic and atraumatic. Cardiovascular:      Rate and Rhythm: Normal rate and regular rhythm. Pulses: Normal pulses. Heart sounds: Normal heart sounds. Pulmonary:      Effort: Pulmonary effort is normal.      Breath sounds: Rales (diffuse crackles improving) present. No wheezing. Abdominal:      General: Bowel sounds are normal. There is no distension. Tenderness: There is no abdominal tenderness. Neurological:      General: No focal deficit present. Mental Status: She is alert and oriented to person, place, and time.    Psychiatric:         Mood and Affect: Mood normal.       Labs:  Recent Results (from the past 24 hour(s))   EKG 12 Lead    Collection Time: 03/04/21 12:26 AM   Result Value Ref Range    Ventricular Rate 104 BPM Atrial Rate 104 BPM    P-R Interval 176 ms    QRS Duration 78 ms    Q-T Interval 340 ms    QTc Calculation (Bazett) 447 ms    P Axis 18 degrees    R Axis -12 degrees    T Axis -25 degrees   Comprehensive Metabolic Panel w/ Reflex to MG    Collection Time: 03/04/21  4:35 AM   Result Value Ref Range    Sodium 144 132 - 146 mmol/L    Potassium reflex Magnesium 4.1 3.5 - 5.0 mmol/L    Chloride 101 98 - 107 mmol/L    CO2 34 (H) 22 - 29 mmol/L    Anion Gap 9 7 - 16 mmol/L    Glucose 155 (H) 74 - 99 mg/dL    BUN 55 (H) 8 - 23 mg/dL    CREATININE 0.8 0.5 - 1.0 mg/dL    GFR Non-African American >60 >=60 mL/min/1.73    GFR African American >60     Calcium 9.6 8.6 - 10.2 mg/dL    Total Protein 6.1 (L) 6.4 - 8.3 g/dL    Albumin 3.2 (L) 3.5 - 5.2 g/dL    Total Bilirubin 0.4 0.0 - 1.2 mg/dL    Alkaline Phosphatase 94 35 - 104 U/L    ALT 17 0 - 32 U/L    AST 19 0 - 31 U/L   CBC auto differential    Collection Time: 03/04/21  4:35 AM   Result Value Ref Range    WBC 17.0 (H) 4.5 - 11.5 E9/L    RBC 2.92 (L) 3.50 - 5.50 E12/L    Hemoglobin 9.3 (L) 11.5 - 15.5 g/dL    Hematocrit 30.4 (L) 34.0 - 48.0 %    .1 (H) 80.0 - 99.9 fL    MCH 31.8 26.0 - 35.0 pg    MCHC 30.6 (L) 32.0 - 34.5 %    RDW 15.1 (H) 11.5 - 15.0 fL    Platelets 020 (H) 430 - 450 E9/L    MPV 10.2 7.0 - 12.0 fL    Neutrophils % 92.1 (H) 43.0 - 80.0 %    Immature Granulocytes % 3.7 0.0 - 5.0 %    Lymphocytes % 1.9 (L) 20.0 - 42.0 %    Monocytes % 2.1 2.0 - 12.0 %    Eosinophils % 0.0 0.0 - 6.0 %    Basophils % 0.2 0.0 - 2.0 %    Neutrophils Absolute 15.62 (H) 1.80 - 7.30 E9/L    Immature Granulocytes # 0.62 E9/L    Lymphocytes Absolute 0.33 (L) 1.50 - 4.00 E9/L    Monocytes Absolute 0.35 0.10 - 0.95 E9/L    Eosinophils Absolute 0.00 (L) 0.05 - 0.50 E9/L    Basophils Absolute 0.04 0.00 - 0.20 E9/L    Anisocytosis 1+    Brain Natriuretic Peptide    Collection Time: 03/04/21  4:35 AM   Result Value Ref Range    Pro-BNP 1,348 (H) 0 - 450 pg/mL   Magnesium Collection Time: 03/04/21  4:35 AM   Result Value Ref Range    Magnesium 2.2 1.6 - 2.6 mg/dL   EKG 12 Lead    Collection Time: 03/04/21  7:05 AM   Result Value Ref Range    Ventricular Rate 117 BPM    Atrial Rate 101 BPM    P-R Interval 184 ms    QRS Duration 90 ms    Q-T Interval 368 ms    QTc Calculation (Bazett) 513 ms    P Axis 63 degrees    R Axis -7 degrees    T Axis -27 degrees   EKG 12 Lead    Collection Time: 03/04/21 10:39 AM   Result Value Ref Range    Ventricular Rate 74 BPM    Atrial Rate 74 BPM    P-R Interval 154 ms    QRS Duration 90 ms    Q-T Interval 384 ms    QTc Calculation (Bazett) 426 ms    P Axis 62 degrees    R Axis -7 degrees    T Axis -22 degrees     Radiology and other tests reviewed:  XR CHEST PORTABLE   Final Result   No interval change      CTA PULMONARY W CONTRAST   Final Result   No convincing evidence of a pulmonary embolism. Significant progression in the ground-glass opacities scattered throughout   both lungs when compared with February 10, 2021. Findings are suspicious for   multifocal pneumonia. Stable calcified mass in the central canal of the thoracic spine. Distended esophagus filled with undigested material, placing the patient at   risk for aspiration. Additional findings as described. XR CHEST PORTABLE   Final Result   Grossly unchanged patchy bilateral airspace opacities compared with February 14 2021. Findings may be on the basis of multifocal pneumonia.       XR CHEST PORTABLE    (Results Pending)     Assessment:  Active Hospital Problems    Diagnosis Date Noted    Apathy [R45.3]     Encounter for hospice care discussion [Z71.89]     Mild protein-calorie malnutrition ([facility] [address] [E44.1] 02/26/2021    History of 2019 novel coronavirus disease (COVID-19) [Z86.16]     Acute on chronic respiratory failure with hypoxia ([facility] [address] [J96.21] 02/24/2021    Oral yeast infection [B37.0]     Depression [F32.9] 02/12/2021    Sore throat [J02.9] 02/09/2021    COVID-19 [U07.1] 02/08/2021    HAP (hospital-acquired pneumonia) [J18.9, Y95] 11/13/2018    Essential hypertension [I10] 05/23/2016    Multiple closed fractures of ribs of both sides with routine healing [S22.43XD] 12/16/2014    Anemia [D64.9] 07/26/2014    Hyponatremia [E87.1] 03/25/2014    Hypothyroidism [E03.9] 06/10/2011    Rheumatoid arthritis ([facility] [M06.9] 06/10/2011    A-fib ([facility] [I48.91] 06/09/2011     Plan:    Acute on chronic respiratory failure with hypoxia   Likely secondary to recent COVID-19 infection, hx of ILD,currently on 4 L of oxygen, home baseline O2 3 L, PT/OT AM-PAC Score 7/24, 13/24 respectively,respiratory panel negative   -Switched to Solumedrol 40 mg daily as per pulm   -Telemetry monitoring  -Zinc 50 mg daily  -Thiamine 100 mg daily  -Vitamin C 500 mg daily  -CRP every other day  -CBC daily  -CMP daily  -Albuterol sulfate inhaler 2 puffs QID PRN and Symbicort inhaler 2 puffs BID    -Wean as tolerated    Tachycardia   overnight, EKG showed sinus tachycardia with premature supraventricular complexes and fusion complexes, A fib w/ RVR was noted on telemetry, Lopressor 5 mg was given overnight, HR improved to 80s. Mg normal  -Resolved  -Monitor vitals    Volume overload with hx of HFpEF  Echocardiogram showed EF 65%, indeterminate diastolic dysfunction, mild mitral regurgitation, and mild aortic regurgitation  -Lasix 40 mg daily     Hx of HTN   Stopped home HCTZ 25 mg QD on 02/25 due to hyponatremia (now resolved) and concurrent use of PO Lasix 40 mg QD  -Valsartan 160 mg daily     Macrocytic anemia   Seen by Hem/Onc during last admission,anemia likely due to methotrexate use.    -Folic acid 1 mg QD  -Vitamin B12 1000 mcg QD   -Iron 325 mg every other day     Hx of Atrial fibrillation  Rate controlled  -Flecainide 50 mg BID   -Lopressor 25 mg BID      Hx of Hypothyroidism   -Synthroid 100 mcg daily     Hx of Rheumatoid Arthritis  -Celebrex 200 mg daily   -Wellcovorin 25 mg tablet weekly (on Tuesdays)  -Methotrexate weekly on Mondays (5 mg at lunch and 10 mg in evening)     Multiple closed fractures of both sides of ribs   -Lidocaine patch Q12HR     Oral yeast infection   Treated by ID during last admission   -Nystatin suspension 500,000 units QID Day 10     Hx of Depression   -Cymbalta 40 mg daily     Continue additional home medications:  -Colace 100 mg daily  -Pepcid 20 mg nightly   -Gabapentin 100 mg BID   -Zyrtec 10 mg QD   -Vitamin D 2000 units daily      Pain Control: Tylenol 650 mg Q6HR PRN for mild pain  DVT ppx: Xarelto 15 mg daily   GI ppx: Protonix 40 mg daily  Code Status: Full  Diet: General diet      Kt Matias MD  Family Medicine Resident PGY-1  03/04/21   4:57 PM

## 2021-03-04 NOTE — PROGRESS NOTES
Skip Mckinney M.D.,[facility]  Sterling Camarena D.O., F.A.C.O.I., Susan Guo M.D. Stepan Garcia M.D., Guera Arias M.D. Henry Mobley D.O. Daily Pulmonary Progress Note    Patient:  Christiane Fink 80 y.o. female MRN: [id number]     Date of Service: 3/4/2021      Synopsis     We are following patient for respiratory failure with hypoxia, recent COVID-19 pneumonia    \"CC\" shortness of breath    Code status: Full      Subjective      Patient was personally seen and examined. Afib RVR overnight converted to NSR after lopressor . Patient saturating 96 on 4 L of oxygen. Laying in bed , she appears very tired today she reports that she has not been up to chair today. Patient is close to her home liter flow of 3 L      Review of Systems:  Constitutional: Refer severe fatigue and weakness  Skin: Denies pigmentation, dark lesions, and rashes   HEENT: Denies hearing loss, tinnitus, ear drainage, epistaxis, sore throat, and hoarseness. Cardiovascular: Denies palpitations, chest pain, and chest pressure. Respiratory: + cough Denies   hemoptysis, apnea, and choking.   Gastrointestinal: Denies nausea, vomiting, poor appetite, diarrhea, heartburn or reflux  Genitourinary: Denies dysuria, frequency, urgency or hematuria  Musculoskeletal: Denies myalgias, muscle weakness, and bone pain    24-hour events:  Increased fatigued     Objective   Vitals: BP (!) 155/78   Pulse 72   Temp 97.8 °F (36.6 °C) (Oral)   Resp 16   Ht 5' (1.524 m)   Wt 145 lb 11.6 oz (66.1 kg)   SpO2 96%   BMI 28.46 kg/m²     I/O:    Intake/Output Summary (Last 24 hours) at 3/4/2021 1440  Last data filed at 3/4/2021 1320  Gross per 24 hour   Intake 300 ml   Output 750 ml   Net -450 ml       Vent Information  Skin Assessment: Clean, dry, & intact  SpO2: 96 %                CURRENT MEDS :  Scheduled Meds:   methylPREDNISolone  40 mg Intravenous Q12H    white petrolatum   Topical BID    budesonide  500 mcg Nebulization BID    Arformoterol Tartrate  15 mcg Nebulization BID    polyethylene glycol  17 g Oral Daily    nystatin  5 mL Oral 4x Daily    triamcinolone acetonide   Mouth/Throat TID    furosemide  40 mg Oral Daily    celecoxib  200 mg Oral Daily    docusate sodium  100 mg Oral Daily    DULoxetine  40 mg Oral Daily    ferrous sulfate  325 mg Oral Every Other Day    cetirizine  10 mg Oral Daily    flecainide  50 mg Oral BID    folic acid  1 mg Oral Daily    ascorbic acid  500 mg Oral Daily    gabapentin  100 mg Oral BID    leucovorin calcium  25 mg Oral Weekly    levothyroxine  100 mcg Oral Daily    lidocaine  1 patch Transdermal Daily    methotrexate  5 mg Oral Weekly    metoprolol tartrate  25 mg Oral BID    pantoprazole  40 mg Oral BID AC    rivaroxaban  15 mg Oral Daily with breakfast    thiamine  100 mg Oral Daily    vitamin B-12  1,000 mcg Oral Daily    vitamin D  2,000 Units Oral Daily    zinc sulfate  50 mg Oral Daily    sodium chloride flush  10 mL Intravenous 2 times per day    famotidine  20 mg Oral Nightly    valsartan  160 mg Oral Daily    methotrexate  10 mg Oral Weekly       Physical Exam:  General Appearance: appears comfortable in no acute distress. HEENT: Normocephalic atraumatic without obvious abnormality   Neck: Lips, mucosa, and tongue normal.    Lung: Breath sounds diminished few basilar crackles. Respirations   unlabored. Symmetrical expansion. Heart: RRR, normal S1, S2. No MRG  Abdomen: Soft, NT, ND. BS present x 4 quadrants. No bruit or organomegaly. Extremities: Pedal pulses 2+ symmetric b/l. Extremities normal, no cyanosis, clubbing, or edema. Musculokeletal: No joint swelling, no muscle tenderness. ROM normal in all joints of extremities. Neurologic: Mental status: Alert and Oriented X3 . Pertinent/ New Labs and Imaging Studies     Imaging Personally Reviewed:     Pending       CTA chest   No convincing evidence of a pulmonary embolism.    Significant progression in the ground-glass opacities scattered throughout   both lungs when compared with February 10, 2021.  Findings are suspicious for   multifocal pneumonia. Stable calcified mass in the central canal of the thoracic spine. Distended esophagus filled with undigested material, placing the patient at   risk for aspiration. Additional findings as described.               ECHO   Conclusions      Summary   Left ventricular internal dimensions were normal in diastole and systole. No regional wall motion abnormalities seen. Normal left ventricular ejection fraction. The left atrium is borderline dilated. Moderate mitral annular calcification. Mild mitral regurgitation is present. The aortic valve appears mildly sclerotic. Mild aortic regurgitation is noted. Labs:  Lab Results   Component Value Date    WBC 17.0 03/04/2021    HGB 9.3 03/04/2021    HCT 30.4 03/04/2021    .1 03/04/2021    MCH 31.8 03/04/2021    MCHC 30.6 03/04/2021    RDW 15.1 03/04/2021     03/04/2021    MPV 10.2 03/04/2021     Lab Results   Component Value Date     03/04/2021    K 4.1 03/04/2021     03/04/2021    CO2 34 03/04/2021    BUN 55 03/04/2021    CREATININE 0.8 03/04/2021    LABALBU 3.2 03/04/2021    LABALBU 4.0 04/24/2012    CALCIUM 9.6 03/04/2021    GFRAA >60 03/04/2021    LABGLOM >60 03/04/2021     Lab Results   Component Value Date    PROTIME 12.2 03/01/2021    PROTIME 15.8 05/12/2014    INR 1.1 03/01/2021     Recent Labs     03/04/21  0435   PROBNP 1,348*     No results for input(s): PROCAL in the last 72 hours. This SmartLink has not been configured with any valid records. Micro:  No results for input(s): CULTRESP in the last 72 hours. No results for input(s): LABGRAM in the last 72 hours. No results for input(s): LEGUR in the last 72 hours. No results for input(s): STREPNEUMAGU in the last 72 hours. No results for input(s): LP1UAG in the last 72 hours.        Assessment: 1. Acute on chronic hypoxic respiratory failure   2. Hx of recent Covid 19 pneumonitis 2/8/21 positive test with possible pneumonitis  3. 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 Multilobar ground glass opacities likely multifactorial ? chf with pulmonary edema with superimposed previous ggo from covid. 5. Elevated bnp  6. Leukocytosis likely secondary to steroids   7. Hiatal hernia   8. hafsa   9. Multiple rib fractures   10. RA on mtx   11. Immunocompromised   12. Anemia   13. A fib  14. Oral candida      Plan:   1. We FiO2 for saturations above 92%, now on 4 L , she wears 3 L as her home liter flow  2. Will need repeat O2 testing prior to discharge  3. bipap prn for respiratory support-did not use last night  4. Trend proBNP 1508, Lasix dosing-now on 40 mg p.o. daily  5. Continue to monitor off antibiotics  6. Continue vitamin protocol, completed dexamethasone and antiviral medication  7. Solu-Medrol IV steroids to 40 mg IV twice daily, taper to daily  8. DVT GI prophylaxis-Xarelto, Protonix  9.  IS , add home nebulizer poor inspiratory effort due to COVID pneumonia with albuterol PRN  10. A. fib RVR overnight given Lopressor converted back to sinus rhythm  11. Check cxr today       Electronically signed by FRAN Rodriguez on 3/4/2021 at 2:40 PM              Addendum; Patient seen and examined. Her oxygenation is slowly improving currently down to 4 L. Switch her to oral prednisone 40 mg daily starting tomorrow. I personally saw, examined, and cared for the patient. Labs, medications, radiographs reviewed. I agree with history exam and plans detailed in NP note.   Armando Sebastian MD

## 2021-03-04 NOTE — PLAN OF CARE
Problem: Falls - Risk of:  Goal: Will remain free from falls  Description: Will remain free from falls  Outcome: Met This Shift     Problem: Falls - Risk of:  Goal: Absence of physical injury  Description: Absence of physical injury  Outcome: Met This Shift     Problem: Skin Integrity:  Goal: Will show no infection signs and symptoms  Description: Will show no infection signs and symptoms  Outcome: Ongoing     Problem: Skin Integrity:  Goal: Absence of new skin breakdown  Description: Absence of new skin breakdown  Outcome: Ongoing

## 2021-03-04 NOTE — PROGRESS NOTES
Attempted to wean patient to 3L nasal cannula which is baseline. With turning and repositioning patient dropped to 83%. O2 back to 4L.  O2 came back up to 94%

## 2021-03-04 NOTE — PROGRESS NOTES
Occupational Therapy  OT BEDSIDE TREATMENT NOTE      Date:3/4/2021  Patient Name: Bruna Rojo  MRN: [id number]  : 1935  Room: [address] [place] [address]     Referring Theresa Lion DO     Evaluating OT: Foster Stubbs OTR/L [id number]     AM-PAC Daily Activity Raw Score:      Recommended Adaptive Equipment: TBD     Diagnosis: acute respiratory failure with hypoxia. Pt presents to ED from home with progressively worsening SOB. Recent hospitalization with discharge to home environment, multiple L rib fractures and COVID.      Pertinent Medical History: HTN, RA, neuropathy, PAF, afib    Precautions:  falls     Home Living: Pt lives with son in a single story home. Bathroom setup: tub/shower combo with extended tub bench, has 3:1      Prior Level of Function: pd caregiver and family assist  to [address] [place] [address] ADL/IADLs; completed functional mobility wc level with use of UEs for mobility. Sim Miah all transfers at home. Hospital bed. Pt reports she has assist sponge bathing several times a week and approx 1 time a week her granddaughter will assist her in the tub for bathing. Use of Herrera Bodo in/out of bathroom for commode transfer. Able to complete grooming, UB dressing and self feeding tasks on her own.     Pain Level: Pt did not report pain.      Cognition: awake.  Slow to respond to questions.                  Functional Assessment:    Initial Eval Status  Date: 21 Treatment session:  Short Term Goals      Feeding Set up  Of lunch tray       Grooming Min A   Set up   UB Dressing Min A  Management of hospital gown Mod A  Set up   LB Dressing Max A  Management of B socks Max A  Mod A    Bathing Max A   Mod A   Toileting Max A x2 Max A  Mod A   Bed Mobility  Supine to sit: Max A  Sit to Supine: Dependent Supine <> sit max A        Functional Transfers STS: Dependent at beti stedy, unable to tolerate STS, buckling at B LEs assisted back to bed for safety   Mod A   Functional Mobility NA     Balance Sitting: poor to poor plus at EOB     Standing: unable to tolerate Sit balance min A on the EOB. Moderate use of UE for sit balance support.      Activity Tolerance Poor  8L O2  Post activity: 86%  Min recovery time with pursed lip breathing to 92% Poor  standing lelo x2-3 min with fair minus balance during self care tasks       Comments:  Limited tolerance for activity. Pt somewhat lethargic and mod cues to keep eyes open throughout session. Returned to bed at end of the session. Education/treatment:  ADL retraining with facilitation of movement to increase self care. Therapeutic activity to address balance and endurance for ADL and transfers. Pt education of daily orientation. · Pt has made  progress towards set goals. Plan of Care: 2-5 days/week for 1-2 weeks PRN   [x]? ?? ADL retraining/adaptive techniques and AE recommendations to increase functional independence within precautions                    [x]? ? ? Energy conservation techniques to improve tolerance for ADL/IADLs  [x]? ? ? Functional transfer training/DME recommendations for increased independence, safety and fall prevention         [x]? ? ? Patient/family education to increase safety and functional independence during daily routine          [x]? ? ? Environmental modifications for safe mobility and completion of ADLs                             []? ?? Cognitive retraining to improve problem solving skills & safe participation in ADLs/IADLs     []? ? ?Sensory re-education techniques to improve extremity awareness, maintain skin integrity and improve hand function                             []? ? ? Visual/Perceptual retraining to improve body awareness and safety during transfers and ADLs  []? ?? Splinting/positioning needs to maintain joint/skin integrity and contracture prevention  [x]? ? ? Therapeutic activity to improve functional performance during ADLs                                        [x]? ? ? Therapeutic exercise to improve tolerance and functional strength for ADLs   [x]? ? ? Balance retraining/tolerance tasks for facilitation of postural control with dynamic challenges during ADLs  []? ? ?Neuromuscular re-education to facilitate righting/equilibrium reactions, midline orientation, scapular stability/mobility, normalize muscle tone and facilitate active functional movement                        []? ? ? Delirium prevention/treatment    [x]? ? Positioning to improve functional independence and decrease risk of skin breakdown  []? ??Other:     Time In: 2:15   Time Out: 2:30      Min Units   Therapeutic Ex 45269     Therapeutic Activities 20910 10 1   ADL/Self Care 01311 5    Orthotic Management 73869     Neuro Re-Ed 82289     Non-Billable Time     TOTAL TIMED TREATMENT 15 [address] [facility] KARELY/L [id number]

## 2021-03-04 NOTE — CARE COORDINATION
CASE MANAGEMENT. ... Chart reviewed. Noted patients increased o2 needs from [facility] to [address] Updated o2 orders obtained and Brittney with [address] notified. Patient went into afib rvr on nights, was given iv lopressor x1, and converted back to sr. Already on lopressor 25mg bid and xarelto. Will cont to monitor. In the meantime, patient cont on iv solumedrol 40mg q12hrs. Plan is home at discharge with [facility] and 24/7 care at home. Will need ambulance transport arranged when discharged. Forms in chart.

## 2021-03-05 LAB
ALBUMIN SERPL-MCNC: 3.3 G/DL (ref 3.5–5.2)
ALP BLD-CCNC: 88 U/L (ref 35–104)
ALT SERPL-CCNC: 20 U/L (ref 0–32)
ANION GAP SERPL CALCULATED.3IONS-SCNC: 6 MMOL/L (ref 7–16)
ANISOCYTOSIS: ABNORMAL
AST SERPL-CCNC: 22 U/L (ref 0–31)
ATYPICAL LYMPHOCYTE RELATIVE PERCENT: 1 % (ref 0–4)
BASOPHILS ABSOLUTE: 0 E9/L (ref 0–0.2)
BASOPHILS RELATIVE PERCENT: 0 % (ref 0–2)
BILIRUB SERPL-MCNC: 0.4 MG/DL (ref 0–1.2)
BUN BLDV-MCNC: 54 MG/DL (ref 8–23)
CALCIUM SERPL-MCNC: 10.2 MG/DL (ref 8.6–10.2)
CHLORIDE BLD-SCNC: 103 MMOL/L (ref 98–107)
CO2: 38 MMOL/L (ref 22–29)
CREAT SERPL-MCNC: 0.9 MG/DL (ref 0.5–1)
EOSINOPHILS ABSOLUTE: 0.7 E9/L (ref 0.05–0.5)
EOSINOPHILS RELATIVE PERCENT: 4 % (ref 0–6)
GFR AFRICAN AMERICAN: >60
GFR NON-AFRICAN AMERICAN: 59 ML/MIN/1.73
GLUCOSE BLD-MCNC: 115 MG/DL (ref 74–99)
HCT VFR BLD CALC: 34.2 % (ref 34–48)
HEMOGLOBIN: 10 G/DL (ref 11.5–15.5)
HYPOCHROMIA: ABNORMAL
LYMPHOCYTES ABSOLUTE: 1.06 E9/L (ref 1.5–4)
LYMPHOCYTES RELATIVE PERCENT: 5 % (ref 20–42)
MCH RBC QN AUTO: 31.8 PG (ref 26–35)
MCHC RBC AUTO-ENTMCNC: 29.2 % (ref 32–34.5)
MCV RBC AUTO: 108.9 FL (ref 80–99.9)
MONOCYTES ABSOLUTE: 1.41 E9/L (ref 0.1–0.95)
MONOCYTES RELATIVE PERCENT: 8 % (ref 2–12)
MYELOCYTE PERCENT: 3 % (ref 0–0)
NEUTROPHILS ABSOLUTE: 13.9 E9/L (ref 1.8–7.3)
NEUTROPHILS RELATIVE PERCENT: 76 % (ref 43–80)
OVALOCYTES: ABNORMAL
PDW BLD-RTO: 15.1 FL (ref 11.5–15)
PLATELET # BLD: 549 E9/L (ref 130–450)
PMV BLD AUTO: 10 FL (ref 7–12)
POIKILOCYTES: ABNORMAL
POLYCHROMASIA: ABNORMAL
POTASSIUM REFLEX MAGNESIUM: 4.3 MMOL/L (ref 3.5–5)
PRO-BNP: 1338 PG/ML (ref 0–450)
PROMYELOCYTES PERCENT: 3 % (ref 0–0)
RBC # BLD: 3.14 E12/L (ref 3.5–5.5)
SODIUM BLD-SCNC: 147 MMOL/L (ref 132–146)
TOTAL PROTEIN: 6.2 G/DL (ref 6.4–8.3)
TOXIC GRANULATION: ABNORMAL
VACUOLATED NEUTROPHILS: ABNORMAL
WBC # BLD: 17.6 E9/L (ref 4.5–11.5)

## 2021-03-05 PROCEDURE — 80053 COMPREHEN METABOLIC PANEL: CPT

## 2021-03-05 PROCEDURE — 83880 ASSAY OF NATRIURETIC PEPTIDE: CPT

## 2021-03-05 PROCEDURE — 94660 CPAP INITIATION&MGMT: CPT

## 2021-03-05 PROCEDURE — 36415 COLL VENOUS BLD VENIPUNCTURE: CPT

## 2021-03-05 PROCEDURE — 99232 SBSQ HOSP IP/OBS MODERATE 35: CPT | Performed by: FAMILY MEDICINE

## 2021-03-05 PROCEDURE — 6370000000 HC RX 637 (ALT 250 FOR IP): Performed by: INTERNAL MEDICINE

## 2021-03-05 PROCEDURE — 2700000000 HC OXYGEN THERAPY PER DAY

## 2021-03-05 PROCEDURE — 6370000000 HC RX 637 (ALT 250 FOR IP): Performed by: NURSE PRACTITIONER

## 2021-03-05 PROCEDURE — 6370000000 HC RX 637 (ALT 250 FOR IP): Performed by: FAMILY MEDICINE

## 2021-03-05 PROCEDURE — 94640 AIRWAY INHALATION TREATMENT: CPT

## 2021-03-05 PROCEDURE — 85025 COMPLETE CBC W/AUTO DIFF WBC: CPT

## 2021-03-05 PROCEDURE — 2060000000 HC ICU INTERMEDIATE R&B

## 2021-03-05 PROCEDURE — 2580000003 HC RX 258: Performed by: FAMILY MEDICINE

## 2021-03-05 PROCEDURE — 6360000002 HC RX W HCPCS: Performed by: INTERNAL MEDICINE

## 2021-03-05 RX ORDER — LEVALBUTEROL INHALATION SOLUTION 0.31 MG/3ML
0.31 SOLUTION RESPIRATORY (INHALATION) EVERY 6 HOURS PRN
Qty: 3 ML | Refills: 3 | Status: SHIPPED | OUTPATIENT
Start: 2021-03-05

## 2021-03-05 RX ORDER — FUROSEMIDE 20 MG/1
20 TABLET ORAL DAILY
Status: DISCONTINUED | OUTPATIENT
Start: 2021-03-06 | End: 2021-03-06

## 2021-03-05 RX ORDER — PREDNISONE 10 MG/1
TABLET ORAL
Qty: 32 TABLET | Refills: 0 | Status: ON HOLD | OUTPATIENT
Start: 2021-03-05 | End: 2021-04-14 | Stop reason: SDUPTHER

## 2021-03-05 RX ADMIN — NYSTATIN 500000 UNITS: 500000 SUSPENSION ORAL at 20:34

## 2021-03-05 RX ADMIN — GABAPENTIN 100 MG: 100 CAPSULE ORAL at 09:05

## 2021-03-05 RX ADMIN — VALSARTAN 160 MG: 160 TABLET, FILM COATED ORAL at 09:05

## 2021-03-05 RX ADMIN — PETROLATUM: 420 OINTMENT TOPICAL at 16:55

## 2021-03-05 RX ADMIN — ARFORMOTEROL TARTRATE 15 MCG: 15 SOLUTION RESPIRATORY (INHALATION) at 19:21

## 2021-03-05 RX ADMIN — DOCUSATE SODIUM 100 MG: 100 CAPSULE, LIQUID FILLED ORAL at 09:04

## 2021-03-05 RX ADMIN — PANTOPRAZOLE SODIUM 40 MG: 40 TABLET, DELAYED RELEASE ORAL at 06:44

## 2021-03-05 RX ADMIN — CYANOCOBALAMIN TAB 1000 MCG 1000 MCG: 1000 TAB at 09:05

## 2021-03-05 RX ADMIN — ZINC SULFATE 220 MG (50 MG) CAPSULE 50 MG: CAPSULE at 09:04

## 2021-03-05 RX ADMIN — LEVOTHYROXINE SODIUM 100 MCG: 100 TABLET ORAL at 06:44

## 2021-03-05 RX ADMIN — SODIUM CHLORIDE, PRESERVATIVE FREE 10 ML: 5 INJECTION INTRAVENOUS at 09:05

## 2021-03-05 RX ADMIN — CETIRIZINE HYDROCHLORIDE 10 MG: 10 TABLET, FILM COATED ORAL at 09:04

## 2021-03-05 RX ADMIN — GABAPENTIN 100 MG: 100 CAPSULE ORAL at 20:33

## 2021-03-05 RX ADMIN — PETROLATUM: 420 OINTMENT TOPICAL at 09:04

## 2021-03-05 RX ADMIN — TRIAMCINOLONE ACETONIDE: 1 PASTE DENTAL at 12:48

## 2021-03-05 RX ADMIN — METOPROLOL TARTRATE 25 MG: 25 TABLET, FILM COATED ORAL at 20:34

## 2021-03-05 RX ADMIN — SODIUM CHLORIDE, PRESERVATIVE FREE 10 ML: 5 INJECTION INTRAVENOUS at 20:33

## 2021-03-05 RX ADMIN — PANTOPRAZOLE SODIUM 40 MG: 40 TABLET, DELAYED RELEASE ORAL at 16:55

## 2021-03-05 RX ADMIN — BUDESONIDE 500 MCG: 0.5 SUSPENSION RESPIRATORY (INHALATION) at 19:21

## 2021-03-05 RX ADMIN — BUDESONIDE 500 MCG: 0.5 SUSPENSION RESPIRATORY (INHALATION) at 08:04

## 2021-03-05 RX ADMIN — FLECAINIDE ACETATE 50 MG: 50 TABLET ORAL at 20:33

## 2021-03-05 RX ADMIN — DULOXETINE HYDROCHLORIDE 40 MG: 60 CAPSULE, DELAYED RELEASE ORAL at 09:05

## 2021-03-05 RX ADMIN — METOPROLOL TARTRATE 25 MG: 25 TABLET, FILM COATED ORAL at 09:05

## 2021-03-05 RX ADMIN — NYSTATIN 500000 UNITS: 500000 SUSPENSION ORAL at 09:04

## 2021-03-05 RX ADMIN — ARFORMOTEROL TARTRATE 15 MCG: 15 SOLUTION RESPIRATORY (INHALATION) at 08:03

## 2021-03-05 RX ADMIN — LIDOCAINE HYDROCHLORIDE 5 ML: 20 SOLUTION ORAL; TOPICAL at 20:34

## 2021-03-05 RX ADMIN — NYSTATIN 500000 UNITS: 500000 SUSPENSION ORAL at 12:48

## 2021-03-05 RX ADMIN — FAMOTIDINE 20 MG: 20 TABLET ORAL at 20:34

## 2021-03-05 RX ADMIN — CELECOXIB 200 MG: 100 CAPSULE ORAL at 09:04

## 2021-03-05 RX ADMIN — OXYCODONE HYDROCHLORIDE AND ACETAMINOPHEN 500 MG: 500 TABLET ORAL at 09:05

## 2021-03-05 RX ADMIN — FUROSEMIDE 40 MG: 40 TABLET ORAL at 09:05

## 2021-03-05 RX ADMIN — FLECAINIDE ACETATE 50 MG: 50 TABLET ORAL at 09:05

## 2021-03-05 RX ADMIN — NYSTATIN 500000 UNITS: 500000 SUSPENSION ORAL at 16:55

## 2021-03-05 RX ADMIN — FOLIC ACID 1 MG: 1 TABLET ORAL at 09:05

## 2021-03-05 RX ADMIN — RIVAROXABAN 15 MG: 15 TABLET, FILM COATED ORAL at 09:04

## 2021-03-05 RX ADMIN — TRIAMCINOLONE ACETONIDE: 1 PASTE DENTAL at 09:06

## 2021-03-05 RX ADMIN — Medication 100 MG: at 09:04

## 2021-03-05 RX ADMIN — Medication 2000 UNITS: at 09:06

## 2021-03-05 RX ADMIN — PREDNISONE 40 MG: 20 TABLET ORAL at 09:05

## 2021-03-05 RX ADMIN — TRIAMCINOLONE ACETONIDE: 1 PASTE DENTAL at 20:34

## 2021-03-05 ASSESSMENT — PAIN SCALES - GENERAL
PAINLEVEL_OUTOF10: 0

## 2021-03-05 NOTE — PROGRESS NOTES
Physical Therapy    Pt kindly refused rx, states tired, fatigued, wants to remain in bed and rest. Wishes respected. Will follow on another date/time.   Araceli Daugherty PTA [id number]

## 2021-03-05 NOTE — CARE COORDINATION
Social Work/Discharge Planning:  DME order was noted for 6 liters of oxygen. RN completed oxygen pulse saturation testing and patient will require four liters of oxygen at discharge. Informed patient daughter Roslyn Martinez that her mother does not qualify for 6 liters of oxygen. Roslyn Caller acknowledged understanding. Informed Micah Vieiras with [facility].   Electronically signed by JU Middleton on 3/5/2021 at 2:52 PM

## 2021-03-05 NOTE — PROGRESS NOTES
3/5/2021  6:39 PM      Comprehensive Nutrition Assessment    Type and Reason for Visit:  Reassess    Nutrition Recommendations/Plan: Continue current diet and ONS, as tolerated     Nutrition Assessment:  Pt nutrition status slightly improved with PO at meals average 50-75% today with continued poor appetite reported. Discharge planning in progress for 3/6. Malnutrition Assessment:  Malnutrition Status:  Mild malnutrition    Context:  Acute Illness     Findings of the 6 clinical characteristics of malnutrition:  Energy Intake:  7 - 50% or less of estimated energy requirements for 5 or more days  Weight Loss:  No significant weight loss     Body Fat Loss:  1 - Mild body fat loss Orbital   Muscle Mass Loss:  No significant muscle mass loss    Fluid Accumulation:  No significant fluid accumulation     Strength:   Not measured    Estimated Daily Nutrient Needs:  Energy (kcal):  ; Weight Used for Energy Requirements:  Admission     Protein (g):  75-85 (1.3-1.5 g/kg); Weight Used for Protein Requirements:  Admission        Fluid (ml/day):  ; Method Used for Fluid Requirements:  1 ml/kcal      Nutrition Related Findings:  A&Ox4, poor appetite, +1 edema, +BS, hypernatremia      Wounds:  (reddened buttocks, groin and bruised sacrum)       Current Nutrition Therapies:    DIET GENERAL;  Dietary Nutrition Supplements: Frozen Oral Supplement  Dietary Nutrition Supplements: Clear Liquid Oral Supplement    Anthropometric Measures:  · Height: 5' (152.4 cm)  · Current Body Weight: 146 lb (66.2 kg)(3/5 - wt up with +fluid balance)   · Admission Body Weight: 123 lb (55.8 kg)(2/24 bedscale)    · Usual Body Weight: 123 lb (55.8 kg)(per EMR hx x 1 yr.  Pt wt ave. 137# on admit earlier this mo but +I/O and edema)     · Ideal Body Weight: 100 lbs; % Ideal Body Weight 124 %   · BMI: 28.5  ·   · BMI Categories: Normal Weight (BMI 22.0 to 24.9) age over 72       Nutrition Diagnosis:   · Mild malnutrition, In context of acute illness or injury related to impaired respiratory function(increased work of breathing/SOB) as evidenced by intake 0-25%, poor intake prior to admission, mild loss of subcutaneous fat      Nutrition Interventions:   Food and/or Nutrient Delivery:  Continue Current Diet, Continue Oral Nutrition Supplement  Nutrition Education/Counseling:  No recommendation at this time   Coordination of Nutrition Care:  Continue to monitor while inpatient    Goals:  PO >75% at meals/ONS       Nutrition Monitoring and Evaluation:   Behavioral-Environmental Outcomes:  None Identified   Food/Nutrient Intake Outcomes:  Food and Nutrient Intake, Supplement Intake  Physical Signs/Symptoms Outcomes:  GI Status, Biochemical Data, Fluid Status or Edema, Weight, Skin, Nutrition Focused Physical Findings     Discharge Planning:    No discharge needs at this time     Electronically signed by Sherlie Frankel, RD, CNSC, LD on 3/5/21 at 6:40 PM EST    Contact: [phone]

## 2021-03-05 NOTE — PLAN OF CARE
Problem: Falls - Risk of:  Goal: Will remain free from falls  Description: Will remain free from falls  Outcome: Met This Shift     Problem: Skin Integrity:  Goal: Will show no infection signs and symptoms  Description: Will show no infection signs and symptoms  Outcome: Met This Shift  Goal: Absence of new skin breakdown  Description: Absence of new skin breakdown  Outcome: Met This Shift

## 2021-03-05 NOTE — CARE COORDINATION
Social Work/Discharge Planning:  Patient daughter Vikram Shafer inquired if patient can have a [place] [employer] lift at discharge. [facility], [employer], [employer] and [employer] and none provide [place] Plus lifts. Notified patient Vikram Shafer. Vikram Shafer is requesting Ambulance transport to be arranged for 1:30pm tomorrow. This worker confirmed home address (address: [facility] [address]) with no steps to enter. Margaux Lane with [facility] (ph: [phone]) and arranged Ambulance transport for tomorrow 3/6 at 1:30pm.  Notified Jacob Wilde with [facility] of discharge for tomorrow. Notified liaison Carol with [facility] of discharge. Notified patient daughter and RN of discharge time. Will continue to follow.   Electronically signed by JU Pinon on 3/5/2021 at 11:23 AM

## 2021-03-05 NOTE — PROGRESS NOTES
Occupational Therapy  Patient treatment attempted this PM.  Patient declined session due to fatigue. Will attempt at a later time.                                                  Champ JARAMILLO/PAULY [id number]

## 2021-03-05 NOTE — PROGRESS NOTES
budesonide  500 mcg Nebulization BID    Arformoterol Tartrate  15 mcg Nebulization BID    polyethylene glycol  17 g Oral Daily    nystatin  5 mL Oral 4x Daily    triamcinolone acetonide   Mouth/Throat TID    celecoxib  200 mg Oral Daily    docusate sodium  100 mg Oral Daily    DULoxetine  40 mg Oral Daily    ferrous sulfate  325 mg Oral Every Other Day    cetirizine  10 mg Oral Daily    flecainide  50 mg Oral BID    folic acid  1 mg Oral Daily    ascorbic acid  500 mg Oral Daily    gabapentin  100 mg Oral BID    leucovorin calcium  25 mg Oral Weekly    levothyroxine  100 mcg Oral Daily    lidocaine  1 patch Transdermal Daily    methotrexate  5 mg Oral Weekly    metoprolol tartrate  25 mg Oral BID    pantoprazole  40 mg Oral BID AC    rivaroxaban  15 mg Oral Daily with breakfast    thiamine  100 mg Oral Daily    vitamin B-12  1,000 mcg Oral Daily    vitamin D  2,000 Units Oral Daily    zinc sulfate  50 mg Oral Daily    sodium chloride flush  10 mL Intravenous 2 times per day    famotidine  20 mg Oral Nightly    valsartan  160 mg Oral Daily    methotrexate  10 mg Oral Weekly       Physical Exam:  General Appearance: appears comfortable in no acute distress. HEENT: Normocephalic atraumatic without obvious abnormality   Neck: Lips, mucosa, and tongue normal.    Lung: Breath sounds diminished few basilar crackles. Respirations   unlabored. Symmetrical expansion. Heart: RRR, normal S1, S2. No MRG  Abdomen: Soft, NT, ND. BS present x 4 quadrants. No bruit or organomegaly. Extremities: Pedal pulses 2+ symmetric b/l. Extremities normal, no cyanosis, clubbing, or edema. Musculokeletal: No joint swelling, no muscle tenderness. ROM normal in all joints of extremities. Neurologic: Mental status: Alert and Oriented X3 .     Pertinent/ New Labs and Imaging Studies     Imaging Personally Reviewed:        3/4/2021 3:20 pm       COMPARISON:   February 26       HISTORY:   [address] HISTORY: hypoxia   TECHNOLOGIST PROVIDED HISTORY:   Reason for exam:->hypoxia       FINDINGS:   Cardiac silhouette is not enlarged.       Interstitial coarsening bilaterally predominantly in the lung bases, likely   chronic.       No new areas of airspace consolidation or pleural effusions.       The pulmonary vasculature is within normal limits.           Impression   Interstitial coarsening, predominantly in the lung bases, likely chronic.  No   new areas of airspace consolidation or pleural effusions.               CTA chest   No convincing evidence of a pulmonary embolism. Significant progression in the ground-glass opacities scattered throughout   both lungs when compared with February 10, 2021.  Findings are suspicious for   multifocal pneumonia. Stable calcified mass in the central canal of the thoracic spine. Distended esophagus filled with undigested material, placing the patient at   risk for aspiration. Additional findings as described.               ECHO   Conclusions      Summary   Left ventricular internal dimensions were normal in diastole and systole. No regional wall motion abnormalities seen. Normal left ventricular ejection fraction. The left atrium is borderline dilated. Moderate mitral annular calcification. Mild mitral regurgitation is present. The aortic valve appears mildly sclerotic. Mild aortic regurgitation is noted.          Labs:  Lab Results   Component Value Date    WBC 17.0 03/04/2021    HGB 9.3 03/04/2021    HCT 30.4 03/04/2021    .1 03/04/2021    MCH 31.8 03/04/2021    MCHC 30.6 03/04/2021    RDW 15.1 03/04/2021     03/04/2021    MPV 10.2 03/04/2021     Lab Results   Component Value Date     03/04/2021    K 4.1 03/04/2021     03/04/2021    CO2 34 03/04/2021    BUN 55 03/04/2021    CREATININE 0.8 03/04/2021    LABALBU 3.2 03/04/2021    LABALBU 4.0 04/24/2012    CALCIUM 9.6 03/04/2021    GFRAA >60 03/04/2021    LABGLOM >60 03/04/2021 Lab Results   Component Value Date    PROTIME 12.2 03/01/2021    PROTIME 15.8 05/12/2014    INR 1.1 03/01/2021     Recent Labs     03/04/21  0435   PROBNP 1,348*     No results for input(s): PROCAL in the last 72 hours. This SmartLink has not been configured with any valid records. Micro:  No results for input(s): CULTRESP in the last 72 hours. No results for input(s): LABGRAM in the last 72 hours. No results for input(s): LEGUR in the last 72 hours. No results for input(s): STREPNEUMAGU in the last 72 hours. No results for input(s): LP1UAG in the last 72 hours. Assessment:    1. Acute on chronic hypoxic respiratory failure   2. Hx of recent Covid 19 pneumonitis 2/8/21 positive test with possible pneumonitis  3. 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 Multilobar ground glass opacities likely multifactorial ? chf with pulmonary edema with superimposed previous ggo from covid. 5. Elevated bnp  6. Leukocytosis likely secondary to steroids   7. Hiatal hernia   8. hafsa   9. Multiple rib fractures   10. RA on mtx   11. Immunocompromised   12. Anemia   13. A fib  14. Oral candida      Plan:   1. We FiO2 for saturations above 92%, now on 4 L 6 L with activity  , she wears 3 L as her home liter flow  2. bipap prn for respiratory support-did not use last night  3. Continue to monitor off antibiotics  4. Continue vitamin protocol, completed dexamethasone and antiviral medication  5. Prednisone 40 mg QQ day    6. DVT GI prophylaxis-Xarelto, Protonix  7. IS , add home nebulizer with xopenex Q  PRN poor inspiratory effort due to COVID pneumonia with albuterol PRN, prednisone taper   ordered in med rec, she has required 6 l with activity will order o2  8. [id number] [address] Monge for home  from [employer] po      Electronically signed by FRAN Abreu on 3/5/2021 at 12:04 PM     I personally saw, examined, and cared for the patient. Labs, medications, radiographs reviewed. I agree with history exam and plans detailed in NP note.   Yasemin Underwood MD

## 2021-03-06 DIAGNOSIS — I48.0 PAROXYSMAL ATRIAL FIBRILLATION (HCC): ICD-10-CM

## 2021-03-06 LAB
ANION GAP SERPL CALCULATED.3IONS-SCNC: 7 MMOL/L (ref 7–16)
BUN BLDV-MCNC: 52 MG/DL (ref 8–23)
CALCIUM SERPL-MCNC: 8.8 MG/DL (ref 8.6–10.2)
CHLORIDE BLD-SCNC: 107 MMOL/L (ref 98–107)
CO2: 35 MMOL/L (ref 22–29)
CREAT SERPL-MCNC: 0.9 MG/DL (ref 0.5–1)
GFR AFRICAN AMERICAN: >60
GFR NON-AFRICAN AMERICAN: 59 ML/MIN/1.73
GLUCOSE BLD-MCNC: 123 MG/DL (ref 74–99)
MAGNESIUM: 1.9 MG/DL (ref 1.6–2.6)
POTASSIUM SERPL-SCNC: 4 MMOL/L (ref 3.5–5)
SODIUM BLD-SCNC: 149 MMOL/L (ref 132–146)

## 2021-03-06 PROCEDURE — 2700000000 HC OXYGEN THERAPY PER DAY

## 2021-03-06 PROCEDURE — 93005 ELECTROCARDIOGRAM TRACING: CPT | Performed by: INTERNAL MEDICINE

## 2021-03-06 PROCEDURE — 6370000000 HC RX 637 (ALT 250 FOR IP): Performed by: FAMILY MEDICINE

## 2021-03-06 PROCEDURE — 6370000000 HC RX 637 (ALT 250 FOR IP): Performed by: STUDENT IN AN ORGANIZED HEALTH CARE EDUCATION/TRAINING PROGRAM

## 2021-03-06 PROCEDURE — 80048 BASIC METABOLIC PNL TOTAL CA: CPT

## 2021-03-06 PROCEDURE — 6370000000 HC RX 637 (ALT 250 FOR IP): Performed by: INTERNAL MEDICINE

## 2021-03-06 PROCEDURE — 2580000003 HC RX 258: Performed by: STUDENT IN AN ORGANIZED HEALTH CARE EDUCATION/TRAINING PROGRAM

## 2021-03-06 PROCEDURE — 2580000003 HC RX 258: Performed by: FAMILY MEDICINE

## 2021-03-06 PROCEDURE — 6360000002 HC RX W HCPCS: Performed by: INTERNAL MEDICINE

## 2021-03-06 PROCEDURE — 93005 ELECTROCARDIOGRAM TRACING: CPT | Performed by: STUDENT IN AN ORGANIZED HEALTH CARE EDUCATION/TRAINING PROGRAM

## 2021-03-06 PROCEDURE — 2500000003 HC RX 250 WO HCPCS: Performed by: STUDENT IN AN ORGANIZED HEALTH CARE EDUCATION/TRAINING PROGRAM

## 2021-03-06 PROCEDURE — 36415 COLL VENOUS BLD VENIPUNCTURE: CPT

## 2021-03-06 PROCEDURE — 2060000000 HC ICU INTERMEDIATE R&B

## 2021-03-06 PROCEDURE — 94660 CPAP INITIATION&MGMT: CPT

## 2021-03-06 PROCEDURE — 99291 CRITICAL CARE FIRST HOUR: CPT | Performed by: INTERNAL MEDICINE

## 2021-03-06 PROCEDURE — 83735 ASSAY OF MAGNESIUM: CPT

## 2021-03-06 PROCEDURE — 99233 SBSQ HOSP IP/OBS HIGH 50: CPT | Performed by: INTERNAL MEDICINE

## 2021-03-06 PROCEDURE — 2580000003 HC RX 258: Performed by: INTERNAL MEDICINE

## 2021-03-06 PROCEDURE — 94640 AIRWAY INHALATION TREATMENT: CPT

## 2021-03-06 PROCEDURE — 6370000000 HC RX 637 (ALT 250 FOR IP): Performed by: NURSE PRACTITIONER

## 2021-03-06 RX ORDER — DIGOXIN 250 MCG
250 TABLET ORAL ONCE
Status: COMPLETED | OUTPATIENT
Start: 2021-03-06 | End: 2021-03-06

## 2021-03-06 RX ORDER — METOPROLOL TARTRATE 5 MG/5ML
5 INJECTION INTRAVENOUS ONCE
Status: DISCONTINUED | OUTPATIENT
Start: 2021-03-06 | End: 2021-03-09 | Stop reason: HOSPADM

## 2021-03-06 RX ORDER — 0.9 % SODIUM CHLORIDE 0.9 %
1000 INTRAVENOUS SOLUTION INTRAVENOUS ONCE
Status: COMPLETED | OUTPATIENT
Start: 2021-03-06 | End: 2021-03-06

## 2021-03-06 RX ORDER — 0.9 % SODIUM CHLORIDE 0.9 %
500 INTRAVENOUS SOLUTION INTRAVENOUS ONCE
Status: COMPLETED | OUTPATIENT
Start: 2021-03-06 | End: 2021-03-06

## 2021-03-06 RX ORDER — FUROSEMIDE 20 MG/1
20 TABLET ORAL DAILY
Qty: 30 TABLET | Refills: 0 | Status: CANCELLED | OUTPATIENT
Start: 2021-03-06

## 2021-03-06 RX ORDER — METOPROLOL TARTRATE 5 MG/5ML
5 INJECTION INTRAVENOUS ONCE
Status: COMPLETED | OUTPATIENT
Start: 2021-03-06 | End: 2021-03-06

## 2021-03-06 RX ORDER — PREDNISONE 20 MG/1
20 TABLET ORAL DAILY
Status: DISCONTINUED | OUTPATIENT
Start: 2021-03-07 | End: 2021-03-09 | Stop reason: HOSPADM

## 2021-03-06 RX ORDER — DIGOXIN 125 MCG
125 TABLET ORAL DAILY
Status: DISCONTINUED | OUTPATIENT
Start: 2021-03-07 | End: 2021-03-07

## 2021-03-06 RX ORDER — METOPROLOL TARTRATE 50 MG/1
50 TABLET, FILM COATED ORAL 2 TIMES DAILY
Status: DISCONTINUED | OUTPATIENT
Start: 2021-03-06 | End: 2021-03-09 | Stop reason: HOSPADM

## 2021-03-06 RX ORDER — SODIUM CHLORIDE 9 MG/ML
INJECTION, SOLUTION INTRAVENOUS CONTINUOUS
Status: DISCONTINUED | OUTPATIENT
Start: 2021-03-06 | End: 2021-03-08

## 2021-03-06 RX ADMIN — METOPROLOL TARTRATE 50 MG: 50 TABLET, FILM COATED ORAL at 21:55

## 2021-03-06 RX ADMIN — Medication 2000 UNITS: at 08:56

## 2021-03-06 RX ADMIN — LIDOCAINE HYDROCHLORIDE 5 ML: 20 SOLUTION ORAL; TOPICAL at 21:55

## 2021-03-06 RX ADMIN — PANTOPRAZOLE SODIUM 40 MG: 40 TABLET, DELAYED RELEASE ORAL at 06:19

## 2021-03-06 RX ADMIN — GABAPENTIN 100 MG: 100 CAPSULE ORAL at 08:56

## 2021-03-06 RX ADMIN — POLYETHYLENE GLYCOL 3350 17 G: 17 POWDER, FOR SOLUTION ORAL at 08:56

## 2021-03-06 RX ADMIN — NYSTATIN 500000 UNITS: 500000 SUSPENSION ORAL at 07:55

## 2021-03-06 RX ADMIN — BUDESONIDE 500 MCG: 0.5 SUSPENSION RESPIRATORY (INHALATION) at 09:36

## 2021-03-06 RX ADMIN — RIVAROXABAN 15 MG: 15 TABLET, FILM COATED ORAL at 07:55

## 2021-03-06 RX ADMIN — FOLIC ACID 1 MG: 1 TABLET ORAL at 07:55

## 2021-03-06 RX ADMIN — ZINC SULFATE 220 MG (50 MG) CAPSULE 50 MG: CAPSULE at 07:55

## 2021-03-06 RX ADMIN — GABAPENTIN 100 MG: 100 CAPSULE ORAL at 21:55

## 2021-03-06 RX ADMIN — PETROLATUM: 420 OINTMENT TOPICAL at 16:24

## 2021-03-06 RX ADMIN — DIGOXIN 250 MCG: 0.25 TABLET ORAL at 17:42

## 2021-03-06 RX ADMIN — Medication 100 MG: at 07:55

## 2021-03-06 RX ADMIN — CETIRIZINE HYDROCHLORIDE 10 MG: 10 TABLET, FILM COATED ORAL at 07:55

## 2021-03-06 RX ADMIN — PETROLATUM: 420 OINTMENT TOPICAL at 07:55

## 2021-03-06 RX ADMIN — DIGOXIN 250 MCG: 0.25 TABLET ORAL at 16:24

## 2021-03-06 RX ADMIN — NYSTATIN 500000 UNITS: 500000 SUSPENSION ORAL at 12:58

## 2021-03-06 RX ADMIN — SODIUM CHLORIDE, PRESERVATIVE FREE 10 ML: 5 INJECTION INTRAVENOUS at 07:52

## 2021-03-06 RX ADMIN — SODIUM CHLORIDE, PRESERVATIVE FREE 10 ML: 5 INJECTION INTRAVENOUS at 21:55

## 2021-03-06 RX ADMIN — SODIUM CHLORIDE: 9 INJECTION, SOLUTION INTRAVENOUS at 18:40

## 2021-03-06 RX ADMIN — DULOXETINE HYDROCHLORIDE 40 MG: 60 CAPSULE, DELAYED RELEASE ORAL at 07:55

## 2021-03-06 RX ADMIN — FLECAINIDE ACETATE 50 MG: 50 TABLET ORAL at 07:55

## 2021-03-06 RX ADMIN — METOPROLOL TARTRATE 5 MG: 5 INJECTION INTRAVENOUS at 07:52

## 2021-03-06 RX ADMIN — NYSTATIN 500000 UNITS: 500000 SUSPENSION ORAL at 21:55

## 2021-03-06 RX ADMIN — SODIUM CHLORIDE 500 ML: 9 INJECTION, SOLUTION INTRAVENOUS at 04:38

## 2021-03-06 RX ADMIN — OXYCODONE HYDROCHLORIDE AND ACETAMINOPHEN 500 MG: 500 TABLET ORAL at 07:55

## 2021-03-06 RX ADMIN — LEVOTHYROXINE SODIUM 100 MCG: 100 TABLET ORAL at 06:19

## 2021-03-06 RX ADMIN — FERROUS SULFATE TAB 325 MG (65 MG ELEMENTAL FE) 325 MG: 325 (65 FE) TAB at 07:55

## 2021-03-06 RX ADMIN — SODIUM CHLORIDE 1000 ML: 9 INJECTION, SOLUTION INTRAVENOUS at 17:43

## 2021-03-06 RX ADMIN — SODIUM CHLORIDE 500 ML: 9 INJECTION, SOLUTION INTRAVENOUS at 05:40

## 2021-03-06 RX ADMIN — METOPROLOL TARTRATE 50 MG: 50 TABLET, FILM COATED ORAL at 08:56

## 2021-03-06 RX ADMIN — NYSTATIN 500000 UNITS: 500000 SUSPENSION ORAL at 16:24

## 2021-03-06 RX ADMIN — PANTOPRAZOLE SODIUM 40 MG: 40 TABLET, DELAYED RELEASE ORAL at 15:44

## 2021-03-06 RX ADMIN — FAMOTIDINE 20 MG: 20 TABLET ORAL at 21:55

## 2021-03-06 RX ADMIN — BUDESONIDE 500 MCG: 0.5 SUSPENSION RESPIRATORY (INHALATION) at 20:04

## 2021-03-06 RX ADMIN — TRIAMCINOLONE ACETONIDE: 1 PASTE DENTAL at 08:02

## 2021-03-06 RX ADMIN — METOPROLOL TARTRATE 5 MG: 5 INJECTION INTRAVENOUS at 06:47

## 2021-03-06 RX ADMIN — DOCUSATE SODIUM 100 MG: 100 CAPSULE, LIQUID FILLED ORAL at 07:55

## 2021-03-06 RX ADMIN — FUROSEMIDE 20 MG: 20 TABLET ORAL at 08:56

## 2021-03-06 RX ADMIN — CELECOXIB 200 MG: 100 CAPSULE ORAL at 07:55

## 2021-03-06 RX ADMIN — ARFORMOTEROL TARTRATE 15 MCG: 15 SOLUTION RESPIRATORY (INHALATION) at 20:04

## 2021-03-06 RX ADMIN — CYANOCOBALAMIN TAB 1000 MCG 1000 MCG: 1000 TAB at 07:55

## 2021-03-06 RX ADMIN — FLECAINIDE ACETATE 50 MG: 50 TABLET ORAL at 21:55

## 2021-03-06 RX ADMIN — TRIAMCINOLONE ACETONIDE: 1 PASTE DENTAL at 21:55

## 2021-03-06 RX ADMIN — ARFORMOTEROL TARTRATE 15 MCG: 15 SOLUTION RESPIRATORY (INHALATION) at 09:35

## 2021-03-06 RX ADMIN — PREDNISONE 40 MG: 20 TABLET ORAL at 07:55

## 2021-03-06 ASSESSMENT — PAIN SCALES - GENERAL
PAINLEVEL_OUTOF10: 0

## 2021-03-06 NOTE — PROGRESS NOTES
Candice Awad M.D.,[facility]  Dorothy Humphreys D.O., FCAMILLE., Eileen Ramos M.D. Toby Marie M.D., Brian Mix M.D. Jennifer Moeller D.O. Daily Pulmonary Progress Note    Patient:  Emiliana Dinh 80 y.o. female MRN: [id number]     Date of Service: 3/6/2021      Synopsis     We are following patient for respiratory failure with hypoxia, recent COVID-19 pneumonia    \"CC\" shortness of breath    Code status: Full      Subjective      Patient was personally seen and examined. She had a rough night. She was in A. fib RVR with heart rates in the 120s to 130s. Was given IV fluid overnight. This morning is just wiped out and has no energy from a respiratory standpoint her oxygenation continues to improve she is down to 2 L/min. Review of Systems:  Constitutional: Refer severe fatigue and weakness  Skin: Denies pigmentation, dark lesions, and rashes   HEENT: Denies hearing loss, tinnitus, ear drainage, epistaxis, sore throat, and hoarseness. Cardiovascular: Denies palpitations, chest pain, and chest pressure. Respiratory: Cough is improved denies   hemoptysis, apnea, and choking.   Gastrointestinal: Denies nausea, vomiting, poor appetite, diarrhea, heartburn or reflux  Genitourinary: Denies dysuria, frequency, urgency or hematuria  Musculoskeletal: Denies myalgias, muscle weakness, and bone pain    24-hour events:  Increased fatigued     Objective   Vitals: /66   Pulse 110   Temp 96.3 °F (35.7 °C) (Axillary)   Resp 18   Ht 5' (1.524 m)   Wt 146 lb 2.6 oz (66.3 kg)   SpO2 92%   BMI 28.55 kg/m²     I/O:    Intake/Output Summary (Last 24 hours) at 3/6/2021 1217  Last data filed at 3/6/2021 0503  Gross per 24 hour   Intake 820 ml   Output 800 ml   Net 20 ml       Vent Information  Skin Assessment: Clean, dry, & intact  SpO2: 92 %                CURRENT MEDS :  Scheduled Meds:   metoprolol  5 mg Intravenous Once    metoprolol tartrate  50 mg Oral BID    [START ON 3/7/2021] predniSONE  20 mg Oral Daily    white petrolatum   Topical BID    budesonide  500 mcg Nebulization BID    Arformoterol Tartrate  15 mcg Nebulization BID    polyethylene glycol  17 g Oral Daily    nystatin  5 mL Oral 4x Daily    triamcinolone acetonide   Mouth/Throat TID    celecoxib  200 mg Oral Daily    docusate sodium  100 mg Oral Daily    DULoxetine  40 mg Oral Daily    ferrous sulfate  325 mg Oral Every Other Day    cetirizine  10 mg Oral Daily    flecainide  50 mg Oral BID    folic acid  1 mg Oral Daily    ascorbic acid  500 mg Oral Daily    gabapentin  100 mg Oral BID    leucovorin calcium  25 mg Oral Weekly    levothyroxine  100 mcg Oral Daily    lidocaine  1 patch Transdermal Daily    methotrexate  5 mg Oral Weekly    pantoprazole  40 mg Oral BID AC    rivaroxaban  15 mg Oral Daily with breakfast    thiamine  100 mg Oral Daily    vitamin B-12  1,000 mcg Oral Daily    vitamin D  2,000 Units Oral Daily    zinc sulfate  50 mg Oral Daily    sodium chloride flush  10 mL Intravenous 2 times per day    famotidine  20 mg Oral Nightly    valsartan  160 mg Oral Daily    methotrexate  10 mg Oral Weekly       Physical Exam:  General Appearance: appears comfortable in no acute distress. HEENT: Normocephalic atraumatic without obvious abnormality   Neck: Lips, mucosa, and tongue normal.    Lung: Breath sounds diminished few basilar crackles. Respirations   unlabored. Symmetrical expansion. Heart: RRR, normal S1, S2. No MRG  Abdomen: Soft, NT, ND. BS present x 4 quadrants. No bruit or organomegaly. Extremities: Pedal pulses 2+ symmetric b/l. Extremities normal, no cyanosis, clubbing, or edema. Musculokeletal: No joint swelling, no muscle tenderness. ROM normal in all joints of extremities. Neurologic: Mental status: Alert and Oriented X3 .     Pertinent/ New Labs and Imaging Studies     Imaging Personally Reviewed:        3/4/2021 3:20 pm       COMPARISON:   February 26     HISTORY:   ORDERING SYSTEM PROVIDED HISTORY: hypoxia   TECHNOLOGIST PROVIDED HISTORY:   Reason for exam:->hypoxia       FINDINGS:   Cardiac silhouette is not enlarged.       Interstitial coarsening bilaterally predominantly in the lung bases, likely   chronic.       No new areas of airspace consolidation or pleural effusions.       The pulmonary vasculature is within normal limits.           Impression   Interstitial coarsening, predominantly in the lung bases, likely chronic.  No   new areas of airspace consolidation or pleural effusions.               CTA chest   No convincing evidence of a pulmonary embolism. Significant progression in the ground-glass opacities scattered throughout   both lungs when compared with February 10, 2021.  Findings are suspicious for   multifocal pneumonia. Stable calcified mass in the central canal of the thoracic spine. Distended esophagus filled with undigested material, placing the patient at   risk for aspiration. Additional findings as described.               ECHO   Conclusions      Summary   Left ventricular internal dimensions were normal in diastole and systole. No regional wall motion abnormalities seen. Normal left ventricular ejection fraction. The left atrium is borderline dilated. Moderate mitral annular calcification. Mild mitral regurgitation is present. The aortic valve appears mildly sclerotic. Mild aortic regurgitation is noted.          Labs:  Lab Results   Component Value Date    WBC 17.6 03/05/2021    HGB 10.0 03/05/2021    HCT 34.2 03/05/2021    .9 03/05/2021    MCH 31.8 03/05/2021    MCHC 29.2 03/05/2021    RDW 15.1 03/05/2021     03/05/2021    MPV 10.0 03/05/2021     Lab Results   Component Value Date     03/06/2021    K 4.0 03/06/2021    K 4.3 03/05/2021     03/06/2021    CO2 35 03/06/2021    BUN 52 03/06/2021    CREATININE 0.9 03/06/2021    LABALBU 3.3 03/05/2021    LABALBU 4.0 04/24/2012    CALCIUM 8.8 03/06/2021    GFRAA >60 03/06/2021    LABGLOM 59 03/06/2021     Lab Results   Component Value Date    PROTIME 12.2 03/01/2021    PROTIME 15.8 05/12/2014    INR 1.1 03/01/2021     Recent Labs     03/05/21  1630   PROBNP 1,338*     No results for input(s): PROCAL in the last 72 hours. This SmartLink has not been configured with any valid records. Micro:  No results for input(s): CULTRESP in the last 72 hours. No results for input(s): LABGRAM in the last 72 hours. No results for input(s): LEGUR in the last 72 hours. No results for input(s): STREPNEUMAGU in the last 72 hours. No results for input(s): LP1UAG in the last 72 hours. Assessment:    1. Acute on chronic hypoxic respiratory failure   2. Hx of recent Covid 19 pneumonitis 2/8/21 positive test with possible pneumonitis  3. 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 Multilobar ground glass opacities likely multifactorial ? chf with pulmonary edema with superimposed previous ggo from covid. 5. Elevated bnp  6. Leukocytosis likely secondary to steroids   7. Hiatal hernia   8. hafsa   9. Multiple rib fractures   10. RA on mtx   11. Immunocompromised   12. Anemia   13. A fib  14. Oral candida      Plan:   1. Stop Lasix  2. FiO2 is down to 2 L   3. Prednisone taper down to 20 mg for 4 days followed by 10 mg x 4 days and stop  4. Would recommend to consult cardiology for her ongoing A. fib issues  5. DVT GI prophylaxis-Xarelto, Protonix  6.    Continue to monitor her electrolytes        Electronically signed by Facundo Pineda MD on 3/6/2021 at 12:17 PM

## 2021-03-06 NOTE — PROGRESS NOTES
Pt back into Afib RVR w/ HR sustaining 160s, BP 70/doppler. Pt alert but very lethargic. RRT called.

## 2021-03-06 NOTE — PROGRESS NOTES
[facility] Progress Note    Admitting Date and Time: 2/24/2021  4:31 AM  Admit Dx: Acute on chronic respiratory failure with hypoxia (HCC) [J96.21]    Subjective:  Patient is being followed for Acute on chronic respiratory failure with hypoxia ([address] [J96.21]   Pt feels tired today, she went into Afib with RVR this morning and she was hypotensive again today. ROS: denies fever, chills, cp, n/v, HA unless stated above.       metoprolol  5 mg Intravenous Once    metoprolol tartrate  50 mg Oral BID    furosemide  20 mg Oral Daily    predniSONE  40 mg Oral Daily    white petrolatum   Topical BID    budesonide  500 mcg Nebulization BID    Arformoterol Tartrate  15 mcg Nebulization BID    polyethylene glycol  17 g Oral Daily    nystatin  5 mL Oral 4x Daily    triamcinolone acetonide   Mouth/Throat TID    celecoxib  200 mg Oral Daily    docusate sodium  100 mg Oral Daily    DULoxetine  40 mg Oral Daily    ferrous sulfate  325 mg Oral Every Other Day    cetirizine  10 mg Oral Daily    flecainide  50 mg Oral BID    folic acid  1 mg Oral Daily    ascorbic acid  500 mg Oral Daily    gabapentin  100 mg Oral BID    leucovorin calcium  25 mg Oral Weekly    levothyroxine  100 mcg Oral Daily    lidocaine  1 patch Transdermal Daily    methotrexate  5 mg Oral Weekly    pantoprazole  40 mg Oral BID AC    rivaroxaban  15 mg Oral Daily with breakfast    thiamine  100 mg Oral Daily    vitamin B-12  1,000 mcg Oral Daily    vitamin D  2,000 Units Oral Daily    zinc sulfate  50 mg Oral Daily    sodium chloride flush  10 mL Intravenous 2 times per day    famotidine  20 mg Oral Nightly    valsartan  160 mg Oral Daily    methotrexate  10 mg Oral Weekly     magic (miracle) mouthwash, 5 mL, 4x Daily PRN  albuterol sulfate HFA, 2 puff, 4x Daily PRN  sodium chloride flush, 10 mL, PRN  acetaminophen, 650 mg, Q6H PRN    Or  acetaminophen, 650 mg, Q6H PRN  perflutren lipid microspheres, 1.5 mL, Plan:  1. Afib with RVR, I will increase the dose of metoprolol to 50 mg bid, patient received IV metoprolol earlier this morning, continue flecainide. Continue on Xarelto for [facility], monitor today, consider consulting cardiology. 2.   Acute on chronic hypoxic respiratory failure, baseline on 3L at home, currently 4L at rest and 6 on ambulation, O2 sat was in the 80's on RA. patient will probably need 4L on discharge  3. 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 4.  Recent COVID 19 infectino, treated with remdesivir and dexamethasone. 5.  Acute on chronic HFpEF, continue on lasix, ef 65% on echo  6. Hx of RA, on MTX at home weekly. 7.  Hypothyroidism, continue levothyroxine. NOTE: This report was transcribed using voice recognition software. Every effort was made to ensure accuracy; however, inadvertent computerized transcription errors may be present.   Electronically signed by Merline Face, MD on 3/6/2021 at 9:58 AM

## 2021-03-06 NOTE — PROGRESS NOTES
Dr. Skyler Blood notified that patient going in and out of AFib RVR and NSR for the last hour. When RVR she is going up to 150s and 160s, lasts about five minutes, and then breaks to NSR in the 90s. Patient is asymptomatic, blood pressure is 129/67. Patient is on Xarelto and does has a history of PAF.

## 2021-03-06 NOTE — SIGNIFICANT EVENT
[facility] RRT/Code Blue Note    Subjective:    Called to bedside for hypotension and tachycardia, patient became tachycardic and hypotensive earlier this morning and she was given a dose of IV metoprolol, then metoprolol orally was increased to 50 mg bid, has remained in afib but controlled rate throughout the day, until the RRT was called when she became hypotensive in the 10'M systolic over doppler, patient reported feeling tired, she denied any chest pain or shortness of breath, no lightheadedness. HR was in the 160-170, afib with RVR  Placed the patient in Trenedelenburg and start IV NS bolus,  HR improved and patient converted back to sinus rhythm. HR in the [de-identified]  Contacted cardiology, we can't start the patient on amiodarone as she is on flecainide, earlier we gave the patient a dose of digoxin 0.25 mg, we will give another dose of 0.25 mg and start her on 0.125 daily, we will finish a liter bolus then continue with maintenance at 75 ml/hr. Patient reported feeling better, BP improved to 90's, ok to keep the patient on the same unit, discussed with the daughter at bedside.      metoprolol  5 mg Intravenous Once    metoprolol tartrate  50 mg Oral BID    [START ON 3/7/2021] predniSONE  20 mg Oral Daily    sodium chloride  1,000 mL Intravenous Once    [START ON 3/7/2021] digoxin  125 mcg Oral Daily    white petrolatum   Topical BID    budesonide  500 mcg Nebulization BID    Arformoterol Tartrate  15 mcg Nebulization BID    polyethylene glycol  17 g Oral Daily    nystatin  5 mL Oral 4x Daily    triamcinolone acetonide   Mouth/Throat TID    celecoxib  200 mg Oral Daily    docusate sodium  100 mg Oral Daily    DULoxetine  40 mg Oral Daily    ferrous sulfate  325 mg Oral Every Other Day    cetirizine  10 mg Oral Daily    flecainide  50 mg Oral BID    folic acid  1 mg Oral Daily    ascorbic acid  500 mg Oral Daily    gabapentin  100 mg Oral BID    leucovorin calcium  25 mg Oral Weekly    levothyroxine  100 mcg Oral Daily    lidocaine  1 patch Transdermal Daily    methotrexate  5 mg Oral Weekly    pantoprazole  40 mg Oral BID AC    rivaroxaban  15 mg Oral Daily with breakfast    thiamine  100 mg Oral Daily    vitamin B-12  1,000 mcg Oral Daily    vitamin D  2,000 Units Oral Daily    zinc sulfate  50 mg Oral Daily    sodium chloride flush  10 mL Intravenous 2 times per day    famotidine  20 mg Oral Nightly    valsartan  160 mg Oral Daily    methotrexate  10 mg Oral Weekly     magic (miracle) mouthwash, 5 mL, 4x Daily PRN  albuterol sulfate HFA, 2 puff, 4x Daily PRN  sodium chloride flush, 10 mL, PRN  acetaminophen, 650 mg, Q6H PRN    Or  acetaminophen, 650 mg, Q6H PRN  perflutren lipid microspheres, 1.5 mL, ONCE PRN         Objective:    /60   Pulse 122   Temp 97.4 °F (36.3 °C) (Oral)   Resp 20   Ht 5' (1.524 m)   Wt 146 lb 2.6 oz (66.3 kg)   SpO2 98%   BMI 28.55 kg/m²         Recent Labs     03/04/21  0435 03/05/21  1630 03/06/21  0650    147* 149*   K 4.1 4.3 4.0    103 107   CO2 34* 38* 35*   BUN 55* 54* 52*   CREATININE 0.8 0.9 0.9   GLUCOSE 155* 115* 123*   CALCIUM 9.6 10.2 8.8       Recent Labs     03/04/21  0435 03/05/21  1630   WBC 17.0* 17.6*   RBC 2.92* 3.14*   HGB 9.3* 10.0*   HCT 30.4* 34.2   .1* 108.9*   MCH 31.8 31.8   MCHC 30.6* 29.2*   RDW 15.1* 15.1*   * 549*   MPV 10.2 10.0           I/O last 3 completed shifts: In: 700 [P.O.:200; I.V.:500]  Out: 800 [Urine:800]  No intake/output data recorded.       Assessment:    Principal Problem:    Acute on chronic respiratory failure with hypoxia (HCC)  Active Problems:    Hypothyroidism    Rheumatoid arthritis ([address]    Hyponatremia    Anemia    Multiple closed fractures of ribs of both sides with routine healing    A-fib (HCC)    Essential hypertension    HAP (hospital-acquired pneumonia)    COVID-19    Sore throat    Depression    Oral yeast infection    History of 2019 novel coronavirus disease (COVID-19)    Mild protein-calorie malnutrition ([facility]    Apathy    Encounter for hospice care discussion  Resolved Problems:    * No resolved hospital problems. *        Critical care time 32 minutes not including procedures.     Electronically signed by Scott Mathew MD on 3/6/2021 at 5:50 PM

## 2021-03-06 NOTE — PROGRESS NOTES
Second 500 ml normal saline bolus finsihed, HR sustaining 120s-130s, AFib RVR. Manual blood pressure 98/60.

## 2021-03-07 LAB
ALBUMIN SERPL-MCNC: 2.8 G/DL (ref 3.5–5.2)
ALP BLD-CCNC: 76 U/L (ref 35–104)
ALT SERPL-CCNC: 17 U/L (ref 0–32)
ANION GAP SERPL CALCULATED.3IONS-SCNC: 7 MMOL/L (ref 7–16)
ANISOCYTOSIS: ABNORMAL
AST SERPL-CCNC: 22 U/L (ref 0–31)
BASOPHILS ABSOLUTE: 0 E9/L (ref 0–0.2)
BASOPHILS RELATIVE PERCENT: 0 % (ref 0–2)
BILIRUB SERPL-MCNC: 0.3 MG/DL (ref 0–1.2)
BUN BLDV-MCNC: 45 MG/DL (ref 8–23)
CALCIUM SERPL-MCNC: 8.6 MG/DL (ref 8.6–10.2)
CHLORIDE BLD-SCNC: 111 MMOL/L (ref 98–107)
CO2: 31 MMOL/L (ref 22–29)
CREAT SERPL-MCNC: 0.9 MG/DL (ref 0.5–1)
EOSINOPHILS ABSOLUTE: 0.29 E9/L (ref 0.05–0.5)
EOSINOPHILS RELATIVE PERCENT: 2 % (ref 0–6)
GFR AFRICAN AMERICAN: >60
GFR NON-AFRICAN AMERICAN: 59 ML/MIN/1.73
GLUCOSE BLD-MCNC: 114 MG/DL (ref 74–99)
HCT VFR BLD CALC: 31.4 % (ref 34–48)
HEMOGLOBIN: 9.3 G/DL (ref 11.5–15.5)
LYMPHOCYTES ABSOLUTE: 2 E9/L (ref 1.5–4)
LYMPHOCYTES RELATIVE PERCENT: 14 % (ref 20–42)
MCH RBC QN AUTO: 32.6 PG (ref 26–35)
MCHC RBC AUTO-ENTMCNC: 29.6 % (ref 32–34.5)
MCV RBC AUTO: 110.2 FL (ref 80–99.9)
MONOCYTES ABSOLUTE: 1 E9/L (ref 0.1–0.95)
MONOCYTES RELATIVE PERCENT: 7 % (ref 2–12)
MYELOCYTE PERCENT: 3 % (ref 0–0)
NEUTROPHILS ABSOLUTE: 11.01 E9/L (ref 1.8–7.3)
NEUTROPHILS RELATIVE PERCENT: 74 % (ref 43–80)
OVALOCYTES: ABNORMAL
PDW BLD-RTO: 15.9 FL (ref 11.5–15)
PLATELET # BLD: 496 E9/L (ref 130–450)
PMV BLD AUTO: 10.3 FL (ref 7–12)
POIKILOCYTES: ABNORMAL
POLYCHROMASIA: ABNORMAL
POTASSIUM SERPL-SCNC: 3.6 MMOL/L (ref 3.5–5)
RBC # BLD: 2.85 E12/L (ref 3.5–5.5)
SODIUM BLD-SCNC: 149 MMOL/L (ref 132–146)
TEAR DROP CELLS: ABNORMAL
TOTAL PROTEIN: 5.5 G/DL (ref 6.4–8.3)
WBC # BLD: 14.3 E9/L (ref 4.5–11.5)

## 2021-03-07 PROCEDURE — 80053 COMPREHEN METABOLIC PANEL: CPT

## 2021-03-07 PROCEDURE — 2700000000 HC OXYGEN THERAPY PER DAY

## 2021-03-07 PROCEDURE — 99233 SBSQ HOSP IP/OBS HIGH 50: CPT | Performed by: INTERNAL MEDICINE

## 2021-03-07 PROCEDURE — 6370000000 HC RX 637 (ALT 250 FOR IP): Performed by: NURSE PRACTITIONER

## 2021-03-07 PROCEDURE — 2580000003 HC RX 258: Performed by: INTERNAL MEDICINE

## 2021-03-07 PROCEDURE — 6370000000 HC RX 637 (ALT 250 FOR IP): Performed by: INTERNAL MEDICINE

## 2021-03-07 PROCEDURE — 2060000000 HC ICU INTERMEDIATE R&B

## 2021-03-07 PROCEDURE — 6370000000 HC RX 637 (ALT 250 FOR IP): Performed by: FAMILY MEDICINE

## 2021-03-07 PROCEDURE — 6360000002 HC RX W HCPCS: Performed by: INTERNAL MEDICINE

## 2021-03-07 PROCEDURE — 94640 AIRWAY INHALATION TREATMENT: CPT

## 2021-03-07 PROCEDURE — 85025 COMPLETE CBC W/AUTO DIFF WBC: CPT

## 2021-03-07 PROCEDURE — 2580000003 HC RX 258: Performed by: FAMILY MEDICINE

## 2021-03-07 PROCEDURE — 36415 COLL VENOUS BLD VENIPUNCTURE: CPT

## 2021-03-07 PROCEDURE — 99232 SBSQ HOSP IP/OBS MODERATE 35: CPT | Performed by: FAMILY MEDICINE

## 2021-03-07 RX ORDER — FLECAINIDE ACETATE 50 MG/1
75 TABLET ORAL 2 TIMES DAILY
Status: DISCONTINUED | OUTPATIENT
Start: 2021-03-07 | End: 2021-03-09 | Stop reason: HOSPADM

## 2021-03-07 RX ORDER — FLECAINIDE ACETATE 50 MG/1
25 TABLET ORAL ONCE
Status: COMPLETED | OUTPATIENT
Start: 2021-03-07 | End: 2021-03-07

## 2021-03-07 RX ADMIN — NYSTATIN 500000 UNITS: 500000 SUSPENSION ORAL at 16:09

## 2021-03-07 RX ADMIN — FLECAINIDE ACETATE 25 MG: 50 TABLET ORAL at 11:40

## 2021-03-07 RX ADMIN — FAMOTIDINE 20 MG: 20 TABLET ORAL at 20:25

## 2021-03-07 RX ADMIN — ZINC SULFATE 220 MG (50 MG) CAPSULE 50 MG: CAPSULE at 07:46

## 2021-03-07 RX ADMIN — ARFORMOTEROL TARTRATE 15 MCG: 15 SOLUTION RESPIRATORY (INHALATION) at 08:50

## 2021-03-07 RX ADMIN — BUDESONIDE 500 MCG: 0.5 SUSPENSION RESPIRATORY (INHALATION) at 20:00

## 2021-03-07 RX ADMIN — SODIUM CHLORIDE: 9 INJECTION, SOLUTION INTRAVENOUS at 07:47

## 2021-03-07 RX ADMIN — PETROLATUM: 420 OINTMENT TOPICAL at 07:56

## 2021-03-07 RX ADMIN — OXYCODONE HYDROCHLORIDE AND ACETAMINOPHEN 500 MG: 500 TABLET ORAL at 07:46

## 2021-03-07 RX ADMIN — NYSTATIN 500000 UNITS: 500000 SUSPENSION ORAL at 12:42

## 2021-03-07 RX ADMIN — GABAPENTIN 100 MG: 100 CAPSULE ORAL at 20:25

## 2021-03-07 RX ADMIN — METOPROLOL TARTRATE 50 MG: 50 TABLET, FILM COATED ORAL at 20:28

## 2021-03-07 RX ADMIN — TRIAMCINOLONE ACETONIDE: 1 PASTE DENTAL at 20:25

## 2021-03-07 RX ADMIN — NYSTATIN 500000 UNITS: 500000 SUSPENSION ORAL at 20:25

## 2021-03-07 RX ADMIN — TRIAMCINOLONE ACETONIDE: 1 PASTE DENTAL at 14:02

## 2021-03-07 RX ADMIN — GABAPENTIN 100 MG: 100 CAPSULE ORAL at 09:54

## 2021-03-07 RX ADMIN — BUDESONIDE 500 MCG: 0.5 SUSPENSION RESPIRATORY (INHALATION) at 08:50

## 2021-03-07 RX ADMIN — SODIUM CHLORIDE: 9 INJECTION, SOLUTION INTRAVENOUS at 20:28

## 2021-03-07 RX ADMIN — TRIAMCINOLONE ACETONIDE: 1 PASTE DENTAL at 09:57

## 2021-03-07 RX ADMIN — DULOXETINE HYDROCHLORIDE 40 MG: 60 CAPSULE, DELAYED RELEASE ORAL at 09:56

## 2021-03-07 RX ADMIN — CYANOCOBALAMIN TAB 1000 MCG 1000 MCG: 1000 TAB at 07:46

## 2021-03-07 RX ADMIN — SODIUM CHLORIDE, PRESERVATIVE FREE 10 ML: 5 INJECTION INTRAVENOUS at 20:26

## 2021-03-07 RX ADMIN — LEVOTHYROXINE SODIUM 100 MCG: 100 TABLET ORAL at 06:34

## 2021-03-07 RX ADMIN — PETROLATUM: 420 OINTMENT TOPICAL at 17:41

## 2021-03-07 RX ADMIN — NYSTATIN 500000 UNITS: 500000 SUSPENSION ORAL at 09:54

## 2021-03-07 RX ADMIN — METOPROLOL TARTRATE 50 MG: 50 TABLET, FILM COATED ORAL at 09:55

## 2021-03-07 RX ADMIN — RIVAROXABAN 15 MG: 15 TABLET, FILM COATED ORAL at 07:46

## 2021-03-07 RX ADMIN — PANTOPRAZOLE SODIUM 40 MG: 40 TABLET, DELAYED RELEASE ORAL at 16:09

## 2021-03-07 RX ADMIN — FOLIC ACID 1 MG: 1 TABLET ORAL at 07:45

## 2021-03-07 RX ADMIN — CETIRIZINE HYDROCHLORIDE 10 MG: 10 TABLET, FILM COATED ORAL at 07:45

## 2021-03-07 RX ADMIN — PANTOPRAZOLE SODIUM 40 MG: 40 TABLET, DELAYED RELEASE ORAL at 06:34

## 2021-03-07 RX ADMIN — Medication 100 MG: at 07:45

## 2021-03-07 RX ADMIN — VALSARTAN 160 MG: 160 TABLET, FILM COATED ORAL at 09:56

## 2021-03-07 RX ADMIN — PREDNISONE 20 MG: 20 TABLET ORAL at 09:56

## 2021-03-07 RX ADMIN — ARFORMOTEROL TARTRATE 15 MCG: 15 SOLUTION RESPIRATORY (INHALATION) at 20:00

## 2021-03-07 RX ADMIN — FLECAINIDE ACETATE 50 MG: 50 TABLET ORAL at 09:56

## 2021-03-07 RX ADMIN — Medication 2000 UNITS: at 07:56

## 2021-03-07 RX ADMIN — CELECOXIB 200 MG: 100 CAPSULE ORAL at 09:55

## 2021-03-07 RX ADMIN — FLECAINIDE ACETATE 75 MG: 50 TABLET ORAL at 20:25

## 2021-03-07 ASSESSMENT — ENCOUNTER SYMPTOMS
DIARRHEA: 0
CHEST TIGHTNESS: 0
COUGH: 0
NAUSEA: 0
ABDOMINAL PAIN: 0

## 2021-03-07 ASSESSMENT — PAIN SCALES - GENERAL
PAINLEVEL_OUTOF10: 0

## 2021-03-07 NOTE — PROGRESS NOTES
[address]  Progress Note    Chief complaint :  Chief Complaint   Patient presents with    Shortness of Breath    Positive For Covid-19     Subjective:    Pt with A. Fib RVR yesterday morning, hypotensive with fluid bolus and increase in lopressor dosage. Pt had another A fib RVR in the evening, was again hypotensive, -170s. RRT was called. IVF bolus and digoxin given, improvement of BP and symptoms. No acute overnight events. Patient reports she feels little tired today and decrease in appetite. She denies any chest pain, SOB, abdominal pain or palpitations currently. Past medical, surgical, family and social history were reviewed, non-contributory, and unchanged unless otherwise stated. Review of Systems   Constitutional: Negative for activity change and fever. Respiratory: Negative for cough and chest tightness. Cardiovascular: Negative for chest pain, palpitations and leg swelling. Gastrointestinal: Negative for abdominal pain, diarrhea and nausea. Musculoskeletal: Negative for arthralgias and myalgias. Neurological: Negative for dizziness, syncope, weakness and headaches. Psychiatric/Behavioral: Negative for agitation. The patient is not nervous/anxious. Objective:  BP (!) 144/65   Pulse 68   Temp 97.5 °F (36.4 °C)   Resp 16   Ht 5' (1.524 m)   Wt 146 lb 2.6 oz (66.3 kg)   SpO2 96%   BMI 28.55 kg/m²     Physical Exam  Constitutional:       Appearance: Normal appearance. HENT:      Head: Normocephalic and atraumatic. Eyes:      Extraocular Movements: Extraocular movements intact. Conjunctiva/sclera: Conjunctivae normal.   Cardiovascular:      Rate and Rhythm: Normal rate and regular rhythm. Pulses: Normal pulses. Heart sounds: Normal heart sounds. Pulmonary:      Effort: Pulmonary effort is normal. No respiratory distress. Breath sounds: Rales (mid to lower b/l, upper lobes clear b/l) present. No wheezing. Comments: On 3 L oxygen via nasal cannula  Abdominal:      General: Bowel sounds are normal. There is no distension. Tenderness: There is no abdominal tenderness. Neurological:      General: No focal deficit present. Mental Status: She is alert and oriented to person, place, and time. Psychiatric:         Mood and Affect: Mood normal.       Labs:  Recent Results (from the past 24 hour(s))   EKG 12 Lead    Collection Time: 03/06/21  5:36 PM   Result Value Ref Range    Ventricular Rate 88 BPM    Atrial Rate 88 BPM    P-R Interval 184 ms    QRS Duration 90 ms    Q-T Interval 374 ms    QTc Calculation (Bazett) 452 ms    P Axis 12 degrees    R Axis -5 degrees    T Axis -54 degrees     Radiology and other tests reviewed:  XR CHEST PORTABLE   Final Result   Interstitial coarsening, predominantly in the lung bases, likely chronic. No   new areas of airspace consolidation or pleural effusions. XR CHEST PORTABLE   Final Result   No interval change      CTA PULMONARY W CONTRAST   Final Result   No convincing evidence of a pulmonary embolism. Significant progression in the ground-glass opacities scattered throughout   both lungs when compared with February 10, 2021. Findings are suspicious for   multifocal pneumonia. Stable calcified mass in the central canal of the thoracic spine. Distended esophagus filled with undigested material, placing the patient at   risk for aspiration. Additional findings as described. XR CHEST PORTABLE   Final Result   Grossly unchanged patchy bilateral airspace opacities compared with February 14 2021. Findings may be on the basis of multifocal pneumonia.         Assessment:  Active Hospital Problems    Diagnosis Date Noted    Apathy [R45.3]     Encounter for hospice care discussion [Z71.89]     Mild protein-calorie malnutrition ([facility] [E44.1] 02/26/2021    History of 2019 novel coronavirus disease (COVID-19) [Z86.16]     Acute on chronic respiratory failure with hypoxia ([facility] [J96.21] 02/24/2021    Oral yeast infection [B37.0]     Depression [F32.9] 02/12/2021    Sore throat [J02.9] 02/09/2021    COVID-19 [U07.1] 02/08/2021    HAP (hospital-acquired pneumonia) [J18.9, Y95] 11/13/2018    Essential hypertension [I10] 05/23/2016    Atrial fibrillation with RVR (HCC) [I48.91] 03/21/2016    Multiple closed fractures of ribs of both sides with routine healing [S22.43XD] 12/16/2014    Anemia [D64.9] 07/26/2014    Hyponatremia [E87.1] 03/25/2014    Hypothyroidism [E03.9] 06/10/2011    Rheumatoid arthritis ([facility] [M06.9] 06/10/2011    A-fib ([facility] [I48.91] 06/09/2011     Plan:    Acute on chronic respiratory failure with hypoxia   Likely secondary to recent COVID-19 infection, hx of ILD,currently on 3 L of oxygen, home baseline, PT/OT AM-PAC Score 7/24, 13/24 respectively,respiratory panel negative   -Prednisone 20 mg as per pulm   -Telemetry monitoring  -Zinc 50 mg daily  -Thiamine 100 mg daily  -Vitamin C 500 mg daily  -CRP every other day  -CBC daily  -CMP daily  -Albuterol sulfate inhaler 2 puffs QID PRN and Symbicort inhaler 2 puffs BID    -Wean as tolerated    A fib with RVR  -Resolved and on sinus rhythm  -on home Flecainide 50 mg BID   -increase in Lopressor 50 mg BID yesterday  -Digoxin 125 mcg d/c  -cardiology consulted->appreciate input    Volume overload with hx of HFpEF  Echocardiogram showed EF 65%, indeterminate diastolic dysfunction, mild mitral regurgitation, and mild aortic regurgitation  -Lasix 20 mg daily on hold for hypotensive episodes     HTN  -Home Valsartan 160 mg   -home HCTZ held  -Lasix 20 mg held as well  -continue monitoring vitals     Macrocytic anemia   Seen by Hem/Onc during last admission,anemia likely due to methotrexate use.    -Folic acid 1 mg QD  -Vitamin B12 1000 mcg QD   -Iron 325 mg every other day        Hx of Hypothyroidism   -Synthroid 100 mcg daily     Hx of Rheumatoid Arthritis  -Celebrex 200 mg daily -Wellcovorin 25 mg tablet weekly (on Tuesdays)  -Methotrexate weekly on Mondays (5 mg at lunch and 10 mg in evening)     Multiple closed fractures of both sides of ribs   -Lidocaine patch Q12HR     Oral yeast infection   Treated by ID during last admission   -Nystatin suspension 500,000 units QID Day 10     Hx of Depression   -Cymbalta 40 mg daily     Continue additional home medications:  -Colace 100 mg daily  -Pepcid 20 mg nightly   -Gabapentin 100 mg BID   -Zyrtec 10 mg QD   -Vitamin D 2000 units daily      Pain Control: Tylenol 650 mg Q6HR PRN for mild pain  DVT ppx: Xarelto 15 mg daily   GI ppx: Protonix 40 mg daily  Code Status: Full  Diet: General diet      Orin Trveino MD  Family Medicine Resident PGY-1  03/07/21   10:01 AM

## 2021-03-07 NOTE — PROGRESS NOTES
Yonas Robertson M.D.,[facility]  Ortega Arevalo D.O., F.A.C.O.I., Neo Mcdonough M.D. Laurence Bahena M.D., Edita Ayala M.D. Gemini Fernandez D.O. Daily Pulmonary Progress Note    Patient:  Alan Lindsey 80 y.o. female MRN: [id number]     Date of Service: 3/7/2021      Synopsis     We are following patient for respiratory failure with hypoxia, recent COVID-19 pneumonia    \"CC\" shortness of breath    Code status: Full      Subjective      Patient was personally seen and examined. She is laying in bed in no acute distress. Still in A. fib but heart rate is in the 60s to 70s. Saturating 96 to 98% on 3 L of oxygen. Review of Systems:  Constitutional: Refer severe fatigue and weakness  Skin: Denies pigmentation, dark lesions, and rashes   HEENT: Denies hearing loss, tinnitus, ear drainage, epistaxis, sore throat, and hoarseness. Cardiovascular: Denies palpitations, chest pain, and chest pressure. Respiratory: Cough is improved denies   hemoptysis, apnea, and choking.   Gastrointestinal: Denies nausea, vomiting, poor appetite, diarrhea, heartburn or reflux  Genitourinary: Denies dysuria, frequency, urgency or hematuria  Musculoskeletal: Denies myalgias, muscle weakness, and bone pain    24-hour events:  Increased fatigued     Objective   Vitals: BP (!) 144/67   Pulse 71   Temp 97.4 °F (36.3 °C)   Resp 18   Ht 5' (1.524 m)   Wt 146 lb 2.6 oz (66.3 kg)   SpO2 97%   BMI 28.55 kg/m²     I/O:    Intake/Output Summary (Last 24 hours) at 3/7/2021 1435  Last data filed at 3/7/2021 1405  Gross per 24 hour   Intake 3127 ml   Output 500 ml   Net 2627 ml       Vent Information  Skin Assessment: Clean, dry, & intact  SpO2: 97 %                CURRENT MEDS :  Scheduled Meds:   flecainide  75 mg Oral BID    metoprolol  5 mg Intravenous Once    metoprolol tartrate  50 mg Oral BID    predniSONE  20 mg Oral Daily    white petrolatum   Topical BID    budesonide  500 mcg Nebulization BID    Arformoterol Tartrate  15 mcg Nebulization BID    polyethylene glycol  17 g Oral Daily    nystatin  5 mL Oral 4x Daily    triamcinolone acetonide   Mouth/Throat TID    celecoxib  200 mg Oral Daily    docusate sodium  100 mg Oral Daily    DULoxetine  40 mg Oral Daily    ferrous sulfate  325 mg Oral Every Other Day    cetirizine  10 mg Oral Daily    folic acid  1 mg Oral Daily    ascorbic acid  500 mg Oral Daily    gabapentin  100 mg Oral BID    leucovorin calcium  25 mg Oral Weekly    levothyroxine  100 mcg Oral Daily    lidocaine  1 patch Transdermal Daily    methotrexate  5 mg Oral Weekly    pantoprazole  40 mg Oral BID AC    rivaroxaban  15 mg Oral Daily with breakfast    thiamine  100 mg Oral Daily    vitamin B-12  1,000 mcg Oral Daily    vitamin D  2,000 Units Oral Daily    zinc sulfate  50 mg Oral Daily    sodium chloride flush  10 mL Intravenous 2 times per day    famotidine  20 mg Oral Nightly    valsartan  160 mg Oral Daily    methotrexate  10 mg Oral Weekly       Physical Exam:  General Appearance: appears comfortable in no acute distress. HEENT: Normocephalic atraumatic without obvious abnormality   Neck: Lips, mucosa, and tongue normal.    Lung: Breath sounds diminished few basilar crackles. Respirations   unlabored. Symmetrical expansion. Heart: RRR, normal S1, S2. No MRG  Abdomen: Soft, NT, ND. BS present x 4 quadrants. No bruit or organomegaly. Extremities: Pedal pulses 2+ symmetric b/l. Extremities normal, no cyanosis, clubbing, or edema. Musculokeletal: No joint swelling, no muscle tenderness. ROM normal in all joints of extremities. Neurologic: Mental status: Alert and Oriented X3 .     Pertinent/ New Labs and Imaging Studies     Imaging Personally Reviewed:        3/4/2021 3:20 pm       COMPARISON:   February 26       HISTORY:   ORDERING SYSTEM PROVIDED HISTORY: hypoxia   TECHNOLOGIST PROVIDED HISTORY:   Reason for exam:->hypoxia       FINDINGS:   Cardiac silhouette is not enlarged.       Interstitial coarsening bilaterally predominantly in the lung bases, likely   chronic.       No new areas of airspace consolidation or pleural effusions.       The pulmonary vasculature is within normal limits.           Impression   Interstitial coarsening, predominantly in the lung bases, likely chronic.  No   new areas of airspace consolidation or pleural effusions.               CTA chest   No convincing evidence of a pulmonary embolism. Significant progression in the ground-glass opacities scattered throughout   both lungs when compared with February 10, 2021.  Findings are suspicious for   multifocal pneumonia. Stable calcified mass in the central canal of the thoracic spine. Distended esophagus filled with undigested material, placing the patient at   risk for aspiration. Additional findings as described.               ECHO   Conclusions      Summary   Left ventricular internal dimensions were normal in diastole and systole. No regional wall motion abnormalities seen. Normal left ventricular ejection fraction. The left atrium is borderline dilated. Moderate mitral annular calcification. Mild mitral regurgitation is present. The aortic valve appears mildly sclerotic. Mild aortic regurgitation is noted.          Labs:  Lab Results   Component Value Date    WBC 14.3 03/07/2021    HGB 9.3 03/07/2021    HCT 31.4 03/07/2021    .2 03/07/2021    MCH 32.6 03/07/2021    MCHC 29.6 03/07/2021    RDW 15.9 03/07/2021     03/07/2021    MPV 10.3 03/07/2021     Lab Results   Component Value Date     03/07/2021    K 3.6 03/07/2021    K 4.3 03/05/2021     03/07/2021    CO2 31 03/07/2021    BUN 45 03/07/2021    CREATININE 0.9 03/07/2021    LABALBU 2.8 03/07/2021    LABALBU 4.0 04/24/2012    CALCIUM 8.6 03/07/2021    GFRAA >60 03/07/2021    LABGLOM 59 03/07/2021     Lab Results   Component Value Date    PROTIME 12.2 03/01/2021    PROTIME 15.8 05/12/2014    INR 1.1 03/01/2021     Recent Labs     03/05/21  1630   PROBNP 1,338*     No results for input(s): PROCAL in the last 72 hours. This SmartLink has not been configured with any valid records. Micro:  No results for input(s): CULTRESP in the last 72 hours. No results for input(s): LABGRAM in the last 72 hours. No results for input(s): LEGUR in the last 72 hours. No results for input(s): STREPNEUMAGU in the last 72 hours. No results for input(s): LP1UAG in the last 72 hours. Assessment:    1. Acute on chronic hypoxic respiratory failure   2. Hx of recent Covid 19 pneumonitis 2/8/21 positive test with possible pneumonitis  3. 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 Multilobar ground glass opacities likely multifactorial ? chf with pulmonary edema with superimposed previous ggo from covid. 5. Elevated bnp  6. Leukocytosis likely secondary to steroids   7. Hiatal hernia   8. hafsa   9. Multiple rib fractures   10. RA on mtx   11. Immunocompromised   12. Anemia   13. A fib  14. Oral candida      Plan:   1. FiO2 is down to 3 L , continue to wean for FiO2 above for 94%  2. Prednisone taper down to 20 mg for 4 days followed by 10 mg x 4 days and stop  3. Stop diuretics yesterday  4. Flecainide increase per cardiology to 75 mg twice daily. Lopressor to be continued 50 mg twice daily dig was stopped. 5. DVT GI prophylaxis-Xarelto, Protonix  6.    Continue to monitor her electrolytes, patient continues to be hypernatremic would recommend to change her IV fluids to half-normal saline        Electronically signed by Facundo Pineda MD on 3/7/2021 at 2:35 PM

## 2021-03-07 NOTE — PROGRESS NOTES
INPATIENT CARDIOLOGY FOLLOW-UP    Name: Christiane Fink    Age: 80 y.o. Date of Admission: 2/24/2021  4:31 AM    Date of Service: 3/7/2021    Primary Cardiologist: Dr Francisco Harding and Jordan Alcantara    Chief Complaint: Follow-up for recurrent episodes of A. fib with RVR    Interim History:  Reconsulted due to recurrent paroxysms of A. fib with RVR associate with hypotension and significantly elevated heart rates, had RRT last evening. Had A. fib with RVR yesterday morning, and on 3/4/2021. Currently sinus rhythm. On 3 L nasal cannula. Denies chest pain or shortness of breath. Daughter is also at the bedside.     Review of Systems:   Negative except as described above    Problem List:  Patient Active Problem List   Diagnosis    Elevated CA-125    Hiatal hernia    Hypothyroidism    Diverticular disease    Skin cancer of lip    Rheumatoid arthritis ([address]    Diastolic dysfunction    Paresthesia of lower extremity    Lumbar radicular pain    Hyponatremia    Anemia    Multiple closed fractures of ribs of both sides with routine healing    Thoracic spinal stenosis with mass at T6-T7    Thoracic spine tumor    Thoracic spine fracture (HCC)    Atrial fibrillation with RVR (HCC)    A-fib (HCC)    Obstructive sleep apnea syndrome    Essential hypertension    Adjustment disorder with anxious mood    Laceration of lower limb    Palpitations    Fatigue    Closed fracture of patella    Delayed gastric emptying    HAP (hospital-acquired pneumonia)    Pain in both lower legs    Bimalleolar fracture, left, closed, initial encounter    Osteoporosis    Non-pressure chronic ulcer of other part of right lower leg with fat layer exposed ([address]    Bimalleolar fracture    Patellar fracture    Acute respiratory failure with hypoxia (HCC)    COVID-19    Sore throat    Depression    Oral yeast infection    Acute on chronic respiratory failure with hypoxia (HCC)    History of 2019 novel coronavirus disease (COVID-19)    Mild protein-calorie malnutrition ([address]    Apathy    Encounter for hospice care discussion       Current Medications:    Current Facility-Administered Medications:     flecainide (TAMBOCOR) tablet 75 mg, 75 mg, Oral, BID, Esther Nolasco MD    flecainide [facility]) tablet 25 mg, 25 mg, Oral, Once, Esther Nolasco MD    metoprolol (LOPRESSOR) injection 5 mg, 5 mg, Intravenous, Once, Aniya Nolan MD    metoprolol tartrate (LOPRESSOR) tablet 50 mg, 50 mg, Oral, BID, Martha Walker MD, 50 mg at 03/07/21 0955    predniSONE (DELTASONE) tablet 20 mg, 20 mg, Oral, Daily, Yasemin Underwood MD, 20 mg at 03/07/21 0956    0.9 % sodium chloride infusion, , Intravenous, Continuous, Martha Walker MD, Last Rate: 75 mL/hr at 03/07/21 0747, New Bag at 03/07/21 0747    white petrolatum ointment, , Topical, BID, Jessica Wagoner MD, Given at 03/07/21 0756    budesonide (PULMICORT) nebulizer suspension 500 mcg, 500 mcg, Nebulization, BID, Yasemin Underwood MD, 500 mcg at 03/07/21 0850    Arformoterol Tartrate (BROVANA) nebulizer solution 15 mcg, 15 mcg, Nebulization, BID, Yasemin Underwood MD, 15 mcg at 03/07/21 0850    polyethylene glycol (GLYCOLAX) packet 17 g, 17 g, Oral, Daily, Ankit Aragon MD, 17 g at 03/06/21 0856    nystatin (MYCOSTATIN) 813671 UNIT/ML suspension 500,000 Units, 5 mL, Oral, 4x Daily, FRAN Blanc - CNP, 500,000 Units at 03/07/21 0954    magic (miracle) mouthwash, 5 mL, Swish & Spit, 4x Daily PRN, FRAN Blanc - CNP, 5 mL at 03/06/21 2155    triamcinolone acetonide (KENALOG) 0.1 % paste, , Mouth/Throat, TID, Pleas Bitters, DO, Given at 03/07/21 0957    albuterol sulfate  (90 Base) MCG/ACT inhaler 2 puff, 2 puff, Inhalation, 4x Daily PRN, DO Garo    celecoxib (CELEBREX) capsule 200 mg, 200 mg, Oral, Daily, [facility] DO Prasanth, 200 mg at 03/07/21 0955    docusate sodium (COLACE) capsule 100 mg, 100 mg, Oral, Daily, [facility] Bobovnyik, DO, 100 mg at 03/06/21 0755    DULoxetine (CYMBALTA) extended release capsule 40 mg, 40 mg, Oral, Daily, Meghanl Larry, DO, 40 mg at 03/07/21 1655    ferrous sulfate (IRON 325) tablet 325 mg, 325 mg, Oral, Every Other Day, Meghanl Larry, DO, 325 mg at 03/06/21 0755    cetirizine (ZYRTEC) tablet 10 mg, 10 mg, Oral, Daily, Meghanl [place], DO, 10 mg at 36/50/07 9992    folic acid (FOLVITE) tablet 1 mg, 1 mg, Oral, Daily, Meghanl [place], DO, 1 mg at 03/07/21 0745    ascorbic acid (VITAMIN C) tablet 500 mg, 500 mg, Oral, Daily, [facility] Frdedynyik, DO, 500 mg at 03/07/21 0746    gabapentin (NEURONTIN) capsule 100 mg, 100 mg, Oral, BID, Kelly Robertslem, DO, 100 mg at 03/07/21 0954    leucovorin calcium (WELLCOVORIN) tablet 25 mg, 25 mg, Oral, Weekly, [facility] Freddynyamanda, DO, 25 mg at 03/02/21 0816    levothyroxine (SYNTHROID) tablet 100 mcg, 100 mcg, Oral, Daily, [facility] Freddynyik, DO, 100 mcg at 03/07/21 6159    lidocaine 4 % external patch 1 patch, 1 patch, Transdermal, Daily, Kelly Amadorm, DO, 1 patch at 03/04/21 0856    methotrexate (RHEUMATREX) chemo tablet 5 mg, 5 mg, Oral, Weekly, [facility] Freddynyik, DO, 5 mg at 03/01/21 1151    pantoprazole (PROTONIX) tablet 40 mg, 40 mg, Oral, BID AC, [facility] Bobovnyik, DO, 40 mg at 03/07/21 4121    rivaroxaban (XARELTO) tablet 15 mg, 15 mg, Oral, Daily with breakfast, [facility] Freddynyik, DO, 15 mg at 03/07/21 0746    thiamine tablet 100 mg, 100 mg, Oral, Daily, Kelly Robertslem, DO, 100 mg at 03/07/21 0745    vitamin B-12 (CYANOCOBALAMIN) tablet 1,000 mcg, 1,000 mcg, Oral, Daily, Kelly Juarez DO, 1,000 mcg at 03/07/21 0746    vitamin D (CHOLECALCIFEROL) tablet 2,000 Units, 2,000 Units, Oral, Daily, Kelly Juarez DO, 2,000 Units at 03/07/21 0756    zinc sulfate (ZINCATE) capsule 50 mg, 50 mg, Oral, Daily, [facility] DO Prasanth, 50 mg at 03/07/21 0746    sodium chloride flush 0.9 % injection 10 mL, 10 mL, Intravenous, 2 times per day, Garo, DO, 10 mL at 03/06/21 2155    sodium chloride flush 0.9 % injection 10 mL, 10 mL, Intravenous, PRN, Garo, DO, 10 mL at 03/02/21 0017    acetaminophen (TYLENOL) tablet 650 mg, 650 mg, Oral, Q6H PRN, 650 mg at 02/28/21 0946 **OR** acetaminophen (TYLENOL) suppository 650 mg, 650 mg, Rectal, Q6H PRN, Garo, DO    famotidine (PEPCID) tablet 20 mg, 20 mg, Oral, Nightly, Garo, DO, 20 mg at 03/06/21 2155    valsartan (DIOVAN) tablet 160 mg, 160 mg, Oral, Daily, 160 mg at 03/07/21 0956 **AND** [DISCONTINUED] hydroCHLOROthiazide (HYDRODIURIL) tablet 25 mg, 25 mg, Oral, Daily, [place], DO, 25 mg at 02/24/21 1034    methotrexate (RHEUMATREX) chemo tablet 10 mg, 10 mg, Oral, Weekly, Garo, DO, 10 mg at 03/01/21 2040    perflutren lipid microspheres (DEFINITY) injection 1.65 mg, 1.5 mL, Intravenous, ONCE PRN, Anaid Rooney MD    Physical Exam:  BP (!) 144/65   Pulse 68   Temp 97.5 °F (36.4 °C)   Resp 16   Ht 5' (1.524 m)   Wt 146 lb 2.6 oz (66.3 kg)   SpO2 96%   BMI 28.55 kg/m²   Wt Readings from Last 3 Encounters:   03/05/21 146 lb 2.6 oz (66.3 kg)   02/16/21 142 lb 14.4 oz (64.8 kg)   11/25/20 128 lb (58.1 kg)     Appearance: Elderly female, awake, alert, somewhat chronically ill-appearing  Skin: Intact, no rash  Head: Normocephalic, atraumatic  Eyes: EOMI, no conjunctival erythema  ENMT: No pharyngeal erythema, MMM, no rhinorrhea  Neck: Supple, no elevated JVP, no carotid bruits  Lungs: Coarse bibasilar rales  Cardiac: PMI nondisplaced, Regular rhythm with a normal rate, S1 & S2 normal, no murmurs  Abdomen: Soft, nontender, +bowel sounds  Extremities: Moves all extremities x 4, no lower extremity edema  Neurologic: No focal motor deficits apparent, normal mood and affect  Peripheral Pulses: Intact posterior tibial pulses bilaterally    Intake/Output:    Intake/Output Summary (Last 24 hours) at 3/7/2021 1023  Last data filed at 3/7/2021 0903  Gross per 24 hour   Intake 2575 ml   Output 400 ml   Net 2175 ml     No intake/output data recorded. Laboratory Tests:  Recent Labs     03/05/21  1630 03/06/21  0650   * 149*   K 4.3 4.0    107   CO2 38* 35*   BUN 54* 52*   CREATININE 0.9 0.9   GLUCOSE 115* 123*   CALCIUM 10.2 8.8     Lab Results   Component Value Date    MG 1.9 03/06/2021     Recent Labs     03/05/21  1630   ALKPHOS 88   ALT 20   AST 22   PROT 6.2*   BILITOT 0.4   LABALBU 3.3*     Recent Labs     03/05/21  1630   WBC 17.6*   RBC 3.14*   HGB 10.0*   HCT 34.2   .9*   MCH 31.8   MCHC 29.2*   RDW 15.1*   *   MPV 10.0     Lab Results   Component Value Date    CKTOTAL 105 07/12/2020    CKMB 1.9 03/21/2016    TROPONINI <0.01 02/24/2021    TROPONINI <0.01 02/08/2021    TROPONINI <0.01 02/08/2021     Lab Results   Component Value Date    INR 1.1 03/01/2021    INR 1.4 02/28/2021    INR 1.5 02/27/2021    PROTIME 12.2 03/01/2021    PROTIME 16.4 (H) 02/28/2021    PROTIME 17.8 (H) 02/27/2021     Lab Results   Component Value Date    TSH 4.870 (H) 01/06/2021     Lab Results   Component Value Date    LABA1C 5.4 02/12/2021     No results found for: EAG  Lab Results   Component Value Date    CHOL 182 10/22/2019    CHOL 174 06/10/2019    CHOL 175 03/22/2016     Lab Results   Component Value Date    TRIG 140 10/22/2019    TRIG 129 06/10/2019    TRIG 82 03/22/2016     Lab Results   Component Value Date    HDL 44 10/22/2019    HDL 49 06/10/2019    HDL 53 03/22/2016     Lab Results   Component Value Date    LDLCALC 110 (H) 10/22/2019    LDLCALC 99 06/10/2019    LDLCALC 106 (H) 03/22/2016     Lab Results   Component Value Date    LABVLDL 28 10/22/2019    LABVLDL 26 06/10/2019    LABVLDL 16 03/22/2016     No results found for: CHOLHDLRATIO  Recent Labs     03/05/21  1630   PROBNP 1,338*       Cardiac Tests:    EKG:   3/6/2021 at 1736: Sinus rhythm 88 bpm with PVCs. Normal axis normal intervals. flecainide/metoprolol. Recurrent in setting of respiratory failure and pulmonary issues. AC with dose adjusted rivaroxaban  2. Acute on chronic hypoxic respiratory failure, currently on 3 L  3. Recent COVID-19 infection with residual pneumonitis/interstitial lung disease from RA  4. Acute on chronic HFpEF  5. Valvular heart disease: Mild AI, mild MR, mild TR  6. Hypernatremia sodium 149  7. Anemia Hgb 10.0  8. Thrombocytosis platelets 994  9. Leukocytosis  10. Rheumatoid arthritis on methotrexate  11. Severe obstructive sleep apnea  12. Hypothyroidism  13. Hypertension    RECOMMENDATIONS:  Recurrent bouts of A. fib not entirely surprising in the context of her severe lung disease. I discussed with her electrophysiologist Dr. Kvng Kelly her antiarrhythmic options, and he recommended increasing flecainide. While amiodarone has higher efficacy of rhythm control, it is relatively contraindicated with her interstitial lung disease.  Increase flecainide to 75 mg twice daily   Continue metoprolol tartrate 50 mg twice daily   Discontinue digoxin as concurrent use of flecainide can increase digoxin levels   Agree with holding diuretics with increasing sodium   Continue rivaroxaban for stroke risk reduction, dose adjusted for creatinine clearance less than 50 mL/min   Recommend watching for another 24 hours for any recurrent atrial arrhythmia   If needed, and not limited by bradycardia, flecainide could be further increased to 100 mg twice daily   Will need close outpatient follow-up with EP and cardiology   Discussed with family medicine team   Discussed with daughter at the bedside   Aggressive risk factor modification    Socorro Davidson MD, [facility]    NOTE: This report was transcribed using voice recognition software. Every effort was made to ensure accuracy; however, inadvertent computerized transcription errors may be present.

## 2021-03-08 LAB
ALBUMIN SERPL-MCNC: 2.8 G/DL (ref 3.5–5.2)
ALP BLD-CCNC: 75 U/L (ref 35–104)
ALT SERPL-CCNC: 17 U/L (ref 0–32)
ANION GAP SERPL CALCULATED.3IONS-SCNC: 6 MMOL/L (ref 7–16)
ANISOCYTOSIS: ABNORMAL
AST SERPL-CCNC: 22 U/L (ref 0–31)
BASOPHILIC STIPPLING: ABNORMAL
BASOPHILS ABSOLUTE: 0.13 E9/L (ref 0–0.2)
BASOPHILS RELATIVE PERCENT: 0.9 % (ref 0–2)
BILIRUB SERPL-MCNC: 0.3 MG/DL (ref 0–1.2)
BUN BLDV-MCNC: 33 MG/DL (ref 8–23)
CALCIUM SERPL-MCNC: 8.7 MG/DL (ref 8.6–10.2)
CHLORIDE BLD-SCNC: 114 MMOL/L (ref 98–107)
CO2: 28 MMOL/L (ref 22–29)
CREAT SERPL-MCNC: 0.6 MG/DL (ref 0.5–1)
EOSINOPHILS ABSOLUTE: 0.52 E9/L (ref 0.05–0.5)
EOSINOPHILS RELATIVE PERCENT: 3.6 % (ref 0–6)
GFR AFRICAN AMERICAN: >60
GFR NON-AFRICAN AMERICAN: >60 ML/MIN/1.73
GLUCOSE BLD-MCNC: 101 MG/DL (ref 74–99)
HCT VFR BLD CALC: 29.9 % (ref 34–48)
HEMOGLOBIN: 8.6 G/DL (ref 11.5–15.5)
LYMPHOCYTES ABSOLUTE: 0.58 E9/L (ref 1.5–4)
LYMPHOCYTES RELATIVE PERCENT: 3.5 % (ref 20–42)
MCH RBC QN AUTO: 32.5 PG (ref 26–35)
MCHC RBC AUTO-ENTMCNC: 28.8 % (ref 32–34.5)
MCV RBC AUTO: 112.8 FL (ref 80–99.9)
METAMYELOCYTES RELATIVE PERCENT: 3.5 % (ref 0–1)
MONOCYTES ABSOLUTE: 1.15 E9/L (ref 0.1–0.95)
MONOCYTES RELATIVE PERCENT: 8 % (ref 2–12)
NEUTROPHILS ABSOLUTE: 12.1 E9/L (ref 1.8–7.3)
NEUTROPHILS RELATIVE PERCENT: 80.5 % (ref 43–80)
NUCLEATED RED BLOOD CELLS: 0 /100 WBC
OVALOCYTES: ABNORMAL
PDW BLD-RTO: 15.9 FL (ref 11.5–15)
PLATELET # BLD: 414 E9/L (ref 130–450)
PMV BLD AUTO: 10.9 FL (ref 7–12)
POIKILOCYTES: ABNORMAL
POLYCHROMASIA: ABNORMAL
POTASSIUM SERPL-SCNC: 4.4 MMOL/L (ref 3.5–5)
RBC # BLD: 2.65 E12/L (ref 3.5–5.5)
SCHISTOCYTES: ABNORMAL
SODIUM BLD-SCNC: 148 MMOL/L (ref 132–146)
TEAR DROP CELLS: ABNORMAL
TOTAL PROTEIN: 5.3 G/DL (ref 6.4–8.3)
TOXIC GRANULATION: ABNORMAL
VACUOLATED NEUTROPHILS: ABNORMAL
WBC # BLD: 14.4 E9/L (ref 4.5–11.5)

## 2021-03-08 PROCEDURE — 6370000000 HC RX 637 (ALT 250 FOR IP): Performed by: STUDENT IN AN ORGANIZED HEALTH CARE EDUCATION/TRAINING PROGRAM

## 2021-03-08 PROCEDURE — 2060000000 HC ICU INTERMEDIATE R&B

## 2021-03-08 PROCEDURE — 80053 COMPREHEN METABOLIC PANEL: CPT

## 2021-03-08 PROCEDURE — 94660 CPAP INITIATION&MGMT: CPT

## 2021-03-08 PROCEDURE — 85025 COMPLETE CBC W/AUTO DIFF WBC: CPT

## 2021-03-08 PROCEDURE — 6370000000 HC RX 637 (ALT 250 FOR IP): Performed by: INTERNAL MEDICINE

## 2021-03-08 PROCEDURE — 99232 SBSQ HOSP IP/OBS MODERATE 35: CPT | Performed by: FAMILY MEDICINE

## 2021-03-08 PROCEDURE — 97530 THERAPEUTIC ACTIVITIES: CPT

## 2021-03-08 PROCEDURE — 6370000000 HC RX 637 (ALT 250 FOR IP): Performed by: FAMILY MEDICINE

## 2021-03-08 PROCEDURE — 94640 AIRWAY INHALATION TREATMENT: CPT

## 2021-03-08 PROCEDURE — 2580000003 HC RX 258: Performed by: FAMILY MEDICINE

## 2021-03-08 PROCEDURE — 97535 SELF CARE MNGMENT TRAINING: CPT

## 2021-03-08 PROCEDURE — 36415 COLL VENOUS BLD VENIPUNCTURE: CPT

## 2021-03-08 PROCEDURE — 99233 SBSQ HOSP IP/OBS HIGH 50: CPT | Performed by: INTERNAL MEDICINE

## 2021-03-08 PROCEDURE — 6370000000 HC RX 637 (ALT 250 FOR IP): Performed by: NURSE PRACTITIONER

## 2021-03-08 PROCEDURE — 6360000002 HC RX W HCPCS: Performed by: FAMILY MEDICINE

## 2021-03-08 PROCEDURE — 2700000000 HC OXYGEN THERAPY PER DAY

## 2021-03-08 PROCEDURE — 6360000002 HC RX W HCPCS: Performed by: INTERNAL MEDICINE

## 2021-03-08 PROCEDURE — 93005 ELECTROCARDIOGRAM TRACING: CPT | Performed by: INTERNAL MEDICINE

## 2021-03-08 RX ORDER — VALSARTAN 320 MG/1
320 TABLET ORAL DAILY
Status: DISCONTINUED | OUTPATIENT
Start: 2021-03-09 | End: 2021-03-09

## 2021-03-08 RX ORDER — FUROSEMIDE 20 MG/1
20 TABLET ORAL DAILY
Status: DISCONTINUED | OUTPATIENT
Start: 2021-03-08 | End: 2021-03-09 | Stop reason: HOSPADM

## 2021-03-08 RX ORDER — RIVAROXABAN 15 MG/1
TABLET, FILM COATED ORAL
Qty: 90 TABLET | Refills: 3 | Status: SHIPPED
Start: 2021-03-08 | End: 2021-05-19

## 2021-03-08 RX ADMIN — PETROLATUM: 420 OINTMENT TOPICAL at 08:53

## 2021-03-08 RX ADMIN — PANTOPRAZOLE SODIUM 40 MG: 40 TABLET, DELAYED RELEASE ORAL at 06:07

## 2021-03-08 RX ADMIN — LEVOTHYROXINE SODIUM 100 MCG: 100 TABLET ORAL at 06:07

## 2021-03-08 RX ADMIN — CYANOCOBALAMIN TAB 1000 MCG 1000 MCG: 1000 TAB at 08:53

## 2021-03-08 RX ADMIN — BUDESONIDE 500 MCG: 0.5 SUSPENSION RESPIRATORY (INHALATION) at 21:15

## 2021-03-08 RX ADMIN — ZINC SULFATE 220 MG (50 MG) CAPSULE 50 MG: CAPSULE at 08:53

## 2021-03-08 RX ADMIN — NYSTATIN 500000 UNITS: 500000 SUSPENSION ORAL at 16:52

## 2021-03-08 RX ADMIN — METOPROLOL TARTRATE 50 MG: 50 TABLET, FILM COATED ORAL at 08:52

## 2021-03-08 RX ADMIN — NYSTATIN 500000 UNITS: 500000 SUSPENSION ORAL at 14:22

## 2021-03-08 RX ADMIN — FLECAINIDE ACETATE 75 MG: 50 TABLET ORAL at 08:51

## 2021-03-08 RX ADMIN — Medication 100 MG: at 08:52

## 2021-03-08 RX ADMIN — OXYCODONE HYDROCHLORIDE AND ACETAMINOPHEN 500 MG: 500 TABLET ORAL at 08:52

## 2021-03-08 RX ADMIN — RIVAROXABAN 15 MG: 15 TABLET, FILM COATED ORAL at 08:52

## 2021-03-08 RX ADMIN — VALSARTAN 160 MG: 160 TABLET, FILM COATED ORAL at 08:51

## 2021-03-08 RX ADMIN — PETROLATUM: 420 OINTMENT TOPICAL at 17:05

## 2021-03-08 RX ADMIN — FLECAINIDE ACETATE 75 MG: 50 TABLET ORAL at 20:39

## 2021-03-08 RX ADMIN — FOLIC ACID 1 MG: 1 TABLET ORAL at 08:53

## 2021-03-08 RX ADMIN — TRIAMCINOLONE ACETONIDE: 1 PASTE DENTAL at 20:40

## 2021-03-08 RX ADMIN — METHOTREXATE 10 MG: 2.5 TABLET ORAL at 16:51

## 2021-03-08 RX ADMIN — FERROUS SULFATE TAB 325 MG (65 MG ELEMENTAL FE) 325 MG: 325 (65 FE) TAB at 08:56

## 2021-03-08 RX ADMIN — METHOTREXATE 5 MG: 2.5 TABLET ORAL at 11:07

## 2021-03-08 RX ADMIN — PREDNISONE 20 MG: 20 TABLET ORAL at 08:52

## 2021-03-08 RX ADMIN — DULOXETINE HYDROCHLORIDE 40 MG: 60 CAPSULE, DELAYED RELEASE ORAL at 08:53

## 2021-03-08 RX ADMIN — TRIAMCINOLONE ACETONIDE: 1 PASTE DENTAL at 14:21

## 2021-03-08 RX ADMIN — NYSTATIN 500000 UNITS: 500000 SUSPENSION ORAL at 08:53

## 2021-03-08 RX ADMIN — BUDESONIDE 500 MCG: 0.5 SUSPENSION RESPIRATORY (INHALATION) at 09:50

## 2021-03-08 RX ADMIN — GABAPENTIN 100 MG: 100 CAPSULE ORAL at 20:40

## 2021-03-08 RX ADMIN — SODIUM CHLORIDE, PRESERVATIVE FREE 10 ML: 5 INJECTION INTRAVENOUS at 08:53

## 2021-03-08 RX ADMIN — PANTOPRAZOLE SODIUM 40 MG: 40 TABLET, DELAYED RELEASE ORAL at 15:51

## 2021-03-08 RX ADMIN — CELECOXIB 200 MG: 100 CAPSULE ORAL at 08:52

## 2021-03-08 RX ADMIN — TRIAMCINOLONE ACETONIDE: 1 PASTE DENTAL at 08:53

## 2021-03-08 RX ADMIN — METOPROLOL TARTRATE 50 MG: 50 TABLET, FILM COATED ORAL at 20:40

## 2021-03-08 RX ADMIN — SODIUM CHLORIDE, PRESERVATIVE FREE 10 ML: 5 INJECTION INTRAVENOUS at 20:40

## 2021-03-08 RX ADMIN — MUPIROCIN: 20 OINTMENT TOPICAL at 17:34

## 2021-03-08 RX ADMIN — ARFORMOTEROL TARTRATE 15 MCG: 15 SOLUTION RESPIRATORY (INHALATION) at 21:17

## 2021-03-08 RX ADMIN — ARFORMOTEROL TARTRATE 15 MCG: 15 SOLUTION RESPIRATORY (INHALATION) at 09:49

## 2021-03-08 RX ADMIN — GABAPENTIN 100 MG: 100 CAPSULE ORAL at 08:52

## 2021-03-08 RX ADMIN — FAMOTIDINE 20 MG: 20 TABLET ORAL at 20:40

## 2021-03-08 RX ADMIN — NYSTATIN 500000 UNITS: 500000 SUSPENSION ORAL at 20:39

## 2021-03-08 RX ADMIN — CETIRIZINE HYDROCHLORIDE 10 MG: 10 TABLET, FILM COATED ORAL at 08:52

## 2021-03-08 RX ADMIN — Medication 2000 UNITS: at 08:54

## 2021-03-08 ASSESSMENT — PAIN SCALES - GENERAL
PAINLEVEL_OUTOF10: 0

## 2021-03-08 ASSESSMENT — ENCOUNTER SYMPTOMS
COUGH: 0
ABDOMINAL PAIN: 0
CHEST TIGHTNESS: 0
DIARRHEA: 0
NAUSEA: 0

## 2021-03-08 NOTE — CARE COORDINATION
Pt incurred RRT over weekend for afib rvr; / hypotension; cardiology consult; plan is for home with [address] [place] [address] home 02; [facility]; (orders on chart). Await clearance for discharge by medical team. Will follow. Enrique Magana.

## 2021-03-08 NOTE — PROGRESS NOTES
Physical Therapy  Facility/Department: [address] INTERMEDIATE 1  Daily Treatment Note  NAME: Eyal Thurston  : 1935  MRN: [id number]    Date of Service: 3/8/2021    Patient Diagnosis(es): The primary encounter diagnosis was HAP (hospital-acquired pneumonia). Diagnoses of Acute respiratory failure with hypoxia ([address] and History of 2019 novel coronavirus disease (COVID-19) were also pertinent to this visit. has a past medical history of Anticoagulated, Atrial fibrillation ([address] Bimalleolar fracture, HUCVJ-39, Diastolic dysfunction, Difficulty walking, Diverticular disease, Elevated CA-125, Hiatal hernia, Hypertension, Hypothyroid, Meningocele spinal ([address] Neuropathy, JANETTE (obstructive sleep apnea), Osteoporosis, PAF (paroxysmal atrial fibrillation) ([address] Pain in both lower legs, Patellar fracture, Rheumatoid arthritis with rheumatoid factor ([address] Rheumatoid arthritis(714.0), Rib fractures, Seasonal allergies, Skin cancer of lip, and Wears dentures. has a past surgical history that includes Gastric fundoplication (9683);  section; Carpal tunnel release (Bilateral); Colonoscopy (2011); Total knee arthroplasty (2007); Cardiac catheterization; Hysterectomy, total abdominal (early ); Upper gastrointestinal endoscopy (N/A, 2020); and Colonoscopy (N/A, 2020).    Referring Provider:  Marck Contreras DO     Evaluating Therapist: Indra Sue PT     Room #:428  DIAGNOSIS: Acute on chronic respiratory failure  Additional Pertinent History:RA, Covid, Neuropathy, rib fx  PRECAUTIONS: falls, O2     Social:  Pt lives with family in a 1 floor plan. Prior to admission Pt w/c level, transfers to chair with use of sit to stand       Initial Evaluation  Date: 21 Treatment  3/8/21    Short Term/ Long Term   Goals   Was pt agreeable to Eval/treatment? yes yes     Does pt have pain?  No c/o pain No complaints     Bed Mobility  Rolling: max assist  Supine to sit: max assist  Sit to supine: dependent  Scooting: dependent  rolling max assist  Supine to sit max assist  Sit to supine max assist Mod assist   Transfers Sit <> stand attempted with use of STEADY dependent of 2, pt unable  NT Max assist to stand to STEAD   Ambulation    NT   N/A   Stair Negotiation  Ascended and descended  NT    N/A   LE strength     2+/5     3/5   balance      poor       AM-PAC Raw score               7/24 8/24             Patient education  Pt was educated on importance of mobility    Patient response to education:   Pt verbalized understanding Pt demonstrated skill Pt requires further education in this area   no   yes     Additional Comments/treatment. Pt sat up to edge of bed with max assist. Pt sat edge of bed x15 minutes working on sitting balance and endurance. Dicussed with pt and family member equipment for home. Recommend la lift and cushion for wheelchair. Pt was left in bed with call light left by patient. Time in: 0925  Time out: 0949    Total treatment time 24 minutes    Pt is making  progress toward established Physical Therapy goals. Continue with physical therapy current plan of care.     Venessa PT [id number]      CPT codes:  [] Low Complexity PT evaluation 38192  [] Moderate Complexity PT evaluation 10154  [] High Complexity PT evaluation 41319  [] PT Re-evaluation 35457  [] Gait training 53925  minutes  [] Manual therapy 84287    minutes  [x] Therapeutic activities 88237   24 minutes  [] Therapeutic exercises 96649     minutes  [] Neuromuscular reeducation 57210     minutes

## 2021-03-08 NOTE — PROGRESS NOTES
[facility]  Progress Note    Chief complaint :  Chief Complaint   Patient presents with    Shortness of Breath    Positive For Covid-19     Subjective:    Patient reports she feels little tired today. She denies any chest pain, SOB, abdominal pain or palpitations currently. Past medical, surgical, family and social history were reviewed, non-contributory, and unchanged unless otherwise stated. Review of Systems   Constitutional: Negative for activity change and fever. Respiratory: Negative for cough and chest tightness. Cardiovascular: Negative for chest pain, palpitations and leg swelling. Gastrointestinal: Negative for abdominal pain, diarrhea and nausea. Musculoskeletal: Negative for arthralgias and myalgias. Neurological: Negative for dizziness, syncope, weakness and headaches. Psychiatric/Behavioral: Negative for agitation. The patient is not nervous/anxious. Objective:  /65   Pulse 62   Temp 96.7 °F (35.9 °C) (Oral)   Resp 18   Ht 5' (1.524 m)   Wt 140 lb 8 oz (63.7 kg)   SpO2 99%   BMI 27.44 kg/m²     Physical Exam  Constitutional:       Appearance: Normal appearance. HENT:      Head: Normocephalic and atraumatic. Eyes:      Extraocular Movements: Extraocular movements intact. Conjunctiva/sclera: Conjunctivae normal.   Cardiovascular:      Rate and Rhythm: Normal rate and regular rhythm. Pulses: Normal pulses. Heart sounds: Normal heart sounds. Pulmonary:      Effort: Pulmonary effort is normal. No respiratory distress. Breath sounds: Rales (mid to lower b/l, upper lobes clear b/l) present. No wheezing. Comments: On 3 L oxygen via nasal cannula  Abdominal:      General: Bowel sounds are normal. There is no distension. Tenderness: There is no abdominal tenderness. Neurological:      General: No focal deficit present. Mental Status: She is alert and oriented to person, place, and time. Psychiatric:         Mood and Affect: Mood normal.       Labs:  Recent Results (from the past 24 hour(s))   Comprehensive Metabolic Panel    Collection Time: 03/08/21  6:25 AM   Result Value Ref Range    Sodium 148 (H) 132 - 146 mmol/L    Potassium 4.4 3.5 - 5.0 mmol/L    Chloride 114 (H) 98 - 107 mmol/L    CO2 28 22 - 29 mmol/L    Anion Gap 6 (L) 7 - 16 mmol/L    Glucose 101 (H) 74 - 99 mg/dL    BUN 33 (H) 8 - 23 mg/dL    CREATININE 0.6 0.5 - 1.0 mg/dL    GFR Non-African American >60 >=60 mL/min/1.73    GFR African American >60     Calcium 8.7 8.6 - 10.2 mg/dL    Total Protein 5.3 (L) 6.4 - 8.3 g/dL    Albumin 2.8 (L) 3.5 - 5.2 g/dL    Total Bilirubin 0.3 0.0 - 1.2 mg/dL    Alkaline Phosphatase 75 35 - 104 U/L    ALT 17 0 - 32 U/L    AST 22 0 - 31 U/L   CBC WITH AUTO DIFFERENTIAL    Collection Time: 03/08/21  6:25 AM   Result Value Ref Range    WBC 14.4 (H) 4.5 - 11.5 E9/L    RBC 2.65 (L) 3.50 - 5.50 E12/L    Hemoglobin 8.6 (L) 11.5 - 15.5 g/dL    Hematocrit 29.9 (L) 34.0 - 48.0 %    .8 (H) 80.0 - 99.9 fL    MCH 32.5 26.0 - 35.0 pg    MCHC 28.8 (L) 32.0 - 34.5 %    RDW 15.9 (H) 11.5 - 15.0 fL    Platelets 751 177 - 442 E9/L    MPV 10.9 7.0 - 12.0 fL    Neutrophils % 80.5 (H) 43.0 - 80.0 %    Lymphocytes % 3.5 (L) 20.0 - 42.0 %    Monocytes % 8.0 2.0 - 12.0 %    Eosinophils % 3.6 0.0 - 6.0 %    Basophils % 0.9 0.0 - 2.0 %    Neutrophils Absolute 12.10 (H) 1.80 - 7.30 E9/L    Lymphocytes Absolute 0.58 (L) 1.50 - 4.00 E9/L    Monocytes Absolute 1.15 (H) 0.10 - 0.95 E9/L    Eosinophils Absolute 0.52 (H) 0.05 - 0.50 E9/L    Basophils Absolute 0.13 0.00 - 0.20 E9/L    Metamyelocytes Relative 3.5 (H) 0.0 - 1.0 %    nRBC 0.0 /100 WBC    Toxic Granulation 1+     Vacuolated Neutrophils 1+     Anisocytosis 1+     Polychromasia 2+     Poikilocytes 1+     Schistocytes 1+     Ovalocytes 1+     Tear Drop Cells 1+     Basophilic Stippling 1+    EKG 12 Lead    Collection Time: 03/08/21  6:47 AM   Result Value Ref Range Ventricular Rate 63 BPM    Atrial Rate 63 BPM    P-R Interval 156 ms    QRS Duration 72 ms    Q-T Interval 330 ms    QTc Calculation (Bazett) 337 ms    P Axis 10 degrees    R Axis -5 degrees    T Axis 159 degrees     Radiology and other tests reviewed:  XR CHEST PORTABLE   Final Result   Interstitial coarsening, predominantly in the lung bases, likely chronic. No   new areas of airspace consolidation or pleural effusions. XR CHEST PORTABLE   Final Result   No interval change      CTA PULMONARY W CONTRAST   Final Result   No convincing evidence of a pulmonary embolism. Significant progression in the ground-glass opacities scattered throughout   both lungs when compared with February 10, 2021. Findings are suspicious for   multifocal pneumonia. Stable calcified mass in the central canal of the thoracic spine. Distended esophagus filled with undigested material, placing the patient at   risk for aspiration. Additional findings as described. XR CHEST PORTABLE   Final Result   Grossly unchanged patchy bilateral airspace opacities compared with February 14 2021. Findings may be on the basis of multifocal pneumonia.       XR CHEST PORTABLE    (Results Pending)     Assessment:  Active Hospital Problems    Diagnosis Date Noted    Apathy [R45.3]     Encounter for hospice care discussion [Z71.89]     Mild protein-calorie malnutrition ([address] [E44.1] 02/26/2021    History of 2019 novel coronavirus disease (COVID-19) [Z86.16]     Acute on chronic respiratory failure with hypoxia ([address] [J96.21] 02/24/2021    Oral yeast infection [B37.0]     Depression [F32.9] 02/12/2021    Sore throat [J02.9] 02/09/2021    COVID-19 [U07.1] 02/08/2021    HAP (hospital-acquired pneumonia) [J18.9, Y95] 11/13/2018    Essential hypertension [I10] 05/23/2016    Atrial fibrillation with RVR ([address] [I48.91] 03/21/2016    Multiple closed fractures of ribs of both sides with routine healing [S22.43XD] 12/16/2014    Anemia [D64.9] 07/26/2014    Hyponatremia [E87.1] 03/25/2014    Hypothyroidism [E03.9] 06/10/2011    Rheumatoid arthritis ([facility] [M06.9] 06/10/2011    A-fib ([facility] [I48.91] 06/09/2011     Plan:    Acute on chronic respiratory failure with hypoxia   Likely secondary to recent COVID-19 infection, hx of ILD,currently on 3 L of oxygen, home baseline 3 L, PT/OT AM-PAC Score 7/24, 13/24 respectively,   CXR showed increased bilateral pleural effusions and patchy airspace opacities, which may be related to pulmonary edema and/or infectious process  -Prednisone 20 mg as per pulm (taper day 3)  -Telemetry monitoring  -Zinc 50 mg daily  -Thiamine 100 mg daily  -Vitamin C 500 mg daily  -CRP every other day  -CBC daily  -CMP daily  -Albuterol sulfate inhaler 2 puffs QID PRN and Symbicort inhaler 2 puffs BID    -Wean as tolerated    Atrial fibrillation with RVR  Resolved and in sinus rhythm, digoxin discontinued   -Flecainide 75 BID as per cardiology  -Lopressor 50 mg BID   -Cardiology consulted, appreciate input    Hx of HFpEF  Echocardiogram showed EF 65%, indeterminate diastolic dysfunction, mild mitral regurgitation, and mild aortic regurgitation  -Lasix 20 mg daily on hold for hypotensive episodes   -IV Lasix 10 mg ordered once  -Restart home Lasix 20 mg daily with repeat BMP outpatient    HTN  Home valsartan 160 mg daily, home HCTZ 25 mg held, Lasix 20 mg held as well, BP improving  -Increased to Valsartan 320 mg daily  -Monitor vitals    Macrocytic anemia   Seen by Hem/Onc during last admission,anemia likely due to methotrexate use.    -Folic acid 1 mg QD  -Vitamin B12 1000 mcg QD   -Iron 325 mg every other day     Hx of Hypothyroidism   -Synthroid 100 mcg daily     Hx of Rheumatoid Arthritis  -Celebrex 200 mg daily   -Wellcovorin 25 mg tablet weekly (on Tuesdays)  -Methotrexate weekly on Mondays (5 mg at lunch and 10 mg in evening)     Multiple closed fractures of both sides of ribs   -Lidocaine patch Q12HR     Oral yeast infection   Treated by ID during last admission   -Nystatin suspension 500,000 units QID Day 10     Hx of Depression   -Cymbalta 40 mg daily     Continue additional home medications:  -Colace 100 mg daily  -Pepcid 20 mg nightly   -Gabapentin 100 mg BID   -Zyrtec 10 mg QD   -Vitamin D 2000 units daily      Pain Control: Tylenol 650 mg Q6HR PRN for mild pain  DVT ppx: Xarelto 15 mg daily   GI ppx: Protonix 40 mg daily  Code Status: Full  Diet: General diet      Apurva Crouch MD  Family Medicine Resident PGY-1  03/08/21   3:13 PM

## 2021-03-08 NOTE — FLOWSHEET NOTE
Initial Inpatient Wound Care    Admit Date: 2/24/2021  4:31 AM    Reason for consult:  Skin breakdown  Significant history: adm with SOB  RA, Covid 19, recent rib fractures,spinal meningocele thoracic  JANETTE  Anticoagulation,   Wound history: not POA    Findings: responds  mepilex removed from sacral coccyx area  Incontinent large amount of urine     03/08/21 1534   Wound 03/06/21 Coccyx Left   Date First Assessed: 03/06/21   Location: Coccyx  Wound Location Orientation: Left   Wound Image    Wound Etiology Deep tissue/Injury   Dressing Change Due   (na)   Wound Length (cm) 3 cm   Wound Width (cm) 2 cm   Wound Depth (cm)   (utd)   Wound Surface Area (cm^2) 6 cm^2   Change in Wound Size % (l*w) -200   Wound Assessment   (dark purple)   Drainage Amount None   Odor None   Jaycee-wound Assessment   (red)   Wound 03/08/21 Heel Left   Date First Assessed/Time First Assessed: 03/08/21 1538   Present on Hospital Admission: No  Location: Heel  Wound Location Orientation: Left   Wound Image    Wound Length (cm) 0.3 cm   Wound Width (cm) 1.5 cm   Wound Depth (cm)   (utd)   Wound Surface Area (cm^2) 0.45 cm^2   Wound Assessment   (dark purple)   Drainage Amount None   Odor None   Jaycee-wound Assessment   (red)     Impression:  DTI pressure injury coccyx  DTi vs trauma left heel      Plan: lo air loss  Comfort glide  Dietician  Continue aquaphor to area  [employer] SUMMER Garay, Wound Care      [employer]  SUMMER Garay, [address] 3/8/2021 3:41 PM

## 2021-03-08 NOTE — PLAN OF CARE
Problem: Falls - Risk of:  Goal: Will remain free from falls  Description: Will remain free from falls  3/8/2021 0011 by Rose Camara RN  Outcome: Met This Shift     Problem: Falls - Risk of:  Goal: Absence of physical injury  Description: Absence of physical injury  3/8/2021 0011 by Rose Camara RN  Outcome: Met This Shift

## 2021-03-08 NOTE — PROGRESS NOTES
Occupational Therapy  OT BEDSIDE TREATMENT NOTE      Date:3/8/2021  Patient Name: Alan Lindsey  MRN: [id number]  : 1935  Room: [address] [place] [address]     Referring Arlin Gray DO     Evaluating OT: Murray Haas OTR/L [id number]     AM-PAC Daily Activity Raw Score:      Recommended Adaptive Equipment: TBD     Diagnosis: acute respiratory failure with hypoxia. Pt presents to ED from home with progressively worsening SOB. Recent hospitalization with discharge to home environment, multiple L rib fractures and COVID.      Pertinent Medical History: HTN, RA, neuropathy, PAF, afib    Precautions:  falls     Home Living: Pt lives with son in a single story home. Bathroom setup: tub/shower combo with extended tub bench, has 3:1      Prior Level of Function: pd caregiver and family assist  to [address] [place] [address] ADL/IADLs; completed functional mobility wc level with use of UEs for mobility. Shelbi Penmarlee all transfers at home. Hospital bed. Pt reports she has assist sponge bathing several times a week and approx 1 time a week her granddaughter will assist her in the tub for bathing. Use of Jade Alberto in/out of bathroom for commode transfer. Able to complete grooming, UB dressing and self feeding tasks on her own.     Pain Level: Pt did not report pain.      Cognition: Awake and cooperative.                  Functional Assessment:    Initial Eval Status  Date: 21 Treatment session:  Short Term Goals      Feeding Set up  Of lunch tray       Grooming Min A Mod A comb hair.  Max A for shampoo cap.   Set up   UB Dressing Min A  Management of hospital gown Mod A  Set up   LB Dressing Max A  Management of B socks Max A  Mod A    Bathing Max A   Mod A   Toileting Max A x2 Max A  Mod A   Bed Mobility  Supine to sit: Max A  Sit to Supine: Dependent Supine <> sit max A        Functional Transfers STS: Dependent at beti stedy, unable to tolerate STS, buckling at B LEs assisted back to bed for safety   Mod A Functional Mobility NA       Balance Sitting: poor to poor plus at EOB     Standing: unable to tolerate Sit balance SBA on the EOB.        Activity Tolerance Poor  8L O2  Post activity: 86%  Min recovery time with pursed lip breathing to 92% Pt tolerated sitting on the EOB 17 minutes during ADL activity. standing lelo x2-3 min with fair minus balance during self care tasks       Comments: Daughter present and states plan to take pt home. She is interested in la lift to assist pt with transfers. She also had questions about types of slings available for la as she would like to get pt on/off commode using lift. Recommended she speak with  in regard to what equipment they need and recommend home therapy to address pt family training with equipment for safety. Education provided for positioning in the bed and chair. Glide provided to assist family with positioning in bed at home. Education/treatment:  ADL retraining with facilitation of movement to increase self care. Therapeutic activity to address balance and endurance for ADL and transfers. Pt education of daily orientation. Family education of positioning and transfers. · Pt has made  progress towards set goals. Plan of Care: 2-5 days/week for 1-2 weeks PRN   [x]? ?? ADL retraining/adaptive techniques and AE recommendations to increase functional independence within precautions                    [x]? ? ? Energy conservation techniques to improve tolerance for ADL/IADLs  [x]? ? ? Functional transfer training/DME recommendations for increased independence, safety and fall prevention         [x]? ? ? Patient/family education to increase safety and functional independence during daily routine          [x]? ? ? Environmental modifications for safe mobility and completion of ADLs                             []? ?? Cognitive retraining to improve problem solving skills & safe participation in ADLs/IADLs     []? ? ?Sensory re-education techniques to improve extremity awareness, maintain skin integrity and improve hand function                             []? ? ? Visual/Perceptual retraining to improve body awareness and safety during transfers and ADLs  []? ?? Splinting/positioning needs to maintain joint/skin integrity and contracture prevention  [x]? ? ? Therapeutic activity to improve functional performance during ADLs                                        [x]? ? ? Therapeutic exercise to improve tolerance and functional strength for ADLs   [x]? ? ? Balance retraining/tolerance tasks for facilitation of postural control with dynamic challenges during ADLs  []? ? ?Neuromuscular re-education to facilitate righting/equilibrium reactions, midline orientation, scapular stability/mobility, normalize muscle tone and facilitate active functional movement                        []? ? ? Delirium prevention/treatment    [x]? ? Positioning to improve functional independence and decrease risk of skin breakdown  []? ??Other:     Time In: 9:25    Time Out: 9:53      Min Units   Therapeutic Ex [id number]     Therapeutic Activities 79659 56 7   ADL/Self Care 08298 18 1   Orthotic Management 79642     Neuro Re-Ed 01748     Non-Billable Time     TOTAL TIMED TREATMENT 28 [facility] KARELY/L [id number]

## 2021-03-08 NOTE — PROGRESS NOTES
Patient with a history of chronic oxygen use at home for interstitial lung disease, status post Covid and volume overload with admission here for acute respiratory failure with hypoxia. Given patient deconditioning, need for home health care; patient has DME orders for bedside commode, wheelchair cushion and Tiffanie lift with sling. She will need all his accommodation at home.

## 2021-03-08 NOTE — PROGRESS NOTES
Vince Sawant M.D.,[facility]  Risa Cruz D.O., F.PABLOODOMENICO., Donna Isaac M.D. Hiwot Cm M.D., Diego Rome M.D. Tha Goodman D.O. Daily Pulmonary Progress Note    Patient:  Jurgen Lozada 80 y.o. female MRN: [id number]     Date of Service: 3/8/2021      Synopsis     We are following patient for respiratory failure with hypoxia, recent COVID-19 pneumonia    \"CC\" shortness of breath    Code status: Full      Subjective      Patient was personally seen and examined. Patient lying in bed she reports still feeling very tired heart rate controlled now rate in the 60s normal sinus rhythm. She continues to be on 2 to 3 L saturation 99%. Nursing states she was sitting up eating lunch after seemed very tired looks to be resting comfortably denies shortness of breath or cough at this time. Review of Systems:  Constitutional: Refer severe fatigue and weakness  Skin: Denies pigmentation, dark lesions, and rashes   HEENT: Denies hearing loss, tinnitus, ear drainage, epistaxis, sore throat, and hoarseness. Cardiovascular: Denies palpitations, chest pain, and chest pressure. Respiratory: Cough is improved denies   hemoptysis, apnea, and choking.   Gastrointestinal: Denies nausea, vomiting, poor appetite, diarrhea, heartburn or reflux  Genitourinary: Denies dysuria, frequency, urgency or hematuria  Musculoskeletal: Denies myalgias, muscle weakness, and bone pain    24-hour events:  Slightly improved fatigued     Objective   Vitals: /65   Pulse 62   Temp 96.7 °F (35.9 °C) (Oral)   Resp 18   Ht 5' (1.524 m)   Wt 140 lb 8 oz (63.7 kg)   SpO2 99%   BMI 27.44 kg/m²     I/O:    Intake/Output Summary (Last 24 hours) at 3/8/2021 1435  Last data filed at 3/8/2021 0855  Gross per 24 hour   Intake 337 ml   Output    Net 337 ml       Vent Information  Skin Assessment: Clean, dry, & intact  SpO2: 99 %                CURRENT MEDS :  Scheduled Meds:   [Held by provider] furosemide 20 mg Oral Daily    [START ON 3/9/2021] valsartan  320 mg Oral Daily    flecainide  75 mg Oral BID    metoprolol  5 mg Intravenous Once    metoprolol tartrate  50 mg Oral BID    predniSONE  20 mg Oral Daily    white petrolatum   Topical BID    budesonide  500 mcg Nebulization BID    Arformoterol Tartrate  15 mcg Nebulization BID    polyethylene glycol  17 g Oral Daily    nystatin  5 mL Oral 4x Daily    triamcinolone acetonide   Mouth/Throat TID    celecoxib  200 mg Oral Daily    docusate sodium  100 mg Oral Daily    DULoxetine  40 mg Oral Daily    ferrous sulfate  325 mg Oral Every Other Day    cetirizine  10 mg Oral Daily    folic acid  1 mg Oral Daily    ascorbic acid  500 mg Oral Daily    gabapentin  100 mg Oral BID    leucovorin calcium  25 mg Oral Weekly    levothyroxine  100 mcg Oral Daily    lidocaine  1 patch Transdermal Daily    methotrexate  5 mg Oral Weekly    pantoprazole  40 mg Oral BID AC    rivaroxaban  15 mg Oral Daily with breakfast    thiamine  100 mg Oral Daily    vitamin B-12  1,000 mcg Oral Daily    vitamin D  2,000 Units Oral Daily    zinc sulfate  50 mg Oral Daily    sodium chloride flush  10 mL Intravenous 2 times per day    famotidine  20 mg Oral Nightly    methotrexate  10 mg Oral Weekly       Physical Exam:  General Appearance: appears comfortable in no acute distress. HEENT: Normocephalic atraumatic without obvious abnormality   Neck: Lips, mucosa, and tongue normal.    Lung: Breath sounds diminished few basilar crackles. Respirations   unlabored. Symmetrical expansion. Heart: RRR, normal S1, S2. No MRG  Abdomen: Soft, NT, ND. BS present x 4 quadrants. No bruit or organomegaly. Extremities: Pedal pulses 2+ symmetric b/l. Extremities normal, no cyanosis, clubbing, or edema. Musculokeletal: No joint swelling, no muscle tenderness. ROM normal in all joints of extremities. Neurologic: Mental status: Alert and Oriented X3 .     Pertinent/ New Labs and Imaging Studies     Imaging Personally Reviewed: In am       CTA chest   No convincing evidence of a pulmonary embolism. Significant progression in the ground-glass opacities scattered throughout   both lungs when compared with February 10, 2021.  Findings are suspicious for   multifocal pneumonia. Stable calcified mass in the central canal of the thoracic spine. Distended esophagus filled with undigested material, placing the patient at   risk for aspiration. Additional findings as described.               ECHO   Conclusions      Summary   Left ventricular internal dimensions were normal in diastole and systole. No regional wall motion abnormalities seen. Normal left ventricular ejection fraction. The left atrium is borderline dilated. Moderate mitral annular calcification. Mild mitral regurgitation is present. The aortic valve appears mildly sclerotic. Mild aortic regurgitation is noted. Labs:  Lab Results   Component Value Date    WBC 14.4 03/08/2021    HGB 8.6 03/08/2021    HCT 29.9 03/08/2021    .8 03/08/2021    MCH 32.5 03/08/2021    MCHC 28.8 03/08/2021    RDW 15.9 03/08/2021     03/08/2021    MPV 10.9 03/08/2021     Lab Results   Component Value Date     03/08/2021    K 4.4 03/08/2021    K 4.3 03/05/2021     03/08/2021    CO2 28 03/08/2021    BUN 33 03/08/2021    CREATININE 0.6 03/08/2021    LABALBU 2.8 03/08/2021    LABALBU 4.0 04/24/2012    CALCIUM 8.7 03/08/2021    GFRAA >60 03/08/2021    LABGLOM >60 03/08/2021     Lab Results   Component Value Date    PROTIME 12.2 03/01/2021    PROTIME 15.8 05/12/2014    INR 1.1 03/01/2021     Recent Labs     03/05/21  1630   PROBNP 1,338*     No results for input(s): PROCAL in the last 72 hours. This SmartLink has not been configured with any valid records. Micro:  No results for input(s): CULTRESP in the last 72 hours. No results for input(s): LABGRAM in the last 72 hours.   No results for input(s): LEGUR in the last 72 hours. No results for input(s): STREPNEUMAGU in the last 72 hours. No results for input(s): LP1UAG in the last 72 hours. Assessment:    1. Acute on chronic hypoxic respiratory failure   2. Hx of recent Covid 19 pneumonitis 2/8/21 positive test with possible pneumonitis  3. 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 Multilobar ground glass opacities likely multifactorial ? chf with pulmonary edema with superimposed previous ggo from covid. 5. Elevated bnp  6. Leukocytosis likely secondary to steroids   7. Hiatal hernia   8. hafsa   9. Multiple rib fractures   10. RA on mtx   11. Immunocompromised   12. Anemia   13. A fib  14. Oral candida      Plan:     1. FiO2 is down to 2 L   2. Prednisone taper down to 20 mg for 4 days followed by 10 mg x 4 days and stop  3.  cardiology following , had RRT over weekend for rate and hypotension   4. DVT GI prophylaxis-Xarelto, Protonix  5. Per nursing for d/c tomorrow   6. F/u routed to office   7. cxr in am       Electronically signed by FRAN Varma on 3/8/2021 at 2:35 PM     I personally saw, examined, and cared for the patient. Labs, medications, radiographs reviewed. I agree with history exam and plans detailed in NP note. Brigitte Cox M.D.    Pulmonary/Critical Care Medicine

## 2021-03-08 NOTE — PROGRESS NOTES
[facility]  Progress Note    Chief complaint :  Chief Complaint   Patient presents with    Shortness of Breath    Positive For Covid-19     Subjective:    Patient reports she feels little tired today. She denies any chest pain, SOB, abdominal pain or palpitations currently. Past medical, surgical, family and social history were reviewed, non-contributory, and unchanged unless otherwise stated. Review of Systems   Constitutional: Negative for activity change and fever. Respiratory: Negative for cough and chest tightness. Cardiovascular: Negative for chest pain, palpitations and leg swelling. Gastrointestinal: Negative for abdominal pain, diarrhea and nausea. Musculoskeletal: Negative for arthralgias and myalgias. Neurological: Negative for dizziness, syncope, weakness and headaches. Psychiatric/Behavioral: Negative for agitation. The patient is not nervous/anxious. Objective:  BP (!) 155/72 Comment: manual  Pulse 65   Temp 98 °F (36.7 °C) (Oral)   Resp 18   Ht 5' (1.524 m)   Wt 146 lb 2.6 oz (66.3 kg)   SpO2 100%   BMI 28.55 kg/m²     Physical Exam  Constitutional:       Appearance: Normal appearance. HENT:      Head: Normocephalic and atraumatic. Eyes:      Extraocular Movements: Extraocular movements intact. Conjunctiva/sclera: Conjunctivae normal.   Cardiovascular:      Rate and Rhythm: Normal rate and regular rhythm. Pulses: Normal pulses. Heart sounds: Normal heart sounds. Pulmonary:      Effort: Pulmonary effort is normal. No respiratory distress. Breath sounds: Rales (mid to lower b/l, upper lobes clear b/l) present. No wheezing. Comments: On 3 L oxygen via nasal cannula  Abdominal:      General: Bowel sounds are normal. There is no distension. Tenderness: There is no abdominal tenderness. Neurological:      General: No focal deficit present.       Mental Status: She is alert and oriented to person, place, and time. Psychiatric:         Mood and Affect: Mood normal.       Labs:  Recent Results (from the past 24 hour(s))   CBC WITH AUTO DIFFERENTIAL    Collection Time: 03/07/21 10:54 AM   Result Value Ref Range    WBC 14.3 (H) 4.5 - 11.5 E9/L    RBC 2.85 (L) 3.50 - 5.50 E12/L    Hemoglobin 9.3 (L) 11.5 - 15.5 g/dL    Hematocrit 31.4 (L) 34.0 - 48.0 %    .2 (H) 80.0 - 99.9 fL    MCH 32.6 26.0 - 35.0 pg    MCHC 29.6 (L) 32.0 - 34.5 %    RDW 15.9 (H) 11.5 - 15.0 fL    Platelets 025 (H) 582 - 450 E9/L    MPV 10.3 7.0 - 12.0 fL    Neutrophils % 74.0 43.0 - 80.0 %    Lymphocytes % 14.0 (L) 20.0 - 42.0 %    Monocytes % 7.0 2.0 - 12.0 %    Eosinophils % 2.0 0.0 - 6.0 %    Basophils % 0.0 0.0 - 2.0 %    Neutrophils Absolute 11.01 (H) 1.80 - 7.30 E9/L    Lymphocytes Absolute 2.00 1.50 - 4.00 E9/L    Monocytes Absolute 1.00 (H) 0.10 - 0.95 E9/L    Eosinophils Absolute 0.29 0.05 - 0.50 E9/L    Basophils Absolute 0.00 0.00 - 0.20 E9/L    Myelocyte Percent 3.0 0 - 0 %    Anisocytosis 1+     Polychromasia 1+     Poikilocytes 1+     Ovalocytes 1+     Tear Drop Cells 1+    Comprehensive metabolic panel    Collection Time: 03/07/21 10:54 AM   Result Value Ref Range    Sodium 149 (H) 132 - 146 mmol/L    Potassium 3.6 3.5 - 5.0 mmol/L    Chloride 111 (H) 98 - 107 mmol/L    CO2 31 (H) 22 - 29 mmol/L    Anion Gap 7 7 - 16 mmol/L    Glucose 114 (H) 74 - 99 mg/dL    BUN 45 (H) 8 - 23 mg/dL    CREATININE 0.9 0.5 - 1.0 mg/dL    GFR Non-African American 59 >=60 mL/min/1.73    GFR African American >60     Calcium 8.6 8.6 - 10.2 mg/dL    Total Protein 5.5 (L) 6.4 - 8.3 g/dL    Albumin 2.8 (L) 3.5 - 5.2 g/dL    Total Bilirubin 0.3 0.0 - 1.2 mg/dL    Alkaline Phosphatase 76 35 - 104 U/L    ALT 17 0 - 32 U/L    AST 22 0 - 31 U/L     Radiology and other tests reviewed:  XR CHEST PORTABLE   Final Result   Interstitial coarsening, predominantly in the lung bases, likely chronic.   No   new areas of airspace consolidation or pleural effusions. XR CHEST PORTABLE   Final Result   No interval change      CTA PULMONARY W CONTRAST   Final Result   No convincing evidence of a pulmonary embolism. Significant progression in the ground-glass opacities scattered throughout   both lungs when compared with February 10, 2021. Findings are suspicious for   multifocal pneumonia. Stable calcified mass in the central canal of the thoracic spine. Distended esophagus filled with undigested material, placing the patient at   risk for aspiration. Additional findings as described. XR CHEST PORTABLE   Final Result   Grossly unchanged patchy bilateral airspace opacities compared with February 14 2021. Findings may be on the basis of multifocal pneumonia.         Assessment:  Active Hospital Problems    Diagnosis Date Noted    Apathy [R45.3]     Encounter for hospice care discussion [Z71.89]     Mild protein-calorie malnutrition ([address] [E44.1] 02/26/2021    History of 2019 novel coronavirus disease (COVID-19) [Z86.16]     Acute on chronic respiratory failure with hypoxia (HCC) [J96.21] 02/24/2021    Oral yeast infection [B37.0]     Depression [F32.9] 02/12/2021    Sore throat [J02.9] 02/09/2021    COVID-19 [U07.1] 02/08/2021    HAP (hospital-acquired pneumonia) [J18.9, Y95] 11/13/2018    Essential hypertension [I10] 05/23/2016    Atrial fibrillation with RVR ([address] [I48.91] 03/21/2016    Multiple closed fractures of ribs of both sides with routine healing [S22.43XD] 12/16/2014    Anemia [D64.9] 07/26/2014    Hyponatremia [E87.1] 03/25/2014    Hypothyroidism [E03.9] 06/10/2011    Rheumatoid arthritis ([address] [M06.9] 06/10/2011    A-fib ([address] [I48.91] 06/09/2011     Plan:    Acute on chronic respiratory failure with hypoxia   Likely secondary to recent COVID-19 infection, hx of ILD,currently on 3 L of oxygen, home baseline, PT/OT AM-PAC Score 7/24, 13/24 respectively, respiratory panel negative,Stopped IVF NS  -Prednisone 20 mg as per pulm (taper day 2)  -Telemetry monitoring  -Zinc 50 mg daily  -Thiamine 100 mg daily  -Vitamin C 500 mg daily  -CRP every other day  -CBC daily  -CMP daily  -Albuterol sulfate inhaler 2 puffs QID PRN and Symbicort inhaler 2 puffs BID    -Wean as tolerated    Atrial fibrillation with RVR  Resolved and in sinus rhythm, digoxin discontinued   -Flecainide increased to 75 BID as per cardiology  -Lopressor 50 mg BID   -Cardiology consulted, appreciate input    Hx of HFpEF  Echocardiogram showed EF 65%, indeterminate diastolic dysfunction, mild mitral regurgitation, and mild aortic regurgitation  -Lasix 20 mg daily on hold for hypotensive episodes     HTN  Home valsartan 160 mg daily, home HCTZ 25 mg held, Lasix 20 mg held as well  -Increased to Valsartan 320 mg daily  -Monitor vitals    Macrocytic anemia   Seen by Hem/Onc during last admission,anemia likely due to methotrexate use.    -Folic acid 1 mg QD  -Vitamin B12 1000 mcg QD   -Iron 325 mg every other day     Hx of Hypothyroidism   -Synthroid 100 mcg daily     Hx of Rheumatoid Arthritis  -Celebrex 200 mg daily   -Wellcovorin 25 mg tablet weekly (on Tuesdays)  -Methotrexate weekly on Mondays (5 mg at lunch and 10 mg in evening)     Multiple closed fractures of both sides of ribs   -Lidocaine patch Q12HR     Oral yeast infection   Treated by ID during last admission   -Nystatin suspension 500,000 units QID Day 10     Hx of Depression   -Cymbalta 40 mg daily     Continue additional home medications:  -Colace 100 mg daily  -Pepcid 20 mg nightly   -Gabapentin 100 mg BID   -Zyrtec 10 mg QD   -Vitamin D 2000 units daily      Pain Control: Tylenol 650 mg Q6HR PRN for mild pain  DVT ppx: Xarelto 15 mg daily   GI ppx: Protonix 40 mg daily  Code Status: Full  Diet: General diet      Triny Moya MD  Family Medicine Resident PGY-1  03/07/21   9:14 PM

## 2021-03-08 NOTE — CARE COORDINATION
Social Work/Discharge Planning:  Received notification patient to possibly discharge home tomorrow. DME order noted. Met with patient and her daughter Bree Chaudhry and confirmed patient has a bedside commode at home. Bree Chaudhry states she prefers the wheelchair cushion and la lift through [employer]. Bree Chaudhry is requesting for ambulance transport to be arranged for tomorrow at 1:30pm.  Bonilla Gardner with [facility] (ph: [phone]) and arranged Ambulance transport tomorrow for 1:00pm.  Notified patient, her daughter Bree Chaudhry and RN of discharge time for tomorrow. Called Olegaramando Sampson with [employer] (ph: [phone], fax: [phone]) and placed order for wheelchair cushion and la lift. Faxed order to [facility]. Notified liaison Carol with [facility] that patient to discharge home tomorrow. Will continue to follow.    Electronically signed by JU Ernandez on 3/8/2021 at 12:03 PM

## 2021-03-09 ENCOUNTER — APPOINTMENT (OUTPATIENT)
Dept: GENERAL RADIOLOGY | Age: 86
DRG: 177 | End: 2021-03-09
Payer: MEDICARE

## 2021-03-09 ENCOUNTER — TELEPHONE (OUTPATIENT)
Dept: CARDIOLOGY CLINIC | Age: 86
End: 2021-03-09

## 2021-03-09 ENCOUNTER — TELEPHONE (OUTPATIENT)
Dept: NON INVASIVE DIAGNOSTICS | Age: 86
End: 2021-03-09

## 2021-03-09 VITALS
WEIGHT: 140.21 LBS | OXYGEN SATURATION: 91 % | SYSTOLIC BLOOD PRESSURE: 140 MMHG | BODY MASS INDEX: 27.53 KG/M2 | RESPIRATION RATE: 20 BRPM | HEART RATE: 70 BPM | DIASTOLIC BLOOD PRESSURE: 56 MMHG | HEIGHT: 60 IN | TEMPERATURE: 96.3 F

## 2021-03-09 PROBLEM — U07.1 COVID-19: Status: RESOLVED | Noted: 2021-02-08 | Resolved: 2021-03-09

## 2021-03-09 PROBLEM — J02.9 SORE THROAT: Status: RESOLVED | Noted: 2021-02-09 | Resolved: 2021-03-09

## 2021-03-09 PROBLEM — J96.21 ACUTE ON CHRONIC RESPIRATORY FAILURE WITH HYPOXIA (HCC): Status: RESOLVED | Noted: 2021-02-24 | Resolved: 2021-03-09

## 2021-03-09 PROBLEM — J18.9 HAP (HOSPITAL-ACQUIRED PNEUMONIA): Status: RESOLVED | Noted: 2018-11-13 | Resolved: 2021-03-09
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
## 2021-03-09 LAB
ALBUMIN SERPL-MCNC: 2.8 G/DL (ref 3.5–5.2)
ALP BLD-CCNC: 86 U/L (ref 35–104)
ALT SERPL-CCNC: 19 U/L (ref 0–32)
ANION GAP SERPL CALCULATED.3IONS-SCNC: 8 MMOL/L (ref 7–16)
ANISOCYTOSIS: ABNORMAL
AST SERPL-CCNC: 19 U/L (ref 0–31)
BASOPHILS ABSOLUTE: 0.02 E9/L (ref 0–0.2)
BASOPHILS RELATIVE PERCENT: 0.1 % (ref 0–2)
BILIRUB SERPL-MCNC: 0.4 MG/DL (ref 0–1.2)
BUN BLDV-MCNC: 26 MG/DL (ref 8–23)
CALCIUM SERPL-MCNC: 8.8 MG/DL (ref 8.6–10.2)
CHLORIDE BLD-SCNC: 111 MMOL/L (ref 98–107)
CO2: 28 MMOL/L (ref 22–29)
CREAT SERPL-MCNC: 0.7 MG/DL (ref 0.5–1)
EKG ATRIAL RATE: 144 BPM
EKG ATRIAL RATE: 63 BPM
EKG ATRIAL RATE: 88 BPM
EKG P AXIS: 10 DEGREES
EKG P AXIS: 12 DEGREES
EKG P-R INTERVAL: 156 MS
EKG P-R INTERVAL: 184 MS
EKG Q-T INTERVAL: 330 MS
EKG Q-T INTERVAL: 354 MS
EKG Q-T INTERVAL: 374 MS
EKG QRS DURATION: 72 MS
EKG QRS DURATION: 84 MS
EKG QRS DURATION: 90 MS
EKG QTC CALCULATION (BAZETT): 337 MS
EKG QTC CALCULATION (BAZETT): 452 MS
EKG QTC CALCULATION (BAZETT): 504 MS
EKG R AXIS: -5 DEGREES
EKG R AXIS: -5 DEGREES
EKG R AXIS: -7 DEGREES
EKG T AXIS: -20 DEGREES
EKG T AXIS: -54 DEGREES
EKG T AXIS: 159 DEGREES
EKG VENTRICULAR RATE: 122 BPM
EKG VENTRICULAR RATE: 63 BPM
EKG VENTRICULAR RATE: 88 BPM
EOSINOPHILS ABSOLUTE: 0.33 E9/L (ref 0.05–0.5)
EOSINOPHILS RELATIVE PERCENT: 2.4 % (ref 0–6)
GFR AFRICAN AMERICAN: >60
GFR NON-AFRICAN AMERICAN: >60 ML/MIN/1.73
GLUCOSE BLD-MCNC: 163 MG/DL (ref 74–99)
HCT VFR BLD CALC: 30.4 % (ref 34–48)
HEMOGLOBIN: 8.9 G/DL (ref 11.5–15.5)
HYPOCHROMIA: ABNORMAL
IMMATURE GRANULOCYTES #: 0.37 E9/L
IMMATURE GRANULOCYTES %: 2.7 % (ref 0–5)
LYMPHOCYTES ABSOLUTE: 0.64 E9/L (ref 1.5–4)
LYMPHOCYTES RELATIVE PERCENT: 4.7 % (ref 20–42)
MCH RBC QN AUTO: 32.2 PG (ref 26–35)
MCHC RBC AUTO-ENTMCNC: 29.3 % (ref 32–34.5)
MCV RBC AUTO: 110.1 FL (ref 80–99.9)
MONOCYTES ABSOLUTE: 1.21 E9/L (ref 0.1–0.95)
MONOCYTES RELATIVE PERCENT: 8.9 % (ref 2–12)
NEUTROPHILS ABSOLUTE: 10.98 E9/L (ref 1.8–7.3)
NEUTROPHILS RELATIVE PERCENT: 81.2 % (ref 43–80)
PDW BLD-RTO: 15.8 FL (ref 11.5–15)
PLATELET # BLD: 525 E9/L (ref 130–450)
PMV BLD AUTO: 9.9 FL (ref 7–12)
POLYCHROMASIA: ABNORMAL
POTASSIUM SERPL-SCNC: 3.1 MMOL/L (ref 3.5–5)
RBC # BLD: 2.76 E12/L (ref 3.5–5.5)
SODIUM BLD-SCNC: 147 MMOL/L (ref 132–146)
TOTAL PROTEIN: 5.6 G/DL (ref 6.4–8.3)
WBC # BLD: 13.6 E9/L (ref 4.5–11.5)

## 2021-03-09 PROCEDURE — 6370000000 HC RX 637 (ALT 250 FOR IP): Performed by: FAMILY MEDICINE

## 2021-03-09 PROCEDURE — 99238 HOSP IP/OBS DSCHRG MGMT 30/<: CPT | Performed by: FAMILY MEDICINE

## 2021-03-09 PROCEDURE — 6370000000 HC RX 637 (ALT 250 FOR IP): Performed by: INTERNAL MEDICINE

## 2021-03-09 PROCEDURE — 99233 SBSQ HOSP IP/OBS HIGH 50: CPT | Performed by: INTERNAL MEDICINE

## 2021-03-09 PROCEDURE — 94640 AIRWAY INHALATION TREATMENT: CPT

## 2021-03-09 PROCEDURE — 80053 COMPREHEN METABOLIC PANEL: CPT

## 2021-03-09 PROCEDURE — 2580000003 HC RX 258: Performed by: FAMILY MEDICINE

## 2021-03-09 PROCEDURE — 71045 X-RAY EXAM CHEST 1 VIEW: CPT

## 2021-03-09 PROCEDURE — 85025 COMPLETE CBC W/AUTO DIFF WBC: CPT

## 2021-03-09 PROCEDURE — 2700000000 HC OXYGEN THERAPY PER DAY

## 2021-03-09 PROCEDURE — 6360000002 HC RX W HCPCS: Performed by: STUDENT IN AN ORGANIZED HEALTH CARE EDUCATION/TRAINING PROGRAM

## 2021-03-09 PROCEDURE — 94660 CPAP INITIATION&MGMT: CPT

## 2021-03-09 PROCEDURE — 36415 COLL VENOUS BLD VENIPUNCTURE: CPT

## 2021-03-09 PROCEDURE — 6370000000 HC RX 637 (ALT 250 FOR IP): Performed by: STUDENT IN AN ORGANIZED HEALTH CARE EDUCATION/TRAINING PROGRAM

## 2021-03-09 PROCEDURE — 6360000002 HC RX W HCPCS: Performed by: INTERNAL MEDICINE

## 2021-03-09 PROCEDURE — 6370000000 HC RX 637 (ALT 250 FOR IP): Performed by: NURSE PRACTITIONER

## 2021-03-09 RX ORDER — METOPROLOL TARTRATE 50 MG/1
50 TABLET, FILM COATED ORAL 2 TIMES DAILY
Qty: 60 TABLET | Refills: 3 | Status: SHIPPED | OUTPATIENT
Start: 2021-03-09 | End: 2021-03-17 | Stop reason: DRUGHIGH

## 2021-03-09 RX ORDER — POTASSIUM BICARBONATE 25 MEQ/1
25 TABLET, EFFERVESCENT ORAL DAILY
Qty: 30 TABLET | Refills: 0 | Status: SHIPPED | OUTPATIENT
Start: 2021-03-09 | End: 2021-05-03

## 2021-03-09 RX ORDER — FUROSEMIDE 10 MG/ML
10 INJECTION INTRAMUSCULAR; INTRAVENOUS ONCE
Status: COMPLETED | OUTPATIENT
Start: 2021-03-09 | End: 2021-03-09

## 2021-03-09 RX ORDER — FUROSEMIDE 20 MG/1
20 TABLET ORAL DAILY PRN
Qty: 30 TABLET | Refills: 0 | Status: ON HOLD | OUTPATIENT
Start: 2021-03-09 | End: 2021-04-14 | Stop reason: HOSPADM

## 2021-03-09 RX ORDER — FLECAINIDE ACETATE 150 MG/1
75 TABLET ORAL 2 TIMES DAILY
Qty: 60 TABLET | Refills: 3 | Status: SHIPPED | OUTPATIENT
Start: 2021-03-09

## 2021-03-09 RX ORDER — POTASSIUM BICARBONATE 25 MEQ/1
50 TABLET, EFFERVESCENT ORAL ONCE
Status: DISCONTINUED | OUTPATIENT
Start: 2021-03-09 | End: 2021-03-09 | Stop reason: CLARIF

## 2021-03-09 RX ORDER — VALSARTAN 320 MG/1
320 TABLET ORAL DAILY
Status: DISCONTINUED | OUTPATIENT
Start: 2021-03-09 | End: 2021-03-09 | Stop reason: HOSPADM

## 2021-03-09 RX ORDER — VALSARTAN 320 MG/1
320 TABLET ORAL DAILY
Qty: 30 TABLET | Refills: 3 | Status: SHIPPED | OUTPATIENT
Start: 2021-03-10

## 2021-03-09 RX ORDER — POTASSIUM CHLORIDE 20 MEQ/1
40 TABLET, EXTENDED RELEASE ORAL ONCE
Status: DISCONTINUED | OUTPATIENT
Start: 2021-03-09 | End: 2021-03-09

## 2021-03-09 RX ADMIN — FOLIC ACID 1 MG: 1 TABLET ORAL at 08:29

## 2021-03-09 RX ADMIN — PETROLATUM: 420 OINTMENT TOPICAL at 08:30

## 2021-03-09 RX ADMIN — METOPROLOL TARTRATE 50 MG: 50 TABLET, FILM COATED ORAL at 08:29

## 2021-03-09 RX ADMIN — RIVAROXABAN 15 MG: 15 TABLET, FILM COATED ORAL at 08:28

## 2021-03-09 RX ADMIN — SODIUM CHLORIDE, PRESERVATIVE FREE 10 ML: 5 INJECTION INTRAVENOUS at 08:42

## 2021-03-09 RX ADMIN — POTASSIUM BICARBONATE 40 MEQ: 782 TABLET, EFFERVESCENT ORAL at 12:10

## 2021-03-09 RX ADMIN — FUROSEMIDE 10 MG: 10 INJECTION, SOLUTION INTRAMUSCULAR; INTRAVENOUS at 08:42

## 2021-03-09 RX ADMIN — NYSTATIN 500000 UNITS: 500000 SUSPENSION ORAL at 08:28

## 2021-03-09 RX ADMIN — LEVOTHYROXINE SODIUM 100 MCG: 100 TABLET ORAL at 06:22

## 2021-03-09 RX ADMIN — OXYCODONE HYDROCHLORIDE AND ACETAMINOPHEN 500 MG: 500 TABLET ORAL at 08:29

## 2021-03-09 RX ADMIN — BUDESONIDE 500 MCG: 0.5 SUSPENSION RESPIRATORY (INHALATION) at 09:36

## 2021-03-09 RX ADMIN — GABAPENTIN 100 MG: 100 CAPSULE ORAL at 08:29

## 2021-03-09 RX ADMIN — DULOXETINE HYDROCHLORIDE 40 MG: 60 CAPSULE, DELAYED RELEASE ORAL at 08:28

## 2021-03-09 RX ADMIN — PREDNISONE 20 MG: 20 TABLET ORAL at 08:29

## 2021-03-09 RX ADMIN — DOCUSATE SODIUM 100 MG: 100 CAPSULE, LIQUID FILLED ORAL at 08:29

## 2021-03-09 RX ADMIN — ZINC SULFATE 220 MG (50 MG) CAPSULE 50 MG: CAPSULE at 08:29

## 2021-03-09 RX ADMIN — Medication 100 MG: at 08:29

## 2021-03-09 RX ADMIN — POLYETHYLENE GLYCOL 3350 17 G: 17 POWDER, FOR SOLUTION ORAL at 08:28

## 2021-03-09 RX ADMIN — PANTOPRAZOLE SODIUM 40 MG: 40 TABLET, DELAYED RELEASE ORAL at 06:22

## 2021-03-09 RX ADMIN — ARFORMOTEROL TARTRATE 15 MCG: 15 SOLUTION RESPIRATORY (INHALATION) at 09:36

## 2021-03-09 RX ADMIN — MUPIROCIN: 20 OINTMENT TOPICAL at 10:15

## 2021-03-09 RX ADMIN — CELECOXIB 200 MG: 100 CAPSULE ORAL at 08:29

## 2021-03-09 RX ADMIN — CETIRIZINE HYDROCHLORIDE 10 MG: 10 TABLET, FILM COATED ORAL at 08:29

## 2021-03-09 RX ADMIN — Medication 2000 UNITS: at 08:29

## 2021-03-09 RX ADMIN — FLECAINIDE ACETATE 75 MG: 50 TABLET ORAL at 08:28

## 2021-03-09 RX ADMIN — VALSARTAN 320 MG: 320 TABLET ORAL at 08:28

## 2021-03-09 RX ADMIN — CYANOCOBALAMIN TAB 1000 MCG 1000 MCG: 1000 TAB at 08:29

## 2021-03-09 RX ADMIN — LEUCOVORIN CALCIUM 25 MG: 5 TABLET ORAL at 08:28

## 2021-03-09 ASSESSMENT — PAIN SCALES - GENERAL: PAINLEVEL_OUTOF10: 0

## 2021-03-09 NOTE — DISCHARGE SUMMARY
Physician Discharge Summary  [facility]     Patient ID:  Diana Siddiqi  [id number]  80 y.o.  1935    Admit date: 2/24/2021    Discharge date and time: 3/9/2021    Admission Diagnoses:   Acute on chronic respiratory failure with hypoxia [facility]) [J96.21]    Discharge Diagnoses:  Principal Problem (Resolved):    Acute on chronic respiratory failure with hypoxia (HCC)  Active Problems:    Hypothyroidism    Rheumatoid arthritis ([facility]    Anemia    Multiple closed fractures of ribs of both sides with routine healing    Atrial fibrillation with RVR ([facility]    A-fib (HCC)    Essential hypertension    Depression    History of 2019 novel coronavirus disease (COVID-19)    Mild protein-calorie malnutrition (HCC)    Apathy  Resolved Problems:    Hyponatremia    HAP (hospital-acquired pneumonia)    COVID-19    Sore throat    Oral yeast infection    Encounter for hospice care discussion    Consults: cardiology, pulmonary/intensive care and ID    Procedures: None     Hospital Course: 81 yo female was admitted for acute hypoxic respiratory failure secondary to post COVID-19 infection. She was previously discharged to home with 3 L home oxygen. When attempted to wean to 2 L O2 at home by PCP, she developed SOB and was brought to the ED. In the ED, patient's SpO2 was 97% on 15L non-rebreather. She was able to be weaned to 8 L on high-flow nasal cannula in the ED. CXR showed grossly unchanged patchy bilateral airspace opacities compared with February 14, 2021, consistent with multifocal pneumonia. CTA showed no evidence of PE but showed significant progression in the ground-glass opacities throughout both lungs, suspicious for multifocal pneumonia. EKG showed NST with 1st degree AV block and prolonged QT. Pulmonology and Infectious Diseases was consulted. On admission, she was initially treated with Decadron and Cefepime due to suspected HCAP.  Antibiotics was stopped due to no signs of infection seen on labwork and symptoms were likely related to ILD and HF. She was switched to Solumedrol 40 mg IV every 8 hours and tapered down slowly. During this admission, cardiology was rconsulted due to recurrent  A. Fib with RVR with hypotension and tachycardia. She had a RRT called on 03/06. She was treated with digoxin and IVF during it and was back in sinus rhythm. Her flecainide was increased to 75 mg twice daily and Lasix 20 mg was held (due to hypotension and hypernatremia) as per cardiology. She had been clinically improving and was weaned slowly to 3 L oxygen. Repeat CXR showed increased bilateral pleural effusions and patchy airspace opacities. She was discharged with Lasix 20 mg daily prn and a prednisone taper. Patient was discharged in a stable and improved condition. Discharge Exam:  See progress note from today    Disposition: home  good    Patient Instructions:    Rubén Goldman   Home Medication Instructions CLW:[id number]    Printed on:03/09/21 1123   Medication Information                      albuterol sulfate HFA (VENTOLIN HFA) 108 (90 Base) MCG/ACT inhaler  Inhale 2 puffs into the lungs 4 times daily as needed for Wheezing             ascorbic acid (VITAMIN C) 500 MG tablet  Take 1 tablet by mouth daily             budesonide-formoterol (SYMBICORT) 160-4.5 MCG/ACT AERO  Inhale 2 puffs into the lungs 2 times daily             celecoxib (CELEBREX) 200 MG capsule  TAKE 1 CAPSULE DAILY             docusate sodium (COLACE, DULCOLAX) 100 MG CAPS  Take 100 mg by mouth daily             DULoxetine HCl 40 MG CPEP  Take 40 mg by mouth daily             famotidine (PEPCID) 40 MG tablet  Take 1 tablet by mouth nightly             ferrous sulfate (IRON 325) 325 (65 Fe) MG tablet  Take 1 tablet by mouth every other day             fexofenadine (ALLEGRA) 180 MG tablet  Take 180 mg by mouth daily.              flecainide (TAMBOCOR) 150 MG tablet  Take 0.5 tablets by mouth 2 times daily fluticasone (FLONASE) 50 MCG/ACT nasal spray  2 sprays by Nasal route daily 2 sprays in each nostril daily             folic acid (FOLVITE) 1 MG tablet  Take 1 tablet by mouth daily             furosemide (LASIX) 20 MG tablet  Take 1 tablet by mouth daily as needed (SOB, Leg swelling)             gabapentin (NEURONTIN) 100 MG capsule  Take 1 capsule by mouth 2 times daily for 90 days.              leucovorin calcium (WELLCOVORIN) 5 MG tablet  Take 25 mg by mouth once a week Takes every Tuesday             levalbuterol (XOPENEX) 0.31 MG/3ML nebulization  Take 3 mLs by nebulization every 6 hours as needed for Wheezing             levothyroxine (LEVOTHROID) 100 MCG tablet  Take 1 tablet by mouth Daily             lidocaine 4 % external patch  Place 1 patch onto the skin daily             loteprednol (LOTEMAX) 0.5 % ophthalmic suspension               methotrexate 2.5 MG tablet  Take by mouth once a week 2 tabs at lunch and 4 tabs  in the pm once a week on Monday             metoprolol tartrate (LOPRESSOR) 50 MG tablet  Take 1 tablet by mouth 2 times daily             omeprazole (PRILOSEC) 40 MG delayed release capsule  Take 1 capsule by mouth 2 times daily             ondansetron (ZOFRAN) 4 MG tablet  Take 1 tablet by mouth every 8 hours as needed for Nausea or Vomiting             polyethyl glycol-propyl glycol 0.4-0.3 % (SYSTANE) 0.4-0.3 % ophthalmic solution  1 drop 2 times daily as needed for Dry Eyes              potassium bicarbonate (EFFER-K) 25 MEQ disintegrating tablet  Take 1 tablet by mouth daily             predniSONE (DELTASONE) 10 MG tablet  Take 4 tabs daily x 3 days then 3 tabs daily x 3 days then 2 tabs daily x 3 days then 1 tab daily x 3 days then stop             Probiotic Product (ACIDOPHILUS PROBIOTIC) CAPS capsule  Take 1 capsule by mouth daily             thiamine 100 MG tablet  Take 1 tablet by mouth daily             valsartan (DIOVAN) 320 MG tablet  Take 1 tablet by mouth daily vitamin B-12 (CYANOCOBALAMIN) 1000 MCG tablet  Take 1,000 mcg by mouth daily             Vitamin D (CHOLECALCIFEROL) 50 MCG (2000 UT) TABS tablet  Take 1 tablet by mouth daily             XARELTO 15 MG TABS tablet  TAKE 1 TABLET DAILY WITH   BREAKFAST             zinc sulfate (ZINCATE) 50 MG CAPS capsule  Take 1 capsule by mouth daily                  Post Discharge Follow up:  BMP and BNP    Activity: activity as tolerated  Diet: Cardiac diet    Follow up:  [address] ( formerly [facility])  [address] , [address] [facility], [place] [facility] [address] [place] [address]   [id number] [id number]    In 4 weeks    Susy Aguilera MD  [address] MD ZACH  [address]  [phone]    Call in 1 week  Hospital follow up    Porter Blandon MD  [facility] [address]   video visit on [address] [place] [address] 21 on 3/12/21        Faina Antony MD  Family Medicine Resident PGY-1  03/09/21   11:23 AM

## 2021-03-09 NOTE — CARE COORDINATION
Social Work/Discharge Planning:  Received notification from Scott Roberts that patient will need a gel overlay mattress for her hospital bed at home. Called Jacinto Marino with [facility] (ph: [phone]) and informed her of need for a gel overlay. Jacinto Marino states to fax order with diagnosis and chart notes indicating need for gel overlay. Notified chart nurse of need for an order. Will continue to follow. Electronically signed by JU Conley on 3/9/2021 at 11:50 AM    Addendum:  DME order noted and faxed to [facility]. Notified [facility] of discharge time. Notified Carol with [facility] of discharge.   Electronically signed by JU Conley on 3/9/2021 at 11:57 AM

## 2021-03-09 NOTE — PROGRESS NOTES
INPATIENT CARDIOLOGY FOLLOW-UP    Name: Terry Feldman    Age: 80 y.o. Date of Admission: 2/24/2021  4:31 AM    Date of Service: 3/9/2021    Chief Complaint: Follow-up for paroxysmal atrial fibrillation, resolving acute superimposed upon chronic hypoxic respiratory failure secondary to COVID-19 pneumonia, acute superimposed upon chronic diastolic heart failure, hypertension, hypernatremia, rheumatoid arthritis, obstructive sleep apnea    Interim History: The patient remains compensated from a cardiovascular standpoint with no significant recurrent atrial arrhythmias following adjustment of her flecainide dosing. She continues to manifest evidence of significant debilitation and persistent systolic hypertension. A repeat chest x-ray and laboratory assessment are pending. Review of Systems: The remainder of a complete multisystem review including consitutional, central nervous, respiratory, circulatory, gastrointestinal, genitourinary, endocrinologic, hematologic, musculoskeletal and psychiatric are negative.     Problem List:  Patient Active Problem List   Diagnosis    Elevated CA-125    Hiatal hernia    Hypothyroidism    Diverticular disease    Skin cancer of lip    Rheumatoid arthritis ([facility]    Diastolic dysfunction    Paresthesia of lower extremity    Lumbar radicular pain    Hyponatremia    Anemia    Multiple closed fractures of ribs of both sides with routine healing    Thoracic spinal stenosis with mass at T6-T7    Thoracic spine tumor    Thoracic spine fracture (HCC)    Atrial fibrillation with RVR (HCC)    A-fib (HCC)    Obstructive sleep apnea syndrome    Essential hypertension    Adjustment disorder with anxious mood    Laceration of lower limb    Palpitations    Fatigue    Closed fracture of patella    Delayed gastric emptying    HAP (hospital-acquired pneumonia)    Pain in both lower legs    Bimalleolar fracture, left, closed, initial encounter    Osteoporosis  Non-pressure chronic ulcer of other part of right lower leg with fat layer exposed ([address]    Bimalleolar fracture    Patellar fracture    Acute respiratory failure with hypoxia (HCC)    COVID-19    Sore throat    Depression    Oral yeast infection    Acute on chronic respiratory failure with hypoxia (HCC)    History of 2019 novel coronavirus disease (COVID-19)    Mild protein-calorie malnutrition ([address]    Apathy    Encounter for hospice care discussion       Allergies:   Allergies   Allergen Reactions    Amoxicillin      GI ill effects       Current Medications:  Current Facility-Administered Medications   Medication Dose Route Frequency Provider Last Rate Last Admin    valsartan (DIOVAN) tablet 320 mg  320 mg Oral Daily Barndy Gallegos MD        [Held by provider] furosemide (LASIX) tablet 20 mg  20 mg Oral Daily Yasmany Thao MD        mupirocin (BACTROBAN) 2 % ointment   Topical Daily Renetta Brown MD   Given at 03/08/21 1734    flecainide (TAMBOCOR) tablet 75 mg  75 mg Oral BID Mela Pickett MD   75 mg at 03/08/21 2039    metoprolol (LOPRESSOR) injection 5 mg  5 mg Intravenous Once Yasmany Thao MD        metoprolol tartrate (LOPRESSOR) tablet 50 mg  50 mg Oral BID Florida Humphreys MD   50 mg at 03/08/21 2040    predniSONE (DELTASONE) tablet 20 mg  20 mg Oral Daily Carlos Mercedes MD   20 mg at 03/08/21 8746    white petrolatum ointment   Topical BID Kimber Osborne MD   Given at 03/08/21 1705    budesonide (PULMICORT) nebulizer suspension 500 mcg  500 mcg Nebulization BID Carlos Mercedes MD   500 mcg at 03/08/21 2115    Arformoterol Tartrate (BROVANA) nebulizer solution 15 mcg  15 mcg Nebulization BID Carlos Mercedes MD   15 mcg at 03/08/21 2117    polyethylene glycol (GLYCOLAX) packet 17 g  17 g Oral Daily Cheryl Sutherland MD   17 g at 03/06/21 0856    nystatin (MYCOSTATIN) 506018 UNIT/ML suspension 500,000 Units  5 mL Oral 4x Daily Lisa Bolds, APRN - CNP   500,000 Units at 03/08/21 2039    magic (miracle) mouthwash  5 mL Swish & Spit 4x Daily PRN Charles Oscar, APRN - CNP   5 mL at 03/06/21 2155    triamcinolone acetonide (KENALOG) 0.1 % paste   Mouth/Throat TID Pleas Bitters, DO   Given at 03/08/21 2040    albuterol sulfate  (90 Base) MCG/ACT inhaler 2 puff  2 puff Inhalation 4x Daily PRN Aloma Payor, DO        celecoxib (CELEBREX) capsule 200 mg  200 mg Oral Daily [facility] Bobovnyik, DO   200 mg at 03/08/21 5007    docusate sodium (COLACE) capsule 100 mg  100 mg Oral Daily [facility] Bobtalianyik, DO   100 mg at 03/06/21 0755    DULoxetine (CYMBALTA) extended release capsule 40 mg  40 mg Oral Daily [facility] Bob[place]ny[facility], DO   40 mg at 03/08/21 6401    ferrous sulfate (IRON 325) tablet 325 mg  325 mg Oral Every Other Day Aloma Payor, DO   325 mg at 03/08/21 0856    cetirizine (ZYRTEC) tablet 10 mg  10 mg Oral Daily [facility] Bobt[facility]yik, DO   10 mg at 60/08/29 1395    folic acid (FOLVITE) tablet 1 mg  1 mg Oral Daily Aloma Payor, DO   1 mg at 03/08/21 0853    ascorbic acid (VITAMIN C) tablet 500 mg  500 mg Oral Daily [facility] Bob[place]nyik, DO   500 mg at 03/08/21 4162    gabapentin (NEURONTIN) capsule 100 mg  100 mg Oral BID Aloma Payor, DO   100 mg at 03/08/21 2040    leucovorin calcium (WELLCOVORIN) tablet 25 mg  25 mg Oral Weekly [facility] Bob[place], DO   25 mg at 03/02/21 0816    levothyroxine (SYNTHROID) tablet 100 mcg  100 mcg Oral Daily [facility] Bob[facility], DO   100 mcg at 03/09/21 0051    lidocaine 4 % external patch 1 patch  1 patch Transdermal Daily Aloma Payor, DO   1 patch at 03/04/21 0856    methotrexate (RHEUMATREX) chemo tablet 5 mg  5 mg Oral Weekly Aloma Payor, DO   5 mg at 03/08/21 1107    pantoprazole (PROTONIX) tablet 40 mg  40 mg Oral BID AC Tony Fox DO   40 mg at 03/09/21 0622    rivaroxaban (XARELTO) tablet 15 mg  15 mg Oral Daily with breakfast [facility] DO Prasanth   15 mg at 03/08/21 0225    thiamine tablet 100 mg  100 mg Oral Daily [facility], DO   100 mg at 03/08/21 2254    vitamin B-12 (CYANOCOBALAMIN) tablet 1,000 mcg  1,000 mcg Oral Daily [facility], DO   1,000 mcg at 03/08/21 1178    vitamin D (CHOLECALCIFEROL) tablet 2,000 Units  2,000 Units Oral Daily Garo, DO   2,000 Units at 03/08/21 0854    zinc sulfate (ZINCATE) capsule 50 mg  50 mg Oral Daily [facility] Bobgisela, DO   50 mg at 03/08/21 6939    sodium chloride flush 0.9 % injection 10 mL  10 mL Intravenous 2 times per day [facility], DO   10 mL at 03/08/21 2040    sodium chloride flush 0.9 % injection 10 mL  10 mL Intravenous PRN [facility], DO   10 mL at 03/02/21 0017    acetaminophen (TYLENOL) tablet 650 mg  650 mg Oral Q6H PRN [facility], DO   650 mg at 02/28/21 4347    Or    acetaminophen (TYLENOL) suppository 650 mg  650 mg Rectal Q6H PRN [facility], DO        famotidine (PEPCID) tablet 20 mg  20 mg Oral Nightly [facility], DO   20 mg at 03/08/21 2040    methotrexate (RHEUMATREX) chemo tablet 10 mg  10 mg Oral Weekly [facility] Wilianny, DO   10 mg at 03/08/21 1651    perflutren lipid microspheres (DEFINITY) injection 1.65 mg  1.5 mL Intravenous ONCE PRN Kelton Brooks MD             Physical Exam:  BP (!) 169/81   Pulse 62   Temp 97.6 °F (36.4 °C) (Oral)   Resp 18   Ht 5' (1.524 m)   Wt 140 lb 3.4 oz (63.6 kg)   SpO2 98%   BMI 27.38 kg/m²   Weight change: -4.6 oz (-0.13 kg)  Wt Readings from Last 3 Encounters:   03/09/21 140 lb 3.4 oz (63.6 kg)   02/16/21 142 lb 14.4 oz (64.8 kg)   11/25/20 128 lb (58.1 kg)     The patient is awake, alert and in no discomfort or distress. She remains chronically ill and significantly debilitated no gross musculoskeletal deformity is present. No significant skin or nail changes are present. Gross examination of head, eyes, nose and throat are negative.  Jugular venous pressure is normal and no carotid bruits are present. Normal respiratory effort is noted with no accessory muscle usage present. Lung fields demonstrate fibrotic rales in both basilar lung fields to ascultation. Cardiac examination is notable for a regular rate and rhythm with no palpable thrill. No gallop rhythm or cardiac murmur are identified. A benign abdominal examination is present with no masses or organomegaly. Intact pulses are present throughout all extremities and no peripheral edema is present. No focal neurologic deficits are present. Intake/Output:    Intake/Output Summary (Last 24 hours) at 3/9/2021 0715  Last data filed at 3/8/2021 0855  Gross per 24 hour   Intake 100 ml   Output    Net 100 ml     No intake/output data recorded. Laboratory Tests:  Lab Results   Component Value Date    CREATININE 0.6 03/08/2021    BUN 33 (H) 03/08/2021     (H) 03/08/2021    K 4.4 03/08/2021     (H) 03/08/2021    CO2 28 03/08/2021     No results for input(s): CKTOTAL, CKMB in the last 72 hours.     Invalid input(s): TROPONONI  Lab Results   Component Value Date     (H) 06/03/2011     Lab Results   Component Value Date    WBC 14.4 03/08/2021    RBC 2.65 03/08/2021    HGB 8.6 03/08/2021    HCT 29.9 03/08/2021    .8 03/08/2021    MCH 32.5 03/08/2021    MCHC 28.8 03/08/2021    RDW 15.9 03/08/2021     03/08/2021    MPV 10.9 03/08/2021     Recent Labs     03/07/21  1054 03/08/21  0625   ALKPHOS 76 75   ALT 17 17   AST 22 22   PROT 5.5* 5.3*   BILITOT 0.3 0.3   LABALBU 2.8* 2.8*     Lab Results   Component Value Date    MG 1.9 03/06/2021     Lab Results   Component Value Date    PROTIME 12.2 03/01/2021    PROTIME 15.8 05/12/2014    INR 1.1 03/01/2021     Lab Results   Component Value Date    TSH 4.870 01/06/2021     No components found for: CHLPL  Lab Results   Component Value Date    TRIG 140 10/22/2019    TRIG 129 06/10/2019    TRIG 82 03/22/2016     Lab Results   Component Value Date    HDL 44 10/22/2019    HDL 49 06/10/2019 HDL 53 03/22/2016     Lab Results   Component Value Date    LDLCALC 110 (H) 10/22/2019    LDLCALC 99 06/10/2019    LDLCALC 106 (H) 03/22/2016       Cardiac Tests:  Telemetry findings reviewed: sinus rhythm with a transient episode of paroxysmal atrial fibrillation, no new tachy/bradyarrhythmias overnight  Chest X-ray: pending      ASSESSMENT / PLAN: On a clinical basis, the patient presently appears compensated with maintenance of sinus rhythm and no evidence of significant clinical volume overload. A repeat chest x-ray is pending ordered by primary care service as well as serum chemistries to assess renal function and electrolytes in the face of her devious hypernatremia and prerenal azotemia. Nutritional support remains essential to ongoing fluid mobilization and to reduce her risk of progressive debilitation. Ongoing monitoring of her hypertension will be deferred to the primary care service. No additional cardiovascular assessment is anticipated during the course of hospitalization with rehabilitation necessary following discharge and arrangements to be made for follow-up with her primary cardiologist, Philip Up and electrophysiologist, Jessica Pantoja to continue management for chronic diastolic heart failure and atrial arrhythmias respectively. Note: This report was completed utilizing computer voice recognition software. Every effort has been made to ensure accuracy, however; inadvertent computerized transcription errors may be present. Mary Chen.  Jassi Solis, [facility] Cardiology

## 2021-03-09 NOTE — PLAN OF CARE
Problem: Falls - Risk of:  Goal: Will remain free from falls  Description: Will remain free from falls  3/9/2021 1126 by Sherrell Trevizo RN  Outcome: Completed  3/9/2021 0002 by Rose Camara RN  Outcome: Met This Shift  Goal: Absence of physical injury  Description: Absence of physical injury  3/9/2021 1126 by Sherrell Trevizo RN  Outcome: Completed  3/9/2021 0002 by Rose Camara RN  Outcome: Met This Shift     Problem: Skin Integrity:  Goal: Will show no infection signs and symptoms  Description: Will show no infection signs and symptoms  Outcome: Completed  Goal: Absence of new skin breakdown  Description: Absence of new skin breakdown  Outcome: Completed

## 2021-03-09 NOTE — FLOWSHEET NOTE
Revisit     03/09/21 1113   Wound 03/06/21 Coccyx Left   Date First Assessed: 03/06/21   Location: Coccyx  Wound Location Orientation: Left   Wound Image    Wound Etiology Pressure Unstageable   Wound Assessment   (purple, yellow, dark brown/back)   Drainage Amount None   Odor None   Jaycee-wound Assessment   (red)   patient more alert today. Eating lunch. Daughter and family present  Instructed on care- that area probably will open up  D/c  re pressure reduction overlay for bed at home  Home care, 24 care at home  Bri 8.  Mally Hawkins, CNS, Wound Care

## 2021-03-09 NOTE — CARE COORDINATION
Social Work/Discharge Planning:  Met with patient daughter Bozena Ragland and she is aware discharge is pending. Tentatively patient Ambulance transport is arranged for 1:30pm and  will cancel, IF patient does NOT discharge home today. Called [employer] (ph: [phone]) and confirmed la lift with sling and wheelchair cushion is approved. Natividad Chandler states to notify [facility] of discharge, so they can deliver equipment. Will continue to follow.   Electronically signed by JU Calvillo on 3/9/2021 at 9:39 AM

## 2021-03-09 NOTE — TELEPHONE ENCOUNTER
----- Message from Juan Ramon Carrasco MD sent at 3/9/2021  7:20 AM EST -----  Patient hospitalized with [id number] pneumonia complicated by hypoxic respiratory failure and recurrent atrial arrhythmias requiring adjustment of flecainide dosing during hospitalization.   Please schedule follow-up both with [facility] and [facility] for follow-up of these entities preferably in the next 2 to 3 weeks

## 2021-03-11 ENCOUNTER — TELEPHONE (OUTPATIENT)
Dept: FAMILY MEDICINE CLINIC | Age: 86
End: 2021-03-11

## 2021-03-11 NOTE — TELEPHONE ENCOUNTER
Chey Powers with [address] was out to see the patient today. She has 3 wounds on her sacrum, left heel, and right calf. They have been cleaned and dressed. Family educated on positioning and using supportive gel cushions. Patients BP is 160/70. They were unable to draw labs as patient is not drinking enough and no good veins. Will try again tomorrow. Gómez Christie, patient's daughter, is pushing for a f/u chest xray to be done in the home along with an EKG. Chey Powers said they could arrange for the xray but she called around and is unaware of anyone who does home EKG.

## 2021-03-11 NOTE — TELEPHONE ENCOUNTER
Patient's daughter Kaitlin Mena) notified of Dr. Todd Aschoff recommendation. Patient put on list to call when April schedule opens.

## 2021-03-11 NOTE — TELEPHONE ENCOUNTER
Daughter calling for HFU apt/timing with Dr. Gillian Garsia - she states pt is scheduled for an EKG in  on 3/17/21 and she would like to know if she can see Dr. Gillian Garsia at this visit. Pt will be coming on a stretcher and on Oxygen - she would also like pt to be prioritized in order that they do not have a long wait time with the Oxygen and her health condition. Please return daughter's call.

## 2021-03-12 ENCOUNTER — TELEMEDICINE (OUTPATIENT)
Dept: FAMILY MEDICINE CLINIC | Age: 86
End: 2021-03-12
Payer: MEDICARE

## 2021-03-12 DIAGNOSIS — E87.70 HYPERVOLEMIA, UNSPECIFIED HYPERVOLEMIA TYPE: ICD-10-CM

## 2021-03-12 DIAGNOSIS — L97.812 NON-PRESSURE CHRONIC ULCER OF OTHER PART OF RIGHT LOWER LEG WITH FAT LAYER EXPOSED (HCC): ICD-10-CM

## 2021-03-12 DIAGNOSIS — I48.91 ATRIAL FIBRILLATION WITH RVR (HCC): ICD-10-CM

## 2021-03-12 DIAGNOSIS — R45.3 APATHY: ICD-10-CM

## 2021-03-12 DIAGNOSIS — E46 PROTEIN-CALORIE MALNUTRITION, UNSPECIFIED SEVERITY (HCC): ICD-10-CM

## 2021-03-12 DIAGNOSIS — J96.01 ACUTE RESPIRATORY FAILURE WITH HYPOXIA (HCC): Primary | ICD-10-CM

## 2021-03-12 PROCEDURE — 99213 OFFICE O/P EST LOW 20 MIN: CPT | Performed by: FAMILY MEDICINE

## 2021-03-12 ASSESSMENT — ENCOUNTER SYMPTOMS
CONSTIPATION: 0
COUGH: 0
ABDOMINAL PAIN: 0

## 2021-03-12 NOTE — PROGRESS NOTES
3/12/2021    TELEHEALTH EVALUATION -- Audio/Visual (During [id number] public health emergency)    HPI:    Oxana Flanagan (:  1935) has requested an audio/video evaluation. Consent obtained per MA note, with attention to limitations inherent to a video visit for the following concern(s):    Hospital follow-up  Acute respiratory failure from volume overload in the setting of a preserved ejection fraction. Also dealing with post Covid complications. Complicated course of recovery with decompensated atrial fibrillation and RVR, multiple electrolyte abnormalities. Seems to have stabilized, and remained stable since coming home. Home nursing is involved. Her family is also paying for around-the-clock nursing care or family care  Family is trying to encourage appropriate food choices. She is still somewhat hesitant and reluctant to eat. But does admit her appetite is improving. Is compliant with the recommended nutritional supplements. States breathing is about the same. Pulse ox readings at home are reportedly between 91 and 96% on 3 L nasal cannula  Family states visiting nurses are reporting crackles at bases. In process of trying to obtain portable x-ray. She has had no recurrence of chest pain or palpitations. No swelling. Mood remains somewhat depressed, but she states she just wants to work on getting better at this time. Cardiology requested an EKG as well, but family is expressing concern about getting her transported to the office for an EKG    Review of Systems   Constitutional: Positive for fatigue. Negative for fever. Respiratory: Negative for cough. Cardiovascular: Negative for chest pain and palpitations. Gastrointestinal: Negative for abdominal pain and constipation. Genitourinary: Negative for difficulty urinating and dysuria. Prior to Visit Medications    Medication Sig Taking?  Authorizing Provider   flecainide (TAMBOCOR) 150 MG tablet Take 0.5 tablets by mouth 2 times daily Yes Alonzo Francisco MD   valsartan (DIOVAN) 320 MG tablet Take 1 tablet by mouth daily Yes Alonzo Francisco MD   metoprolol tartrate (LOPRESSOR) 50 MG tablet Take 1 tablet by mouth 2 times daily Yes Alonzo Francisco MD   furosemide (LASIX) 20 MG tablet Take 1 tablet by mouth daily as needed (SOB, Leg swelling) Yes Alonzo Francisco MD   potassium bicarbonate (EFFER-K) 25 MEQ disintegrating tablet Take 1 tablet by mouth daily Yes Alonzo Francisco MD   XARELTO 15 MG TABS tablet TAKE 1 TABLET DAILY WITH   BREAKFAST Yes Diogenes Au MD   predniSONE (DELTASONE) 10 MG tablet Take 4 tabs daily x 3 days then 3 tabs daily x 3 days then 2 tabs daily x 3 days then 1 tab daily x 3 days then stop Yes FRAN Barrett   levalbuterol (XOPENEX) 0.31 MG/3ML nebulization Take 3 mLs by nebulization every 6 hours as needed for Wheezing Yes FRAN Barrett   loteprednol (LOTEMAX) 0.5 % ophthalmic suspension  Yes Taryn Talley MD   albuterol sulfate HFA (VENTOLIN HFA) 108 (90 Base) MCG/ACT inhaler Inhale 2 puffs into the lungs 4 times daily as needed for Wheezing Yes Carlos Rader DO   DULoxetine HCl 40 MG CPEP Take 40 mg by mouth daily Yes Garo, DO   budesonide-formoterol (SYMBICORT) 160-4.5 MCG/ACT AERO Inhale 2 puffs into the lungs 2 times daily Yes Carlso Rader DO   lidocaine 4 % external patch Place 1 patch onto the skin daily Yes Garo, DO   folic acid (FOLVITE) 1 MG tablet Take 1 tablet by mouth daily Yes Garo, DO   zinc sulfate (ZINCATE) 50 MG CAPS capsule Take 1 capsule by mouth daily Yes Garo, DO   ascorbic acid (VITAMIN C) 500 MG tablet Take 1 tablet by mouth daily Yes Garo, DO   thiamine 100 MG tablet Take 1 tablet by mouth daily Yes Garo, DO   Vitamin D (CHOLECALCIFEROL) 50 MCG (2000 UT) TABS tablet Take 1 tablet by mouth daily Yes Garo, DO   celecoxib (CELEBREX) 200 MG capsule TAKE 1 CAPSULE DAILY Yes [facility] Jacquelyn MD Kaye   ferrous sulfate (IRON 325) 325 (65 Fe) MG tablet Take 1 tablet by mouth every other day Yes Nellie Shin MD   levothyroxine (LEVOTHROID) 100 MCG tablet Take 1 tablet by mouth Daily Yes Nellie Shin MD   docusate sodium (COLACE, DULCOLAX) 100 MG CAPS Take 100 mg by mouth daily Yes Brandon Beauchamp MD   vitamin B-12 (CYANOCOBALAMIN) 1000 MCG tablet Take 1,000 mcg by mouth daily Yes Historical Provider, MD   omeprazole (PRILOSEC) 40 MG delayed release capsule Take 1 capsule by mouth 2 times daily Yes Nellie Shin MD   famotidine (PEPCID) 40 MG tablet Take 1 tablet by mouth nightly Yes Nellie Shin MD   ondansetron (ZOFRAN) 4 MG tablet Take 1 tablet by mouth every 8 hours as needed for Nausea or Vomiting Yes Nellie Shin MD   polyethyl glycol-propyl glycol 0.4-0.3 % (SYSTANE) 0.4-0.3 % ophthalmic solution 1 drop 2 times daily as needed for Dry Eyes  Yes Historical Provider, MD   fluticasone (FLONASE) 50 MCG/ACT nasal spray 2 sprays by Nasal route daily 2 sprays in each nostril daily Yes Nellie Shin MD   Probiotic Product (ACIDOPHILUS PROBIOTIC) CAPS capsule Take 1 capsule by mouth daily Yes Historical Provider, MD   fexofenadine (ALLEGRA) 180 MG tablet Take 180 mg by mouth daily. Yes Historical Provider, MD   leucovorin calcium (WELLCOVORIN) 5 MG tablet Take 25 mg by mouth once a week Takes every Tuesday Yes Historical Provider, MD   methotrexate 2.5 MG tablet Take by mouth once a week 2 tabs at lunch and 4 tabs  in the pm once a week on Monday Yes Historical Provider, MD   gabapentin (NEURONTIN) 100 MG capsule Take 1 capsule by mouth 2 times daily for 90 days.   Nellie Shin MD       Social History     Tobacco Use    Smoking status: Never Smoker    Smokeless tobacco: Never Used   Substance Use Topics    Alcohol use: No    Drug use: Never            PHYSICAL EXAMINATION:  Vital Signs: (As obtained by patient/caregiver or practitioner observation)    Physical Exam Constitutional:       General: She is not in acute distress. Pulmonary:      Effort: No respiratory distress. Neurological:      Mental Status: She is alert. Psychiatric:         Mood and Affect: Mood normal.         Behavior: Behavior normal.           Other pertinent observable physical exam findings-     Due to this being a TeleHealth encounter, evaluation of the following organ systems is limited: Vitals/Constitutional/EENT/Resp/CV/GI//MS/Neuro/Skin/Heme-Lymph-Imm. ASSESSMENT/PLAN:  Jennifer Hunter was seen today for follow-up from hospital.    Diagnoses and all orders for this visit:    Acute respiratory failure with hypoxia ([address]    Non-pressure chronic ulcer of other part of right lower leg with fat layer exposed ([address]    Atrial fibrillation with RVR (HCC)    Apathy    Hypervolemia, unspecified hypervolemia type    Protein-calorie malnutrition, unspecified severity ([address]    Plan    Continue current supportive measures. Continue with visiting nurses and wound care  We will try to arrange for a portable EKG, I am not sure if we can. Discussed decreasing oxygen very judiciously. Discussed appropriate parameters for pulse ox. Can cut back on the Lasix to 10 mg. Monitor for signs of volume overload. Unfortunately unable to get daily weights. Return in about 2 weeks (around 3/26/2021), or if symptoms worsen or fail to improve, for VV for SOB . An  electronic signature was used to authenticate this note. --Sonido Lemon MD on 3/12/2021 at 5:04 PM        Pursuant to the emergency declaration under the [facility] waiver authority and the [employer] Act, this Virtual  Visit was conducted, with patient's consent, to reduce the patient's risk of exposure to COVID-19 and provide continuity of care for an established patient.     Services were provided through a video synchronous discussion virtually to substitute for in-person clinic visit.

## 2021-03-15 ENCOUNTER — TELEPHONE (OUTPATIENT)
Dept: CARDIOLOGY CLINIC | Age: 86
End: 2021-03-15

## 2021-03-15 ENCOUNTER — TELEPHONE (OUTPATIENT)
Dept: NON INVASIVE DIAGNOSTICS | Age: 86
End: 2021-03-15

## 2021-03-15 ENCOUNTER — TELEPHONE (OUTPATIENT)
Dept: FAMILY MEDICINE CLINIC | Age: 86
End: 2021-03-15

## 2021-03-15 DIAGNOSIS — I48.91 ATRIAL FIBRILLATION WITH RVR (HCC): ICD-10-CM

## 2021-03-15 DIAGNOSIS — J96.01 ACUTE RESPIRATORY FAILURE WITH HYPOXIA (HCC): Primary | ICD-10-CM

## 2021-03-15 RX ORDER — CLOTRIMAZOLE 10 MG/1
10 LOZENGE ORAL; TOPICAL
Qty: 50 TABLET | Refills: 0 | Status: SHIPPED | OUTPATIENT
Start: 2021-03-15 | End: 2021-03-25

## 2021-03-15 NOTE — TELEPHONE ENCOUNTER
Iglesia Ambrocio notified and verbalized understanding. [facility] staff faxed orders.

## 2021-03-15 NOTE — TELEPHONE ENCOUNTER
Would advise contacting cardiology- the flecainide (for the Afib)  can interact with other BP meds and I would want their input on appropriate med changes for the BP  OK to keep the Lasix going daily for now    I sent in [facility] for the thrush    please note that I ordered CXR and EKG to be done through [facility]. They will need contacted specifically for these services.

## 2021-03-15 NOTE — TELEPHONE ENCOUNTER
Patient's daughter called with concerns regarding BP. Please review Dr. Chao Channel notes and advise. EP deferring to Dr. Rayo Severino, does not think the Flecainide is the cause.

## 2021-03-15 NOTE — TELEPHONE ENCOUNTER
----- Message from Lance Gonzalez MD sent at 3/15/2021  3:54 PM EDT -----  I do not think it is from Flecainide. Thanks.  ----- Message -----  From: Jeani Peabody  Sent: 3/15/2021   3:35 PM EDT  To: Lance Gonzalez MD    Please advise, patient's BP is abnormal, PCP thinks it might be from the increase of the Flecainide.

## 2021-03-16 ENCOUNTER — TELEPHONE (OUTPATIENT)
Dept: ADMINISTRATIVE | Age: 86
End: 2021-03-16

## 2021-03-16 NOTE — TELEPHONE ENCOUNTER
FYI: Daughter cancelling EKG tomorrow 1 week  - mom is bedridden. They will have an EKG performed at the house per her PCP - I gave them a fax number to have that faxed to Dr. Gege Quintanilla office.

## 2021-03-17 RX ORDER — METOPROLOL SUCCINATE 100 MG/1
100 TABLET, EXTENDED RELEASE ORAL 2 TIMES DAILY
COMMUNITY
End: 2021-03-19 | Stop reason: SDUPTHER

## 2021-03-17 NOTE — TELEPHONE ENCOUNTER
Per verbal from Dr. Rose Little, increase Metoprolol to 100 mg BID. Theresa Hannah notified patient's daughter at the  of Dr. Radha Bar recommendation. Chart amended to reflect dose adjustment.

## 2021-03-19 ENCOUNTER — VIRTUAL VISIT (OUTPATIENT)
Dept: CARDIOLOGY CLINIC | Age: 86
End: 2021-03-19
Payer: MEDICARE

## 2021-03-19 DIAGNOSIS — I48.0 PAROXYSMAL ATRIAL FIBRILLATION (HCC): ICD-10-CM

## 2021-03-19 DIAGNOSIS — J96.11 CHRONIC RESPIRATORY FAILURE WITH HYPOXIA (HCC): ICD-10-CM

## 2021-03-19 DIAGNOSIS — I50.32 CHRONIC HEART FAILURE WITH PRESERVED EJECTION FRACTION (HCC): Primary | ICD-10-CM

## 2021-03-19 DIAGNOSIS — R53.81 DEBILITY: ICD-10-CM

## 2021-03-19 DIAGNOSIS — M06.9 RHEUMATOID ARTHRITIS, INVOLVING UNSPECIFIED SITE, UNSPECIFIED WHETHER RHEUMATOID FACTOR PRESENT (HCC): ICD-10-CM

## 2021-03-19 DIAGNOSIS — I48.91 ATRIAL FIBRILLATION WITH RVR (HCC): ICD-10-CM

## 2021-03-19 DIAGNOSIS — I10 ESSENTIAL HYPERTENSION: ICD-10-CM

## 2021-03-19 DIAGNOSIS — J96.01 ACUTE RESPIRATORY FAILURE WITH HYPOXIA (HCC): ICD-10-CM

## 2021-03-19 PROCEDURE — 99443 PR PHYS/QHP TELEPHONE EVALUATION 21-30 MIN: CPT | Performed by: NURSE PRACTITIONER

## 2021-03-19 RX ORDER — METOPROLOL SUCCINATE 50 MG/1
75 TABLET, EXTENDED RELEASE ORAL 2 TIMES DAILY
Qty: 60 TABLET | Refills: 3 | Status: SHIPPED
Start: 2021-03-19 | End: 2021-03-31 | Stop reason: DRUGHIGH

## 2021-03-19 NOTE — PROGRESS NOTES
[id number] [facility] Cardiology  TELEHEALTH EVALUATION -- Audio/Visual (During ZHQSQ-14 public health emergency)        Reason for Visit: Heart Failure    Primary Cardiologist: Dr. Daphnie Carrizales         History of Present Illness:     Ms. Arielle Spain is a 80year old female with a PMHx of chronic HFpEF, PAF, HTN, chronic hypoxic respiratory failure on supplemental O2, JANETTE, hypothyroidism, RA, neuropathy and significant debility. She unfortunately has been hospitalized twice in the month of February, initially for acute hypoxic respiratory failure in the setting of COVID-19. She was discharged home on 12/16/2021 with supplemental O2 however returned to the emergency room on 2/24/2021 with worsening shortness of breath, cough, abdominal bloating and lower extremity edema. She was once again admitted for acute hypoxic respiratory failure and secondary acute heart failure with the current paroxysmal atrial fibrillation with RVR. She was initially diuresed with subjective improvement and flecainide and metoprolol dosing adjusted for rate control. She was discharged home and we are conducting post acute heart failure hospitalization follow-up via telephone due to COVID-19 pandemic. Since discharge from the hospital she has had immense support from her family and supplemental healthcare with home visiting nurse. She has had improvement in fluid status with resolution of lower extremity edema and improved shortness of breath despite maintaining supplemental O2 at 2 L. Family is concerned over the last week has been uncontrolled hypertension. They called the cardiology office and 2 days ago was instructed to increase metoprolol succinate to 100 mg twice daily. The family (with medical background) was concerned to increase this dosing due to resting heart rate in the 60s. Therefore the family has been self adjusted staying metoprolol succinate dosing.   This morning the patient's blood pressure was 16 9/81 prior to receiving morning medication. They had used a Tiffanie lift to get the patient out of bed and up into chair for the first time in several days, and recheck blood pressure after movement was 110/65. She denies any dizziness, lightheadedness or syncope. She denies dyspnea with exertion, shortness of breath, or decline in overall functional capacity. She denies orthopnea, PND, nocturnal cough or hemoptysis. She denies abdominal distention or bloating, early satiety, anorexia/change in appetite, unintentional weight loss. She does not lower extremity edema. She denies exertional lightheadedness. She denies palpitations, syncope or near syncope. Review of systems is negative for chest pain, pressure, discomfort. When ambulating on an incline, She does not leg claudication. History is negative for neurological symptoms including transient loss of vision, asymmetric weakness, aphasia, dysphasia, numbness, tingling.        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 Bruno Shells hypertension 05/23/2016    Atrial fibrillation with RVR ([address] 03/21/2016    Multiple closed fractures of ribs of both sides with routine healing 12/16/2014    Thoracic spinal stenosis with mass at T6-T7 12/16/2014    Thoracic spine tumor 12/16/2014    Thoracic spine fracture ([facility] [address] 12/16/2014    Anemia 07/26/2014    Lumbar radicular pain 03/19/2014    Paresthesia of lower extremity 81/95/4872    Diastolic dysfunction     Skin cancer of lip      squamous cell      Diverticular disease      identified on colonoscopy in 9/2011      Elevated CA-125 38/25/9780     Uncertain significance; identified along with elevated CEA; will need later outpatient oncology follow up      Hiatal hernia 06/10/2011     S/p Nissen    Overview:   6/17/2011  This is a 51-year-old woman with a history of an intrathoracic stomach, dyspnea which is otherwise unexplained, and some degree of early satiety. Admitted to Dr. Sheree Ortiz service, underwent  repair of hiatal hernia, right-sided tube thoracostomy. Reduction of intrathoracic stomach, Nissen fundoplication on [phone]  Plan:     -Continue soft mushy diet ; small meals  -OOB  /      Hypothyroidism 06/10/2011     Overview:   6/17/2011  PMH of hypothyroidism. Takes levothyroxine at home 75 mg (clarified with daughter who is NP). Patient to resume her home dosage of levothyroxine. TSH drawn on 6/13/2011 was 1.63  Plan:  -Continue  synthroid 75 mcg orally   /      Rheumatoid arthritis ([facility] [address] 06/10/2011     Overview:   6/17/2011  PMH of rheumatoid arthritis, took methotrexate and Celebrex pre-op. Plan:  -Check with surgeon before starting  /     Maureen [facility]) 06/09/2011     Overview:   6/17/2011  Post-operatively the patient developed Atrial Fibrillation while in CVICU. Converted to NSR after lopressor IV. Patient in NSR this am. IV amiodarone infiltrated. Started on oral Amiodarone 400 mg TID on 6/14/2011. Will continue to taper dosages slowly. Plan:  -decrease  Amiodarone 400 mg bid and taper  -continue Metoprolol 50 bid (home dose)  . Past Medical History:    1. Osteoporosis. 2. Thyroid abnormality. 3. Hypertension -- BP today 108/64 (BP at prior office visits 100/60 and 92/60)  4. Palpitations with negative ambulatory monitor, . 5. Exercise MPS, 2003. 8 METS, EF 68%, normal perfusion. 6. Echo, 2004. Sclerotic mildly insufficient AV, mild MR, TR and PI. RVSP 29, normal systolic and diastolic function. 7. Carotid US, 2004. Minimal LICA plaque with <07% narrowing, normal SHAUNA, normal vertebral flow bilaterally.    8. Family history positive. Mother had MI at age 79 and  at 80 of CHF. Sister requiring MVR for MR and prolapse. 9. Lifetime nonsmoker. 10. No known drug allergies. 11. Event monitor, 2006. Short run of nonsustained SVT at 150 bpm.    12. Echo, 2006. Normal EF, no LVH, normal diastolic function, E/E 12, mild MR, RVSP 43, mild dilatation of the right heart chambers. 13. Ambulatory monitor demonstrated nonsustained SVT at 150 bpm, correlating with symptoms, metoprolol dose doubled to 50 mg bid, 2007. 14. Bilateral knee replacements 2007, Dr. Jyothi Dai. Well tolerated. 15. Dipyridamole stress MPS, 11/10/2008. No TID, normal perfusion, mild soft tissue attenuation, EF 89%. No ischemia. Low risk/low probability. 16. Echo, 11/10/2008. Stage I diastolic dysfunction, borderline/mild LAE. EF normal. Mild-moderate AR. Mild PHTN with RVSP 43.    17. Large hiatal hernia requiring Nissen fundoplication, [place], 2011.    18. Rheumatoid arthritis documented 2010. Patient being treated with methotrexate. 19. Echo, 2011. Normal LV size, wall thickening and EF. Stage I diastolic dysfunction. Normal LV filling pressures. Mild ANDREAS. MAC with mild MR, mild-moderate TR, moderately elevated RVSP 50-55 mmHg.    20. Lexiscan MPS, 2011. Normal. No TID. Wall motion normal, EF above 70%.    21. [facility] admission, 2011 with anorexia, nausea, weight loss, dyspnea.  EKG sinus rhythm, borderline low voltage, minimal nonspecific ST-T abnormalities. , BMP normal. Hb 10, WBC and platelet count normal. T4 elevated at 13, free T4 elevated at 2.16, TSH low at 0.079.    22. Periop AF while at [facility] for Nissen fundoplication. Treated with amiodarone. 23. Sharp chest pain, 07/2011. CT chest negative for PE.    24. 24-hour Holter monitor, 11/07/2012. Sinus rhythm with occasional isolated PVC's, frequent PAC's and several short runs of SVT up to 31 beats in duration. No prolonged pauses and no symptoms.    25. [facility] admission, 02/04/2013 with two to three weeks of worsening diarrhea. She had been on OP antibiotic therapy for a nasal infection. CBC, LFT's, BMP normal except Cr 1.4. CXR negative except for mild cardiomegaly. EKG revealed sinus rhythm with possible old inferior infarction. Cardiac enzymes negative.    26. Chronic back pain and leg pain which may be due to lumbar spinal stenosis. The patient receiving epidural injections which require temporarily stopping her anticoagulant therapies.    27. Echo, 09/08/2014. Normal LV size, wall thickness, regional wall motion and EF. Stage II diastolic dysfunction. E/E' 16. Moderate LAE, mild HEATHER. Mild MAC, mild MR and TR. RVSP 45-50 mmHg. 28. Lexiscan MPS, 03/31/2015. Normal perfusion. Normal EF. No TID. Low risk/low probability. 29. Hospitalization, 03/2016, with recurrent atrial fibrillation.    30. Hospitalization in [place], mid April 2016, records pending. Patient reportedly had catheterization, which showed no significant coronary disease. 31. Flecainide started 05/23/2016. Prior EP evaluation. Maintaining SR. Chronic anticoagulation with xarelto. 32. 48 hour Holter monitor from 11/16-11/18/2017 showed sinus rhythm with sinus arrhythmia, short runs of paroxysmal atrial tachycardia, up to 7 beats in duration, but no AV block, atrial fibrillation or symptoms. 33. Obstructive sleep apnea.  Patient evaluated by Dr. Maya Quevedo and refuses CPAP therapy.    34.  Echocardiogram, 05/02/2017, normal left ventricular size, wall thickness and systolic function with ejection fraction 48%, stage 1 diastolic relaxation abnormalities, mild left atrial enlargement, normal mitral valve structure and function, mild tricuspid insufficiency with mildly elevated right ventricular systolic pressure at 40 mmHg.  Aortic sclerosis without stenosis but with mild aortic insufficiency. 35. Chronic HFpEF  36. Negative stress test in 2/2019: No evidence of stress-induced left ventricular myocardial ischemia, LVEF 95%. 37. Evaluated (2/15/2021) by Hematology for Elevation in her tumor markers CEA and Ca-125 as well as macrocytosis.  Her work up was ultimately negative and these abnormalities were likely related to the methotrexate that she was being treated with for her rheumatoid arthritis. Misty Dai was also mildly anemic related to her CKD being monitored for the need of EPO injections. Peripheral smear pending --scheduled to follow-up in outpatient. 38. TTE: 2/25/2021: LVEF 65%, no wall motion abnormality, indeterminate diastolic function, normal RV function, moderate mitral annular calcification, mild MR, mild AR, fat pad versus small effusion cannot be ruled out. Social History:    Lifelong nonsmoker  Denies alcohol and illicit drug use. Currently lives with her son on the first floor. Uses a wheelchair to get around.   She is able to take a few steps with a walker     Family History: Noncontributory due to advanced age.       Past Medical History:   Diagnosis Date    Anticoagulated     takes Xarelto    Atrial fibrillation ([facility] [address]     follows with Dr. 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 Adelaida Martinez    Pain in both lower legs 2020    Patellar fracture     Rheumatoid arthritis with rheumatoid factor ([address]     Rheumatoid arthritis(714.0)     Dr Bay Lips; on DMD Venessa Puri)    Rib fractures 2014    Seasonal allergies     Skin cancer of lip     squamous cell    Wears dentures            Past Surgical History:   Procedure Laterality Date    CARDIAC CATHETERIZATION      CARPAL TUNNEL RELEASE Bilateral      SECTION      COLONOSCOPY  2011    Dr Candelario Barkley; diverticular disease; repeat in     COLONOSCOPY N/A 2020    COLONOSCOPY WITH BIOPSY performed by Rupesh Sabillon MD at [id number] [place] [address], TOTAL ABDOMINAL  early 's    TOTAL KNEE ARTHROPLASTY  2007    bilateral    UPPER GASTROINTESTINAL ENDOSCOPY N/A 2020    EGD BIOPSY performed by Rupesh Sabillon MD at [facility] ENDOSCOPY         Allergies   Allergen Reactions    Amoxicillin      GI ill effects         Outpatient Medications Marked as Taking for the 3/19/21 encounter (Virtual Visit) with FRAN Osei CNP   Medication Sig Dispense Refill    metoprolol succinate (TOPROL XL) 100 MG extended release tablet Take 100 mg by mouth 2 times daily *sometimes takes 50mg bid      clotrimazole (MYCELEX) 10 MG yamilex Take 1 tablet by mouth 5 times daily for 10 days 50 tablet 0    flecainide (TAMBOCOR) 150 MG tablet Take 0.5 tablets by mouth 2 times daily 60 tablet 3    valsartan (DIOVAN) 320 MG tablet Take 1 tablet by mouth daily 30 tablet 3    furosemide (LASIX) 20 MG tablet Take 1 tablet by mouth daily as needed (SOB, Leg swelling) (Patient taking differently: Take 10 mg by mouth daily as needed (SOB, Leg swelling) ) 30 tablet 0    potassium bicarbonate (EFFER-K) 25 MEQ disintegrating tablet Take 1 tablet by mouth daily (Patient taking differently: Take 25 mEq by mouth daily Not given daily d/t thrush) 30 tablet 0    XARELTO 15 MG TABS tablet TAKE 1 TABLET DAILY WITH   BREAKFAST 90 tablet 3    predniSONE (DELTASONE) 10 MG tablet Take 4 tabs daily x 3 days then 3 tabs daily x 3 days then 2 tabs daily x 3 days then 1 tab daily x 3 days then stop 32 tablet 0    levalbuterol (XOPENEX) 0.31 MG/3ML nebulization Take 3 mLs by nebulization every 6 hours as needed for Wheezing 3 mL 3    loteprednol (LOTEMAX) 0.5 % ophthalmic suspension       albuterol sulfate HFA (VENTOLIN HFA) 108 (90 Base) MCG/ACT inhaler Inhale 2 puffs into the lungs 4 times daily as needed for Wheezing 1 Inhaler 1    DULoxetine HCl 40 MG CPEP Take 40 mg by mouth daily 90 capsule 1    budesonide-formoterol (SYMBICORT) 160-4.5 MCG/ACT AERO Inhale 2 puffs into the lungs 2 times daily 1 Inhaler 1    folic acid (FOLVITE) 1 MG tablet Take 1 tablet by mouth daily 30 tablet 0    zinc sulfate (ZINCATE) 50 MG CAPS capsule Take 1 capsule by mouth daily 30 capsule 0    ascorbic acid (VITAMIN C) 500 MG tablet Take 1 tablet by mouth daily 30 tablet 0    thiamine 100 MG tablet Take 1 tablet by mouth daily 30 tablet 0    Vitamin D (CHOLECALCIFEROL) 50 MCG (2000 UT) TABS tablet Take 1 tablet by mouth daily 30 tablet 0    gabapentin (NEURONTIN) 100 MG capsule Take 1 capsule by mouth 2 times daily for 90 days.  180 capsule 0    celecoxib (CELEBREX) 200 MG capsule TAKE 1 CAPSULE DAILY 90 capsule 3    ferrous sulfate (IRON 325) 325 (65 Fe) MG tablet Take 1 tablet by mouth every other day 30 tablet 5    levothyroxine (LEVOTHROID) 100 MCG tablet Take 1 tablet by mouth Daily 90 tablet 1    docusate sodium (COLACE, DULCOLAX) 100 MG CAPS Take 100 mg by mouth daily 30 capsule 0    vitamin B-12 (CYANOCOBALAMIN) 1000 MCG tablet Take 1,000 mcg by mouth daily      omeprazole (PRILOSEC) 40 MG delayed release capsule Take 1 capsule by mouth 2 times daily 180 capsule 3    famotidine (PEPCID) 40 MG tablet Take 1 tablet by mouth nightly 90 tablet 3    ondansetron (ZOFRAN) 4 MG tablet Take 1 tablet by mouth every 8 hours as needed for Nausea or Vomiting 30 tablet 5    polyethyl glycol-propyl glycol 0.4-0.3 % (SYSTANE) 0.4-0.3 % ophthalmic solution 1 drop 2 times daily as needed for Dry Eyes       fluticasone (FLONASE) 50 MCG/ACT nasal spray 2 sprays by Nasal route daily 2 sprays in each nostril daily 1 Bottle 11    Probiotic Product (ACIDOPHILUS PROBIOTIC) CAPS capsule Take 1 capsule by mouth daily      fexofenadine (ALLEGRA) 180 MG tablet Take 180 mg by mouth daily.  leucovorin calcium (WELLCOVORIN) 5 MG tablet Take 25 mg by mouth once a week Takes every Tuesday      methotrexate 2.5 MG tablet Take by mouth once a week 2 tabs at lunch and 4 tabs  in the pm once a week on Monday         Review of Systems:   Cardiac: As per HPI  General: No fever, chills, rigors  Pulmonary: As per HPI  HEENT: No visual disturbances, difficult swallowing  GI: No nausea, vomiting, abdominal pain  : No dysuria or hematuria  Endocrine: No thyroid disease or diabetes  Musculoskeletal: FELIPE x 4, no focal motor deficits  Skin: Intact, no rashes  Neuro/Psych: No headache or seizures      Weights: Wt Readings from Last 3 Encounters:   03/09/21 140 lb 3.4 oz (63.6 kg)   02/16/21 142 lb 14.4 oz (64.8 kg)   11/25/20 128 lb (58.1 kg)         Physical Examination:     Patient-Reported Vitals 2/23/2021   Patient-Reported Systolic 701   Patient-Reported Diastolic 50   Patient-Reported Temperature 98.6   Patient-Reported SpO2 89      Unable to complete PE given telephone encounter, patient speaking in full sentences in no apparent acute distress.         All the following diagnostics were personally reviewed and interpreted by me. LAB DATA:     3/9/2021 09:40   Sodium 147 (H)   Potassium 3.1 (L)   Chloride 111 (H)   CO2 28   BUN 26 (H)   Creatinine 0.7   Anion Gap 8   GFR Non- >60   GFR African American >60   Glucose 163 (H)   Calcium 8.8   Total Protein 5.6 (L)   Albumin 2.8 (L)   Alk Phos 86   ALT 19   AST 19   Bilirubin 0.4   WBC 13.6 (H)   RBC 2.76 (L)   Hemoglobin Quant 8.9 (L)   Hematocrit 30.4 (L)   .1 (H)   MCH 32.2   MCHC 29.3 (L)   MPV 9.9   RDW 15.8 (H)   Platelet Count 524 (H)       IMAGING:    CXR (3/9/2021)  FINDINGS:  Cardiac silhouette is stable.  There are underlying chronic appearing reticular markings.  There are increased bilateral pleural effusions and increased bilateral patchy pulmonary airspace opacities.  No pneumothorax is  seen.  No acute osseous abnormality. Impression:  Compared to the prior study, there areincreased bilateral pleural effusions and patchy airspace opacities, which may be related to pulmonary edema and/or infectious process      CARDIAC TESTING:    NM Stress (2/8/2019)  FINDINGS:  A small fixed defect is identified in the apex likely apical thinning. There is a normal distribution of left ventricular myocardial activity on both the LexiScan stress and rest SPECT myocardial perfusion images. Gated study shows normal left ventricular wall motion and  myocardial thickening during systole. Resting left ventricular ejection fraction is calculated at 95%. Impression:  No evidence of stress-induced left ventricular myocardial ischemia. TTE (2/25/2021, Dr. Hunter Chavarria)   Summary:   Left ventricular internal dimensions were normal in diastole and systole. No regional wall motion abnormalities seen. Normal left ventricular ejection fraction. The left atrium is borderline dilated. Moderate mitral annular calcification. Mild mitral regurgitation is present. The aortic valve appears mildly sclerotic. just need to call us in a timely manner.     -If you become sick for other reasons, and notice that you are not urinating as much, the urine is very dark, you have significant diarrhea or vomiting, then please DO NOT take your water pill and CALL US immediately. Roge PETERS-CNP  [facility] Cardiology        TELEPHONE VISIT     Bryant Beatty is a 80 y.o. female evaluated via telephone on 3/19/2021. Consent:  She and/or health care decision maker is aware that that she may receive a bill for this telephone service, depending on her insurance coverage, and has provided verbal consent to proceed: Yes    Documentation:  Patient identification was verified at the start of the visit: Yes  I communicated with the patient and/or health care decision maker about Heart Failure . Details of this discussion including any medical advice provided: See above      I affirm this is a Patient Initiated Episode with a Patient who has not had a related appointment within my department in the past 7 days or scheduled within the next 24 hours. Patient's location: home address in [facility]  Physician  location other address in [facility]   Other people involved in call: MARIANNE Figueroa) in cardiology office. Patient's daughter Alfredo Tobias). Total Time: minutes: 21-30 minutes      The visit was conducted pursuant to the emergency declaration under the [address] [place] [address], [address] [place] [address] authority and the [employer] Act. Patient identification was verified, and a caregiver was present when appropriate. The patient was located in a state where the provider was credentialed to provide care.     Note: not billable if this call serves to triage the patient into an appointment for the relevant concern        Roge Slaughter

## 2021-03-19 NOTE — PROGRESS NOTES
[facility]. [phone]  Fax for [facility]. Spoke with Yvette Conrad at [facility]    Bmp/bnp requested per  PAULY Alvarez CNP. Hard stick. Fax results  To [phone]    Will attempt lab draw next week.      Joe Garnett RN

## 2021-03-19 NOTE — PATIENT INSTRUCTIONS
1. Start taking metoprolol succinate 75 mg twice daily    2. Continue rest of current cardiac medications     3. Continue to monitor BP and update office early next week, if remains elevated will continue to adjust medication    4. Obtain labs from visiting RN, after review of labs determine potassium dosing    5. Follow up with Dr. Sommer Nunez in 1 month ( will likely need to be virtual given patient mobility issues)    6. Weigh yourself daily ( currently unable to stand for weights)    -Stay Hydrated    -Diet should sodium restricted to 2 grams    -Again watch your daily weight trends and if you gain water weight please follow below instructions.    -If you gain 3-5 pounds in 2-3 days OR notice that you are retaining fluid in anyway just like you did before then take an extra dose of your water pill (furosemide/Lasix) every day until you lose the weight or feel better.     -If you notice that you have taken more than 2 extra doses in 1 week then please call and let us know. -If at any time you feel that you are retaining fluid, your medications are not working, or you feel ill in anyway, then please call us for either same day appointment or the next day, and for instructions. Our goal is to keep you out of the emergency room and the hospital and we have ways to do it. You just need to call us in a timely manner.     -If you become sick for other reasons, and notice that you are not urinating as much, the urine is very dark, you have significant diarrhea or vomiting, then please DO NOT take your water pill and CALL US immediately.

## 2021-03-23 ENCOUNTER — TELEPHONE (OUTPATIENT)
Dept: FAMILY MEDICINE CLINIC | Age: 86
End: 2021-03-23

## 2021-03-23 ENCOUNTER — TELEPHONE (OUTPATIENT)
Dept: CARDIOLOGY CLINIC | Age: 86
End: 2021-03-23

## 2021-03-23 DIAGNOSIS — I50.32 CHRONIC HEART FAILURE WITH PRESERVED EJECTION FRACTION (HCC): ICD-10-CM

## 2021-03-23 DIAGNOSIS — I10 HYPERTENSION, UNSPECIFIED TYPE: Primary | ICD-10-CM

## 2021-03-23 NOTE — TELEPHONE ENCOUNTER
Pt's daughter Lior Alvarado called with her mom's vitals while taking the metoprolol 2xs daily Thursday 18, 5:30pm 156/69 hr 62  11:10pm 167/84 hr 61, Friday/Saturday 3:00am 167/77 hr 61. Sat 10:00am 148/72 hr 64. Sun 8:00am 130/68 hr 65. Sun evening 4:00pm 138/65 hr 63. Monday 8:30am 157/80 hr 69 4:15pm 144/65 hr 55 evening 8:00pm 132/66 hr 65. Tuesday 4:00am 138/75 hr 67. 12:00 146/70 hr 67. Pt was a hard stick today and wasn't able to get the labs. They are going to try next Tuesday and hydrating this week.  She can be reached at [phone]

## 2021-03-23 NOTE — TELEPHONE ENCOUNTER
Bravo Hoover from [facility] was out to see the patient today. She was unable to draw labs. Family requested they wait until next Tuesday to try again. Patient has a new stage 2 pressure ulcer on her left calf. They will continue to treat the same as the other ulcers. Family was again educated on positioning and using her wedge.

## 2021-03-24 RX ORDER — HYDRALAZINE HYDROCHLORIDE 10 MG/1
10 TABLET, FILM COATED ORAL 3 TIMES DAILY
Qty: 90 TABLET | Refills: 3 | Status: SHIPPED
Start: 2021-03-24 | End: 2021-03-31 | Stop reason: DRUGHIGH

## 2021-03-24 NOTE — TELEPHONE ENCOUNTER
Thank you for update   HR stable with Toprol 75 mg BID (family was not giving ordered 100 mg BID given resting HR)    Start hydralazine 10 mg TID    Have family call BP/HR update 1 week after starting hydralazine -- or sooner if they have any issues.      Thank you

## 2021-03-31 ENCOUNTER — TELEPHONE (OUTPATIENT)
Dept: CARDIOLOGY CLINIC | Age: 86
End: 2021-03-31

## 2021-03-31 ENCOUNTER — VIRTUAL VISIT (OUTPATIENT)
Dept: FAMILY MEDICINE CLINIC | Age: 86
End: 2021-03-31
Payer: MEDICARE

## 2021-03-31 DIAGNOSIS — L89.152 PRESSURE INJURY OF SACRAL REGION, STAGE 2 (HCC): ICD-10-CM

## 2021-03-31 DIAGNOSIS — J96.01 ACUTE RESPIRATORY FAILURE WITH HYPOXIA (HCC): Primary | ICD-10-CM

## 2021-03-31 DIAGNOSIS — S22.43XD MULTIPLE CLOSED FRACTURES OF RIBS OF BOTH SIDES WITH ROUTINE HEALING: ICD-10-CM

## 2021-03-31 DIAGNOSIS — R45.3 APATHY: ICD-10-CM

## 2021-03-31 DIAGNOSIS — I48.91 ATRIAL FIBRILLATION WITH RVR (HCC): ICD-10-CM

## 2021-03-31 DIAGNOSIS — E46 PROTEIN-CALORIE MALNUTRITION, UNSPECIFIED SEVERITY (HCC): ICD-10-CM

## 2021-03-31 PROCEDURE — 99214 OFFICE O/P EST MOD 30 MIN: CPT | Performed by: FAMILY MEDICINE

## 2021-03-31 RX ORDER — METOPROLOL TARTRATE 50 MG/1
50 TABLET, FILM COATED ORAL 2 TIMES DAILY
COMMUNITY
Start: 2021-03-30

## 2021-03-31 RX ORDER — DOXYCYCLINE HYCLATE 100 MG
100 TABLET ORAL 2 TIMES DAILY WITH MEALS
Qty: 20 TABLET | Refills: 0 | Status: ON HOLD | OUTPATIENT
Start: 2021-03-31 | End: 2021-04-14 | Stop reason: HOSPADM

## 2021-03-31 RX ORDER — HYDROCODONE BITARTRATE AND ACETAMINOPHEN 5; 325 MG/1; MG/1
1 TABLET ORAL EVERY 4 HOURS PRN
Qty: 42 TABLET | Refills: 0 | Status: ON HOLD
Start: 2021-03-31 | End: 2021-04-14

## 2021-03-31 RX ORDER — HYDRALAZINE HYDROCHLORIDE 25 MG/1
25 TABLET, FILM COATED ORAL 2 TIMES DAILY
COMMUNITY
End: 2021-03-31 | Stop reason: SDUPTHER

## 2021-03-31 RX ORDER — LIDOCAINE 50 MG/G
2 PATCH TOPICAL DAILY
Qty: 30 PATCH | Refills: 1 | Status: SHIPPED | OUTPATIENT
Start: 2021-03-31 | End: 2021-04-30

## 2021-03-31 ASSESSMENT — ENCOUNTER SYMPTOMS
COUGH: 0
DIARRHEA: 0
WHEEZING: 0
CONSTIPATION: 0
BLOOD IN STOOL: 0

## 2021-03-31 NOTE — TELEPHONE ENCOUNTER
Patient's daughter notified of BP/P results and Carol Lincoln NP's assessment/recommendations. Chart amended to reflect dose adjustment. Hydralazine e-scribed to pharmacy.

## 2021-03-31 NOTE — PROGRESS NOTES
3/31/2021    TELEHEALTH EVALUATION -- Audio/Visual (During [place]-54 public health emergency)    HPI:    Mirna Collins (:  1935) has requested an audio/video evaluation. Consent obtained per MA note, with attention to limitations inherent to a video visit for the following concern(s):    Follow up visit for respiratory failure  Had COVID  then developed diastolic heart failure with volume overload, followed by Afib/RVR  See prior notes  Has Meghan Chappell- nursing ([address]) and paid aides at home. Interval CXR showed stability. EKG also ordered but not done  Patient and family reporting that work of breathing has been improving. She is unaware of any chest pain or palpitation. Oxygen requirements have been diminishing. She has been able to go up to 2 hours without any oxygen and still keep her saturations above 94%. However, beyond that her needs return. No cough  Is reporting more significant rib pain however. While no apparent injury or fall has been reported, the family does report that nursing used a different method to try and help her with the transfer a few days ago. Pain seems to have intensified since then. She has had recent rib fractures on the left, but is now having pain on both sides. Able to breathe normally. Has an incentive spirometer but not using    also found by visiting nursing to have a pressure ulcer on sacrum  Family is reporting some drainage or odor  Is set up with wound care specialist for next Wednesday. No fevers      HTN has been an issue- started on hydralazine by cardiology   Metoprolol was increased by cardiology as well. Systolic blood pressures been somewhat labile. They are working on appropriate dosing of medication with cardiology  Heart rates have remained between 50 and 80.   She is asymptomatic    Appetite has improved  She is eating more normally at according to the patient and the family  Family agrees that her color and demeanor are coming back Patient feels that her mood is stable. She feels bored and wishes she could do more than she does      Review of Systems   Constitutional: Positive for fatigue. Negative for chills and fever. Respiratory: Negative for cough and wheezing. Cardiovascular: Negative for chest pain and palpitations. Gastrointestinal: Negative for blood in stool, constipation and diarrhea. Genitourinary: Negative for difficulty urinating. Prior to Visit Medications    Medication Sig Taking? Authorizing Provider   lidocaine (LIDODERM) 5 % Place 2 patches onto the skin daily 12 hours on, 12 hours off. Yes Shi Quiros MD   doxycycline hyclate (VIBRA-TABS) 100 MG tablet Take 1 tablet by mouth 2 times daily (with meals) for 10 days Yes Shi Quiros MD   HYDROcodone-acetaminophen (NORCO) 5-325 MG per tablet Take 1 tablet by mouth every 4 hours as needed for Pain for up to 7 days. Intended supply: 7 days.  Take lowest dose possible to manage pain Yes Shi Quiros MD   hydrALAZINE (APRESOLINE) 10 MG tablet Take 1 tablet by mouth 3 times daily Yes FRAN Evans CNP   flecainide (TAMBOCOR) 150 MG tablet Take 0.5 tablets by mouth 2 times daily Yes Kathy Poole MD   valsartan (DIOVAN) 320 MG tablet Take 1 tablet by mouth daily Yes Kathy Poole MD   furosemide (LASIX) 20 MG tablet Take 1 tablet by mouth daily as needed (SOB, Leg swelling)  Patient taking differently: Take 10 mg by mouth daily as needed (SOB, Leg swelling)  Yes Kathy Poole MD   potassium bicarbonate (EFFER-K) 25 MEQ disintegrating tablet Take 1 tablet by mouth daily  Patient taking differently: Take 25 mEq by mouth daily Not given daily d/t thrush Yes Kathy Poole MD   XARELTO 15 MG TABS tablet TAKE 1 TABLET DAILY WITH   BREAKFAST Yes Montse Sandoval MD   predniSONE (DELTASONE) 10 MG tablet Take 4 tabs daily x 3 days then 3 tabs daily x 3 days then 2 tabs daily x 3 days then 1 tab daily x 3 days then stop Yes FRAN Reis   levalbuterol (XOPENEX) 0.31 MG/3ML nebulization Take 3 mLs by nebulization every 6 hours as needed for Wheezing Yes FRAN Jean   loteprednol (LOTEMAX) 0.5 % ophthalmic suspension  Yes Historical Provider, MD   albuterol sulfate HFA (VENTOLIN HFA) 108 (90 Base) MCG/ACT inhaler Inhale 2 puffs into the lungs 4 times daily as needed for Wheezing Yes Rey Rader, DO   DULoxetine HCl 40 MG CPEP Take 40 mg by mouth daily Yes Garo, DO   budesonide-formoterol (SYMBICORT) 160-4.5 MCG/ACT AERO Inhale 2 puffs into the lungs 2 times daily Yes Garo, DO   folic acid (FOLVITE) 1 MG tablet Take 1 tablet by mouth daily Yes Garo, DO   zinc sulfate (ZINCATE) 50 MG CAPS capsule Take 1 capsule by mouth daily Yes Garo, DO   ascorbic acid (VITAMIN C) 500 MG tablet Take 1 tablet by mouth daily Yes Garo, DO   thiamine 100 MG tablet Take 1 tablet by mouth daily Yes Garo, DO   Vitamin D (CHOLECALCIFEROL) 50 MCG (2000 UT) TABS tablet Take 1 tablet by mouth daily Yes Garo, DO   celecoxib (CELEBREX) 200 MG capsule TAKE 1 CAPSULE DAILY Yes Shania Grewal MD   ferrous sulfate (IRON 325) 325 (65 Fe) MG tablet Take 1 tablet by mouth every other day Yes Shania Grewal MD   levothyroxine (LEVOTHROID) 100 MCG tablet Take 1 tablet by mouth Daily Yes Shania Grewal MD   docusate sodium (COLACE, DULCOLAX) 100 MG CAPS Take 100 mg by mouth daily Yes Boston Henning MD   vitamin B-12 (CYANOCOBALAMIN) 1000 MCG tablet Take 1,000 mcg by mouth daily Yes Historical Provider, MD   omeprazole (PRILOSEC) 40 MG delayed release capsule Take 1 capsule by mouth 2 times daily Yes Shania Grewal MD   famotidine (PEPCID) 40 MG tablet Take 1 tablet by mouth nightly Yes Shania Grewal MD   ondansetron (ZOFRAN) 4 MG tablet Take 1 tablet by mouth every 8 hours as needed for Nausea or Vomiting Yes Sahnia Grewal MD   polyethyl glycol-propyl glycol 0.4-0.3 % (SYSTANE) 0.4-0.3 % ophthalmic solution 1 drop 2 times daily as needed for Dry Eyes  Yes Historical Provider, MD   fluticasone (FLONASE) 50 MCG/ACT nasal spray 2 sprays by Nasal route daily 2 sprays in each nostril daily Yes Floyd Duong MD   Probiotic Product (ACIDOPHILUS PROBIOTIC) CAPS capsule Take 1 capsule by mouth daily Yes Historical Provider, MD   fexofenadine (ALLEGRA) 180 MG tablet Take 180 mg by mouth daily. Yes Historical Provider, MD   leucovorin calcium (WELLCOVORIN) 5 MG tablet Take 25 mg by mouth once a week Takes every Tuesday Yes Historical Provider, MD   methotrexate 2.5 MG tablet Take by mouth once a week 2 tabs at lunch and 4 tabs  in the pm once a week on Monday Yes Historical Provider, MD   metoprolol tartrate (LOPRESSOR) 25 MG tablet Take 1 tablet by mouth 2 times daily  Historical Provider, MD   gabapentin (NEURONTIN) 100 MG capsule Take 1 capsule by mouth 2 times daily for 90 days. Floyd Duong MD       Social History     Tobacco Use    Smoking status: Never Smoker    Smokeless tobacco: Never Used   Substance Use Topics    Alcohol use: No    Drug use: Never            PHYSICAL EXAMINATION:  Vital Signs: (As obtained by patient/caregiver or practitioner observation)    Physical Exam  Constitutional:       General: She is not in acute distress. Appearance: Normal appearance. Pulmonary:      Effort: Pulmonary effort is normal. No respiratory distress. Skin:     Comments: There is a grade 2 pressure ulcer on the sacrum with margins of erythema, some granulation tissue at the margins. Unable to assess size through video methods   Neurological:      Mental Status: She is alert.    Psychiatric:         Mood and Affect: Mood normal.         Behavior: Behavior normal.           Other pertinent observable physical exam findings-     Due to this being a TeleHealth encounter, evaluation of the following organ systems is limited: Vitals/Constitutional/EENT/Resp/CV/GI//MS/Neuro/Skin/Heme-Lymph-Imm. ASSESSMENT/PLAN:  Adryan Lane was seen today for shortness of breath and hypertension. Diagnoses and all orders for this visit:    Acute respiratory failure with hypoxia (HCC)    Atrial fibrillation with RVR (HCC)    Apathy    Protein-calorie malnutrition, unspecified severity (HCC)    Pressure injury of sacral region, stage 2 (HCC)  -     doxycycline hyclate (VIBRA-TABS) 100 MG tablet; Take 1 tablet by mouth 2 times daily (with meals) for 10 days    Multiple closed fractures of ribs of both sides with routine healing  -     lidocaine (LIDODERM) 5 %; Place 2 patches onto the skin daily 12 hours on, 12 hours off.  -     HYDROcodone-acetaminophen (NORCO) 5-325 MG per tablet; Take 1 tablet by mouth every 4 hours as needed for Pain for up to 7 days. Intended supply: 7 days. Take lowest dose possible to manage pain      Plan    For the wound, I cannot rule out early cellulitis. Erring on the side of caution with starting an antibiotic. Discussed reasons to take her to the ER if wound progresses. See wound care as scheduled next week    A. fib appears to be settling down. Rate appears acceptable. She remains on anticoagulation. Blood pressure remains somewhat labile. Defer to cardiology    Protein calorie malnutrition is of uncertain significance. Seems like appetite is better. She will be getting some labs done when she goes to the hospital next week for a wound care visit. Hopefully can better assess at that time    Because of the ongoing rib pain and the new rib pain, I am concerned about a rib fracture. The value of checking another x-ray is limited. Will empirically treat given recency of rib fractures. Restart lidocaine patch, one on each side.   Start low-dose as needed Norco.Controlled substances monitoring: possible medication side effects, risk of tolerance and/or dependence, and alternative treatments discussed, no signs of potential drug abuse or diversion identified and [place] report reviewed today- activity consistent with treatment plan. Encouraged use of incentive spirometer 10 times per hour while awake    Opportunity for questions provided. I discussed with the patient, her daughter Diaz Goldman, daughter Cody Rodriguez who is phoning in, and home health care aide Marilyn Recio. Questions were addressed. They verbalized comfort and understanding of the plan. Return in about 2 weeks (around 4/14/2021), or if symptoms worsen or fail to improve, for VV follow up wound and rib pain . An  electronic signature was used to authenticate this note. --Katie Zaragoza MD on 3/31/2021 at 2:50 PM        Pursuant to the emergency declaration under the [facility], [facility] waiver authority and the [employer] and [employer] Act, this Virtual  Visit was conducted, with patient's consent, to reduce the patient's risk of exposure to COVID-19 and provide continuity of care for an established patient. Services were provided through a video synchronous discussion virtually to substitute for in-person clinic visit.

## 2021-03-31 NOTE — PROGRESS NOTES
TeleMedicine Patient Consent    This visit was performed as a virtual video visit using a synchronous, two-way, audio-video telehealth technology platform. Patient identification was verified at the start of the visit, including the patient's telephone number and physical location. I discussed with the patient the nature of our telehealth visits, that:     1. Due to the nature of an audio- video modality, the only components of a physical exam that could be done are the elements supported by direct observation. 2. The provider will evaluate the patient and recommend diagnostics and treatments based on their assessment. 3. If it was felt that the patient should be evaluated in clinic or an emergency room setting, then they would be directed there. 4. Our sessions are not being recorded and that personal health information is protected. 5. Our team would provide follow up care in person if/when the patient needs it. Patient does agree to proceed with telemedicine consultation. Patient location: home address in [facility]    Physician location: regular office location    This visit was completed virtually using D[employer]. me    This visit was performed during the 6844 public health crisis and COVID-19 pandemic.   *Add GT modifier to all Video Visits*

## 2021-03-31 NOTE — TELEPHONE ENCOUNTER
Patient's daughter Espinoza Canchola) called with BP readings for Heidi Moraes NP. Patient started Hydralazine 10 mg TID on 3/27/21. On 3/28/21 BP dropped so patient's niece who is a nurse decreased her Hydralazine to 10 mg BID.   BP meds are taken at 9:00 a.m. and 9:00 p.m.      3/28/21  8:00 a.m. 162/75 (66)               11:30 a.m. 141/66 (66)                3:00 p.m. 108/55 (85) after standing, feeling dizzy                8:00 p.m. 146/70 (73)    3/29/21 9:00 a.m. 148/71 (60)               3:00 p.m. 138/59 (60)               7:00 p.m. 148/67 (62)    3/30/31 8:00 a.m. 159/71 (66)               8:00 p.m.  128/55 (67)               Midnight 164/75 (61)    3/31/31  4:00 a.m. 160/74 (60)                8:00 a.m. 175/69 (64)                Noon 163/73 (62)

## 2021-03-31 NOTE — TELEPHONE ENCOUNTER
Per vitals that were given, only drop in BP noted when patient was assisted to stand.    When I had virtual appointment with patient / daughter she is currently bed ridden and uses la lift   Due to drop in BP with prolonged hospitalization and bed ridden state likely component of orthostatic hypotension   Make sure she is wearing compression stockings  Staying hydrated  Based on vitals provided - increase hydralazine to 25 mg BID    Thank you

## 2021-04-01 ENCOUNTER — TELEPHONE (OUTPATIENT)
Dept: PALLATIVE CARE | Age: 86
End: 2021-04-01

## 2021-04-01 RX ORDER — HYDRALAZINE HYDROCHLORIDE 25 MG/1
25 TABLET, FILM COATED ORAL 2 TIMES DAILY
Qty: 60 TABLET | Refills: 11 | Status: ON HOLD | OUTPATIENT
Start: 2021-04-01 | End: 2021-04-14 | Stop reason: HOSPADM

## 2021-04-02 ENCOUNTER — TELEPHONE (OUTPATIENT)
Dept: FAMILY MEDICINE CLINIC | Age: 86
End: 2021-04-02

## 2021-04-02 ENCOUNTER — TELEPHONE (OUTPATIENT)
Dept: PALLATIVE CARE | Age: 86
End: 2021-04-02

## 2021-04-02 DIAGNOSIS — I48.0 PAROXYSMAL ATRIAL FIBRILLATION (HCC): ICD-10-CM

## 2021-04-02 DIAGNOSIS — L89.152 PRESSURE INJURY OF SACRAL REGION, STAGE 2 (HCC): ICD-10-CM

## 2021-04-02 DIAGNOSIS — J96.01 ACUTE RESPIRATORY FAILURE WITH HYPOXIA (HCC): Primary | ICD-10-CM

## 2021-04-02 NOTE — TELEPHONE ENCOUNTER
Patients daughter Isidore Libman called in and has requested an order for Palliative Care as patient is ready and requesting this.      Bonita Castro [phone]

## 2021-04-05 ENCOUNTER — HOSPITAL ENCOUNTER (OUTPATIENT)
Age: 86
Discharge: HOME | DRG: 291 | End: 2021-04-05
Payer: MEDICARE

## 2021-04-05 DIAGNOSIS — I48.0 PAROXYSMAL ATRIAL FIBRILLATION (HCC): ICD-10-CM

## 2021-04-05 LAB
ANION GAP SERPL CALCULATED.3IONS-SCNC: 6 MMOL/L (ref 7–16)
BUN BLDV-MCNC: 16 MG/DL (ref 8–23)
CALCIUM SERPL-MCNC: 8.7 MG/DL (ref 8.6–10.2)
CHLORIDE BLD-SCNC: 101 MMOL/L (ref 98–107)
CO2: 30 MMOL/L (ref 22–29)
CREAT SERPL-MCNC: 0.7 MG/DL (ref 0.5–1)
GFR AFRICAN AMERICAN: >60
GFR NON-AFRICAN AMERICAN: >60 ML/MIN/1.73
GLUCOSE BLD-MCNC: 149 MG/DL (ref 74–99)
POTASSIUM SERPL-SCNC: 3.6 MMOL/L (ref 3.5–5)
PRO-BNP: 4295 PG/ML (ref 0–450)
SODIUM BLD-SCNC: 137 MMOL/L (ref 132–146)

## 2021-04-05 PROCEDURE — 83880 ASSAY OF NATRIURETIC PEPTIDE: CPT

## 2021-04-05 PROCEDURE — 36415 COLL VENOUS BLD VENIPUNCTURE: CPT

## 2021-04-05 PROCEDURE — 80048 BASIC METABOLIC PNL TOTAL CA: CPT

## 2021-04-06 ENCOUNTER — HOSPITAL ENCOUNTER (INPATIENT)
Age: 86
LOS: 8 days | Discharge: HOME | DRG: 291 | End: 2021-04-14
Attending: EMERGENCY MEDICINE | Admitting: FAMILY MEDICINE
Payer: MEDICARE

## 2021-04-06 ENCOUNTER — APPOINTMENT (OUTPATIENT)
Dept: GENERAL RADIOLOGY | Age: 86
DRG: 291 | End: 2021-04-06
Payer: MEDICARE

## 2021-04-06 ENCOUNTER — TELEPHONE (OUTPATIENT)
Dept: CARDIOLOGY CLINIC | Age: 86
End: 2021-04-06

## 2021-04-06 ENCOUNTER — TELEPHONE (OUTPATIENT)
Dept: FAMILY MEDICINE CLINIC | Age: 86
End: 2021-04-06

## 2021-04-06 DIAGNOSIS — I50.9 CONGESTIVE HEART FAILURE, UNSPECIFIED HF CHRONICITY, UNSPECIFIED HEART FAILURE TYPE (HCC): ICD-10-CM

## 2021-04-06 DIAGNOSIS — J44.1 CHRONIC OBSTRUCTIVE PULMONARY DISEASE WITH ACUTE EXACERBATION (HCC): ICD-10-CM

## 2021-04-06 DIAGNOSIS — I50.9 CHRONIC CONGESTIVE HEART FAILURE, UNSPECIFIED HEART FAILURE TYPE (HCC): ICD-10-CM

## 2021-04-06 DIAGNOSIS — I50.32 CHRONIC HEART FAILURE WITH PRESERVED EJECTION FRACTION (HCC): Primary | ICD-10-CM

## 2021-04-06 DIAGNOSIS — J96.01 ACUTE RESPIRATORY FAILURE WITH HYPOXIA (HCC): Primary | ICD-10-CM

## 2021-04-06 DIAGNOSIS — S22.43XD MULTIPLE CLOSED FRACTURES OF RIBS OF BOTH SIDES WITH ROUTINE HEALING: ICD-10-CM

## 2021-04-06 PROBLEM — L89.150 PRESSURE INJURY OF SACRAL REGION, UNSTAGEABLE (HCC): Status: ACTIVE | Noted: 2021-04-06

## 2021-04-06 PROBLEM — I50.33 ACUTE ON CHRONIC DIASTOLIC HEART FAILURE (HCC): Status: ACTIVE | Noted: 2021-04-06

## 2021-04-06 LAB
ALBUMIN SERPL-MCNC: 2.3 G/DL (ref 3.5–5.2)
ALP BLD-CCNC: 112 U/L (ref 35–104)
ALT SERPL-CCNC: 9 U/L (ref 0–32)
ANION GAP SERPL CALCULATED.3IONS-SCNC: 7 MMOL/L (ref 7–16)
ANISOCYTOSIS: ABNORMAL
AST SERPL-CCNC: 22 U/L (ref 0–31)
BASOPHILS ABSOLUTE: 0.05 E9/L (ref 0–0.2)
BASOPHILS RELATIVE PERCENT: 0.4 % (ref 0–2)
BILIRUB SERPL-MCNC: 0.3 MG/DL (ref 0–1.2)
BUN BLDV-MCNC: 19 MG/DL (ref 8–23)
CALCIUM SERPL-MCNC: 8.9 MG/DL (ref 8.6–10.2)
CHLORIDE BLD-SCNC: 99 MMOL/L (ref 98–107)
CO2: 28 MMOL/L (ref 22–29)
CREAT SERPL-MCNC: 0.6 MG/DL (ref 0.5–1)
EOSINOPHILS ABSOLUTE: 0.31 E9/L (ref 0.05–0.5)
EOSINOPHILS RELATIVE PERCENT: 2.6 % (ref 0–6)
GFR AFRICAN AMERICAN: >60
GFR NON-AFRICAN AMERICAN: >60 ML/MIN/1.73
GLUCOSE BLD-MCNC: 100 MG/DL (ref 74–99)
HCT VFR BLD CALC: 34.8 % (ref 34–48)
HEMOGLOBIN: 10.3 G/DL (ref 11.5–15.5)
HYPOCHROMIA: ABNORMAL
IMMATURE GRANULOCYTES #: 0.2 E9/L
IMMATURE GRANULOCYTES %: 1.7 % (ref 0–5)
INR BLD: 3
LYMPHOCYTES ABSOLUTE: 0.76 E9/L (ref 1.5–4)
LYMPHOCYTES RELATIVE PERCENT: 6.3 % (ref 20–42)
MCH RBC QN AUTO: 30.5 PG (ref 26–35)
MCHC RBC AUTO-ENTMCNC: 29.6 % (ref 32–34.5)
MCV RBC AUTO: 103 FL (ref 80–99.9)
MONOCYTES ABSOLUTE: 1.59 E9/L (ref 0.1–0.95)
MONOCYTES RELATIVE PERCENT: 13.2 % (ref 2–12)
NEUTROPHILS ABSOLUTE: 9.17 E9/L (ref 1.8–7.3)
NEUTROPHILS RELATIVE PERCENT: 75.8 % (ref 43–80)
OVALOCYTES: ABNORMAL
PDW BLD-RTO: 14.5 FL (ref 11.5–15)
PLATELET # BLD: 438 E9/L (ref 130–450)
PMV BLD AUTO: 9.9 FL (ref 7–12)
POIKILOCYTES: ABNORMAL
POLYCHROMASIA: ABNORMAL
POTASSIUM REFLEX MAGNESIUM: 4 MMOL/L (ref 3.5–5)
PRO-BNP: 4343 PG/ML (ref 0–450)
PROTHROMBIN TIME: 33.4 SEC (ref 9.3–12.4)
RBC # BLD: 3.38 E12/L (ref 3.5–5.5)
SCHISTOCYTES: ABNORMAL
SODIUM BLD-SCNC: 134 MMOL/L (ref 132–146)
TOTAL PROTEIN: 5.8 G/DL (ref 6.4–8.3)
TROPONIN: <0.01 NG/ML (ref 0–0.03)
WBC # BLD: 12.1 E9/L (ref 4.5–11.5)

## 2021-04-06 PROCEDURE — 94660 CPAP INITIATION&MGMT: CPT

## 2021-04-06 PROCEDURE — 71045 X-RAY EXAM CHEST 1 VIEW: CPT

## 2021-04-06 PROCEDURE — 94664 DEMO&/EVAL PT USE INHALER: CPT

## 2021-04-06 PROCEDURE — 2580000003 HC RX 258: Performed by: FAMILY MEDICINE

## 2021-04-06 PROCEDURE — 6370000000 HC RX 637 (ALT 250 FOR IP): Performed by: FAMILY MEDICINE

## 2021-04-06 PROCEDURE — 99284 EMERGENCY DEPT VISIT MOD MDM: CPT

## 2021-04-06 PROCEDURE — 85025 COMPLETE CBC W/AUTO DIFF WBC: CPT

## 2021-04-06 PROCEDURE — 83880 ASSAY OF NATRIURETIC PEPTIDE: CPT

## 2021-04-06 PROCEDURE — 2060000000 HC ICU INTERMEDIATE R&B

## 2021-04-06 PROCEDURE — 85610 PROTHROMBIN TIME: CPT

## 2021-04-06 PROCEDURE — 94640 AIRWAY INHALATION TREATMENT: CPT

## 2021-04-06 PROCEDURE — 6360000002 HC RX W HCPCS: Performed by: FAMILY MEDICINE

## 2021-04-06 PROCEDURE — 84484 ASSAY OF TROPONIN QUANT: CPT

## 2021-04-06 PROCEDURE — 80053 COMPREHEN METABOLIC PANEL: CPT

## 2021-04-06 RX ORDER — LEVALBUTEROL INHALATION SOLUTION 0.31 MG/3ML
0.31 SOLUTION RESPIRATORY (INHALATION) EVERY 6 HOURS PRN
Status: DISCONTINUED | OUTPATIENT
Start: 2021-04-06 | End: 2021-04-07

## 2021-04-06 RX ORDER — FLECAINIDE ACETATE 50 MG/1
75 TABLET ORAL 2 TIMES DAILY
Status: DISCONTINUED | OUTPATIENT
Start: 2021-04-06 | End: 2021-04-14 | Stop reason: HOSPADM

## 2021-04-06 RX ORDER — CELECOXIB 100 MG/1
200 CAPSULE ORAL DAILY
Status: DISCONTINUED | OUTPATIENT
Start: 2021-04-07 | End: 2021-04-14 | Stop reason: HOSPADM

## 2021-04-06 RX ORDER — ACETAMINOPHEN 325 MG/1
650 TABLET ORAL EVERY 6 HOURS PRN
Status: DISCONTINUED | OUTPATIENT
Start: 2021-04-06 | End: 2021-04-14 | Stop reason: HOSPADM

## 2021-04-06 RX ORDER — LEVOTHYROXINE SODIUM 0.1 MG/1
100 TABLET ORAL DAILY
Status: DISCONTINUED | OUTPATIENT
Start: 2021-04-07 | End: 2021-04-14 | Stop reason: HOSPADM

## 2021-04-06 RX ORDER — ACETAMINOPHEN 650 MG/1
650 SUPPOSITORY RECTAL EVERY 6 HOURS PRN
Status: DISCONTINUED | OUTPATIENT
Start: 2021-04-06 | End: 2021-04-14 | Stop reason: HOSPADM

## 2021-04-06 RX ORDER — ZINC SULFATE 50(220)MG
50 CAPSULE ORAL DAILY
Status: DISCONTINUED | OUTPATIENT
Start: 2021-04-07 | End: 2021-04-14 | Stop reason: HOSPADM

## 2021-04-06 RX ORDER — DULOXETIN HYDROCHLORIDE 20 MG/1
40 CAPSULE, DELAYED RELEASE ORAL DAILY
Status: DISCONTINUED | OUTPATIENT
Start: 2021-04-07 | End: 2021-04-14 | Stop reason: HOSPADM

## 2021-04-06 RX ORDER — LANOLIN ALCOHOL/MO/W.PET/CERES
100 CREAM (GRAM) TOPICAL DAILY
Status: DISCONTINUED | OUTPATIENT
Start: 2021-04-07 | End: 2021-04-08

## 2021-04-06 RX ORDER — DOCUSATE SODIUM 100 MG/1
100 CAPSULE, LIQUID FILLED ORAL DAILY
Status: DISCONTINUED | OUTPATIENT
Start: 2021-04-07 | End: 2021-04-08

## 2021-04-06 RX ORDER — DOXYCYCLINE HYCLATE 100 MG/1
100 CAPSULE ORAL 2 TIMES DAILY WITH MEALS
Status: DISCONTINUED | OUTPATIENT
Start: 2021-04-07 | End: 2021-04-12

## 2021-04-06 RX ORDER — PROMETHAZINE HYDROCHLORIDE 25 MG/1
12.5 TABLET ORAL EVERY 6 HOURS PRN
Status: DISCONTINUED | OUTPATIENT
Start: 2021-04-06 | End: 2021-04-14 | Stop reason: HOSPADM

## 2021-04-06 RX ORDER — FUROSEMIDE 10 MG/ML
20 INJECTION INTRAMUSCULAR; INTRAVENOUS DAILY
Status: DISCONTINUED | OUTPATIENT
Start: 2021-04-07 | End: 2021-04-07

## 2021-04-06 RX ORDER — FOLIC ACID 1 MG/1
1 TABLET ORAL DAILY
Status: DISCONTINUED | OUTPATIENT
Start: 2021-04-07 | End: 2021-04-08

## 2021-04-06 RX ORDER — PANTOPRAZOLE SODIUM 40 MG/1
40 TABLET, DELAYED RELEASE ORAL
Status: DISCONTINUED | OUTPATIENT
Start: 2021-04-07 | End: 2021-04-14 | Stop reason: HOSPADM

## 2021-04-06 RX ORDER — BUDESONIDE 0.5 MG/2ML
0.5 INHALANT ORAL 2 TIMES DAILY
Status: DISCONTINUED | OUTPATIENT
Start: 2021-04-06 | End: 2021-04-14 | Stop reason: HOSPADM

## 2021-04-06 RX ORDER — SODIUM CHLORIDE 0.9 % (FLUSH) 0.9 %
10 SYRINGE (ML) INJECTION PRN
Status: DISCONTINUED | OUTPATIENT
Start: 2021-04-06 | End: 2021-04-14 | Stop reason: HOSPADM

## 2021-04-06 RX ORDER — ONDANSETRON 2 MG/ML
4 INJECTION INTRAMUSCULAR; INTRAVENOUS EVERY 6 HOURS PRN
Status: DISCONTINUED | OUTPATIENT
Start: 2021-04-06 | End: 2021-04-14 | Stop reason: HOSPADM

## 2021-04-06 RX ORDER — SODIUM CHLORIDE 0.9 % (FLUSH) 0.9 %
10 SYRINGE (ML) INJECTION EVERY 12 HOURS SCHEDULED
Status: DISCONTINUED | OUTPATIENT
Start: 2021-04-06 | End: 2021-04-14 | Stop reason: HOSPADM

## 2021-04-06 RX ORDER — VALSARTAN 320 MG/1
320 TABLET ORAL DAILY
Status: DISCONTINUED | OUTPATIENT
Start: 2021-04-07 | End: 2021-04-14 | Stop reason: HOSPADM

## 2021-04-06 RX ORDER — FUROSEMIDE 10 MG/ML
20 INJECTION INTRAMUSCULAR; INTRAVENOUS ONCE
Status: COMPLETED | OUTPATIENT
Start: 2021-04-06 | End: 2021-04-06

## 2021-04-06 RX ORDER — HYDROCODONE BITARTRATE AND ACETAMINOPHEN 5; 325 MG/1; MG/1
1 TABLET ORAL EVERY 4 HOURS PRN
Status: DISCONTINUED | OUTPATIENT
Start: 2021-04-06 | End: 2021-04-08

## 2021-04-06 RX ORDER — METOPROLOL TARTRATE 50 MG/1
50 TABLET, FILM COATED ORAL 2 TIMES DAILY
Status: DISCONTINUED | OUTPATIENT
Start: 2021-04-06 | End: 2021-04-14 | Stop reason: HOSPADM

## 2021-04-06 RX ORDER — POLYVINYL ALCOHOL 14 MG/ML
1 SOLUTION/ DROPS OPHTHALMIC 2 TIMES DAILY PRN
Status: DISCONTINUED | OUTPATIENT
Start: 2021-04-06 | End: 2021-04-14 | Stop reason: HOSPADM

## 2021-04-06 RX ORDER — VITAMIN B COMPLEX
2000 TABLET ORAL DAILY
Status: DISCONTINUED | OUTPATIENT
Start: 2021-04-07 | End: 2021-04-14 | Stop reason: HOSPADM

## 2021-04-06 RX ORDER — HYDRALAZINE HYDROCHLORIDE 25 MG/1
25 TABLET, FILM COATED ORAL 2 TIMES DAILY
Status: DISCONTINUED | OUTPATIENT
Start: 2021-04-06 | End: 2021-04-07

## 2021-04-06 RX ORDER — FERROUS SULFATE 325(65) MG
325 TABLET ORAL EVERY OTHER DAY
Status: DISCONTINUED | OUTPATIENT
Start: 2021-04-07 | End: 2021-04-14 | Stop reason: HOSPADM

## 2021-04-06 RX ORDER — LANOLIN ALCOHOL/MO/W.PET/CERES
1000 CREAM (GRAM) TOPICAL DAILY
Status: DISCONTINUED | OUTPATIENT
Start: 2021-04-07 | End: 2021-04-08

## 2021-04-06 RX ORDER — BUDESONIDE AND FORMOTEROL FUMARATE DIHYDRATE 160; 4.5 UG/1; UG/1
2 AEROSOL RESPIRATORY (INHALATION) 2 TIMES DAILY
Status: DISCONTINUED | OUTPATIENT
Start: 2021-04-06 | End: 2021-04-06 | Stop reason: CLARIF

## 2021-04-06 RX ORDER — LIDOCAINE 4 G/G
1 PATCH TOPICAL DAILY
Status: DISCONTINUED | OUTPATIENT
Start: 2021-04-07 | End: 2021-04-14 | Stop reason: HOSPADM

## 2021-04-06 RX ORDER — POLYETHYLENE GLYCOL 3350 17 G/17G
17 POWDER, FOR SOLUTION ORAL DAILY PRN
Status: DISCONTINUED | OUTPATIENT
Start: 2021-04-06 | End: 2021-04-14 | Stop reason: HOSPADM

## 2021-04-06 RX ORDER — SODIUM CHLORIDE 9 MG/ML
25 INJECTION, SOLUTION INTRAVENOUS PRN
Status: DISCONTINUED | OUTPATIENT
Start: 2021-04-06 | End: 2021-04-14 | Stop reason: HOSPADM

## 2021-04-06 RX ORDER — ARFORMOTEROL TARTRATE 15 UG/2ML
15 SOLUTION RESPIRATORY (INHALATION) 2 TIMES DAILY
Status: DISCONTINUED | OUTPATIENT
Start: 2021-04-06 | End: 2021-04-14 | Stop reason: HOSPADM

## 2021-04-06 RX ORDER — FUROSEMIDE 20 MG/1
20 TABLET ORAL SEE ADMIN INSTRUCTIONS
Qty: 90 TABLET | Refills: 1 | Status: ON HOLD | OUTPATIENT
Start: 2021-04-06 | End: 2021-04-14 | Stop reason: HOSPADM

## 2021-04-06 RX ADMIN — BUDESONIDE 500 MCG: 0.5 SUSPENSION RESPIRATORY (INHALATION) at 21:58

## 2021-04-06 RX ADMIN — RIVAROXABAN 15 MG: 15 TABLET, FILM COATED ORAL at 21:20

## 2021-04-06 RX ADMIN — METOPROLOL TARTRATE 50 MG: 50 TABLET, FILM COATED ORAL at 21:19

## 2021-04-06 RX ADMIN — FUROSEMIDE 20 MG: 10 INJECTION, SOLUTION INTRAMUSCULAR; INTRAVENOUS at 21:19

## 2021-04-06 RX ADMIN — HYDROCODONE BITARTRATE AND ACETAMINOPHEN 1 TABLET: 5; 325 TABLET ORAL at 22:46

## 2021-04-06 RX ADMIN — SODIUM CHLORIDE, PRESERVATIVE FREE 10 ML: 5 INJECTION INTRAVENOUS at 21:21

## 2021-04-06 RX ADMIN — FLECAINIDE ACETATE 75 MG: 50 TABLET ORAL at 21:20

## 2021-04-06 RX ADMIN — ARFORMOTEROL TARTRATE 15 MCG: 15 SOLUTION RESPIRATORY (INHALATION) at 21:58

## 2021-04-06 RX ADMIN — HYDRALAZINE HYDROCHLORIDE 25 MG: 25 TABLET, FILM COATED ORAL at 21:19

## 2021-04-06 ASSESSMENT — PAIN SCALES - GENERAL: PAINLEVEL_OUTOF10: 0

## 2021-04-06 ASSESSMENT — ENCOUNTER SYMPTOMS
CHEST TIGHTNESS: 0
RHINORRHEA: 0
BACK PAIN: 0
COUGH: 0
VOMITING: 0
DIARRHEA: 1
NAUSEA: 0
SHORTNESS OF BREATH: 1
ABDOMINAL PAIN: 0
SORE THROAT: 0
CONSTIPATION: 0

## 2021-04-06 ASSESSMENT — PAIN DESCRIPTION - FREQUENCY: FREQUENCY: CONTINUOUS

## 2021-04-06 NOTE — H&P
[address] [address]  Resident History and Physical      CHIEF COMPLAINT:    Chief Complaint   Patient presents with    Shortness of Breath        History of Present Illness:   Jessica Adrian  is a 80 y.o. female patient of Clarissa Yao MD  with a pertinent PMHx of A-fib, HFpEF, JANETTE, and HTN who presented to the ER from home with daughter with chief complaint of increased shortness of breath and swelling. Patient was discharged on 3/9/2021 after being admitted for pneumonia. She was discharged on 2L O2 and had been doing well since. Starting on Friday, she started to have increased shortness of breath and swelling in her legs. Her neice who is an NP noticed edema on her face and legs and crackles in her lungs. They decided to give an extra 20 mg of Lasix on Friday and an extra 10 mg of Lasix on Saturday which provided her with some relief over the weekend. Over the last day, she had increasing shortness of breath, edematous feet, and decrease in urination. She instructed to get outpatient labs yesterday which showed elevated BNP greater than 4000 from 1338. She required increasing levels of oxygen and was desaturating. Patient's daughter states that she was 77% on 2 L and then 87% on 5 L today. Daughter also states that she has had right-sided rib pain that is reoccurring today but has improved with the lidocaine patch. She also endorses a sacral wound she was scheduled to follow-up with Dr. Tiffany Bauman for debridement tomorrow. She was started on doxy on 3/31 for this wound and is on day 7. She denies any fever, chills, chest pain, palpitations, abdominal pain, nausea, vomiting, constipation. She does endorse 2 episodes of diarrhea on Saturday. In the ED, patient was saturating well on 5 L of oxygen and vitals were within normal limits. X-ray showed grossly stable diffuse interstitial and alveolar opacities.   Lab results were remarkable for severely elevated proBNP, leukocytosis, and hypoalbuminemia. Patient was mildly fluid overloaded but was resting comfortably on 5 L.    ROS:   Review of Systems   Constitutional: Negative for chills, fatigue and fever. HENT: Negative for congestion, rhinorrhea and sore throat. Respiratory: Positive for shortness of breath. Negative for cough and chest tightness. Cardiovascular: Positive for leg swelling. Negative for chest pain and palpitations. Gastrointestinal: Positive for diarrhea. Negative for abdominal pain, constipation, nausea and vomiting. Genitourinary: Negative for dysuria and frequency. Neurological: Negative for dizziness and light-headedness. All other systems reviewed and are negative.         Past Medical History:   Diagnosis Date    Anticoagulated     takes Xarelto    Atrial fibrillation ([facility]     follows with Dr. 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    Wears dentures          Past Surgical History:   Procedure Laterality Date    CARDIAC CATHETERIZATION      CARPAL TUNNEL RELEASE Bilateral      SECTION      COLONOSCOPY  2011    Dr Carlos Lemons; diverticular disease; repeat in 2016    COLONOSCOPY N/A 2020    COLONOSCOPY WITH BIOPSY performed by Criss Bloch, MD at [address] [place] [address]  early     TOTAL KNEE ARTHROPLASTY  2007    bilateral    UPPER GASTROINTESTINAL ENDOSCOPY N/A 2020    EGD BIOPSY performed by Criss Bloch, MD at [facility]       Medications Prior to Admission:    Prior to Admission medications    Medication Sig Start Date End Date Taking? Authorizing Provider   hydrALAZINE (APRESOLINE) 25 MG tablet Take 1 tablet by mouth 2 times daily 21  Yes Daniella Flow, APRN - CNP   metoprolol tartrate (LOPRESSOR) 25 MG tablet Take 50 mg by mouth 2 times daily  3/30/21  Yes Historical Provider, MD   lidocaine (LIDODERM) 5 % Place 2 patches onto the skin daily 12 hours on, 12 hours off. 3/31/21 4/30/21 Yes Prachi Law MD   doxycycline hyclate (VIBRA-TABS) 100 MG tablet Take 1 tablet by mouth 2 times daily (with meals) for 10 days 3/31/21 4/10/21 Yes Prachi Law MD   HYDROcodone-acetaminophen (NORCO) 5-325 MG per tablet Take 1 tablet by mouth every 4 hours as needed for Pain for up to 7 days. Intended supply: 7 days.  Take lowest dose possible to manage pain 3/31/21 4/7/21 Yes Prachi Law MD   flecainide [facility]) 150 MG tablet Take 0.5 tablets by mouth 2 times daily 3/9/21  Yes Brandon Damon MD   valsartan (DIOVAN) 320 MG tablet Take 1 tablet by mouth daily 3/10/21  Yes Brandon Damon MD   furosemide (LASIX) 20 MG tablet Take 1 tablet by mouth daily as needed (SOB, Leg swelling)  Patient taking differently: Take 10 mg by mouth daily as needed (SOB, Leg swelling)  3/9/21  Yes Brandon Damon MD   XARELTO 15 MG TABS tablet TAKE 1 TABLET DAILY WITH   BREAKFAST 3/8/21  Yes Kathie Lopez MD   predniSONE (DELTASONE) 10 MG tablet Take 4 tabs daily x 3 days then 3 tabs daily x 3 days then 2 tabs daily x 3 days then 1 tab daily x 3 days then stop 3/5/21  Yes FRAN Zurita   levalbuterol [facility]) 0.31 MG/3ML nebulization Take 3 mLs by nebulization every 6 hours as needed for Wheezing 3/5/21  Yes FRAN Zurita   loteprednol (LOTEMAX) 0.5 % ophthalmic suspension  1/20/21  Yes Historical Provider, MD   DULoxetine HCl 40 MG CPEP Take 40 mg by mouth daily 2/17/21  Yes Nory Bosch DO   folic acid (FOLVITE) 1 MG tablet Take 1 tablet by mouth daily 2/17/21  Yes Nory Bosch DO   zinc sulfate (ZINCATE) 50 MG CAPS capsule Take 1 capsule by mouth daily 2/17/21  Yes Carlos Rader DO   ascorbic acid (VITAMIN C) 500 MG tablet Take 1 tablet by mouth daily 2/17/21  Yes Nory Bosch DO   thiamine 100 MG tablet Take 1 tablet by mouth daily 2/17/21  Yes Carlos Rader DO   Vitamin D (CHOLECALCIFEROL) 50 MCG (2000 UT) TABS tablet Take 1 tablet by mouth daily 2/17/21  Yes Carlos Rader DO   celecoxib (CELEBREX) 200 MG capsule TAKE 1 CAPSULE DAILY 9/17/20  Yes Sherley Jett MD   ferrous sulfate (IRON 325) 325 (65 Fe) MG tablet Take 1 tablet by mouth every other day 9/17/20  Yes Sherley Jett MD   levothyroxine (LEVOTHROID) 100 MCG tablet Take 1 tablet by mouth Daily 9/17/20  Yes Sherley Jett MD   docusate sodium (COLACE, DULCOLAX) 100 MG CAPS Take 100 mg by mouth daily 7/17/20  Yes Leoncio Bowden MD   vitamin B-12 (CYANOCOBALAMIN) 1000 MCG tablet Take 1,000 mcg by mouth daily   Yes Historical Provider, MD   omeprazole (PRILOSEC) 40 MG delayed release capsule Take 1 capsule by mouth 2 times daily 4/16/20  Yes Sherley Jett MD   famotidine (PEPCID) 40 MG tablet Take 1 tablet by mouth nightly 3/31/20  Yes Sherley Jett MD   polyethyl glycol-propyl glycol 0.4-0.3 % (SYSTANE) 0.4-0.3 % ophthalmic solution 1 drop 2 times daily as needed for Dry Eyes    Yes Historical Provider, MD brother, sister, and sister; Other in her daughter, father, sister, and son; Rheum Arthritis in her brother and sister; Thyroid Disease in her sister. PHYSICAL EXAM:    Vitals:  /73   Pulse 67   Temp 97.8 °F (36.6 °C)   Resp 18   Ht 5' (1.524 m)   Wt 130 lb (59 kg)   SpO2 96%   BMI 25.39 kg/m²     Physical Exam  Constitutional:       General: She is not in acute distress. Appearance: Normal appearance. HENT:      Head: Normocephalic and atraumatic. Mouth/Throat:      Mouth: Mucous membranes are moist.      Pharynx: Oropharynx is clear. Eyes:      Extraocular Movements: Extraocular movements intact. Conjunctiva/sclera: Conjunctivae normal.   Neck:      Musculoskeletal: Normal range of motion. Cardiovascular:      Rate and Rhythm: Normal rate and regular rhythm. Pulses: Normal pulses. Heart sounds: Normal heart sounds. No murmur. Pulmonary:      Effort: Pulmonary effort is normal. No respiratory distress. Breath sounds: Rhonchi present. No wheezing. Abdominal:      General: Bowel sounds are normal. There is no distension. Palpations: Abdomen is soft. Tenderness: There is no abdominal tenderness. Musculoskeletal:         General: Tenderness (right chest wall) present. No swelling. Back:       Right lower leg: Edema present. Left lower leg: Edema present. Skin:     General: Skin is warm and dry. Neurological:      General: No focal deficit present. Mental Status: She is alert and oriented to person, place, and time. Cranial Nerves: No cranial nerve deficit.    Psychiatric:         Attention and Perception: Attention normal.         Mood and Affect: Mood normal.           LABS:  Recent Results (from the past 24 hour(s))   Comprehensive Metabolic Panel w/ Reflex to MG    Collection Time: 04/06/21  3:29 PM   Result Value Ref Range    Sodium 134 132 - 146 mmol/L    Potassium reflex Magnesium 4.0 3.5 - 5.0 mmol/L    Chloride 99 98 - 107 mmol/L    CO2 28 22 - 29 mmol/L    Anion Gap 7 7 - 16 mmol/L    Glucose 100 (H) 74 - 99 mg/dL    BUN 19 8 - 23 mg/dL    CREATININE 0.6 0.5 - 1.0 mg/dL    GFR Non-African American >60 >=60 mL/min/1.73    GFR African American >60     Calcium 8.9 8.6 - 10.2 mg/dL    Total Protein 5.8 (L) 6.4 - 8.3 g/dL    Albumin 2.3 (L) 3.5 - 5.2 g/dL    Total Bilirubin 0.3 0.0 - 1.2 mg/dL    Alkaline Phosphatase 112 (H) 35 - 104 U/L    ALT 9 0 - 32 U/L    AST 22 0 - 31 U/L   CBC Auto Differential    Collection Time: 04/06/21  3:29 PM   Result Value Ref Range    WBC 12.1 (H) 4.5 - 11.5 E9/L    RBC 3.38 (L) 3.50 - 5.50 E12/L    Hemoglobin 10.3 (L) 11.5 - 15.5 g/dL    Hematocrit 34.8 34.0 - 48.0 %    .0 (H) 80.0 - 99.9 fL    MCH 30.5 26.0 - 35.0 pg    MCHC 29.6 (L) 32.0 - 34.5 %    RDW 14.5 11.5 - 15.0 fL    Platelets 585 940 - 330 E9/L    MPV 9.9 7.0 - 12.0 fL   Troponin    Collection Time: 04/06/21  3:29 PM   Result Value Ref Range    Troponin <0.01 0.00 - 0.03 ng/mL   Brain Natriuretic Peptide    Collection Time: 04/06/21  3:29 PM   Result Value Ref Range    Pro-BNP 4,343 (H) 0 - 450 pg/mL   Protime-INR    Collection Time: 04/06/21  3:29 PM   Result Value Ref Range    Protime 33.4 (H) 9.3 - 12.4 sec    INR 3.0        XR CHEST PORTABLE   Final Result   Grossly stable diffuse interstitial and alveolar opacities which may be   infectious/inflammatory and/or related to fibrosis. CT of the chest from   02/24/2021 demonstrated evidence of both pneumonia and fibrosis.              ASSESSMENT/PLAN:      Active Hospital Problems    Diagnosis Date Noted    Pressure injury of sacral region, unstageable [facility]) [L89.150] 04/06/2021    Acute on chronic diastolic heart failure (HCC) [I50.33] 04/06/2021     1) Acute on chronic respiratory failure likely due to HFpEF exacerbation  -Lasix 20 mg IV daily  -Wean oxygen as tolerated; on 5 L NC  -Continue home Xopenex nebs  -Continue home Symbicort  -Cardiology consult    2) Sacral decubitus ulcer  -Continue Doxycycline 100 mg BID until 4/10  -Consult General Surgery for ulcer debridement     3) Deconditioning  -Palliative care consult  -PT/OT consulted    4) Hx of A-fib  -Continue home Metoprolol 25 mg BID, Flecainide 75 mg BID  -Continue home Xarelto 15 mg daily     5) Hx of depression  -Continue home Cymbalta 40 mg daily    6) Hx of rib fractures  -Continue Norco 5 mg every 4 hours PRN for severe pain  -Continue home Celebrex 200 mg daily   -Continue Lidoderm patches    7) Hx of hypothyroidism  -Continue home Synthroid 100 mcg daily    8) Hx of moderate gastritis  Seen on EGD on 4/2020  -Prilosec changed to Protonix 40 mg PO BID    9) Hx of RA  -On Leucovorin and Methotrexate- takes these medications on Monday    10) Hx of Anemia  -Continue home Iron 325 mg every other day    11) Hx of HTN  -Continue home Diovan 320 mg daily   -Continue home Hydralazine 25 mg BID    DVT ppx: Xarelto  GI ppx: PPI  Code Status: Full Code        Case discussed with attending on call Dr. Nataly Boudreaux.     Rodrigo Gutierrez MD  Family Medicine Resident Physician PGY-2  4/6/2021

## 2021-04-06 NOTE — TELEPHONE ENCOUNTER
Received call from Jen Jackson at [facility]. Patient was not doing well today with crackles in her lungs, edema to bilateral lower extremities, no urine output, and O2 level of 90% on 2L. She tried to get the family to take the patient in but they had refused at the time. I placed a call to Neena Davis to see how the patient was doing and she stated not good. . Pt O2 was down to 81% on 5L and she felt she was filling up w more fluid. Advised that pt should go by squad to ED with this low O2.   Daughter agreed and was call as soon as we hung up

## 2021-04-06 NOTE — PROGRESS NOTES
Date: 4/6/2021    Time: 7:26 PM    Patient Placed On BIPAP/CPAP/ Non-Invasive Ventilation? Yes    If no must comment. Facial area red/color change? No           If YES are Blister/Lesion present? No   If yes must notify nursing staff  BIPAP/CPAP skin barrier?   Yes    Skin barrier type:mepilexlite       Comments:        Micaela Alfonso

## 2021-04-06 NOTE — ED PROVIDER NOTES
effort is normal.      Breath sounds: No wheezing, rhonchi or rales. Chest:      Chest wall: No tenderness. Abdominal:      General: There is no distension. Palpations: Abdomen is soft. Tenderness: There is no abdominal tenderness. There is no guarding or rebound. Hernia: No hernia is present. Musculoskeletal:      Right lower leg: Edema present. Left lower leg: Edema present. Skin:     General: Skin is warm and dry. Capillary Refill: Capillary refill takes less than 2 seconds. Neurological:      General: No focal deficit present. Mental Status: She is alert and oriented to person, place, and time. Cranial Nerves: No cranial nerve deficit. Psychiatric:         Mood and Affect: Mood normal.         Behavior: Behavior normal.          Procedures     MDM  Number of Diagnoses or Management Options  Acute respiratory failure with hypoxia (HCC)  Congestive heart failure, unspecified HF chronicity, unspecified heart failure type [facility])  Diagnosis management comments: Patient is an 19-year-old female with a past medical history of CHF who presented to the ED for decompensated heart failure hypoxia. She did have significant edema and elevated proBNP. Patient was hypoxic even on submental oxygen and ultimately was placed on BiPAP. Patient had no significant elevation in her troponin but did have an elevated proBNP with signs of fluid overload. The case was discussed with the family medicine team who went into the room to evaluate the patient and agreed with admission. I had a long discussion with the daughter as well as the patient were both agreeable this plan.        ED Course as of Apr 06 2121 Tue Apr 06, 2021 1821 Called into room, patient had pulse ox of 88% on 6 liters, discussed options, agreed on BiPAP    [BP]      ED Course User Index  [BP] Keven Ground, DO           ED Course as of Apr 06 2121 Tue Apr 06, 2021 1821 Called into room, patient had pulse ox of 88% on 6 liters, discussed options, agreed on BiPAP    [BP]      ED Course User Index  [BP] Mirlande Paul DO       --------------------------------------------- PAST HISTORY ---------------------------------------------  Past Medical History:  has a past medical history of Anticoagulated, Atrial fibrillation ([facility] [address] Bimalleolar fracture, [id number], Diastolic dysfunction, Difficulty walking, Diverticular disease, Elevated CA-125, Hiatal hernia, Hypertension, Hypothyroid, Meningocele spinal ([facility] [address] Neuropathy, JANETTE (obstructive sleep apnea), Osteoporosis, PAF (paroxysmal atrial fibrillation) ([facility] [address] Pain in both lower legs, Patellar fracture, Rheumatoid arthritis with rheumatoid factor ([facility] [address] Rheumatoid arthritis(714.0), Rib fractures, Seasonal allergies, Skin cancer of lip, and Wears dentures. Past Surgical History:  has a past surgical history that includes Gastric fundoplication (4617);  section; Carpal tunnel release (Bilateral); Colonoscopy (2011); Total knee arthroplasty (2007); Cardiac catheterization; Hysterectomy, total abdominal (early ); Upper gastrointestinal endoscopy (N/A, 2020); and Colonoscopy (N/A, 2020). Social History:  reports that she has never smoked. She has never used smokeless tobacco. She reports that she does not drink alcohol or use drugs. Family History: family history includes Heart Attack (age of onset: 58) in her mother; Hypertension in her brother, sister, and sister; Other in her daughter, father, sister, and son; Rheum Arthritis in her brother and sister; Thyroid Disease in her sister. The patients home medications have been reviewed.     Allergies: Amoxicillin    -------------------------------------------------- RESULTS -------------------------------------------------    LABS:  Results for orders placed or performed during the hospital encounter of 21   Comprehensive Metabolic Panel w/ Reflex to MG   Result Value Ref Range Sodium 134 132 - 146 mmol/L    Potassium reflex Magnesium 4.0 3.5 - 5.0 mmol/L    Chloride 99 98 - 107 mmol/L    CO2 28 22 - 29 mmol/L    Anion Gap 7 7 - 16 mmol/L    Glucose 100 (H) 74 - 99 mg/dL    BUN 19 8 - 23 mg/dL    CREATININE 0.6 0.5 - 1.0 mg/dL    GFR Non-African American >60 >=60 mL/min/1.73    GFR African American >60     Calcium 8.9 8.6 - 10.2 mg/dL    Total Protein 5.8 (L) 6.4 - 8.3 g/dL    Albumin 2.3 (L) 3.5 - 5.2 g/dL    Total Bilirubin 0.3 0.0 - 1.2 mg/dL    Alkaline Phosphatase 112 (H) 35 - 104 U/L    ALT 9 0 - 32 U/L    AST 22 0 - 31 U/L   CBC Auto Differential   Result Value Ref Range    WBC 12.1 (H) 4.5 - 11.5 E9/L    RBC 3.38 (L) 3.50 - 5.50 E12/L    Hemoglobin 10.3 (L) 11.5 - 15.5 g/dL    Hematocrit 34.8 34.0 - 48.0 %    .0 (H) 80.0 - 99.9 fL    MCH 30.5 26.0 - 35.0 pg    MCHC 29.6 (L) 32.0 - 34.5 %    RDW 14.5 11.5 - 15.0 fL    Platelets 306 260 - 869 E9/L    MPV 9.9 7.0 - 12.0 fL    Neutrophils % 75.8 43.0 - 80.0 %    Immature Granulocytes % 1.7 0.0 - 5.0 %    Lymphocytes % 6.3 (L) 20.0 - 42.0 %    Monocytes % 13.2 (H) 2.0 - 12.0 %    Eosinophils % 2.6 0.0 - 6.0 %    Basophils % 0.4 0.0 - 2.0 %    Neutrophils Absolute 9.17 (H) 1.80 - 7.30 E9/L    Immature Granulocytes # 0.20 E9/L    Lymphocytes Absolute 0.76 (L) 1.50 - 4.00 E9/L    Monocytes Absolute 1.59 (H) 0.10 - 0.95 E9/L    Eosinophils Absolute 0.31 0.05 - 0.50 E9/L    Basophils Absolute 0.05 0.00 - 0.20 E9/L    Anisocytosis 1+     Polychromasia 1+     Hypochromia 1+     Poikilocytes 1+     Schistocytes 1+     Ovalocytes 1+    Troponin   Result Value Ref Range    Troponin <0.01 0.00 - 0.03 ng/mL   Brain Natriuretic Peptide   Result Value Ref Range    Pro-BNP 4,343 (H) 0 - 450 pg/mL   Protime-INR   Result Value Ref Range    Protime 33.4 (H) 9.3 - 12.4 sec    INR 3.0        RADIOLOGY:  XR CHEST PORTABLE   Final Result   Grossly stable diffuse interstitial and alveolar opacities which may be   infectious/inflammatory and/or related history and physicial examination, re-evaluation prior to disposition, multiple bedside re-evaluations, IV medications, cardiac monitoring, continuous pulse oximetry and complex medical decision making and emergency management    This patient has remained hemodynamically stable during their ED course. Diagnosis:  1. Acute respiratory failure with hypoxia ([address]    2. Congestive heart failure, unspecified HF chronicity, unspecified heart failure type ([address]        Disposition:  Patient's disposition: Admit to telemetry  Patient's condition is stable.        Vijay Gonzalez DO  04/06/21 [id number]

## 2021-04-06 NOTE — TELEPHONE ENCOUNTER
Message  Received: Today  Message Contents   Lesli Lott, APRN - GLADYS Escobar, MARIANNE             Patient is a hard stick and they have been unable to get labs for several weeks   When we had virtual appointment, family was not giving her the prescribed potassium. Can you see if they have continued to hold this? Rise in pro-bnp - family given lasix PRN, how often have they been giving? K+ dosing :    Not taking  K+ (was told to hold K+ till labs were done)   Only took 25meq on Friday when had 20meq extra of lasix for worsening lung sounds crackles. Diuretic dosing:  Takes lasix 10mg daily since hospital discharge. · 20mg extra lasix Friday with K+ 25meq dose  · 10mg lasix extra Saturday Sunday and Monday had a good day no extra lasix. · [address] 3liters  (was 88% on 2.5 liters)   Sat is 90% on 3 liters. [facility]  ([facility] sat [facility]) had some crackles in lungs. 4/6/2021  [address] [place] [address] Dr saw her phone # [phone]      · Josiah Bent Mild SOB, but noted inc feet edema today. Did use xopenex nebulizer today (hadn't been using it , did use today)     145/70bp        71 hr    Temp 98.1f  · Has caretaker family has at the home   · Urine little concentrated today, dec urine output today. · Laying down, doesn't feel dizzy or lightheaded. · Rib pain .   Trying to use lidocane patch and spirometer   · 4 22 VV  with DR Leon is set and has apt with EP 4 13 2021

## 2021-04-06 NOTE — RESULT ENCOUNTER NOTE
Patient is a hard stick and they have been unable to get labs for several weeks  When we had virtual appointment, family was not giving her the prescribed potassium. Can you see if they have continued to hold this? Rise in pro-bnp - family given lasix PRN, how often have they been giving?

## 2021-04-07 ENCOUNTER — HOSPITAL ENCOUNTER (OUTPATIENT)
Dept: WOUND CARE | Age: 86
Discharge: HOME | End: 2021-04-07
Payer: MEDICARE

## 2021-04-07 LAB
ADENOVIRUS BY PCR: NOT DETECTED
ALBUMIN SERPL-MCNC: 2.5 G/DL (ref 3.5–5.2)
ALP BLD-CCNC: 123 U/L (ref 35–104)
ALT SERPL-CCNC: 9 U/L (ref 0–32)
ANION GAP SERPL CALCULATED.3IONS-SCNC: 9 MMOL/L (ref 7–16)
ANISOCYTOSIS: ABNORMAL
AST SERPL-CCNC: 19 U/L (ref 0–31)
BASOPHILS ABSOLUTE: 0.06 E9/L (ref 0–0.2)
BASOPHILS RELATIVE PERCENT: 0.4 % (ref 0–2)
BILIRUB SERPL-MCNC: 0.5 MG/DL (ref 0–1.2)
BORDETELLA PARAPERTUSSIS BY PCR: NOT DETECTED
BORDETELLA PERTUSSIS BY PCR: NOT DETECTED
BUN BLDV-MCNC: 19 MG/DL (ref 8–23)
C-REACTIVE PROTEIN: 20.3 MG/DL (ref 0–0.4)
CALCIUM SERPL-MCNC: 8.5 MG/DL (ref 8.6–10.2)
CHLAMYDOPHILIA PNEUMONIAE BY PCR: NOT DETECTED
CHLORIDE BLD-SCNC: 99 MMOL/L (ref 98–107)
CO2: 31 MMOL/L (ref 22–29)
CORONAVIRUS 229E BY PCR: NOT DETECTED
CORONAVIRUS HKU1 BY PCR: NOT DETECTED
CORONAVIRUS NL63 BY PCR: NOT DETECTED
CORONAVIRUS OC43 BY PCR: NOT DETECTED
CREAT SERPL-MCNC: 0.6 MG/DL (ref 0.5–1)
EKG ATRIAL RATE: 71 BPM
EKG P AXIS: 14 DEGREES
EKG P-R INTERVAL: 188 MS
EKG Q-T INTERVAL: 438 MS
EKG QRS DURATION: 88 MS
EKG QTC CALCULATION (BAZETT): 475 MS
EKG R AXIS: -16 DEGREES
EKG T AXIS: 0 DEGREES
EKG VENTRICULAR RATE: 71 BPM
EOSINOPHILS ABSOLUTE: 0.16 E9/L (ref 0.05–0.5)
EOSINOPHILS RELATIVE PERCENT: 1.1 % (ref 0–6)
GFR AFRICAN AMERICAN: >60
GFR NON-AFRICAN AMERICAN: >60 ML/MIN/1.73
GLUCOSE BLD-MCNC: 100 MG/DL (ref 74–99)
HCT VFR BLD CALC: 30.5 % (ref 34–48)
HEMOGLOBIN: 9.6 G/DL (ref 11.5–15.5)
HUMAN METAPNEUMOVIRUS BY PCR: NOT DETECTED
HUMAN RHINOVIRUS/ENTEROVIRUS BY PCR: NOT DETECTED
IMMATURE GRANULOCYTES #: 0.24 E9/L
IMMATURE GRANULOCYTES %: 1.6 % (ref 0–5)
INFLUENZA A BY PCR: NOT DETECTED
INFLUENZA B BY PCR: NOT DETECTED
LYMPHOCYTES ABSOLUTE: 0.72 E9/L (ref 1.5–4)
LYMPHOCYTES RELATIVE PERCENT: 4.9 % (ref 20–42)
MCH RBC QN AUTO: 31.3 PG (ref 26–35)
MCHC RBC AUTO-ENTMCNC: 31.5 % (ref 32–34.5)
MCV RBC AUTO: 99.3 FL (ref 80–99.9)
MONOCYTES ABSOLUTE: 1.54 E9/L (ref 0.1–0.95)
MONOCYTES RELATIVE PERCENT: 10.4 % (ref 2–12)
MYCOPLASMA PNEUMONIAE BY PCR: NOT DETECTED
NEUTROPHILS ABSOLUTE: 12.09 E9/L (ref 1.8–7.3)
NEUTROPHILS RELATIVE PERCENT: 81.6 % (ref 43–80)
PARAINFLUENZA VIRUS 1 BY PCR: NOT DETECTED
PARAINFLUENZA VIRUS 2 BY PCR: NOT DETECTED
PARAINFLUENZA VIRUS 3 BY PCR: NOT DETECTED
PARAINFLUENZA VIRUS 4 BY PCR: NOT DETECTED
PDW BLD-RTO: 14.4 FL (ref 11.5–15)
PLATELET # BLD: 432 E9/L (ref 130–450)
PMV BLD AUTO: 9.7 FL (ref 7–12)
POLYCHROMASIA: ABNORMAL
POTASSIUM REFLEX MAGNESIUM: 3.7 MMOL/L (ref 3.5–5)
PROCALCITONIN: 0.2 NG/ML (ref 0–0.08)
RBC # BLD: 3.07 E12/L (ref 3.5–5.5)
RESPIRATORY SYNCYTIAL VIRUS BY PCR: NOT DETECTED
SARS-COV-2, PCR: NOT DETECTED
SEDIMENTATION RATE, ERYTHROCYTE: 77 MM/HR (ref 0–20)
SODIUM BLD-SCNC: 139 MMOL/L (ref 132–146)
TOTAL PROTEIN: 5.7 G/DL (ref 6.4–8.3)
WBC # BLD: 14.8 E9/L (ref 4.5–11.5)

## 2021-04-07 PROCEDURE — 80053 COMPREHEN METABOLIC PANEL: CPT

## 2021-04-07 PROCEDURE — 94761 N-INVAS EAR/PLS OXIMETRY MLT: CPT

## 2021-04-07 PROCEDURE — 87449 NOS EACH ORGANISM AG IA: CPT

## 2021-04-07 PROCEDURE — 85025 COMPLETE CBC W/AUTO DIFF WBC: CPT

## 2021-04-07 PROCEDURE — 6360000002 HC RX W HCPCS: Performed by: FAMILY MEDICINE

## 2021-04-07 PROCEDURE — 93005 ELECTROCARDIOGRAM TRACING: CPT | Performed by: STUDENT IN AN ORGANIZED HEALTH CARE EDUCATION/TRAINING PROGRAM

## 2021-04-07 PROCEDURE — 94640 AIRWAY INHALATION TREATMENT: CPT

## 2021-04-07 PROCEDURE — 6360000002 HC RX W HCPCS: Performed by: STUDENT IN AN ORGANIZED HEALTH CARE EDUCATION/TRAINING PROGRAM

## 2021-04-07 PROCEDURE — 2060000000 HC ICU INTERMEDIATE R&B

## 2021-04-07 PROCEDURE — 99222 1ST HOSP IP/OBS MODERATE 55: CPT | Performed by: NURSE PRACTITIONER

## 2021-04-07 PROCEDURE — 99222 1ST HOSP IP/OBS MODERATE 55: CPT | Performed by: STUDENT IN AN ORGANIZED HEALTH CARE EDUCATION/TRAINING PROGRAM

## 2021-04-07 PROCEDURE — 99221 1ST HOSP IP/OBS SF/LOW 40: CPT | Performed by: SURGERY

## 2021-04-07 PROCEDURE — 86140 C-REACTIVE PROTEIN: CPT

## 2021-04-07 PROCEDURE — 99232 SBSQ HOSP IP/OBS MODERATE 35: CPT | Performed by: FAMILY MEDICINE

## 2021-04-07 PROCEDURE — 85651 RBC SED RATE NONAUTOMATED: CPT

## 2021-04-07 PROCEDURE — 93010 ELECTROCARDIOGRAM REPORT: CPT | Performed by: INTERNAL MEDICINE

## 2021-04-07 PROCEDURE — 6370000000 HC RX 637 (ALT 250 FOR IP): Performed by: STUDENT IN AN ORGANIZED HEALTH CARE EDUCATION/TRAINING PROGRAM

## 2021-04-07 PROCEDURE — 6370000000 HC RX 637 (ALT 250 FOR IP): Performed by: FAMILY MEDICINE

## 2021-04-07 PROCEDURE — 2580000003 HC RX 258: Performed by: FAMILY MEDICINE

## 2021-04-07 PROCEDURE — 0202U NFCT DS 22 TRGT SARS-COV-2: CPT

## 2021-04-07 PROCEDURE — 6360000002 HC RX W HCPCS: Performed by: NURSE PRACTITIONER

## 2021-04-07 PROCEDURE — 94660 CPAP INITIATION&MGMT: CPT

## 2021-04-07 PROCEDURE — 36415 COLL VENOUS BLD VENIPUNCTURE: CPT

## 2021-04-07 PROCEDURE — 84145 PROCALCITONIN (PCT): CPT

## 2021-04-07 RX ORDER — METHYLPREDNISOLONE SODIUM SUCCINATE 40 MG/ML
40 INJECTION, POWDER, LYOPHILIZED, FOR SOLUTION INTRAMUSCULAR; INTRAVENOUS DAILY
Status: DISCONTINUED | OUTPATIENT
Start: 2021-04-07 | End: 2021-04-07

## 2021-04-07 RX ORDER — LEVALBUTEROL INHALATION SOLUTION 0.31 MG/3ML
0.31 SOLUTION RESPIRATORY (INHALATION) EVERY 6 HOURS PRN
Status: DISCONTINUED | OUTPATIENT
Start: 2021-04-07 | End: 2021-04-14 | Stop reason: HOSPADM

## 2021-04-07 RX ORDER — FUROSEMIDE 10 MG/ML
20 INJECTION INTRAMUSCULAR; INTRAVENOUS 3 TIMES DAILY
Status: DISCONTINUED | OUTPATIENT
Start: 2021-04-07 | End: 2021-04-08

## 2021-04-07 RX ORDER — METHYLPREDNISOLONE SODIUM SUCCINATE 40 MG/ML
40 INJECTION, POWDER, LYOPHILIZED, FOR SOLUTION INTRAMUSCULAR; INTRAVENOUS EVERY 12 HOURS
Status: DISCONTINUED | OUTPATIENT
Start: 2021-04-07 | End: 2021-04-12

## 2021-04-07 RX ORDER — AMLODIPINE BESYLATE 5 MG/1
5 TABLET ORAL DAILY
Status: DISCONTINUED | OUTPATIENT
Start: 2021-04-07 | End: 2021-04-14 | Stop reason: HOSPADM

## 2021-04-07 RX ADMIN — METOPROLOL TARTRATE 50 MG: 50 TABLET, FILM COATED ORAL at 07:47

## 2021-04-07 RX ADMIN — Medication 2000 UNITS: at 07:42

## 2021-04-07 RX ADMIN — RIVAROXABAN 15 MG: 15 TABLET, FILM COATED ORAL at 18:32

## 2021-04-07 RX ADMIN — FERROUS SULFATE TAB 325 MG (65 MG ELEMENTAL FE) 325 MG: 325 (65 FE) TAB at 07:42

## 2021-04-07 RX ADMIN — FOLIC ACID 1 MG: 1 TABLET ORAL at 07:46

## 2021-04-07 RX ADMIN — SODIUM CHLORIDE, PRESERVATIVE FREE 10 ML: 5 INJECTION INTRAVENOUS at 20:00

## 2021-04-07 RX ADMIN — POTASSIUM BICARBONATE 20 MEQ: 782 TABLET, EFFERVESCENT ORAL at 07:46

## 2021-04-07 RX ADMIN — LEVALBUTEROL HYDROCHLORIDE 0.31 MG: 0.31 SOLUTION RESPIRATORY (INHALATION) at 17:01

## 2021-04-07 RX ADMIN — DULOXETINE HYDROCHLORIDE 40 MG: 20 CAPSULE, DELAYED RELEASE ORAL at 07:46

## 2021-04-07 RX ADMIN — BUDESONIDE 500 MCG: 0.5 SUSPENSION RESPIRATORY (INHALATION) at 20:14

## 2021-04-07 RX ADMIN — LEVOTHYROXINE SODIUM 100 MCG: 100 TABLET ORAL at 05:05

## 2021-04-07 RX ADMIN — HYDROCODONE BITARTRATE AND ACETAMINOPHEN 1 TABLET: 5; 325 TABLET ORAL at 09:03

## 2021-04-07 RX ADMIN — PANTOPRAZOLE SODIUM 40 MG: 40 TABLET, DELAYED RELEASE ORAL at 05:05

## 2021-04-07 RX ADMIN — HYDROCODONE BITARTRATE AND ACETAMINOPHEN 1 TABLET: 5; 325 TABLET ORAL at 18:31

## 2021-04-07 RX ADMIN — METHYLPREDNISOLONE SODIUM SUCCINATE 40 MG: 40 INJECTION, POWDER, LYOPHILIZED, FOR SOLUTION INTRAMUSCULAR; INTRAVENOUS at 09:03

## 2021-04-07 RX ADMIN — DOXYCYCLINE HYCLATE 100 MG: 100 CAPSULE ORAL at 07:46

## 2021-04-07 RX ADMIN — FLECAINIDE ACETATE 75 MG: 50 TABLET ORAL at 07:46

## 2021-04-07 RX ADMIN — ACETAMINOPHEN 650 MG: 325 TABLET ORAL at 11:26

## 2021-04-07 RX ADMIN — FUROSEMIDE 20 MG: 10 INJECTION, SOLUTION INTRAMUSCULAR; INTRAVENOUS at 13:55

## 2021-04-07 RX ADMIN — DOCUSATE SODIUM 100 MG: 100 CAPSULE, LIQUID FILLED ORAL at 07:47

## 2021-04-07 RX ADMIN — DOXYCYCLINE HYCLATE 100 MG: 100 CAPSULE ORAL at 16:02

## 2021-04-07 RX ADMIN — AMLODIPINE BESYLATE 5 MG: 5 TABLET ORAL at 11:26

## 2021-04-07 RX ADMIN — PANTOPRAZOLE SODIUM 40 MG: 40 TABLET, DELAYED RELEASE ORAL at 16:02

## 2021-04-07 RX ADMIN — BUDESONIDE 500 MCG: 0.5 SUSPENSION RESPIRATORY (INHALATION) at 08:58

## 2021-04-07 RX ADMIN — ARFORMOTEROL TARTRATE 15 MCG: 15 SOLUTION RESPIRATORY (INHALATION) at 08:58

## 2021-04-07 RX ADMIN — HYDRALAZINE HYDROCHLORIDE 25 MG: 25 TABLET, FILM COATED ORAL at 07:46

## 2021-04-07 RX ADMIN — METOPROLOL TARTRATE 50 MG: 50 TABLET, FILM COATED ORAL at 19:59

## 2021-04-07 RX ADMIN — Medication 100 MG: at 07:46

## 2021-04-07 RX ADMIN — SODIUM CHLORIDE, PRESERVATIVE FREE 10 ML: 5 INJECTION INTRAVENOUS at 07:47

## 2021-04-07 RX ADMIN — VALSARTAN 320 MG: 320 TABLET ORAL at 07:47

## 2021-04-07 RX ADMIN — FUROSEMIDE 20 MG: 10 INJECTION, SOLUTION INTRAMUSCULAR; INTRAVENOUS at 20:00

## 2021-04-07 RX ADMIN — CELECOXIB 200 MG: 100 CAPSULE ORAL at 07:47

## 2021-04-07 RX ADMIN — FLECAINIDE ACETATE 75 MG: 50 TABLET ORAL at 19:59

## 2021-04-07 RX ADMIN — CYANOCOBALAMIN TAB 1000 MCG 1000 MCG: 1000 TAB at 07:46

## 2021-04-07 RX ADMIN — ARFORMOTEROL TARTRATE 15 MCG: 15 SOLUTION RESPIRATORY (INHALATION) at 20:13

## 2021-04-07 RX ADMIN — FUROSEMIDE 20 MG: 10 INJECTION, SOLUTION INTRAMUSCULAR; INTRAVENOUS at 07:47

## 2021-04-07 RX ADMIN — METHYLPREDNISOLONE SODIUM SUCCINATE 40 MG: 40 INJECTION, POWDER, LYOPHILIZED, FOR SOLUTION INTRAMUSCULAR; INTRAVENOUS at 20:00

## 2021-04-07 ASSESSMENT — ENCOUNTER SYMPTOMS
WHEEZING: 0
NAUSEA: 0
VOMITING: 0
BACK PAIN: 0
CONSTIPATION: 0
ABDOMINAL PAIN: 0
RHINORRHEA: 0
BLOOD IN STOOL: 0
SHORTNESS OF BREATH: 1
EYE REDNESS: 0
DIARRHEA: 0
CHEST TIGHTNESS: 0
EYE PAIN: 0
COUGH: 0
ABDOMINAL DISTENTION: 0
EYE ITCHING: 0
SORE THROAT: 1

## 2021-04-07 ASSESSMENT — PAIN SCALES - GENERAL
PAINLEVEL_OUTOF10: 4
PAINLEVEL_OUTOF10: 0

## 2021-04-07 NOTE — PROGRESS NOTES
Date: 4/6/21    Time: 2345    Patient Placed On BIPAP/CPAP/ Non-Invasive Ventilation? Yes    If no must comment. Facial area red/color change? No           If YES are Blister/Lesion present? No   If yes must notify nursing staff  BIPAP/CPAP skin barrier?   Yes    Skin barrier type:mepilexlite       Comments:    Pt ripped off ox 78% on rm air placed back on bipap  Red outlet  [facility]

## 2021-04-07 NOTE — PROGRESS NOTES
Physical Therapy  PT eval attempted. Family member requested to hold due to pt resting comfortably. Will re-attempt at later date.

## 2021-04-07 NOTE — PROGRESS NOTES
[address]  Progress Note    Chief complaint :  Chief Complaint   Patient presents with    Shortness of Breath       Subjective:    Patient states that her throat is bothering her. She is unsure as to why the BiPAP was replaced overnight. As per nurse, patient required 15 L high flow oxygen prior to replacing the BiPAP. She was having saturations into the high 80's, so the BiPAP was replaced earlier this morning    Past medical, surgical, family and social history were reviewed, non-contributory, and unchanged unless otherwise stated. Past Medical History:   Diagnosis Date    Anticoagulated     takes Xarelto    Atrial fibrillation ([facility] [address]     follows with Dr. 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 HENT: Positive for sore throat. Negative for congestion and sneezing.     Respiratory: Positive for shortness of breath. Negative for cough. Cardiovascular: Negative for chest pain. Gastrointestinal: Negative for abdominal pain, constipation, diarrhea, nausea and vomiting. Genitourinary: Negative for dysuria. Neurological: Negative for dizziness, numbness and headaches. Psychiatric/Behavioral: The patient is not nervous/anxious. Objective:  /60   Pulse 67   Temp 97.2 °F (36.2 °C) (Temporal)   Resp 18   Ht 5' 2\" (1.575 m)   Wt 72 lb 14.4 oz (33.1 kg)   SpO2 100%   BMI 13.33 kg/m²     Physical Exam  Vitals signs reviewed. Constitutional:       Appearance: Normal appearance. She is ill-appearing. HENT:      Head: Normocephalic. Nose: Nose normal.      Mouth/Throat:      Mouth: Mucous membranes are moist.   Eyes:      Pupils: Pupils are equal, round, and reactive to light. Cardiovascular:      Rate and Rhythm: Normal rate and regular rhythm. Heart sounds: Normal heart sounds. No murmur. Pulmonary:      Effort: Pulmonary effort is normal.      Breath sounds: Examination of the right-lower field reveals rhonchi. Examination of the left-lower field reveals rhonchi. Rhonchi present. Abdominal:      General: Bowel sounds are normal.      Palpations: Abdomen is soft. Tenderness: There is no abdominal tenderness. Musculoskeletal:      Right lower leg: Edema present. Left lower leg: Edema present. Skin:     General: Skin is warm. Capillary Refill: Capillary refill takes less than 2 seconds. Neurological:      Mental Status: She is alert. She is disoriented. Psychiatric:         Mood and Affect: Mood normal.         Behavior: Behavior is agitated. Thought Content:  Thought content normal.         Judgment: Judgment normal.         Labs:  Recent Results (from the past 24 hour(s))   Comprehensive Metabolic Panel w/ Reflex to MG    Collection Time: 04/06/21  3:29 PM   Result Value Ref Range    Sodium 134 132 - 146 mmol/L    Potassium reflex Magnesium 4.0 3.5 - 5.0 mmol/L    Chloride 99 98 - 107 mmol/L    CO2 28 22 - 29 mmol/L    Anion Gap 7 7 - 16 mmol/L    Glucose 100 (H) 74 - 99 mg/dL    BUN 19 8 - 23 mg/dL    CREATININE 0.6 0.5 - 1.0 mg/dL    GFR Non-African American >60 >=60 mL/min/1.73    GFR African American >60     Calcium 8.9 8.6 - 10.2 mg/dL    Total Protein 5.8 (L) 6.4 - 8.3 g/dL    Albumin 2.3 (L) 3.5 - 5.2 g/dL    Total Bilirubin 0.3 0.0 - 1.2 mg/dL    Alkaline Phosphatase 112 (H) 35 - 104 U/L    ALT 9 0 - 32 U/L    AST 22 0 - 31 U/L   CBC Auto Differential    Collection Time: 04/06/21  3:29 PM   Result Value Ref Range    WBC 12.1 (H) 4.5 - 11.5 E9/L    RBC 3.38 (L) 3.50 - 5.50 E12/L    Hemoglobin 10.3 (L) 11.5 - 15.5 g/dL    Hematocrit 34.8 34.0 - 48.0 %    .0 (H) 80.0 - 99.9 fL    MCH 30.5 26.0 - 35.0 pg    MCHC 29.6 (L) 32.0 - 34.5 %    RDW 14.5 11.5 - 15.0 fL    Platelets 729 329 - 956 E9/L    MPV 9.9 7.0 - 12.0 fL    Neutrophils % 75.8 43.0 - 80.0 %    Immature Granulocytes % 1.7 0.0 - 5.0 %    Lymphocytes % 6.3 (L) 20.0 - 42.0 %    Monocytes % 13.2 (H) 2.0 - 12.0 %    Eosinophils % 2.6 0.0 - 6.0 %    Basophils % 0.4 0.0 - 2.0 %    Neutrophils Absolute 9.17 (H) 1.80 - 7.30 E9/L    Immature Granulocytes # 0.20 E9/L    Lymphocytes Absolute 0.76 (L) 1.50 - 4.00 E9/L    Monocytes Absolute 1.59 (H) 0.10 - 0.95 E9/L    Eosinophils Absolute 0.31 0.05 - 0.50 E9/L    Basophils Absolute 0.05 0.00 - 0.20 E9/L    Anisocytosis 1+     Polychromasia 1+     Hypochromia 1+     Poikilocytes 1+     Schistocytes 1+     Ovalocytes 1+    Troponin    Collection Time: 04/06/21  3:29 PM   Result Value Ref Range    Troponin <0.01 0.00 - 0.03 ng/mL   Brain Natriuretic Peptide    Collection Time: 04/06/21  3:29 PM   Result Value Ref Range    Pro-BNP 4,343 (H) 0 - 450 pg/mL   Protime-INR    Collection Time: 04/06/21  3:29 PM   Result Value Ref Range    Protime 33.4 (H) 9.3 - 12.4 sec    INR 3.0    Comprehensive Metabolic Panel w/ Reflex to MG    Collection Time: 04/07/21  4:05 AM   Result Value Ref Range    Sodium 139 132 - 146 mmol/L    Potassium reflex Magnesium 3.7 3.5 - 5.0 mmol/L    Chloride 99 98 - 107 mmol/L    CO2 31 (H) 22 - 29 mmol/L    Anion Gap 9 7 - 16 mmol/L    Glucose 100 (H) 74 - 99 mg/dL    BUN 19 8 - 23 mg/dL    CREATININE 0.6 0.5 - 1.0 mg/dL    GFR Non-African American >60 >=60 mL/min/1.73    GFR African American >60     Calcium 8.5 (L) 8.6 - 10.2 mg/dL    Total Protein 5.7 (L) 6.4 - 8.3 g/dL    Albumin 2.5 (L) 3.5 - 5.2 g/dL    Total Bilirubin 0.5 0.0 - 1.2 mg/dL    Alkaline Phosphatase 123 (H) 35 - 104 U/L    ALT 9 0 - 32 U/L    AST 19 0 - 31 U/L   CBC auto differential    Collection Time: 04/07/21  4:05 AM   Result Value Ref Range    WBC 14.8 (H) 4.5 - 11.5 E9/L    RBC 3.07 (L) 3.50 - 5.50 E12/L    Hemoglobin 9.6 (L) 11.5 - 15.5 g/dL    Hematocrit 30.5 (L) 34.0 - 48.0 %    MCV 99.3 80.0 - 99.9 fL    MCH 31.3 26.0 - 35.0 pg    MCHC 31.5 (L) 32.0 - 34.5 %    RDW 14.4 11.5 - 15.0 fL    Platelets 503 645 - 782 E9/L    MPV 9.7 7.0 - 12.0 fL    Neutrophils % 81.6 (H) 43.0 - 80.0 %    Immature Granulocytes % 1.6 0.0 - 5.0 %    Lymphocytes % 4.9 (L) 20.0 - 42.0 %    Monocytes % 10.4 2.0 - 12.0 %    Eosinophils % 1.1 0.0 - 6.0 %    Basophils % 0.4 0.0 - 2.0 %    Neutrophils Absolute 12.09 (H) 1.80 - 7.30 E9/L    Immature Granulocytes # 0.24 E9/L    Lymphocytes Absolute 0.72 (L) 1.50 - 4.00 E9/L    Monocytes Absolute 1.54 (H) 0.10 - 0.95 E9/L    Eosinophils Absolute 0.16 0.05 - 0.50 E9/L    Basophils Absolute 0.06 0.00 - 0.20 E9/L    Anisocytosis 1+     Polychromasia 1+        Radiology and other tests reviewed:  XR CHEST PORTABLE   Final Result   Grossly stable diffuse interstitial and alveolar opacities which may be   infectious/inflammatory and/or related to fibrosis. CT of the chest from   02/24/2021 demonstrated evidence of both pneumonia and fibrosis.              Assessment:  Active Hospital Problems    Diagnosis Date Noted    Pressure injury of sacral region, unstageable [facility]) Neeta Page 04/06/2021    Acute on chronic diastolic heart failure ([facility] [address] [I50.33] 04/06/2021       Plan:     1) Acute on chronic respiratory failure likely due to HFpEF exacerbation  -Lasix 20 mg IV daily  -Wean oxygen as tolerated; on BiPAP  -Continue home Xopenex nebs PRN  -Home Symbicort changed to Brovana/Pulmicort nebs  -Consider steroids if no improvement in respiratory status with diuretics   -Cardiology consulted     2) Sacral decubitus ulcer  -Continue Doxycycline 100 mg BID until 4/10  -Consult General Surgery for ulcer debridement      3) Deconditioning  -Palliative care consulted  -PT/OT consulted     4) Hx of A-fib  -Continue home Metoprolol 25 mg BID, Flecainide 75 mg BID  -Continue home Xarelto 15 mg daily   -Cardiology consulted     5) Hx of depression  -Continue home Cymbalta 40 mg daily     6) Hx of rib fractures  -Continue Norco 5 mg every 4 hours PRN for severe pain  -Continue home Celebrex 200 mg daily   -Continue Lidoderm patches     7) Hx of hypothyroidism  -Continue home Synthroid 100 mcg daily     8) Hx of moderate gastritis  Seen on EGD on 4/2020  -Prilosec changed to Protonix 40 mg PO BID     9) Hx of RA  -On Leucovorin and Methotrexate- takes these medications on Monday     10) Hx of Anemia  -Continue home Iron 325 mg every other day     11) Hx of HTN  -Continue home Diovan 320 mg daily   -Continue home Hydralazine 25 mg BID     DVT ppx: Xarelto  GI ppx: Protonix  Code Status: Full Code  Diet: Maria Guadalupe Arechiga MD  Family Medicine, PGY-2

## 2021-04-07 NOTE — FLOWSHEET NOTE
Initial Inpatient Wound Care    Admit Date: 4/6/2021  3:03 PM    Reason for consult:  Sacral and lower extremity  wounds  Significant history:  Adm with SOB  See H&P  Wound history: POA  Findings: awake, daughter in law present  Conform dressings in place to bilateral legs    04/07/21 1041   Wound 03/06/21 Coccyx   Date First Assessed: 03/06/21   Present on Hospital Admission: No  Location: Coccyx   Wound Image    Wound Etiology Pressure Stage  4   Dressing Status New dressing applied   Wound Cleansed Cleansed with saline   Dressing/Treatment   (wound gel.  opticel, mepilex)   Wound Length (cm) 3 cm   Wound Width (cm) 2 cm   Wound Depth (cm)   (obs)   Wound Surface Area (cm^2) 6 cm^2   Change in Wound Size % (l*w) -200   Undermining Starts ___ O'Clock 6   Undermining Ends___ O'Clock 10   Undermining Maxium Distance (cm) 1   Wound Assessment   (yellow, brown, slough)   Jaycee-wound Assessment   (red)   periwound with some purple  Impression: stage 4 pressure injury POA this adm      Plan: lo air loss, dressing to coccyx and legs  Comfort glide applied  Shyla Wallis 4/7/2021 2:30 PM

## 2021-04-07 NOTE — CONSULTS
INPATIENT CARDIOLOGY CONSULT    Name: Samm Tom    Age: 80 y.o. Date of Service: 4/7/2021    History of Present Illness:  71-year-old female with medical history of poor functional capacity due to peripheral neuropathy nonambulatory bedridden, heart failure preserved ejection fraction, hypertension, paroxysmal atrial fibrillation on flecainide, metoprolol and Xarelto, decubitus ulcer recently discharged from the hospital after being treated with pneumonia presents with increasing shortness of breath, hypoxia and lower limb edema. Patient did not have cough or fever. Upon presentation, she was found to be hypoxic. BNP was elevated 4000, troponin negative. Labs were also significant for WBC 15,000. Patient was started on IV steroids and she received IV diuresis. Patient did not have any other cardiac symptoms.     Review of Systems:  Cardiac: As per HPI  General: No fever, chills  Pulmonary: As per HPI  HEENT: No visual disturbances, difficult swallowing  GI: No nausea, vomiting  Endocrine: No thyroid disease or DM  Musculoskeletal: FELIPE x 4, no focal motor deficits  Skin: Intact, no rashes  Neuro/Psych: No headache or seizures    Past Medical History:  Past Medical History:   Diagnosis Date    Anticoagulated     takes Xarelto    Atrial fibrillation ([facility] [address]     follows with Dr. 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; Dr Manuel Marrow; no plans for surgery    Neuropathy     chronic; 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  / at finger, and feet, legs    JANETTE (obstructive sleep apnea)     not using cpap or bipap    Osteoporosis Amy Ruiz MD   40 mg at 04/07/21 0746    ferrous sulfate (IRON 325) tablet 325 mg  325 mg Oral Every Other Day Rehan Sandoval MD   325 mg at 04/07/21 0742    flecainide (TAMBOCOR) tablet 75 mg  75 mg Oral BID Rehan Sandoval MD   75 mg at 74/82/88 5717    folic acid (FOLVITE) tablet 1 mg  1 mg Oral Daily Rehan Sandoval MD   1 mg at 04/07/21 0746    hydrALAZINE (APRESOLINE) tablet 25 mg  25 mg Oral BID Rehan Sandoval MD   25 mg at 04/07/21 0746    HYDROcodone-acetaminophen (NORCO) 5-325 MG per tablet 1 tablet  1 tablet Oral Q4H PRN Rehan Sandoval MD   1 tablet at 04/07/21 3139    levothyroxine (SYNTHROID) tablet 100 mcg  100 mcg Oral Daily Rehan Sandoval MD   100 mcg at 04/07/21 0505    lidocaine 4 % external patch 1 patch  1 patch Topical Daily Rehan Sandoval MD   1 patch at 04/07/21 0742    pantoprazole (PROTONIX) tablet 40 mg  40 mg Oral BID AC Rehan Sandoval MD   40 mg at 04/07/21 0505    polyvinyl alcohol (LIQUIFILM TEARS) 1.4 % ophthalmic solution 1 drop  1 drop Both Eyes BID PRN Rehan Sandoval MD        thiamine tablet 100 mg  100 mg Oral Daily Rehan Sandoval MD   100 mg at 04/07/21 0746    valsartan (DIOVAN) tablet 320 mg  320 mg Oral Daily Rehan Sandoval MD   320 mg at 04/07/21 0747    vitamin B-12 (CYANOCOBALAMIN) tablet 1,000 mcg  1,000 mcg Oral Daily Rehan Sandoval MD   1,000 mcg at 04/07/21 0746    vitamin D (CHOLECALCIFEROL) tablet 2,000 Units  2,000 Units Oral Daily Rehan Sandoval MD   2,000 Units at 04/07/21 0742    rivaroxaban (XARELTO) tablet 15 mg  15 mg Oral Daily Rehan Sandoval MD   15 mg at 04/06/21 2120    zinc sulfate (ZINCATE) capsule 50 mg  50 mg Oral Daily Rehan Sandoval MD        metoprolol tartrate (LOPRESSOR) tablet 50 mg  50 mg Oral BID Rehan Sandoval MD   50 mg at 04/07/21 0747    furosemide (LASIX) injection 20 mg  20 mg Intravenous Daily Rehan Sandoval MD   20 mg at 04/07/21 0747    sodium chloride flush 0.9 % injection 10 mL  10 mL Intravenous 2 times per day Leeanne Bradley MD   10 mL at 04/07/21 0747    sodium chloride flush 0.9 % injection 10 mL  10 mL Intravenous PRN Leeanne Bradley MD        0.9 % sodium chloride infusion  25 mL Intravenous PRN Leeanne Bradley MD        promethazine (PHENERGAN) tablet 12.5 mg  12.5 mg Oral Q6H PRN Leeanne Bradley MD        Or    ondansetron [facility]) injection 4 mg  4 mg Intravenous Q6H PRN Leeanne Bradley MD        polyethylene glycol [facility]) packet 17 g  17 g Oral Daily PRN Leeanne Bradley MD        acetaminophen (TYLENOL) tablet 650 mg  650 mg Oral Q6H PRN Leeanne Bradley MD        Or    acetaminophen (TYLENOL) suppository 650 mg  650 mg Rectal Q6H PRN Leeanne Bradley MD        budesonide (PULMICORT) nebulizer suspension 500 mcg  0.5 mg Nebulization BID Leeanne Bradley MD   500 mcg at 04/07/21 0858    Arformoterol Tartrate (BROVANA) nebulizer solution 15 mcg  15 mcg Nebulization BID Leeanne Bradley MD   15 mcg at 04/07/21 0858       Physical Exam:  /72   Pulse 67   Temp 98 °F (36.7 °C) (Oral)   Resp 18   Ht 5' 2\" (1.575 m)   Wt 72 lb 14.4 oz (33.1 kg)   SpO2 94%   BMI 13.33 kg/m²   Wt Readings from Last 3 Encounters:   04/06/21 72 lb 14.4 oz (33.1 kg)   03/09/21 140 lb 3.4 oz (63.6 kg)   02/16/21 142 lb 14.4 oz (64.8 kg)     Appearance: Awake, alert, no acute respiratory distress  Skin: Intact, no rash  Head: Normocephalic, atraumatic  Eyes: EOMI, no conjunctival erythema  Neck: Supple, no elevated JVP, no carotid bruits  Lungs: Bilateral crackles cardiac: Regular rate and rhythm, +S1S2, no murmurs apparent  Abdomen: Soft, nontender, +bowel sounds  Extremities: Bilateral lower extremity edema  Neurologic: No focal motor deficits apparent, normal mood and affect  Peripheral Pulses: Intact posterior tibial pulses bilaterally    Laboratory Tests:  Lab Results   Component Value Date    CREATININE 0.6 04/07/2021    BUN 19 04/07/2021     04/07/2021    K 3.7 04/07/2021 CL 99 04/07/2021    CO2 31 (H) 04/07/2021     Lab Results   Component Value Date    MG 1.9 03/06/2021     Lab Results   Component Value Date    WBC 14.8 (H) 04/07/2021    HGB 9.6 (L) 04/07/2021    HCT 30.5 (L) 04/07/2021    MCV 99.3 04/07/2021     04/07/2021     Lab Results   Component Value Date    ALT 9 04/07/2021    AST 19 04/07/2021    ALKPHOS 123 (H) 04/07/2021    BILITOT 0.5 04/07/2021     Lab Results   Component Value Date    CKTOTAL 105 07/12/2020    CKMB 1.9 03/21/2016    TROPONINI <0.01 04/06/2021    TROPONINI <0.01 02/24/2021    TROPONINI <0.01 02/08/2021     Lab Results   Component Value Date    INR 3.0 04/06/2021    INR 1.1 03/01/2021    INR 1.4 02/28/2021    PROTIME 33.4 (H) 04/06/2021    PROTIME 12.2 03/01/2021    PROTIME 16.4 (H) 02/28/2021     Lab Results   Component Value Date    TSH 4.870 (H) 01/06/2021     Lab Results   Component Value Date    LABA1C 5.4 02/12/2021     No results found for: EAG  Lab Results   Component Value Date    CHOL 182 10/22/2019    CHOL 174 06/10/2019    CHOL 175 03/22/2016     Lab Results   Component Value Date    TRIG 140 10/22/2019    TRIG 129 06/10/2019    TRIG 82 03/22/2016     Lab Results   Component Value Date    HDL 44 10/22/2019    HDL 49 06/10/2019    HDL 53 03/22/2016     Lab Results   Component Value Date    LDLCALC 110 (H) 10/22/2019    LDLCALC 99 06/10/2019    LDLCALC 106 (H) 03/22/2016     Lab Results   Component Value Date    LABVLDL 28 10/22/2019    LABVLDL 26 06/10/2019    LABVLDL 16 03/22/2016     No results found for: CHOLHDLRATIO  Recent Labs     04/05/21  1540 04/06/21  1529   PROBNP 4,295* 4,343*       ASSESSMENT / PLAN:  26-year-old female with medical history of pneumonia, decubitus ulcer, poor functional capacity, heart failure preserved ejection fraction, paroxysmal atrial fibrillation presents with increasing shortness of breath, hypoxia and lower limb edema. She was found to be fluid overloaded on exam.  WBC elevated 15,000. Recommendations  Congestive heart failure possibly exacerbated with underlying pneumonia. Pulmonary management per primary team.  Continue IV diuresis. Obtain ECG. Continue flecainide, Xarelto, metoprolol and losartan. Start amlodipine. Discontinue hydralazine. Thank you for allowing me to participate in your patient's care. Please feel free to contact me if you have any questions or concerns.     Bipin Mathur MD  [facility]) Cardiology

## 2021-04-07 NOTE — CONSULTS
Palliative Care Department  Palliative Care Initial Consult  Provider: Tyrone PETERS-CNP  [address] Day: 2  Date of Initial Consult: 4/6/2021  Referring Provider: Dr. Polo Kauffman MD  Palliative Medicine was consulted for assistance with: Assist with goals of care and Symptom Management    Chief Complaint: Andres Vega is a 80 y.o. female with chief complaint of SOB    HPI:   Andres Vega is a 80 y.o. female with significant past medical history of HFpEF, A-fib, HTN, RA, and recent COVID infection requiring hospitalization from 2/7-2/16, d/c home with 3L supplemental oxygen and with HHC, who was admitted again 2/4 through 3/9 related to shortness of breath/PNA as well as issues with recurrent A-Fib, discharged home with [place] [id number] and heart failure clinic following closely. She presented again on 4/6/2021 with complaints of SOB and edema. She is admitted for further management of CHF exacerbation, also for management of sacral ulcer. She is being followed closely by cardiology and pulmonology, as well as GS. Palliative is consulted for goals of care and symptom management. ASSESSMENT/PLAN:     Pertinent Hospital Diagnoses:  Current medical issues leading to Palliative Medicine involvement include   Active Hospital Problems    Diagnosis Date Noted    Frailty [R54]     Pressure injury of sacral region, unstageable ([address] [L89.150] 04/06/2021    Acute on chronic diastolic heart failure ([facility] [address] [I50.33] 04/06/2021     Palliative Care Encounter / Counseling Regarding Goals of Care:  Please see detailed goals of care discussion as below.  At this time, Andres Vega, Does have capacity for medical decision-making. Capacity is time limited and situation/question specific.    Outcome of goals of care meeting: live longer, improve or maintain function/quality of life, remain at home and continue current management   Code status: Full Code  o I reviewed with the patient and family that given or rectal pain, diarrhea, constipation, . GENITOURINARY:  Burning, frequency, urgency, incontinence, discharge  INTEGUMENTARY: rash, wound, pruritis  HEMATOLOGIC/LYMPHATIC:  Swelling, sores, gum bleeding, easy bruising, pica. MUSCULOSKELETAL:  pain, edema, joint swelling or redness  NEUROLOGICAL:  light headed, dizziness, loss of consciousness, weakness, change in memory, seizures, tremors    OBJECTIVE:   Prognosis: Guarded    Physical Exam:  BP (!) 159/74   Pulse 67   Temp 98 °F (36.7 °C) (Oral)   Resp 18   Ht 5' 2\" (1.575 m)   Wt 72 lb 14.4 oz (33.1 kg)   SpO2 97%   BMI 13.33 kg/m²     Gen:  Alert, elderly, in no acute distress  HEENT:  Normocephalic, conjunctiva pink, no drainage, mucosa moist  Neck:  Supple  Lungs:  CTA bilaterally with diminished bases  Heart: RRR, no murmur, rub, or gallop noted during exam  Abd:  Soft, non tender, non distended, BS+  M/S/Ext:  Moving all extremities, weak, pulses present  Skin:  Warm and dry  Neuro:  PERRL, Alert, oriented x 3; following commands    Objective data reviewed: labs, images, records, medication use, vitals and chart    Time/Communication:  Greater than 50% of time spent, total 50 minutes in counseling and coordination of care at the bedside regarding goals of care, symptom management and see above. Madonna PETERS-CNP  Palliative Medicine    Patient and the plan of care discussed with the other IDT members of Palliative Care Team, and with consultants, Primary Attending, patient and family, as appropriate and available. Thank you for allowing Palliative Medicine to participate in the care of Samm Tom. Note: This report was completed using [employer] voiced recognition software. Every effort has been made to ensure accuracy; however, inadvertent computerized transcription errors may be present.

## 2021-04-07 NOTE — PROGRESS NOTES
[address]  Progress Note    Chief complaint :  Chief Complaint   Patient presents with    Shortness of Breath       Subjective:    No overnight problems. Patient describes feeling \"so-so\". She states that her breathing is about the same and she does not have any chest pain. Tolerating diet. Past medical, surgical, family and social history were reviewed, non-contributory, and unchanged unless otherwise stated. Review of Systems   Constitutional: Positive for fatigue. Negative for appetite change, chills and fever. HENT: Negative for congestion, sneezing and sore throat. Respiratory: Positive for shortness of breath. Negative for cough. Cardiovascular: Negative for chest pain. Gastrointestinal: Negative for abdominal pain, constipation, diarrhea, nausea and vomiting. Genitourinary: Negative for dysuria. Neurological: Negative for dizziness, numbness and headaches. Psychiatric/Behavioral: The patient is not nervous/anxious. Objective:  BP (!) 158/70   Pulse 78   Temp 98.1 °F (36.7 °C) (Oral)   Resp 18   Ht 5' 2\" (1.575 m)   Wt 104 lb (47.2 kg)   SpO2 100%   BMI 19.02 kg/m²     Physical Exam  Vitals signs reviewed. Constitutional:       General: She is not in acute distress. Appearance: Normal appearance. HENT:      Head: Normocephalic. Nose: Nose normal.      Mouth/Throat:      Mouth: Mucous membranes are moist.   Eyes:      Pupils: Pupils are equal, round, and reactive to light. Cardiovascular:      Rate and Rhythm: Normal rate and regular rhythm. Heart sounds: Murmur present. Systolic murmur present. Pulmonary:      Effort: Pulmonary effort is normal.      Breath sounds: No rhonchi. Comments: Crackles bilateral  Abdominal:      General: Bowel sounds are normal.      Palpations: Abdomen is soft. Tenderness: There is no abdominal tenderness. Musculoskeletal:      Right lower leg: Edema present.       Left ([place]) 475 ms    P Axis 14 degrees    R Axis -16 degrees    T Axis 0 degrees       Radiology and other tests reviewed:  XR CHEST PORTABLE   Final Result   Grossly stable diffuse interstitial and alveolar opacities which may be   infectious/inflammatory and/or related to fibrosis. CT of the chest from   02/24/2021 demonstrated evidence of both pneumonia and fibrosis.              Assessment:  Active Hospital Problems    Diagnosis Date Noted    Frailty [R54]     Pressure injury of sacral region, unstageable [facility]) [L89.150] 04/06/2021    Acute on chronic diastolic heart failure ([facility] [address] [I50.33] 04/06/2021       Plan:  1) Acute on chronic respiratory failure likely due to HFpEF exacerbation  -Lasix 20 mg IV TID  -Wean oxygen as tolerated; on 13L O2  -Continue home Xopenex nebs PRN  -Home Symbicort changed to Brovana/Pulmicort nebs  -Encouraged incentive spirometer  -Procal does not indicated infection  -Respiratory panel negative  -Down 1L since admission  -Cardiology following  -Stared amlodipine and d/[place] hydralazine  -Pulmonology following  -Solumedrol 40mg BID IV    2) Sacral decubitus ulcer  -Continue Doxycycline 100 mg BID until 4/10  -Consult General Surgery, no plan for debridement  -Wound Care consulted     3) Deconditioning  -Palliative care consulted  -PT/OT consulted     4) Hx of A-fib  -Continue home Metoprolol 25 mg BID, Flecainide 75 mg BID  -Continue home Xarelto 15 mg daily   -Cardiology consulted     5) Hx of depression  -Continue home Cymbalta 40 mg daily     6) Hx of rib fractures  -Continue Norco 5 mg every 4 hours PRN for severe pain  -Continue home Celebrex 200 mg daily   -Continue Lidoderm patches     7) Hx of hypothyroidism  -Continue home Synthroid 100 mcg daily     8) Hx of moderate gastritis  Seen on EGD on 4/2020  -Prilosec changed to Protonix 40 mg PO BID     9) Hx of RA  -On Leucovorin and Methotrexate- takes these medications on Monday     10) Hx of Anemia  -Continue home Iron 325 mg every other day     11) Hx of HTN  -Continue home Diovan 320 mg daily   -Continue home Hydralazine 25 mg BID     DVT ppx: Xarelto  GI ppx: Protonix  Code Status: Full Code  Diet: Cardiac        [address] Resident PGY-1  04/08/21   7:45 AM

## 2021-04-07 NOTE — CONSULTS
GENERAL SURGERY  CONSULT NOTE    Patient's Name/Date of Birth: Maxwell Barrett /1935 (87 y.o.)    Date: 2021     CC: Decubitus ulcer    HPI:  Maxwell Barrett is a 80 y.o. female who presents with decubitus ulcer. Patient presented to hospital with worsening shortness of breath. She does have poor functional capacity and is bedridden. She is unsure how long the decubitus ulcers been present she denies any pain. She denies any fevers or chills. She denies any abdominal pain. She was admitted to hospital for CHF exacerbation. She is on Xarelto for history of atrial fibrillation.     Past Medical History:   Diagnosis Date    Anticoagulated     takes Xarelto    Atrial fibrillation ([facility] [address]     follows with Dr. 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 Ernesto Blackburn    Pain in both lower legs 2020    Patellar fracture     Rheumatoid arthritis with rheumatoid factor ([facility] [address]     Rheumatoid arthritis(714.0)     Dr Sharon Rehman; on DMD Leonor Gory)    Rib fractures 2014    Seasonal allergies     Skin cancer of lip     squamous cell    Wears dentures        Past Surgical History:   Procedure Laterality Date    CARDIAC CATHETERIZATION      CARPAL TUNNEL RELEASE Bilateral      SECTION      COLONOSCOPY 9/22/2011    Dr Denver Orozco; diverticular disease; repeat in 2016    COLONOSCOPY N/A 4/29/2020    COLONOSCOPY WITH BIOPSY performed by Ceci Ortega MD at [address] [facility], TOTAL ABDOMINAL  early 2000's    TOTAL KNEE ARTHROPLASTY  6/2007    bilateral    UPPER GASTROINTESTINAL ENDOSCOPY N/A 4/29/2020    EGD BIOPSY performed by Ceci Ortega MD at [facility]       Current Facility-Administered Medications   Medication Dose Route Frequency Provider Last Rate Last Admin    potassium bicarb-citric acid (EFFER-K) effervescent tablet 20 mEq  20 mEq Oral Daily Yvan Moscoso MD   20 mEq at 04/07/21 0746    collagenase ointment   Topical Daily Eileen Ferrera MD   Stopped at 04/07/21 1100    levalbuterol (XOPENEX) nebulization 0.31 mg  0.31 mg Nebulization Q6H PRN Yvan Moscoso MD        methylPREDNISolone sodium (SOLU-MEDROL) injection 40 mg  40 mg Intravenous Daily Lorenzo Sampson MD   40 mg at 04/07/21 4762    furosemide (LASIX) injection 20 mg  20 mg Intravenous TID Nav Hopkins MD        amLODIPine (NORVASC) tablet 5 mg  5 mg Oral Daily Nav Hopkins MD   5 mg at 04/07/21 1126    celecoxib (CELEBREX) capsule 200 mg  200 mg Oral Daily Yvan Moscoso MD   200 mg at 04/07/21 0747    docusate sodium (COLACE) capsule 100 mg  100 mg Oral Daily Yvan Moscoso MD   100 mg at 04/07/21 0747    doxycycline hyclate (VIBRAMYCIN) capsule 100 mg  100 mg Oral BID  Yvan Moscoso MD   100 mg at 04/07/21 0746    DULoxetine (CYMBALTA) extended release capsule 40 mg  40 mg Oral Daily Yvan Moscoso MD   40 mg at 04/07/21 0746    ferrous sulfate (IRON 325) tablet 325 mg  325 mg Oral Every Other Day Yvan Moscoso MD   325 mg at 04/07/21 0742    flecainide (TAMBOCOR) tablet 75 mg  75 mg Oral BID Yvan Moscoso MD   75 mg at 71/63/29 2982    folic acid (FOLVITE) tablet 1 mg  1 mg Oral Daily Yvan Moscoso MD   1 mg at 04/07/21 0746    HYDROcodone-acetaminophen (NORCO) 5-325 MG per tablet 1 tablet  1 tablet Oral Q4H PRN Leeanne Bradley MD   1 tablet at 04/07/21 0903    levothyroxine (SYNTHROID) tablet 100 mcg  100 mcg Oral Daily Leeanne Bradley MD   100 mcg at 04/07/21 0505    lidocaine 4 % external patch 1 patch  1 patch Topical Daily Leeanne Bradley MD   1 patch at 04/07/21 0742    pantoprazole (PROTONIX) tablet 40 mg  40 mg Oral BID AC Leeanne Bradley MD   40 mg at 04/07/21 0505    polyvinyl alcohol (LIQUIFILM TEARS) 1.4 % ophthalmic solution 1 drop  1 drop Both Eyes BID PRN Leeanne Bradley MD        thiamine tablet 100 mg  100 mg Oral Daily Leeanne Bradley MD   100 mg at 04/07/21 0746    valsartan (DIOVAN) tablet 320 mg  320 mg Oral Daily Leeanne Bradley MD   320 mg at 04/07/21 0747    vitamin B-12 (CYANOCOBALAMIN) tablet 1,000 mcg  1,000 mcg Oral Daily Leeanne Bradley MD   1,000 mcg at 04/07/21 0746    vitamin D (CHOLECALCIFEROL) tablet 2,000 Units  2,000 Units Oral Daily Leeanne Bradley MD   2,000 Units at 04/07/21 0742    rivaroxaban (XARELTO) tablet 15 mg  15 mg Oral Daily Leeanne Bradley MD   15 mg at 04/06/21 2120    zinc sulfate (ZINCATE) capsule 50 mg  50 mg Oral Daily Leeanne Bradley MD        metoprolol tartrate (LOPRESSOR) tablet 50 mg  50 mg Oral BID Leeanne Bradley MD   50 mg at 04/07/21 0747    sodium chloride flush 0.9 % injection 10 mL  10 mL Intravenous 2 times per day Leeanne Bradley MD   10 mL at 04/07/21 0747    sodium chloride flush 0.9 % injection 10 mL  10 mL Intravenous PRN Leeanne Bradley MD        0.9 % sodium chloride infusion  25 mL Intravenous PRN Leeanne Bradley MD        promethazine (PHENERGAN) tablet 12.5 mg  12.5 mg Oral Q6H PRN Leeanne Bradley MD        Or    [facility]ron [facility] injection 4 mg  4 mg Intravenous Q6H PRN Leeanne Bradley MD        polyethylene glycol [facility]) packet 17 g  17 g Oral Daily PRN Leeanne Bradley MD        acetaminophen (TYLENOL) tablet organizations: Not on file     Relationship status:     Intimate partner violence     Fear of current or ex partner: Not on file     Emotionally abused: Not on file     Physically abused: Not on file     Forced sexual activity: Not on file   Other Topics Concern    Not on file   Social History Narrative    Not on file       ROS:   Review of Systems   Constitutional: Positive for fatigue. Negative for chills, fever and unexpected weight change. HENT: Negative for nosebleeds, rhinorrhea and sneezing. Eyes: Negative for pain, redness and itching. Respiratory: Positive for shortness of breath. Negative for cough, chest tightness and wheezing. Cardiovascular: Positive for leg swelling. Negative for chest pain. Gastrointestinal: Negative for abdominal distention, abdominal pain, blood in stool, constipation, diarrhea, nausea and vomiting. Endocrine: Negative for polydipsia, polyphagia and polyuria. Genitourinary: Negative for difficulty urinating, dysuria and hematuria. Musculoskeletal: Negative for arthralgias, back pain and neck pain. Skin: Negative for pallor, rash and wound. Neurological: Negative for dizziness, weakness and headaches. Psychiatric/Behavioral: Negative for agitation, confusion and hallucinations. Physical Exam:  Vitals:    04/07/21 1200   BP:    Pulse:    Resp:    Temp:    SpO2: 93%       PSYCH: mood and affect normal, alert and oriented x 3: No apparent distress, comfortable  EYES: Sclera white, pupils equal round and reactive to light  ENMT:  Hearing normal, trachea midline, ears externally intact  LYMPH: no lympadenopathy in neck. Nolympadenopathy in groins  RESP: Breath sounds were clear and equal with no rales, wheezes, or rhonchi. Respiratory effort was normal with no retractions or use of accessory muscles. CV: Heart soundswere normal with a regular rate and rhythm. No pedal edema  GI/ Abdomen: The abdomen was soft and non distended. There was no tenderness, guarding, rebound, or rigidity. There was no, hepatosplenomegaly, or hernias. MSK: no clubbing/ no cyanosis/ gait normal  SKIN: 2.5 cm sacral decubitus ulcer without surrounding erythema, fibrinous exudate exudate within the wound no signs of necrotic tissue    LABS:  CBC  Recent Labs     04/07/21  0405   WBC 14.8*   HGB 9.6*   HCT 30.5*        BMP  Recent Labs     04/07/21  0405      K 3.7   CL 99   CO2 31*   BUN 19   CREATININE 0.6   CALCIUM 8.5*     Liver Function  Recent Labs     04/07/21  0405   BILITOT 0.5   AST 19   ALT 9   ALKPHOS 123*   PROT 5.7*   LABALBU 2.5*     No results for input(s): LACTATE in the last 72 hours. Recent Labs     04/06/21  1529   INR 3.0       RADIOLOGY  Xr Chest Portable    Result Date: 4/6/2021  EXAMINATION: ONE XRAY VIEW OF THE CHEST 4/6/2021 3:36 pm COMPARISON: March 9, 2021. HISTORY: ORDERING SYSTEM PROVIDED HISTORY: SOB TECHNOLOGIST PROVIDED HISTORY: Reason for exam:->SOB FINDINGS: There are diffuse interstitial and alveolar opacities, which are grossly stable as of 03/09/2021. The cardiac silhouette is enlarged, which may be in part or entirely due to technique. No pneumothorax or pleural effusion is seen. The visualized bones appear demineralized but are otherwise intact. Grossly stable diffuse interstitial and alveolar opacities which may be infectious/inflammatory and/or related to fibrosis. CT of the chest from 02/24/2021 demonstrated evidence of both pneumonia and fibrosis.           Assessment/Plan:  80 y.o. female with sacral decubitus ulcer       No signs of necrotic areas or areas that need debrided  Local wound care with Santyl and Mepilex dressing  Local wound care for remaining wounds      Micheal Rojo M.D., Ph.D., PGY-3  4/7/2021  12:26 PM

## 2021-04-07 NOTE — CONSULTS
Chase Valenzuela M.D.,[facility]  Bruce Ramos D.O., F.A.C.O.I., Mark Saxena M.D. Ana Bacon M.D., Yariel Guo M.D. Kiki Basurto, D.O. Patient:  Lucia Heller 80 y.o. female MRN: [id number]     Date of Service: 4/7/2021      PULMONARY CONSULTATION    Reason for Consultation: respiratory distress, pt with COPD and CHF requiring HF oxygen  Referring Physician: Dr. Cheryl Sampson MD    Communication with the referring physician will be sent via the electronic medical record. Chief Complaint: shortness of breath     CODE STATUS: FULL     SUBJECTIVE:  HPI:  Lucia Heller is a 80 y.o.  female who we are asked to evaluate for respiratory distress, pt with COPD and CHF requiring HF oxygen. 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 She is a known patient to our service followed during previous hospital stay for COVID 19 Feb 2021. She was treated with remdesivir x 5 days and decadron x 10 days. She was also treated with cefepime for superimposed bacterial PNA. Presented to ED at [facility] 4/6 with 1 wk hx of worsening shortness of breath, fatigue, and leg swelling. She admits to orthopnea. Over the weekend she was given diuretic dosing per her niece who is a nurse practitioner. This improved her symptoms temporarily however over the last few days she continue to worsen with her breathing. Denies fever, chills or cough. CXR  With stable diffuse interstitial and alveolar opacities. CTA chest 2/24 with ILD and GGO multilobar from covid 19 infection. No PE identified. No lymphadenopathy.  Previous echo Feb 2021 with preserved violence     Fear of current or ex partner: Not on file     Emotionally abused: Not on file     Physically abused: Not on file     Forced sexual activity: Not on file   Other Topics Concern    Not on file   Social History Narrative    Not on file     Smoking history: The patient is a  nonsmoker  ETOH:   reports no history of alcohol use. Exposures: There  is not history of TB or TB exposure. There is not asbestos or silica dust exposure. The patient reports does not have coal, foundry, quarry or [employer] exposure. Recent travel history none. There is not  history of recreational or IV drug use. There is not hot tub exposure. The patient does not have any exotic pets, turtles or exotic birds. Vaccines:       Immunization History   Administered Date(s) Administered    COVID-19, Pfizer, PF, 30mcg/0.3mL 01/28/2021    Influenza Virus Vaccine 10/06/2015, 10/21/2016    Influenza, High Dose (Fluzone 65 yrs and older) 09/25/2014, 10/06/2015, 10/21/2016, 10/02/2017, 10/05/2018    Influenza, Triv, inactivated, subunit, adjuvanted, IM (Fluad 65 yrs and older) 10/16/2019, 10/30/2020    Pneumococcal Conjugate 13-valent ([id number]) 10/22/2015    Pneumococcal Polysaccharide ([id number]) 01/23/2017    Tdap (Boostrix, Adacel) 11/17/2011    Zoster Live (Zostavax) 08/27/2012    Zoster Recombinant (Shingrix) 09/19/2018, 03/19/2019        Home Meds: Medications Prior to Admission: hydrALAZINE (APRESOLINE) 25 MG tablet, Take 1 tablet by mouth 2 times daily  metoprolol tartrate (LOPRESSOR) 25 MG tablet, Take 50 mg by mouth 2 times daily   lidocaine (LIDODERM) 5 %, Place 2 patches onto the skin daily 12 hours on, 12 hours off.  doxycycline hyclate (VIBRA-TABS) 100 MG tablet, Take 1 tablet by mouth 2 times daily (with meals) for 10 days  HYDROcodone-acetaminophen (NORCO) 5-325 MG per tablet, Take 1 tablet by mouth every 4 hours as needed for Pain for up to 7 days. Intended supply: 7 days.  Take lowest dose possible to manage pain  flecainide (TAMBOCOR) 150 MG tablet, Take 0.5 tablets by mouth 2 times daily  valsartan (DIOVAN) 320 MG tablet, Take 1 tablet by mouth daily  furosemide (LASIX) 20 MG tablet, Take 1 tablet by mouth daily as needed (SOB, Leg swelling) (Patient taking differently: Take 10 mg by mouth daily as needed (SOB, Leg swelling) )  XARELTO 15 MG TABS tablet, TAKE 1 TABLET DAILY WITH   BREAKFAST  predniSONE (DELTASONE) 10 MG tablet, Take 4 tabs daily x 3 days then 3 tabs daily x 3 days then 2 tabs daily x 3 days then 1 tab daily x 3 days then stop  levalbuterol (XOPENEX) 0.31 MG/3ML nebulization, Take 3 mLs by nebulization every 6 hours as needed for Wheezing  loteprednol (LOTEMAX) 0.5 % ophthalmic suspension,   DULoxetine HCl 40 MG CPEP, Take 40 mg by mouth daily  folic acid (FOLVITE) 1 MG tablet, Take 1 tablet by mouth daily  zinc sulfate (ZINCATE) 50 MG CAPS capsule, Take 1 capsule by mouth daily  gabapentin (NEURONTIN) 100 MG capsule, Take 1 capsule by mouth 2 times daily for 90 days. celecoxib (CELEBREX) 200 MG capsule, TAKE 1 CAPSULE DAILY  ferrous sulfate (IRON 325) 325 (65 Fe) MG tablet, Take 1 tablet by mouth every other day  levothyroxine (LEVOTHROID) 100 MCG tablet, Take 1 tablet by mouth Daily  docusate sodium (COLACE, DULCOLAX) 100 MG CAPS, Take 100 mg by mouth daily  vitamin B-12 (CYANOCOBALAMIN) 1000 MCG tablet, Take 1,000 mcg by mouth daily  omeprazole (PRILOSEC) 40 MG delayed release capsule, Take 1 capsule by mouth 2 times daily  polyethyl glycol-propyl glycol 0.4-0.3 % (SYSTANE) 0.4-0.3 % ophthalmic solution, 1 drop 2 times daily as needed for Dry Eyes   Probiotic Product (ACIDOPHILUS PROBIOTIC) CAPS capsule, Take 1 capsule by mouth daily  fexofenadine (ALLEGRA) 180 MG tablet, Take 180 mg by mouth daily.   methotrexate 2.5 MG tablet, Take by mouth once a week 2 tabs at lunch and 4 tabs  in the pm once a week on Monday  furosemide (LASIX) 20 MG tablet, Take 1 tablet by mouth See Admin Cardiovascular: regular rate and rhythm without murmur or gallop  Respiratory: bibasilar crackles to auscultation  Air entry is symmetric  Abdomen: soft, non-tender, non-distended, normal bowel sounds  Extremities: warm no clubbing +LE edema  Skin: no rash or lesion +sacral decubitus ulcer  Neurologic: CN II-XII grossly intact, no focal deficits    Pulmonary Function Testing  None on file       Imaging personally reviewed:  FINDINGS:   There are diffuse interstitial and alveolar opacities, which are grossly   stable as of 03/09/2021.  The cardiac silhouette is enlarged, which may be in   part or entirely due to technique.  No pneumothorax or pleural effusion is   seen.  The visualized bones appear demineralized but are otherwise intact.           Impression   Grossly stable diffuse interstitial and alveolar opacities which may be   infectious/inflammatory and/or related to fibrosis.  CT of the chest from   02/24/2021 demonstrated evidence of both pneumonia and fibrosis.           CTA chest 2/24/21  No convincing evidence of a pulmonary embolism. Significant progression in the ground-glass opacities scattered throughout   both lungs when compared with February 10, 2021.  Findings are suspicious for   multifocal pneumonia. Stable calcified mass in the central canal of the thoracic spine. Distended esophagus filled with undigested material, placing the patient at   risk for aspiration. Additional findings as described. Echo:   Summary   Left ventricular internal dimensions were normal in diastole and systole. No regional wall motion abnormalities seen. Normal left ventricular ejection fraction. The left atrium is borderline dilated. Moderate mitral annular calcification. Mild mitral regurgitation is present. The aortic valve appears mildly sclerotic. Mild aortic regurgitation is noted.     Labs:  Lab Results   Component Value Date    WBC 14.8 04/07/2021    HGB 9.6 04/07/2021    HCT 30.5 04/07/2021    MCV 99.3 04/07/2021    MCH 31.3 04/07/2021    MCHC 31.5 04/07/2021    RDW 14.4 04/07/2021     04/07/2021    MPV 9.7 04/07/2021     Lab Results   Component Value Date     04/07/2021    K 3.7 04/07/2021    CL 99 04/07/2021    CO2 31 04/07/2021    BUN 19 04/07/2021    CREATININE 0.6 04/07/2021    LABALBU 2.5 04/07/2021    LABALBU 4.0 04/24/2012    CALCIUM 8.5 04/07/2021    GFRAA >60 04/07/2021    LABGLOM >60 04/07/2021     Lab Results   Component Value Date    PROTIME 33.4 04/06/2021    PROTIME 15.8 05/12/2014    INR 3.0 04/06/2021     Recent Labs     04/06/21  1529   PROBNP 4,343*     Recent Labs     04/06/21  1529   TROPONINI <0.01     No results for input(s): PROCAL in the last 72 hours. This SmartLink has not been configured with any valid records. Micro:  No results for input(s): CULTRESP in the last 72 hours. No results for input(s): LABGRAM in the last 72 hours. No results for input(s): LEGUR in the last 72 hours. No results for input(s): STREPNEUMAGU in the last 72 hours. No results for input(s): LP1UAG in the last 72 hours. Assessment:  1. Acute respiratory failure with hypoxia  2. HFpEF  3. 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 JANETTE  7. Seasonal allergies   8. A fib on anticoagulation  9. Fluid overload  10. Anemia  11. Leukocytosis   12. Sacral decubitus ulcer     Plan:  1. Oxygen therapy 12 L HF wean to keep >92%  2. Bipap for respiratory support  3. Diuresis lasix 20 mg IV TID, mgmt of chf and a fib per cardiology   4. Brovana, budesonide bid, xopenex prn-add ezpap  5. Add incentive spirometer. Recruitment techniques  6. Solumedrol 40 mg IV BID  7. Dvt, gi prophylaxis. Xarelto for PAF   8. Doxycycline for empiric CAP coverage. Trend procal. Monitor for fevers. 9. Local wound care. Surgery consult for debridement  10.  Palliative medicine following for goals of care, symptom management  11. PT, OT Thank you for allowing me to participate in the care of Roya Dela Cruz. Please feel free to call with questions. This plan of care was reviewed in collaboration with Dr. Fernando Liang    Electronically signed by FRAN Rogers CNP on 4/7/2021 at 10:37 AM      Note: This report was completed utilizing computer voice recognition software. Every effort has been made to ensure accuracy, however; inadvertent computerized transcription errors may be present    I personally saw, examined, and cared for the patient. Labs, medications, radiographs reviewed. I agree with history exam and plans detailed in NP note. Bertrand Ramirez M.D.    Pulmonary/Critical Care Medicine

## 2021-04-08 LAB
ALBUMIN SERPL-MCNC: 2.6 G/DL (ref 3.5–5.2)
ALP BLD-CCNC: 135 U/L (ref 35–104)
ALT SERPL-CCNC: 11 U/L (ref 0–32)
ANION GAP SERPL CALCULATED.3IONS-SCNC: 10 MMOL/L (ref 7–16)
AST SERPL-CCNC: 19 U/L (ref 0–31)
BASOPHILS ABSOLUTE: 0.02 E9/L (ref 0–0.2)
BASOPHILS RELATIVE PERCENT: 0.1 % (ref 0–2)
BILIRUB SERPL-MCNC: 0.5 MG/DL (ref 0–1.2)
BUN BLDV-MCNC: 25 MG/DL (ref 8–23)
CALCIUM SERPL-MCNC: 9.5 MG/DL (ref 8.6–10.2)
CHLORIDE BLD-SCNC: 91 MMOL/L (ref 98–107)
CO2: 32 MMOL/L (ref 22–29)
CREAT SERPL-MCNC: 0.7 MG/DL (ref 0.5–1)
EOSINOPHILS ABSOLUTE: 0 E9/L (ref 0.05–0.5)
EOSINOPHILS RELATIVE PERCENT: 0 % (ref 0–6)
GFR AFRICAN AMERICAN: >60
GFR NON-AFRICAN AMERICAN: >60 ML/MIN/1.73
GLUCOSE BLD-MCNC: 161 MG/DL (ref 74–99)
HCT VFR BLD CALC: 33.5 % (ref 34–48)
HEMOGLOBIN: 10.1 G/DL (ref 11.5–15.5)
HYPOCHROMIA: ABNORMAL
IMMATURE GRANULOCYTES #: 0.22 E9/L
IMMATURE GRANULOCYTES %: 1.2 % (ref 0–5)
L. PNEUMOPHILA SEROGP 1 UR AG: NORMAL
LYMPHOCYTES ABSOLUTE: 0.51 E9/L (ref 1.5–4)
LYMPHOCYTES RELATIVE PERCENT: 2.8 % (ref 20–42)
MCH RBC QN AUTO: 30.2 PG (ref 26–35)
MCHC RBC AUTO-ENTMCNC: 30.1 % (ref 32–34.5)
MCV RBC AUTO: 100.3 FL (ref 80–99.9)
MONOCYTES ABSOLUTE: 0.58 E9/L (ref 0.1–0.95)
MONOCYTES RELATIVE PERCENT: 3.2 % (ref 2–12)
NEUTROPHILS ABSOLUTE: 16.61 E9/L (ref 1.8–7.3)
NEUTROPHILS RELATIVE PERCENT: 92.7 % (ref 43–80)
OVALOCYTES: ABNORMAL
PDW BLD-RTO: 14.4 FL (ref 11.5–15)
PLATELET # BLD: 590 E9/L (ref 130–450)
PMV BLD AUTO: 9.8 FL (ref 7–12)
POIKILOCYTES: ABNORMAL
POLYCHROMASIA: ABNORMAL
POTASSIUM REFLEX MAGNESIUM: 3.9 MMOL/L (ref 3.5–5)
RBC # BLD: 3.34 E12/L (ref 3.5–5.5)
SODIUM BLD-SCNC: 133 MMOL/L (ref 132–146)
STOMATOCYTES: ABNORMAL
STREP PNEUMONIAE ANTIGEN, URINE: NORMAL
TARGET CELLS: ABNORMAL
TEAR DROP CELLS: ABNORMAL
TOTAL PROTEIN: 6.3 G/DL (ref 6.4–8.3)
VACUOLATED NEUTROPHILS: ABNORMAL
WBC # BLD: 17.9 E9/L (ref 4.5–11.5)

## 2021-04-08 PROCEDURE — 6370000000 HC RX 637 (ALT 250 FOR IP): Performed by: FAMILY MEDICINE

## 2021-04-08 PROCEDURE — 94660 CPAP INITIATION&MGMT: CPT

## 2021-04-08 PROCEDURE — 94664 DEMO&/EVAL PT USE INHALER: CPT

## 2021-04-08 PROCEDURE — 94640 AIRWAY INHALATION TREATMENT: CPT

## 2021-04-08 PROCEDURE — 94761 N-INVAS EAR/PLS OXIMETRY MLT: CPT

## 2021-04-08 PROCEDURE — 2580000003 HC RX 258: Performed by: FAMILY MEDICINE

## 2021-04-08 PROCEDURE — 6360000002 HC RX W HCPCS: Performed by: NURSE PRACTITIONER

## 2021-04-08 PROCEDURE — 99232 SBSQ HOSP IP/OBS MODERATE 35: CPT | Performed by: FAMILY MEDICINE

## 2021-04-08 PROCEDURE — 6360000002 HC RX W HCPCS: Performed by: STUDENT IN AN ORGANIZED HEALTH CARE EDUCATION/TRAINING PROGRAM

## 2021-04-08 PROCEDURE — 2700000000 HC OXYGEN THERAPY PER DAY

## 2021-04-08 PROCEDURE — 80053 COMPREHEN METABOLIC PANEL: CPT

## 2021-04-08 PROCEDURE — 36415 COLL VENOUS BLD VENIPUNCTURE: CPT

## 2021-04-08 PROCEDURE — 97166 OT EVAL MOD COMPLEX 45 MIN: CPT

## 2021-04-08 PROCEDURE — 85025 COMPLETE CBC W/AUTO DIFF WBC: CPT

## 2021-04-08 PROCEDURE — 6370000000 HC RX 637 (ALT 250 FOR IP): Performed by: SURGERY

## 2021-04-08 PROCEDURE — 97161 PT EVAL LOW COMPLEX 20 MIN: CPT

## 2021-04-08 PROCEDURE — 6370000000 HC RX 637 (ALT 250 FOR IP): Performed by: STUDENT IN AN ORGANIZED HEALTH CARE EDUCATION/TRAINING PROGRAM

## 2021-04-08 PROCEDURE — 2060000000 HC ICU INTERMEDIATE R&B

## 2021-04-08 PROCEDURE — 6360000002 HC RX W HCPCS: Performed by: FAMILY MEDICINE

## 2021-04-08 RX ORDER — HYDROCODONE BITARTRATE AND ACETAMINOPHEN 5; 325 MG/1; MG/1
1 TABLET ORAL EVERY 4 HOURS PRN
Status: DISCONTINUED | OUTPATIENT
Start: 2021-04-08 | End: 2021-04-14 | Stop reason: HOSPADM

## 2021-04-08 RX ORDER — FUROSEMIDE 10 MG/ML
40 INJECTION INTRAMUSCULAR; INTRAVENOUS 3 TIMES DAILY
Status: DISCONTINUED | OUTPATIENT
Start: 2021-04-08 | End: 2021-04-09

## 2021-04-08 RX ORDER — HYDROCODONE BITARTRATE AND ACETAMINOPHEN 5; 325 MG/1; MG/1
1 TABLET ORAL 2 TIMES DAILY
Status: DISCONTINUED | OUTPATIENT
Start: 2021-04-08 | End: 2021-04-14 | Stop reason: HOSPADM

## 2021-04-08 RX ORDER — FUROSEMIDE 10 MG/ML
20 INJECTION INTRAMUSCULAR; INTRAVENOUS ONCE
Status: COMPLETED | OUTPATIENT
Start: 2021-04-08 | End: 2021-04-08

## 2021-04-08 RX ORDER — SENNA AND DOCUSATE SODIUM 50; 8.6 MG/1; MG/1
2 TABLET, FILM COATED ORAL 2 TIMES DAILY
Status: DISCONTINUED | OUTPATIENT
Start: 2021-04-08 | End: 2021-04-14 | Stop reason: HOSPADM

## 2021-04-08 RX ADMIN — LEVALBUTEROL HYDROCHLORIDE 0.31 MG: 0.31 SOLUTION RESPIRATORY (INHALATION) at 00:02

## 2021-04-08 RX ADMIN — ACETAMINOPHEN 650 MG: 325 TABLET ORAL at 16:24

## 2021-04-08 RX ADMIN — LEVALBUTEROL HYDROCHLORIDE 0.31 MG: 0.31 SOLUTION RESPIRATORY (INHALATION) at 09:48

## 2021-04-08 RX ADMIN — AMLODIPINE BESYLATE 5 MG: 5 TABLET ORAL at 09:30

## 2021-04-08 RX ADMIN — SODIUM CHLORIDE, PRESERVATIVE FREE 10 ML: 5 INJECTION INTRAVENOUS at 09:34

## 2021-04-08 RX ADMIN — COLLAGENASE SANTYL: 250 OINTMENT TOPICAL at 14:03

## 2021-04-08 RX ADMIN — PANTOPRAZOLE SODIUM 40 MG: 40 TABLET, DELAYED RELEASE ORAL at 16:25

## 2021-04-08 RX ADMIN — ARFORMOTEROL TARTRATE 15 MCG: 15 SOLUTION RESPIRATORY (INHALATION) at 19:39

## 2021-04-08 RX ADMIN — FUROSEMIDE 20 MG: 10 INJECTION, SOLUTION INTRAMUSCULAR; INTRAVENOUS at 05:45

## 2021-04-08 RX ADMIN — METHYLPREDNISOLONE SODIUM SUCCINATE 40 MG: 40 INJECTION, POWDER, LYOPHILIZED, FOR SOLUTION INTRAMUSCULAR; INTRAVENOUS at 09:32

## 2021-04-08 RX ADMIN — PANTOPRAZOLE SODIUM 40 MG: 40 TABLET, DELAYED RELEASE ORAL at 05:45

## 2021-04-08 RX ADMIN — METHYLPREDNISOLONE SODIUM SUCCINATE 40 MG: 40 INJECTION, POWDER, LYOPHILIZED, FOR SOLUTION INTRAMUSCULAR; INTRAVENOUS at 21:12

## 2021-04-08 RX ADMIN — DOXYCYCLINE HYCLATE 100 MG: 100 CAPSULE ORAL at 16:27

## 2021-04-08 RX ADMIN — ZINC SULFATE 220 MG (50 MG) CAPSULE 50 MG: CAPSULE at 09:32

## 2021-04-08 RX ADMIN — SODIUM CHLORIDE, PRESERVATIVE FREE 10 ML: 5 INJECTION INTRAVENOUS at 21:12

## 2021-04-08 RX ADMIN — VALSARTAN 320 MG: 320 TABLET ORAL at 09:32

## 2021-04-08 RX ADMIN — FLECAINIDE ACETATE 75 MG: 50 TABLET ORAL at 09:30

## 2021-04-08 RX ADMIN — BUDESONIDE 500 MCG: 0.5 SUSPENSION RESPIRATORY (INHALATION) at 09:47

## 2021-04-08 RX ADMIN — POTASSIUM BICARBONATE 20 MEQ: 782 TABLET, EFFERVESCENT ORAL at 09:31

## 2021-04-08 RX ADMIN — LEVOTHYROXINE SODIUM 100 MCG: 100 TABLET ORAL at 05:45

## 2021-04-08 RX ADMIN — DOCUSATE SODIUM 50 MG AND SENNOSIDES 8.6 MG 2 TABLET: 8.6; 5 TABLET, FILM COATED ORAL at 21:11

## 2021-04-08 RX ADMIN — FUROSEMIDE 40 MG: 10 INJECTION, SOLUTION INTRAMUSCULAR; INTRAVENOUS at 21:12

## 2021-04-08 RX ADMIN — DOCUSATE SODIUM 50 MG AND SENNOSIDES 8.6 MG 2 TABLET: 8.6; 5 TABLET, FILM COATED ORAL at 12:01

## 2021-04-08 RX ADMIN — BUDESONIDE 500 MCG: 0.5 SUSPENSION RESPIRATORY (INHALATION) at 19:39

## 2021-04-08 RX ADMIN — LEVALBUTEROL HYDROCHLORIDE 0.31 MG: 0.31 SOLUTION RESPIRATORY (INHALATION) at 13:45

## 2021-04-08 RX ADMIN — DOCUSATE SODIUM 100 MG: 100 CAPSULE, LIQUID FILLED ORAL at 09:31

## 2021-04-08 RX ADMIN — METOPROLOL TARTRATE 50 MG: 50 TABLET, FILM COATED ORAL at 09:33

## 2021-04-08 RX ADMIN — HYDROCODONE BITARTRATE AND ACETAMINOPHEN 1 TABLET: 5; 325 TABLET ORAL at 18:56

## 2021-04-08 RX ADMIN — Medication 2000 UNITS: at 09:30

## 2021-04-08 RX ADMIN — DOXYCYCLINE HYCLATE 100 MG: 100 CAPSULE ORAL at 09:31

## 2021-04-08 RX ADMIN — RIVAROXABAN 15 MG: 15 TABLET, FILM COATED ORAL at 18:56

## 2021-04-08 RX ADMIN — FUROSEMIDE 20 MG: 10 INJECTION, SOLUTION INTRAVENOUS at 16:25

## 2021-04-08 RX ADMIN — DULOXETINE HYDROCHLORIDE 40 MG: 20 CAPSULE, DELAYED RELEASE ORAL at 09:32

## 2021-04-08 RX ADMIN — ARFORMOTEROL TARTRATE 15 MCG: 15 SOLUTION RESPIRATORY (INHALATION) at 09:47

## 2021-04-08 RX ADMIN — CELECOXIB 200 MG: 100 CAPSULE ORAL at 09:32

## 2021-04-08 RX ADMIN — HYDROCODONE BITARTRATE AND ACETAMINOPHEN 1 TABLET: 5; 325 TABLET ORAL at 09:31

## 2021-04-08 RX ADMIN — METOPROLOL TARTRATE 50 MG: 50 TABLET, FILM COATED ORAL at 21:11

## 2021-04-08 RX ADMIN — FLECAINIDE ACETATE 75 MG: 50 TABLET ORAL at 21:11

## 2021-04-08 ASSESSMENT — PAIN SCALES - GENERAL
PAINLEVEL_OUTOF10: 6
PAINLEVEL_OUTOF10: 7
PAINLEVEL_OUTOF10: 0
PAINLEVEL_OUTOF10: 7

## 2021-04-08 ASSESSMENT — ENCOUNTER SYMPTOMS
VOMITING: 0
ABDOMINAL PAIN: 0
NAUSEA: 0
CONSTIPATION: 0
SHORTNESS OF BREATH: 1
SORE THROAT: 0
DIARRHEA: 0
COUGH: 0

## 2021-04-08 NOTE — PROGRESS NOTES
Christophe Lea M.D.,[facility]  Schuyler Angelucci, D.O., F.A.C.O.I., Linnette Durant M.D. Carmel Tejada M.D., Austen Fritz M.D. Tello Redmond D.O. Daily Pulmonary Progress Note    Patient:  Patty Whitt 80 y.o. female MRN: [id number]     Date of Service: 4/8/2021      Synopsis     We are following patient for respiratory distress, patient with COPD and CHF requiring high flow oxygen    \"CC\" shortness of breath    Code status: Full code      Subjective      Patient was seen and examined lying in bed in no apparent distress. She is currently on 13 L oxygen with a sat of 96%. She is using incentive spirometer frequently. Questionable whether or not she wore the BiPAP last night. She appears to be struggling to breathe with exertion, currently she is trying to eat. The edema in her feet is improved, her Lasix has been increased. Review of Systems:  Constitutional: Denies fever, weight loss, night sweats, and fatigue  Skin: Denies pigmentation, dark lesions, and rashes   HEENT: Denies hearing loss, tinnitus, ear drainage, epistaxis, sore throat, and hoarseness. Cardiovascular: Denies palpitations, chest pain, and chest pressure. Respiratory: Denies cough, dyspnea at rest, hemoptysis, apnea, and choking.   Gastrointestinal: Denies nausea, vomiting, poor appetite, diarrhea, heartburn or reflux  Genitourinary: Denies dysuria, frequency, urgency or hematuria  Musculoskeletal: Denies myalgias, muscle weakness, and bone pain  Neurological: Denies dizziness, vertigo, headache, and focal weakness  Psychological: Denies anxiety and depression  Endocrine: Denies heat intolerance and cold intolerance  Hematopoietic/Lymphatic: Denies bleeding problems and blood transfusions    24-hour events:  None    Objective   Vitals: BP (!) 147/65   Pulse 78   Temp 97 °F (36.1 °C) (Axillary)   Resp 18   Ht 5' 2\" (1.575 m)   Wt 104 lb (47.2 kg)   SpO2 96%   BMI 19.02 kg/m²     I/O:    Intake/Output Summary (Last 24 hours) at 4/8/2021 1337  Last data filed at 4/8/2021 1013  Gross per 24 hour   Intake 120 ml   Output 500 ml   Net -380 ml       Vent Information  Skin Assessment: Clean, dry, & intact  FiO2 : 50 %  SpO2: 96 %  I Time/ I Time %: 0.9 s  Mask Type: Full face mask  Mask Size: Medium       IPAP: (S) 14 cmH20  CPAP/EPAP: 6 cmH2O     CURRENT MEDS :  Scheduled Meds:   HYDROcodone 5 mg - acetaminophen  1 tablet Oral BID    sennosides-docusate sodium  2 tablet Oral BID    potassium bicarb-citric acid  20 mEq Oral Daily    collagenase   Topical Daily    furosemide  20 mg Intravenous TID    amLODIPine  5 mg Oral Daily    methylPREDNISolone  40 mg Intravenous Q12H    celecoxib  200 mg Oral Daily    doxycycline hyclate  100 mg Oral BID WC    DULoxetine  40 mg Oral Daily    ferrous sulfate  325 mg Oral Every Other Day    flecainide  75 mg Oral BID    levothyroxine  100 mcg Oral Daily    lidocaine  1 patch Topical Daily    pantoprazole  40 mg Oral BID AC    valsartan  320 mg Oral Daily    Vitamin D  2,000 Units Oral Daily    rivaroxaban  15 mg Oral Daily    zinc sulfate  50 mg Oral Daily    metoprolol tartrate  50 mg Oral BID    sodium chloride flush  10 mL Intravenous 2 times per day    budesonide  0.5 mg Nebulization BID    Arformoterol Tartrate  15 mcg Nebulization BID       Physical Exam:  General Appearance: appears comfortable in no acute distress. HEENT: Normocephalic atraumatic without obvious abnormality   Neck: Lips, mucosa, and tongue normal.  Supple, symmetrical, trachea midline, no adenopathy;thyroid:  no enlargement/tenderness/nodules or JVD. Lung: Breath sounds CTA. Respirations   unlabored. Symmetrical expansion. Heart: RRR, normal S1, S2. No MRG  Abdomen: Soft, NT, ND. BS present x 4 quadrants. No bruit or organomegaly. Extremities: Pedal pulses 2+ symmetric b/l. Extremities normal, no cyanosis, clubbing, or edema.    Musculokeletal: No joint swelling, no muscle tenderness. ROM normal in all joints of extremities. Neurologic: Mental status: Alert and Oriented X3 . Pertinent/ New Labs and Imaging Studies     Imaging Personally Reviewed:  4/6  Grossly stable diffuse interstitial and alveolar opacities which may be   infectious/inflammatory and/or related to fibrosis.  CT of the chest from   02/24/2021 demonstrated evidence of both pneumonia and fibrosis. ECHO  2/25  Left ventricular internal dimensions were normal in diastole and systole. No regional wall motion abnormalities seen. Normal left ventricular ejection fraction. The left atrium is borderline dilated. Moderate mitral annular calcification. Mild mitral regurgitation is present. The aortic valve appears mildly sclerotic. Mild aortic regurgitation is noted. Labs:  Lab Results   Component Value Date    WBC 14.8 04/07/2021    HGB 9.6 04/07/2021    HCT 30.5 04/07/2021    MCV 99.3 04/07/2021    MCH 31.3 04/07/2021    MCHC 31.5 04/07/2021    RDW 14.4 04/07/2021     04/07/2021    MPV 9.7 04/07/2021     Lab Results   Component Value Date     04/07/2021    K 3.7 04/07/2021    CL 99 04/07/2021    CO2 31 04/07/2021    BUN 19 04/07/2021    CREATININE 0.6 04/07/2021    LABALBU 2.5 04/07/2021    LABALBU 4.0 04/24/2012    CALCIUM 8.5 04/07/2021    GFRAA >60 04/07/2021    LABGLOM >60 04/07/2021     Lab Results   Component Value Date    PROTIME 33.4 04/06/2021    PROTIME 15.8 05/12/2014    INR 3.0 04/06/2021     Recent Labs     04/06/21  1529   PROBNP 4,343*     Recent Labs     04/07/21  0405   PROCAL 0.20*     This SmartLink has not been configured with any valid records. Micro:  No results for input(s): CULTRESP in the last 72 hours. No results for input(s): LABGRAM in the last 72 hours. No results for input(s): LEGUR in the last 72 hours. Recent Labs     04/07/21  1206   STREPNEUMAGU Presumptive negative- suggests no current or recent  pneumococcal infection.   Infection due to Strep pneumoniae cannot be  ruled out since the antigen present in the sample  may be below the detection limit of the test.  Normal Range:Presumptive Negative       Recent Labs     04/07/21  1206   LP1UAG Presumptive Negative -suggesting no recent or current infections  with Legionella pneumophila serogroup 1. Infection to Legionella cannot be ruled out since other serogroups  and species may cause infection, antigen may not be present in  early infection, or level of antigen may be below the  detection limit. Normal Range: Presumptive Negative            Assessment:    1. Acute respiratory failure with hypoxia  2. HFpEF  3. 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 JANETTE  7. Seasonal allergies   8. A fib on anticoagulation  9. Fluid overload  10. Anemia  11. Leukocytosis   12. Sacral decubitus ulcer       Plan:   1. Oxygen therapy 13 L HF wean to keep >92%  2. Bipap for respiratory support  3. Diuresis lasix 20 mg IV TID, mgmt of chf and a fib per cardiology   4. Brovana, budesonide bid, xopenex prn-add ezpap  5. Continue incentive spirometer. Recruitment techniques  6. Solumedrol 40 mg IV BID, hold wean today, patient struggling to breathe  7. Dvt, gi prophylaxis. Xarelto for PAF   8. Doxycycline for empiric CAP coverage. Trend procal. Monitor for fevers. 9. Local wound care. Surgery consult for debridement  10. Palliative medicine following for goals of care, symptom management  11. PT, OT    This plan of care was reviewed in collaboration with Dr. Cata Mace  Electronically signed by FRAN Ardon CNP on 4/8/2021 at 1:37 PM    I personally saw, examined, and cared for the patient. Labs, medications, radiographs reviewed. I agree with history exam and plans detailed in NP note. Dylon Shrestha M.D.    Pulmonary/Critical Care Medicine

## 2021-04-08 NOTE — PROGRESS NOTES
Pt instructed on IS, demonstrated understanding. Pt goal = 1500, achieved = 550, 36% of predicted goal achieved. Pt lelo well. No complications noted. Pt on 13L HFNC.

## 2021-04-08 NOTE — PROGRESS NOTES
Attempted to wean patient oxygen flow while asleep, off of bipap. SpO2 100% on 13L, decreased to 89% on 11L. Returned to 15, nurse notified, will continue to monitor.

## 2021-04-08 NOTE — PROGRESS NOTES
Occupational Therapy  OCCUPATIONAL THERAPY INITIAL EVALUATION      Date:2021  Patient Name: Josiah Sheldon  MRN: [id number]  : 1935  Room: [address] [place] [address]    Referring Provider: Waylon Vaughan MD    Evaluating OT: Armin Turner OTR/L [id number]    AM-PAC Daily Activity Raw Score:     Recommended Adaptive Equipment: TBD    Diagnosis: acute on chronic CHF. Pt presents to ED from home with SOB, LE edema and sacral wound. Pertinent Medical History: HTN, RA, afib, h/o COVID 2021, neuropathy, CHF, osteoporosis, PAF  Precautions:  Falls, high flow O2, continuous puls ox     Home Living: Pt lives with family in a single story home. Bathroom setup: tub/shower combo with extended tub bench, has 3:1     Prior Level of Function: Hospital bed. Veola Quyen lift or beti steady for all transfers at home. Primarily bed level with most recent re-hospitalizations. Has 24 hour care between family and pd caregivers at home. Pain Level: pt reports discomfort at buttocks with brown care, on air mattress and dressing intact at sacral wound    Cognition: A&O: 3/4. Pt is grossly oriented to self, hospital and temporal concepts. Confusion regarding situation. Lethargic. Problem solving:  poor   Judgement/safety:  poor     Functional Assessment:   Initial Eval Status  Date: 21 Treatment session:  Short Term Goals     Feeding Min A  Set up   Grooming Min A  Set up   UB Dressing Mod A  Donning/doffing hospital gown  Set up   LB Dressing Dependent  Management of socks  Max A    Bathing Max A  Mod A   Toileting Dependent  Incontinent of urine and BM with total assist in brown care bed level  Max A   Bed Mobility  Supine <> sit: Dependent  Rolling:  Mod A     Functional Transfers STS: unable to tolerate d/t poor O2 sat with minimal exertion  Mod A   Functional Mobility NA     Balance Sitting: poor  Unable to tolerate EOB sitting, returned to supine for safety d/t decreased O2 saturation with exertion    Standing: unable to tolerate     Activity Tolerance Poor  13L O2   Restin%    With bed level activity and sitting EOB: 78%    Mod recovery time in supported semi supine to 88%  sitting lelo x8-10 min with fair balance during self care tasks           OK per nursing for OT evaluation this date    Treatment: Patient educated on techniques for completion of ADL, safe functional transfers and functional mobility. Patient required cues for follow through with proper hand/foot placement, pacing, safety, compensatory strategies, breathing, attention, sequencing and technique in bed mobility, toileting, UB dressing and LB dressing in preparation for maximum independence in all self care tasks.      Hand Dominance: Right []  Left []   Strength ROM Additional Info:    RUE  3+/5 WFL  Fair  and FMC/dexterity noted during ADL tasks    Arthritic changes at digits/hands     LUE 3+/5 WFL  fair  and FMC/dexterity noted during ADL tasks    Arthritic changes at digits/hands         Hearing: [place]  Vision: WFL   Sensation:  No c/o numbness or tingling   Tone: WFL                             Long Term Goal (1-3 wks): Pt will maximize functional performance in all self care tasks/functional transfers with good follow through of all trained techniques for safe transition to next level of care    Assessment of current deficits   Functional mobility [x]  ADLs [x] Strength [x]  Cognition []  Functional transfers  [x] IADLs [x] Safety Awareness [x]  Endurance [x]  Fine Motor Coordination [x] Balance [x] Vision/perception [] Sensation []   Gross Motor Coordination [x] ROM [x] Delirium []                  Motor Control []    Plan of Care: 1-3 days/week for 1-2 weeks PRN   [x]ADL retraining/adaptive techniques and AE recommendations to increase functional independence within precautions                    [x]Energy conservation techniques to improve tolerance for ADL/IADLs  [x]Functional transfer/DME recommendations for increased independence, safety and fall prevention         [x]Patient/family education to increase safety and functional independence during daily routine          [x]Environmental modifications for safe mobility and completion of ADLs                             []Cognitive retraining to improve problem solving skills & safe participation in ADLs/IADLs     []Sensory re-education techniques to improve extremity awareness, maintain skin integrity and improve hand function                             []Visual/Perceptual retraining to improve body awareness and safety during transfers and ADLs  []Splinting/positioning needs to maintain joint/skin integrity and contracture prevention  [x]Therapeutic activity to improve functional performance during ADLs                                        [x]Therapeutic exercise to improve tolerance and functional strength for ADLs   [x]Balance retraining/tolerance tasks for facilitation of postural control with dynamic challenges during ADLs  []Neuromuscular re-education to facilitate righting/equilibrium reactions, midline orientation, scapular stability/mobility, normalize muscle tone and facilitate active functional movement                        []Delirium prevention/treatment    [x]Positioning to improve functional independence and decrease risk of skin breakdown  []Other:     Rehab Potential: Guarded for established goals     Patient/Family Goal: Pt does not state goal.      Patient and/or family were instructed on functional diagnosis, prognosis/goals and OT plan of care. Pt verbalized questionable understanding. Upon arrival, patient supine in bed. At end of session, patient supine in bed with call light and phone within reach, all lines and tubes intact. Pt would benefit from continued skilled OT to increase safety and independence with completion of ADL/IADL tasks for functional independence and quality of life.  Bed/chair alarm: ON    Eval Complexity: Mod [id number]  - Profile and History- expanded:

## 2021-04-08 NOTE — PROGRESS NOTES
Patient is refusing bipap at this time. She was educated on the importance and use of the bipap machine, but stated that she \"couldn't breathe\". SpO2 was 95% at that time. Patient was placed on 13 L high flow nasal cannula.  SpO2 92%

## 2021-04-08 NOTE — PROGRESS NOTES
[facility]  Progress Note    Chief complaint :  Chief Complaint   Patient presents with    Shortness of Breath       Subjective:    No overnight problems. Patient describes feeling better. She says she is still short of breath but feels like it is improving. She did desaturate when moving to participate with exam. She responded appropriately with rest after 15 seconds. Tolerating diet. Past medical, surgical, family and social history were reviewed, non-contributory, and unchanged unless otherwise stated. Review of Systems   Constitutional: Positive for fatigue. Negative for appetite change, chills and fever. HENT: Negative for congestion, sneezing and sore throat. Respiratory: Positive for shortness of breath. Negative for cough. Cardiovascular: Negative for chest pain. Gastrointestinal: Negative for abdominal pain, constipation, diarrhea, nausea and vomiting. Genitourinary: Negative for dysuria. Neurological: Negative for dizziness, numbness and headaches. Psychiatric/Behavioral: The patient is not nervous/anxious. Objective:  /82   Pulse 81   Temp 97.7 °F (36.5 °C) (Oral)   Resp 15   Ht 5' 2\" (1.575 m)   Wt 120 lb 3.2 oz (54.5 kg)   SpO2 97%   BMI 21.98 kg/m²     Physical Exam  Vitals signs reviewed. Constitutional:       General: She is not in acute distress. Appearance: Normal appearance. HENT:      Head: Normocephalic. Nose: Nose normal.      Mouth/Throat:      Mouth: Mucous membranes are moist.   Eyes:      Pupils: Pupils are equal, round, and reactive to light. Cardiovascular:      Rate and Rhythm: Normal rate and regular rhythm. Heart sounds: Murmur present. Systolic murmur present. Pulmonary:      Effort: Pulmonary effort is normal.      Breath sounds: No rhonchi. Comments: Crackles bilateral bases  Abdominal:      General: Bowel sounds are normal.      Palpations: Abdomen is soft.       Tenderness: There is no abdominal tenderness. Musculoskeletal:      Right lower leg: Edema present. Left lower leg: Edema present. Skin:     General: Skin is warm. Capillary Refill: Capillary refill takes less than 2 seconds. Neurological:      General: No focal deficit present. Mental Status: She is alert and oriented to person, place, and time. Psychiatric:         Mood and Affect: Mood normal.         Thought Content:  Thought content normal.         Judgment: Judgment normal.           Labs:  Recent Results (from the past 24 hour(s))   Comprehensive Metabolic Panel w/ Reflex to MG    Collection Time: 04/08/21  1:25 PM   Result Value Ref Range    Sodium 133 132 - 146 mmol/L    Potassium reflex Magnesium 3.9 3.5 - 5.0 mmol/L    Chloride 91 (L) 98 - 107 mmol/L    CO2 32 (H) 22 - 29 mmol/L    Anion Gap 10 7 - 16 mmol/L    Glucose 161 (H) 74 - 99 mg/dL    BUN 25 (H) 8 - 23 mg/dL    CREATININE 0.7 0.5 - 1.0 mg/dL    GFR Non-African American >60 >=60 mL/min/1.73    GFR African American >60     Calcium 9.5 8.6 - 10.2 mg/dL    Total Protein 6.3 (L) 6.4 - 8.3 g/dL    Albumin 2.6 (L) 3.5 - 5.2 g/dL    Total Bilirubin 0.5 0.0 - 1.2 mg/dL    Alkaline Phosphatase 135 (H) 35 - 104 U/L    ALT 11 0 - 32 U/L    AST 19 0 - 31 U/L   CBC auto differential    Collection Time: 04/08/21  1:25 PM   Result Value Ref Range    WBC 17.9 (H) 4.5 - 11.5 E9/L    RBC 3.34 (L) 3.50 - 5.50 E12/L    Hemoglobin 10.1 (L) 11.5 - 15.5 g/dL    Hematocrit 33.5 (L) 34.0 - 48.0 %    .3 (H) 80.0 - 99.9 fL    MCH 30.2 26.0 - 35.0 pg    MCHC 30.1 (L) 32.0 - 34.5 %    RDW 14.4 11.5 - 15.0 fL    Platelets 986 (H) 579 - 450 E9/L    MPV 9.8 7.0 - 12.0 fL    Neutrophils % 92.7 (H) 43.0 - 80.0 %    Immature Granulocytes % 1.2 0.0 - 5.0 %    Lymphocytes % 2.8 (L) 20.0 - 42.0 %    Monocytes % 3.2 2.0 - 12.0 %    Eosinophils % 0.0 0.0 - 6.0 %    Basophils % 0.1 0.0 - 2.0 %    Neutrophils Absolute 16.61 (H) 1.80 - 7.30 E9/L    Immature Granulocytes # 0.22 E9/L    Lymphocytes Absolute 0.51 (L) 1.50 - 4.00 E9/L    Monocytes Absolute 0.58 0.10 - 0.95 E9/L    Eosinophils Absolute 0.00 (L) 0.05 - 0.50 E9/L    Basophils Absolute 0.02 0.00 - 0.20 E9/L    Vacuolated Neutrophils 1+     Polychromasia 2+     Hypochromia 2+     Poikilocytes 2+     Ovalocytes 1+     Stomatocytes 2+     Target Cells 1+     Tear Drop Cells 1+        Radiology and other tests reviewed:  XR CHEST PORTABLE   Final Result   Grossly stable diffuse interstitial and alveolar opacities which may be   infectious/inflammatory and/or related to fibrosis. CT of the chest from   02/24/2021 demonstrated evidence of both pneumonia and fibrosis.              Assessment:  Active Hospital Problems    Diagnosis Date Noted    Frailty [R54]     Pressure injury of sacral region, unstageable ([facility] [L89.150] 04/06/2021    Acute on chronic diastolic heart failure (HCC) [I50.33] 04/06/2021    Chronic respiratory failure with hypoxia (HCC) [J96.11] 03/19/2021    Anemia [D64.9] 07/26/2014    A-fib ([address] [I48.91] 06/09/2011       Plan:  Acute on chronic respiratory failure  -Likely secondary to HFpEF but contributing factors include ILD due to RA, COPD, and recent COVID infection  -Lasix increased to 40 mg IV TID   -Wean oxygen as tolerated; on 11L O2  -Continue home Xopenex nebs PRN  -Home Symbicort changed to Brovana/Pulmicort nebs  -Encouraged incentive spirometer  -Procal does not indicated infection  -Respiratory panel negative  -I/Os: -1.1L  -Cardiology following  -Stared amlodipine and d/[place] hydralazine  -Pulmonology following   -Solumedrol 40mg BID IV     Leukocytosis  -Continual increase  -Likely secondary to steroids but sacral ulcer could be contributing  -Procal does indicate infection  -continue to monitor    Sacral decubitus ulcer  -Continue Doxycycline 100 mg BID until 4/10  -Consult General Surgery, no plan for debridement  -Wound Care consulted  -Pain control scheduled for BID with PRN for breakthrough     Deconditioning  -Palliative care consulted  -PT/OT consulted     Hx of A-fib  -Continue home Metoprolol 25 mg BID, Flecainide 75 mg BID  -Continue home Xarelto 15 mg daily   -Cardiology consulted     Hx of depression  -Continue home Cymbalta 40 mg daily     Hx of rib fractures  -Continue Norco 5 mg every 4 hours PRN for severe pain  -Continue home Celebrex 200 mg daily   -Continue Lidoderm patches     Hx of hypothyroidism  -Continue home Synthroid 100 mcg daily     Hx of moderate gastritis  Seen on EGD on 4/2020  -Prilosec changed to Protonix 40 mg PO BID     Hx of RA  -On Leucovorin and Methotrexate- takes these medications on Monday     Hx of Anemia  -Continue home Iron 325 mg every other day     Hx of HTN  -Continue home Diovan 320 mg daily   -Continue home Hydralazine 25 mg BID     DVT ppx: Xarelto  GI ppx: Protonix  Code Status: Full Code  Diet: Cardiac     [address] Resident PGY-1  04/09/21   7:49 AM

## 2021-04-08 NOTE — PROGRESS NOTES
Bipap settings adjusted to increase patient comfort and hopefully compliance. States that she wasn't getting enough so ipap increased. Saturations currently measuring 94% on 50% FiO2, 13LHFNC only 89% SpO2, Patient educated and encouraged by respiratory and nursing.

## 2021-04-08 NOTE — PROGRESS NOTES
Date: 4/7/2021    Time: 8:49 PM    Patient Placed On BIPAP/CPAP/ Non-Invasive Ventilation? Yes    If no must comment. Facial area red/color change? No           If YES are Blister/Lesion present? No   If yes must notify nursing staff  BIPAP/CPAP skin barrier? Yes    Skin barrier type:mepilexlite       Comments: Tolerating well at this time.        Douglas Rosenthal

## 2021-04-08 NOTE — PROGRESS NOTES
Physical Therapy    Facility/Department: [address] INTERNAL MEDICINE 2  Initial Assessment    NAME: Ethan Nascimento  : 1935  MRN: [id number]    Date of Service: 2021      Patient Diagnosis(es): The primary encounter diagnosis was Acute respiratory failure with hypoxia ([address] A diagnosis of Congestive heart failure, unspecified HF chronicity, unspecified heart failure type [facility]) was also pertinent to this visit. has a past medical history of Anticoagulated, Atrial fibrillation ([address] Bimalleolar fracture, DGOUD-23, Diastolic dysfunction, Difficulty walking, Diverticular disease, Elevated CA-125, Hiatal hernia, Hypertension, Hypothyroid, Meningocele spinal ([address] Neuropathy, JANETTE (obstructive sleep apnea), Osteoporosis, PAF (paroxysmal atrial fibrillation) ([address] Pain in both lower legs, Patellar fracture, Rheumatoid arthritis with rheumatoid factor ([address] Rheumatoid arthritis(714.0), Rib fractures, Seasonal allergies, Skin cancer of lip, and Wears dentures. has a past surgical history that includes Gastric fundoplication ();  section; Carpal tunnel release (Bilateral); Colonoscopy (2011); Total knee arthroplasty (2007); Cardiac catheterization; Hysterectomy, total abdominal (early ); Upper gastrointestinal endoscopy (N/A, 2020); and Colonoscopy (N/A, 2020). Referring Provider:  Daily Carranza MD      Evaluating Therapist: Greg Mcintosh PT      Room #:419  DIAGNOSIS: Acute on Chronic CHF  Additional Pertinent History:Covid, RA  PRECAUTIONS: falls, high flow O2, continuous pulse ox    Social:  Pt lives with family in a 1 floor plan. Prior to admission Pt mostly bed bound. Dependent with Smith Bhavana lift for transfers. Initial Evaluation  Date: 21 Treatment      Short Term/ Long Term   Goals   Was pt agreeable to Eval/treatment?  yes     Does pt have pain? no     Bed Mobility  Rolling: mod assist  Supine to sit: dependent  Sit to supine: dependent  Scooting: dependent  Min assist to roll  Mod assist for supine <> sit and scooting   Transfers NT  N. A pt does not stand   Ambulation    NT  N/A   Stair Negotiation  Ascended and descended  NT   N/A   LE strength     0/5        balance      Sitting balance poor     AM-PAC Raw score               7/24         Pt is alert and Oriented   LE ROM: WFL  Sensation: decreased B LEs  Edema: none  Endurance: poor     ASSESSMENT  Pt displays functional ability as noted in the objective portion of this evaluation. Patient education  Pt educated on PT objectives    Patient response to education:   Pt verbalized understanding Pt demonstrated skill Pt requires further education in this area   yes         ASSESSMENT:    Comments:  SpO2 at rest on 13L 91%. After mobility 78%  Recovered to 88%. Pt incontinent of bowel and bladder. Dependent for hygiene  Patient and or family understand(s) diagnosis, prognosis, and plan of care. PLAN:    PLAN OF CARE:    Current Treatment Recommendations     [x] Strengthening     [] ROM   [x] Balance Training   [x] Endurance Training   [] Transfer Training   [] Gait Training   [] Stair Training   [x] Positioning   [x] Safety and Education Training   [x] Patient/Caregiver Education   [] HEP  [] Other     Frequency of treatments: 1-3x/week x 5 .days    Time in  0820  Time out  0840      Evaluation Time includes thorough review of current medical information, gathering information on past medical history/social history and prior level of function, completion of standardized testing/informal observation of tasks, assessment of data and education on plan of care and goals.     CPT codes:  [x] Low Complexity PT evaluation 36343  [] Moderate Complexity PT evaluation 70819  [] High Complexity PT evaluation 58152  [] PT Re-evaluation 04702  [] Gait training 16752 minutes  [] Manual therapy 50750 minutes  [] Therapeutic activities 11362 minutes  [] Therapeutic exercises 65702 minutes  [] Neuromuscular reeducation 23470 minutes     Sussy Hillman PT [id number]

## 2021-04-09 LAB
ALBUMIN SERPL-MCNC: 3 G/DL (ref 3.5–5.2)
ALP BLD-CCNC: 129 U/L (ref 35–104)
ALT SERPL-CCNC: 8 U/L (ref 0–32)
ANION GAP SERPL CALCULATED.3IONS-SCNC: 12 MMOL/L (ref 7–16)
AST SERPL-CCNC: 17 U/L (ref 0–31)
BASOPHILS ABSOLUTE: 0.01 E9/L (ref 0–0.2)
BASOPHILS RELATIVE PERCENT: 0.1 % (ref 0–2)
BILIRUB SERPL-MCNC: 0.4 MG/DL (ref 0–1.2)
BUN BLDV-MCNC: 31 MG/DL (ref 8–23)
CALCIUM SERPL-MCNC: 9.7 MG/DL (ref 8.6–10.2)
CHLORIDE BLD-SCNC: 92 MMOL/L (ref 98–107)
CO2: 36 MMOL/L (ref 22–29)
CREAT SERPL-MCNC: 0.8 MG/DL (ref 0.5–1)
EOSINOPHILS ABSOLUTE: 0.01 E9/L (ref 0.05–0.5)
EOSINOPHILS RELATIVE PERCENT: 0.1 % (ref 0–6)
GFR AFRICAN AMERICAN: >60
GFR NON-AFRICAN AMERICAN: >60 ML/MIN/1.73
GLUCOSE BLD-MCNC: 159 MG/DL (ref 74–99)
HCT VFR BLD CALC: 36.7 % (ref 34–48)
HEMOGLOBIN: 10.9 G/DL (ref 11.5–15.5)
IMMATURE GRANULOCYTES #: 0.15 E9/L
IMMATURE GRANULOCYTES %: 0.9 % (ref 0–5)
LYMPHOCYTES ABSOLUTE: 0.78 E9/L (ref 1.5–4)
LYMPHOCYTES RELATIVE PERCENT: 4.7 % (ref 20–42)
MAGNESIUM: 1.6 MG/DL (ref 1.6–2.6)
MCH RBC QN AUTO: 29.9 PG (ref 26–35)
MCHC RBC AUTO-ENTMCNC: 29.7 % (ref 32–34.5)
MCV RBC AUTO: 100.8 FL (ref 80–99.9)
MONOCYTES ABSOLUTE: 0.82 E9/L (ref 0.1–0.95)
MONOCYTES RELATIVE PERCENT: 4.9 % (ref 2–12)
NEUTROPHILS ABSOLUTE: 14.88 E9/L (ref 1.8–7.3)
NEUTROPHILS RELATIVE PERCENT: 89.3 % (ref 43–80)
PDW BLD-RTO: 14.2 FL (ref 11.5–15)
PLATELET # BLD: 705 E9/L (ref 130–450)
PMV BLD AUTO: 9.5 FL (ref 7–12)
POTASSIUM REFLEX MAGNESIUM: 3.3 MMOL/L (ref 3.5–5)
PROCALCITONIN: 0.15 NG/ML (ref 0–0.08)
RBC # BLD: 3.64 E12/L (ref 3.5–5.5)
SODIUM BLD-SCNC: 140 MMOL/L (ref 132–146)
TOTAL PROTEIN: 6.4 G/DL (ref 6.4–8.3)
WBC # BLD: 16.7 E9/L (ref 4.5–11.5)

## 2021-04-09 PROCEDURE — 6360000002 HC RX W HCPCS: Performed by: NURSE PRACTITIONER

## 2021-04-09 PROCEDURE — 2700000000 HC OXYGEN THERAPY PER DAY

## 2021-04-09 PROCEDURE — 94660 CPAP INITIATION&MGMT: CPT

## 2021-04-09 PROCEDURE — 6360000002 HC RX W HCPCS: Performed by: STUDENT IN AN ORGANIZED HEALTH CARE EDUCATION/TRAINING PROGRAM

## 2021-04-09 PROCEDURE — 94761 N-INVAS EAR/PLS OXIMETRY MLT: CPT

## 2021-04-09 PROCEDURE — 99232 SBSQ HOSP IP/OBS MODERATE 35: CPT | Performed by: FAMILY MEDICINE

## 2021-04-09 PROCEDURE — 83735 ASSAY OF MAGNESIUM: CPT

## 2021-04-09 PROCEDURE — 80053 COMPREHEN METABOLIC PANEL: CPT

## 2021-04-09 PROCEDURE — 6370000000 HC RX 637 (ALT 250 FOR IP): Performed by: FAMILY MEDICINE

## 2021-04-09 PROCEDURE — 6370000000 HC RX 637 (ALT 250 FOR IP): Performed by: STUDENT IN AN ORGANIZED HEALTH CARE EDUCATION/TRAINING PROGRAM

## 2021-04-09 PROCEDURE — 99232 SBSQ HOSP IP/OBS MODERATE 35: CPT | Performed by: STUDENT IN AN ORGANIZED HEALTH CARE EDUCATION/TRAINING PROGRAM

## 2021-04-09 PROCEDURE — 6360000002 HC RX W HCPCS: Performed by: FAMILY MEDICINE

## 2021-04-09 PROCEDURE — 84145 PROCALCITONIN (PCT): CPT

## 2021-04-09 PROCEDURE — 94640 AIRWAY INHALATION TREATMENT: CPT

## 2021-04-09 PROCEDURE — 2580000003 HC RX 258: Performed by: FAMILY MEDICINE

## 2021-04-09 PROCEDURE — 36415 COLL VENOUS BLD VENIPUNCTURE: CPT

## 2021-04-09 PROCEDURE — 2060000000 HC ICU INTERMEDIATE R&B

## 2021-04-09 PROCEDURE — 85025 COMPLETE CBC W/AUTO DIFF WBC: CPT

## 2021-04-09 RX ORDER — FUROSEMIDE 10 MG/ML
60 INJECTION INTRAMUSCULAR; INTRAVENOUS 3 TIMES DAILY
Status: DISCONTINUED | OUTPATIENT
Start: 2021-04-09 | End: 2021-04-12

## 2021-04-09 RX ORDER — LEUCOVORIN CALCIUM 5 MG/1
25 TABLET ORAL ONCE
Status: COMPLETED | OUTPATIENT
Start: 2021-04-13 | End: 2021-04-13

## 2021-04-09 RX ORDER — MAGNESIUM SULFATE IN WATER 40 MG/ML
2000 INJECTION, SOLUTION INTRAVENOUS ONCE
Status: COMPLETED | OUTPATIENT
Start: 2021-04-09 | End: 2021-04-09

## 2021-04-09 RX ADMIN — POTASSIUM BICARBONATE 40 MEQ: 782 TABLET, EFFERVESCENT ORAL at 15:20

## 2021-04-09 RX ADMIN — VALSARTAN 320 MG: 320 TABLET ORAL at 08:37

## 2021-04-09 RX ADMIN — METHYLPREDNISOLONE SODIUM SUCCINATE 40 MG: 40 INJECTION, POWDER, LYOPHILIZED, FOR SOLUTION INTRAMUSCULAR; INTRAVENOUS at 08:39

## 2021-04-09 RX ADMIN — METHYLPREDNISOLONE SODIUM SUCCINATE 40 MG: 40 INJECTION, POWDER, LYOPHILIZED, FOR SOLUTION INTRAMUSCULAR; INTRAVENOUS at 21:04

## 2021-04-09 RX ADMIN — POTASSIUM BICARBONATE 20 MEQ: 782 TABLET, EFFERVESCENT ORAL at 08:36

## 2021-04-09 RX ADMIN — FUROSEMIDE 60 MG: 10 INJECTION, SOLUTION INTRAMUSCULAR; INTRAVENOUS at 21:04

## 2021-04-09 RX ADMIN — FLECAINIDE ACETATE 75 MG: 50 TABLET ORAL at 08:39

## 2021-04-09 RX ADMIN — FERROUS SULFATE TAB 325 MG (65 MG ELEMENTAL FE) 325 MG: 325 (65 FE) TAB at 08:39

## 2021-04-09 RX ADMIN — BUDESONIDE 500 MCG: 0.5 SUSPENSION RESPIRATORY (INHALATION) at 20:06

## 2021-04-09 RX ADMIN — AMLODIPINE BESYLATE 5 MG: 5 TABLET ORAL at 08:39

## 2021-04-09 RX ADMIN — MAGNESIUM SULFATE HEPTAHYDRATE 2000 MG: 40 INJECTION, SOLUTION INTRAVENOUS at 17:03

## 2021-04-09 RX ADMIN — ARFORMOTEROL TARTRATE 15 MCG: 15 SOLUTION RESPIRATORY (INHALATION) at 20:06

## 2021-04-09 RX ADMIN — PANTOPRAZOLE SODIUM 40 MG: 40 TABLET, DELAYED RELEASE ORAL at 15:20

## 2021-04-09 RX ADMIN — ZINC SULFATE 220 MG (50 MG) CAPSULE 50 MG: CAPSULE at 12:02

## 2021-04-09 RX ADMIN — Medication 10 ML: at 13:43

## 2021-04-09 RX ADMIN — DOXYCYCLINE HYCLATE 100 MG: 100 CAPSULE ORAL at 21:04

## 2021-04-09 RX ADMIN — DOCUSATE SODIUM 50 MG AND SENNOSIDES 8.6 MG 2 TABLET: 8.6; 5 TABLET, FILM COATED ORAL at 20:58

## 2021-04-09 RX ADMIN — FUROSEMIDE 40 MG: 10 INJECTION, SOLUTION INTRAMUSCULAR; INTRAVENOUS at 06:27

## 2021-04-09 RX ADMIN — PANTOPRAZOLE SODIUM 40 MG: 40 TABLET, DELAYED RELEASE ORAL at 06:27

## 2021-04-09 RX ADMIN — DOCUSATE SODIUM 50 MG AND SENNOSIDES 8.6 MG 2 TABLET: 8.6; 5 TABLET, FILM COATED ORAL at 08:37

## 2021-04-09 RX ADMIN — BUDESONIDE 500 MCG: 0.5 SUSPENSION RESPIRATORY (INHALATION) at 08:53

## 2021-04-09 RX ADMIN — HYDROCODONE BITARTRATE AND ACETAMINOPHEN 1 TABLET: 5; 325 TABLET ORAL at 16:53

## 2021-04-09 RX ADMIN — DOXYCYCLINE HYCLATE 100 MG: 100 CAPSULE ORAL at 08:37

## 2021-04-09 RX ADMIN — DULOXETINE HYDROCHLORIDE 40 MG: 20 CAPSULE, DELAYED RELEASE ORAL at 08:38

## 2021-04-09 RX ADMIN — ACETAMINOPHEN 650 MG: 325 TABLET ORAL at 12:07

## 2021-04-09 RX ADMIN — Medication 2000 UNITS: at 08:37

## 2021-04-09 RX ADMIN — METOPROLOL TARTRATE 50 MG: 50 TABLET, FILM COATED ORAL at 20:58

## 2021-04-09 RX ADMIN — CELECOXIB 200 MG: 100 CAPSULE ORAL at 08:38

## 2021-04-09 RX ADMIN — ARFORMOTEROL TARTRATE 15 MCG: 15 SOLUTION RESPIRATORY (INHALATION) at 08:53

## 2021-04-09 RX ADMIN — HYDROCODONE BITARTRATE AND ACETAMINOPHEN 1 TABLET: 5; 325 TABLET ORAL at 20:58

## 2021-04-09 RX ADMIN — HYDROCODONE BITARTRATE AND ACETAMINOPHEN 1 TABLET: 5; 325 TABLET ORAL at 08:39

## 2021-04-09 RX ADMIN — METOPROLOL TARTRATE 50 MG: 50 TABLET, FILM COATED ORAL at 08:37

## 2021-04-09 RX ADMIN — Medication 10 ML: at 10:08

## 2021-04-09 RX ADMIN — SODIUM CHLORIDE, PRESERVATIVE FREE 10 ML: 5 INJECTION INTRAVENOUS at 21:06

## 2021-04-09 RX ADMIN — FUROSEMIDE 60 MG: 10 INJECTION, SOLUTION INTRAMUSCULAR; INTRAVENOUS at 13:43

## 2021-04-09 RX ADMIN — LEVOTHYROXINE SODIUM 100 MCG: 100 TABLET ORAL at 06:27

## 2021-04-09 RX ADMIN — FLECAINIDE ACETATE 75 MG: 50 TABLET ORAL at 21:06

## 2021-04-09 RX ADMIN — SODIUM CHLORIDE, PRESERVATIVE FREE 10 ML: 5 INJECTION INTRAVENOUS at 08:40

## 2021-04-09 RX ADMIN — COLLAGENASE SANTYL: 250 OINTMENT TOPICAL at 08:40

## 2021-04-09 ASSESSMENT — ENCOUNTER SYMPTOMS
CONSTIPATION: 0
VOMITING: 0
ABDOMINAL PAIN: 0
SHORTNESS OF BREATH: 1
NAUSEA: 0
SORE THROAT: 0
COUGH: 0
DIARRHEA: 0

## 2021-04-09 ASSESSMENT — PAIN DESCRIPTION - ONSET: ONSET: ON-GOING

## 2021-04-09 ASSESSMENT — PAIN SCALES - GENERAL
PAINLEVEL_OUTOF10: 6
PAINLEVEL_OUTOF10: 3
PAINLEVEL_OUTOF10: 4
PAINLEVEL_OUTOF10: 0

## 2021-04-09 ASSESSMENT — PAIN DESCRIPTION - DESCRIPTORS: DESCRIPTORS: DISCOMFORT

## 2021-04-09 ASSESSMENT — PAIN DESCRIPTION - FREQUENCY: FREQUENCY: CONTINUOUS

## 2021-04-09 ASSESSMENT — PAIN DESCRIPTION - LOCATION: LOCATION: BUTTOCKS

## 2021-04-09 NOTE — PROGRESS NOTES
[address] [address]  Progress Note    Chief complaint :  Chief Complaint   Patient presents with    Shortness of Breath       Subjective:    No overnight problems. Patient is feeling overall better. She states that her pain is better controlled. She was weaned down to 5 L NC. Patient had a bloody bowel movement yesterday afternoon. Daughter states that her Lasix dose was increased yesterday. Past medical, surgical, family and social history were reviewed, non-contributory, and unchanged unless otherwise stated. Review of Systems   Constitutional: Positive for fatigue. Negative for appetite change, chills and fever. HENT: Negative for congestion, sneezing and sore throat. Respiratory: Positive for shortness of breath. Negative for cough. Cardiovascular: Negative for chest pain. Gastrointestinal: Negative for abdominal pain, constipation, diarrhea, nausea and vomiting. Genitourinary: Negative for dysuria. Neurological: Negative for dizziness, numbness and headaches. Psychiatric/Behavioral: The patient is not nervous/anxious. Objective:  BP (!) 123/58   Pulse 68   Temp 98.1 °F (36.7 °C) (Oral)   Resp 16   Ht 5' 2\" (1.575 m)   Wt 120 lb 3.2 oz (54.5 kg)   SpO2 92%   BMI 21.98 kg/m²     Physical Exam  Vitals signs reviewed. Constitutional:       General: She is not in acute distress. Appearance: Normal appearance. HENT:      Head: Normocephalic. Nose: Nose normal.      Mouth/Throat:      Mouth: Mucous membranes are moist.   Eyes:      Pupils: Pupils are equal, round, and reactive to light. Cardiovascular:      Rate and Rhythm: Normal rate and regular rhythm. Heart sounds: Murmur present. Systolic murmur present. Pulmonary:      Effort: Pulmonary effort is normal.      Breath sounds: No rhonchi. Comments: Crackles bilateral bases  Abdominal:      General: Bowel sounds are normal.      Palpations: Abdomen is soft. Tenderness: There is no abdominal tenderness. Musculoskeletal:      Right lower leg: Edema present. Left lower leg: Edema present. Skin:     General: Skin is warm. Capillary Refill: Capillary refill takes less than 2 seconds. Neurological:      General: No focal deficit present. Mental Status: She is alert and oriented to person, place, and time. Psychiatric:         Mood and Affect: Mood normal.         Thought Content:  Thought content normal.         Judgment: Judgment normal.           Labs:  Recent Results (from the past 24 hour(s))   Comprehensive Metabolic Panel w/ Reflex to MG    Collection Time: 04/09/21  9:45 AM   Result Value Ref Range    Sodium 140 132 - 146 mmol/L    Potassium reflex Magnesium 3.3 (L) 3.5 - 5.0 mmol/L    Chloride 92 (L) 98 - 107 mmol/L    CO2 36 (H) 22 - 29 mmol/L    Anion Gap 12 7 - 16 mmol/L    Glucose 159 (H) 74 - 99 mg/dL    BUN 31 (H) 8 - 23 mg/dL    CREATININE 0.8 0.5 - 1.0 mg/dL    GFR Non-African American >60 >=60 mL/min/1.73    GFR African American >60     Calcium 9.7 8.6 - 10.2 mg/dL    Total Protein 6.4 6.4 - 8.3 g/dL    Albumin 3.0 (L) 3.5 - 5.2 g/dL    Total Bilirubin 0.4 0.0 - 1.2 mg/dL    Alkaline Phosphatase 129 (H) 35 - 104 U/L    ALT 8 0 - 32 U/L    AST 17 0 - 31 U/L   CBC auto differential    Collection Time: 04/09/21  9:45 AM   Result Value Ref Range    WBC 16.7 (H) 4.5 - 11.5 E9/L    RBC 3.64 3.50 - 5.50 E12/L    Hemoglobin 10.9 (L) 11.5 - 15.5 g/dL    Hematocrit 36.7 34.0 - 48.0 %    .8 (H) 80.0 - 99.9 fL    MCH 29.9 26.0 - 35.0 pg    MCHC 29.7 (L) 32.0 - 34.5 %    RDW 14.2 11.5 - 15.0 fL    Platelets 695 (H) 378 - 450 E9/L    MPV 9.5 7.0 - 12.0 fL    Neutrophils % 89.3 (H) 43.0 - 80.0 %    Immature Granulocytes % 0.9 0.0 - 5.0 %    Lymphocytes % 4.7 (L) 20.0 - 42.0 %    Monocytes % 4.9 2.0 - 12.0 %    Eosinophils % 0.1 0.0 - 6.0 %    Basophils % 0.1 0.0 - 2.0 %    Neutrophils Absolute 14.88 (H) 1.80 - 7.30 E9/L    Immature Granulocytes # 0.15 E9/L    Lymphocytes Absolute 0.78 (L) 1.50 - 4.00 E9/L    Monocytes Absolute 0.82 0.10 - 0.95 E9/L    Eosinophils Absolute 0.01 (L) 0.05 - 0.50 E9/L    Basophils Absolute 0.01 0.00 - 0.20 E9/L   Procalcitonin    Collection Time: 04/09/21  9:45 AM   Result Value Ref Range    Procalcitonin 0.15 (H) 0.00 - 0.08 ng/mL   Magnesium    Collection Time: 04/09/21  9:45 AM   Result Value Ref Range    Magnesium 1.6 1.6 - 2.6 mg/dL       Radiology and other tests reviewed:  XR CHEST PORTABLE   Final Result   Grossly stable diffuse interstitial and alveolar opacities which may be   infectious/inflammatory and/or related to fibrosis. CT of the chest from   02/24/2021 demonstrated evidence of both pneumonia and fibrosis.              Assessment:  Active Hospital Problems    Diagnosis Date Noted    Leukocytosis [D72.829]     Frailty [R54]     Pressure injury of sacral region, unstageable ([address] [L89.150] 04/06/2021    Acute on chronic diastolic heart failure (HCC) [I50.33] 04/06/2021    Chronic respiratory failure with hypoxia (HCC) [J96.11] 03/19/2021    Anemia [D64.9] 07/26/2014    A-fib ([address] [I48.91] 06/09/2011       Plan:  Acute on chronic respiratory failure  -Likely secondary to HFpEF but contributing factors include ILD due to RA, COPD, and recent COVID infection  -On Lasix 60 mg IV TID   -Solumedrol 40mg BID IV- transition to oral prednisone for discharge  -Wean oxygen as tolerated; on 5 L O2  -Continue home Xopenex nebs PRN  -Home Symbicort changed to Brovana/Pulmicort nebs  -Encouraged incentive spirometer  -Respiratory panel negative  -Cardiology started patient on Amlodipine 5 mg and stopped hydralazine  -I/Os: -1.8L  -Cardiology following  -Pulmonology following     GI bleed   -POCT occult stool positive  -General surgery was consulted      Leukocytosis  Improving   -Likely secondary to steroids but sacral ulcer could be contributing  -continue to monitor    Sacral decubitus ulcer  -Continue Doxycycline 100 mg BID until 4/10  -No plan to debride wound by general surgery   -Wound Care following  -Pain control: Norco 5 mg BID with PRN for breakthrough     Deconditioning  -Palliative care consulted  -PT/OT consulted     Hx of A-fib  -Continue home Metoprolol 25 mg BID, Flecainide 75 mg BID  -Continue home Xarelto 15 mg daily   -Cardiology consulted     Hx of depression  -Continue home Cymbalta 40 mg daily     Hx of rib fractures  Pain improving   -Continue Norco 5 mg BID and every 4 hours PRN for severe pain  -Continue home Celebrex 200 mg daily   -Continue Lidoderm patches     Hx of hypothyroidism  -Continue home Synthroid 100 mcg daily     Hx of moderate gastritis  Seen on EGD on 4/2020  -Prilosec changed to Protonix 40 mg PO BID     Hx of RA  -On Leucovorin- takes on Tuesday   -On Methotrexate- takes on Monday      Hx of Anemia  -Continue home Iron 325 mg every other day    Hx of HTN  -Continue home Diovan 320 mg daily   -Continue home Hydralazine 25 mg BID     DVT ppx: Xarelto ON HOLD due to rectal bleeding  GI ppx: Protonix  Code Status: Full Code  Diet: Cardiac     Norman Vides MD  Family Medicine Resident PGY-2  04/09/21   4:58 PM

## 2021-04-09 NOTE — PLAN OF CARE
Problem: Skin Integrity:  Goal: Will show no infection signs and symptoms  Description: Will show no infection signs and symptoms  4/9/2021 1045 by Lay Bolaños RN  Outcome: Met This Shift  4/9/2021 0134 by [place] Lilly RN  Outcome: Met This Shift  4/9/2021 0133 by [place] Lilly RN  Outcome: Met This Shift

## 2021-04-09 NOTE — PROGRESS NOTES
Discharge plans for  Home  Patient does not have lo air loss bed or overlay at  Home  Has a stage 4 coccyx ulcer and need pressure redistribution device  Shana Roque.  Shelton Myles, CNS, Wound Care

## 2021-04-09 NOTE — PROGRESS NOTES
Re-visit  Patient had BM and needed redressed. Family request for me to revisit  Coccyx wound, some pink now present. Aashish Torres is looseining  Undermined area still stringy dry slough  Plan  Continue Erlinda  D/w  re need for surface at home  Stage 4 coccyx pressure injury  Concerns for patients intake  Want jacobs but is not allowed due to cardiac diet at [facility].  Clif Brownlee, CNS, Wound Care

## 2021-04-09 NOTE — PROGRESS NOTES
Jacob Rosario M.D.,[facility]  Mirna Lee D.O., F.A.C.O.I., Zac Zepeda M.D. Hamlet Valente M.D., Luisa Day M.D. Emelia Parks D.O. Daily Pulmonary Progress Note    Patient:  Iwona Jacob 80 y.o. female MRN: [id number]     Date of Service: 4/9/2021      Synopsis     We are following patient for respiratory distress, patient with COPD and CHF requiring high flow oxygen    \"CC\" shortness of breath    Code status: Full code      Subjective      Patient was seen and examined lying in bed in no apparent distress. She is currently on 9 L oxygen with a sat of 98%. Per nursing she does desaturate with activity. She did use BiPAP last night for only a few hours settings 14/6. She is using incentive spirometer frequently. She is tolerating increased diuresis per cardiology. Review of Systems:  Constitutional: Denies fever, weight loss, night sweats, and fatigue  Skin: Denies pigmentation, dark lesions, and rashes   HEENT: Denies hearing loss, tinnitus, ear drainage, epistaxis, sore throat, and hoarseness. Cardiovascular: Denies palpitations, chest pain, and chest pressure. Respiratory: Denies cough, dyspnea at rest, hemoptysis, apnea, and choking.   Gastrointestinal: Denies nausea, vomiting, poor appetite, diarrhea, heartburn or reflux  Genitourinary: Denies dysuria, frequency, urgency or hematuria  Musculoskeletal: Denies myalgias, muscle weakness, and bone pain  Neurological: Denies dizziness, vertigo, headache, and focal weakness  Psychological: Denies anxiety and depression  Endocrine: Denies heat intolerance and cold intolerance  Hematopoietic/Lymphatic: Denies bleeding problems and blood transfusions    24-hour events:  None    Objective   Vitals: BP (!) 123/58   Pulse 68   Temp 98.1 °F (36.7 °C) (Oral)   Resp 16   Ht 5' 2\" (1.575 m)   Wt 120 lb 3.2 oz (54.5 kg)   SpO2 92%   BMI 21.98 kg/m²     I/O:    Intake/Output Summary (Last 24 hours) at 4/9/2021 1621 Last data filed at 4/9/2021 1028  Gross per 24 hour   Intake 300 ml   Output 300 ml   Net 0 ml       Vent Information  Skin Assessment: Clean, dry, & intact  FiO2 : 50 %  SpO2: 92 %  I Time/ I Time %: 0.9 s  Mask Type: Full face mask  Mask Size: Small       IPAP: 14 cmH20  CPAP/EPAP: 6 cmH2O     CURRENT MEDS :  Scheduled Meds:   [START ON 4/13/2021] leucovorin calcium  25 mg Oral Once    [START ON 4/12/2021] methotrexate  5 mg Oral Once    [START ON 4/12/2021] methotrexate  10 mg Oral Once    furosemide  60 mg Intravenous TID    magnesium sulfate  2,000 mg Intravenous Once    HYDROcodone 5 mg - acetaminophen  1 tablet Oral BID    sennosides-docusate sodium  2 tablet Oral BID    potassium bicarb-citric acid  20 mEq Oral Daily    collagenase   Topical Daily    amLODIPine  5 mg Oral Daily    methylPREDNISolone  40 mg Intravenous Q12H    celecoxib  200 mg Oral Daily    doxycycline hyclate  100 mg Oral BID WC    DULoxetine  40 mg Oral Daily    ferrous sulfate  325 mg Oral Every Other Day    flecainide  75 mg Oral BID    levothyroxine  100 mcg Oral Daily    lidocaine  1 patch Topical Daily    pantoprazole  40 mg Oral BID AC    valsartan  320 mg Oral Daily    Vitamin D  2,000 Units Oral Daily    rivaroxaban  15 mg Oral Daily    zinc sulfate  50 mg Oral Daily    metoprolol tartrate  50 mg Oral BID    sodium chloride flush  10 mL Intravenous 2 times per day    budesonide  0.5 mg Nebulization BID    Arformoterol Tartrate  15 mcg Nebulization BID       Physical Exam:  General Appearance: appears comfortable in no acute distress. HEENT: Normocephalic atraumatic without obvious abnormality   Neck: Lips, mucosa, and tongue normal.  Supple, symmetrical, trachea midline, no adenopathy;thyroid:  no enlargement/tenderness/nodules or JVD. Lung: Breath sounds bibasilar crackles. Respirations   unlabored. Symmetrical expansion. Heart: RRR, normal S1, S2.  No MRG  Abdomen: Soft, NT, ND. BS present x 4 quadrants. No bruit or organomegaly. Extremities: Pedal pulses 2+ symmetric b/l. Extremities normal, no cyanosis, clubbing, bilateral lower extremity  Musculokeletal: No joint swelling, no muscle tenderness. ROM normal in all joints of extremities. Neurologic: Mental status: Alert and Oriented X3 . Pertinent/ New Labs and Imaging Studies     Imaging Personally Reviewed:  4/6  Grossly stable diffuse interstitial and alveolar opacities which may be   infectious/inflammatory and/or related to fibrosis.  CT of the chest from   02/24/2021 demonstrated evidence of both pneumonia and fibrosis. ECHO  2/25  Left ventricular internal dimensions were normal in diastole and systole. No regional wall motion abnormalities seen. Normal left ventricular ejection fraction. The left atrium is borderline dilated. Moderate mitral annular calcification. Mild mitral regurgitation is present. The aortic valve appears mildly sclerotic. Mild aortic regurgitation is noted. Labs:  Lab Results   Component Value Date    WBC 16.7 04/09/2021    HGB 10.9 04/09/2021    HCT 36.7 04/09/2021    .8 04/09/2021    MCH 29.9 04/09/2021    MCHC 29.7 04/09/2021    RDW 14.2 04/09/2021     04/09/2021    MPV 9.5 04/09/2021     Lab Results   Component Value Date     04/09/2021    K 3.3 04/09/2021    CL 92 04/09/2021    CO2 36 04/09/2021    BUN 31 04/09/2021    CREATININE 0.8 04/09/2021    LABALBU 3.0 04/09/2021    LABALBU 4.0 04/24/2012    CALCIUM 9.7 04/09/2021    GFRAA >60 04/09/2021    LABGLOM >60 04/09/2021     Lab Results   Component Value Date    PROTIME 33.4 04/06/2021    PROTIME 15.8 05/12/2014    INR 3.0 04/06/2021     No results for input(s): PROBNP in the last 72 hours. Recent Labs     04/09/21  0945   PROCAL 0.15*     This SmartLink has not been configured with any valid records. Micro:  No results for input(s): CULTRESP in the last 72 hours.   No results for input(s): LABGRAM in the last 72 Dr. Filiberto Rodrigues  Electronically signed by FRAN Miller CNP on 2021 at 4:21 PM    I personally saw, examined, and cared for the patient. Labs, medications, radiographs reviewed. I agree with history exam and plans detailed in NP note. Esteban Hernandez M.D.    Pulmonary/Critical Care Medicine

## 2021-04-09 NOTE — CARE COORDINATION
4/9/2021  Social Work Discharge Planning:Plan is home with spouse. Pt is primarily bed bound. Pt is on 10 o2 here and uses 4l via [employer]. Wean o2; if unable, then a new order will need to be faxed to [phone]. Plan is home with spouse. Pt has private pay aides 24 hrs/day via [address] [place] [address] and is active with [place]. [phone]  [id number]. TEENA order will be needed. Electronically signed by JU Prescott on 4/9/2021 at 9:46 AM

## 2021-04-09 NOTE — CONSULTS
4/9/2021  1:48 PM     Comprehensive Nutrition Assessment    Type and Reason for Visit:  Initial, Positive Nutrition Screen, Wound, Consult    Nutrition Recommendations/Plan: Recommend liberalize to No Added Salt diet to promote improved PO and will add ONS to all meals    Nutrition Assessment:  Pt admit for acute on chronic CHF. Pt nutritionally compromised 2/2 inadequate oral intake and increased nutrient demand d/t stage IV sacral PI. Recommend liberalize to No added salt diet to improve PO. Will add ONS at all meals as well. Malnutrition Assessment:  Malnutrition Status: At risk for malnutrition (Comment)    Context:  Chronic Illness     Findings of the 6 clinical characteristics of malnutrition:  Energy Intake:     Weight Loss: Body Fat Loss:        Muscle Mass Loss:       Fluid Accumulation:        Strength:       Estimated Daily Nutrient Needs:  Energy (kcal):  ; Weight Used for Energy Requirements:  Admission     Protein (g):  70-85 (1.4-1.6 g/kg); Weight Used for Protein Requirements:  Admission        Fluid (ml/day):  ; Method Used for Fluid Requirements:  1 ml/kcal      Nutrition Related Findings:  A&Ox4, SOB PTA, +2 edema, +BS, poor appetite, I/O WNL      Wounds:  Stage IV, Pressure Injury(sacrum)       Current Nutrition Therapies:    DIET CARDIAC;   Dietary Nutrition Supplements: Wound Healing Oral Supplement  Dietary Nutrition Supplements: Low Calorie High Protein Supplement    Anthropometric Measures:  · Height: 5' 2\" (157.5 cm)  · Current Body Weight: 120 lb (54.4 kg)(4/9 bedscale)   · Admission Body Weight: 104 lb (47.2 kg)(4/8 bedscale)    · Usual Body Weight: 128 lb (58.1 kg)(per EMR x 6 mo with wt fluctuations with fluid status (CHF, edema))     · Ideal Body Weight: 110 lbs; % Ideal Body Weight 109.1 %   · BMI: 21.9  · BMI Categories: Underweight (BMI less than 22) age over 65(19.0 at admit)       Nutrition Diagnosis:   · Inadequate oral intake related to increase demand for energy/nutrients as evidenced by wounds, poor intake prior to admission, intake 0-25%      Nutrition Interventions:   Food and/or Nutrient Delivery:  Modify Current Diet, Start Oral Nutrition Supplement(Recommend only No Added Salt diet restriction to promote PO and will add ONS to all meals)  Nutrition Education/Counseling:  No recommendation at this time   Coordination of Nutrition Care:  Continue to monitor while inpatient    Goals:  PO >50% at meals/ONS       Nutrition Monitoring and Evaluation:   Behavioral-Environmental Outcomes:  None Identified   Food/Nutrient Intake Outcomes:  Food and Nutrient Intake, Supplement Intake  Physical Signs/Symptoms Outcomes:  Biochemical Data, Nutrition Focused Physical Findings, Fluid Status or Edema, GI Status, Skin, Weight     Discharge Planning:    Continue Oral Nutrition Supplement     Electronically signed by Raffi Alves RD, CNSC, LD on 4/9/21 at 1:48 PM EDT    Contact: [phone]

## 2021-04-09 NOTE — PROGRESS NOTES
Leukocytosis       Allergies:   Allergies   Allergen Reactions    Amoxicillin      GI ill effects       Current Medications:  Current Facility-Administered Medications   Medication Dose Route Frequency Provider Last Rate Last Admin    [START ON 4/13/2021] leucovorin calcium (WELLCOVORIN) tablet 25 mg  25 mg Oral Once [employer] V, DO        [START ON 4/12/2021] methotrexate (RHEUMATREX) chemo tablet 5 mg  5 mg Oral Once [employer] V, DO        [START ON 4/12/2021] methotrexate (RHEUMATREX) chemo tablet 10 mg  10 mg Oral Once [employer] V, DO        furosemide (LASIX) injection 60 mg  60 mg Intravenous TID Jose Rafael Delaney MD        HYDROcodone-acetaminophen (NORCO) 5-325 MG per tablet 1 tablet  1 tablet Oral Q4H PRN Mik Wilkins DO        HYDROcodone-acetaminophen (NORCO) 5-325 MG per tablet 1 tablet  1 tablet Oral BID [employer] V, DO   1 tablet at 04/09/21 0839    sennosides-docusate sodium (SENOKOT-S) 8.6-50 MG tablet 2 tablet  2 tablet Oral BID Audrey Magana MD   2 tablet at 04/09/21 0837    potassium bicarb-citric acid (EFFER-K) effervescent tablet 20 mEq  20 mEq Oral Daily Bimal Santoro MD   20 mEq at 04/09/21 0836    collagenase ointment   Topical Daily Anton Alberto MD   Given at 04/09/21 0840    levalbuterol (XOPENEX) nebulization 0.31 mg  0.31 mg Nebulization Q6H PRN Bimal Santoro MD   0.31 mg at 04/08/21 1345    amLODIPine (NORVASC) tablet 5 mg  5 mg Oral Daily Jose Rafael Delaney MD   5 mg at 04/09/21 0839    methylPREDNISolone sodium (SOLU-MEDROL) injection 40 mg  40 mg Intravenous Q12H FRAN Cisse - CNP   40 mg at 04/09/21 0839    celecoxib (CELEBREX) capsule 200 mg  200 mg Oral Daily Bimal Santoro MD   200 mg at 04/09/21 3939    doxycycline hyclate (VIBRAMYCIN) capsule 100 mg  100 mg Oral BID  Bimal Santoro MD   100 mg at 04/09/21 0837    DULoxetine (CYMBALTA) extended release capsule 40 mg  40 mg Oral Daily Bimal Santoro MD   40 mg at 04/09/21 0838    ferrous sulfate (IRON 325) tablet 325 mg  325 mg Oral Every Other Day Waylon Vaughan MD   325 mg at 04/09/21 0839    flecainide (TAMBOCOR) tablet 75 mg  75 mg Oral BID Waylon Vaughan MD   75 mg at 04/09/21 7001    levothyroxine (SYNTHROID) tablet 100 mcg  100 mcg Oral Daily Waylon Vaughan MD   100 mcg at 04/09/21 2603    lidocaine 4 % external patch 1 patch  1 patch Topical Daily Waylon Vaughan MD   1 patch at 04/09/21 0836    pantoprazole (PROTONIX) tablet 40 mg  40 mg Oral BID AC Waylon Vaughan MD   40 mg at 04/09/21 3728    polyvinyl alcohol (LIQUIFILM TEARS) 1.4 % ophthalmic solution 1 drop  1 drop Both Eyes BID PRN Waylon Vaughan MD        valsartan (DIOVAN) tablet 320 mg  320 mg Oral Daily Waylon Vaughan MD   320 mg at 04/09/21 3772    vitamin D (CHOLECALCIFEROL) tablet 2,000 Units  2,000 Units Oral Daily Waylon Vaughan MD   2,000 Units at 04/09/21 0837    rivaroxaban (XARELTO) tablet 15 mg  15 mg Oral Daily Waylon Vaughan MD   15 mg at 04/08/21 1856    zinc sulfate (ZINCATE) capsule 50 mg  50 mg Oral Daily Waylon Vaughan MD   50 mg at 04/09/21 1202    metoprolol tartrate (LOPRESSOR) tablet 50 mg  50 mg Oral BID Waylon Vaughan MD   50 mg at 04/09/21 0837    sodium chloride flush 0.9 % injection 10 mL  10 mL Intravenous 2 times per day Waylon Vaguhan MD   10 mL at 04/09/21 0840    sodium chloride flush 0.9 % injection 10 mL  10 mL Intravenous PRN Waylon Vaughan MD   10 mL at 04/09/21 1008    0.9 % sodium chloride infusion  25 mL Intravenous PRN Waylon Vaughan MD        promethazine (PHENERGAN) tablet 12.5 mg  12.5 mg Oral Q6H PRN Waylon Vaughan MD        Or    ondansetron [facility]) injection 4 mg  4 mg Intravenous Q6H PRN Waylon Vaughan MD        polyethylene glycol [facility]) packet 17 g  17 g Oral Daily PRN Waylon Vaughan MD        acetaminophen (TYLENOL) tablet 650 mg  650 mg Oral Q6H PRN Waylon Vaughan MD   650 mg at 04/09/21 1207    Or  acetaminophen (TYLENOL) suppository 650 mg  650 mg Rectal Q6H PRN Rah Holland MD        budesonide (PULMICORT) nebulizer suspension 500 mcg  0.5 mg Nebulization BID Rah Holland MD   500 mcg at 04/09/21 0853    Arformoterol Tartrate (BROVANA) nebulizer solution 15 mcg  15 mcg Nebulization BID Rah Holland MD   15 mcg at 04/09/21 0853      sodium chloride         Physical Exam:  /63   Pulse 81   Temp 96.4 °F (35.8 °C) (Oral)   Resp 16   Ht 5' 2\" (1.575 m)   Wt 120 lb 3.2 oz (54.5 kg)   SpO2 96%   BMI 21.98 kg/m²   Wt Readings from Last 3 Encounters:   04/09/21 120 lb 3.2 oz (54.5 kg)   03/09/21 140 lb 3.4 oz (63.6 kg)   02/16/21 142 lb 14.4 oz (64.8 kg)     Appearance: Awake, alert, no acute respiratory distress  Skin: Intact, no rash  Head: Normocephalic, atraumatic  Eyes: EOMI, no conjunctival erythema  Neck: Supple, no elevated JVP, no carotid bruits  Lungs: Bilateral crackles cardiac: Regular rate and rhythm, +S1S2, no murmurs apparent  Abdomen: Soft, nontender, +bowel sounds  Extremities: Bilateral lower extremity edema  Neurologic: No focal motor deficits apparent, normal mood and affect  Peripheral Pulses: Intact posterior tibial pulses bilaterally  Intake/Output:    Intake/Output Summary (Last 24 hours) at 4/9/2021 1244  Last data filed at 4/9/2021 1028  Gross per 24 hour   Intake 300 ml   Output 300 ml   Net 0 ml     I/O this shift:  In: 180 [P.O.:180]  Out: -     Laboratory Tests:  Recent Labs     04/07/21  0405 04/08/21  1325 04/09/21  0945    133 140   K 3.7 3.9 3.3*   CL 99 91* 92*   CO2 31* 32* 36*   BUN 19 25* 31*   CREATININE 0.6 0.7 0.8   GLUCOSE 100* 161* 159*   CALCIUM 8.5* 9.5 9.7     Lab Results   Component Value Date    MG 1.9 03/06/2021     Recent Labs     04/07/21  0405 04/08/21  1325 04/09/21  0945   ALKPHOS 123* 135* 129*   ALT 9 11 8   AST 19 19 17   PROT 5.7* 6.3* 6.4   BILITOT 0.5 0.5 0.4   LABALBU 2.5* 2.6* 3.0*     Recent Labs     04/07/21  0408 04/08/21  1325 04/09/21  0945   WBC 14.8* 17.9* 16.7*   RBC 3.07* 3.34* 3.64   HGB 9.6* 10.1* 10.9*   HCT 30.5* 33.5* 36.7   MCV 99.3 100.3* 100.8*   MCH 31.3 30.2 29.9   MCHC 31.5* 30.1* 29.7*   RDW 14.4 14.4 14.2    590* 705*   MPV 9.7 9.8 9.5     Lab Results   Component Value Date    CKTOTAL 105 07/12/2020    CKMB 1.9 03/21/2016    TROPONINI <0.01 04/06/2021    TROPONINI <0.01 02/24/2021    TROPONINI <0.01 02/08/2021     Lab Results   Component Value Date    INR 3.0 04/06/2021    INR 1.1 03/01/2021    INR 1.4 02/28/2021    PROTIME 33.4 (H) 04/06/2021    PROTIME 12.2 03/01/2021    PROTIME 16.4 (H) 02/28/2021     Lab Results   Component Value Date    TSH 4.870 (H) 01/06/2021     Lab Results   Component Value Date    LABA1C 5.4 02/12/2021     No results found for: EAG  Lab Results   Component Value Date    CHOL 182 10/22/2019    CHOL 174 06/10/2019    CHOL 175 03/22/2016     Lab Results   Component Value Date    TRIG 140 10/22/2019    TRIG 129 06/10/2019    TRIG 82 03/22/2016     Lab Results   Component Value Date    HDL 44 10/22/2019    HDL 49 06/10/2019    HDL 53 03/22/2016     Lab Results   Component Value Date    LDLCALC 110 (H) 10/22/2019    LDLCALC 99 06/10/2019    LDLCALC 106 (H) 03/22/2016     Lab Results   Component Value Date    LABVLDL 28 10/22/2019    LABVLDL 26 06/10/2019    LABVLDL 16 03/22/2016     No results found for: CHOLHDLRATIO  Recent Labs     04/06/21  1529   PROBNP 4,343*       ASSESSMENT / PLAN:  51-year-old female with medical history of pneumonia, decubitus ulcer, poor functional capacity, heart failure preserved ejection fraction, paroxysmal atrial fibrillation presents with increasing shortness of breath, hypoxia and lower limb edema. She was found to be fluid overloaded on exam.       Recommendations  Continue IV diuresis. Communicated with nursing staff about importance of accurately measuring urine output.    Continue flecainide, Xarelto, metoprolol and losartan and amlodipine Vinay Garibay MD  [facility]) Cardiology

## 2021-04-09 NOTE — PLAN OF CARE
Problem: Skin Integrity:  Goal: Will show no infection signs and symptoms  Description: Will show no infection signs and symptoms  4/9/2021 0134 by Nnamdi Funez RN  Outcome: Met This Shift  4/9/2021 0133 by Nnamdi Funez RN  Outcome: Met This Shift  Goal: Absence of new skin breakdown  Description: Absence of new skin breakdown  4/9/2021 0134 by Nnamdi Funez RN  Outcome: Met This Shift  4/9/2021 0133 by Nnamdi Funez RN  Outcome: Met This Shift     Problem: Falls - Risk of:  Goal: Will remain free from falls  Description: Will remain free from falls  4/9/2021 0134 by Nnamdi Funez RN  Outcome: Met This Shift  4/9/2021 0133 by Nnamdi Funez RN  Outcome: Met This Shift  Goal: Absence of physical injury  Description: Absence of physical injury  4/9/2021 0134 by Nnamdi Funez RN  Outcome: Met This Shift  4/9/2021 0133 by Nnamdi Funez RN  Outcome: Met This Shift     Problem: Pain:  Goal: Pain level will decrease  Description: Pain level will decrease  4/9/2021 0134 by Nnamdi Funez RN  Outcome: Met This Shift  4/9/2021 0133 by Nnamdi Funez RN  Outcome: Met This Shift  Goal: Control of acute pain  Description: Control of acute pain  4/9/2021 0134 by Nnamdi Funez RN  Outcome: Met This Shift  4/9/2021 0133 by Nnamdi Funez RN  Outcome: Met This Shift  Goal: Control of chronic pain  Description: Control of chronic pain  4/9/2021 0134 by Nnamdi Funez RN  Outcome: Met This Shift  4/9/2021 0133 by Nnamdi Funez RN  Outcome: Met This Shift     Problem:  Activity:  Goal: Capacity to carry out activities will improve  Description: Capacity to carry out activities will improve  Outcome: Met This Shift  Goal: Will verbalize the importance of balancing activity with adequate rest periods  Description: Will verbalize the importance of balancing activity with adequate rest periods  Outcome: Met This Shift     Problem: Cardiac:  Goal: Hemodynamic stability will improve Description: Hemodynamic stability will improve  Outcome: Met This Shift  Goal: Ability to maintain an adequate cardiac output will improve  Description: Ability to maintain an adequate cardiac output will improve  Outcome: Met This Shift     Problem: Coping:  Goal: Verbalizations of decreased anxiety will decrease  Description: Verbalizations of decreased anxiety will decrease  Outcome: Met This Shift     Problem: Fluid Volume:  Goal: Risk for excess fluid volume will decrease  Description: Risk for excess fluid volume will decrease  Outcome: Met This Shift  Goal: Maintenance of adequate hydration will improve  Description: Maintenance of adequate hydration will improve  Outcome: Met This Shift  Goal: Will show no signs and symptoms of electrolyte imbalance  Description: Will show no signs and symptoms of electrolyte imbalance  Outcome: Met This Shift     Problem: Health Behavior:  Goal: Ability to manage health-related needs will improve  Description: Ability to manage health-related needs will improve  Outcome: Met This Shift  Goal: Ability to seek appropriate health care will improve  Description: Ability to seek appropriate health care will improve  Outcome: Met This Shift     Problem: Nutritional:  Goal: Maintenance of adequate nutrition will improve  Description: Maintenance of adequate nutrition will improve  Outcome: Met This Shift     Problem: Physical Regulation:  Goal: Complications related to the disease process, condition or treatment will be avoided or minimized  Description: Complications related to the disease process, condition or treatment will be avoided or minimized  Outcome: Met This Shift     Problem: Respiratory:  Goal: Ability to maintain adequate ventilation will improve  Description: Ability to maintain adequate ventilation will improve  Outcome: Met This Shift  Goal: Respiratory status will improve  Description: Respiratory status will improve  Outcome: Met This Shift     Problem: OXYGENATION/RESPIRATORY FUNCTION  Goal: Patient will maintain patent airway  Outcome: Met This Shift  Goal: Patient will achieve/maintain normal respiratory rate/effort  Description: Respiratory rate and effort will be within normal limits for the patient  Outcome: Met This Shift     Problem: MECHANICAL VENTILATION  Goal: Patient will maintain patent airway  Outcome: Met This Shift  Goal: Oral health is maintained or improved  Outcome: Met This Shift  Goal: Tracheostomy will be managed safely  Outcome: Met This Shift  Goal: ET tube will be managed safely  Outcome: Met This Shift  Goal: Ability to express needs and understand communication  Outcome: Met This Shift  Goal: Mobility/activity is maintained at optimum level for patient  Outcome: Met This Shift     Problem: SKIN INTEGRITY  Goal: Skin integrity is maintained or improved  Outcome: Met This Shift     Problem: NUTRITION  Goal: Nutritional status is improving  Outcome: Met This Shift

## 2021-04-10 PROBLEM — J44.9 COPD (CHRONIC OBSTRUCTIVE PULMONARY DISEASE) (HCC): Status: ACTIVE | Noted: 2021-04-10

## 2021-04-10 PROBLEM — K92.2 GI BLEED: Status: ACTIVE | Noted: 2021-04-10

## 2021-04-10 LAB
ALBUMIN SERPL-MCNC: 2.7 G/DL (ref 3.5–5.2)
ALP BLD-CCNC: 109 U/L (ref 35–104)
ALT SERPL-CCNC: 10 U/L (ref 0–32)
ANION GAP SERPL CALCULATED.3IONS-SCNC: 5 MMOL/L (ref 7–16)
AST SERPL-CCNC: 29 U/L (ref 0–31)
BASOPHILS ABSOLUTE: 0.01 E9/L (ref 0–0.2)
BASOPHILS RELATIVE PERCENT: 0.1 % (ref 0–2)
BILIRUB SERPL-MCNC: 0.3 MG/DL (ref 0–1.2)
BUN BLDV-MCNC: 44 MG/DL (ref 8–23)
CALCIUM SERPL-MCNC: 9 MG/DL (ref 8.6–10.2)
CHLORIDE BLD-SCNC: 91 MMOL/L (ref 98–107)
CO2: 42 MMOL/L (ref 22–29)
CREAT SERPL-MCNC: 0.9 MG/DL (ref 0.5–1)
EOSINOPHILS ABSOLUTE: 0 E9/L (ref 0.05–0.5)
EOSINOPHILS RELATIVE PERCENT: 0 % (ref 0–6)
GFR AFRICAN AMERICAN: >60
GFR NON-AFRICAN AMERICAN: 59 ML/MIN/1.73
GLUCOSE BLD-MCNC: 129 MG/DL (ref 74–99)
HCT VFR BLD CALC: 31.7 % (ref 34–48)
HEMOGLOBIN: 9.5 G/DL (ref 11.5–15.5)
IMMATURE GRANULOCYTES #: 0.19 E9/L
IMMATURE GRANULOCYTES %: 1.4 % (ref 0–5)
LYMPHOCYTES ABSOLUTE: 0.66 E9/L (ref 1.5–4)
LYMPHOCYTES RELATIVE PERCENT: 4.7 % (ref 20–42)
MCH RBC QN AUTO: 29.9 PG (ref 26–35)
MCHC RBC AUTO-ENTMCNC: 30 % (ref 32–34.5)
MCV RBC AUTO: 99.7 FL (ref 80–99.9)
MONOCYTES ABSOLUTE: 0.75 E9/L (ref 0.1–0.95)
MONOCYTES RELATIVE PERCENT: 5.3 % (ref 2–12)
NEUTROPHILS ABSOLUTE: 12.43 E9/L (ref 1.8–7.3)
NEUTROPHILS RELATIVE PERCENT: 88.5 % (ref 43–80)
PDW BLD-RTO: 14.1 FL (ref 11.5–15)
PLATELET # BLD: 624 E9/L (ref 130–450)
PMV BLD AUTO: 10.4 FL (ref 7–12)
POTASSIUM REFLEX MAGNESIUM: 4.7 MMOL/L (ref 3.5–5)
RBC # BLD: 3.18 E12/L (ref 3.5–5.5)
SODIUM BLD-SCNC: 138 MMOL/L (ref 132–146)
TOTAL PROTEIN: 6 G/DL (ref 6.4–8.3)
WBC # BLD: 14 E9/L (ref 4.5–11.5)

## 2021-04-10 PROCEDURE — 6370000000 HC RX 637 (ALT 250 FOR IP): Performed by: FAMILY MEDICINE

## 2021-04-10 PROCEDURE — 99232 SBSQ HOSP IP/OBS MODERATE 35: CPT | Performed by: FAMILY MEDICINE

## 2021-04-10 PROCEDURE — 94640 AIRWAY INHALATION TREATMENT: CPT

## 2021-04-10 PROCEDURE — 6360000002 HC RX W HCPCS: Performed by: NURSE PRACTITIONER

## 2021-04-10 PROCEDURE — 2700000000 HC OXYGEN THERAPY PER DAY

## 2021-04-10 PROCEDURE — 6360000002 HC RX W HCPCS: Performed by: FAMILY MEDICINE

## 2021-04-10 PROCEDURE — 94660 CPAP INITIATION&MGMT: CPT

## 2021-04-10 PROCEDURE — 94761 N-INVAS EAR/PLS OXIMETRY MLT: CPT

## 2021-04-10 PROCEDURE — 2060000000 HC ICU INTERMEDIATE R&B

## 2021-04-10 PROCEDURE — 2580000003 HC RX 258: Performed by: FAMILY MEDICINE

## 2021-04-10 PROCEDURE — 6370000000 HC RX 637 (ALT 250 FOR IP): Performed by: SURGERY

## 2021-04-10 PROCEDURE — 85025 COMPLETE CBC W/AUTO DIFF WBC: CPT

## 2021-04-10 PROCEDURE — 80053 COMPREHEN METABOLIC PANEL: CPT

## 2021-04-10 PROCEDURE — 99232 SBSQ HOSP IP/OBS MODERATE 35: CPT | Performed by: INTERNAL MEDICINE

## 2021-04-10 PROCEDURE — 36415 COLL VENOUS BLD VENIPUNCTURE: CPT

## 2021-04-10 PROCEDURE — 6370000000 HC RX 637 (ALT 250 FOR IP): Performed by: STUDENT IN AN ORGANIZED HEALTH CARE EDUCATION/TRAINING PROGRAM

## 2021-04-10 PROCEDURE — 6360000002 HC RX W HCPCS: Performed by: STUDENT IN AN ORGANIZED HEALTH CARE EDUCATION/TRAINING PROGRAM

## 2021-04-10 RX ADMIN — PANTOPRAZOLE SODIUM 40 MG: 40 TABLET, DELAYED RELEASE ORAL at 16:50

## 2021-04-10 RX ADMIN — FUROSEMIDE 60 MG: 10 INJECTION, SOLUTION INTRAMUSCULAR; INTRAVENOUS at 13:44

## 2021-04-10 RX ADMIN — METOPROLOL TARTRATE 50 MG: 50 TABLET, FILM COATED ORAL at 08:54

## 2021-04-10 RX ADMIN — DOCUSATE SODIUM 50 MG AND SENNOSIDES 8.6 MG 2 TABLET: 8.6; 5 TABLET, FILM COATED ORAL at 08:53

## 2021-04-10 RX ADMIN — LEVALBUTEROL HYDROCHLORIDE 0.31 MG: 0.31 SOLUTION RESPIRATORY (INHALATION) at 19:26

## 2021-04-10 RX ADMIN — FLECAINIDE ACETATE 75 MG: 50 TABLET ORAL at 23:47

## 2021-04-10 RX ADMIN — BUDESONIDE 500 MCG: 0.5 SUSPENSION RESPIRATORY (INHALATION) at 09:55

## 2021-04-10 RX ADMIN — FUROSEMIDE 60 MG: 10 INJECTION, SOLUTION INTRAMUSCULAR; INTRAVENOUS at 06:37

## 2021-04-10 RX ADMIN — ARFORMOTEROL TARTRATE 15 MCG: 15 SOLUTION RESPIRATORY (INHALATION) at 09:51

## 2021-04-10 RX ADMIN — LEVALBUTEROL HYDROCHLORIDE 0.31 MG: 0.31 SOLUTION RESPIRATORY (INHALATION) at 09:55

## 2021-04-10 RX ADMIN — VALSARTAN 320 MG: 320 TABLET ORAL at 08:53

## 2021-04-10 RX ADMIN — ARFORMOTEROL TARTRATE 15 MCG: 15 SOLUTION RESPIRATORY (INHALATION) at 19:26

## 2021-04-10 RX ADMIN — AMLODIPINE BESYLATE 5 MG: 5 TABLET ORAL at 08:53

## 2021-04-10 RX ADMIN — METHYLPREDNISOLONE SODIUM SUCCINATE 40 MG: 40 INJECTION, POWDER, LYOPHILIZED, FOR SOLUTION INTRAMUSCULAR; INTRAVENOUS at 21:08

## 2021-04-10 RX ADMIN — DOXYCYCLINE HYCLATE 100 MG: 100 CAPSULE ORAL at 08:53

## 2021-04-10 RX ADMIN — ACETAMINOPHEN 650 MG: 325 TABLET ORAL at 13:45

## 2021-04-10 RX ADMIN — FUROSEMIDE 60 MG: 10 INJECTION, SOLUTION INTRAMUSCULAR; INTRAVENOUS at 21:07

## 2021-04-10 RX ADMIN — Medication 2000 UNITS: at 08:54

## 2021-04-10 RX ADMIN — BUDESONIDE 500 MCG: 0.5 SUSPENSION RESPIRATORY (INHALATION) at 19:26

## 2021-04-10 RX ADMIN — HYDROCODONE BITARTRATE AND ACETAMINOPHEN 1 TABLET: 5; 325 TABLET ORAL at 08:55

## 2021-04-10 RX ADMIN — POTASSIUM BICARBONATE 20 MEQ: 782 TABLET, EFFERVESCENT ORAL at 08:54

## 2021-04-10 RX ADMIN — DOCUSATE SODIUM 50 MG AND SENNOSIDES 8.6 MG 2 TABLET: 8.6; 5 TABLET, FILM COATED ORAL at 21:08

## 2021-04-10 RX ADMIN — HYDROCODONE BITARTRATE AND ACETAMINOPHEN 1 TABLET: 5; 325 TABLET ORAL at 21:08

## 2021-04-10 RX ADMIN — FLECAINIDE ACETATE 75 MG: 50 TABLET ORAL at 08:53

## 2021-04-10 RX ADMIN — PANTOPRAZOLE SODIUM 40 MG: 40 TABLET, DELAYED RELEASE ORAL at 06:35

## 2021-04-10 RX ADMIN — SODIUM CHLORIDE, PRESERVATIVE FREE 10 ML: 5 INJECTION INTRAVENOUS at 21:08

## 2021-04-10 RX ADMIN — CELECOXIB 200 MG: 100 CAPSULE ORAL at 08:53

## 2021-04-10 RX ADMIN — ZINC SULFATE 220 MG (50 MG) CAPSULE 50 MG: CAPSULE at 08:55

## 2021-04-10 RX ADMIN — DOXYCYCLINE HYCLATE 100 MG: 100 CAPSULE ORAL at 16:50

## 2021-04-10 RX ADMIN — METHYLPREDNISOLONE SODIUM SUCCINATE 40 MG: 40 INJECTION, POWDER, LYOPHILIZED, FOR SOLUTION INTRAMUSCULAR; INTRAVENOUS at 08:54

## 2021-04-10 RX ADMIN — DULOXETINE HYDROCHLORIDE 40 MG: 20 CAPSULE, DELAYED RELEASE ORAL at 08:53

## 2021-04-10 RX ADMIN — COLLAGENASE SANTYL: 250 OINTMENT TOPICAL at 08:53

## 2021-04-10 RX ADMIN — SODIUM CHLORIDE, PRESERVATIVE FREE 10 ML: 5 INJECTION INTRAVENOUS at 08:55

## 2021-04-10 RX ADMIN — METOPROLOL TARTRATE 50 MG: 50 TABLET, FILM COATED ORAL at 21:08

## 2021-04-10 RX ADMIN — LEVOTHYROXINE SODIUM 100 MCG: 100 TABLET ORAL at 06:35

## 2021-04-10 ASSESSMENT — ENCOUNTER SYMPTOMS
ABDOMINAL PAIN: 0
COUGH: 0
SHORTNESS OF BREATH: 1
VOMITING: 0
SORE THROAT: 0
CONSTIPATION: 0
DIARRHEA: 0
NAUSEA: 0

## 2021-04-10 ASSESSMENT — PAIN SCALES - GENERAL
PAINLEVEL_OUTOF10: 5
PAINLEVEL_OUTOF10: 4
PAINLEVEL_OUTOF10: 5

## 2021-04-10 ASSESSMENT — PAIN - FUNCTIONAL ASSESSMENT: PAIN_FUNCTIONAL_ASSESSMENT: ACTIVITIES ARE NOT PREVENTED

## 2021-04-10 ASSESSMENT — PAIN DESCRIPTION - FREQUENCY: FREQUENCY: CONTINUOUS

## 2021-04-10 NOTE — PROGRESS NOTES
Date: 4/9/2021    Time: 10:20 PM    Patient Placed On BIPAP/CPAP/ Non-Invasive Ventilation? Yes    If no must comment. Facial area red/color change? No           If YES are Blister/Lesion present? No   If yes must notify nursing staff  BIPAP/CPAP skin barrier?   Yes    Skin barrier type:mepilexlite       Comments:        Nate Snell

## 2021-04-10 NOTE — PROGRESS NOTES
[facility] Cardiology Inpatient Progress Note    Patient is a 80 y.o. female of Deanne Marrufo MD seen in hospital follow up. Chief complaint: CHF    HPI: No CP or SOB.      Patient Active Problem List   Diagnosis    Elevated CA-125    Hiatal hernia    Hypothyroidism    Diverticular disease    Skin cancer of lip    Rheumatoid arthritis ([address]    Diastolic dysfunction    Paresthesia of lower extremity    Lumbar radicular pain    Anemia    Multiple closed fractures of ribs of both sides with routine healing    Thoracic spinal stenosis with mass at T6-T7    Thoracic spine tumor    Thoracic spine fracture (HCC)    Atrial fibrillation with RVR (HCC)    A-fib (HCC)    Obstructive sleep apnea syndrome    Essential hypertension    Adjustment disorder with anxious mood    Laceration of lower limb    Palpitations    Debility    Closed fracture of patella    Delayed gastric emptying    Pain in both lower legs    Bimalleolar fracture, left, closed, initial encounter    Osteoporosis    Non-pressure chronic ulcer of other part of right lower leg with fat layer exposed ([facility] [address]    Bimalleolar fracture    Patellar fracture    Acute respiratory failure with hypoxia (HCC)    Depression    History of 2019 novel coronavirus disease (COVID-19)    Mild protein-calorie malnutrition (HCC)    Apathy    Chronic heart failure with preserved ejection fraction (HCC)    Chronic respiratory failure with hypoxia (HCC)    Pressure injury of sacral region, unstageable (HCC)    Acute on chronic diastolic heart failure (HCC)    Frailty    Leukocytosis    COPD (chronic obstructive pulmonary disease) (HCC)    GI bleed       Allergies   Allergen Reactions    Amoxicillin      GI ill effects       Current Facility-Administered Medications   Medication Dose Route Frequency Provider Last Rate Last Admin    [START ON 4/13/2021] leucovorin calcium (WELLCOVORIN) tablet 25 mg  25 mg Oral Once [employer] V, DO  [START ON 4/12/2021] methotrexate (RHEUMATREX) chemo tablet 5 mg  5 mg Oral Once [employer] V, DO        [START ON 4/12/2021] methotrexate (RHEUMATREX) chemo tablet 10 mg  10 mg Oral Once [employer] V, DO        furosemide (LASIX) injection 60 mg  60 mg Intravenous TID Ethan Javed MD   60 mg at 04/10/21 0637    HYDROcodone-acetaminophen (NORCO) 5-325 MG per tablet 1 tablet  1 tablet Oral Q4H PRN [employer] V, DO   1 tablet at 04/09/21 1653    HYDROcodone-acetaminophen (NORCO) 5-325 MG per tablet 1 tablet  1 tablet Oral BID [employer] V, DO   1 tablet at 04/10/21 0855    sennosides-docusate sodium (SENOKOT-S) 8.6-50 MG tablet 2 tablet  2 tablet Oral BID Page Salmeron MD   2 tablet at 04/10/21 0853    potassium bicarb-citric acid (EFFER-K) effervescent tablet 20 mEq  20 mEq Oral Daily Abimbola Singh MD   20 mEq at 04/10/21 0854    collagenase ointment   Topical Daily Marlon Sanchez MD   Given at 04/09/21 0840    levalbuterol (XOPENEX) nebulization 0.31 mg  0.31 mg Nebulization Q6H PRN Abimbola Singh MD   0.31 mg at 04/10/21 0955    amLODIPine (NORVASC) tablet 5 mg  5 mg Oral Daily Ethan Javed MD   5 mg at 04/10/21 0853    methylPREDNISolone sodium (SOLU-MEDROL) injection 40 mg  40 mg Intravenous Q12H FRAN Short CNP   40 mg at 04/10/21 0854    celecoxib (CELEBREX) capsule 200 mg  200 mg Oral Daily Abimbola Singh MD   200 mg at 04/10/21 0853    doxycycline hyclate (VIBRAMYCIN) capsule 100 mg  100 mg Oral BID WC Abimbola Singh MD   100 mg at 04/10/21 0853    DULoxetine (CYMBALTA) extended release capsule 40 mg  40 mg Oral Daily Abimbola Singh MD   40 mg at 04/10/21 0652    ferrous sulfate (IRON 325) tablet 325 mg  325 mg Oral Every Other Day Abimbola Singh MD   325 mg at 04/09/21 0839    flecainide (TAMBOCOR) tablet 75 mg  75 mg Oral BID Abimbola Singh MD   75 mg at 04/10/21 0853    levothyroxine (SYNTHROID) tablet 100 mcg  100 mcg Oral Daily Mariella Albarado MD   100 mcg at 04/10/21 5093    lidocaine 4 % external patch 1 patch  1 patch Topical Daily Mariella Albarado MD   1 patch at 04/10/21 0855    pantoprazole (PROTONIX) tablet 40 mg  40 mg Oral BID AC Mariella Albarado MD   40 mg at 04/10/21 3980    polyvinyl alcohol (LIQUIFILM TEARS) 1.4 % ophthalmic solution 1 drop  1 drop Both Eyes BID PRN Mariella Albarado MD        valsartan (DIOVAN) tablet 320 mg  320 mg Oral Daily Mariella Albarado MD   320 mg at 04/10/21 6671    vitamin D (CHOLECALCIFEROL) tablet 2,000 Units  2,000 Units Oral Daily Mariella Albarado MD   2,000 Units at 04/10/21 0854    [Held by provider] rivaroxaban (XARELTO) tablet 15 mg  15 mg Oral Daily Mariella Albarado MD   15 mg at 04/08/21 1856    zinc sulfate (ZINCATE) capsule 50 mg  50 mg Oral Daily Mariella Albarado MD   50 mg at 04/10/21 0855    metoprolol tartrate (LOPRESSOR) tablet 50 mg  50 mg Oral BID Mariella Albarado MD   50 mg at 04/10/21 0854    sodium chloride flush 0.9 % injection 10 mL  10 mL Intravenous 2 times per day Mariella Albarado MD   10 mL at 04/10/21 0855    sodium chloride flush 0.9 % injection 10 mL  10 mL Intravenous PRN Mariella Albarado MD   10 mL at 04/09/21 1343    0.9 % sodium chloride infusion  25 mL Intravenous PRN Mariella Albarado MD        promethazine (PHENERGAN) tablet 12.5 mg  12.5 mg Oral Q6H PRN Mariella Albarado MD        Or    [facility]dansetron [facility]) injection 4 mg  4 mg Intravenous Q6H PRN Mariella Albarado MD        polyethylene glycol [facility]) packet 17 g  17 g Oral Daily PRN Mariella Albarado MD        acetaminophen (TYLENOL) tablet 650 mg  650 mg Oral Q6H PRN Mariella Albarado MD   650 mg at 04/09/21 1207    Or    acetaminophen (TYLENOL) suppository 650 mg  650 mg Rectal Q6H PRN Mariella Albarado MD        budesonide (PULMICORT) nebulizer suspension 500 mcg  0.5 mg Nebulization BID Mariella Albarado MD   500 mcg at 04/10/21 0955    Arformoterol Tartrate (BROVANA) nebulizer solution 15 mcg  15 mcg Nebulization BID Juanis Borjas MD   15 mcg at 04/10/21 0520       Review of systems:   Heart: as above   Lungs: as above   Eyes: denies changes in vision or discharge. Ears: denies changes in hearing or pain. Nose: denies epistaxis or masses   Throat: denies sore throat or trouble swallowing. Neuro: denies numbness, tingling, tremors. Skin: denies rashes or itching. : denies hematuria, dysuria   GI: denies vomiting, diarrhea   Psych: denies mood changed, anxiety, depression. Physical Exam   BP (!) 140/61   Pulse 65   Temp 97 °F (36.1 °C) (Axillary)   Resp 16   Ht 5' 2\" (1.575 m)   Wt 104 lb (47.2 kg)   SpO2 94%   BMI 19.02 kg/m²   Constitutional: Oriented to person, place, and time. No distress. Well developed. Head: Normocephalic and atraumatic. Neck: Neck supple. No hepatojugular reflux. No JVD present. Carotid bruit is not present. No tracheal deviation present. No thyromegaly present. Cardiovascular: Normal rate, regular rhythm, normal heart sounds. intact distal pulses. No gallop and no friction rub. No murmur heard. Pulmonary: Breath sounds normal. No respiratory distress. No wheezes. No rales. Chest: Effort normal. No tenderness. Abdominal: Soft. Bowel sounds are normal. No distension or mass. No tenderness, rebound or guarding. Musculoskeletal: . No tenderness. No clubbing or cyanosis. Extremitites: Intact distal pulses. No edema  Neurological: Alert and oriented to person, place, and time. Skin: Skin is warm and dry. No rash noted. Not diaphoretic. No erythema. Psychiatric: Normal mood and affect. Behavior is normal.   Lymphadenopathy: No cervical adenopathy. No groin adenopathy.       CBC:   Lab Results   Component Value Date    WBC 14.0 04/10/2021    RBC 3.18 04/10/2021    HGB 9.5 04/10/2021    HCT 31.7 04/10/2021    MCV 99.7 04/10/2021    MCH 29.9 04/10/2021    MCHC 30.0 04/10/2021    RDW 14.1 04/10/2021     04/10/2021    MPV 10.4 04/10/2021 BMP:   Lab Results   Component Value Date     04/10/2021    K 4.7 04/10/2021    CL 91 04/10/2021    CO2 42 04/10/2021    BUN 44 04/10/2021    LABALBU 2.7 04/10/2021    LABALBU 4.0 04/24/2012    CREATININE 0.9 04/10/2021    CALCIUM 9.0 04/10/2021    GFRAA >60 04/10/2021    LABGLOM 59 04/10/2021     Magnesium:    Lab Results   Component Value Date    MG 1.6 04/09/2021     Cardiac Enzymes:   Lab Results   Component Value Date    CKTOTAL 105 07/12/2020    CKTOTAL 70 03/21/2016    CKTOTAL 40 03/23/2014    CKMB 1.9 03/21/2016    CKMB 1.8 03/23/2014    CKMB <0.3 02/05/2013    TROPONINI <0.01 04/06/2021    TROPONINI <0.01 02/24/2021    TROPONINI <0.01 02/08/2021      PT/INR:    Lab Results   Component Value Date    PROTIME 33.4 04/06/2021    PROTIME 15.8 05/12/2014    INR 3.0 04/06/2021     TSH:    Lab Results   Component Value Date    TSH 4.870 01/06/2021     Rhythm Strip: normal sinus rhythm.     ASSESSMENT & PLAN:    Patient Active Problem List   Diagnosis    Elevated CA-125    Hiatal hernia    Hypothyroidism    Diverticular disease    Skin cancer of lip    Rheumatoid arthritis ([address]    Diastolic dysfunction    Paresthesia of lower extremity    Lumbar radicular pain    Anemia    Multiple closed fractures of ribs of both sides with routine healing    Thoracic spinal stenosis with mass at T6-T7    Thoracic spine tumor    Thoracic spine fracture (HCC)    Atrial fibrillation with RVR (HCC)    A-fib (HCC)    Obstructive sleep apnea syndrome    Essential hypertension    Adjustment disorder with anxious mood    Laceration of lower limb    Palpitations    Debility    Closed fracture of patella    Delayed gastric emptying    Pain in both lower legs    Bimalleolar fracture, left, closed, initial encounter    Osteoporosis    Non-pressure chronic ulcer of other part of right lower leg with fat layer exposed ([address]    Bimalleolar fracture    Patellar fracture    Acute respiratory failure with hypoxia ([facility] [address]    Depression    History of 2019 novel coronavirus disease (COVID-19)    Mild protein-calorie malnutrition (HCC)    Apathy    Chronic heart failure with preserved ejection fraction (HCC)    Chronic respiratory failure with hypoxia (HCC)    Pressure injury of sacral region, unstageable (HCC)    Acute on chronic diastolic heart failure (HCC)    Frailty    Leukocytosis    COPD (chronic obstructive pulmonary disease) (HCC)    GI bleed     1. Acute on chronic diastolic CHF/SOB:    Diurese. 2. PAF: BB/flecainide/Xarelto. 3. GIB: Per primary service. 4. HTN: Observe. 5. Anemia    6. RA    7. Decubitus ulcer    Lawson Hyatt D.O.   Cardiologist  Cardiology, [facility]

## 2021-04-10 NOTE — PLAN OF CARE
Problem: Skin Integrity:  Goal: Will show no infection signs and symptoms  Description: Will show no infection signs and symptoms  Outcome: Met This Shift  Goal: Absence of new skin breakdown  Description: Absence of new skin breakdown  Outcome: Met This Shift     Problem: Falls - Risk of:  Goal: Will remain free from falls  Description: Will remain free from falls  Outcome: Met This Shift  Goal: Absence of physical injury  Description: Absence of physical injury  Outcome: Met This Shift     Problem: Pain:  Goal: Pain level will decrease  Description: Pain level will decrease  Outcome: Met This Shift  Goal: Control of acute pain  Description: Control of acute pain  Outcome: Met This Shift  Goal: Control of chronic pain  Description: Control of chronic pain  Outcome: Met This Shift     Problem:  Activity:  Goal: Capacity to carry out activities will improve  Description: Capacity to carry out activities will improve  Outcome: Met This Shift  Goal: Will verbalize the importance of balancing activity with adequate rest periods  Description: Will verbalize the importance of balancing activity with adequate rest periods  Outcome: Met This Shift     Problem: Cardiac:  Goal: Hemodynamic stability will improve  Description: Hemodynamic stability will improve  Outcome: Met This Shift  Goal: Ability to maintain an adequate cardiac output will improve  Description: Ability to maintain an adequate cardiac output will improve  Outcome: Met This Shift     Problem: Coping:  Goal: Verbalizations of decreased anxiety will decrease  Description: Verbalizations of decreased anxiety will decrease  Outcome: Met This Shift     Problem: Fluid Volume:  Goal: Risk for excess fluid volume will decrease  Description: Risk for excess fluid volume will decrease  Outcome: Met This Shift  Goal: Maintenance of adequate hydration will improve  Description: Maintenance of adequate hydration will improve  Outcome: Met This Shift  Goal: Will show no signs and symptoms of electrolyte imbalance  Description: Will show no signs and symptoms of electrolyte imbalance  Outcome: Met This Shift     Problem: Health Behavior:  Goal: Ability to manage health-related needs will improve  Description: Ability to manage health-related needs will improve  Outcome: Met This Shift  Goal: Ability to seek appropriate health care will improve  Description: Ability to seek appropriate health care will improve  Outcome: Met This Shift     Problem: Nutritional:  Goal: Maintenance of adequate nutrition will improve  Description: Maintenance of adequate nutrition will improve  Outcome: Met This Shift     Problem: Physical Regulation:  Goal: Complications related to the disease process, condition or treatment will be avoided or minimized  Description: Complications related to the disease process, condition or treatment will be avoided or minimized  Outcome: Met This Shift     Problem: Respiratory:  Goal: Ability to maintain adequate ventilation will improve  Description: Ability to maintain adequate ventilation will improve  Outcome: Met This Shift  Goal: Respiratory status will improve  Description: Respiratory status will improve  Outcome: Met This Shift     Problem: OXYGENATION/RESPIRATORY FUNCTION  Goal: Patient will maintain patent airway  Outcome: Met This Shift  Goal: Patient will achieve/maintain normal respiratory rate/effort  Description: Respiratory rate and effort will be within normal limits for the patient  Outcome: Met This Shift     Problem: MECHANICAL VENTILATION  Goal: Patient will maintain patent airway  Outcome: Met This Shift  Goal: Oral health is maintained or improved  Outcome: Met This Shift  Goal: Tracheostomy will be managed safely  Outcome: Met This Shift  Goal: ET tube will be managed safely  Outcome: Met This Shift  Goal: Ability to express needs and understand communication  Outcome: Met This Shift  Goal: Mobility/activity is maintained at optimum level for patient  Outcome: Met This Shift     Problem: SKIN INTEGRITY  Goal: Skin integrity is maintained or improved  Outcome: Met This Shift     Problem: NUTRITION  Goal: Nutritional status is improving  Outcome: Met This Shift     Problem: Increased nutrient needs (NI-5.1)  Goal: Food and/or Nutrient Delivery  Description: Individualized approach for food/nutrient provision.   4/9/2021 1345 by Octavia Harvey RD, CNSC, LD  Outcome: Met This Shift

## 2021-04-11 PROBLEM — B37.0 THRUSH: Status: ACTIVE | Noted: 2021-04-11

## 2021-04-11 LAB
ALBUMIN SERPL-MCNC: 3.3 G/DL (ref 3.5–5.2)
ALP BLD-CCNC: 122 U/L (ref 35–104)
ALT SERPL-CCNC: 8 U/L (ref 0–32)
ANION GAP SERPL CALCULATED.3IONS-SCNC: 9 MMOL/L (ref 7–16)
AST SERPL-CCNC: 15 U/L (ref 0–31)
ATYPICAL LYMPHOCYTE RELATIVE PERCENT: 1 % (ref 0–4)
B.E.: 15.1 MMOL/L (ref -3–3)
BASOPHILS ABSOLUTE: 0 E9/L (ref 0–0.2)
BASOPHILS RELATIVE PERCENT: 0 % (ref 0–2)
BILIRUB SERPL-MCNC: 0.3 MG/DL (ref 0–1.2)
BUN BLDV-MCNC: 51 MG/DL (ref 8–23)
CALCIUM SERPL-MCNC: 9.7 MG/DL (ref 8.6–10.2)
CHLORIDE BLD-SCNC: 89 MMOL/L (ref 98–107)
CO2: 41 MMOL/L (ref 22–29)
COHB: 0.5 % (ref 0–1.5)
CREAT SERPL-MCNC: 1.1 MG/DL (ref 0.5–1)
CRITICAL: ABNORMAL
DATE ANALYZED: ABNORMAL
DATE OF COLLECTION: ABNORMAL
EOSINOPHILS ABSOLUTE: 0 E9/L (ref 0.05–0.5)
EOSINOPHILS RELATIVE PERCENT: 0 % (ref 0–6)
GFR AFRICAN AMERICAN: 57
GFR NON-AFRICAN AMERICAN: 47 ML/MIN/1.73
GLUCOSE BLD-MCNC: 117 MG/DL (ref 74–99)
HCO3: 40.9 MMOL/L (ref 22–26)
HCT VFR BLD CALC: 37.7 % (ref 34–48)
HEMOGLOBIN: 11.6 G/DL (ref 11.5–15.5)
HHB: 3.8 % (ref 0–5)
HYPERSEGMENTED NEUTROPHILS: ABNORMAL
HYPOCHROMIA: ABNORMAL
LAB: ABNORMAL
LYMPHOCYTES ABSOLUTE: 1.46 E9/L (ref 1.5–4)
LYMPHOCYTES RELATIVE PERCENT: 7 % (ref 20–42)
Lab: ABNORMAL
MCH RBC QN AUTO: 30.8 PG (ref 26–35)
MCHC RBC AUTO-ENTMCNC: 30.8 % (ref 32–34.5)
MCV RBC AUTO: 100 FL (ref 80–99.9)
METHB: 0.3 % (ref 0–1.5)
MODE: ABNORMAL
MONOCYTES ABSOLUTE: 1.65 E9/L (ref 0.1–0.95)
MONOCYTES RELATIVE PERCENT: 9 % (ref 2–12)
MYELOCYTE PERCENT: 2 % (ref 0–0)
NEUTROPHILS ABSOLUTE: 15.19 E9/L (ref 1.8–7.3)
NEUTROPHILS RELATIVE PERCENT: 81 % (ref 43–80)
O2 CONTENT: 15 ML/DL
O2 SATURATION: 96.2 % (ref 92–98.5)
O2HB: 95.4 % (ref 94–97)
OPERATOR ID: 166
PATIENT TEMP: 37 C
PCO2: 57 MMHG (ref 35–45)
PDW BLD-RTO: 13.8 FL (ref 11.5–15)
PH BLOOD GAS: 7.47 (ref 7.35–7.45)
PLATELET # BLD: 755 E9/L (ref 130–450)
PMV BLD AUTO: 9.5 FL (ref 7–12)
PO2: 85.5 MMHG (ref 75–100)
POIKILOCYTES: ABNORMAL
POLYCHROMASIA: ABNORMAL
POTASSIUM REFLEX MAGNESIUM: 4.1 MMOL/L (ref 3.5–5)
RBC # BLD: 3.77 E12/L (ref 3.5–5.5)
SODIUM BLD-SCNC: 139 MMOL/L (ref 132–146)
SOURCE, BLOOD GAS: ABNORMAL
STOMATOCYTES: ABNORMAL
TEAR DROP CELLS: ABNORMAL
THB: 11.1 G/DL (ref 11.5–16.5)
TIME ANALYZED: 1602
TOTAL PROTEIN: 6.5 G/DL (ref 6.4–8.3)
WBC # BLD: 18.3 E9/L (ref 4.5–11.5)

## 2021-04-11 PROCEDURE — 80053 COMPREHEN METABOLIC PANEL: CPT

## 2021-04-11 PROCEDURE — 99232 SBSQ HOSP IP/OBS MODERATE 35: CPT | Performed by: INTERNAL MEDICINE

## 2021-04-11 PROCEDURE — 85025 COMPLETE CBC W/AUTO DIFF WBC: CPT

## 2021-04-11 PROCEDURE — 2580000003 HC RX 258: Performed by: INTERNAL MEDICINE

## 2021-04-11 PROCEDURE — 6360000002 HC RX W HCPCS: Performed by: FAMILY MEDICINE

## 2021-04-11 PROCEDURE — 82805 BLOOD GASES W/O2 SATURATION: CPT

## 2021-04-11 PROCEDURE — 2060000000 HC ICU INTERMEDIATE R&B

## 2021-04-11 PROCEDURE — 6370000000 HC RX 637 (ALT 250 FOR IP): Performed by: FAMILY MEDICINE

## 2021-04-11 PROCEDURE — 6360000002 HC RX W HCPCS: Performed by: NURSE PRACTITIONER

## 2021-04-11 PROCEDURE — 99232 SBSQ HOSP IP/OBS MODERATE 35: CPT | Performed by: FAMILY MEDICINE

## 2021-04-11 PROCEDURE — 94660 CPAP INITIATION&MGMT: CPT

## 2021-04-11 PROCEDURE — 6370000000 HC RX 637 (ALT 250 FOR IP): Performed by: STUDENT IN AN ORGANIZED HEALTH CARE EDUCATION/TRAINING PROGRAM

## 2021-04-11 PROCEDURE — 94761 N-INVAS EAR/PLS OXIMETRY MLT: CPT

## 2021-04-11 PROCEDURE — 6360000002 HC RX W HCPCS: Performed by: STUDENT IN AN ORGANIZED HEALTH CARE EDUCATION/TRAINING PROGRAM

## 2021-04-11 PROCEDURE — 2700000000 HC OXYGEN THERAPY PER DAY

## 2021-04-11 PROCEDURE — 2580000003 HC RX 258: Performed by: FAMILY MEDICINE

## 2021-04-11 PROCEDURE — 36415 COLL VENOUS BLD VENIPUNCTURE: CPT

## 2021-04-11 PROCEDURE — 94640 AIRWAY INHALATION TREATMENT: CPT

## 2021-04-11 RX ORDER — CLOTRIMAZOLE 10 MG/1
10 LOZENGE ORAL; TOPICAL
Status: DISCONTINUED | OUTPATIENT
Start: 2021-04-11 | End: 2021-04-14 | Stop reason: HOSPADM

## 2021-04-11 RX ORDER — 0.9 % SODIUM CHLORIDE 0.9 %
500 INTRAVENOUS SOLUTION INTRAVENOUS ONCE
Status: COMPLETED | OUTPATIENT
Start: 2021-04-11 | End: 2021-04-11

## 2021-04-11 RX ADMIN — COLLAGENASE SANTYL: 250 OINTMENT TOPICAL at 08:30

## 2021-04-11 RX ADMIN — HYDROCODONE BITARTRATE AND ACETAMINOPHEN 1 TABLET: 5; 325 TABLET ORAL at 08:27

## 2021-04-11 RX ADMIN — FUROSEMIDE 60 MG: 10 INJECTION, SOLUTION INTRAMUSCULAR; INTRAVENOUS at 06:09

## 2021-04-11 RX ADMIN — AMLODIPINE BESYLATE 5 MG: 5 TABLET ORAL at 08:29

## 2021-04-11 RX ADMIN — DOXYCYCLINE HYCLATE 100 MG: 100 CAPSULE ORAL at 08:29

## 2021-04-11 RX ADMIN — Medication 10 ML: at 10:14

## 2021-04-11 RX ADMIN — FLECAINIDE ACETATE 75 MG: 50 TABLET ORAL at 21:24

## 2021-04-11 RX ADMIN — DOCUSATE SODIUM 50 MG AND SENNOSIDES 8.6 MG 2 TABLET: 8.6; 5 TABLET, FILM COATED ORAL at 08:29

## 2021-04-11 RX ADMIN — ZINC SULFATE 220 MG (50 MG) CAPSULE 50 MG: CAPSULE at 08:29

## 2021-04-11 RX ADMIN — Medication 2000 UNITS: at 08:28

## 2021-04-11 RX ADMIN — CELECOXIB 200 MG: 100 CAPSULE ORAL at 08:29

## 2021-04-11 RX ADMIN — DULOXETINE HYDROCHLORIDE 40 MG: 20 CAPSULE, DELAYED RELEASE ORAL at 08:28

## 2021-04-11 RX ADMIN — HYDROCODONE BITARTRATE AND ACETAMINOPHEN 1 TABLET: 5; 325 TABLET ORAL at 12:31

## 2021-04-11 RX ADMIN — LEVALBUTEROL HYDROCHLORIDE 0.31 MG: 0.31 SOLUTION RESPIRATORY (INHALATION) at 13:05

## 2021-04-11 RX ADMIN — POTASSIUM BICARBONATE 20 MEQ: 782 TABLET, EFFERVESCENT ORAL at 08:28

## 2021-04-11 RX ADMIN — METHYLPREDNISOLONE SODIUM SUCCINATE 40 MG: 40 INJECTION, POWDER, LYOPHILIZED, FOR SOLUTION INTRAMUSCULAR; INTRAVENOUS at 21:24

## 2021-04-11 RX ADMIN — BUDESONIDE 500 MCG: 0.5 SUSPENSION RESPIRATORY (INHALATION) at 19:56

## 2021-04-11 RX ADMIN — SODIUM CHLORIDE, PRESERVATIVE FREE 10 ML: 5 INJECTION INTRAVENOUS at 21:35

## 2021-04-11 RX ADMIN — VALSARTAN 320 MG: 320 TABLET ORAL at 08:27

## 2021-04-11 RX ADMIN — DOXYCYCLINE HYCLATE 100 MG: 100 CAPSULE ORAL at 17:25

## 2021-04-11 RX ADMIN — FUROSEMIDE 60 MG: 10 INJECTION, SOLUTION INTRAMUSCULAR; INTRAVENOUS at 21:25

## 2021-04-11 RX ADMIN — CLOTRIMAZOLE 10 MG: 10 LOZENGE ORAL; TOPICAL at 11:01

## 2021-04-11 RX ADMIN — FLECAINIDE ACETATE 75 MG: 50 TABLET ORAL at 08:28

## 2021-04-11 RX ADMIN — SODIUM CHLORIDE, PRESERVATIVE FREE 10 ML: 5 INJECTION INTRAVENOUS at 08:29

## 2021-04-11 RX ADMIN — ARFORMOTEROL TARTRATE 15 MCG: 15 SOLUTION RESPIRATORY (INHALATION) at 10:25

## 2021-04-11 RX ADMIN — HYDROCODONE BITARTRATE AND ACETAMINOPHEN 1 TABLET: 5; 325 TABLET ORAL at 21:23

## 2021-04-11 RX ADMIN — FERROUS SULFATE TAB 325 MG (65 MG ELEMENTAL FE) 325 MG: 325 (65 FE) TAB at 08:28

## 2021-04-11 RX ADMIN — METHYLPREDNISOLONE SODIUM SUCCINATE 40 MG: 40 INJECTION, POWDER, LYOPHILIZED, FOR SOLUTION INTRAMUSCULAR; INTRAVENOUS at 08:29

## 2021-04-11 RX ADMIN — CLOTRIMAZOLE 10 MG: 10 LOZENGE ORAL; TOPICAL at 15:08

## 2021-04-11 RX ADMIN — PANTOPRAZOLE SODIUM 40 MG: 40 TABLET, DELAYED RELEASE ORAL at 15:07

## 2021-04-11 RX ADMIN — RIVAROXABAN 15 MG: 15 TABLET, FILM COATED ORAL at 17:25

## 2021-04-11 RX ADMIN — BUDESONIDE 500 MCG: 0.5 SUSPENSION RESPIRATORY (INHALATION) at 10:25

## 2021-04-11 RX ADMIN — METOPROLOL TARTRATE 50 MG: 50 TABLET, FILM COATED ORAL at 21:23

## 2021-04-11 RX ADMIN — SODIUM CHLORIDE 500 ML: 9 INJECTION, SOLUTION INTRAVENOUS at 15:51

## 2021-04-11 RX ADMIN — DOCUSATE SODIUM 50 MG AND SENNOSIDES 8.6 MG 2 TABLET: 8.6; 5 TABLET, FILM COATED ORAL at 21:24

## 2021-04-11 RX ADMIN — CLOTRIMAZOLE 10 MG: 10 LOZENGE ORAL; TOPICAL at 21:24

## 2021-04-11 RX ADMIN — METOPROLOL TARTRATE 50 MG: 50 TABLET, FILM COATED ORAL at 08:29

## 2021-04-11 RX ADMIN — ARFORMOTEROL TARTRATE 15 MCG: 15 SOLUTION RESPIRATORY (INHALATION) at 19:56

## 2021-04-11 ASSESSMENT — PAIN - FUNCTIONAL ASSESSMENT: PAIN_FUNCTIONAL_ASSESSMENT: ACTIVITIES ARE NOT PREVENTED

## 2021-04-11 ASSESSMENT — PAIN DESCRIPTION - ORIENTATION: ORIENTATION: RIGHT

## 2021-04-11 ASSESSMENT — PAIN DESCRIPTION - DESCRIPTORS: DESCRIPTORS: ACHING

## 2021-04-11 ASSESSMENT — PAIN SCALES - GENERAL
PAINLEVEL_OUTOF10: 8
PAINLEVEL_OUTOF10: 5

## 2021-04-11 NOTE — PROGRESS NOTES
started patient on Amlodipine 5 mg and stopped hydralazine  -I/Os: 2.1 L  -Cardiology following  -Pulmonology following     GI bleed   -POCT occult stool positive  -General surgery was consulted     Leukocytosis  Improving   -Likely secondary to steroids but sacral ulcer could be contributing  -continue to monitor    Sacral decubitus ulcer  -Continue Doxycycline 100 mg BID until 4/10  -No plan to debride wound by general surgery   -Wound Care following  -Pain control: Norco 5 mg BID with PRN for breakthrough     Deconditioning  -Palliative care consulted  -PT/OT consulted     Hx of A-fib  -Continue home Metoprolol 25 mg BID, Flecainide 75 mg BID  -Continue home Xarelto 15 mg daily   -Cardiology consulted     Hx of depression  -Continue home Cymbalta 40 mg daily     Hx of rib fractures  Pain improving   -Continue Norco 5 mg BID and every 4 hours PRN for severe pain  -Continue home Celebrex 200 mg daily   -Continue Lidoderm patches     Hx of hypothyroidism  -Continue home Synthroid 100 mcg daily     Hx of moderate gastritis  Seen on EGD on 4/2020  -Prilosec changed to Protonix 40 mg PO BID     Hx of RA  -On Leucovorin- takes on Tuesday   -On Methotrexate- takes on Monday      Hx of Anemia  -Continue home Iron 325 mg every other day    Hx of HTN  -Continue home Diovan 320 mg daily   -Continue home Hydralazine 25 mg BID    Thrush  -Mycelex 10 mg X 5 daily for 7 days ordered     DVT ppx: Xarelto ON HOLD due to rectal bleeding  GI ppx: Protonix  Code Status: Full Code  Diet: Cardiac     Melinda Kingston MD  Family Medicine Resident PGY-2  04/11/21   7:01 AM

## 2021-04-11 NOTE — PLAN OF CARE
Problem: Skin Integrity:  Goal: Will show no infection signs and symptoms  Description: Will show no infection signs and symptoms  Outcome: Met This Shift  Goal: Absence of new skin breakdown  Description: Absence of new skin breakdown  Outcome: Met This Shift     Problem: Falls - Risk of:  Goal: Will remain free from falls  Description: Will remain free from falls  Outcome: Met This Shift  Goal: Absence of physical injury  Description: Absence of physical injury  Outcome: Met This Shift     Problem: Pain:  Goal: Pain level will decrease  Description: Pain level will decrease  Outcome: Met This Shift  Goal: Control of acute pain  Description: Control of acute pain  Outcome: Met This Shift  Goal: Control of chronic pain  Description: Control of chronic pain  Outcome: Met This Shift     Problem:  Activity:  Goal: Capacity to carry out activities will improve  Description: Capacity to carry out activities will improve  Outcome: Met This Shift  Goal: Will verbalize the importance of balancing activity with adequate rest periods  Description: Will verbalize the importance of balancing activity with adequate rest periods  Outcome: Met This Shift     Problem: Cardiac:  Goal: Hemodynamic stability will improve  Description: Hemodynamic stability will improve  Outcome: Met This Shift  Goal: Ability to maintain an adequate cardiac output will improve  Description: Ability to maintain an adequate cardiac output will improve  Outcome: Met This Shift     Problem: Coping:  Goal: Verbalizations of decreased anxiety will decrease  Description: Verbalizations of decreased anxiety will decrease  Outcome: Met This Shift     Problem: Fluid Volume:  Goal: Risk for excess fluid volume will decrease  Description: Risk for excess fluid volume will decrease  Outcome: Met This Shift  Goal: Maintenance of adequate hydration will improve  Description: Maintenance of adequate hydration will improve  Outcome: Met This Shift  Goal: Will show no signs and symptoms of electrolyte imbalance  Description: Will show no signs and symptoms of electrolyte imbalance  Outcome: Met This Shift     Problem: Health Behavior:  Goal: Ability to manage health-related needs will improve  Description: Ability to manage health-related needs will improve  Outcome: Met This Shift  Goal: Ability to seek appropriate health care will improve  Description: Ability to seek appropriate health care will improve  Outcome: Met This Shift     Problem: Nutritional:  Goal: Maintenance of adequate nutrition will improve  Description: Maintenance of adequate nutrition will improve  Outcome: Met This Shift     Problem: Physical Regulation:  Goal: Complications related to the disease process, condition or treatment will be avoided or minimized  Description: Complications related to the disease process, condition or treatment will be avoided or minimized  Outcome: Met This Shift     Problem: Respiratory:  Goal: Ability to maintain adequate ventilation will improve  Description: Ability to maintain adequate ventilation will improve  Outcome: Met This Shift  Goal: Respiratory status will improve  Description: Respiratory status will improve  Outcome: Met This Shift     Problem: OXYGENATION/RESPIRATORY FUNCTION  Goal: Patient will maintain patent airway  Outcome: Met This Shift  Goal: Patient will achieve/maintain normal respiratory rate/effort  Description: Respiratory rate and effort will be within normal limits for the patient  Outcome: Met This Shift     Problem: MECHANICAL VENTILATION  Goal: Patient will maintain patent airway  Outcome: Met This Shift  Goal: Oral health is maintained or improved  Outcome: Met This Shift  Goal: Tracheostomy will be managed safely  Outcome: Met This Shift  Goal: ET tube will be managed safely  Outcome: Met This Shift  Goal: Ability to express needs and understand communication  Outcome: Met This Shift  Goal: Mobility/activity is maintained at optimum level for patient  Outcome: Met This Shift     Problem: SKIN INTEGRITY  Goal: Skin integrity is maintained or improved  Outcome: Met This Shift     Problem: NUTRITION  Goal: Nutritional status is improving  Outcome: Met This Shift

## 2021-04-11 NOTE — PROGRESS NOTES
[facility] Cardiology Inpatient Progress Note    Patient is a 80 y.o. female of Collin Carrasquillo MD seen in hospital follow up. Chief complaint: CHF    HPI: No CP or SOB.      Patient Active Problem List   Diagnosis    Elevated CA-125    Hiatal hernia    Hypothyroidism    Diverticular disease    Skin cancer of lip    Rheumatoid arthritis ([address]    Diastolic dysfunction    Paresthesia of lower extremity    Lumbar radicular pain    Anemia    Multiple closed fractures of ribs of both sides with routine healing    Thoracic spinal stenosis with mass at T6-T7    Thoracic spine tumor    Thoracic spine fracture (HCC)    Atrial fibrillation with RVR (HCC)    A-fib (HCC)    Obstructive sleep apnea syndrome    Essential hypertension    Adjustment disorder with anxious mood    Laceration of lower limb    Palpitations    Debility    Closed fracture of patella    Delayed gastric emptying    Pain in both lower legs    Bimalleolar fracture, left, closed, initial encounter    Osteoporosis    Non-pressure chronic ulcer of other part of right lower leg with fat layer exposed ([facility] [address]    Bimalleolar fracture    Patellar fracture    Acute respiratory failure with hypoxia (HCC)    Depression    History of 2019 novel coronavirus disease (COVID-19)    Mild protein-calorie malnutrition (HCC)    Apathy    Chronic heart failure with preserved ejection fraction (HCC)    Chronic respiratory failure with hypoxia (HCC)    Pressure injury of sacral region, unstageable (HCC)    Acute on chronic diastolic heart failure (HCC)    Frailty    Leukocytosis    COPD (chronic obstructive pulmonary disease) (HCC)    GI bleed    Thrush       Allergies   Allergen Reactions    Amoxicillin      GI ill effects       Current Facility-Administered Medications   Medication Dose Route Frequency Provider Last Rate Last Admin    clotrimazole (MYCELEX) yamilex 10 mg  10 mg Oral 5x Daily Deepak Terrell MD   10 mg at 04/11/21 1101  [START ON 4/13/2021] leucovorin calcium (WELLCOVORIN) tablet 25 mg  25 mg Oral Once [employer] V, DO        [START ON 4/12/2021] methotrexate (RHEUMATREX) chemo tablet 5 mg  5 mg Oral Once [employer] V, DO        [START ON 4/12/2021] methotrexate (RHEUMATREX) chemo tablet 10 mg  10 mg Oral Once [employer] V, DO        furosemide (LASIX) injection 60 mg  60 mg Intravenous TID Bell Clinton MD   60 mg at 04/11/21 0609    HYDROcodone-acetaminophen (NORCO) 5-325 MG per tablet 1 tablet  1 tablet Oral Q4H PRN [employer] V, DO   1 tablet at 04/09/21 1653    HYDROcodone-acetaminophen (NORCO) 5-325 MG per tablet 1 tablet  1 tablet Oral BID [employer] V, DO   1 tablet at 04/11/21 0827    sennosides-docusate sodium (SENOKOT-S) 8.6-50 MG tablet 2 tablet  2 tablet Oral BID Goran Rivera MD   2 tablet at 04/11/21 0829    potassium bicarb-citric acid (EFFER-K) effervescent tablet 20 mEq  20 mEq Oral Daily Olimpia Quach MD   20 mEq at 04/11/21 8129    collagenase ointment   Topical Daily Miguel Ángel Desouza MD   Given at 04/11/21 0830    levalbuterol (XOPENEX) nebulization 0.31 mg  0.31 mg Nebulization Q6H PRN Olimpia Quach MD   0.31 mg at 04/10/21 1926    amLODIPine (NORVASC) tablet 5 mg  5 mg Oral Daily Bell Clinton MD   5 mg at 04/11/21 0829    methylPREDNISolone sodium (SOLU-MEDROL) injection 40 mg  40 mg Intravenous Q12H FRAN Choi CNP   40 mg at 04/11/21 0829    celecoxib (CELEBREX) capsule 200 mg  200 mg Oral Daily Olimpia Quach MD   200 mg at 04/11/21 0475    doxycycline hyclate (VIBRAMYCIN) capsule 100 mg  100 mg Oral BID WC Olimpia Quach MD   100 mg at 04/11/21 0829    DULoxetine (CYMBALTA) extended release capsule 40 mg  40 mg Oral Daily Olimpia Quach MD   40 mg at 04/11/21 4915    ferrous sulfate (IRON 325) tablet 325 mg  325 mg Oral Every Other Day Olimpia Quach MD   325 mg at 04/11/21 0828    flecainide (TAMBOCOR) tablet 75 mg  75 mg Oral BID Mendel Marek, MD   75 mg at 04/11/21 2830    levothyroxine (SYNTHROID) tablet 100 mcg  100 mcg Oral Daily Mendel Marek, MD   100 mcg at 04/10/21 3480    lidocaine 4 % external patch 1 patch  1 patch Topical Daily Mendel Marek, MD   1 patch at 04/11/21 0829    pantoprazole (PROTONIX) tablet 40 mg  40 mg Oral BID AC Mendel Marek, MD   40 mg at 04/10/21 1650    polyvinyl alcohol (LIQUIFILM TEARS) 1.4 % ophthalmic solution 1 drop  1 drop Both Eyes BID PRN Mendel Marek, MD        valsartan (DIOVAN) tablet 320 mg  320 mg Oral Daily Mendel Marek, MD   320 mg at 04/11/21 0827    vitamin D (CHOLECALCIFEROL) tablet 2,000 Units  2,000 Units Oral Daily Mendel Marek, MD   2,000 Units at 04/11/21 0828    [Held by provider] rivaroxaban (XARELTO) tablet 15 mg  15 mg Oral Daily Mendel Marek, MD   15 mg at 04/08/21 1856    zinc sulfate (ZINCATE) capsule 50 mg  50 mg Oral Daily Mendel Marek, MD   50 mg at 04/11/21 0829    metoprolol tartrate (LOPRESSOR) tablet 50 mg  50 mg Oral BID Mendel Marek, MD   50 mg at 04/11/21 0829    sodium chloride flush 0.9 % injection 10 mL  10 mL Intravenous 2 times per day Mendel Marek, MD   10 mL at 04/11/21 0829    sodium chloride flush 0.9 % injection 10 mL  10 mL Intravenous PRN Mendel Marek, MD   10 mL at 04/11/21 1014    0.9 % sodium chloride infusion  25 mL Intravenous PRN Mendel Marek, MD        promethazine (PHENERGAN) tablet 12.5 mg  12.5 mg Oral Q6H PRN Mendel Marek, MD        Or    ondansetron [facility]) injection 4 mg  4 mg Intravenous Q6H PRN Mendel Marek, MD        polyethylene glycol [facility]) packet 17 g  17 g Oral Daily PRN Mendel Marek, MD        acetaminophen (TYLENOL) tablet 650 mg  650 mg Oral Q6H PRN Mendel Marek, MD   650 mg at 04/10/21 1345    Or    acetaminophen (TYLENOL) suppository 650 mg  650 mg Rectal Q6H PRN Mendel Marek, MD        budesonide (PULMICORT) nebulizer suspension 500 mcg  0.5 mg Nebulization BID Colt Martinez MD   500 mcg at 04/11/21 1025    Arformoterol Tartrate (BROVANA) nebulizer solution 15 mcg  15 mcg Nebulization BID Colt Martinez MD   15 mcg at 04/11/21 1025       Review of systems:   Heart: as above   Lungs: as above   Eyes: denies changes in vision or discharge. Ears: denies changes in hearing or pain. Nose: denies epistaxis or masses   Throat: denies sore throat or trouble swallowing. Neuro: denies numbness, tingling, tremors. Skin: denies rashes or itching. : denies hematuria, dysuria   GI: denies vomiting, diarrhea   Psych: denies mood changed, anxiety, depression. Physical Exam   /80   Pulse 72   Temp 97.8 °F (36.6 °C) (Oral)   Resp 18   Ht 5' 2\" (1.575 m)   Wt 114 lb 4.8 oz (51.8 kg)   SpO2 99%   BMI 20.91 kg/m²   Constitutional: Oriented to person, place, and time. No distress. Well developed. Head: Normocephalic and atraumatic. Neck: Neck supple. No hepatojugular reflux. No JVD present. Carotid bruit is not present. No tracheal deviation present. No thyromegaly present. Cardiovascular: Normal rate, regular rhythm, normal heart sounds. intact distal pulses. No gallop and no friction rub. No murmur heard. Pulmonary: Breath sounds normal. No respiratory distress. No wheezes. No rales. Chest: Effort normal. No tenderness. Abdominal: Soft. Bowel sounds are normal. No distension or mass. No tenderness, rebound or guarding. Musculoskeletal: . No tenderness. No clubbing or cyanosis. Extremitites: Intact distal pulses. No edema  Neurological: Alert and oriented to person, place, and time. Skin: Skin is warm and dry. No rash noted. Not diaphoretic. No erythema. Psychiatric: Normal mood and affect. Behavior is normal.   Lymphadenopathy: No cervical adenopathy. No groin adenopathy.       CBC:   Lab Results   Component Value Date    WBC 18.3 04/11/2021    RBC 3.77 04/11/2021    HGB 11.6 04/11/2021    HCT 37.7 04/11/2021    .0 04/11/2021 with fat layer exposed ([facility] [address]    Bimalleolar fracture    Patellar fracture    Acute respiratory failure with hypoxia ([facility] [address]    Depression    History of 2019 novel coronavirus disease (COVID-19)    Mild protein-calorie malnutrition (HCC)    Apathy    Chronic heart failure with preserved ejection fraction (HCC)    Chronic respiratory failure with hypoxia (HCC)    Pressure injury of sacral region, unstageable (HCC)    Acute on chronic diastolic heart failure (HCC)    Frailty    Leukocytosis    COPD (chronic obstructive pulmonary disease) (HCC)    GI bleed    Thrush     1. Acute on chronic diastolic CHF/SOB:    Diurese. 2. PAF: BB/flecainide/Xarelto. 3. GIB: Per primary service. 4. HTN: Observe. 5. Anemia    6. RA    7. Decubitus ulcer    Norman Chiu D.O.   Cardiologist  Cardiology, [facility]

## 2021-04-11 NOTE — PROGRESS NOTES
Date: 4/10/2021    Time: 10:23 PM    Patient Placed On BIPAP/CPAP/ Non-Invasive Ventilation? Yes    If no must comment. Facial area red/color change? No           If YES are Blister/Lesion present? No   If yes must notify nursing staff  BIPAP/CPAP skin barrier? Yes    Skin barrier type:mepilexlite       Comments: Pt placed on BiPAP for the night, mepilex in place. Machine plugged into red outlet.         Sunny Ram     04/10/21 2222   NIV Type   Mode Bilevel   Mask Type Full face mask   Mask Size Small   Settings/Measurements   IPAP 14 cmH20   CPAP/EPAP 6 cmH2O   Rate Ordered 12   Resp 19   FiO2  40 %   Vt Exhaled 450 mL   Minute Volume 9.1 Liters   Mask Leak (lpm) 46 lpm   Comfort Level Good   Using Accessory Muscles No   SpO2 94

## 2021-04-11 NOTE — PROGRESS NOTES
[address] [address]  Progress Note    Chief complaint :  Chief Complaint   Patient presents with    Shortness of Breath       Subjective:    No overnight problems. Patient describes feeling anxious today. She states that she is not sure why she is still here and is very anxious to go home. . Tolerating diet. Past medical, surgical, family and social history were reviewed, non-contributory, and unchanged unless otherwise stated. Review of Systems   Constitutional: Positive for fatigue. Negative for appetite change, chills and fever. HENT: Negative for congestion, sneezing and sore throat. Respiratory: Positive for shortness of breath. Negative for cough. Cardiovascular: Negative for chest pain. Gastrointestinal: Negative for abdominal pain, constipation, diarrhea, nausea and vomiting. Genitourinary: Negative for dysuria. Neurological: Negative for dizziness, numbness and headaches. Psychiatric/Behavioral: The patient is not nervous/anxious. Objective:  BP (!) 97/48   Pulse 65   Temp 97.6 °F (36.4 °C) (Axillary)   Resp 16   Ht 5' 2\" (1.575 m)   Wt 114 lb 4.8 oz (51.8 kg)   SpO2 98%   BMI 20.91 kg/m²     Physical Exam  Vitals signs reviewed. Constitutional:       General: She is not in acute distress. Appearance: Normal appearance. HENT:      Head: Normocephalic. Nose: Nose normal.      Mouth/Throat:      Mouth: Mucous membranes are dry. No oral lesions. Tongue: No lesions. Pharynx: Oropharynx is clear. Eyes:      Pupils: Pupils are equal, round, and reactive to light. Cardiovascular:      Rate and Rhythm: Normal rate and regular rhythm. Heart sounds: Murmur present. Systolic murmur present. Pulmonary:      Effort: Pulmonary effort is normal.      Breath sounds: No rhonchi. Comments: Crackles bilateral bases  Abdominal:      General: Bowel sounds are normal.      Palpations: Abdomen is soft. Tenderness:  There is no abdominal tenderness. Musculoskeletal:      Right lower leg: Edema present. Left lower leg: Edema present. Skin:     General: Skin is warm. Capillary Refill: Capillary refill takes less than 2 seconds. Neurological:      General: No focal deficit present. Mental Status: She is alert and oriented to person, place, and time. Psychiatric:         Mood and Affect: Mood normal.         Thought Content:  Thought content normal.         Judgment: Judgment normal.         Labs:  Recent Results (from the past 24 hour(s))   Comprehensive Metabolic Panel w/ Reflex to MG    Collection Time: 04/11/21 10:10 AM   Result Value Ref Range    Sodium 139 132 - 146 mmol/L    Potassium reflex Magnesium 4.1 3.5 - 5.0 mmol/L    Chloride 89 (L) 98 - 107 mmol/L    CO2 41 (HH) 22 - 29 mmol/L    Anion Gap 9 7 - 16 mmol/L    Glucose 117 (H) 74 - 99 mg/dL    BUN 51 (H) 8 - 23 mg/dL    CREATININE 1.1 (H) 0.5 - 1.0 mg/dL    GFR Non-African American 47 >=60 mL/min/1.73    GFR African American 57     Calcium 9.7 8.6 - 10.2 mg/dL    Total Protein 6.5 6.4 - 8.3 g/dL    Albumin 3.3 (L) 3.5 - 5.2 g/dL    Total Bilirubin 0.3 0.0 - 1.2 mg/dL    Alkaline Phosphatase 122 (H) 35 - 104 U/L    ALT 8 0 - 32 U/L    AST 15 0 - 31 U/L   CBC auto differential    Collection Time: 04/11/21 10:10 AM   Result Value Ref Range    WBC 18.3 (H) 4.5 - 11.5 E9/L    RBC 3.77 3.50 - 5.50 E12/L    Hemoglobin 11.6 11.5 - 15.5 g/dL    Hematocrit 37.7 34.0 - 48.0 %    .0 (H) 80.0 - 99.9 fL    MCH 30.8 26.0 - 35.0 pg    MCHC 30.8 (L) 32.0 - 34.5 %    RDW 13.8 11.5 - 15.0 fL    Platelets 027 (H) 724 - 450 E9/L    MPV 9.5 7.0 - 12.0 fL    Neutrophils % 81.0 (H) 43.0 - 80.0 %    Lymphocytes % 7.0 (L) 20.0 - 42.0 %    Monocytes % 9.0 2.0 - 12.0 %    Eosinophils % 0.0 0.0 - 6.0 %    Basophils % 0.0 0.0 - 2.0 %    Neutrophils Absolute 15.19 (H) 1.80 - 7.30 E9/L    Lymphocytes Absolute 1.46 (L) 1.50 - 4.00 E9/L    Monocytes Absolute 1.65 (H) 0.10 - 0.95 E9/L    Eosinophils Absolute 0.00 (L) 0.05 - 0.50 E9/L    Basophils Absolute 0.00 0.00 - 0.20 E9/L    Atypical Lymphocytes Relative 1.0 0.0 - 4.0 %    Myelocyte Percent 2.0 0 - 0 %    Hypersegmented Neutrophils 1+     Polychromasia 1+     Hypochromia 1+     Poikilocytes 1+     Stomatocytes 1+     Tear Drop Cells 1+    Blood Gas, Arterial    Collection Time: 04/11/21  4:02 PM   Result Value Ref Range    Date Analyzed [id number]     Time Analyzed 1602     Source: Blood Arterial     pH, Blood Gas 7.474 (H) 7.350 - 7.450    PCO2 57.0 (H) 35.0 - 45.0 mmHg    PO2 85.5 75.0 - 100.0 mmHg    HCO3 40.9 (H) 22.0 - 26.0 mmol/L    B.E. 15.1 (H) -3.0 - 3.0 mmol/L    O2 Sat 96.2 92.0 - 98.5 %    O2Hb 95.4 94.0 - 97.0 %    COHb 0.5 0.0 - 1.5 %    MetHb 0.3 0.0 - 1.5 %    O2 Content 15.0 mL/dL    HHb 3.8 0.0 - 5.0 %    tHb (est) 11.1 (L) 11.5 - 16.5 g/dL    Mode NC-3  L     Date Of Collection      Time Collected      Pt Temp 37.0 C     ID [id number]     Lab [id number]     Critical(s) Notified . No Critical Values        Radiology and other tests reviewed:  XR CHEST PORTABLE   Final Result   Grossly stable diffuse interstitial and alveolar opacities which may be   infectious/inflammatory and/or related to fibrosis. CT of the chest from   02/24/2021 demonstrated evidence of both pneumonia and fibrosis.              Assessment:  Active Hospital Problems    Diagnosis Date Noted   [place] [employer] [B37.0] 04/11/2021    COPD (chronic obstructive pulmonary disease) ([facility] [J44.9] 04/10/2021    GI bleed [K92.2] 04/10/2021    Leukocytosis [D72.829]     Frailty [R54]     Pressure injury of sacral region, unstageable ([facility] [L89.150] 04/06/2021    Acute on chronic diastolic heart failure (HCC) [I50.33] 04/06/2021    Chronic respiratory failure with hypoxia (HCC) [J96.11] 03/19/2021    Anemia [D64.9] 07/26/2014    A-fib ([facility] [facility] [I48.91] 06/09/2011       Plan:  Acute on chronic respiratory failure  -Likely secondary to HFpEF but contributing factors include ILD due to RA, COPD, and recent COVID infection  -On Lasix 40 mg IV TID   -Solumedrol 40mg BID IV- transition to oral prednisone for discharge  -Wean oxygen as tolerated; on 4 L O2  -Continue home Xopenex nebs PRN  -Home Symbicort changed to Brovana/Pulmicort nebs  -Encouraged incentive spirometer  -Respiratory panel negative  -Bicarb elevated  -Decrease Lasix to 40 TID due to evaded bicarb  -Cardiology started patient on Amlodipine 5 mg and stopped hydralazine  -I/Os: 2.3 L  -Cardiology following  -Pulmonology following      GI bleed   -POCT occult stool positive  -General surgery was consulted  -Xarelto restarted     Leukocytosis  Improving   -Likely secondary to steroids but sacral ulcer could be contributing  -continue to monitor     Sacral decubitus ulcer  -Doxycycline 100 mg BID D/Caio  -No plan to debride wound by general surgery   -Wound Care following  -Pain control: Norco 5 mg BID with PRN for breakthrough     Deconditioning  -Palliative care consulted  -PT/OT consulted     Hx of A-fib  -Continue home Metoprolol 25 mg BID, Flecainide 75 mg BID  -Continue home Xarelto 15 mg daily   -Cardiology consulted     Hx of depression  -Continue home Cymbalta 40 mg daily     Hx of rib fractures  Pain improving   -Continue Norco 5 mg BID and every 4 hours PRN for severe pain  -Continue home Celebrex 200 mg daily   -Continue Lidoderm patches     Hx of hypothyroidism  -Continue home Synthroid 100 mcg daily     Hx of moderate gastritis  Seen on EGD on 4/2020  -Prilosec changed to Protonix 40 mg PO BID     Hx of RA  -On Leucovorin- takes on Tuesday   -On Methotrexate- takes on Monday      Hx of Anemia  -Continue home Iron 325 mg every other day     Hx of HTN  -Continue home Diovan 320 mg daily   -Continue home Hydralazine 25 mg BID     Thrush  -Mycelex 10 mg X 5 daily for 7 days ordered     Disposition: Possible d/c today    DVT ppx: Xarelto ON HOLD due to rectal bleeding  GI ppx: Protonix  Code Status: Full Code Diet: Cardiac       Marcus Dumont DO  Family Medicine Resident PGY-1  04/11/21   5:48 PM

## 2021-04-11 NOTE — PROGRESS NOTES
Per family member in rm pt kept pulling bipap mask off and had panic attack.  Pt on 4l ox 97% Afsaneh PeaPalakRT

## 2021-04-12 ENCOUNTER — APPOINTMENT (OUTPATIENT)
Dept: CT IMAGING | Age: 86
DRG: 291 | End: 2021-04-12
Payer: MEDICARE

## 2021-04-12 LAB
ALBUMIN SERPL-MCNC: 2.9 G/DL (ref 3.5–5.2)
ALP BLD-CCNC: 110 U/L (ref 35–104)
ALT SERPL-CCNC: 9 U/L (ref 0–32)
ANION GAP SERPL CALCULATED.3IONS-SCNC: 5 MMOL/L (ref 7–16)
AST SERPL-CCNC: 15 U/L (ref 0–31)
BASOPHILS ABSOLUTE: 0.04 E9/L (ref 0–0.2)
BASOPHILS RELATIVE PERCENT: 0.2 % (ref 0–2)
BILIRUB SERPL-MCNC: 0.3 MG/DL (ref 0–1.2)
BUN BLDV-MCNC: 53 MG/DL (ref 8–23)
CALCIUM SERPL-MCNC: 8.8 MG/DL (ref 8.6–10.2)
CHLORIDE BLD-SCNC: 89 MMOL/L (ref 98–107)
CO2: 44 MMOL/L (ref 22–29)
CREAT SERPL-MCNC: 1 MG/DL (ref 0.5–1)
EOSINOPHILS ABSOLUTE: 0 E9/L (ref 0.05–0.5)
EOSINOPHILS RELATIVE PERCENT: 0 % (ref 0–6)
GFR AFRICAN AMERICAN: >60
GFR NON-AFRICAN AMERICAN: 53 ML/MIN/1.73
GLUCOSE BLD-MCNC: 124 MG/DL (ref 74–99)
HCT VFR BLD CALC: 34.8 % (ref 34–48)
HEMOGLOBIN: 10.4 G/DL (ref 11.5–15.5)
IMMATURE GRANULOCYTES #: 0.46 E9/L
IMMATURE GRANULOCYTES %: 2.6 % (ref 0–5)
LYMPHOCYTES ABSOLUTE: 0.69 E9/L (ref 1.5–4)
LYMPHOCYTES RELATIVE PERCENT: 4 % (ref 20–42)
MCH RBC QN AUTO: 30.1 PG (ref 26–35)
MCHC RBC AUTO-ENTMCNC: 29.9 % (ref 32–34.5)
MCV RBC AUTO: 100.9 FL (ref 80–99.9)
MONOCYTES ABSOLUTE: 0.7 E9/L (ref 0.1–0.95)
MONOCYTES RELATIVE PERCENT: 4 % (ref 2–12)
NEUTROPHILS ABSOLUTE: 15.52 E9/L (ref 1.8–7.3)
NEUTROPHILS RELATIVE PERCENT: 89.2 % (ref 43–80)
PDW BLD-RTO: 13.7 FL (ref 11.5–15)
PLATELET # BLD: 680 E9/L (ref 130–450)
PMV BLD AUTO: 9.6 FL (ref 7–12)
POTASSIUM REFLEX MAGNESIUM: 4.1 MMOL/L (ref 3.5–5)
RBC # BLD: 3.45 E12/L (ref 3.5–5.5)
SODIUM BLD-SCNC: 138 MMOL/L (ref 132–146)
TOTAL PROTEIN: 5.9 G/DL (ref 6.4–8.3)
WBC # BLD: 17.4 E9/L (ref 4.5–11.5)

## 2021-04-12 PROCEDURE — 6360000002 HC RX W HCPCS: Performed by: NURSE PRACTITIONER

## 2021-04-12 PROCEDURE — 97530 THERAPEUTIC ACTIVITIES: CPT

## 2021-04-12 PROCEDURE — 94660 CPAP INITIATION&MGMT: CPT

## 2021-04-12 PROCEDURE — 85025 COMPLETE CBC W/AUTO DIFF WBC: CPT

## 2021-04-12 PROCEDURE — 2060000000 HC ICU INTERMEDIATE R&B

## 2021-04-12 PROCEDURE — 2580000003 HC RX 258: Performed by: FAMILY MEDICINE

## 2021-04-12 PROCEDURE — 99232 SBSQ HOSP IP/OBS MODERATE 35: CPT | Performed by: STUDENT IN AN ORGANIZED HEALTH CARE EDUCATION/TRAINING PROGRAM

## 2021-04-12 PROCEDURE — 6360000002 HC RX W HCPCS: Performed by: STUDENT IN AN ORGANIZED HEALTH CARE EDUCATION/TRAINING PROGRAM

## 2021-04-12 PROCEDURE — 6370000000 HC RX 637 (ALT 250 FOR IP): Performed by: NURSE PRACTITIONER

## 2021-04-12 PROCEDURE — 6370000000 HC RX 637 (ALT 250 FOR IP): Performed by: STUDENT IN AN ORGANIZED HEALTH CARE EDUCATION/TRAINING PROGRAM

## 2021-04-12 PROCEDURE — 97110 THERAPEUTIC EXERCISES: CPT

## 2021-04-12 PROCEDURE — 99232 SBSQ HOSP IP/OBS MODERATE 35: CPT | Performed by: FAMILY MEDICINE

## 2021-04-12 PROCEDURE — 2700000000 HC OXYGEN THERAPY PER DAY

## 2021-04-12 PROCEDURE — 36415 COLL VENOUS BLD VENIPUNCTURE: CPT

## 2021-04-12 PROCEDURE — 6370000000 HC RX 637 (ALT 250 FOR IP): Performed by: FAMILY MEDICINE

## 2021-04-12 PROCEDURE — 6360000002 HC RX W HCPCS: Performed by: FAMILY MEDICINE

## 2021-04-12 PROCEDURE — 80053 COMPREHEN METABOLIC PANEL: CPT

## 2021-04-12 PROCEDURE — 94640 AIRWAY INHALATION TREATMENT: CPT

## 2021-04-12 PROCEDURE — 99233 SBSQ HOSP IP/OBS HIGH 50: CPT | Performed by: STUDENT IN AN ORGANIZED HEALTH CARE EDUCATION/TRAINING PROGRAM

## 2021-04-12 PROCEDURE — 94669 MECHANICAL CHEST WALL OSCILL: CPT

## 2021-04-12 PROCEDURE — 71250 CT THORAX DX C-: CPT

## 2021-04-12 PROCEDURE — 94761 N-INVAS EAR/PLS OXIMETRY MLT: CPT

## 2021-04-12 RX ORDER — FUROSEMIDE 10 MG/ML
40 INJECTION INTRAMUSCULAR; INTRAVENOUS 3 TIMES DAILY
Status: DISCONTINUED | OUTPATIENT
Start: 2021-04-12 | End: 2021-04-12

## 2021-04-12 RX ORDER — ACETAZOLAMIDE 500 MG/5ML
250 INJECTION, POWDER, LYOPHILIZED, FOR SOLUTION INTRAVENOUS EVERY 12 HOURS
Status: DISCONTINUED | OUTPATIENT
Start: 2021-04-12 | End: 2021-04-12 | Stop reason: ALTCHOICE

## 2021-04-12 RX ORDER — FUROSEMIDE 10 MG/ML
40 INJECTION INTRAMUSCULAR; INTRAVENOUS 2 TIMES DAILY
Status: DISCONTINUED | OUTPATIENT
Start: 2021-04-12 | End: 2021-04-12

## 2021-04-12 RX ORDER — PREDNISONE 20 MG/1
40 TABLET ORAL DAILY
Status: DISCONTINUED | OUTPATIENT
Start: 2021-04-13 | End: 2021-04-13

## 2021-04-12 RX ORDER — FUROSEMIDE 20 MG/1
20 TABLET ORAL DAILY
Status: DISCONTINUED | OUTPATIENT
Start: 2021-04-12 | End: 2021-04-14 | Stop reason: HOSPADM

## 2021-04-12 RX ORDER — ACETAZOLAMIDE 250 MG/1
250 TABLET ORAL EVERY 12 HOURS
Status: DISCONTINUED | OUTPATIENT
Start: 2021-04-12 | End: 2021-04-12

## 2021-04-12 RX ORDER — POTASSIUM CHLORIDE 20 MEQ/1
20 TABLET, EXTENDED RELEASE ORAL DAILY
Status: DISCONTINUED | OUTPATIENT
Start: 2021-04-12 | End: 2021-04-14 | Stop reason: HOSPADM

## 2021-04-12 RX ADMIN — ZINC SULFATE 220 MG (50 MG) CAPSULE 50 MG: CAPSULE at 08:50

## 2021-04-12 RX ADMIN — ARFORMOTEROL TARTRATE 15 MCG: 15 SOLUTION RESPIRATORY (INHALATION) at 22:16

## 2021-04-12 RX ADMIN — POTASSIUM BICARBONATE 20 MEQ: 782 TABLET, EFFERVESCENT ORAL at 08:50

## 2021-04-12 RX ADMIN — AMLODIPINE BESYLATE 5 MG: 5 TABLET ORAL at 08:50

## 2021-04-12 RX ADMIN — Medication 2000 UNITS: at 08:50

## 2021-04-12 RX ADMIN — HYDROCODONE BITARTRATE AND ACETAMINOPHEN 1 TABLET: 5; 325 TABLET ORAL at 08:50

## 2021-04-12 RX ADMIN — CLOTRIMAZOLE 10 MG: 10 LOZENGE ORAL; TOPICAL at 18:07

## 2021-04-12 RX ADMIN — COLLAGENASE SANTYL: 250 OINTMENT TOPICAL at 08:49

## 2021-04-12 RX ADMIN — LEVALBUTEROL HYDROCHLORIDE 0.31 MG: 0.31 SOLUTION RESPIRATORY (INHALATION) at 12:49

## 2021-04-12 RX ADMIN — METHOTREXATE 5 MG: 2.5 TABLET ORAL at 11:11

## 2021-04-12 RX ADMIN — METHYLPREDNISOLONE SODIUM SUCCINATE 40 MG: 40 INJECTION, POWDER, LYOPHILIZED, FOR SOLUTION INTRAMUSCULAR; INTRAVENOUS at 08:51

## 2021-04-12 RX ADMIN — FLECAINIDE ACETATE 75 MG: 50 TABLET ORAL at 20:15

## 2021-04-12 RX ADMIN — ACETAZOLAMIDE 250 MG: 250 TABLET ORAL at 11:11

## 2021-04-12 RX ADMIN — VALSARTAN 320 MG: 320 TABLET ORAL at 08:50

## 2021-04-12 RX ADMIN — BUDESONIDE 500 MCG: 0.5 SUSPENSION RESPIRATORY (INHALATION) at 22:17

## 2021-04-12 RX ADMIN — CLOTRIMAZOLE 10 MG: 10 LOZENGE ORAL; TOPICAL at 11:11

## 2021-04-12 RX ADMIN — HYDROCODONE BITARTRATE AND ACETAMINOPHEN 1 TABLET: 5; 325 TABLET ORAL at 14:50

## 2021-04-12 RX ADMIN — DULOXETINE HYDROCHLORIDE 40 MG: 20 CAPSULE, DELAYED RELEASE ORAL at 08:50

## 2021-04-12 RX ADMIN — FLECAINIDE ACETATE 75 MG: 50 TABLET ORAL at 08:50

## 2021-04-12 RX ADMIN — CLOTRIMAZOLE 10 MG: 10 LOZENGE ORAL; TOPICAL at 21:42

## 2021-04-12 RX ADMIN — SODIUM CHLORIDE, PRESERVATIVE FREE 10 ML: 5 INJECTION INTRAVENOUS at 08:51

## 2021-04-12 RX ADMIN — LEVALBUTEROL HYDROCHLORIDE 0.31 MG: 0.31 SOLUTION RESPIRATORY (INHALATION) at 08:33

## 2021-04-12 RX ADMIN — BUDESONIDE 500 MCG: 0.5 SUSPENSION RESPIRATORY (INHALATION) at 08:30

## 2021-04-12 RX ADMIN — SODIUM CHLORIDE, PRESERVATIVE FREE 10 ML: 5 INJECTION INTRAVENOUS at 20:18

## 2021-04-12 RX ADMIN — CLOTRIMAZOLE 10 MG: 10 LOZENGE ORAL; TOPICAL at 06:04

## 2021-04-12 RX ADMIN — RIVAROXABAN 15 MG: 15 TABLET, FILM COATED ORAL at 18:07

## 2021-04-12 RX ADMIN — CLOTRIMAZOLE 10 MG: 10 LOZENGE ORAL; TOPICAL at 14:50

## 2021-04-12 RX ADMIN — FUROSEMIDE 20 MG: 20 TABLET ORAL at 14:50

## 2021-04-12 RX ADMIN — HYDROCODONE BITARTRATE AND ACETAMINOPHEN 1 TABLET: 5; 325 TABLET ORAL at 20:15

## 2021-04-12 RX ADMIN — DOCUSATE SODIUM 50 MG AND SENNOSIDES 8.6 MG 2 TABLET: 8.6; 5 TABLET, FILM COATED ORAL at 08:50

## 2021-04-12 RX ADMIN — CELECOXIB 200 MG: 100 CAPSULE ORAL at 08:50

## 2021-04-12 RX ADMIN — FUROSEMIDE 60 MG: 10 INJECTION, SOLUTION INTRAMUSCULAR; INTRAVENOUS at 06:05

## 2021-04-12 RX ADMIN — LEVOTHYROXINE SODIUM 100 MCG: 100 TABLET ORAL at 06:04

## 2021-04-12 RX ADMIN — METOPROLOL TARTRATE 50 MG: 50 TABLET, FILM COATED ORAL at 08:50

## 2021-04-12 RX ADMIN — PANTOPRAZOLE SODIUM 40 MG: 40 TABLET, DELAYED RELEASE ORAL at 16:11

## 2021-04-12 RX ADMIN — ARFORMOTEROL TARTRATE 15 MCG: 15 SOLUTION RESPIRATORY (INHALATION) at 08:30

## 2021-04-12 RX ADMIN — PANTOPRAZOLE SODIUM 40 MG: 40 TABLET, DELAYED RELEASE ORAL at 06:04

## 2021-04-12 RX ADMIN — ACETAMINOPHEN 650 MG: 325 TABLET ORAL at 11:19

## 2021-04-12 RX ADMIN — METOPROLOL TARTRATE 50 MG: 50 TABLET, FILM COATED ORAL at 20:14

## 2021-04-12 RX ADMIN — METHOTREXATE 10 MG: 2.5 TABLET ORAL at 18:07

## 2021-04-12 RX ADMIN — DOCUSATE SODIUM 50 MG AND SENNOSIDES 8.6 MG 2 TABLET: 8.6; 5 TABLET, FILM COATED ORAL at 20:15

## 2021-04-12 ASSESSMENT — PAIN SCALES - PAIN ASSESSMENT IN ADVANCED DEMENTIA (PAINAD)
TOTALSCORE: 0
CONSOLABILITY: 0
NEGVOCALIZATION: 1
CONSOLABILITY: 1
NEGVOCALIZATION: 0
TOTALSCORE: 3
BODYLANGUAGE: 0
CONSOLABILITY: 0
BREATHING: 0
FACIALEXPRESSION: 0
NEGVOCALIZATION: 0
BREATHING: 0
TOTALSCORE: 0
BODYLANGUAGE: 0
CONSOLABILITY: 0
TOTALSCORE: 6
FACIALEXPRESSION: 2
FACIALEXPRESSION: 2
FACIALEXPRESSION: 0
BREATHING: 0

## 2021-04-12 ASSESSMENT — PAIN SCALES - GENERAL
PAINLEVEL_OUTOF10: 0
PAINLEVEL_OUTOF10: 0
PAINLEVEL_OUTOF10: 6

## 2021-04-12 ASSESSMENT — ENCOUNTER SYMPTOMS
VOMITING: 0
ABDOMINAL PAIN: 0
COUGH: 0
SORE THROAT: 0
DIARRHEA: 0
CONSTIPATION: 0
SHORTNESS OF BREATH: 1
NAUSEA: 0

## 2021-04-12 NOTE — PROGRESS NOTES
INPATIENT CARDIOLOGY FOLLOW-UP    Name: Pranay Duggan    Age: 80 y.o. Date of Admission: 4/6/2021  3:03 PM    Date of Service: 4/12/2021    Interim History:  Effective diuresis. Decreasing O2 requirement.     Review of Systems:   Cardiac: As per HPI  General: No fever, chills  Pulmonary: As per HPI  HEENT: No visual disturbances, difficult swallowing  GI: No nausea, vomiting  Endocrine: No thyroid disease or DM  Musculoskeletal: FELIPE x 4, no focal motor deficits  Skin: Intact, no rashes  Neuro/Psych: No headache or seizures    Problem List:  Patient Active Problem List   Diagnosis    Elevated CA-125    Hiatal hernia    Hypothyroidism    Diverticular disease    Skin cancer of lip    Rheumatoid arthritis ([address]    Diastolic dysfunction    Paresthesia of lower extremity    Lumbar radicular pain    Anemia    Multiple closed fractures of ribs of both sides with routine healing    Thoracic spinal stenosis with mass at T6-T7    Thoracic spine tumor    Thoracic spine fracture (HCC)    Atrial fibrillation with RVR (HCC)    A-fib ([address]    Obstructive sleep apnea syndrome    Essential hypertension    Adjustment disorder with anxious mood    Laceration of lower limb    Palpitations    Debility    Closed fracture of patella    Delayed gastric emptying    Pain in both lower legs    Bimalleolar fracture, left, closed, initial encounter    Osteoporosis    Non-pressure chronic ulcer of other part of right lower leg with fat layer exposed ([address]    Bimalleolar fracture    Patellar fracture    Acute respiratory failure with hypoxia ([address]    Depression    History of 2019 novel coronavirus disease (COVID-19)    Mild protein-calorie malnutrition (HCC)    Apathy    Chronic heart failure with preserved ejection fraction (HCC)    Chronic respiratory failure with hypoxia (HCC)    Pressure injury of sacral region, unstageable ([address]    Acute on chronic diastolic heart failure (HCC)    Frailty    Leukocytosis    COPD [facility]ch[facility] obstructive [facility] disease) ([facility]    GI bleed    Thrush       Allergies:   Allergies   Allergen Reactions    Amoxicillin      GI ill effects       Current Medications:  Current Facility-Administered Medications   Medication Dose Route Frequency Provider Last Rate Last Admin    potassium chloride (KLOR-CON M) extended release tablet 20 mEq  20 mEq Oral Daily Carol Adrian MD        [START ON 4/13/2021] predniSONE (DELTASONE) tablet 40 mg  40 mg Oral Daily Josefina Earl MD        furosemide (LASIX) tablet 20 mg  20 mg Oral Daily Minh Sena MD        [facility] yamilex 10 mg  10 mg Oral 5x Daily Nawaf Li MD   10 mg at 04/12/21 1111    [START ON 4/13/2021] leucovorin calcium (WELLCOVORIN) tablet 25 mg  25 mg Oral Once [employer] V, DO        methotrexate (RHEUMATREX) chemo tablet 10 mg  10 mg Oral Once [employer] V, DO        HYDROcodone-acetaminophen (NORCO) 5-325 MG per tablet 1 tablet  1 tablet Oral Q4H PRN [employer] V, DO   1 tablet at 04/11/21 1231    HYDROcodone-acetaminophen (NORCO) 5-325 MG per tablet 1 tablet  1 tablet Oral BID [employer] V, DO   1 tablet at 04/12/21 0850    sennosides-docusate sodium (SENOKOT-S) 8.6-50 MG tablet 2 tablet  2 tablet Oral BID Carol Adrian MD   2 tablet at 04/12/21 0850    collagenase ointment   Topical Daily Georgina Landry MD   Given at 04/12/21 0849    levalbuterol (XOPENEX) nebulization 0.31 mg  0.31 mg Nebulization Q6H PRN Nnamdi Layton MD   0.31 mg at 04/12/21 1249    amLODIPine (NORVASC) tablet 5 mg  5 mg Oral Daily Minh Sena MD   5 mg at 04/12/21 0850    celecoxib (CELEBREX) capsule 200 mg  200 mg Oral Daily Nnamid Layton MD   200 mg at 04/12/21 0850    DULoxetine (CYMBALTA) extended release capsule 40 mg  40 mg Oral Daily Nnamdi Layton MD   40 mg at 04/12/21 0850    ferrous sulfate (IRON 325) tablet 325 mg  325 mg Oral Every Other Chelsey Baltazar MD 325 mg at 04/11/21 0828    flecainide (TAMBOCOR) tablet 75 mg  75 mg Oral BID Gita Toledo MD   75 mg at 04/12/21 0850    levothyroxine (SYNTHROID) tablet 100 mcg  100 mcg Oral Daily Gita Toledo MD   100 mcg at 04/12/21 0604    lidocaine 4 % external patch 1 patch  1 patch Topical Daily Gita Toledo MD   1 patch at 04/11/21 0829    pantoprazole (PROTONIX) tablet 40 mg  40 mg Oral BID AC Gita Toledo MD   40 mg at 04/12/21 0604    polyvinyl alcohol (LIQUIFILM TEARS) 1.4 % ophthalmic solution 1 drop  1 drop Both Eyes BID PRN Gita Toledo MD        valsartan (DIOVAN) tablet 320 mg  320 mg Oral Daily Gita Toledo MD   320 mg at 04/12/21 0850    vitamin D (CHOLECALCIFEROL) tablet 2,000 Units  2,000 Units Oral Daily Gita Toledo MD   2,000 Units at 04/12/21 0850    rivaroxaban (XARELTO) tablet 15 mg  15 mg Oral Daily Gita Toledo MD   15 mg at 04/11/21 1725    zinc sulfate (ZINCATE) capsule 50 mg  50 mg Oral Daily Gita Toledo MD   50 mg at 04/12/21 0850    metoprolol tartrate (LOPRESSOR) tablet 50 mg  50 mg Oral BID Gita Toledo MD   50 mg at 04/12/21 0850    sodium chloride flush 0.9 % injection 10 mL  10 mL Intravenous 2 times per day Gita Toledo MD   10 mL at 04/12/21 0851    sodium chloride flush 0.9 % injection 10 mL  10 mL Intravenous PRN Gita Toledo MD   10 mL at 04/11/21 1014    0.9 % sodium chloride infusion  25 mL Intravenous PRN Gita Toledo MD        promethazine (PHENERGAN) tablet 12.5 mg  12.5 mg Oral Q6H PRN Gita Toledo MD        Or    ondansetron [facility]) injection 4 mg  4 mg Intravenous Q6H PRN Gita Toledo MD        polyethylene glycol [facility]) packet 17 g  17 g Oral Daily PRN Gita Toledo MD        acetaminophen (TYLENOL) tablet 650 mg  650 mg Oral Q6H PRN Gita Toledo MD   650 mg at 04/12/21 1119    Or    acetaminophen (TYLENOL) suppository 650 mg  650 mg Rectal Q6H PRN Gita Toledo MD        budashleyonide (PULMICORT) nebulizer suspension 500 mcg  0.5 mg Nebulization BID Abimbola Singh MD   500 mcg at 04/12/21 0830    Arformoterol Tartrate (BROVANA) nebulizer solution 15 mcg  15 mcg Nebulization BID Abimbola Singh MD   15 mcg at 04/12/21 0830      sodium chloride         Physical Exam:  BP (!) 127/59   Pulse 69   Temp 97 °F (36.1 °C) (Oral)   Resp 16   Ht 5' 2\" (1.575 m)   Wt 114 lb 12.8 oz (52.1 kg)   SpO2 95%   BMI 21.00 kg/m²   Wt Readings from Last 3 Encounters:   04/12/21 114 lb 12.8 oz (52.1 kg)   03/09/21 140 lb 3.4 oz (63.6 kg)   02/16/21 142 lb 14.4 oz (64.8 kg)     Appearance: Awake, alert, no acute respiratory distress  Skin: Intact, no rash  Head: Normocephalic, atraumatic  Eyes: EOMI, no conjunctival erythema  Neck: Supple, no elevated JVP, no carotid bruits  Lungs: clear bilaterally.    cardiac: Regular rate and rhythm, +S1S2, no murmurs apparent  Abdomen: Soft, nontender, +bowel sounds  Extremities: No edema  Neurologic: No focal motor deficits apparent, normal mood and affect  Peripheral Pulses: Intact posterior tibial pulses bilaterally  Intake/Output:    Intake/Output Summary (Last 24 hours) at 4/12/2021 1356  Last data filed at 4/12/2021 0859  Gross per 24 hour   Intake 240 ml   Output 825 ml   Net -585 ml     I/O this shift:  In: -   Out: 375 [Urine:375]    Laboratory Tests:  Recent Labs     04/10/21  0434 04/11/21  1010 04/12/21  0548    139 138   K 4.7 4.1 4.1   CL 91* 89* 89*   CO2 42* 41* 44*   BUN 44* 51* 53*   CREATININE 0.9 1.1* 1.0   GLUCOSE 129* 117* 124*   CALCIUM 9.0 9.7 8.8     Lab Results   Component Value Date    MG 1.6 04/09/2021     Recent Labs     04/10/21  0434 04/11/21  1010 04/12/21  0548   ALKPHOS 109* 122* 110*   ALT 10 8 9   AST 29 15 15   PROT 6.0* 6.5 5.9*   BILITOT 0.3 0.3 0.3   LABALBU 2.7* 3.3* 2.9*     Recent Labs     04/10/21  0434 04/11/21  1010 04/12/21  0548   WBC 14.0* 18.3* 17.4*   RBC 3.18* 3.77 3.45*   HGB 9.5* 11.6 10.4*   HCT 31.7* 37.7 34.8

## 2021-04-12 NOTE — PROGRESS NOTES
Abdomen is soft. Tenderness: There is no abdominal tenderness. Musculoskeletal:      Right lower leg: No edema. Left lower leg: No edema. Skin:     General: Skin is warm. Capillary Refill: Capillary refill takes less than 2 seconds. Neurological:      General: No focal deficit present. Mental Status: She is alert and oriented to person, place, and time. Psychiatric:         Mood and Affect: Mood normal.         Thought Content:  Thought content normal.         Judgment: Judgment normal.         Labs:  Recent Results (from the past 24 hour(s))   Comprehensive Metabolic Panel w/ Reflex to MG    Collection Time: 04/13/21  4:05 AM   Result Value Ref Range    Sodium 137 132 - 146 mmol/L    Potassium reflex Magnesium 4.3 3.5 - 5.0 mmol/L    Chloride 90 (L) 98 - 107 mmol/L    CO2 40 (H) 22 - 29 mmol/L    Anion Gap 7 7 - 16 mmol/L    Glucose 95 74 - 99 mg/dL    BUN 64 (H) 8 - 23 mg/dL    CREATININE 1.3 (H) 0.5 - 1.0 mg/dL    GFR Non-African American 39 >=60 mL/min/1.73    GFR African American 47     Calcium 8.7 8.6 - 10.2 mg/dL    Total Protein 5.8 (L) 6.4 - 8.3 g/dL    Albumin 2.8 (L) 3.5 - 5.2 g/dL    Total Bilirubin 0.3 0.0 - 1.2 mg/dL    Alkaline Phosphatase 97 35 - 104 U/L    ALT 9 0 - 32 U/L    AST 20 0 - 31 U/L   CBC auto differential    Collection Time: 04/13/21  4:05 AM   Result Value Ref Range    WBC 17.9 (H) 4.5 - 11.5 E9/L    RBC 3.29 (L) 3.50 - 5.50 E12/L    Hemoglobin 10.1 (L) 11.5 - 15.5 g/dL    Hematocrit 33.2 (L) 34.0 - 48.0 %    .9 (H) 80.0 - 99.9 fL    MCH 30.7 26.0 - 35.0 pg    MCHC 30.4 (L) 32.0 - 34.5 %    RDW 13.9 11.5 - 15.0 fL    Platelets 378 (H) 732 - 450 E9/L    MPV 9.8 7.0 - 12.0 fL    Neutrophils % 77.8 43.0 - 80.0 %    Immature Granulocytes % 4.4 0.0 - 5.0 %    Lymphocytes % 8.8 (L) 20.0 - 42.0 %    Monocytes % 8.6 2.0 - 12.0 %    Eosinophils % 0.2 0.0 - 6.0 %    Basophils % 0.2 0.0 - 2.0 %    Neutrophils Absolute 13.95 (H) 1.80 - 7.30 E9/L    Immature Granulocytes # 0.78 E9/L    Lymphocytes Absolute 1.57 1.50 - 4.00 E9/L    Monocytes Absolute 1.54 (H) 0.10 - 0.95 E9/L    Eosinophils Absolute 0.04 (L) 0.05 - 0.50 E9/L    Basophils Absolute 0.04 0.00 - 0.20 E9/L    Polychromasia 1+     Hypochromia 1+        Radiology and other tests reviewed:  CT CHEST HIGH RESOLUTION   Final Result   1. 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 Further evaluation   with MRI of the thoracic spine is recommended. 2. Bronchiectasis and scarring in the lower lobes, which has mostly developed   in the interim since the earlier CT of the chest from 02/10/2021 and may be   the result of infection. 3. The inspiratory and expiratory phase imaging was limited, as no expiratory   phase images were obtained. (Even during images labeled expiration, the   patient appears to be in deep inspiration. ). 4. Mosaic attenuation of the lungs, with relative hypoattenuation in the   anterior aspects of the upper lobes. These findings may be due to air   trapping or oligemia, possibly due to chronic thromboembolic disease. 5. Cardiomegaly. 6. Small to moderate size hiatal hernia. XR CHEST PORTABLE   Final Result   Grossly stable diffuse interstitial and alveolar opacities which may be   infectious/inflammatory and/or related to fibrosis. CT of the chest from   02/24/2021 demonstrated evidence of both pneumonia and fibrosis.              Assessment:  Active Hospital Problems    Diagnosis Date Noted    Congestive heart failure (HCC) [I50.9]     Thrush [B37.0] 04/11/2021    COPD (chronic obstructive pulmonary disease) ([facility] [address] [J44.9] 04/10/2021    GI bleed [K92.2] 04/10/2021    Leukocytosis [D72.829]     Frailty [R54]     Pressure injury of sacral region, unstageable ([address] [L89.150] 04/06/2021    Acute on chronic diastolic heart failure (HCC) [I50.33] 04/06/2021    Chronic respiratory failure with hypoxia [facility]) [J96.11] 03/19/2021    Anemia [D64.9] 07/26/2014    A-fib ([facility] [address] [I48.91] 06/09/2011       Plan:  Acute on chronic respiratory failure  -Likely secondary to HFpEF but contributing factors include ILD due to RA, COPD, and recent COVID infection  -Lasix 20 mg oral  -Solumedrol 40mg BID IV- transition to oral prednisone 40 mg daily  -Wean oxygen as tolerated; on 4 L O2  -Continue home Xopenex nebs PRN  -Home Symbicort changed to Brovana/Pulmicort nebs  -Encouraged incentive spirometer  -Respiratory panel negative  -Bicarb elevated  -Cardiology started patient on Amlodipine 5 mg and stopped hydralazine  -I/Os: 2.7 L  -Cardiology following  -Pulmonology following   -CT chest showed bronchiectasis and scarring in the lower lobes which is new from CT 2/10/2021, is a continuation of the lungs, may be due to air trapping or oligoemia possibly due to chronic thromboembolic disease     GI bleed   -POCT occult stool positive  -General surgery was consulted  -Xarelto restarted     Leukocytosis  Improving   -Likely secondary to steroids but sacral ulcer could be contributing  -continue to monitor     Sacral decubitus ulcer  -Doxycycline 100 mg BID D/Caio  -No plan to debride wound by general surgery   -Wound Care following  -Pain control: Norco 5 mg BID with PRN for breakthrough     LONG  -likely due to diuretics use  -Baseline creatinine approximately 1.0  -May need outpatient lab draw to monitor    Deconditioning  -Palliative care consulted  -PT/OT consulted     Hx of A-fib  -Continue home Metoprolol 25 mg BID, Flecainide 75 mg BID  -Continue home Xarelto 15 mg daily   -Cardiology consulted     Hx of depression  -Continue home Cymbalta 40 mg daily     Hx of rib fractures  Pain improving   -Continue Norco 5 mg BID and every 4 hours PRN for severe pain  -Continue home Celebrex 200 mg daily   -Continue Lidoderm patches     Hx of hypothyroidism  -Continue home Synthroid 100 mcg daily     Hx of moderate gastritis

## 2021-04-12 NOTE — PROGRESS NOTES
no muscle tenderness. ROM normal in all joints of extremities. Neurologic: Mental status: Alert and Oriented X3 . Pertinent/ New Labs and Imaging Studies     Imaging Personally Reviewed:  4/6  Grossly stable diffuse interstitial and alveolar opacities which may be   infectious/inflammatory and/or related to fibrosis.  CT of the chest from   02/24/2021 demonstrated evidence of both pneumonia and fibrosis. ECHO  2/25  Left ventricular internal dimensions were normal in diastole and systole. No regional wall motion abnormalities seen. Normal left ventricular ejection fraction. The left atrium is borderline dilated. Moderate mitral annular calcification. Mild mitral regurgitation is present. The aortic valve appears mildly sclerotic. Mild aortic regurgitation is noted. Labs:  Lab Results   Component Value Date    WBC 17.4 04/12/2021    HGB 10.4 04/12/2021    HCT 34.8 04/12/2021    .9 04/12/2021    MCH 30.1 04/12/2021    MCHC 29.9 04/12/2021    RDW 13.7 04/12/2021     04/12/2021    MPV 9.6 04/12/2021     Lab Results   Component Value Date     04/12/2021    K 4.1 04/12/2021    CL 89 04/12/2021    CO2 44 04/12/2021    BUN 53 04/12/2021    CREATININE 1.0 04/12/2021    LABALBU 2.9 04/12/2021    LABALBU 4.0 04/24/2012    CALCIUM 8.8 04/12/2021    GFRAA >60 04/12/2021    LABGLOM 53 04/12/2021     Lab Results   Component Value Date    PROTIME 33.4 04/06/2021    PROTIME 15.8 05/12/2014    INR 3.0 04/06/2021     No results for input(s): PROBNP in the last 72 hours. No results for input(s): PROCAL in the last 72 hours. This SmartLink has not been configured with any valid records. Micro:  No results for input(s): CULTRESP in the last 72 hours. No results for input(s): LABGRAM in the last 72 hours. No results for input(s): LEGUR in the last 72 hours. No results for input(s): STREPNEUMAGU in the last 72 hours. No results for input(s): LP1UAG in the last 72 hours. Assessment:    1. Acute respiratory failure with hypoxia  2. HFpEF  3. 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 Severe JANETTE AHI 36, previously followed with  as OP   7. Seasonal allergies   8. A fib on anticoagulation  9. Fluid overload  10. Anemia  11. Leukocytosis   12. Sacral decubitus ulcer       Plan:   1. Oxygen therapy 2  L HF wean to keep >92%  2. Change Bipap to prn   3. Decreased lasix dosing 20 po daily- mgmt of chf and a fib per cardiology . Add diamox x 4 doses  4. Brovana, budesonide bid, xopenex prn-continue ezpap-if she requires bronchodilator upon discharge we will recommend to continue Xopenex HFA as needed for rescue  5. Obtain CT chest high resolution to assess ILD resolution of infiltrates from previous covid 19 pneumonia  6. Continue incentive spirometer. Recruitment techniques  7. Transition to oral prednisone with taper for dc  8. Dvt, gi prophylaxis. Xarelto for PAF   9. Doxycycline for empiric CAP coverage. procal 0.15  10. Local wound care. 11. Palliative medicine following for goals of care, symptom management  12. PT, OT    This plan of care was reviewed in collaboration with Dr. Mary Curtis  Electronically signed by Corinne Dudley, APRN - CNP on 4/12/2021 at 5:21 PM       Addendum;  I personally talked to Ms. Ricarda Singleton was daughter on the phone I gave her an update. Order a CT of her chest for follow-up regarding her pneumonitis from her Covid  Switch her to oral prednisone 40 daily. Her oxygen requirements have improved significantly and she is down to 2 L/min    I personally saw, examined, and cared for the patient. Labs, medications, radiographs reviewed. I agree with history exam and plans detailed in NP note.   Lenis Habermann, MD

## 2021-04-12 NOTE — PROGRESS NOTES
Occupational Therapy  OT BEDSIDE TREATMENT NOTE      Date:2021  Patient Name: Lewis Choudhary  MRN: [id number]  : 1935  Room: [address] [place] [address]     Referring Provider: Bimal Santoro MD     Evaluating OT: Sandra Ying OTR/L [id number]     AM-PAC Daily Activity Raw Score:      Recommended Adaptive Equipment: TBD     Diagnosis: acute on chronic CHF. Pt presents to ED from home with SOB, LE edema and sacral wound. Pertinent Medical History: HTN, RA, afib, h/o COVID 2021, neuropathy, CHF, osteoporosis, PAF  Precautions:  Falls, high flow O2, continuous puls ox     Home Living: Pt lives with family in a single story home. Bathroom setup: tub/shower combo with extended tub bench, has 3:1      Prior Level of Function: Hospital bed. Nancy Filler lift or beti steady for all transfers at home. Primarily bed level with most recent re-hospitalizations. Has 24 hour care between family and pd caregivers at home.      Pain Level: Back when seated- Moderate     Cognition: Alert and conversing with cues for safety provided                Functional Assessment:    Initial Eval Status  Date: 21 Treatment session: 21 Short Term Goals      Feeding Min A   Set up   Grooming Min A   Set up   UB Dressing Mod A  Donning/doffing hospital gown   Set up   LB Dressing Dependent  Management of socks Dep A   To manage socks  Max A    Bathing Max A   Mod A   Toileting Dependent  Incontinent of urine and BM with total assist in brown care bed level   Max A   Bed Mobility  Supine <> sit: Dependent  Rolling: Mod A Supine<> sit: Dep A      Functional Transfers STS: unable to tolerate d/t poor O2 sat with minimal exertion   Mod A   Functional Mobility NA       Balance Sitting: poor  Unable to tolerate EOB sitting, returned to supine for safety d/t decreased O2 saturation with exertion     Standing: unable to tolerate Sitting:  Max A        Activity Tolerance Poor  13L O2   Restin%     With bed level activity and sitting EOB: 78%     Mod recovery time in supported semi supine to 88% Poor sitting tolerance due to back pain  sitting lelo x8-10 min with fair balance during self care tasks                 Treatment: Upon arrival pt was supine in bed with caregiver present. Pt exhibited posterior lean when seated at EOB requiring assistance to correct posture. Gentle weight shifting exercises seated EOB were completed to improve balance. Additional activity seated at EOB was deferred by pt due to pain. At end of session pt was assisted back to bed with alarm on, all lines and tubes intact and call light within reach. Education: Balance training and benefits of EOB activity to increase tolerance to ADLs. · Pt has made fair progress towards set goals. Plan of Care: 1-3 days/week for 1-2 weeks PRN   [x]? ?ADL retraining/adaptive techniques and AE recommendations to increase functional independence within precautions                    [x]? ? Energy conservation techniques to improve tolerance for ADL/IADLs  [x]? ? Functional transfer/DME recommendations for increased independence, safety and fall prevention         [x]? ?Patient/family education to increase safety and functional independence during daily routine          [x]? ? Environmental modifications for safe mobility and completion of ADLs                             []? ? Cognitive retraining to improve problem solving skills & safe participation in ADLs/IADLs     []? ?Sensory re-education techniques to improve extremity awareness, maintain skin integrity and improve hand function                             []? ? Visual/Perceptual retraining to improve body awareness and safety during transfers and ADLs  []? ? Splinting/positioning needs to maintain joint/skin integrity and contracture prevention  [x]? ? Therapeutic activity to improve functional performance during ADLs                                        [x]? ? Therapeutic exercise to improve tolerance and functional strength for ADLs [x]??Balance retraining/tolerance tasks for facilitation of postural control with dynamic challenges during ADLs  []? ?Neuromuscular re-education to facilitate righting/equilibrium reactions, midline orientation, scapular stability/mobility, normalize muscle tone and facilitate active functional movement                        []? ? Delirium prevention/treatment    [x]? Positioning to improve functional independence and decrease risk of skin breakdown  []? ?Other:     Treatment Charges: Mins Units   Ther Ex  [id number]     Manual Therapy Mariela Nur [phone] 1   ADL/Home Mgt [id number]     Neuro Re-ed [id number]     Group Therapy      Orthotic manage/training  [id number]     Non-Billable Time         Total Time: [address] JARAMILLO/L [id number]

## 2021-04-12 NOTE — CARE COORDINATION
O2 weaned to [facility]- wears home O2 4liters provided by [employer]. Continues to diurese w/ Lasix iv tid, on iv steroid. Plan remains to return home w/  on discharge w/ private pay aides 24 hr/day through [facility]. Active [facility]- resume order on chart-notify when to discharges.  Will follow Gilmer Fleischer

## 2021-04-12 NOTE — PROGRESS NOTES
Date: 4/11/2021    Time: 10:22 PM    Patient Placed On BIPAP/CPAP/ Non-Invasive Ventilation? Yes    If no must comment. Facial area red/color change? No           If YES are Blister/Lesion present? No   If yes must notify nursing staff  BIPAP/CPAP skin barrier? Yes    Skin barrier type:mepilexlite       Comments: Pt placed on BiPAP for the night, mepilex in place.         Dipti Gutierrez      04/11/21 2221   NIV Type   Mode Bilevel   Mask Type Full face mask   Mask Size Small   Settings/Measurements   IPAP 14 cmH20   CPAP/EPAP 6 cmH2O   Rate Ordered 12   Resp 20   FiO2  40 %   Vt Exhaled 311 mL   Minute Volume 6.2 Liters   Mask Leak (lpm) 24 lpm   Comfort Level Good   Using Accessory Muscles No   SpO2 94

## 2021-04-12 NOTE — PROGRESS NOTES
dependent  Scooting: dependent Rolling Max A  Supine to sit Dep A  Sit to supine Dep A  Scooting Dep A seated to EOB Min assist to roll  Mod assist for supine <> sit and scooting   Transfers NT NT N. A pt does not stand   Ambulation    NT NT N/A   Stair Negotiation  Ascended and descended  NT NT  N/A   LE strength     0/5        balance      Sitting balance poor     AM-PAC Raw score               7/24 7/24          Pt is alert, with conversation, unable to participate with exercise  Balance: poor sitting EOB    Pt performed therapeutic exercise of the following: B LE supine ankle pumps, heel slides, hip ABd/ADd PROM x 20    Patient education  Pt was educated on exercise promoting circulation and strengthening, UE usage to assist with sitting balance    Patient response to education:   Pt verbalized understanding Pt demonstrated skill Pt requires further education in this area   yes no yes     ASSESSMENT:   Comments: Nurse ok with rx. Pt found in bed, agreed to rx, exercise performed, Pt unable to participate. Pt then assisted to EOB, sat EOB Max A for balance approx 4 minutes due to trunk retroversion. Trunk ROM attempted, Pt then states with low back pain, unable to tolerate, requested to lay back down in bed. Pt assisted to supine, Rx ended. Treatment: Pt practiced and was instructed in the following treatment: exercise, sitting balance    Pt was left in bed with call light in reach positioned on L side, LEs elevated for comfort and edema management home aide present. Chair/bed alarm: bed alarm active     Time in 1318   Time out 1344   Total Treatment Time 26 minutes   CPT codes:     Therapeutic activities 98021 10 minutes   Therapeutic exercises 78797 16 minutes       Pt is making no progress toward established Physical Therapy goals as per inability to participate with exercise or tolerate sitting balance. Continue with physical therapy current plan of care.     Ledy Robledo PTA   License Number: [id number]

## 2021-04-12 NOTE — PROGRESS NOTES
Palliative Care Department  Palliative Care Progress Note  Provider: Haven Grover MD  [address] Day: 7  Date of Initial Consult: 4/6/2021  Referring Provider: Dr. Chiquita Guzmán MD  Palliative Medicine was consulted for assistance with: Assist with goals of care and Symptom Management    Chief Complaint: Dee Peacock is a 80 y.o. female with chief complaint of SOB    HPI:   Dee Peacock is a 80 y.o. female with significant past medical history of HFpEF, A-fib, HTN, RA, and recent COVID infection requiring hospitalization from 2/7-2/16, d/c home with 3L supplemental oxygen and with [facility], who was admitted again 2/4 through 3/9 related to shortness of breath/PNA as well as issues with recurrent A-Fib, discharged home with [place] [address] and heart failure clinic following closely. She presented again on 4/6/2021 with complaints of SOB and edema. She is admitted for further management of CHF exacerbation, also for management of sacral ulcer. She is being followed closely by cardiology and pulmonology, as well as GS. Palliative is consulted for goals of care and symptom management. ASSESSMENT/PLAN:     Pertinent Hospital Diagnoses:  Current medical issues leading to Palliative Medicine involvement include   Active Hospital Problems    Diagnosis Date Noted   Latrice Samaniego [B37.0] 04/11/2021    COPD (chronic obstructive pulmonary disease) ([facility] [J44.9] 04/10/2021    GI bleed [K92.2] 04/10/2021    Leukocytosis [D72.829]     Frailty [R54]     Pressure injury of sacral region, unstageable ([facility] [L89.150] 04/06/2021    Acute on chronic diastolic heart failure (HCC) [I50.33] 04/06/2021    Chronic respiratory failure with hypoxia (HCC) [J96.11] 03/19/2021    Anemia [D64.9] 07/26/2014    A-fib ([facility] [I48.91] 06/09/2011     Palliative Care Encounter / Counseling Regarding Goals of Care:  Please see detailed goals of care discussion as below.    At this time, Dee Peacock, Does have capacity for medical decision-making. Capacity is time limited and situation/question specific.  Outcome of goals of care meeting: live longer, improve or maintain function/quality of life, remain at home and continue current management   Code status: Full Code  o I reviewed with the patient and family that given multiple medical co-morbidities, advanced disease process and advanced age, in the event of cardiac arrest, the chances of surviving may likely be low. It was also reviewed that if they survived a cardiac arrest, a return to prior level of function also may be low.  Advanced Directives: Living Will and HC-POA   Surrogate/Legal NOK:   Razia Holland ([phone]) is the patient's son  Bonnie Flores Will follow and continued to discuss goals of care pending clinical progression. Referrals: [facility], appt. on 4/28/21    SUBJECTIVE:   Events/Discussions:  Met patient at bedside, along with caregiver. Spoke to Ashleigh Whitt about advanced directives. At this time she does not want to make a decision on whether she would want a ventilator or to be resuscitated. We will continue her full code. She is currently on Norco twice daily, along with Norco 5 mg every 4 hours as needed. She is only utilized 1 to 2 tablets throughout the day for breakthrough. She denies any nausea vomiting, or constipation at this time. Patient currently on 4 L of oxygen along with Lasix IV 3 times daily and IV steroids.     OBJECTIVE:   Prognosis: Guarded    Physical Exam:  BP (!) 117/56   Pulse 61   Temp 97.4 °F (36.3 °C) (Oral)   Resp 16   Ht 5' 2\" (1.575 m)   Wt 114 lb 12.8 oz (52.1 kg)   SpO2 95%   BMI 21.00 kg/m²     Gen:  Alert, elderly, in no acute distress  HEENT:  Normocephalic, conjunctiva pink, no drainage, mucosa moist  Neck:  Supple  Lungs:  CTA bilaterally with diminished bases  Heart: RRR, no murmur, rub, or gallop noted during exam  Abd:  Soft, non tender, non distended, BS+  M/S/Ext:  Moving all extremities, weak, pulses present  Skin:  Warm and dry

## 2021-04-13 LAB
ALBUMIN SERPL-MCNC: 2.8 G/DL (ref 3.5–5.2)
ALP BLD-CCNC: 97 U/L (ref 35–104)
ALT SERPL-CCNC: 9 U/L (ref 0–32)
ANION GAP SERPL CALCULATED.3IONS-SCNC: 7 MMOL/L (ref 7–16)
ANION GAP SERPL CALCULATED.3IONS-SCNC: 7 MMOL/L (ref 7–16)
AST SERPL-CCNC: 20 U/L (ref 0–31)
BASOPHILS ABSOLUTE: 0.04 E9/L (ref 0–0.2)
BASOPHILS RELATIVE PERCENT: 0.2 % (ref 0–2)
BILIRUB SERPL-MCNC: 0.3 MG/DL (ref 0–1.2)
BUN BLDV-MCNC: 64 MG/DL (ref 8–23)
BUN BLDV-MCNC: 64 MG/DL (ref 8–23)
CALCIUM SERPL-MCNC: 8.7 MG/DL (ref 8.6–10.2)
CALCIUM SERPL-MCNC: 9 MG/DL (ref 8.6–10.2)
CHLORIDE BLD-SCNC: 90 MMOL/L (ref 98–107)
CHLORIDE BLD-SCNC: 95 MMOL/L (ref 98–107)
CO2: 38 MMOL/L (ref 22–29)
CO2: 40 MMOL/L (ref 22–29)
CREAT SERPL-MCNC: 1.3 MG/DL (ref 0.5–1)
CREAT SERPL-MCNC: 1.3 MG/DL (ref 0.5–1)
EOSINOPHILS ABSOLUTE: 0.04 E9/L (ref 0.05–0.5)
EOSINOPHILS RELATIVE PERCENT: 0.2 % (ref 0–6)
GFR AFRICAN AMERICAN: 47
GFR AFRICAN AMERICAN: 47
GFR NON-AFRICAN AMERICAN: 39 ML/MIN/1.73
GFR NON-AFRICAN AMERICAN: 39 ML/MIN/1.73
GLUCOSE BLD-MCNC: 142 MG/DL (ref 74–99)
GLUCOSE BLD-MCNC: 95 MG/DL (ref 74–99)
HCT VFR BLD CALC: 33.2 % (ref 34–48)
HEMOGLOBIN: 10.1 G/DL (ref 11.5–15.5)
HYPOCHROMIA: ABNORMAL
IMMATURE GRANULOCYTES #: 0.78 E9/L
IMMATURE GRANULOCYTES %: 4.4 % (ref 0–5)
LYMPHOCYTES ABSOLUTE: 1.57 E9/L (ref 1.5–4)
LYMPHOCYTES RELATIVE PERCENT: 8.8 % (ref 20–42)
MCH RBC QN AUTO: 30.7 PG (ref 26–35)
MCHC RBC AUTO-ENTMCNC: 30.4 % (ref 32–34.5)
MCV RBC AUTO: 100.9 FL (ref 80–99.9)
MONOCYTES ABSOLUTE: 1.54 E9/L (ref 0.1–0.95)
MONOCYTES RELATIVE PERCENT: 8.6 % (ref 2–12)
NEUTROPHILS ABSOLUTE: 13.95 E9/L (ref 1.8–7.3)
NEUTROPHILS RELATIVE PERCENT: 77.8 % (ref 43–80)
PDW BLD-RTO: 13.9 FL (ref 11.5–15)
PLATELET # BLD: 579 E9/L (ref 130–450)
PMV BLD AUTO: 9.8 FL (ref 7–12)
POLYCHROMASIA: ABNORMAL
POTASSIUM REFLEX MAGNESIUM: 4.3 MMOL/L (ref 3.5–5)
POTASSIUM SERPL-SCNC: 3.8 MMOL/L (ref 3.5–5)
RBC # BLD: 3.29 E12/L (ref 3.5–5.5)
SODIUM BLD-SCNC: 137 MMOL/L (ref 132–146)
SODIUM BLD-SCNC: 140 MMOL/L (ref 132–146)
TOTAL PROTEIN: 5.8 G/DL (ref 6.4–8.3)
WBC # BLD: 17.9 E9/L (ref 4.5–11.5)

## 2021-04-13 PROCEDURE — 94761 N-INVAS EAR/PLS OXIMETRY MLT: CPT

## 2021-04-13 PROCEDURE — 6370000000 HC RX 637 (ALT 250 FOR IP): Performed by: STUDENT IN AN ORGANIZED HEALTH CARE EDUCATION/TRAINING PROGRAM

## 2021-04-13 PROCEDURE — 6360000002 HC RX W HCPCS: Performed by: FAMILY MEDICINE

## 2021-04-13 PROCEDURE — 80048 BASIC METABOLIC PNL TOTAL CA: CPT

## 2021-04-13 PROCEDURE — 99232 SBSQ HOSP IP/OBS MODERATE 35: CPT | Performed by: FAMILY MEDICINE

## 2021-04-13 PROCEDURE — 6370000000 HC RX 637 (ALT 250 FOR IP): Performed by: INTERNAL MEDICINE

## 2021-04-13 PROCEDURE — 6370000000 HC RX 637 (ALT 250 FOR IP): Performed by: FAMILY MEDICINE

## 2021-04-13 PROCEDURE — 36415 COLL VENOUS BLD VENIPUNCTURE: CPT

## 2021-04-13 PROCEDURE — 2580000003 HC RX 258: Performed by: FAMILY MEDICINE

## 2021-04-13 PROCEDURE — 2060000000 HC ICU INTERMEDIATE R&B

## 2021-04-13 PROCEDURE — 2700000000 HC OXYGEN THERAPY PER DAY

## 2021-04-13 PROCEDURE — 80053 COMPREHEN METABOLIC PANEL: CPT

## 2021-04-13 PROCEDURE — 94640 AIRWAY INHALATION TREATMENT: CPT

## 2021-04-13 PROCEDURE — 94660 CPAP INITIATION&MGMT: CPT

## 2021-04-13 PROCEDURE — 85025 COMPLETE CBC W/AUTO DIFF WBC: CPT

## 2021-04-13 RX ORDER — 0.9 % SODIUM CHLORIDE 0.9 %
500 INTRAVENOUS SOLUTION INTRAVENOUS ONCE
Status: COMPLETED | OUTPATIENT
Start: 2021-04-13 | End: 2021-04-13

## 2021-04-13 RX ORDER — PREDNISONE 20 MG/1
20 TABLET ORAL DAILY
Status: DISCONTINUED | OUTPATIENT
Start: 2021-04-14 | End: 2021-04-14 | Stop reason: HOSPADM

## 2021-04-13 RX ADMIN — CLOTRIMAZOLE 10 MG: 10 LOZENGE ORAL; TOPICAL at 05:47

## 2021-04-13 RX ADMIN — COLLAGENASE SANTYL: 250 OINTMENT TOPICAL at 09:03

## 2021-04-13 RX ADMIN — PANTOPRAZOLE SODIUM 40 MG: 40 TABLET, DELAYED RELEASE ORAL at 16:21

## 2021-04-13 RX ADMIN — LEVALBUTEROL HYDROCHLORIDE 0.31 MG: 0.31 SOLUTION RESPIRATORY (INHALATION) at 15:46

## 2021-04-13 RX ADMIN — METOPROLOL TARTRATE 50 MG: 50 TABLET, FILM COATED ORAL at 09:09

## 2021-04-13 RX ADMIN — RIVAROXABAN 15 MG: 15 TABLET, FILM COATED ORAL at 16:21

## 2021-04-13 RX ADMIN — FERROUS SULFATE TAB 325 MG (65 MG ELEMENTAL FE) 325 MG: 325 (65 FE) TAB at 09:10

## 2021-04-13 RX ADMIN — AMLODIPINE BESYLATE 5 MG: 5 TABLET ORAL at 09:09

## 2021-04-13 RX ADMIN — DOCUSATE SODIUM 50 MG AND SENNOSIDES 8.6 MG 2 TABLET: 8.6; 5 TABLET, FILM COATED ORAL at 09:10

## 2021-04-13 RX ADMIN — BUDESONIDE 500 MCG: 0.5 SUSPENSION RESPIRATORY (INHALATION) at 09:47

## 2021-04-13 RX ADMIN — CELECOXIB 200 MG: 100 CAPSULE ORAL at 09:11

## 2021-04-13 RX ADMIN — ARFORMOTEROL TARTRATE 15 MCG: 15 SOLUTION RESPIRATORY (INHALATION) at 09:47

## 2021-04-13 RX ADMIN — ZINC SULFATE 220 MG (50 MG) CAPSULE 50 MG: CAPSULE at 09:09

## 2021-04-13 RX ADMIN — ARFORMOTEROL TARTRATE 15 MCG: 15 SOLUTION RESPIRATORY (INHALATION) at 20:23

## 2021-04-13 RX ADMIN — SODIUM CHLORIDE, PRESERVATIVE FREE 10 ML: 5 INJECTION INTRAVENOUS at 21:19

## 2021-04-13 RX ADMIN — CLOTRIMAZOLE 10 MG: 10 LOZENGE ORAL; TOPICAL at 21:17

## 2021-04-13 RX ADMIN — VALSARTAN 320 MG: 320 TABLET ORAL at 09:10

## 2021-04-13 RX ADMIN — HYDROCODONE BITARTRATE AND ACETAMINOPHEN 1 TABLET: 5; 325 TABLET ORAL at 09:09

## 2021-04-13 RX ADMIN — PANTOPRAZOLE SODIUM 40 MG: 40 TABLET, DELAYED RELEASE ORAL at 05:47

## 2021-04-13 RX ADMIN — HYDROCODONE BITARTRATE AND ACETAMINOPHEN 1 TABLET: 5; 325 TABLET ORAL at 21:17

## 2021-04-13 RX ADMIN — CLOTRIMAZOLE 10 MG: 10 LOZENGE ORAL; TOPICAL at 11:46

## 2021-04-13 RX ADMIN — CLOTRIMAZOLE 10 MG: 10 LOZENGE ORAL; TOPICAL at 14:42

## 2021-04-13 RX ADMIN — FLECAINIDE ACETATE 75 MG: 50 TABLET ORAL at 09:09

## 2021-04-13 RX ADMIN — PREDNISONE 40 MG: 20 TABLET ORAL at 09:09

## 2021-04-13 RX ADMIN — DOCUSATE SODIUM 50 MG AND SENNOSIDES 8.6 MG 2 TABLET: 8.6; 5 TABLET, FILM COATED ORAL at 21:17

## 2021-04-13 RX ADMIN — FUROSEMIDE 20 MG: 20 TABLET ORAL at 09:09

## 2021-04-13 RX ADMIN — FLECAINIDE ACETATE 75 MG: 50 TABLET ORAL at 21:16

## 2021-04-13 RX ADMIN — Medication 2000 UNITS: at 09:09

## 2021-04-13 RX ADMIN — LEUCOVORIN CALCIUM 25 MG: 5 TABLET ORAL at 09:09

## 2021-04-13 RX ADMIN — DULOXETINE HYDROCHLORIDE 40 MG: 20 CAPSULE, DELAYED RELEASE ORAL at 09:08

## 2021-04-13 RX ADMIN — SODIUM CHLORIDE, PRESERVATIVE FREE 10 ML: 5 INJECTION INTRAVENOUS at 09:10

## 2021-04-13 RX ADMIN — BUDESONIDE 500 MCG: 0.5 SUSPENSION RESPIRATORY (INHALATION) at 20:23

## 2021-04-13 RX ADMIN — CLOTRIMAZOLE 10 MG: 10 LOZENGE ORAL; TOPICAL at 18:42

## 2021-04-13 RX ADMIN — POTASSIUM CHLORIDE 20 MEQ: 1500 TABLET, EXTENDED RELEASE ORAL at 09:09

## 2021-04-13 RX ADMIN — METOPROLOL TARTRATE 50 MG: 50 TABLET, FILM COATED ORAL at 21:17

## 2021-04-13 RX ADMIN — SODIUM CHLORIDE 500 ML: 9 INJECTION, SOLUTION INTRAVENOUS at 09:15

## 2021-04-13 RX ADMIN — LEVOTHYROXINE SODIUM 100 MCG: 100 TABLET ORAL at 05:47

## 2021-04-13 ASSESSMENT — ENCOUNTER SYMPTOMS
ABDOMINAL PAIN: 0
VOMITING: 0
DIARRHEA: 0
CONSTIPATION: 0
NAUSEA: 0
WHEEZING: 0
SHORTNESS OF BREATH: 1
COUGH: 0
SORE THROAT: 0

## 2021-04-13 ASSESSMENT — PAIN SCALES - GENERAL: PAINLEVEL_OUTOF10: 0

## 2021-04-13 NOTE — DISCHARGE SUMMARY
Physician Discharge Summary  [facility]     Patient ID:  Dorota Garner  [id number]  80 y.o.  1935    Admit date: 4/6/2021    Discharge date and time:  4/14/2021    Admitting Physician: Sun Davenport MD     Admission Diagnoses:   Acute on chronic congestive heart failure, unspecified heart failure type [facility]) [I50.9]    Discharge Diagnoses:  Principal Problem (Resolved):    Acute on chronic diastolic heart failure (HCC)  Active Problems:    Anemia    A-fib (HCC)    Chronic respiratory failure with hypoxia (HCC)    Pressure injury of sacral region, unstageable (HCC)    Frailty    Leukocytosis    COPD (chronic obstructive pulmonary disease) ([facility])    GI bleed    Thrush    Congestive heart failure ([facility] [address]  Resolved Problems:    LONG (acute kidney injury) ([facility] [address]      Consults: cardiology, pulmonary/intensive care, general surgery and palliative    Procedures: None    Hospital Course: Dorota Garner is a 80 y.o. female who presented for evaluation of shortness of breath. Family was concerned when patient had increased requirement of home oxygen and increased swelling therefore they brought her in to be evaluated and admitted. She was found to have acute on chronic heart failure at home. Increase oxygen requirement was thought to be due to a combination of heart failure, COPD, recent Covid infection, and ILD from RA. Cardiology was consulted and started aggressive IV diuresis and switched hydralazine to amlodipine 5 mg. Pulmonology was consulted and started the patient on IV steroids and added Xopenex which was continued at discharge. She progressed slowly with IV diuresis and IV steroids and oxygen requirement did improve slowly and was weaned down to her home oxygen. Also noted at admission patient had decubitus ulcer which was treated with wound care. General surgery saw patient for this and did not believe this needed debridement. Family was discharged with wound care. Crackles bilateral bases, improving  Abdominal:      General: Bowel sounds are normal.      Palpations: Abdomen is soft. Tenderness: There is no abdominal tenderness. Musculoskeletal:      Right lower leg: No edema. Left lower leg: No edema. Skin:     General: Skin is warm. Capillary Refill: Capillary refill takes less than 2 seconds. Neurological:      General: No focal deficit present. Mental Status: She is alert and oriented to person, place, and time. Psychiatric:         Mood and Affect: Mood normal.         Thought Content: Thought content normal.         Judgment: Judgment normal.          Disposition: home  Condition: fair    Patient Instructions:   Current Discharge Medication List           Details   amLODIPine (NORVASC) 5 MG tablet Take 1 tablet by mouth daily  Qty: 30 tablet, Refills: 0      clotrimazole (MYCELEX) 10 MG yamilex Take 1 tablet by mouth 5 times daily for 10 days  Qty: 50 tablet, Refills: 0      collagenase 250 UNIT/GM ointment Apply topically daily. Qty: 1 Tube, Refills: 0      Arformoterol Tartrate (BROVANA) 15 MCG/2ML NEBU Take 2 mLs by nebulization 2 times daily  Qty: 120 mL, Refills: 3    Associated Diagnoses: Chronic obstructive pulmonary disease with acute exacerbation (HCC)      budesonide (PULMICORT) 0.5 MG/2ML nebulizer suspension Take 2 mLs by nebulization 2 times daily  Qty: 60 ampule, Refills: 3    Associated Diagnoses: Chronic obstructive pulmonary disease with acute exacerbation (HCC)      Respiratory Therapy Supplies (FULL KIT NEBULIZER SET) MISC Use as directed with nebulized medication. Qty: 1 each, Refills: 0    Comments: Supply prescription to Pharmacy or [address] [place] [address] location of patient or coverage preference. Dispense full nebulizer kit - compressor, tubing, mouthpiece, mask, ancillary supplies - per requirements of ordered product, patient preference, or coverage allowances.               Details   furosemide (LASIX) 20 MG acid (FOLVITE) 1 MG tablet Take 1 tablet by mouth daily  Qty: 30 tablet, Refills: 0      zinc sulfate (ZINCATE) 50 MG CAPS capsule Take 1 capsule by mouth daily  Qty: 30 capsule, Refills: 0      gabapentin (NEURONTIN) 100 MG capsule Take 1 capsule by mouth 2 times daily for 90 days. Qty: 180 capsule, Refills: 0    Associated Diagnoses: Gait difficulty; Paresthesia of lower extremity      celecoxib (CELEBREX) 200 MG capsule TAKE 1 CAPSULE DAILY  Qty: 90 capsule, Refills: 3    Associated Diagnoses: Medication management      ferrous sulfate (IRON 325) 325 (65 Fe) MG tablet Take 1 tablet by mouth every other day  Qty: 30 tablet, Refills: 5    Associated Diagnoses: Medication management      levothyroxine (LEVOTHROID) 100 MCG tablet Take 1 tablet by mouth Daily  Qty: 90 tablet, Refills: 1      docusate sodium (COLACE, DULCOLAX) 100 MG CAPS Take 100 mg by mouth daily  Qty: 30 capsule, Refills: 0      vitamin B-12 (CYANOCOBALAMIN) 1000 MCG tablet Take 1,000 mcg by mouth daily      omeprazole (PRILOSEC) 40 MG delayed release capsule Take 1 capsule by mouth 2 times daily  Qty: 180 capsule, Refills: 3    Associated Diagnoses: Medication management; Hiatal hernia      polyethyl glycol-propyl glycol 0.4-0.3 % (SYSTANE) 0.4-0.3 % ophthalmic solution 1 drop 2 times daily as needed for Dry Eyes       Probiotic Product (ACIDOPHILUS PROBIOTIC) CAPS capsule Take 1 capsule by mouth daily      fexofenadine (ALLEGRA) 180 MG tablet Take 180 mg by mouth daily.       methotrexate 2.5 MG tablet Take by mouth once a week 2 tabs at lunch and 4 tabs  in the pm once a week on Monday      potassium bicarbonate (EFFER-K) 25 MEQ disintegrating tablet Take 1 tablet by mouth daily  Qty: 30 tablet, Refills: 0      albuterol sulfate HFA (VENTOLIN HFA) 108 (90 Base) MCG/ACT inhaler Inhale 2 puffs into the lungs 4 times daily as needed for Wheezing  Qty: 1 Inhaler, Refills: 1      ascorbic acid (VITAMIN C) 500 MG tablet Take 1 tablet by mouth daily Qty: 30 tablet, Refills: 0      thiamine 100 MG tablet Take 1 tablet by mouth daily  Qty: 30 tablet, Refills: 0      Vitamin D (CHOLECALCIFEROL) 50 MCG (2000 UT) TABS tablet Take 1 tablet by mouth daily  Qty: 30 tablet, Refills: 0    Comments: Labeling may look different. 25 mcg=1000 Units. Please double check dosages. famotidine (PEPCID) 40 MG tablet Take 1 tablet by mouth nightly  Qty: 90 tablet, Refills: 3      ondansetron (ZOFRAN) 4 MG tablet Take 1 tablet by mouth every 8 hours as needed for Nausea or Vomiting  Qty: 30 tablet, Refills: 5      fluticasone (FLONASE) 50 MCG/ACT nasal spray 2 sprays by Nasal route daily 2 sprays in each nostril daily  Qty: 1 Bottle, Refills: 11    Associated Diagnoses: Medication management      leucovorin calcium (WELLCOVORIN) 5 MG tablet Take 25 mg by mouth once a week Takes every Tuesday    Associated Diagnoses: Tachyarrhythmia;  Hypertension; Diastolic dysfunction              Activity: activity as tolerated  Diet: cardiac diet    Discharge instructions:   [address] ( [address])  [address] [facility], [address] [facility], [address]  [facility] [address] [place] [address], [address] [place] [address]    low air loss mattress    Sherrell Kenyon MD  [address] [place] [address]  [phone]    In 4 weeks      Collin Carrasquillo MD  [facility] [address]     Call in 2 days  Hospital follow up        Signed:  Zack Sharma DO  4/14/2021  2:32 PM

## 2021-04-13 NOTE — PROGRESS NOTES
Patient's daughter, Concepción Ulloa, would like would like social work to call her in am concerning patient's discharge at [phone].

## 2021-04-13 NOTE — PROGRESS NOTES
Kwan Serna M.D.,[facility]  Sharif Shepherd D.O., F.PABLOOAmandaI., Shirley Bowen M.D. Leatha Barajas M.D., Dwight Madsen M.D. Yamila Hernandez D.O. Daily Pulmonary Progress Note    Patient:  Dee Peacock 80 y.o. female MRN: [id number]     Date of Service: 4/13/2021      Synopsis     We are following patient for respiratory distress, patient with COPD and CHF requiring high flow oxygen    \"CC\" shortness of breath    Code status: Full code      Subjective      Patient was seen and examined lying in bed in no apparent distress. She is currently on 2 L oxygen with a sat of 96%. Per nursing she does desaturate with activity. She did use BiPAP last night settings 14/6. She is using incentive spirometer frequently. HRCT chest reviewed. Review of Systems:  Constitutional: Denies fever, weight loss, night sweats, and fatigue  Skin: Denies pigmentation, dark lesions, and rashes   HEENT: Denies hearing loss, tinnitus, ear drainage, epistaxis, sore throat, and hoarseness. Cardiovascular: Denies palpitations, chest pain, and chest pressure. Respiratory: Denies cough, dyspnea at rest, hemoptysis, apnea, and choking.   Gastrointestinal: Denies nausea, vomiting, poor appetite, diarrhea, heartburn or reflux  Genitourinary: Denies dysuria, frequency, urgency or hematuria  Musculoskeletal: Denies myalgias, muscle weakness, and bone pain  Neurological: Denies dizziness, vertigo, headache, and focal weakness  Psychological: Denies anxiety and depression  Endocrine: Denies heat intolerance and cold intolerance  Hematopoietic/Lymphatic: Denies bleeding problems and blood transfusions    24-hour events:  None    Objective   Vitals: BP (!) 116/57   Pulse 55   Temp 97.3 °F (36.3 °C) (Oral)   Resp 18   Ht 5' 2\" (1.575 m)   Wt 115 lb (52.2 kg)   SpO2 98%   BMI 21.03 kg/m²     I/O:    Intake/Output Summary (Last 24 hours) at 4/13/2021 1523  Last data filed at 4/13/2021 0949  Gross per 24 hour Intake 190 ml   Output    Net 190 ml       Vent Information  Skin Assessment: Clean, dry, & intact  FiO2 : 40 %  SpO2: 98 %  SpO2/FiO2 ratio: 245  I Time/ I Time %: 0.9 s  Mask Type: Full face mask  Mask Size: Small       IPAP: 14 cmH20  CPAP/EPAP: 6 cmH2O     CURRENT MEDS :  Scheduled Meds:   potassium chloride  20 mEq Oral Daily    predniSONE  40 mg Oral Daily    furosemide  20 mg Oral Daily    clotrimazole  10 mg Oral 5x Daily    HYDROcodone 5 mg - acetaminophen  1 tablet Oral BID    sennosides-docusate sodium  2 tablet Oral BID    collagenase   Topical Daily    amLODIPine  5 mg Oral Daily    celecoxib  200 mg Oral Daily    DULoxetine  40 mg Oral Daily    ferrous sulfate  325 mg Oral Every Other Day    flecainide  75 mg Oral BID    levothyroxine  100 mcg Oral Daily    lidocaine  1 patch Topical Daily    pantoprazole  40 mg Oral BID AC    valsartan  320 mg Oral Daily    Vitamin D  2,000 Units Oral Daily    rivaroxaban  15 mg Oral Daily    zinc sulfate  50 mg Oral Daily    metoprolol tartrate  50 mg Oral BID    sodium chloride flush  10 mL Intravenous 2 times per day    budesonide  0.5 mg Nebulization BID    Arformoterol Tartrate  15 mcg Nebulization BID       Physical Exam:  General Appearance: appears comfortable in no acute distress. HEENT: Normocephalic atraumatic without obvious abnormality   Neck: Lips, mucosa, and tongue normal.  Supple, symmetrical, trachea midline, no adenopathy;thyroid:  no enlargement/tenderness/nodules or JVD. Lung: Breath sounds bibasilar crackles. Respirations   unlabored. Symmetrical expansion. Heart: RRR, normal S1, S2. No MRG  Abdomen: Soft, NT, ND. BS present x 4 quadrants. No bruit or organomegaly. Extremities: Pedal pulses 2+ symmetric b/l. Extremities normal, no cyanosis, clubbing, bilateral lower extremity  Musculokeletal: No joint swelling, no muscle tenderness. ROM normal in all joints of extremities.    Neurologic: Mental status: Alert and K 4.3 04/13/2021    CL 95 04/13/2021    CO2 38 04/13/2021    BUN 64 04/13/2021    CREATININE 1.3 04/13/2021    LABALBU 2.8 04/13/2021    LABALBU 4.0 04/24/2012    CALCIUM 9.0 04/13/2021    GFRAA 47 04/13/2021    LABGLOM 39 04/13/2021     Lab Results   Component Value Date    PROTIME 33.4 04/06/2021    PROTIME 15.8 05/12/2014    INR 3.0 04/06/2021     No results for input(s): PROBNP in the last 72 hours. No results for input(s): PROCAL in the last 72 hours. This SmartLink has not been configured with any valid records. Micro:  No results for input(s): CULTRESP in the last 72 hours. No results for input(s): LABGRAM in the last 72 hours. No results for input(s): LEGUR in the last 72 hours. No results for input(s): STREPNEUMAGU in the last 72 hours. No results for input(s): LP1UAG in the last 72 hours. Assessment:    1. Acute respiratory failure with hypoxia  2. HFpEF  3. 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 Severe JANETTE AHI 36, previously followed with  as OP   7. Seasonal allergies   8. A fib on anticoagulation  9. Fluid overload  10. Anemia  11. Leukocytosis   12. Sacral decubitus ulcer       Plan:   1. Oxygen therapy 2  L HF wean to keep >92%  2. Change Bipap to prn used 4 hrs last HS  3. Decreased lasix dosing 20 po daily- mgmt of chf and a fib per cardiology . To complete diamox x 4 doses, CO2 on BMP improved 38  4. Brovana, budesonide bid, xopenex prn-continue ezpap-if she requires bronchodilator upon discharge we will recommend to continue Xopenex HFA as needed for rescue  5. Reviewed non contrast CT chest with high resolution   6. Continue incentive spirometer. Recruitment techniques  7. Transition to oral prednisone with taper for dc  8. Dvt, gi prophylaxis. Xarelto for PAF   9. Doxycycline for empiric CAP coverage. procal 0.15  10. Local wound care. 11. PT, OT  12.  Ok to dc from a pulmonary perspective. Will arrange follow up as OP    This plan of care was reviewed in collaboration with Dr. Tisha Li  Electronically signed by FRAN Ortega CNP on 4/13/2021 at 3:23 PM       Addendum:    CT chest reviewed , shows significant improvement in comapre to last CT in 2/24/2021 with bibasilar bronchiectasis. On 2l/min oxygen saturating well. Continue BPH , ISC. wean steroid down to 20 mg daily. I personally saw, examined, and cared for the patient. Labs, medications, radiographs reviewed. I agree with history exam and plans detailed in NP note.   Iman Moseley MD

## 2021-04-13 NOTE — PROGRESS NOTES
Date: 4/12/2021    Time: 10:30 PM    Patient Placed On BIPAP/CPAP/ Non-Invasive Ventilation? Yes    If no must comment. Facial area red/color change? No           If YES are Blister/Lesion present? No   If yes must notify nursing staff  BIPAP/CPAP skin barrier?   Yes    Skin barrier type:mepilexlite       Comments:        Minal Avila

## 2021-04-14 VITALS
HEIGHT: 62 IN | OXYGEN SATURATION: 98 % | WEIGHT: 113.7 LBS | DIASTOLIC BLOOD PRESSURE: 62 MMHG | HEART RATE: 64 BPM | TEMPERATURE: 97.4 F | RESPIRATION RATE: 18 BRPM | BODY MASS INDEX: 20.92 KG/M2 | SYSTOLIC BLOOD PRESSURE: 133 MMHG

## 2021-04-14 PROBLEM — I50.33 ACUTE ON CHRONIC DIASTOLIC HEART FAILURE (HCC): Status: RESOLVED | Noted: 2021-04-06 | Resolved: 2021-04-14

## 2021-04-14 LAB
ALBUMIN SERPL-MCNC: 2.8 G/DL (ref 3.5–5.2)
ALP BLD-CCNC: 88 U/L (ref 35–104)
ALT SERPL-CCNC: 9 U/L (ref 0–32)
ANION GAP SERPL CALCULATED.3IONS-SCNC: 8 MMOL/L (ref 7–16)
AST SERPL-CCNC: 20 U/L (ref 0–31)
BASOPHILS ABSOLUTE: 0.02 E9/L (ref 0–0.2)
BASOPHILS RELATIVE PERCENT: 0.1 % (ref 0–2)
BILIRUB SERPL-MCNC: 0.2 MG/DL (ref 0–1.2)
BUN BLDV-MCNC: 62 MG/DL (ref 8–23)
CALCIUM SERPL-MCNC: 9 MG/DL (ref 8.6–10.2)
CHLORIDE BLD-SCNC: 96 MMOL/L (ref 98–107)
CO2: 35 MMOL/L (ref 22–29)
CREAT SERPL-MCNC: 1 MG/DL (ref 0.5–1)
EOSINOPHILS ABSOLUTE: 0.04 E9/L (ref 0.05–0.5)
EOSINOPHILS RELATIVE PERCENT: 0.2 % (ref 0–6)
GFR AFRICAN AMERICAN: >60
GFR NON-AFRICAN AMERICAN: 53 ML/MIN/1.73
GLUCOSE BLD-MCNC: 100 MG/DL (ref 74–99)
HCT VFR BLD CALC: 32.5 % (ref 34–48)
HEMOGLOBIN: 9.8 G/DL (ref 11.5–15.5)
IMMATURE GRANULOCYTES #: 0.48 E9/L
IMMATURE GRANULOCYTES %: 2.9 % (ref 0–5)
LYMPHOCYTES ABSOLUTE: 0.9 E9/L (ref 1.5–4)
LYMPHOCYTES RELATIVE PERCENT: 5.5 % (ref 20–42)
MCH RBC QN AUTO: 30.1 PG (ref 26–35)
MCHC RBC AUTO-ENTMCNC: 30.2 % (ref 32–34.5)
MCV RBC AUTO: 99.7 FL (ref 80–99.9)
MONOCYTES ABSOLUTE: 0.89 E9/L (ref 0.1–0.95)
MONOCYTES RELATIVE PERCENT: 5.4 % (ref 2–12)
NEUTROPHILS ABSOLUTE: 14.1 E9/L (ref 1.8–7.3)
NEUTROPHILS RELATIVE PERCENT: 85.9 % (ref 43–80)
PDW BLD-RTO: 13.7 FL (ref 11.5–15)
PLATELET # BLD: 683 E9/L (ref 130–450)
PMV BLD AUTO: 9.7 FL (ref 7–12)
POTASSIUM REFLEX MAGNESIUM: 4 MMOL/L (ref 3.5–5)
RBC # BLD: 3.26 E12/L (ref 3.5–5.5)
SODIUM BLD-SCNC: 139 MMOL/L (ref 132–146)
TOTAL PROTEIN: 5.5 G/DL (ref 6.4–8.3)
WBC # BLD: 16.4 E9/L (ref 4.5–11.5)

## 2021-04-14 PROCEDURE — 85025 COMPLETE CBC W/AUTO DIFF WBC: CPT

## 2021-04-14 PROCEDURE — 6370000000 HC RX 637 (ALT 250 FOR IP): Performed by: STUDENT IN AN ORGANIZED HEALTH CARE EDUCATION/TRAINING PROGRAM

## 2021-04-14 PROCEDURE — 36415 COLL VENOUS BLD VENIPUNCTURE: CPT

## 2021-04-14 PROCEDURE — 6370000000 HC RX 637 (ALT 250 FOR IP): Performed by: FAMILY MEDICINE

## 2021-04-14 PROCEDURE — 6360000002 HC RX W HCPCS: Performed by: FAMILY MEDICINE

## 2021-04-14 PROCEDURE — 99232 SBSQ HOSP IP/OBS MODERATE 35: CPT | Performed by: STUDENT IN AN ORGANIZED HEALTH CARE EDUCATION/TRAINING PROGRAM

## 2021-04-14 PROCEDURE — 6370000000 HC RX 637 (ALT 250 FOR IP): Performed by: INTERNAL MEDICINE

## 2021-04-14 PROCEDURE — 94640 AIRWAY INHALATION TREATMENT: CPT

## 2021-04-14 PROCEDURE — 2700000000 HC OXYGEN THERAPY PER DAY

## 2021-04-14 PROCEDURE — 99238 HOSP IP/OBS DSCHRG MGMT 30/<: CPT | Performed by: FAMILY MEDICINE

## 2021-04-14 PROCEDURE — 94660 CPAP INITIATION&MGMT: CPT

## 2021-04-14 PROCEDURE — 80053 COMPREHEN METABOLIC PANEL: CPT

## 2021-04-14 PROCEDURE — 2580000003 HC RX 258: Performed by: FAMILY MEDICINE

## 2021-04-14 RX ORDER — CLOTRIMAZOLE 10 MG/1
10 LOZENGE ORAL; TOPICAL
Qty: 50 TABLET | Refills: 0 | Status: SHIPPED | OUTPATIENT
Start: 2021-04-14 | End: 2021-04-24

## 2021-04-14 RX ORDER — PREDNISONE 10 MG/1
TABLET ORAL
Qty: 15 TABLET | Refills: 0 | Status: SHIPPED | OUTPATIENT
Start: 2021-04-14 | End: 2021-04-28

## 2021-04-14 RX ORDER — ARFORMOTEROL TARTRATE 15 UG/2ML
15 SOLUTION RESPIRATORY (INHALATION) 2 TIMES DAILY
Qty: 120 ML | Refills: 3 | Status: SHIPPED | OUTPATIENT
Start: 2021-04-14

## 2021-04-14 RX ORDER — FUROSEMIDE 20 MG/1
20 TABLET ORAL DAILY
Qty: 60 TABLET | Refills: 0 | Status: SHIPPED | OUTPATIENT
Start: 2021-04-15

## 2021-04-14 RX ORDER — AMLODIPINE BESYLATE 5 MG/1
5 TABLET ORAL DAILY
Qty: 30 TABLET | Refills: 0 | Status: SHIPPED | OUTPATIENT
Start: 2021-04-15 | End: 2021-05-21

## 2021-04-14 RX ORDER — HYDROCODONE BITARTRATE AND ACETAMINOPHEN 5; 325 MG/1; MG/1
1 TABLET ORAL EVERY 4 HOURS PRN
Qty: 42 TABLET | Refills: 0
Start: 2021-04-14 | End: 2021-04-14 | Stop reason: HOSPADM

## 2021-04-14 RX ORDER — HYDROCODONE BITARTRATE AND ACETAMINOPHEN 5; 325 MG/1; MG/1
1 TABLET ORAL EVERY 4 HOURS PRN
Qty: 18 TABLET | Refills: 0 | Status: SHIPPED | OUTPATIENT
Start: 2021-04-14 | End: 2021-04-28 | Stop reason: SDUPTHER

## 2021-04-14 RX ORDER — BUDESONIDE 0.5 MG/2ML
0.5 INHALANT ORAL 2 TIMES DAILY
Qty: 60 AMPULE | Refills: 3 | Status: SHIPPED | OUTPATIENT
Start: 2021-04-14

## 2021-04-14 RX ADMIN — LEVALBUTEROL HYDROCHLORIDE 0.31 MG: 0.31 SOLUTION RESPIRATORY (INHALATION) at 16:40

## 2021-04-14 RX ADMIN — POTASSIUM CHLORIDE 20 MEQ: 1500 TABLET, EXTENDED RELEASE ORAL at 09:21

## 2021-04-14 RX ADMIN — VALSARTAN 320 MG: 320 TABLET ORAL at 09:21

## 2021-04-14 RX ADMIN — CLOTRIMAZOLE 10 MG: 10 LOZENGE ORAL; TOPICAL at 06:13

## 2021-04-14 RX ADMIN — ARFORMOTEROL TARTRATE 15 MCG: 15 SOLUTION RESPIRATORY (INHALATION) at 10:02

## 2021-04-14 RX ADMIN — CLOTRIMAZOLE 10 MG: 10 LOZENGE ORAL; TOPICAL at 09:21

## 2021-04-14 RX ADMIN — COLLAGENASE SANTYL: 250 OINTMENT TOPICAL at 11:00

## 2021-04-14 RX ADMIN — PANTOPRAZOLE SODIUM 40 MG: 40 TABLET, DELAYED RELEASE ORAL at 06:13

## 2021-04-14 RX ADMIN — DOCUSATE SODIUM 50 MG AND SENNOSIDES 8.6 MG 2 TABLET: 8.6; 5 TABLET, FILM COATED ORAL at 09:22

## 2021-04-14 RX ADMIN — FLECAINIDE ACETATE 75 MG: 50 TABLET ORAL at 09:22

## 2021-04-14 RX ADMIN — ZINC SULFATE 220 MG (50 MG) CAPSULE 50 MG: CAPSULE at 09:21

## 2021-04-14 RX ADMIN — DULOXETINE HYDROCHLORIDE 40 MG: 20 CAPSULE, DELAYED RELEASE ORAL at 09:21

## 2021-04-14 RX ADMIN — LEVOTHYROXINE SODIUM 100 MCG: 100 TABLET ORAL at 06:13

## 2021-04-14 RX ADMIN — FUROSEMIDE 20 MG: 20 TABLET ORAL at 09:22

## 2021-04-14 RX ADMIN — CELECOXIB 200 MG: 100 CAPSULE ORAL at 09:20

## 2021-04-14 RX ADMIN — Medication 2000 UNITS: at 09:22

## 2021-04-14 RX ADMIN — BUDESONIDE 500 MCG: 0.5 SUSPENSION RESPIRATORY (INHALATION) at 10:01

## 2021-04-14 RX ADMIN — SODIUM CHLORIDE, PRESERVATIVE FREE 10 ML: 5 INJECTION INTRAVENOUS at 10:00

## 2021-04-14 RX ADMIN — METOPROLOL TARTRATE 50 MG: 50 TABLET, FILM COATED ORAL at 09:22

## 2021-04-14 RX ADMIN — CLOTRIMAZOLE 10 MG: 10 LOZENGE ORAL; TOPICAL at 15:56

## 2021-04-14 RX ADMIN — AMLODIPINE BESYLATE 5 MG: 5 TABLET ORAL at 09:22

## 2021-04-14 RX ADMIN — PREDNISONE 20 MG: 20 TABLET ORAL at 09:21

## 2021-04-14 RX ADMIN — PANTOPRAZOLE SODIUM 40 MG: 40 TABLET, DELAYED RELEASE ORAL at 15:56

## 2021-04-14 RX ADMIN — HYDROCODONE BITARTRATE AND ACETAMINOPHEN 1 TABLET: 5; 325 TABLET ORAL at 09:23

## 2021-04-14 ASSESSMENT — ENCOUNTER SYMPTOMS
COUGH: 0
CONSTIPATION: 0
ABDOMINAL PAIN: 0
VOMITING: 0
SHORTNESS OF BREATH: 0
RHINORRHEA: 0
DIARRHEA: 0
CHEST TIGHTNESS: 0
NAUSEA: 0
SORE THROAT: 0

## 2021-04-14 ASSESSMENT — PAIN SCALES - GENERAL: PAINLEVEL_OUTOF10: 0

## 2021-04-14 NOTE — PROGRESS NOTES
[address]  Progress Note    Chief complaint :  Chief Complaint   Patient presents with    Shortness of Breath       Subjective:    No overnight problems. Patient describes feeling okay today. She is increasingly withdrawn. She states that her breathing is going well. Tolerating diet. Past medical, surgical, family and social history were reviewed, non-contributory, and unchanged unless otherwise stated. Review of Systems   Constitutional: Negative for chills, fatigue and fever. HENT: Negative for congestion, rhinorrhea and sore throat. Respiratory: Negative for cough, chest tightness and shortness of breath. Cardiovascular: Negative for chest pain and palpitations. Gastrointestinal: Negative for abdominal pain, constipation, diarrhea, nausea and vomiting. Genitourinary: Negative for dysuria and frequency. Neurological: Negative for dizziness and light-headedness. All other systems reviewed and are negative. Objective:  BP (!) 118/59   Pulse 59   Temp 97.5 °F (36.4 °C) (Temporal)   Resp 18   Ht 5' 2\" (1.575 m)   Wt 113 lb 11.2 oz (51.6 kg)   SpO2 99%   BMI 20.80 kg/m²     Physical Exam  Vitals signs reviewed. Constitutional:       General: She is not in acute distress. Appearance: Normal appearance. HENT:      Head: Normocephalic. Nose: Nose normal.      Mouth/Throat:      Mouth: Mucous membranes are dry. No oral lesions. Tongue: No lesions. Pharynx: Oropharynx is clear. Eyes:      Pupils: Pupils are equal, round, and reactive to light. Cardiovascular:      Rate and Rhythm: Normal rate and regular rhythm. Heart sounds: Murmur present. Systolic murmur present. Pulmonary:      Effort: Pulmonary effort is normal.      Breath sounds: No rhonchi. Comments: Crackles bilateral bases, improving  Abdominal:      General: Bowel sounds are normal.      Palpations: Abdomen is soft. Tenderness:  There is no abdominal tenderness. Musculoskeletal:      Right lower leg: No edema. Left lower leg: No edema. Skin:     General: Skin is warm. Capillary Refill: Capillary refill takes less than 2 seconds. Neurological:      General: No focal deficit present. Mental Status: She is alert and oriented to person, place, and time. Psychiatric:         Mood and Affect: Mood normal.         Thought Content:  Thought content normal.         Judgment: Judgment normal.         Labs:  Recent Results (from the past 24 hour(s))   BASIC METABOLIC PANEL    Collection Time: 04/13/21  1:07 PM   Result Value Ref Range    Sodium 140 132 - 146 mmol/L    Potassium 3.8 3.5 - 5.0 mmol/L    Chloride 95 (L) 98 - 107 mmol/L    CO2 38 (H) 22 - 29 mmol/L    Anion Gap 7 7 - 16 mmol/L    Glucose 142 (H) 74 - 99 mg/dL    BUN 64 (H) 8 - 23 mg/dL    CREATININE 1.3 (H) 0.5 - 1.0 mg/dL    GFR Non-African American 39 >=60 mL/min/1.73    GFR African American 47     Calcium 9.0 8.6 - 10.2 mg/dL   Comprehensive Metabolic Panel w/ Reflex to MG    Collection Time: 04/14/21  3:07 AM   Result Value Ref Range    Sodium 139 132 - 146 mmol/L    Potassium reflex Magnesium 4.0 3.5 - 5.0 mmol/L    Chloride 96 (L) 98 - 107 mmol/L    CO2 35 (H) 22 - 29 mmol/L    Anion Gap 8 7 - 16 mmol/L    Glucose 100 (H) 74 - 99 mg/dL    BUN 62 (H) 8 - 23 mg/dL    CREATININE 1.0 0.5 - 1.0 mg/dL    GFR Non-African American 53 >=60 mL/min/1.73    GFR African American >60     Calcium 9.0 8.6 - 10.2 mg/dL    Total Protein 5.5 (L) 6.4 - 8.3 g/dL    Albumin 2.8 (L) 3.5 - 5.2 g/dL    Total Bilirubin 0.2 0.0 - 1.2 mg/dL    Alkaline Phosphatase 88 35 - 104 U/L    ALT 9 0 - 32 U/L    AST 20 0 - 31 U/L   CBC auto differential    Collection Time: 04/14/21  3:07 AM   Result Value Ref Range    WBC 16.4 (H) 4.5 - 11.5 E9/L    RBC 3.26 (L) 3.50 - 5.50 E12/L    Hemoglobin 9.8 (L) 11.5 - 15.5 g/dL    Hematocrit 32.5 (L) 34.0 - 48.0 %    MCV 99.7 80.0 - 99.9 fL    MCH 30.1 26.0 - 35.0 pg Thrush [B37.0] 04/11/2021    COPD (chronic obstructive pulmonary disease) [facility] [J44.9] 04/10/2021    GI bleed [K92.2] 04/10/2021    Leukocytosis [D72.829]     Frailty [R54]     Pressure injury of sacral region, unstageable ([facility] [L89.150] 04/06/2021    Acute on chronic diastolic heart failure (HCC) [I50.33] 04/06/2021    Chronic respiratory failure with hypoxia (HCC) [J96.11] 03/19/2021    Anemia [D64.9] 07/26/2014    LONG (acute kidney injury) ([facility] [N17.9] 01/24/2013    A-fib ([facility] [I48.91] 06/09/2011       Plan:  Acute on chronic respiratory failure, resolved  -Back to baseline  -Likely secondary to HFpEF but contributing factors include ILD due to RA, COPD, and recent COVID infection  -Lasix 20 mg oral  -oral prednisone 20 mg daily, discharge with taper  -Wean oxygen as tolerated; on 1 L O2  -Continue home Xopenex nebs PRN  -Home Symbicort changed to Brovana/Pulmicort nebs  -Encouraged incentive spirometer  -Respiratory panel negative  -I/Os: -2.7 L  -Cardiology following  -Pulmonology following   -CT chest showed bronchiectasis and scarring in the lower lobes which is new from CT 2/10/2021, is a continuation of the lungs, may be due to air trapping or oligoemia possibly due to chronic thromboembolic disease     GI bleed   -POCT occult stool positive  -General surgery was consulted, no plans for colonoscopy  -Small dip in Hb, possibly secondary to 0.5 L fluid bolus   -Xarelto restarted     Leukocytosis  stable  -Likely secondary to steroids but sacral ulcer could be contributing  -continue to monitor     Sacral decubitus ulcer  -Doxycycline 100 mg BID D/Caio  -No plan to debride wound by general surgery   -Wound Care following  -Pain control: Norco 5 mg BID with PRN for breakthrough     LONG, resolved  -likely due to diuretics use  -improved with IVF  -Baseline creatinine approximately 1.0  -May need outpatient lab draw to monitor     Deconditioning  -Palliative care consulted  -PT/OT consulted     Hx of A-fib  -Continue home Metoprolol 25 mg BID, Flecainide 75 mg BID  -Continue home Xarelto 15 mg daily   -Cardiology consulted     Hx of depression  -Continue home Cymbalta 40 mg daily     Hx of rib fractures  Pain improving   -Continue Norco 5 mg BID and every 4 hours PRN for severe pain  -Continue home Celebrex 200 mg daily   -Continue Lidoderm patches     Hx of hypothyroidism  -Continue home Synthroid 100 mcg daily     Hx of moderate gastritis  Seen on EGD on 4/2020  -Prilosec changed to Protonix 40 mg PO BID     Hx of RA  -On Leucovorin- takes on Tuesday   -On Methotrexate- takes on Monday      Hx of Anemia  -Continue home Iron 325 mg every other day     Hx of HTN  -Continue home Diovan 320 mg daily   -Continue home Hydralazine 25 mg BID     Thrush  -Mycelex 10 mg X 5 daily for 7 days ordered     Disposition: Plan for d/c today     DVT ppx: Xarelto ON HOLD due to rectal bleeding  GI ppx: Protonix  Code Status: Full Code  Diet: Cardiac       [address] Resident PGY-1  04/14/21   7:39 AM

## 2021-04-14 NOTE — PROGRESS NOTES
4/14/2021  2:54 PM      Comprehensive Nutrition Assessment    Type and Reason for Visit:  Reassess    Nutrition Recommendations/Plan: While appetite poor, recommend No Added Salt Diet and ONS at least BID to promote wound healing. Nutrition Assessment:  Discharge planning in progress for pt to return home. Appetite remains inadequate. Recommend continue ONS at least BID after discharge to promote wound healing. Malnutrition Assessment:  Malnutrition Status: At risk for malnutrition (Comment)    Context:  Chronic Illness     Findings of the 6 clinical characteristics of malnutrition:  Energy Intake:  7 - 75% or less estimated energy requirements for 1 month or longer  Weight Loss:  Unable to assess(wt fluctuates widely with fluid status changes)     Body Fat Loss:  No significant body fat loss     Muscle Mass Loss:  No significant muscle mass loss    Fluid Accumulation:  Unable to assess Extremities(multiple factors)   Strength:  Not Performed    Estimated Daily Nutrient Needs:  Energy (kcal):  ; Weight Used for Energy Requirements:  Admission     Protein (g):  70-85 (1.4-1.6 g/kg); Weight Used for Protein Requirements:  Admission        Fluid (ml/day):  ; Method Used for Fluid Requirements:  1 ml/kcal      Nutrition Related Findings:  poor appetite, +BS, LE edema (covered in dressings),-I/O 2L      Wounds:  Pressure Injury, Unstageable(chronic LE wound also per wound care consult 4/14)       Current Nutrition Therapies:    DIET CARDIAC;   Dietary Nutrition Supplements: Wound Healing Oral Supplement  Dietary Nutrition Supplements: Low Calorie High Protein Supplement, Clear Liquid Oral Supplement    Anthropometric Measures:  · Height: 5' 2\" (157.5 cm)  · Current Body Weight: 113 lb (51.3 kg)(4/14)   · Admission Body Weight: 104 lb (47.2 kg)(4/8 bedscale)    · Usual Body Weight: 128 lb (58.1 kg)(per EMR x 6 mo with wt fluctuations with fluid status (CHF, edema))     · Ideal Body Weight: 110 lbs; % Ideal Body Weight 102.7 %   · BMI: 20.7  · BMI Categories: Underweight (BMI less than 22) age over 72       Nutrition Diagnosis:   · Inadequate oral intake related to increase demand for energy/nutrients(2/2 chronic LE wound and PI that require nutrients for healing) as evidenced by poor intake prior to admission, intake 0-25%, wounds      Nutrition Interventions:   Food and/or Nutrient Delivery:  Continue Current Diet, Continue Oral Nutrition Supplement  Nutrition Education/Counseling:  No recommendation at this time   Coordination of Nutrition Care:  Continue to monitor while inpatient    Goals:  PO >50% at meals and ONS       Nutrition Monitoring and Evaluation:   Behavioral-Environmental Outcomes:  None Identified   Food/Nutrient Intake Outcomes:  Food and Nutrient Intake, Supplement Intake  Physical Signs/Symptoms Outcomes:  Biochemical Data, GI Status, Fluid Status or Edema, Hemodynamic Status, Nutrition Focused Physical Findings, Skin, Weight     Discharge Planning:    Continue Oral Nutrition Supplement     Electronically signed by Laurence Martinez RD, CNSC, LD on 4/14/21 at 2:54 PM EDT    Contact: [phone]

## 2021-04-14 NOTE — CARE COORDINATION
4/14/2021  Social Work Discharge Planning:Sw was informed by Alyssa Basilio with [facility] that they will deliver Pts low air loss mattress today between 3-3:30. Daughter informed SW that Pt already has a nebulizer so need to order a new one.  notified Scott Castaneda with [place] of Pts dischsrge. SW set up ambulance transport via [facility] for Pt to return home at 5pm today. Nurse here and family were notified. Electronically signed by JU Mcdonald on 4/14/2021 at 1:27 PM

## 2021-04-14 NOTE — PROGRESS NOTES
Palliative Medicine  Progress Note     Patient ok for d/c from palliative POV, outpatient palliative follow up is scheduled for 4/28/21 and further follow up will occur at that time. Prescription for Terrilee Knuckles has been printed and placed in chart.   Makenna Lennon MD  Palliative Medicine     Note: This report was completed using computerMykonos Software voiced recognition software.  Every effort has been made to ensure accuracy; however, inadvertent computerized transcription errors may be present.

## 2021-04-14 NOTE — PROGRESS NOTES
Trang Mack M.D.,[facility]  Shirley Vazquez D.O., F.A.C.O.I., Maude Vickers M.D. John Frank M.D., Wellington Calix M.D. Jac Nesbitt D.O. Daily Pulmonary Progress Note    Patient:  Alondra Nunez 80 y.o. female MRN: [id number]     Date of Service: 4/14/2021      Synopsis     We are following patient for respiratory distress, patient with COPD and CHF requiring high flow oxygen    \"CC\" shortness of breath    Code status: Full code      Subjective      Patient was seen and examined lying in bed in no apparent distress. She is currently on 2 L oxygen with a sat of 96%. Her caregivers at bedside. She did use BiPAP last night settings 14/6. She is using incentive spirometer frequently. HRCT chest reviewed with improved fibrosis. Review of Systems:  Constitutional: Denies fever, weight loss, night sweats, and fatigue  Skin: Denies pigmentation, dark lesions, and rashes   HEENT: Denies hearing loss, tinnitus, ear drainage, epistaxis, sore throat, and hoarseness. Cardiovascular: Denies palpitations, chest pain, and chest pressure. Respiratory: Denies cough, dyspnea at rest, hemoptysis, apnea, and choking.   Gastrointestinal: Denies nausea, vomiting, poor appetite, diarrhea, heartburn or reflux  Genitourinary: Denies dysuria, frequency, urgency or hematuria  Musculoskeletal: Denies myalgias, muscle weakness, and bone pain  Neurological: Denies dizziness, vertigo, headache, and focal weakness  Psychological: Denies anxiety and depression  Endocrine: Denies heat intolerance and cold intolerance  Hematopoietic/Lymphatic: Denies bleeding problems and blood transfusions    24-hour events:  None    Objective   Vitals: /62   Pulse 64   Temp 97.4 °F (36.3 °C) (Oral)   Resp 18   Ht 5' 2\" (1.575 m)   Wt 113 lb 11.2 oz (51.6 kg)   SpO2 98%   BMI 20.80 kg/m²     I/O:    Intake/Output Summary (Last 24 hours) at 4/14/2021 1434  Last data filed at 4/14/2021 1232  Gross per 24 hour Intake 0 ml   Output    Net 0 ml       Vent Information  Skin Assessment: Clean, dry, & intact  FiO2 : 40 %  SpO2: 98 %  SpO2/FiO2 ratio: 245  I Time/ I Time %: 0.9 s  Mask Type: Full face mask  Mask Size: Small       IPAP: 14 cmH20  CPAP/EPAP: 6 cmH2O     CURRENT MEDS :  Scheduled Meds:   predniSONE  20 mg Oral Daily    potassium chloride  20 mEq Oral Daily    furosemide  20 mg Oral Daily    clotrimazole  10 mg Oral 5x Daily    HYDROcodone 5 mg - acetaminophen  1 tablet Oral BID    sennosides-docusate sodium  2 tablet Oral BID    collagenase   Topical Daily    amLODIPine  5 mg Oral Daily    celecoxib  200 mg Oral Daily    DULoxetine  40 mg Oral Daily    ferrous sulfate  325 mg Oral Every Other Day    flecainide  75 mg Oral BID    levothyroxine  100 mcg Oral Daily    lidocaine  1 patch Topical Daily    pantoprazole  40 mg Oral BID AC    valsartan  320 mg Oral Daily    Vitamin D  2,000 Units Oral Daily    rivaroxaban  15 mg Oral Daily    zinc sulfate  50 mg Oral Daily    metoprolol tartrate  50 mg Oral BID    sodium chloride flush  10 mL Intravenous 2 times per day    budesonide  0.5 mg Nebulization BID    Arformoterol Tartrate  15 mcg Nebulization BID       Physical Exam:  General Appearance: appears comfortable in no acute distress. HEENT: Normocephalic atraumatic without obvious abnormality   Neck: Lips, mucosa, and tongue normal.  Supple, symmetrical, trachea midline, no adenopathy;thyroid:  no enlargement/tenderness/nodules or JVD. Lung: Breath sounds bibasilar crackles. Respirations   unlabored. Symmetrical expansion. Heart: RRR, normal S1, S2. No MRG  Abdomen: Soft, NT, ND. BS present x 4 quadrants. No bruit or organomegaly. Extremities: Pedal pulses 2+ symmetric b/l. Extremities normal, no cyanosis, clubbing, bilateral lower extremity  Musculokeletal: No joint swelling, no muscle tenderness. ROM normal in all joints of extremities.    Neurologic: Mental status: Alert and Oriented X3 . Pertinent/ New Labs and Imaging Studies     Imaging Personally Reviewed:  4/12/21 HRCT chest    Impression   1. 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  Further evaluation   with MRI of the thoracic spine is recommended. 2. Bronchiectasis and scarring in the lower lobes, which has mostly developed   in the interim since the earlier CT of the chest from 02/10/2021 and may be   the result of infection. 3. The inspiratory and expiratory phase imaging was limited, as no expiratory   phase images were obtained.  (Even during images labeled expiration, the   patient appears to be in deep inspiration. ). 4. Mosaic attenuation of the lungs, with relative hypoattenuation in the   anterior aspects of the upper lobes.  These findings may be due to air   trapping or oligemia, possibly due to chronic thromboembolic disease. 5. Cardiomegaly. 6. Small to moderate size hiatal hernia. 4/6  Grossly stable diffuse interstitial and alveolar opacities which may be   infectious/inflammatory and/or related to fibrosis.  CT of the chest from   02/24/2021 demonstrated evidence of both pneumonia and fibrosis. ECHO  2/25  Left ventricular internal dimensions were normal in diastole and systole. No regional wall motion abnormalities seen. Normal left ventricular ejection fraction. The left atrium is borderline dilated. Moderate mitral annular calcification. Mild mitral regurgitation is present. The aortic valve appears mildly sclerotic. Mild aortic regurgitation is noted.     Labs:  Lab Results   Component Value Date    WBC 16.4 04/14/2021    HGB 9.8 04/14/2021    HCT 32.5 04/14/2021    MCV 99.7 04/14/2021    MCH 30.1 04/14/2021    MCHC 30.2 04/14/2021    RDW 13.7 04/14/2021     04/14/2021    MPV 9.7 04/14/2021     Lab Results   Component Value Date     04/14/2021    K 4.0 04/14/2021 CL 96 04/14/2021    CO2 35 04/14/2021    BUN 62 04/14/2021    CREATININE 1.0 04/14/2021    LABALBU 2.8 04/14/2021    LABALBU 4.0 04/24/2012    CALCIUM 9.0 04/14/2021    GFRAA >60 04/14/2021    LABGLOM 53 04/14/2021     Lab Results   Component Value Date    PROTIME 33.4 04/06/2021    PROTIME 15.8 05/12/2014    INR 3.0 04/06/2021     No results for input(s): PROBNP in the last 72 hours. No results for input(s): PROCAL in the last 72 hours. This SmartLink has not been configured with any valid records. Micro:  No results for input(s): CULTRESP in the last 72 hours. No results for input(s): LABGRAM in the last 72 hours. No results for input(s): LEGUR in the last 72 hours. No results for input(s): STREPNEUMAGU in the last 72 hours. No results for input(s): LP1UAG in the last 72 hours. Assessment:    1. Acute respiratory failure with hypoxia  2. HFpEF  3. 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 Severe JANETTE AHI 36, previously followed with  as OP   7. Seasonal allergies   8. A fib on anticoagulation  9. Fluid overload  10. Anemia  11. Leukocytosis   12. Sacral decubitus ulcer       Plan:   1. Oxygen therapy 2  L HF wean to keep >92%  2. Change Bipap to prn used 4 hrs last HS  3. Decreased lasix dosing 20 po daily- mgmt of chf and a fib per cardiology . To complete diamox x 4 doses, CO2 on BMP improved 38  4. Brovana, budesonide bid, xopenex prn-continue ezpap-if she requires bronchodilator upon discharge we will recommend to continue Xopenex HFA as needed for rescue  5. Reviewed non contrast CT chest with high resolution -improved fibrosis compared to last CT 2/24/21  6. Continue incentive spirometer. Recruitment techniques  7. Transition to oral prednisone with taper for dc  8. Dvt, gi prophylaxis. Xarelto for PAF   9. Doxycycline for empiric CAP coverage. procal 0.15  10. Local wound care. 11. PT, OT  12.  Ok to oumou from a pulmonary perspective. Will arrange follow up as OP      I personally saw, examined, and cared for the patient. Labs, medications, radiographs reviewed. I agree with history exam and plans detailed in NP note.   Carrol Rivera  This plan of care was reviewed in collaboration with Dr. Destini Ambrose  Electronically signed by FRAN Gay CNP on 4/14/2021 at 2:34 PM

## 2021-04-14 NOTE — PROGRESS NOTES
Date: 4/13/2021    Time: 11:17 PM    Patient Placed On BIPAP/CPAP/ Non-Invasive Ventilation? Yes    If no must comment. Facial area red/color change? No           If YES are Blister/Lesion present? No   If yes must notify nursing staff  BIPAP/CPAP skin barrier?   Yes    Skin barrier type:mepilexlite       Comments:        Lorenzo Morrell

## 2021-04-14 NOTE — FLOWSHEET NOTE
Inpatient Wound Care    Admit Date: 4/6/2021  3:03 PM    Reason for consult:  L leg, coccyx    Significant history:   Admitted for heart failure. History of COVID-19. Wound history:  POA    Findings:       04/14/21 1358   Wound 03/06/21 Coccyx   Date First Assessed: 03/06/21   Present on Hospital Admission: No  Location: Coccyx   Wound Image    Wound Etiology Pressure Unstageable   Dressing Status New dressing applied   Wound Cleansed Cleansed with saline   Dressing/Treatment   (wound gel, opticell, foam)   Dressing Change Due 04/15/21   Wound Length (cm) 2 cm   Wound Width (cm) 2.5 cm   Wound Depth (cm)   (obscured)   Wound Surface Area (cm^2) 5 cm^2   Change in Wound Size % (l*w) -150   Wound Assessment Slough   Drainage Amount Small   Drainage Description Yellow   Odor None   Jaycee-wound Assessment Intact   Wound 04/06/21 Left calf   Date First Assessed/Time First Assessed: 04/06/21 2000   Present on Hospital Admission: Yes  Wound Location Orientation: Left  Wound Description (Comments): calf   Wound Image    Dressing Status New dressing applied   Wound Cleansed   (santyl, 4x4, kerlix, coban)   Dressing Change Due 04/21/21   Wound Length (cm) 2 cm   Wound Width (cm) 1 cm   Wound Depth (cm) 0.2 cm   Wound Surface Area (cm^2) 2 cm^2   Change in Wound Size % (l*w) 0   Wound Volume (cm^3) 0.4 cm^3   Wound Assessment   (red)   Drainage Amount None   Odor None   Jaycee-wound Assessment Dry/flaky       Impression:  Unstageable pressure injury coccyx, Chronic wound L posterior leg. Interventions in place:  Coccyx wound cleansed with NSS then wound gel, opticell and foam applied. Legs washed with foam cleanser. Santyl to wound then gauze. kerlix to both legs then coban from toes to below knees. Instructed daughter and care giver about wound care. Both are receptive. Appetite poor. Discussed options for increase protein. Plan: Dressing changes. SOS precautions, low air loss, dietary.        Sherley Elder 4/14/2021 2:04 PM

## 2021-04-15 ENCOUNTER — CARE COORDINATION (OUTPATIENT)
Dept: CASE MANAGEMENT | Age: 86
End: 2021-04-15

## 2021-04-15 ENCOUNTER — TELEPHONE (OUTPATIENT)
Dept: ADMINISTRATIVE | Age: 86
End: 2021-04-15

## 2021-04-15 NOTE — CONSULTS
2/26/2021  4:06 PM      Comprehensive Nutrition Assessment    Type and Reason for Visit:  Initial, Consult    Nutrition Recommendations/Plan: Continue current diet and ONS with all meals    Nutrition Assessment:  Pt admit w/acute on chronic resp failure-recent inpt admit 2/2 Covid PNA. Pt continues to eat poorly at most meals. Will initiate ONS with all meals to optimize nutrient intake    Malnutrition Assessment:  Malnutrition Status:  Mild malnutrition    Context:  Acute Illness     Findings of the 6 clinical characteristics of malnutrition:  Energy Intake:  7 - 50% or less of estimated energy requirements for 5 or more days  Weight Loss:  No significant weight loss     Body Fat Loss:  1 - Mild body fat loss Orbital   Muscle Mass Loss:  No significant muscle mass loss    Fluid Accumulation:  No significant fluid accumulation     Strength:       Estimated Daily Nutrient Needs:  Energy (kcal):  ; Weight Used for Energy Requirements:  Admission     Protein (g):  75-85 (1.3-1.5 g/kg); Weight Used for Protein Requirements:  Admission        Fluid (ml/day):  ; Method Used for Fluid Requirements:  1 ml/kcal      Nutrition Related Findings:  A&Ox4, sore throat, dentures +I/O 1L, +BS,  poor appetite, HFNC      Wounds:  (reddened buttocks, groin and bruised sacrum)       Current Nutrition Therapies:    DIET GENERAL;  Dietary Nutrition Supplements: Frozen Oral Supplement  Dietary Nutrition Supplements: Clear Liquid Oral Supplement    Anthropometric Measures:  · Height: 5' (152.4 cm)  · Current Body Weight: 124 lb (56.2 kg)(2/26)   · Admission Body Weight: 123 lb (55.8 kg)(2/24 bedscale)    · Usual Body Weight: 123 lb (55.8 kg)(per EMR hx x 1 yr.  Pt wt ave. 137# on admit earlier this mo but +I/O and edema)     · Ideal Body Weight: 100 lbs; % Ideal Body Weight 124 %   · BMI: 24.2  · BMI Categories: Normal Weight (BMI 22.0 to 24.9) age over 72       Nutrition Diagnosis:   · Mild malnutrition, In context of acute illness or injury related to impaired respiratory function(increased work of breathing/SOB) as evidenced by intake 0-25%, poor intake prior to admission, mild loss of subcutaneous fat      Nutrition Interventions:   Food and/or Nutrient Delivery:  Continue Current Diet, Start Oral Nutrition Supplement(Magic cup BID, Ensure Clear BID)  Nutrition Education/Counseling:  No recommendation at this time   Coordination of Nutrition Care:  Continue to monitor while inpatient    Goals:  PO >50% at meals/ONS       Nutrition Monitoring and Evaluation:   Behavioral-Environmental Outcomes:  None Identified   Food/Nutrient Intake Outcomes:  Food and Nutrient Intake, Supplement Intake  Physical Signs/Symptoms Outcomes:  Biochemical Data, GI Status, Fluid Status or Edema, Nutrition Focused Physical Findings, Skin, Weight     Discharge Planning:     Too soon to determine     Electronically signed by Kylah Tilley RD, CNSC, LD on 2/26/21 at 4:06 PM EST    Contact: [phone] Detail Level: Simple Include Location In Plan?: No Detail Level: Zone

## 2021-04-20 ENCOUNTER — TELEPHONE (OUTPATIENT)
Dept: FAMILY MEDICINE CLINIC | Age: 86
End: 2021-04-20

## 2021-04-20 ENCOUNTER — CARE COORDINATION (OUTPATIENT)
Dept: CASE MANAGEMENT | Age: 86
End: 2021-04-20

## 2021-04-20 NOTE — TELEPHONE ENCOUNTER
I have to say these are intertwined- if they want to do Hospice then that would include managing the wound  If not hospice, then we need to wither get her into wound care (either in person or as a home visit) or she needs to come back to the hospital/ER to be evaluated

## 2021-04-20 NOTE — CARE COORDINATION
[facility] Transitions Follow Up Call    2021    Patient: Yahaira Girard  Patient : 1935   MRN: <[id number]>  Reason for Admission: Acute on chronic diastolic heart failure [facility])  Discharge Date: 21 RARS: Readmission Risk Score: 32         Spoke with:Tatyana BURGOS) daughter states she is waiting for a call from [place] [id number] nurse this am, states she knows her mom had some pain this am from her bed sore she was given tylenol. states her BP has been good. Denies any complaints of Chest pain and her breathing is doing well. Pallitive care consult on the . No concerns noted at this time. Needs to be reviewed by the provider   Additional needs identified to be addressed with provider No  none           Method of communication with provider : none    Care Transition Nurse (CTN) contacted the family by telephone to follow up after admission on 21. Verified name and  with family as identifiers. Addressed changes since last contact: symptom management-BP good, pain at bed sore site, breathing ok  Discharged needs reviewed: none  Follow up appointment completed? No    Advance Care Planning:   Does patient have an Advance Directive:  reviewed and current. CTN reviewed discharge instructions, medical action plan and red flags with family and discussed any barriers to care and/or understanding of plan of care after discharge. Discussed appropriate site of care based on symptoms and resources available to patient including: PCP, Specialist and When to call 911. The family agrees to contact the PCP office for questions related to their healthcare. Patients top risk factors for readmission: medical condition-Heart failure  Interventions to address risk factors: Obtained and reviewed discharge summary and/or continuity of care documents    Discussed follow-up appointments.  If no appointment was previously scheduled, appointment scheduling offered: Yes Is follow up appointment scheduled within 7 days of discharge? Yes  Non-[facility] follow up appointment(s): na    Plan for follow-up call in 7-10 days based on severity of symptoms and risk factors. Plan for next call: symptom management-bed sore status, chest ain breathing diffiuclties  CTN provided contact information for future needs. Care Transitions Subsequent and Final Call    Subsequent and Final Calls  Do you have any ongoing symptoms?: Yes  Onset of Patient-reported symptoms: Today  Patient-reported symptoms: Pain  Interventions for patient-reported symptoms: Notified Home Care  Have your medications changed?: No  Do you have any questions related to your medications?: No  Do you currently have any active services?: Yes  Are you currently active with any services?: Home Health  Do you have any needs or concerns that I can assist you with?: No  Identified Barriers: Lack of Education  Care Transitions Interventions  Other Interventions:            Follow Up  Future Appointments   Date Time Provider Santana Arnett   4/21/2021  3:15 PM Valarie Sam MD [facility]   4/22/2021 10:00 AM Felix Byrne MD [address] [facility]   4/27/2021 11:00 AM Valarie Sam MD [facility]   4/28/2021  3:00 PM FRAN Ashby - CNP [facility]       Rodney Kim LPN

## 2021-04-20 NOTE — TELEPHONE ENCOUNTER
Kaleb with [facility] was out to see the patient and the wound on her sacrum is worsening. She notes the wound is deep and she is unsure but feels there may be bone sticking out. Her lungs are diminished with crackling throughout. She has been unable to get into wound care due to being home bound. She suggested needing possible wound vac. She spoke to the family about hospice and half are agreeable while the other is not. Please advise.

## 2021-04-21 ENCOUNTER — VIRTUAL VISIT (OUTPATIENT)
Dept: FAMILY MEDICINE CLINIC | Age: 86
End: 2021-04-21
Payer: MEDICARE

## 2021-04-21 DIAGNOSIS — J96.01 ACUTE RESPIRATORY FAILURE WITH HYPOXIA (HCC): ICD-10-CM

## 2021-04-21 DIAGNOSIS — I48.0 PAROXYSMAL ATRIAL FIBRILLATION (HCC): ICD-10-CM

## 2021-04-21 DIAGNOSIS — E46 PROTEIN-CALORIE MALNUTRITION, UNSPECIFIED SEVERITY (HCC): ICD-10-CM

## 2021-04-21 DIAGNOSIS — I50.33 ACUTE ON CHRONIC DIASTOLIC CONGESTIVE HEART FAILURE (HCC): ICD-10-CM

## 2021-04-21 DIAGNOSIS — B37.0 THRUSH: ICD-10-CM

## 2021-04-21 DIAGNOSIS — L89.152 PRESSURE INJURY OF SACRAL REGION, STAGE 2 (HCC): Primary | ICD-10-CM

## 2021-04-21 PROCEDURE — 1111F DSCHRG MED/CURRENT MED MERGE: CPT | Performed by: FAMILY MEDICINE

## 2021-04-21 PROCEDURE — 99495 TRANSJ CARE MGMT MOD F2F 14D: CPT | Performed by: FAMILY MEDICINE

## 2021-04-21 RX ORDER — LIDOCAINE HYDROCHLORIDE 20 MG/ML
10 SOLUTION OROPHARYNGEAL
Qty: 100 ML | Refills: 2 | Status: SHIPPED
Start: 2021-04-21 | End: 2021-05-19 | Stop reason: ALTCHOICE

## 2021-04-21 NOTE — PROGRESS NOTES
TeleMedicine Patient Consent    This visit was performed as a virtual video visit using a synchronous, two-way, audio-video telehealth technology platform. Patient identification was verified at the start of the visit, including the patient's telephone number and physical location. I discussed with the patient the nature of our telehealth visits, that:     1. Due to the nature of an audio- video modality, the only components of a physical exam that could be done are the elements supported by direct observation. 2. The provider will evaluate the patient and recommend diagnostics and treatments based on their assessment. 3. If it was felt that the patient should be evaluated in clinic or an emergency room setting, then they would be directed there. 4. Our sessions are not being recorded and that personal health information is protected. 5. Our team would provide follow up care in person if/when the patient needs it. Patient does agree to proceed with telemedicine consultation. Patient location: home address in [facility]    Physician location: regular office location    This visit was completed virtually using Doxy. me    This visit was performed during the 3409 public health crisis and COVID-19 pandemic.   *Add GT modifier to all Video Visits*

## 2021-04-21 NOTE — PROGRESS NOTES
Chronic heart failure with preserved ejection fraction (HCC)    Chronic respiratory failure with hypoxia (HCC)    Pressure injury of sacral region, unstageable (HCC)    Frailty    Leukocytosis    COPD (chronic obstructive pulmonary disease) (HCC)    GI bleed    Thrush    Congestive heart failure (HCC)       Allergies   Allergen Reactions    Amoxicillin      GI ill effects       Medications listed as ordered at the time of discharge from hospital   Giovanna MEDINA   Home Medication Instructions ZOILA:    Printed on:04/21/21 9335   Medication Information                      albuterol sulfate HFA (VENTOLIN HFA) 108 (90 Base) MCG/ACT inhaler  Inhale 2 puffs into the lungs 4 times daily as needed for Wheezing             amLODIPine (NORVASC) 5 MG tablet  Take 1 tablet by mouth daily             Arformoterol Tartrate (BROVANA) 15 MCG/2ML NEBU  Take 2 mLs by nebulization 2 times daily             ascorbic acid (VITAMIN C) 500 MG tablet  Take 1 tablet by mouth daily             budesonide (PULMICORT) 0.5 MG/2ML nebulizer suspension  Take 2 mLs by nebulization 2 times daily             celecoxib (CELEBREX) 200 MG capsule  TAKE 1 CAPSULE DAILY             clotrimazole (MYCELEX) 10 MG yamilex  Take 1 tablet by mouth 5 times daily for 10 days             collagenase 250 UNIT/GM ointment  Apply topically daily. docusate sodium (COLACE, DULCOLAX) 100 MG CAPS  Take 100 mg by mouth daily             DULoxetine HCl 40 MG CPEP  Take 40 mg by mouth daily             famotidine (PEPCID) 40 MG tablet  Take 1 tablet by mouth nightly             ferrous sulfate (IRON 325) 325 (65 Fe) MG tablet  Take 1 tablet by mouth every other day             fexofenadine (ALLEGRA) 180 MG tablet  Take 180 mg by mouth daily.              flecainide (TAMBOCOR) 150 MG tablet  Take 0.5 tablets by mouth 2 times daily             fluticasone (FLONASE) 50 MCG/ACT nasal spray  2 sprays by Nasal route daily 2 sprays in each nostril daily folic acid (FOLVITE) 1 MG tablet  Take 1 tablet by mouth daily             furosemide (LASIX) 20 MG tablet  Take 1 tablet by mouth daily             gabapentin (NEURONTIN) 100 MG capsule  Take 1 capsule by mouth 2 times daily for 90 days. leucovorin calcium (WELLCOVORIN) 5 MG tablet  Take 25 mg by mouth once a week Takes every Tuesday             levalbuterol (XOPENEX) 0.31 MG/3ML nebulization  Take 3 mLs by nebulization every 6 hours as needed for Wheezing             levothyroxine (LEVOTHROID) 100 MCG tablet  Take 1 tablet by mouth Daily             lidocaine (LIDODERM) 5 %  Place 2 patches onto the skin daily 12 hours on, 12 hours off.             lidocaine viscous hcl (XYLOCAINE) 2 % SOLN solution  Take 10 mLs by mouth every 3 hours as needed for Irritation or Pain             loteprednol (LOTEMAX) 0.5 % ophthalmic suspension               methotrexate 2.5 MG tablet  Take by mouth once a week 2 tabs at lunch and 4 tabs  in the pm once a week on Monday             metoprolol tartrate (LOPRESSOR) 25 MG tablet  Take 50 mg by mouth 2 times daily              omeprazole (PRILOSEC) 40 MG delayed release capsule  Take 1 capsule by mouth 2 times daily             ondansetron (ZOFRAN) 4 MG tablet  Take 1 tablet by mouth every 8 hours as needed for Nausea or Vomiting             polyethyl glycol-propyl glycol 0.4-0.3 % (SYSTANE) 0.4-0.3 % ophthalmic solution  1 drop 2 times daily as needed for Dry Eyes              potassium bicarbonate (EFFER-K) 25 MEQ disintegrating tablet  Take 1 tablet by mouth daily             predniSONE (DELTASONE) 10 MG tablet  Take 2 tabs daily x 5 days then 1 tabs daily x 5 days             Probiotic Product (ACIDOPHILUS PROBIOTIC) CAPS capsule  Take 1 capsule by mouth daily             Respiratory Therapy Supplies (FULL KIT NEBULIZER SET) MISC  Use as directed with nebulized medication.              thiamine 100 MG tablet  Take 1 tablet by mouth daily             valsartan (DIOVAN) 320 MG tablet  Take 1 tablet by mouth daily             vitamin B-12 (CYANOCOBALAMIN) 1000 MCG tablet  Take 1,000 mcg by mouth daily             Vitamin D (CHOLECALCIFEROL) 50 MCG (2000 UT) TABS tablet  Take 1 tablet by mouth daily             XARELTO 15 MG TABS tablet  TAKE 1 TABLET DAILY WITH   BREAKFAST             zinc sulfate (ZINCATE) 50 MG CAPS capsule  Take 1 capsule by mouth daily                   Medications marked \"taking\" at this time  Outpatient Medications Marked as Taking for the 4/21/21 encounter (Virtual Visit) with Leydi Renee MD   Medication Sig Dispense Refill    lidocaine viscous hcl (XYLOCAINE) 2 % SOLN solution Take 10 mLs by mouth every 3 hours as needed for Irritation or Pain 100 mL 2    amLODIPine (NORVASC) 5 MG tablet Take 1 tablet by mouth daily 30 tablet 0    furosemide (LASIX) 20 MG tablet Take 1 tablet by mouth daily 60 tablet 0    clotrimazole (MYCELEX) 10 MG yamilex Take 1 tablet by mouth 5 times daily for 10 days 50 tablet 0    collagenase 250 UNIT/GM ointment Apply topically daily. 1 Tube 0    Arformoterol Tartrate (BROVANA) 15 MCG/2ML NEBU Take 2 mLs by nebulization 2 times daily 120 mL 3    budesonide (PULMICORT) 0.5 MG/2ML nebulizer suspension Take 2 mLs by nebulization 2 times daily 60 ampule 3    Respiratory Therapy Supplies (FULL KIT NEBULIZER SET) MISC Use as directed with nebulized medication. 1 each 0    predniSONE (DELTASONE) 10 MG tablet Take 2 tabs daily x 5 days then 1 tabs daily x 5 days 15 tablet 0    metoprolol tartrate (LOPRESSOR) 25 MG tablet Take 50 mg by mouth 2 times daily       lidocaine (LIDODERM) 5 % Place 2 patches onto the skin daily 12 hours on, 12 hours off.  30 patch 1    flecainide (TAMBOCOR) 150 MG tablet Take 0.5 tablets by mouth 2 times daily 60 tablet 3    valsartan (DIOVAN) 320 MG tablet Take 1 tablet by mouth daily 30 tablet 3    potassium bicarbonate (EFFER-K) 25 MEQ disintegrating tablet Take 1 tablet by mouth daily (Patient taking differently: Take 25 mEq by mouth daily Not given daily d/t thrush) 30 tablet 0    XARELTO 15 MG TABS tablet TAKE 1 TABLET DAILY WITH   BREAKFAST 90 tablet 3    levalbuterol (XOPENEX) 0.31 MG/3ML nebulization Take 3 mLs by nebulization every 6 hours as needed for Wheezing 3 mL 3    loteprednol (LOTEMAX) 0.5 % ophthalmic suspension       albuterol sulfate HFA (VENTOLIN HFA) 108 (90 Base) MCG/ACT inhaler Inhale 2 puffs into the lungs 4 times daily as needed for Wheezing 1 Inhaler 1    DULoxetine HCl 40 MG CPEP Take 40 mg by mouth daily 90 capsule 1    folic acid (FOLVITE) 1 MG tablet Take 1 tablet by mouth daily 30 tablet 0    zinc sulfate (ZINCATE) 50 MG CAPS capsule Take 1 capsule by mouth daily 30 capsule 0    ascorbic acid (VITAMIN C) 500 MG tablet Take 1 tablet by mouth daily 30 tablet 0    thiamine 100 MG tablet Take 1 tablet by mouth daily 30 tablet 0    Vitamin D (CHOLECALCIFEROL) 50 MCG (2000 UT) TABS tablet Take 1 tablet by mouth daily 30 tablet 0    celecoxib (CELEBREX) 200 MG capsule TAKE 1 CAPSULE DAILY 90 capsule 3    ferrous sulfate (IRON 325) 325 (65 Fe) MG tablet Take 1 tablet by mouth every other day 30 tablet 5    levothyroxine (LEVOTHROID) 100 MCG tablet Take 1 tablet by mouth Daily 90 tablet 1    docusate sodium (COLACE, DULCOLAX) 100 MG CAPS Take 100 mg by mouth daily 30 capsule 0    vitamin B-12 (CYANOCOBALAMIN) 1000 MCG tablet Take 1,000 mcg by mouth daily      omeprazole (PRILOSEC) 40 MG delayed release capsule Take 1 capsule by mouth 2 times daily 180 capsule 3    famotidine (PEPCID) 40 MG tablet Take 1 tablet by mouth nightly 90 tablet 3    ondansetron (ZOFRAN) 4 MG tablet Take 1 tablet by mouth every 8 hours as needed for Nausea or Vomiting 30 tablet 5    polyethyl glycol-propyl glycol 0.4-0.3 % (SYSTANE) 0.4-0.3 % ophthalmic solution 1 drop 2 times daily as needed for Dry Eyes       fluticasone (FLONASE) 50 MCG/ACT nasal spray 2 sprays by Nasal route daily 2 sprays in each nostril daily 1 Bottle 11    Probiotic Product (ACIDOPHILUS PROBIOTIC) CAPS capsule Take 1 capsule by mouth daily      fexofenadine (ALLEGRA) 180 MG tablet Take 180 mg by mouth daily.  leucovorin calcium (WELLCOVORIN) 5 MG tablet Take 25 mg by mouth once a week Takes every Tuesday      methotrexate 2.5 MG tablet Take by mouth once a week 2 tabs at lunch and 4 tabs  in the pm once a week on Monday          Medications patient taking as of now reconciled against medications ordered at time of hospital discharge: Yes    Chief Complaint   Patient presents with   [address] from Hospital       HPI    Inpatient course: Discharge summary reviewed- see chart. Interval history/Current status:     She remains living with one of her adult children. She was admitted for increased respiratory distress and hypoxia. Thought to have diastolic dysfunction with volume overload. Treated with diuretics. Seems to be improving with regard to this. Remains on home oxygen, 2 to 3 L/min nasal cannula continuous  Denies shortness of breath, chest pain, or swelling. Patient and family are comfortable with this issue at this time  She still gets some pleuritic discomfort and likely related to her previous rib fractures. She is on hydrocodone through palliative medicine and she thinks that gives her a modest degree of relief    Decubitus ulcer  Patient is working with home health  Has a sacral ulcer that wound care had been ordered for. However, she missed her appointment due to her recent hospitalization. It has not been rescheduled. Family reports they think the issue is getting better, wound care reports the issue is worsening. I cannot get a up-to-date image. She reports some pain at the site. Family is trying to reposition her. Barry Tellez  She was identified as having thrush while in the hospital.  She is on Mycelex troches. Still having some mouth pain.     Overall goals of care  Discussed with her her chronic health conditions. She remains of the mindset that she would like to \"get better\". We discussed the limitations of her current health conditions. Discussed that resolution of these issues is not likely and that we are best approach would be symptom management. She was also presented with the option of choosing hospice and she declines. She is enrolled with palliative care and does intend to continue to work with them. Review of Systems   Constitutional: Positive for fatigue. Negative for appetite change (Thinks appetite is improving), chills and fever. HENT: Negative for trouble swallowing. Respiratory: Positive for shortness of breath. Negative for cough and wheezing. Cardiovascular: Negative for chest pain, palpitations and leg swelling. Gastrointestinal: Negative for abdominal pain and constipation. Genitourinary: Negative for difficulty urinating. There were no vitals filed for this visit. There is no height or weight on file to calculate BMI. Wt Readings from Last 3 Encounters:   04/14/21 113 lb 11.2 oz (51.6 kg)   03/09/21 140 lb 3.4 oz (63.6 kg)   02/16/21 142 lb 14.4 oz (64.8 kg)     BP Readings from Last 3 Encounters:   04/14/21 133/62   03/09/21 (!) 140/56   02/16/21 (!) 140/61       Physical Exam  Constitutional:       General: She is not in acute distress. Appearance: She is ill-appearing. Pulmonary:      Effort: Pulmonary effort is normal.   Neurological:      General: No focal deficit present. Mental Status: She is alert. Psychiatric:         Mood and Affect: Mood normal.         Behavior: Behavior normal.     Remainder of exam unable to be performed due to the nature of a virtual encounter        Assessment/Plan:  1. Pressure injury of sacral region, stage 2 ([facility] [address]  Ongoing issues. I suggested that if we are going to continue with a comprehensive approach to improving her health that this must be addressed further. Restart wound care. They are using Santyl and Mepilex dressings. Most likely this is reasonable. Could consider DuoDERM or Tegaderm as alternatives. Defer to wound care. Notify if signs and symptoms of infection develop  - [facility] CURRENT OUTPATIENT MED LIST    2. Acute on chronic diastolic congestive heart failure (HCC)  Continue diuresis. Will attempt to get labs when she presents to wound care. 3. Thrush  Still having some pain and discomfort. We will try viscous lidocaine  - lidocaine viscous hcl (XYLOCAINE) 2 % SOLN solution; Take 10 mLs by mouth every 3 hours as needed for Irritation or Pain  Dispense: 100 mL; Refill: 2  - [place] DISCHARGE MEDS RECONCILED W/ CURRENT OUTPATIENT MED LIST    4. Acute respiratory failure with hypoxia ([facility])  Continue oxygen support. 5. Paroxysmal atrial fibrillation ([facility])  Clinically doing well at the present time. Be mindful this could recur    6. Protein-calorie malnutrition, unspecified severity ([facility] [address]  Continue to monitor with labs the next time she gets labs done  - [place] DISCHARGE MEDS RECONCILED W/ CURRENT OUTPATIENT MED LIST      Opportunity for questions were provided to the patient and her family. They verbalized comfort and understanding. She will keep her appointment with me next week    Medical Decision Making: high complexity    Pursuant to the emergency declaration under the [facility] [address] waiver authority and the [employer] Act, this Virtual  Visit was conducted, with patient's consent, to reduce the patient's risk of exposure to COVID-19 and provide continuity of care for an established patient.     Services were provided through a video synchronous discussion virtually to substitute for in-person clinic visit.

## 2021-04-22 ENCOUNTER — VIRTUAL VISIT (OUTPATIENT)
Dept: CARDIOLOGY CLINIC | Age: 86
End: 2021-04-22
Payer: MEDICARE

## 2021-04-22 DIAGNOSIS — I50.33 ACUTE ON CHRONIC HEART FAILURE WITH PRESERVED EJECTION FRACTION (HFPEF) (HCC): Primary | ICD-10-CM

## 2021-04-22 DIAGNOSIS — J96.11 CHRONIC RESPIRATORY FAILURE WITH HYPOXIA (HCC): ICD-10-CM

## 2021-04-22 DIAGNOSIS — I10 ESSENTIAL HYPERTENSION: ICD-10-CM

## 2021-04-22 DIAGNOSIS — I48.0 PAROXYSMAL ATRIAL FIBRILLATION (HCC): ICD-10-CM

## 2021-04-22 DIAGNOSIS — Z79.01 CHRONIC ANTICOAGULATION: ICD-10-CM

## 2021-04-22 PROCEDURE — 99214 OFFICE O/P EST MOD 30 MIN: CPT | Performed by: INTERNAL MEDICINE

## 2021-04-22 ASSESSMENT — ENCOUNTER SYMPTOMS
TROUBLE SWALLOWING: 0
ABDOMINAL PAIN: 0
WHEEZING: 0
COUGH: 0
CONSTIPATION: 0
SHORTNESS OF BREATH: 1

## 2021-04-22 NOTE — PROGRESS NOTES
by arthritis / she ambulates with a walker or wheelchair. On home O2. Sinus on 21 EKG.     Review of Systems:   Cardiac: As per HPI  General: No fever, chills  Pulmonary: As per HPI  HEENT: No visual disturbances, difficult swallowing  GI: No nausea, vomiting  : No dysuria, hematuria  Endocrine: +hypothyroidism, no DM  Musculoskeletal: FELIPE x 4, +arthritis  Skin: +sacral wound  Neuro: No headache, seizures  Psych: Currently with no depression, anxiety    Past Medical History:  Past Medical History:   Diagnosis Date    Anticoagulated     takes Xarelto    Atrial fibrillation ([facility] [address]     follows with Dr. 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 Kirsten Lloyd    Pain in both lower legs 2020    Patellar fracture     Rheumatoid arthritis with rheumatoid factor ([facility] [address]     Rheumatoid arthritis(714.0)     Dr Hernandez Screen; on [facility])    Rib fractures 2014    Seasonal allergies     Skin cancer of lip     squamous cell    Wears dentures        Past Surgical History:  Past Surgical History:   Procedure Laterality Date    CARDIAC CATHETERIZATION      CARPAL TUNNEL RELEASE Bilateral      SECTION      COLONOSCOPY  2011    Dr Hector Caraballo; diverticular disease; repeat in 2016    COLONOSCOPY N/A 2020    COLONOSCOPY WITH BIOPSY performed by Georgina Landry MD at [address] [facility], TOTAL ABDOMINAL  early 2000's    TOTAL KNEE ARTHROPLASTY  6/2007    bilateral    UPPER GASTROINTESTINAL ENDOSCOPY N/A 4/29/2020    EGD BIOPSY [facility]ina [facility] at [facility] ENDOSCOPY       Family History:  Family History   Problem Relation Age of Onset    Heart Attack Mother 58    Other Father         suicide, depression, leg trouble    Other Sister         Dementia    Hypertension Sister     Hypertension Sister     Hypertension Brother     Thyroid Disease Sister     Rheum Arthritis Sister     Rheum Arthritis Brother     Other Daughter         hemochromatosis     Other Son         hemachromatosis        Social History:  Social History     Socioeconomic History    Marital status:      Spouse name: Not on file    Number of children: Not on file    Years of education: Not on file    Highest education level: Not on file   Occupational History    Occupation: retired- farm   Social Needs    Financial resource strain: Not on file    Food insecurity     Worry: Not on file     Inability: Not on file   [employer] needs     Medical: Not on file     Non-medical: Not on file   Tobacco Use    Smoking status: Never Smoker    Smokeless tobacco: Never Used   Substance and Sexual Activity    Alcohol use: No    Drug use: Never    Sexual activity: Not on file   Lifestyle    Physical activity     Days per week: Not on file     Minutes per session: Not on file    Stress: Not on file   Relationships    Social connections     Talks on phone: Not on file     Gets together: Not on file     Attends [employer] service: Not on file     Active member of club or organization: Not on file     Attends meetings of clubs or organizations: Not on file     Relationship status:      Intimate partner violence     Fear of current or ex partner: Not on file     Emotionally abused: Not on file     Physically abused: Not on file     Forced sexual activity: Not on file   Other Topics Concern    Not on file   Social History Narrative    Not on file       Allergies: Allergies   Allergen Reactions    Amoxicillin      GI ill effects       Current Medications:  Current Outpatient Medications   Medication Sig Dispense Refill    lidocaine viscous hcl (XYLOCAINE) 2 % SOLN solution Take 10 mLs by mouth every 3 hours as needed for Irritation or Pain 100 mL 2    amLODIPine (NORVASC) 5 MG tablet Take 1 tablet by mouth daily 30 tablet 0    furosemide (LASIX) 20 MG tablet Take 1 tablet by mouth daily 60 tablet 0    clotrimazole (MYCELEX) 10 MG yamilex Take 1 tablet by mouth 5 times daily for 10 days 50 tablet 0    collagenase 250 UNIT/GM ointment Apply topically daily. 1 Tube 0    Arformoterol Tartrate (BROVANA) 15 MCG/2ML NEBU Take 2 mLs by nebulization 2 times daily 120 mL 3    budesonide (PULMICORT) 0.5 MG/2ML nebulizer suspension Take 2 mLs by nebulization 2 times daily 60 ampule 3    Respiratory Therapy Supplies (FULL KIT NEBULIZER SET) MISC Use as directed with nebulized medication. 1 each 0    predniSONE (DELTASONE) 10 MG tablet Take 2 tabs daily x 5 days then 1 tabs daily x 5 days 15 tablet 0    metoprolol tartrate (LOPRESSOR) 25 MG tablet Take 50 mg by mouth 2 times daily       lidocaine (LIDODERM) 5 % Place 2 patches onto the skin daily 12 hours on, 12 hours off.  30 patch 1    flecainide (TAMBOCOR) 150 MG tablet Take 0.5 tablets by mouth 2 times daily 60 tablet 3    valsartan (DIOVAN) 320 MG tablet Take 1 tablet by mouth daily 30 tablet 3    potassium bicarbonate (EFFER-K) 25 MEQ disintegrating tablet Take 1 tablet by mouth daily (Patient taking differently: Take 25 mEq by mouth daily Not given daily d/t thrush) 30 tablet 0    XARELTO 15 MG TABS tablet TAKE 1 TABLET DAILY WITH   BREAKFAST 90 tablet 3    levalbuterol (XOPENEX) 0.31 MG/3ML nebulization Take 3 mLs by nebulization every 6 hours as needed for Wheezing 3 mL 3    loteprednol (LOTEMAX) 0.5 % ophthalmic suspension       albuterol sulfate HFA (VENTOLIN HFA) 108 (90 Base) MCG/ACT inhaler Inhale 2 puffs into the lungs 4 times daily as needed for Wheezing 1 Inhaler 1    DULoxetine HCl 40 MG CPEP Take 40 mg by mouth daily 90 capsule 1    folic acid (FOLVITE) 1 MG tablet Take 1 tablet by mouth daily 30 tablet 0    zinc sulfate (ZINCATE) 50 MG CAPS capsule Take 1 capsule by mouth daily 30 capsule 0    ascorbic acid (VITAMIN C) 500 MG tablet Take 1 tablet by mouth daily 30 tablet 0    thiamine 100 MG tablet Take 1 tablet by mouth daily 30 tablet 0    Vitamin D (CHOLECALCIFEROL) 50 MCG (2000 UT) TABS tablet Take 1 tablet by mouth daily 30 tablet 0    gabapentin (NEURONTIN) 100 MG capsule Take 1 capsule by mouth 2 times daily for 90 days.  180 capsule 0    celecoxib (CELEBREX) 200 MG capsule TAKE 1 CAPSULE DAILY 90 capsule 3    ferrous sulfate (IRON 325) 325 (65 Fe) MG tablet Take 1 tablet by mouth every other day 30 tablet 5    levothyroxine (LEVOTHROID) 100 MCG tablet Take 1 tablet by mouth Daily 90 tablet 1    docusate sodium (COLACE, DULCOLAX) 100 MG CAPS Take 100 mg by mouth daily 30 capsule 0    vitamin B-12 (CYANOCOBALAMIN) 1000 MCG tablet Take 1,000 mcg by mouth daily      omeprazole (PRILOSEC) 40 MG delayed release capsule Take 1 capsule by mouth 2 times daily 180 capsule 3    famotidine (PEPCID) 40 MG tablet Take 1 tablet by mouth nightly 90 tablet 3    ondansetron (ZOFRAN) 4 MG tablet Take 1 tablet by mouth every 8 hours as needed for Nausea or Vomiting 30 tablet 5    polyethyl glycol-propyl glycol 0.4-0.3 % (SYSTANE) 0.4-0.3 % ophthalmic solution 1 drop 2 times daily as needed for Dry Eyes       fluticasone (FLONASE) 50 MCG/ACT nasal spray 2 sprays by Nasal route daily 2 sprays in each nostril daily 1 Bottle 11    Probiotic Product (ACIDOPHILUS PROBIOTIC) CAPS capsule Take 1 capsule by mouth daily  fexofenadine (ALLEGRA) 180 MG tablet Take 180 mg by mouth daily.  leucovorin calcium (WELLCOVORIN) 5 MG tablet Take 25 mg by mouth once a week Takes every Tuesday      methotrexate 2.5 MG tablet Take by mouth once a week 2 tabs at lunch and 4 tabs  in the pm once a week on Monday       No current facility-administered medications for this visit.       Physical Exam:  /58, HR 64  Appearance: Awake, alert, no acute respiratory distress  Skin: +history of sacral wound  Head: Normocephalic, atraumatic  Eyes: EOMI, no conjunctival erythema  ENMT: MMM, no rhinorrhea  Neck: Supple, no apparent masses  Lungs: Respiratory effort normal, no visualized signs of difficulty breathing or respiratory distress  Extremities: Moves all extremities x 4, + lower extremity edema  Neurologic: No focal motor deficits apparent, normal mood    Laboratory Tests:  Lab Results   Component Value Date    CREATININE 1.0 04/14/2021    BUN 62 (H) 04/14/2021     04/14/2021    K 4.0 04/14/2021    CL 96 (L) 04/14/2021    CO2 35 (H) 04/14/2021     Lab Results   Component Value Date    MG 1.6 04/09/2021     Lab Results   Component Value Date    WBC 16.4 (H) 04/14/2021    HGB 9.8 (L) 04/14/2021    HCT 32.5 (L) 04/14/2021    MCV 99.7 04/14/2021     (H) 04/14/2021     Lab Results   Component Value Date    ALT 9 04/14/2021    AST 20 04/14/2021    ALKPHOS 88 04/14/2021    BILITOT 0.2 04/14/2021     Lab Results   Component Value Date    CKTOTAL 105 07/12/2020    CKMB 1.9 03/21/2016    TROPONINI <0.01 04/06/2021    TROPONINI <0.01 02/24/2021    TROPONINI <0.01 02/08/2021     Lab Results   Component Value Date    INR 3.0 04/06/2021    INR 1.1 03/01/2021    INR 1.4 02/28/2021    PROTIME 33.4 (H) 04/06/2021    PROTIME 12.2 03/01/2021    PROTIME 16.4 (H) 02/28/2021     Lab Results   Component Value Date    TSH 4.870 (H) 01/06/2021     Lab Results   Component Value Date    LABA1C 5.4 02/12/2021     No results found for: EAG  Lab Results   Component Value Date    CHOL 182 10/22/2019    CHOL 174 06/10/2019    CHOL 175 03/22/2016     Lab Results   Component Value Date    TRIG 140 10/22/2019    TRIG 129 06/10/2019    TRIG 82 03/22/2016     Lab Results   Component Value Date    HDL 44 10/22/2019    HDL 49 06/10/2019    HDL 53 03/22/2016     Lab Results   Component Value Date    LDLCALC 110 (H) 10/22/2019    LDLCALC 99 06/10/2019    LDLCALC 106 (H) 03/22/2016     Lab Results   Component Value Date    LABVLDL 28 10/22/2019    LABVLDL 26 06/10/2019    LABVLDL 16 03/22/2016     No results found for: CHOLHDLRATIO  No results for input(s): PROBNP in the last 72 hours. Cardiac Tests:  ECG (4/7/21): SR, rate 71, NSSTT changes, QRS 88 ms, QTc 475 ms     Lexiscan nuclear stress test: 2/8/19  No evidence of stress-induced left ventricular myocardial ischemia. Gated study shows normal left ventricular wall motion and   myocardial thickening during systole. Resting left ventricular   ejection fraction is calculated at 95%. Echocardiogram: 2/25/21 (Dr. Laura Ardon)   Left ventricular internal dimensions were normal in diastole and systole. Ejection fraction is visually estimated at 65%. No regional wall motion abnormalities seen. Normal left ventricular ejection fraction. The left atrium is borderline dilated. Moderate mitral annular calcification. Mild mitral regurgitation is present. The aortic valve appears mildly sclerotic. Mild aortic regurgitation is noted. ASSESSMENT / PLAN:  1. Osteoporosis. 2. Thyroid abnormality. 3. Hypertension -- controlled  4. Palpitations with negative ambulatory monitor, 2003. 5. Exercise MPS, 12/12/2003. 8 METS, EF 68%, normal perfusion. 6. Echo, 11/04/2004. Sclerotic mildly insufficient AV, mild MR, TR and PI. RVSP 29, normal systolic and diastolic function. 7. Carotid US, 11/09/2004. Minimal LICA plaque with <78% narrowing, normal SHAUNA, normal vertebral flow bilaterally.    8.  Family history positive. Mother had MI at age 79 and  at 80 of CHF. Sister requiring MVR for MR and prolapse. 9. Lifetime nonsmoker. 10. No known drug allergies. 11. Event monitor, 2006. Short run of nonsustained SVT at 150 bpm.    12. Echo, 2006. Normal EF, no LVH, normal diastolic function, E/E 12, mild MR, RVSP 43, mild dilatation of the right heart chambers. 13. Ambulatory monitor demonstrated nonsustained SVT at 150 bpm, correlating with symptoms, metoprolol dose doubled to 50 mg bid, 2007. 14. Bilateral knee replacements 2007, Dr. Nicolas Douglass. Well tolerated. 15. Dipyridamole stress MPS, 11/10/2008. No TID, normal perfusion, mild soft tissue attenuation, EF 89%. No ischemia. Low risk/low probability. 16. Echo, 11/10/2008. Stage I diastolic dysfunction, borderline/mild LAE. EF normal. Mild-moderate AR. Mild PHTN with RVSP 43.    17. Large hiatal hernia requiring Nissen fundoplication, [place], 2011.    18. Rheumatoid arthritis documented 2010. Patient being treated with methotrexate. 19. Echo, 2011. Normal LV size, wall thickening and EF. Stage I diastolic dysfunction. Normal LV filling pressures. Mild ANDREAS. MAC with mild MR, mild-moderate TR, moderately elevated RVSP 50-55 mmHg.    20. Lexiscan MPS, 2011. Normal. No TID. Wall motion normal, EF above 70%.    21. [facility] admission, 2011 with anorexia, nausea, weight loss, dyspnea. EKG sinus rhythm, borderline low voltage, minimal nonspecific ST-T abnormalities. , BMP normal. Hb 10, WBC and platelet count normal. T4 elevated at 13, free T4 elevated at 2.16, TSH low at 0.079.    22. Periop AF while at [facility] for Nissen fundoplication. Treated with amiodarone. 23. Sharp chest pain, 2011. CT chest negative for PE.    24. 24-hour Holter monitor, 2012. Sinus rhythm with occasional isolated PVC's, frequent PAC's and several short runs of SVT up to 31 beats in duration. No prolonged pauses and no symptoms.    25.  [facility] admission, 02/04/2013 with two to three weeks of worsening diarrhea. She had been on OP antibiotic therapy for a nasal infection. CBC, LFT's, BMP normal except Cr 1.4. CXR negative except for mild cardiomegaly. EKG revealed sinus rhythm with possible old inferior infarction. Cardiac enzymes negative.    26. Chronic back pain and leg pain which may be due to lumbar spinal stenosis. The patient receiving epidural injections which require temporarily stopping her anticoagulant therapies.    27. Echo, 09/08/2014. Normal LV size, wall thickness, regional wall motion and EF. Stage II diastolic dysfunction. E/E' 16. Moderate LAE, mild HEATHER. Mild MAC, mild MR and TR. RVSP 45-50 mmHg. 28. Lexiscan MPS, 03/31/2015. Normal perfusion. Normal EF. No TID. Low risk/low probability. 29. Hospitalization, 03/2016, with recurrent atrial fibrillation.    30. Hospitalization in [place], [place], mid April 2016, records pending. Patient reportedly had catheterization, which showed no significant coronary disease. 31. Flecainide started 05/23/2016. Prior EP evaluation. Maintaining SR. Chronic anticoagulation with xarelto. 32. 48 hour Holter monitor from 11/16-11/18/2017 showed sinus rhythm with sinus arrhythmia, short runs of paroxysmal atrial tachycardia, up to 7 beats in duration, but no AV block, atrial fibrillation or symptoms. 33. Obstructive sleep apnea.  Patient evaluated by Dr. Renate Herman and refuses CPAP therapy.    34. Echocardiogram, 05/02/2017, normal left ventricular size, wall thickness and systolic function with ejection fraction 35%, stage 1 diastolic relaxation abnormalities, mild left atrial enlargement, normal mitral valve structure and function, mild tricuspid insufficiency with mildly elevated right ventricular systolic pressure at 40 mmHg.  Aortic sclerosis without stenosis but with mild aortic insufficiency. 28. Chronic HFpEF with hospitalization for CHF exacerbation in 4/2021  36.  Negative stress test in 2/2019 (see results above)     - Lasix dose increased from 10 mg daily to 20 mg daily during 4/2021 hospitalization -- discussed today re: taking an additional dose PRN  - Monitor renal function and electrolytes  - Results of 2/2021 echocardiogram reviewed with the patient today  - Continue current medications otherwise (incuding BB, xarelto, and flecainide)  - Follow-up at wound clinic  - Case discussed today with the patient's daughter and the patient's home health nurse    Greater than 30 minutes was spent counseling the patient, reviewing the rationale for the above recommendations and reviewing the patient's current medication list, problem list and results of all previously ordered testing. Kathie Lopez MD on 4/22/2021 at 10:24 AM  [facility]) Cardiology    Pursuant to the emergency declaration under the [facility] waiver authority and the [email or web address] and [employer] Act, this Virtual Visit was conducted, with patient's consent, to reduce the patient's risk of exposure to COVID-19 and provide continuity of care for an established patient. This visit was performed as a virtual video visit using a synchronous, two-way, audio-video telehealth technology platform (the visit was completed virtually using [employer]).

## 2021-04-27 ENCOUNTER — VIRTUAL VISIT (OUTPATIENT)
Dept: FAMILY MEDICINE CLINIC | Age: 86
End: 2021-04-27
Payer: MEDICARE

## 2021-04-27 DIAGNOSIS — L89.152 PRESSURE INJURY OF SACRAL REGION, STAGE 2 (HCC): Primary | ICD-10-CM

## 2021-04-27 DIAGNOSIS — E46 PROTEIN-CALORIE MALNUTRITION, UNSPECIFIED SEVERITY (HCC): ICD-10-CM

## 2021-04-27 DIAGNOSIS — I50.33 ACUTE ON CHRONIC DIASTOLIC CONGESTIVE HEART FAILURE (HCC): ICD-10-CM

## 2021-04-27 DIAGNOSIS — I48.0 PAROXYSMAL ATRIAL FIBRILLATION (HCC): ICD-10-CM

## 2021-04-27 DIAGNOSIS — J96.01 ACUTE RESPIRATORY FAILURE WITH HYPOXIA (HCC): ICD-10-CM

## 2021-04-27 DIAGNOSIS — B37.0 THRUSH: ICD-10-CM

## 2021-04-27 PROCEDURE — 99213 OFFICE O/P EST LOW 20 MIN: CPT | Performed by: FAMILY MEDICINE

## 2021-04-27 NOTE — PROGRESS NOTES
2021    TELEHEALTH EVALUATION -- Audio/Visual (During KTPJK- public health emergency)    HPI:    Angeli You (:  1935) has requested an audio/video evaluation. Consent obtained per MA note, with attention to limitations inherent to a video visit for the following concern(s):    Follow-up of respiratory failure, paroxysmal A. fib, and sacral wound    Patient is accompanied today by family members and her home health care nurse Laurie    Overall, patient and caregivers think she is doing acceptably well. Respiratory failure seems stable. Remains on 2.5 L/min nasal cannula. Pulse ox staying above 95%. Nursing reports persisting crackles at bases but no respiratory distress. No tachypnea. No cough. She did have a video visit with cardiology, dose of Lasix was increased. Recommended checking electrolytes but they have not been able to draw her blood at home. Agreeable to having it done when they transport her for wound care visit next week    Sacral wound  Per home health nurse, wound has not changed in size or caliber. No signs of infection are reported. Family is reportedly doing a good job of helping her change position frequently. Patient denies fever sweats or chills. Pain is manageable  Family has been trying to increase her protein intake. Atrial fibrillation does not appear to have recurred. Denies chest pains or palpitations. Blood pressure is stable as reported by nursing today. Thrush symptoms have improved with viscous lidocaine        Review of Systems   Constitutional: Positive for appetite change (Slightly increased) and fatigue. Negative for chills and fever. HENT: Negative for trouble swallowing. Respiratory: Positive for shortness of breath (Without oxygen). Negative for cough, choking and wheezing. Cardiovascular: Negative for chest pain, palpitations and leg swelling. Gastrointestinal: Negative for abdominal pain.    Psychiatric/Behavioral: Negative for dysphoric mood. The patient is not nervous/anxious. Prior to Visit Medications    Medication Sig Taking? Authorizing Provider   lidocaine viscous hcl (XYLOCAINE) 2 % SOLN solution Take 10 mLs by mouth every 3 hours as needed for Irritation or Pain Yes Ginny Lopez MD   amLODIPine (NORVASC) 5 MG tablet Take 1 tablet by mouth daily Yes Taz Arias V, DO   furosemide (LASIX) 20 MG tablet Take 1 tablet by mouth daily Yes Taz Angles V, DO   Arformoterol Tartrate (BROVANA) 15 MCG/2ML NEBU Take 2 mLs by nebulization 2 times daily Yes FRAN Waterman CNP   budesonide (PULMICORT) 0.5 MG/2ML nebulizer suspension Take 2 mLs by nebulization 2 times daily Yes FRAN Waterman CNP   Respiratory Therapy Supplies (FULL KIT NEBULIZER SET) MISC Use as directed with nebulized medication. Yes FRAN Waterman CNP   predniSONE (DELTASONE) 10 MG tablet Take 2 tabs daily x 5 days then 1 tabs daily x 5 days Yes Taz Arias V, DO   metoprolol tartrate (LOPRESSOR) 25 MG tablet Take 50 mg by mouth 2 times daily  Yes Historical Provider, MD   lidocaine (LIDODERM) 5 % Place 2 patches onto the skin daily 12 hours on, 12 hours off.  Yes Ginny Lopez MD   flecainide (TAMBOCOR) 150 MG tablet Take 0.5 tablets by mouth 2 times daily Yes Jo Dumont MD   valsartan (DIOVAN) 320 MG tablet Take 1 tablet by mouth daily Yes Jo Dumont MD   potassium bicarbonate (EFFER-K) 25 MEQ disintegrating tablet Take 1 tablet by mouth daily  Patient taking differently: Take 25 mEq by mouth daily Not given daily d/t thrush Yes Jo Dumont MD   XARELTO 15 MG TABS tablet TAKE 1 TABLET DAILY WITH   BREAKFAST Yes Joshua Park MD   levalbuterol (XOPENEX) 0.31 MG/3ML nebulization Take 3 mLs by nebulization every 6 hours as needed for Wheezing Yes FRAN Quinteros   loteprednol (LOTEMAX) 0.5 % ophthalmic suspension  Yes Historical Provider, MD   albuterol sulfate HFA (VENTOLIN HFA) 108 (90 Base) MCG/ACT inhaler Inhale 2 puffs into the lungs 4 times daily as needed for Wheezing Yes Carlos Rader DO   DULoxetine HCl 40 MG CPEP Take 40 mg by mouth daily Yes Citlalli Zamorano DO   folic acid (FOLVITE) 1 MG tablet Take 1 tablet by mouth daily Yes Citlalli Zamorano DO   zinc sulfate (ZINCATE) 50 MG CAPS capsule Take 1 capsule by mouth daily Yes Citlalli Zamorano DO   ascorbic acid (VITAMIN C) 500 MG tablet Take 1 tablet by mouth daily Yes Citlalli Zamorano DO   thiamine 100 MG tablet Take 1 tablet by mouth daily Yes Citlalli Zamorano DO   Vitamin D (CHOLECALCIFEROL) 50 MCG (2000 UT) TABS tablet Take 1 tablet by mouth daily Yes Citlalli Zamorano DO   celecoxib (CELEBREX) 200 MG capsule TAKE 1 CAPSULE DAILY Yes Danna Fernandez MD   ferrous sulfate (IRON 325) 325 (65 Fe) MG tablet Take 1 tablet by mouth every other day Yes Danna Fernandez MD   levothyroxine (LEVOTHROID) 100 MCG tablet Take 1 tablet by mouth Daily Yes Danna Fernandez MD   docusate sodium (COLACE, DULCOLAX) 100 MG CAPS Take 100 mg by mouth daily Yes Renate Nicole MD   vitamin B-12 (CYANOCOBALAMIN) 1000 MCG tablet Take 1,000 mcg by mouth daily Yes Historical Provider, MD   omeprazole (PRILOSEC) 40 MG delayed release capsule Take 1 capsule by mouth 2 times daily Yes Danna Fernandez MD   famotidine (PEPCID) 40 MG tablet Take 1 tablet by mouth nightly Yes Danna Fernandez MD   ondansetron (ZOFRAN) 4 MG tablet Take 1 tablet by mouth every 8 hours as needed for Nausea or Vomiting Yes Danna Fernandez MD   polyethyl glycol-propyl glycol 0.4-0.3 % (SYSTANE) 0.4-0.3 % ophthalmic solution 1 drop 2 times daily as needed for Dry Eyes  Yes Historical Provider, MD   fluticasone (FLONASE) 50 MCG/ACT nasal spray 2 sprays by Nasal route daily 2 sprays in each nostril daily Yes Danna Fernandez MD   Probiotic Product (ACIDOPHILUS PROBIOTIC) CAPS capsule Take 1 capsule by mouth daily Yes Historical Provider, MD   fexofenadine (ALLEGRA) 180 MG tablet Take 180 mg by distress. Appearance: She is not toxic-appearing. Pulmonary:      Effort: No respiratory distress. Neurological:      Mental Status: She is alert. Mental status is at baseline. Psychiatric:         Mood and Affect: Mood normal.         Behavior: Behavior normal.           Other pertinent observable physical exam findings-     Due to this being a TeleHealth encounter, evaluation of the following organ systems is limited: Vitals/Constitutional/EENT/Resp/CV/GI//MS/Neuro/Skin/Heme-Lymph-Imm. ASSESSMENT/PLAN:  Getachew Harris was seen today for shortness of breath and wound check. Diagnoses and all orders for this visit:    Pressure injury of sacral region, stage 2 (HCC)    Acute on chronic diastolic congestive heart failure ([facility]    Acute respiratory failure with hypoxia (HCC)  -     COMPREHENSIVE METABOLIC PANEL; Future    Paroxysmal atrial fibrillation (HCC)    Protein-calorie malnutrition, unspecified severity ([facility] [address]  -     COMPREHENSIVE METABOLIC PANEL; Future    Thrush    Plan    No significant changes from plan from last week. See wound care next week as scheduled. I will check a CMP when she comes in for wound care. Continue to monitor wound for signs and symptoms of infection. Continue with current dressings  Continue oxygen support. We discussed gentle weaning down to 2 L/min  Continue to promote healthy eating habits including additional protein supplementation help with wound care  Continue diuretics. CMP ordered as above to assess stability of electrolytes as well as some nutritional markers  Opportunity for questions provided. Patient and family and caregiver verbalized comfort and understanding. No follow-ups on file. An  electronic signature was used to authenticate this note.     --Dorota Jaffe MD on 4/28/2021 at 9:46 AM        Pursuant to the emergency declaration under the [address], [address] [place] [address] and the [facility] Response Supplemental Appropriations Act, this Virtual  Visit was conducted, with patient's consent, to reduce the patient's risk of exposure to COVID-19 and provide continuity of care for an established patient. Services were provided through a video synchronous discussion virtually to substitute for in-person clinic visit.

## 2021-04-27 NOTE — PROGRESS NOTES
TeleMedicine Patient Consent    This visit was performed as a virtual video visit using a synchronous, two-way, audio-video telehealth technology platform. Patient identification was verified at the start of the visit, including the patient's telephone number and physical location. I discussed with the patient the nature of our telehealth visits, that:     1. Due to the nature of an audio- video modality, the only components of a physical exam that could be done are the elements supported by direct observation. 2. The provider will evaluate the patient and recommend diagnostics and treatments based on their assessment. 3. If it was felt that the patient should be evaluated in clinic or an emergency room setting, then they would be directed there. 4. Our sessions are not being recorded and that personal health information is protected. 5. Our team would provide follow up care in person if/when the patient needs it. Patient does agree to proceed with telemedicine consultation. Patient location: home address in [facility]    Physician location: regular office location    This visit was completed virtually using Doxy. me    This visit was performed during the 4757 public health crisis and COVID-19 pandemic.   *Add GT modifier to all Video Visits*

## 2021-04-28 ENCOUNTER — OFFICE VISIT (OUTPATIENT)
Dept: PALLATIVE CARE | Age: 86
End: 2021-04-28
Payer: MEDICARE

## 2021-04-28 VITALS
OXYGEN SATURATION: 100 % | HEART RATE: 65 BPM | RESPIRATION RATE: 12 BRPM | DIASTOLIC BLOOD PRESSURE: 76 MMHG | SYSTOLIC BLOOD PRESSURE: 132 MMHG

## 2021-04-28 DIAGNOSIS — I50.9 CONGESTIVE HEART FAILURE, UNSPECIFIED HF CHRONICITY, UNSPECIFIED HEART FAILURE TYPE (HCC): ICD-10-CM

## 2021-04-28 DIAGNOSIS — I50.9 CHRONIC CONGESTIVE HEART FAILURE, UNSPECIFIED HEART FAILURE TYPE (HCC): ICD-10-CM

## 2021-04-28 PROCEDURE — 99343 PR HOME VST NEW PATIENT MOD-HI SEVERITY 45 MINUTES: CPT | Performed by: NURSE PRACTITIONER

## 2021-04-28 RX ORDER — HYDROCODONE BITARTRATE AND ACETAMINOPHEN 5; 325 MG/1; MG/1
1 TABLET ORAL EVERY 6 HOURS PRN
Qty: 28 TABLET | Refills: 0 | Status: SHIPPED | OUTPATIENT
Start: 2021-04-28 | End: 2021-05-05

## 2021-04-28 ASSESSMENT — ENCOUNTER SYMPTOMS
WHEEZING: 0
SHORTNESS OF BREATH: 1
TROUBLE SWALLOWING: 0
ABDOMINAL PAIN: 0
CHOKING: 0
COUGH: 0

## 2021-04-29 ENCOUNTER — TELEPHONE (OUTPATIENT)
Dept: FAMILY MEDICINE CLINIC | Age: 86
End: 2021-04-29

## 2021-04-29 ENCOUNTER — CARE COORDINATION (OUTPATIENT)
Dept: CASE MANAGEMENT | Age: 86
End: 2021-04-29

## 2021-04-29 ENCOUNTER — TELEPHONE (OUTPATIENT)
Dept: PALLATIVE CARE | Age: 86
End: 2021-04-29

## 2021-04-29 DIAGNOSIS — R53.83 FATIGUE, UNSPECIFIED TYPE: Primary | ICD-10-CM

## 2021-04-29 NOTE — TELEPHONE ENCOUNTER
Community Based Palliative Care Initial Psychosocial Assessment      DEMOGRAPHICS  Name: Ren Fernandez  : 1935  Age: 80 y.o.   Gender: female  Race:   Marital Status:    Taoist: unknown  [place]: No   Branch: n/a   Overall Experience: n/a   [place] Benefits Exploration: n/a      GENERAL INFORMATION  Primary Palliative Diagnosis: COPD, CHF, skin cancer  Medical Insurance: Aet medicare   Prescription Coverage: n/a  Preferred Pharmacy:  [facility] - [place], [place] - [address]  [address] [facility]  Phone: [phone] Fax: [phone]     Guillaume Lerma, [address] [facility] [phone] - f [phone]  [address] [place] [address]  Phone: [phone] Fax: [address] [facility], [address] [place] [address] [phone] f [phone]  [facility] [place] [address]  Phone: [phone] Fax: [phone]    PCP: Sharad Burns MD  Employment: n/a  Advanced Directives: Health Care Power of   Language: English  Communication Barriers: none     PSYCHOSOCIAL  Highest Level of Education: unknown  Learning barriers: n/a  Ethnic/Cultural Considerations: n/a  Interests/Hobbies: n/a  Community Affiliations: family support  Physical Needs Met: yes  Emotional Needs Met: yes  Spiritual Needs Met: yes  Indicators of Abuse/Neglect/Exploitation: none  Substance Abuse: none   Tobacco: no   Alcohol: no   Illicit Drugs: no  Mental Status Exam:   General Observation:     Appearance: __X_Well groomed ___Unkempt ___Disheveled    Build: ___Average _X__Thin ___Overweight    Demeanor: _X__Average ___Hostile ___Mistrustful  ___Withdrawn                         ___Preoccupied___Demanding    Eye contact: _X__Average ___Avoidant ___Intense    Activity: ___Average ___Agitated _X__Slow    Speech: __X_Clear ___Slurred ___Rapid ___Pressured     Thought content:     _X__No disturbance reported or observed    ___Obsessional ___Phobic ___Guilty ___Autistic     ___ Ideas of Reference ___Preoccupied ____Guarded ___other:     Delusions:_X__No disturbance reported or observed      ___ Grandiose ___Persecutory ___Somatic ___Bizarre                 ___Nihilistic ___Religious     Perception: Hallucinations other    __X_No disturbance reported or observed    ___Auditory ___Visual ___Olfactory ___Gustatory ___Tactile     ___Illusions___Depersonalization ___ Derealization     Thought Process:    _X__No disturbance reported or observed    __X_Logical ___Circumstantial ___Tangential ___Loose ___Racing   ___Incoherent ___Concrete ___Blocked ___Flight of Ideas     Mood:    __X_Euthymic ___Depressed ___Anxious ___Angry ___Euphoric    ___Irritable     Affect:    _X__ Full ___Constricted ___Flat ___Inapproriate ___Labile     Behavior:     _X__Cooperative ___Resistant ___Agitated ___Impulsive    ___Over sedated___ Anhedonia ___Akasthisia ___Dystonia    ___Assaultive ___Aggressive___Restless ___Loss of interests    ___Withdrawn ___Tardive dyshinesia     Cognitive:     _X__No disturbance reported or observed    Impairment of: ___Orientation ___Memory     ___Attention/Concentration ___Ability to Abstract    Estimate of Intelligence: ___Signficantly below Average            __X_Average or Above   Insight/Judgement:     _X__Good ___Fair ___Poor     DSM-5 Diagnosis:        Mental Health History: none  Suicidal/Homicidal Ideation: none reported or observed     ENVIRONMENT  Living Situation:  at home  Safety: home with a reliable caregiver  Evacuation: ___Independent___Needs assistance  Safety Awareness: ___Good___Fair___Poor  ADL needs: eating, dressing, grooming, bathing, toileting, ambulating, housekeeping, meal preparation, finances, shopping, transportation, telephoning, reading/writing and managing medications  DME needs: other hospital bed     FINANCES  Income Source: retired  Additional Resources: none

## 2021-04-29 NOTE — TELEPHONE ENCOUNTER
Timothy Russ from [address] [place] [address] was out to see patient. She finished her steroid last Friday. She said a couple days after she starts to decline. Arminda Mcclelland is asking if she could have a low dose daily steroid. She did not know if that would go through PCP or palliative care. Please advise.

## 2021-04-29 NOTE — CARE COORDINATION
appointment(s): na    final call based on severity of symptoms and risk factors. Plan for next call: final call  CTN provided contact information for future needs. Care Transitions Subsequent and Final Call    Subsequent and Final Calls  Do you have any ongoing symptoms?: No  Have your medications changed?: No  Do you have any questions related to your medications?: No  Do you currently have any active services?: Yes  Are you currently active with any services?: Home Health, Other  Do you have any needs or concerns that I can assist you with?: No  Identified Barriers: Lack of Education  Care Transitions Interventions  Other Interventions:            Follow Up  Future Appointments   Date Time Provider Santana Arnett   5/5/2021  1:00 PM Matt Hernandez MD [facility]   5/20/2021  9:30 AM [facility]   6/4/2021  2:15 PM Emerson Cordero MD [address]

## 2021-04-30 ENCOUNTER — TELEPHONE (OUTPATIENT)
Dept: FAMILY MEDICINE CLINIC | Age: 86
End: 2021-04-30

## 2021-04-30 NOTE — TELEPHONE ENCOUNTER
Anupam Watts with [facility] report patient's daughter Miguel Ángel Sabillon is in town and had patient up at bedside. She is really pale, hypotensive and has blood in stool. Miguel Ángel Sabillon is asking if she should hold her xarelto. They have also put a call in to palliative care. Please advise.

## 2021-05-03 NOTE — PROGRESS NOTES
Palliative Care Department  Palliative Care Initial Consult  Provider: Diana JOSEPH    Location of Service: The patient's home    Reason for Consult:  []  Code status Discussion  []  Assist with goals of care  []  Psychosocial support  [x]  Symptom Management  []  Advanced Care Planning    Chief Complaint: Kasie Quintanilla is a 80 y.o. female with chief complaint of pain, SOB, fatigue/weakness    Assessment/Plan      Pain:   -  Norco 5-325 mg Q 6 PRN    Dyspnea with exertion:              -  Not persistent              -  Recovers with rest              -  If worsens could consider low dose oral morphine    Fatigue:   -  Increase activity as able    Palliative Care Encounter:      - Goals of care: live longer, improve or maintain function/quality of life and continue current management    Prognosis: unknown    Referrals to: none today    Follow Up:  4 weeks. They were encouraged to call with any questions, concerns, needs, or changes in symptoms. Subjective:     HPI:  Kasie Quintanilla is a 80 y.o. female with significant past medical history of HFpEF, A-fib, HTN, RA, and previous COVID infection requiring hospitalization from 2/7-2/16, several other times following COVID for shortness of breath/PNA, A-Fib, and HF. She who was referred to [address] [place] [address] for ongoing support. Subjective/Events/Discussions:  4/28/2021:  Mrs. Jona Tesfaye was seen in her home today. She reports that she is doing better since getting home. She has 24 hour in home caregivers and her family is very involved in her care. She she continues to have some pain and uses Norco very sparingly. She continues to be very weak and continues to work with CBIT A/S to increase strength. She has SOB, but this is better than previous. She is going to follow up with wound care for her decubitus ulcer.       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 Evelia Rooney    Pain in both lower legs 2020    Patellar fracture     Rheumatoid arthritis with rheumatoid factor ([facility]     Rheumatoid arthritis(714.0)     Dr Jace Escamilla; on DMD Jonel Kehr)    Rib fractures 2014    Seasonal allergies     Skin cancer of lip     squamous cell    Wears dentures        Past Surgical History:   Procedure Laterality Date    CARDIAC CATHETERIZATION      CARPAL TUNNEL RELEASE Bilateral      SECTION      COLONOSCOPY  2011    Dr Leonid Castano; diverticular disease; repeat in     COLONOSCOPY N/A 2020    COLONOSCOPY WITH BIOPSY performed by David Higuera MD at [facility], TOTAL ABDOMINAL  early 's    TOTAL KNEE ARTHROPLASTY  2007    bilateral    UPPER GASTROINTESTINAL ENDOSCOPY N/A 2020    EGD BIOPSY performed by David Higuera MD at [facility] ENDOSCOPY       Family History   Problem Relation Age of Onset    Heart Attack Mother 58    Other Father         suicide, depression, leg trouble    Other Sister         Dementia    Hypertension Sister     Hypertension Sister     Hypertension Brother     Thyroid Disease Sister     Rheum Arthritis Sister     Rheum Arthritis Brother     Other Daughter         hemochromatosis     Other Son         hemachromatosis        ROS: UNLESS STATED ABOVE PATIENT DENIES:  CONSTITUTIONAL:  fever, chill, rigors, nausea, vomiting, fatigue. HEENT: blurry vision, double vision, hearing problem, tinnitus, hoarseness, dysphagia, odynophagia  RESPIRATORY: cough, shortness of breath, sputum expectoration. CARDIOVASCULAR:  Chest pain/pressure, palpitation, syncope, irregular beats  GASTROINTESTINAL:  abdominal or rectal pain, diarrhea, constipation, . GENITOURINARY:  Burning, frequency, urgency, incontinence, discharge  INTEGUMENTARY: rash, wound, pruritis  HEMATOLOGIC/LYMPHATIC:  Swelling, sores, gum bleeding, easy bruising, pica. MUSCULOSKELETAL:  pain, edema, joint swelling or redness  NEUROLOGICAL:  light headed, dizziness, loss of consciousness, weakness, change in memory, seizures, tremors    Objective:     Physical Exam  /76   Pulse 65   Resp 12   SpO2 100%     Gen:  Alert, elderly, frail, in no acute distress  HEENT:  Normocephalic, conjunctiva pink, no drainage, mucosa moist  Neck:  Supple  Lungs:  CTA bilaterally, no audible rhonchi or wheezes noted  Heart: RRR, no murmur, rub, or gallop noted during exam  Abd:  Soft, non tender, non distended, BS+  M/S/Ext:  Moving all extremities, weak, no edema, pulses present  Skin:  Warm and dry  Neuro:  PERRL, Alert, oriented x 3; following commands    Current Medications:  Medications reviewed: yes    Controlled Substances Monitoring: [place]ARRS reviewed. Leslie Sutton APRN-CNP  Palliative Medicine    Time/Communication  Greater than 50% of time spent, total 45 minutes in face-to-face counseling and coordination of care regarding goals of care, symptom management, diagnosis and prognosis and see above. Discussed the plan of care with the other IDT members of the Palliative Care Team, and with patient and family.     Thank you for allowing Palliative Medicine to participate in the frank of Beatrice Alexis. Note: This report was completed using computerJamdat Mobile voiced recognition software. Every effort has been made to ensure accuracy; however, inadvertent computerized transcription errors may be present.

## 2021-05-04 DIAGNOSIS — R20.2 PARESTHESIA OF LOWER EXTREMITY: ICD-10-CM

## 2021-05-04 DIAGNOSIS — R26.9 GAIT DIFFICULTY: ICD-10-CM

## 2021-05-04 DIAGNOSIS — E03.9 HYPOTHYROIDISM, UNSPECIFIED TYPE: Primary | ICD-10-CM

## 2021-05-04 RX ORDER — GABAPENTIN 100 MG/1
100 CAPSULE ORAL 2 TIMES DAILY
Qty: 180 CAPSULE | Refills: 0 | Status: SHIPPED | OUTPATIENT
Start: 2021-05-04 | End: 2021-08-02

## 2021-05-04 RX ORDER — LEVOTHYROXINE SODIUM 0.1 MG/1
100 TABLET ORAL DAILY
Qty: 90 TABLET | Refills: 1 | Status: SHIPPED | OUTPATIENT
Start: 2021-05-04

## 2021-05-05 ENCOUNTER — HOSPITAL ENCOUNTER (OUTPATIENT)
Dept: WOUND CARE | Age: 86
Discharge: HOME | End: 2021-05-05
Payer: MEDICARE

## 2021-05-05 VITALS
WEIGHT: 120 LBS | HEART RATE: 66 BPM | RESPIRATION RATE: 18 BRPM | BODY MASS INDEX: 20.49 KG/M2 | SYSTOLIC BLOOD PRESSURE: 98 MMHG | HEIGHT: 64 IN | TEMPERATURE: 97.3 F | DIASTOLIC BLOOD PRESSURE: 60 MMHG

## 2021-05-05 DIAGNOSIS — L89.154 PRESSURE INJURY OF COCCYGEAL REGION, STAGE 4 (HCC): Primary | ICD-10-CM

## 2021-05-05 DIAGNOSIS — Z79.899 MEDICATION MANAGEMENT: ICD-10-CM

## 2021-05-05 DIAGNOSIS — K44.9 HIATAL HERNIA: ICD-10-CM

## 2021-05-05 PROCEDURE — 99213 OFFICE O/P EST LOW 20 MIN: CPT

## 2021-05-05 PROCEDURE — 11043 DBRDMT MUSC&/FSCA 1ST 20/<: CPT | Performed by: SURGERY

## 2021-05-05 PROCEDURE — 11043 DBRDMT MUSC&/FSCA 1ST 20/<: CPT

## 2021-05-05 PROCEDURE — 99203 OFFICE O/P NEW LOW 30 MIN: CPT | Performed by: SURGERY

## 2021-05-05 RX ORDER — LIDOCAINE HYDROCHLORIDE 20 MG/ML
JELLY TOPICAL ONCE
Status: CANCELLED | OUTPATIENT
Start: 2021-05-05 | End: 2021-05-05

## 2021-05-05 RX ORDER — CLOBETASOL PROPIONATE 0.5 MG/G
OINTMENT TOPICAL ONCE
Status: CANCELLED | OUTPATIENT
Start: 2021-05-05 | End: 2021-05-05

## 2021-05-05 RX ORDER — GINSENG 100 MG
CAPSULE ORAL ONCE
Status: CANCELLED | OUTPATIENT
Start: 2021-05-05 | End: 2021-05-05

## 2021-05-05 RX ORDER — LIDOCAINE HYDROCHLORIDE 40 MG/ML
SOLUTION TOPICAL ONCE
Status: CANCELLED | OUTPATIENT
Start: 2021-05-05 | End: 2021-05-05

## 2021-05-05 RX ORDER — BETAMETHASONE DIPROPIONATE 0.05 %
OINTMENT (GRAM) TOPICAL ONCE
Status: CANCELLED | OUTPATIENT
Start: 2021-05-05 | End: 2021-05-05

## 2021-05-05 RX ORDER — LIDOCAINE 50 MG/G
OINTMENT TOPICAL ONCE
Status: CANCELLED | OUTPATIENT
Start: 2021-05-05 | End: 2021-05-05

## 2021-05-05 RX ORDER — LIDOCAINE 40 MG/G
CREAM TOPICAL ONCE
Status: CANCELLED | OUTPATIENT
Start: 2021-05-05 | End: 2021-05-05

## 2021-05-05 RX ORDER — OMEPRAZOLE 40 MG/1
CAPSULE, DELAYED RELEASE ORAL
Qty: 180 CAPSULE | Refills: 3 | Status: SHIPPED
Start: 2021-05-05 | End: 2021-05-19

## 2021-05-05 RX ORDER — BACITRACIN, NEOMYCIN, POLYMYXIN B 400; 3.5; 5 [USP'U]/G; MG/G; [USP'U]/G
OINTMENT TOPICAL ONCE
Status: CANCELLED | OUTPATIENT
Start: 2021-05-05 | End: 2021-05-05

## 2021-05-05 RX ORDER — GENTAMICIN SULFATE 1 MG/G
OINTMENT TOPICAL ONCE
Status: CANCELLED | OUTPATIENT
Start: 2021-05-05 | End: 2021-05-05

## 2021-05-05 RX ORDER — BACITRACIN ZINC AND POLYMYXIN B SULFATE 500; 1000 [USP'U]/G; [USP'U]/G
OINTMENT TOPICAL ONCE
Status: CANCELLED | OUTPATIENT
Start: 2021-05-05 | End: 2021-05-05

## 2021-05-05 ASSESSMENT — PAIN DESCRIPTION - ONSET: ONSET: ON-GOING

## 2021-05-05 ASSESSMENT — PAIN DESCRIPTION - FREQUENCY: FREQUENCY: INTERMITTENT

## 2021-05-05 ASSESSMENT — PAIN DESCRIPTION - PROGRESSION: CLINICAL_PROGRESSION: NOT CHANGED

## 2021-05-05 ASSESSMENT — PAIN DESCRIPTION - DESCRIPTORS: DESCRIPTORS: SHARP

## 2021-05-05 ASSESSMENT — PAIN DESCRIPTION - PAIN TYPE: TYPE: CHRONIC PAIN;ACUTE PAIN

## 2021-05-05 ASSESSMENT — PAIN SCALES - GENERAL: PAINLEVEL_OUTOF10: 5

## 2021-05-05 NOTE — PROGRESS NOTES
using cpap or bipap    Osteoporosis     PAF (paroxysmal atrial fibrillation) (HCC)     anticoagulation per cardiology  / follows with Dr. Tari Lutz    Pain in both lower legs 2020    Patellar fracture     Rheumatoid arthritis with rheumatoid factor ([address]     Rheumatoid arthritis(714.0)     Dr Dana Conrad; on DMD Alois Aleida)    Rib fractures 2014    Seasonal allergies     Skin cancer of lip     squamous cell    Wears dentures      Past Surgical History:   Procedure Laterality Date    CARDIAC CATHETERIZATION      CARPAL TUNNEL RELEASE Bilateral      SECTION      COLONOSCOPY  2011    Dr Teri Flores; diverticular disease; repeat in 2016    COLONOSCOPY N/A 2020    COLONOSCOPY WITH BIOPSY performed by Jose Biggs MD at [address] [place] [address]  early     TOTAL KNEE ARTHROPLASTY  2007    bilateral    UPPER GASTROINTESTINAL ENDOSCOPY N/A 2020    EGD BIOPSY performed by Jose Biggs MD at [facility] ENDOSCOPY     Current Medications:     Current Outpatient Medications:     gabapentin (NEURONTIN) 100 MG capsule, Take 1 capsule by mouth 2 times daily for 90 days. , Disp: 180 capsule, Rfl: 0    levothyroxine (SYNTHROID) 100 MCG tablet, Take 1 tablet by mouth Daily, Disp: 90 tablet, Rfl: 1    KLOR-CON/EF 25 MEQ disintegrating tablet, DISSOLVE ONE PACKET IN 4OZ WATER OR JUICE AND DRINK ONCE A DAY, Disp: 30 tablet, Rfl: 3    HYDROcodone-acetaminophen (NORCO) 5-325 MG per tablet, Take 1 tablet by mouth every 6 hours as needed for Pain for up to 7 days. , Disp: 28 tablet, Rfl: 0    amLODIPine (NORVASC) 5 MG tablet, Take 1 tablet by mouth daily, Disp: 30 tablet, Rfl: 0    furosemide (LASIX) 20 MG tablet, Take 1 tablet by mouth daily, Disp: 60 tablet, Rfl: 0    Arformoterol Tartrate (BROVANA) 15 MCG/2ML NEBU, Take 2 mLs by nebulization 2 times daily, Disp: 120 mL, Rfl: 3    budesonide (PULMICORT) 0.5 MG/2ML nebulizer suspension, Take 2 mLs by nebulization 2 times daily, Disp: 60 ampule, Rfl: 3    metoprolol tartrate (LOPRESSOR) 25 MG tablet, Take 50 mg by mouth 2 times daily , Disp: , Rfl:     flecainide (TAMBOCOR) 150 MG tablet, Take 0.5 tablets by mouth 2 times daily, Disp: 60 tablet, Rfl: 3    valsartan (DIOVAN) 320 MG tablet, Take 1 tablet by mouth daily, Disp: 30 tablet, Rfl: 3    XARELTO 15 MG TABS tablet, TAKE 1 TABLET DAILY WITH   BREAKFAST, Disp: 90 tablet, Rfl: 3    loteprednol (LOTEMAX) 0.5 % ophthalmic suspension, , Disp: , Rfl:     DULoxetine HCl 40 MG CPEP, Take 40 mg by mouth daily, Disp: 90 capsule, Rfl: 1    folic acid (FOLVITE) 1 MG tablet, Take 1 tablet by mouth daily, Disp: 30 tablet, Rfl: 0    zinc sulfate (ZINCATE) 50 MG CAPS capsule, Take 1 capsule by mouth daily, Disp: 30 capsule, Rfl: 0    ascorbic acid (VITAMIN C) 500 MG tablet, Take 1 tablet by mouth daily, Disp: 30 tablet, Rfl: 0    thiamine 100 MG tablet, Take 1 tablet by mouth daily, Disp: 30 tablet, Rfl: 0    Vitamin D (CHOLECALCIFEROL) 50 MCG (2000 UT) TABS tablet, Take 1 tablet by mouth daily, Disp: 30 tablet, Rfl: 0    celecoxib (CELEBREX) 200 MG capsule, TAKE 1 CAPSULE DAILY, Disp: 90 capsule, Rfl: 3    ferrous sulfate (IRON 325) 325 (65 Fe) MG tablet, Take 1 tablet by mouth every other day, Disp: 30 tablet, Rfl: 5    docusate sodium (COLACE, DULCOLAX) 100 MG CAPS, Take 100 mg by mouth daily, Disp: 30 capsule, Rfl: 0    vitamin B-12 (CYANOCOBALAMIN) 1000 MCG tablet, Take 1,000 mcg by mouth daily, Disp: , Rfl:     polyethyl glycol-propyl glycol 0.4-0.3 % (SYSTANE) 0.4-0.3 % ophthalmic solution, 1 drop 2 times daily as needed for Dry Eyes , Disp: , Rfl:     fluticasone (FLONASE) 50 MCG/ACT nasal spray, 2 sprays by Nasal route daily 2 sprays in each nostril daily, Disp: 1 Bottle, Rfl: 11    Probiotic Product (ACIDOPHILUS PROBIOTIC) CAPS capsule, Take 1 capsule by mouth daily, Disp: , Rfl:     fexofenadine (ALLEGRA) 180 MG tablet, Take 180 mg by mouth daily. , Disp: , Rfl:     leucovorin calcium (WELLCOVORIN) 5 MG tablet, Take 25 mg by mouth once a week Takes every Tuesday, Disp: , Rfl:     methotrexate 2.5 MG tablet, Take by mouth once a week 2 tabs at lunch and 4 tabs  in the pm once a week on Monday, Disp: , Rfl:     omeprazole (PRILOSEC) 40 MG delayed release capsule, TAKE 1 CAPSULE TWICE DAILY, Disp: 180 capsule, Rfl: 3    lidocaine viscous hcl (XYLOCAINE) 2 % SOLN solution, Take 10 mLs by mouth every 3 hours as needed for Irritation or Pain, Disp: 100 mL, Rfl: 2    Respiratory Therapy Supplies (FULL KIT NEBULIZER SET) MISC, Use as directed with nebulized medication. , Disp: 1 each, Rfl: 0    levalbuterol (XOPENEX) 0.31 MG/3ML nebulization, Take 3 mLs by nebulization every 6 hours as needed for Wheezing, Disp: 3 mL, Rfl: 3    albuterol sulfate HFA (VENTOLIN HFA) 108 (90 Base) MCG/ACT inhaler, Inhale 2 puffs into the lungs 4 times daily as needed for Wheezing, Disp: 1 Inhaler, Rfl: 1    famotidine (PEPCID) 40 MG tablet, Take 1 tablet by mouth nightly, Disp: 90 tablet, Rfl: 3    ondansetron (ZOFRAN) 4 MG tablet, Take 1 tablet by mouth every 8 hours as needed for Nausea or Vomiting, Disp: 30 tablet, Rfl: 5  Allergies:  Amoxicillin  Social History     Socioeconomic History    Marital status:       Spouse name: Not on file    Number of children: Not on file    Years of education: Not on file    Highest education level: Not on file   Occupational History    Occupation: retired- farm   Social Needs    Financial resource strain: Not on file    Food insecurity     Worry: Not on file     Inability: Not on file   [employer] needs     Medical: Not on file     Non-medical: Not on file   Tobacco Use    Smoking status: Never Smoker    Smokeless tobacco: Never Used   Substance and Sexual Activity    Alcohol use: No    Drug use: Never    Sexual activity: Not on file   Lifestyle    Physical activity Days per week: Not on file     Minutes per session: Not on file    Stress: Not on file   Relationships    Social connections     Talks on phone: Not on file     Gets together: Not on file     Attends Orthodox service: Not on file     Active member of club or organization: Not on file     Attends meetings of clubs or organizations: Not on file     Relationship status:     Intimate partner violence     Fear of current or ex partner: Not on file     Emotionally abused: Not on file     Physically abused: Not on file     Forced sexual activity: Not on file   Other Topics Concern    Not on file   Social History Narrative    Not on file     Family History   Problem Relation Age of Onset    Heart Attack Mother 58    Other Father         suicide, depression, leg trouble    Other Sister         Dementia    Hypertension Sister     Hypertension Sister     Hypertension Brother     Thyroid Disease Sister     Rheum Arthritis Sister     Rheum Arthritis Brother     Other Daughter         hemochromatosis     Other Son         hemachromatosis        Objective:    BP 98/60   Pulse 66   Temp 97.3 °F (36.3 °C) (Temporal)   Resp 18   Ht 5' 4\" (1.626 m)   Wt 120 lb (54.4 kg)   BMI 20.60 kg/m²   Wound Evaluation  - Undermining is  present  - Fibrinous exudate - Small amt  - Hyperkeratotic tissue - Small amt  - Granulation tissue - Small amt  - Errythema - Minimal amt            Measurements shown are from today's visit.     BP 98/60   Pulse 66   Temp 97.3 °F (36.3 °C) (Temporal)   Resp 18   Ht 5' 4\" (1.626 m)   Wt 120 lb (54.4 kg)   BMI 20.60 kg/m²   CONSTITUTIONAL:   Awake, alert, cooperative  PSYCHIATRIC :  Oriented to time, place and person     Appropriate insight to disease process  EYES: Lids and lashes normal  ENT:  External ears and nose without lesions   Hearing deficits no  NECK: Supple, symmetrical, trachea midline   Thyroid goiter not appreciated   Carotid bruit no  LUNGS:  No increased work of breathing                 Clear to auscultation  CARDIOVASCULAR:  regular rate and rhythm   ABDOMEN:  soft, non-distended, non-tender   Hernias no   Aorta is not palpable  Lymphatics : Cervical lymphadenopathy no     Femoral lymphadenopathy no  SKIN:   Normal skin color   Texture and turgor normal, no induration  EXTREMITIES:   R UE 5/5 strength   No cyanosis noted in nail beds  L UE 5/5 strength   No cyanosis noted in nail beds  R LE Edema no  L LE Edema no  R femoral ++ L femoral ++   R dorsalis pedis ++ L dorsalis pedis ++   R posterior tibial ++ L posterior tibial ++   LABS:    Lab Results   Component Value Date    WBC 16.4 (H) 04/14/2021    HGB 9.8 (L) 04/14/2021    HCT 32.5 (L) 04/14/2021     (H) 04/14/2021    PROTIME 33.4 (H) 04/06/2021    INR 3.0 04/06/2021    K 4.0 04/14/2021    BUN 62 (H) 04/14/2021    CREATININE 1.0 04/14/2021       RADIOLOGY:    Assessment:   Please refer to nursing measurements and assessment regarding wound size pre and post debridement. Wound check. Care provided includes removal of existing dressing and visual inspection    Procedure:  Debridement: Excisional Debridement  Using curette the wound was sharply debrided    down through and including the removal of muscle/fascia. The wound(s) was debrided sharply of fibrotic, necrotic, and hyperkeratotic tissue, including a layer of surrounding healthy tissue. Devitalized Tissue Debrided:  biofilm, slough and necrotic/eschare. Percent of Wound Debrided: 100%  Total Surface Area Debrided:   < 20 sq cm  Bleeding: Minimal  Hemostasis:   not needed  Response to treatment:  Well tolerated by patient. Plan:   Plan for wound - Dress per physician order  Treatment:     Compression :    Dressing : silver alginate   Additional :      1. Labs ordered No  2. Cultures done Yes  3. Vascular Studies ordered No  4. Pt is not a smoker   -   5. Discussed appropriate home care of this wound. , Dispensed dressing supplies and instructions on their use., Wound redressed. 6. Patient instructions were given. 7. Follow up: 1 week.     Mariana Curling MD

## 2021-05-05 NOTE — PLAN OF CARE
Problem: Pain:  Goal: Pain level will decrease  Description: Pain level will decrease  Outcome: Ongoing  Goal: Control of chronic pain  Description: Control of chronic pain  Outcome: Ongoing     Problem: Pressure Ulcer:  Goal: Signs of wound healing will improve  Description: Signs of wound healing will improve  Outcome: Ongoing  Goal: Absence of new pressure ulcer  Description: Absence of new pressure ulcer  Outcome: Ongoing  Goal: Will show no infection signs and symptoms  Description: Will show no infection signs and symptoms  Outcome: Ongoing

## 2021-05-06 LAB
ALBUMIN SERPL-MCNC: NORMAL G/DL
ALP BLD-CCNC: NORMAL U/L
ALT SERPL-CCNC: NORMAL U/L
ANION GAP SERPL CALCULATED.3IONS-SCNC: NORMAL MMOL/L
AST SERPL-CCNC: NORMAL U/L
BILIRUB SERPL-MCNC: NORMAL MG/DL
BUN BLDV-MCNC: NORMAL MG/DL
CALCIUM SERPL-MCNC: NORMAL MG/DL
CHLORIDE BLD-SCNC: NORMAL MMOL/L
CO2: NORMAL
CORTISOL: NORMAL
CREAT SERPL-MCNC: NORMAL MG/DL
GFR CALCULATED: NORMAL
GLUCOSE BLD-MCNC: NORMAL MG/DL
POTASSIUM SERPL-SCNC: NORMAL MMOL/L
SODIUM BLD-SCNC: NORMAL MMOL/L
TOTAL PROTEIN: NORMAL

## 2021-05-12 ENCOUNTER — TELEPHONE (OUTPATIENT)
Dept: PALLATIVE CARE | Age: 86
End: 2021-05-12

## 2021-05-12 ENCOUNTER — HOSPITAL ENCOUNTER (OUTPATIENT)
Dept: WOUND CARE | Age: 86
Discharge: HOME | End: 2021-05-12
Payer: MEDICARE

## 2021-05-12 VITALS
RESPIRATION RATE: 19 BRPM | TEMPERATURE: 97.4 F | DIASTOLIC BLOOD PRESSURE: 64 MMHG | SYSTOLIC BLOOD PRESSURE: 122 MMHG | HEART RATE: 76 BPM

## 2021-05-12 DIAGNOSIS — L89.154 PRESSURE INJURY OF COCCYGEAL REGION, STAGE 4 (HCC): Primary | ICD-10-CM

## 2021-05-12 PROCEDURE — 6370000000 HC RX 637 (ALT 250 FOR IP): Performed by: SURGERY

## 2021-05-12 PROCEDURE — 11042 DBRDMT SUBQ TIS 1ST 20SQCM/<: CPT | Performed by: SURGERY

## 2021-05-12 PROCEDURE — 11042 DBRDMT SUBQ TIS 1ST 20SQCM/<: CPT

## 2021-05-12 RX ORDER — CLOBETASOL PROPIONATE 0.5 MG/G
OINTMENT TOPICAL ONCE
Status: CANCELLED | OUTPATIENT
Start: 2021-05-12 | End: 2021-05-12

## 2021-05-12 RX ORDER — LIDOCAINE HYDROCHLORIDE 20 MG/ML
JELLY TOPICAL ONCE
Status: CANCELLED | OUTPATIENT
Start: 2021-05-12 | End: 2021-05-12

## 2021-05-12 RX ORDER — LIDOCAINE 50 MG/G
OINTMENT TOPICAL ONCE
Status: CANCELLED | OUTPATIENT
Start: 2021-05-12 | End: 2021-05-12

## 2021-05-12 RX ORDER — BACITRACIN ZINC AND POLYMYXIN B SULFATE 500; 1000 [USP'U]/G; [USP'U]/G
OINTMENT TOPICAL ONCE
Status: CANCELLED | OUTPATIENT
Start: 2021-05-12 | End: 2021-05-12

## 2021-05-12 RX ORDER — LIDOCAINE HYDROCHLORIDE 40 MG/ML
SOLUTION TOPICAL ONCE
Status: COMPLETED | OUTPATIENT
Start: 2021-05-12 | End: 2021-05-12

## 2021-05-12 RX ORDER — BETAMETHASONE DIPROPIONATE 0.05 %
OINTMENT (GRAM) TOPICAL ONCE
Status: CANCELLED | OUTPATIENT
Start: 2021-05-12 | End: 2021-05-12

## 2021-05-12 RX ORDER — LIDOCAINE HYDROCHLORIDE 40 MG/ML
SOLUTION TOPICAL ONCE
Status: CANCELLED | OUTPATIENT
Start: 2021-05-12 | End: 2021-05-12

## 2021-05-12 RX ORDER — GENTAMICIN SULFATE 1 MG/G
OINTMENT TOPICAL ONCE
Status: CANCELLED | OUTPATIENT
Start: 2021-05-12 | End: 2021-05-12

## 2021-05-12 RX ORDER — BACITRACIN, NEOMYCIN, POLYMYXIN B 400; 3.5; 5 [USP'U]/G; MG/G; [USP'U]/G
OINTMENT TOPICAL ONCE
Status: CANCELLED | OUTPATIENT
Start: 2021-05-12 | End: 2021-05-12

## 2021-05-12 RX ORDER — LIDOCAINE 40 MG/G
CREAM TOPICAL ONCE
Status: CANCELLED | OUTPATIENT
Start: 2021-05-12 | End: 2021-05-12

## 2021-05-12 RX ORDER — GINSENG 100 MG
CAPSULE ORAL ONCE
Status: CANCELLED | OUTPATIENT
Start: 2021-05-12 | End: 2021-05-12

## 2021-05-12 RX ADMIN — LIDOCAINE HYDROCHLORIDE 4 ML: 40 SOLUTION TOPICAL at 15:28

## 2021-05-12 ASSESSMENT — PAIN DESCRIPTION - PAIN TYPE: TYPE: CHRONIC PAIN

## 2021-05-12 ASSESSMENT — PAIN DESCRIPTION - FREQUENCY: FREQUENCY: INTERMITTENT

## 2021-05-12 ASSESSMENT — PAIN DESCRIPTION - PROGRESSION: CLINICAL_PROGRESSION: NOT CHANGED

## 2021-05-12 NOTE — PROGRESS NOTES
W[facility] Care Center Follow-Up Progress Note    Radhames Mcgrath  AGE: 80 y.o. GENDER: female  : 1935    TODAY'S DATE:  2021    Subjective:    Radhames Mcgrath is a 80 y.o. female who presents today for follow-up examination and treatment of a delayed-healing wound(s). The patient denies any problems since last visit. Objective:    /64   Pulse 76   Temp 97.4 °F (36.3 °C) (Temporal)   Resp 19     (Wound Reference Date is when first assessed. Measurements shown are from today's visit.)    Wound 21 Throat Left Left neck (Active)   Number of days: 89       Wound 21 Coccyx (Active)   Wound Image   21 1358   Wound Etiology Pressure Unstageable 21 1358   Dressing Status Clean;Dry; Intact 21 1400   Wound Cleansed Cleansed with saline 21 1358   Dressing/Treatment Dry dressing 21 0309   Dressing Change Due 04/15/21 04/14/21 1358   Wound Length (cm) 2 cm 21 1358   Wound Width (cm) 2.5 cm 21 1358   Wound Depth (cm) 1.4 cm 21 191   Wound Surface Area (cm^2) 5 cm^2 21 1358   Change in Wound Size % (l*w) -150 21 1358   Wound Volume (cm^3) 5.04 cm^3 21 1915   Wound Healing % -2420 21 1915   Undermining Starts ___ O'Clock 6 21 1041   Undermining Ends___ O'Clock 10 21 1041   Undermining Maxium Distance (cm) 1 21 1041   Wound Assessment Other (Comment) 21 1400   Drainage Amount Small 21 1358   Drainage Description Yellow 21 1358   Odor None 21 1358   Jaycee-wound Assessment Intact 21 1358   Number of days: 67       Wound 21 Left calf (Active)   Wound Image   21 1358   Dressing Status Clean;Dry; Intact 21 1400   Wound Cleansed Cleansed with saline 21 0309   Dressing/Treatment Dry dressing 21 0309   Dressing Change Due 21 1358   Wound Length (cm) 2 cm 21 1358   Wound Width (cm) 1 cm 21 1358   Wound Depth (cm) 0.2 cm 21 1358   Wound Surface Area (cm^2) 2 cm^2 04/14/21 1358   Change in Wound Size % (l*w) 0 04/14/21 1358   Wound Volume (cm^3) 0.4 cm^3 04/14/21 1358   Wound Assessment Other (Comment) 04/14/21 1400   Drainage Amount None 04/14/21 1358   Drainage Description Serosanguinous 04/14/21 0309   Odor None 04/14/21 1358   Jaycee-wound Assessment Dry/flaky 04/14/21 1358   Number of days: 35       Wound 05/05/21 Coccyx #1 (Active)   Wound Image   05/05/21 1326   Wound Etiology Pressure Stage  4 05/05/21 1326   Dressing Status New dressing applied 05/05/21 1403   Wound Cleansed Cleansed with saline 05/05/21 1403   Dressing/Treatment Alginate with Ag;Dry dressing 05/05/21 1403   Offloading for Diabetic Foot Ulcers No offloading required 05/05/21 1403   Wound Length (cm) 1.8 cm 05/12/21 1526   Wound Width (cm) 0.9 cm 05/12/21 1526   Wound Depth (cm) 0.7 cm 05/12/21 1526   Wound Surface Area (cm^2) 1.62 cm^2 05/12/21 1526   Change in Wound Size % (l*w) -24.62 05/12/21 1526   Wound Volume (cm^3) 1.13 cm^3 05/12/21 1526   Wound Healing % -24 05/12/21 1526   Post-Procedure Length (cm) 1.8 cm 05/12/21 1555   Post-Procedure Width (cm) 0.9 cm 05/12/21 1555   Post-Procedure Depth (cm) 1 cm 05/12/21 1555   Post-Procedure Surface Area (cm^2) 1.62 cm^2 05/12/21 1555   Post-Procedure Volume (cm^3) 1.62 cm^3 05/12/21 1555   Distance Tunneling (cm) 0.4 cm 05/05/21 1326   Tunneling Position ___ O'Clock 1 05/05/21 1326   Undermining Starts ___ O'Clock 9 05/12/21 1526   Undermining Ends___ O'Clock 11 05/12/21 1526   Undermining Maxium Distance (cm) 1.0 @ 9 oclock  05/12/21 1526   Wound Assessment Pink/red;Slough 05/12/21 1526   Drainage Amount Small 05/12/21 1526   Drainage Description Yellow 05/12/21 1526   Odor None 05/12/21 1526   Jaycee-wound Assessment Intact; Maceration;Blanchable erythema 05/12/21 1526   Number of days: 7        Other physical exam findings:        Assessment:     Patient Active Problem List   Diagnosis Code    Elevated CA-125 R97.1    Hiatal hernia K44.9    Hypothyroidism E03.9    Diverticular disease K57.90    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    Rheumatoid arthritis ([facility]) L38.3    Diastolic dysfunction O31.27    Paresthesia of lower extremity R20.2    Lumbar radicular pain M54.16    Anemia D64.9    Multiple closed fractures of ribs of both sides with routine healing S22.43XD    Thoracic spinal stenosis with mass at T6-T7 M48.04    Thoracic spine tumor D49.2    Thoracic spine fracture ([facility]) S22.009A    Atrial fibrillation with RVR ([facility]) I48.91    A-fib ([facility]) I48.91    Obstructive sleep apnea syndrome G47.33    Essential hypertension I10    Adjustment disorder with anxious mood F43.22    Laceration of lower limb S81.819A    Palpitations R00.2    Debility R53.81    Closed fracture of patella S82.009A    Delayed gastric emptying K30    Pain in both lower legs M79.661, M79.662    Bimalleolar fracture, left, closed, initial encounter S82.842A    Osteoporosis M81.0    Non-pressure chronic ulcer of other part of right lower leg with fat layer exposed ([facility] [address] L97.812    Bimalleolar fracture P17.251N    Patellar fracture S82.009A    Acute respiratory failure with hypoxia ([facility]) J96.01    Depression F32.9    History of 2019 novel coronavirus disease (COVID-19) Z86.16    Mild protein-calorie malnutrition ([facility]) E44.1    Apathy R45.3    Chronic heart failure with preserved ejection fraction ([facility]) I50.32    Chronic respiratory failure with hypoxia ([facility]) J96.11    Pressure injury of sacral region, unstageable ([facility]) L89.150    Frailty R54    Leukocytosis D72.829    COPD (chronic obstructive pulmonary disease) ([facility]) J44.9    GI bleed K92.2    Thrush B37.0    Congestive heart failure ([facility]) I50.9    Pressure injury of coccygeal region, stage 4 ([facility]) L89.154       Overall Wound Assessment  Wound has improved. Size has unchanged. Appearance has improved.  Stage 3     (Please refer to nursing measurements and assessment regarding wound measurements.) Wound check - Care provided includes removal of existing dressing and visual inspection. Plan:       1. 100%Debridement today. See Procedure Note below. 2. Discussed appropriate home care of this wound. Dispensed dressing supplies and instructions on their use. Wound redressed. 3. Patient instructions were given. Dressing: silver alginate Offloading. 4. Follow up: 2 week. Mickey Fishman, Cierra E. Main Procedure Note    Iwona Jacob  AGE: 80 y.o. GENDER: female  : 1935    TODAY'S DATE:  2021    The patient was identified and the procedure was confirmed. Lidocaine gel soaked gauze was applied at beginning of wound evaluation. The sacral wound was excisionally debrided sharply of fibrotic, necrotic, and hyperkeratotic tissue, including a layer of surrounding healthy tissue using curette for a total surface area of < 20 cm2. The wound(s) was debrided down through and including subcutaneous tissue. 100% of the wound was debrided. There was minimal bleeding that was controlled with pressure. Patient tolerated procedure well and was given proper instruction.       Mickey Fishman MD

## 2021-05-12 NOTE — PLAN OF CARE
Problem: Pressure Ulcer:  Goal: Absence of new pressure ulcer  Description: Absence of new pressure ulcer  Outcome: Met This Shift  Goal: Will show no infection signs and symptoms  Description: Will show no infection signs and symptoms  Outcome: Met This Shift

## 2021-05-12 NOTE — TELEPHONE ENCOUNTER
CRYSTAL contacted pt daughter to remind of appt with SW tomorrow for a home visit to see pt. Discussed pt current state and any improvements or issues. Daughter stated that pt had a small bout of illness where pt was pale and not eating well, this was about 2 wks ago. Pt is now doing much better, sees a wound care specialist once a week. Wound care ordered a Alves cushion for pt to utilize in wheelchair for current bed sores on coccyx. CRYSTAL provided positive support to daughter for pt improvements. Daughter lives in [facility] and would like to be phone conferenced in during appt with CRYSTAL to hear any updates. CRYSTAL will make sure daughter is on phone while meeting with pt and pt son, whom pt resides with at this time. Daughter did state at end of conversation that she had called PM about a week and a half ago on a Friday with a concern for pt current paleness and low BP. Daughter stated she left a message with PM but never received a return phone call. CRYSTAL apologized for this mishap and advised daughter to call back in the future again if a message is not returned. CRYSTAL will continue to follow with home visits as needed for support.

## 2021-05-14 ENCOUNTER — TELEPHONE (OUTPATIENT)
Dept: PALLATIVE CARE | Age: 86
End: 2021-05-14

## 2021-05-14 DIAGNOSIS — E46 PROTEIN-CALORIE MALNUTRITION, UNSPECIFIED SEVERITY (HCC): ICD-10-CM

## 2021-05-14 DIAGNOSIS — E44.0 MODERATE PROTEIN-CALORIE MALNUTRITION (HCC): Primary | ICD-10-CM

## 2021-05-14 DIAGNOSIS — R53.83 FATIGUE, UNSPECIFIED TYPE: ICD-10-CM

## 2021-05-14 DIAGNOSIS — L89.153 PRESSURE INJURY OF SACRAL REGION, STAGE 3 (HCC): ICD-10-CM

## 2021-05-14 DIAGNOSIS — J96.01 ACUTE RESPIRATORY FAILURE WITH HYPOXIA (HCC): ICD-10-CM

## 2021-05-14 RX ORDER — ARGININE/GLUTAMINE/CALCIUM BMB 7G-7G-1.5G
1 POWDER IN PACKET (EA) ORAL 2 TIMES DAILY
Qty: 60 EACH | Refills: 2 | Status: SHIPPED | OUTPATIENT
Start: 2021-05-14 | End: 2021-05-19 | Stop reason: ALTCHOICE

## 2021-05-14 NOTE — TELEPHONE ENCOUNTER
On-going monitoring for PM psychosocial needs. No new psychosocial needs or concerns identified today for [employer]. CRYSTAL met with pt and pt family for a follow up home visit. Pt was alert and oriented x3 during visit, answering questions and fully engaging in discussion with SW. CRYSTAL informed family that Aurelio Cyr NP will be able to administer 2nd dose of COVID vaccine to pt on his next home visit to her, family was appreciative and grateful. Pt has been seeing a wound care specialist once a week, pt has to be transported by ambulance for this due to being to unstable to travel in a vehicle. Family is currently paying out of pocket for this expense, SW will look to see if there are other alternatives for pt transport. Family also reported they are currently doing self pay to have 24hr care for pt with [facility]. Current caretakers are thru [address] [place] [address], CRYSTAL will follow up with [facility] and see if insurance can provide coverage for any of the hours. Family inquired if Aurelio Cyr can prescribe Linda Mitten for pt due to high cost for over the counter, CRYSTAL will follow up with this.  will remain available for on-going psychosocial support, as needed.

## 2021-05-14 NOTE — TELEPHONE ENCOUNTER
SW contacted pharmacy regarding script sent over for Maria Eugenia, pharmacy reported that insurance was not covering script. SW sent a prior authorization to pharmacy to see about coverage. SW contacted pt daughter Neetu Mota to inform of waiting to hear from insurance company regarding coverage of script. CRYSTAL will follow up after hearing back from insurance company.

## 2021-05-17 ENCOUNTER — CARE COORDINATION (OUTPATIENT)
Dept: CARE COORDINATION | Age: 86
End: 2021-05-17

## 2021-05-19 ENCOUNTER — HOSPITAL ENCOUNTER (OUTPATIENT)
Age: 86
Setting detail: OBSERVATION
Discharge: HOME HEALTH | DRG: 189 | End: 2021-05-20
Attending: EMERGENCY MEDICINE | Admitting: FAMILY MEDICINE
Payer: MEDICARE

## 2021-05-19 ENCOUNTER — APPOINTMENT (OUTPATIENT)
Dept: GENERAL RADIOLOGY | Age: 86
DRG: 189 | End: 2021-05-19
Payer: MEDICARE

## 2021-05-19 DIAGNOSIS — Z79.899 MEDICATION MANAGEMENT: ICD-10-CM

## 2021-05-19 DIAGNOSIS — R07.9 CHEST PAIN, UNSPECIFIED TYPE: Primary | ICD-10-CM

## 2021-05-19 LAB
ALBUMIN SERPL-MCNC: 2.6 G/DL (ref 3.5–5.2)
ALP BLD-CCNC: 118 U/L (ref 35–104)
ALT SERPL-CCNC: 11 U/L (ref 0–32)
AMORPHOUS: ABNORMAL
ANION GAP SERPL CALCULATED.3IONS-SCNC: 9 MMOL/L (ref 7–16)
AST SERPL-CCNC: 18 U/L (ref 0–31)
BACTERIA: ABNORMAL /HPF
BASOPHILS ABSOLUTE: 0.05 E9/L (ref 0–0.2)
BASOPHILS RELATIVE PERCENT: 0.5 % (ref 0–2)
BILIRUB SERPL-MCNC: 0.2 MG/DL (ref 0–1.2)
BILIRUBIN DIRECT: <0.2 MG/DL (ref 0–0.3)
BILIRUBIN URINE: NEGATIVE
BILIRUBIN, INDIRECT: ABNORMAL MG/DL (ref 0–1)
BLOOD, URINE: NEGATIVE
BUN BLDV-MCNC: 22 MG/DL (ref 6–23)
CALCIUM SERPL-MCNC: 9.1 MG/DL (ref 8.6–10.2)
CHLORIDE BLD-SCNC: 100 MMOL/L (ref 98–107)
CK MB: 3.7 NG/ML (ref 0–4.3)
CLARITY: CLEAR
CO2: 31 MMOL/L (ref 22–29)
COLOR: YELLOW
CREAT SERPL-MCNC: 0.6 MG/DL (ref 0.5–1)
EKG ATRIAL RATE: 93 BPM
EKG P AXIS: 74 DEGREES
EKG P-R INTERVAL: 162 MS
EKG Q-T INTERVAL: 352 MS
EKG QRS DURATION: 78 MS
EKG QTC CALCULATION (BAZETT): 437 MS
EKG R AXIS: -12 DEGREES
EKG T AXIS: 5 DEGREES
EKG VENTRICULAR RATE: 93 BPM
EOSINOPHILS ABSOLUTE: 0.21 E9/L (ref 0.05–0.5)
EOSINOPHILS RELATIVE PERCENT: 2.3 % (ref 0–6)
EPITHELIAL CELLS, UA: ABNORMAL /HPF
GFR AFRICAN AMERICAN: >60
GFR NON-AFRICAN AMERICAN: >60 ML/MIN/1.73
GLUCOSE BLD-MCNC: 107 MG/DL (ref 74–99)
GLUCOSE URINE: NEGATIVE MG/DL
HCT VFR BLD CALC: 34.9 % (ref 34–48)
HEMOGLOBIN: 10.5 G/DL (ref 11.5–15.5)
IMMATURE GRANULOCYTES #: 0.2 E9/L
IMMATURE GRANULOCYTES %: 2.2 % (ref 0–5)
INR BLD: 1.2
KETONES, URINE: NEGATIVE MG/DL
LACTIC ACID: 1.7 MMOL/L (ref 0.5–2.2)
LACTIC ACID: 2.4 MMOL/L (ref 0.5–2.2)
LEUKOCYTE ESTERASE, URINE: ABNORMAL
LIPASE: 29 U/L (ref 13–60)
LYMPHOCYTES ABSOLUTE: 0.71 E9/L (ref 1.5–4)
LYMPHOCYTES RELATIVE PERCENT: 7.6 % (ref 20–42)
MAGNESIUM: 1.6 MG/DL (ref 1.6–2.6)
MCH RBC QN AUTO: 30 PG (ref 26–35)
MCHC RBC AUTO-ENTMCNC: 30.1 % (ref 32–34.5)
MCV RBC AUTO: 99.7 FL (ref 80–99.9)
MONOCYTES ABSOLUTE: 1.13 E9/L (ref 0.1–0.95)
MONOCYTES RELATIVE PERCENT: 12.2 % (ref 2–12)
NEUTROPHILS ABSOLUTE: 7 E9/L (ref 1.8–7.3)
NEUTROPHILS RELATIVE PERCENT: 75.2 % (ref 43–80)
NITRITE, URINE: NEGATIVE
PDW BLD-RTO: 14.9 FL (ref 11.5–15)
PH UA: 7.5 (ref 5–9)
PLATELET # BLD: 523 E9/L (ref 130–450)
PMV BLD AUTO: 9.5 FL (ref 7–12)
POTASSIUM SERPL-SCNC: 3.7 MMOL/L (ref 3.5–5)
PRO-BNP: 1507 PG/ML (ref 0–450)
PROTEIN UA: ABNORMAL MG/DL
PROTHROMBIN TIME: 13.5 SEC (ref 9.3–12.4)
RBC # BLD: 3.5 E12/L (ref 3.5–5.5)
RBC UA: ABNORMAL /HPF (ref 0–2)
SARS-COV-2, NAAT: NOT DETECTED
SODIUM BLD-SCNC: 140 MMOL/L (ref 132–146)
SPECIFIC GRAVITY UA: 1.01 (ref 1–1.03)
TOTAL CK: 94 U/L (ref 20–180)
TOTAL PROTEIN: 6.1 G/DL (ref 6.4–8.3)
TROPONIN: 0.02 NG/ML (ref 0–0.03)
TROPONIN: 0.05 NG/ML (ref 0–0.03)
TROPONIN: 0.06 NG/ML (ref 0–0.03)
TSH SERPL DL<=0.05 MIU/L-ACNC: 1.35 UIU/ML (ref 0.27–4.2)
UROBILINOGEN, URINE: 1 E.U./DL
WBC # BLD: 9.3 E9/L (ref 4.5–11.5)
WBC UA: ABNORMAL /HPF (ref 0–5)

## 2021-05-19 PROCEDURE — APPSS60 APP SPLIT SHARED TIME 46-60 MINUTES: Performed by: NURSE PRACTITIONER

## 2021-05-19 PROCEDURE — 6360000002 HC RX W HCPCS: Performed by: FAMILY MEDICINE

## 2021-05-19 PROCEDURE — 6370000000 HC RX 637 (ALT 250 FOR IP): Performed by: FAMILY MEDICINE

## 2021-05-19 PROCEDURE — G0378 HOSPITAL OBSERVATION PER HR: HCPCS

## 2021-05-19 PROCEDURE — 81001 URINALYSIS AUTO W/SCOPE: CPT

## 2021-05-19 PROCEDURE — 83690 ASSAY OF LIPASE: CPT

## 2021-05-19 PROCEDURE — 93010 ELECTROCARDIOGRAM REPORT: CPT | Performed by: INTERNAL MEDICINE

## 2021-05-19 PROCEDURE — 99215 OFFICE O/P EST HI 40 MIN: CPT | Performed by: INTERNAL MEDICINE

## 2021-05-19 PROCEDURE — 87186 SC STD MICRODIL/AGAR DIL: CPT

## 2021-05-19 PROCEDURE — 2580000003 HC RX 258: Performed by: FAMILY MEDICINE

## 2021-05-19 PROCEDURE — 84443 ASSAY THYROID STIM HORMONE: CPT

## 2021-05-19 PROCEDURE — 6370000000 HC RX 637 (ALT 250 FOR IP): Performed by: STUDENT IN AN ORGANIZED HEALTH CARE EDUCATION/TRAINING PROGRAM

## 2021-05-19 PROCEDURE — 83735 ASSAY OF MAGNESIUM: CPT

## 2021-05-19 PROCEDURE — 83605 ASSAY OF LACTIC ACID: CPT

## 2021-05-19 PROCEDURE — 71045 X-RAY EXAM CHEST 1 VIEW: CPT

## 2021-05-19 PROCEDURE — 96366 THER/PROPH/DIAG IV INF ADDON: CPT

## 2021-05-19 PROCEDURE — 80076 HEPATIC FUNCTION PANEL: CPT

## 2021-05-19 PROCEDURE — 96365 THER/PROPH/DIAG IV INF INIT: CPT

## 2021-05-19 PROCEDURE — 82553 CREATINE MB FRACTION: CPT

## 2021-05-19 PROCEDURE — 82550 ASSAY OF CK (CPK): CPT

## 2021-05-19 PROCEDURE — 2580000003 HC RX 258: Performed by: STUDENT IN AN ORGANIZED HEALTH CARE EDUCATION/TRAINING PROGRAM

## 2021-05-19 PROCEDURE — 94640 AIRWAY INHALATION TREATMENT: CPT

## 2021-05-19 PROCEDURE — 87070 CULTURE OTHR SPECIMN AEROBIC: CPT

## 2021-05-19 PROCEDURE — 36415 COLL VENOUS BLD VENIPUNCTURE: CPT

## 2021-05-19 PROCEDURE — 93005 ELECTROCARDIOGRAM TRACING: CPT | Performed by: FAMILY MEDICINE

## 2021-05-19 PROCEDURE — 94664 DEMO&/EVAL PT USE INHALER: CPT

## 2021-05-19 PROCEDURE — 80048 BASIC METABOLIC PNL TOTAL CA: CPT

## 2021-05-19 PROCEDURE — 6360000002 HC RX W HCPCS: Performed by: STUDENT IN AN ORGANIZED HEALTH CARE EDUCATION/TRAINING PROGRAM

## 2021-05-19 PROCEDURE — 2700000000 HC OXYGEN THERAPY PER DAY

## 2021-05-19 PROCEDURE — 99285 EMERGENCY DEPT VISIT HI MDM: CPT

## 2021-05-19 PROCEDURE — 93005 ELECTROCARDIOGRAM TRACING: CPT | Performed by: NURSE PRACTITIONER

## 2021-05-19 PROCEDURE — 85025 COMPLETE CBC W/AUTO DIFF WBC: CPT

## 2021-05-19 PROCEDURE — 83880 ASSAY OF NATRIURETIC PEPTIDE: CPT

## 2021-05-19 PROCEDURE — 87635 SARS-COV-2 COVID-19 AMP PRB: CPT

## 2021-05-19 PROCEDURE — 84484 ASSAY OF TROPONIN QUANT: CPT

## 2021-05-19 PROCEDURE — 93005 ELECTROCARDIOGRAM TRACING: CPT | Performed by: EMERGENCY MEDICINE

## 2021-05-19 PROCEDURE — 85610 PROTHROMBIN TIME: CPT

## 2021-05-19 RX ORDER — PANTOPRAZOLE SODIUM 40 MG/1
40 TABLET, DELAYED RELEASE ORAL
Status: DISCONTINUED | OUTPATIENT
Start: 2021-05-20 | End: 2021-05-20 | Stop reason: HOSPADM

## 2021-05-19 RX ORDER — ACETAMINOPHEN 500 MG
500 TABLET ORAL 2 TIMES DAILY
Status: ON HOLD | COMMUNITY
End: 2021-05-20 | Stop reason: HOSPADM

## 2021-05-19 RX ORDER — SODIUM CHLORIDE 9 MG/ML
25 INJECTION, SOLUTION INTRAVENOUS PRN
Status: DISCONTINUED | OUTPATIENT
Start: 2021-05-19 | End: 2021-05-20 | Stop reason: HOSPADM

## 2021-05-19 RX ORDER — CELECOXIB 100 MG/1
200 CAPSULE ORAL DAILY
Status: DISCONTINUED | OUTPATIENT
Start: 2021-05-19 | End: 2021-05-20 | Stop reason: HOSPADM

## 2021-05-19 RX ORDER — HYDROCODONE BITARTRATE AND ACETAMINOPHEN 5; 325 MG/1; MG/1
1 TABLET ORAL EVERY 4 HOURS PRN
COMMUNITY

## 2021-05-19 RX ORDER — FUROSEMIDE 20 MG/1
20 TABLET ORAL DAILY
Status: DISCONTINUED | OUTPATIENT
Start: 2021-05-19 | End: 2021-05-20 | Stop reason: HOSPADM

## 2021-05-19 RX ORDER — FERROUS SULFATE 325(65) MG
325 TABLET ORAL EVERY OTHER DAY
Status: DISCONTINUED | OUTPATIENT
Start: 2021-05-19 | End: 2021-05-20 | Stop reason: HOSPADM

## 2021-05-19 RX ORDER — GABAPENTIN 100 MG/1
100 CAPSULE ORAL 2 TIMES DAILY
Status: DISCONTINUED | OUTPATIENT
Start: 2021-05-19 | End: 2021-05-20 | Stop reason: HOSPADM

## 2021-05-19 RX ORDER — POLYETHYLENE GLYCOL 3350 17 G/17G
17 POWDER, FOR SOLUTION ORAL DAILY PRN
Status: DISCONTINUED | OUTPATIENT
Start: 2021-05-19 | End: 2021-05-20 | Stop reason: HOSPADM

## 2021-05-19 RX ORDER — ONDANSETRON 2 MG/ML
4 INJECTION INTRAMUSCULAR; INTRAVENOUS EVERY 6 HOURS PRN
Status: DISCONTINUED | OUTPATIENT
Start: 2021-05-19 | End: 2021-05-20 | Stop reason: HOSPADM

## 2021-05-19 RX ORDER — MONTELUKAST SODIUM 10 MG/1
10 TABLET ORAL NIGHTLY
COMMUNITY

## 2021-05-19 RX ORDER — ARFORMOTEROL TARTRATE 15 UG/2ML
15 SOLUTION RESPIRATORY (INHALATION) 2 TIMES DAILY
Status: DISCONTINUED | OUTPATIENT
Start: 2021-05-19 | End: 2021-05-19 | Stop reason: SDUPTHER

## 2021-05-19 RX ORDER — MONTELUKAST SODIUM 10 MG/1
10 TABLET ORAL NIGHTLY
Status: DISCONTINUED | OUTPATIENT
Start: 2021-05-19 | End: 2021-05-20 | Stop reason: HOSPADM

## 2021-05-19 RX ORDER — MAGNESIUM SULFATE IN WATER 40 MG/ML
2000 INJECTION, SOLUTION INTRAVENOUS ONCE
Status: COMPLETED | OUTPATIENT
Start: 2021-05-19 | End: 2021-05-19

## 2021-05-19 RX ORDER — CELECOXIB 200 MG/1
200 CAPSULE ORAL DAILY
COMMUNITY

## 2021-05-19 RX ORDER — METOPROLOL TARTRATE 50 MG/1
50 TABLET, FILM COATED ORAL 2 TIMES DAILY
Status: DISCONTINUED | OUTPATIENT
Start: 2021-05-19 | End: 2021-05-20 | Stop reason: HOSPADM

## 2021-05-19 RX ORDER — ALBUTEROL SULFATE 2.5 MG/3ML
2.5 SOLUTION RESPIRATORY (INHALATION) 2 TIMES DAILY PRN
Status: DISCONTINUED | OUTPATIENT
Start: 2021-05-19 | End: 2021-05-20 | Stop reason: HOSPADM

## 2021-05-19 RX ORDER — FLUTICASONE PROPIONATE 50 MCG
1 SPRAY, SUSPENSION (ML) NASAL DAILY PRN
COMMUNITY

## 2021-05-19 RX ORDER — ACETAMINOPHEN 500 MG
500 TABLET ORAL 2 TIMES DAILY
Status: DISCONTINUED | OUTPATIENT
Start: 2021-05-19 | End: 2021-05-20

## 2021-05-19 RX ORDER — BUDESONIDE 0.5 MG/2ML
0.5 INHALANT ORAL 2 TIMES DAILY
Status: DISCONTINUED | OUTPATIENT
Start: 2021-05-19 | End: 2021-05-19 | Stop reason: SDUPTHER

## 2021-05-19 RX ORDER — SODIUM CHLORIDE 0.9 % (FLUSH) 0.9 %
5-40 SYRINGE (ML) INJECTION EVERY 12 HOURS SCHEDULED
Status: DISCONTINUED | OUTPATIENT
Start: 2021-05-19 | End: 2021-05-20 | Stop reason: HOSPADM

## 2021-05-19 RX ORDER — VALSARTAN 320 MG/1
320 TABLET ORAL DAILY
Status: DISCONTINUED | OUTPATIENT
Start: 2021-05-19 | End: 2021-05-20 | Stop reason: HOSPADM

## 2021-05-19 RX ORDER — LEVOTHYROXINE SODIUM 0.1 MG/1
100 TABLET ORAL DAILY
Status: DISCONTINUED | OUTPATIENT
Start: 2021-05-19 | End: 2021-05-20 | Stop reason: HOSPADM

## 2021-05-19 RX ORDER — HYDROCODONE BITARTRATE AND ACETAMINOPHEN 5; 325 MG/1; MG/1
1 TABLET ORAL EVERY 6 HOURS PRN
Status: DISCONTINUED | OUTPATIENT
Start: 2021-05-19 | End: 2021-05-20 | Stop reason: HOSPADM

## 2021-05-19 RX ORDER — FAMOTIDINE 20 MG/1
40 TABLET, FILM COATED ORAL NIGHTLY
Status: DISCONTINUED | OUTPATIENT
Start: 2021-05-19 | End: 2021-05-20 | Stop reason: HOSPADM

## 2021-05-19 RX ORDER — 0.9 % SODIUM CHLORIDE 0.9 %
500 INTRAVENOUS SOLUTION INTRAVENOUS ONCE
Status: COMPLETED | OUTPATIENT
Start: 2021-05-19 | End: 2021-05-19

## 2021-05-19 RX ORDER — ACETAMINOPHEN 650 MG/1
650 SUPPOSITORY RECTAL EVERY 6 HOURS PRN
Status: DISCONTINUED | OUTPATIENT
Start: 2021-05-19 | End: 2021-05-20 | Stop reason: HOSPADM

## 2021-05-19 RX ORDER — OMEPRAZOLE 40 MG/1
40 CAPSULE, DELAYED RELEASE ORAL 2 TIMES DAILY
COMMUNITY

## 2021-05-19 RX ORDER — SODIUM CHLORIDE 0.9 % (FLUSH) 0.9 %
5-40 SYRINGE (ML) INJECTION PRN
Status: DISCONTINUED | OUTPATIENT
Start: 2021-05-19 | End: 2021-05-20 | Stop reason: HOSPADM

## 2021-05-19 RX ORDER — AMLODIPINE BESYLATE 5 MG/1
5 TABLET ORAL DAILY
Status: DISCONTINUED | OUTPATIENT
Start: 2021-05-19 | End: 2021-05-20 | Stop reason: HOSPADM

## 2021-05-19 RX ORDER — ALBUTEROL SULFATE 2.5 MG/3ML
2.5 SOLUTION RESPIRATORY (INHALATION) 2 TIMES DAILY PRN
Status: DISCONTINUED | OUTPATIENT
Start: 2021-05-19 | End: 2021-05-19 | Stop reason: SDUPTHER

## 2021-05-19 RX ORDER — FLECAINIDE ACETATE 50 MG/1
50 TABLET ORAL 2 TIMES DAILY
Status: DISCONTINUED | OUTPATIENT
Start: 2021-05-19 | End: 2021-05-19 | Stop reason: ALTCHOICE

## 2021-05-19 RX ORDER — DOCUSATE SODIUM 100 MG/1
100 CAPSULE, LIQUID FILLED ORAL DAILY
Status: DISCONTINUED | OUTPATIENT
Start: 2021-05-19 | End: 2021-05-20 | Stop reason: HOSPADM

## 2021-05-19 RX ORDER — DULOXETIN HYDROCHLORIDE 20 MG/1
40 CAPSULE, DELAYED RELEASE ORAL DAILY
Status: DISCONTINUED | OUTPATIENT
Start: 2021-05-19 | End: 2021-05-20 | Stop reason: HOSPADM

## 2021-05-19 RX ORDER — BUDESONIDE 0.5 MG/2ML
0.5 INHALANT ORAL 2 TIMES DAILY
Status: DISCONTINUED | OUTPATIENT
Start: 2021-05-19 | End: 2021-05-20 | Stop reason: HOSPADM

## 2021-05-19 RX ORDER — DOCUSATE SODIUM 100 MG/1
100 CAPSULE, LIQUID FILLED ORAL DAILY PRN
COMMUNITY

## 2021-05-19 RX ORDER — PROMETHAZINE HYDROCHLORIDE 25 MG/1
12.5 TABLET ORAL EVERY 6 HOURS PRN
Status: DISCONTINUED | OUTPATIENT
Start: 2021-05-19 | End: 2021-05-20 | Stop reason: HOSPADM

## 2021-05-19 RX ORDER — POTASSIUM BICARBONATE 25 MEQ/1
25 TABLET, EFFERVESCENT ORAL 2 TIMES DAILY
Status: DISCONTINUED | OUTPATIENT
Start: 2021-05-19 | End: 2021-05-19 | Stop reason: CLARIF

## 2021-05-19 RX ORDER — ARFORMOTEROL TARTRATE 15 UG/2ML
15 SOLUTION RESPIRATORY (INHALATION) 2 TIMES DAILY
Status: DISCONTINUED | OUTPATIENT
Start: 2021-05-19 | End: 2021-05-20 | Stop reason: HOSPADM

## 2021-05-19 RX ORDER — ACETAMINOPHEN 325 MG/1
650 TABLET ORAL EVERY 6 HOURS PRN
Status: DISCONTINUED | OUTPATIENT
Start: 2021-05-19 | End: 2021-05-19 | Stop reason: DRUGHIGH

## 2021-05-19 RX ORDER — FLECAINIDE ACETATE 50 MG/1
75 TABLET ORAL 2 TIMES DAILY
Status: DISCONTINUED | OUTPATIENT
Start: 2021-05-19 | End: 2021-05-20 | Stop reason: HOSPADM

## 2021-05-19 RX ORDER — FAMOTIDINE 40 MG/1
40 TABLET, FILM COATED ORAL NIGHTLY PRN
COMMUNITY

## 2021-05-19 RX ADMIN — DULOXETINE HYDROCHLORIDE 40 MG: 20 CAPSULE, DELAYED RELEASE ORAL at 21:16

## 2021-05-19 RX ADMIN — POTASSIUM BICARBONATE 20 MEQ: 782 TABLET, EFFERVESCENT ORAL at 17:49

## 2021-05-19 RX ADMIN — METOPROLOL TARTRATE 50 MG: 50 TABLET, FILM COATED ORAL at 14:36

## 2021-05-19 RX ADMIN — AMLODIPINE BESYLATE 5 MG: 5 TABLET ORAL at 14:35

## 2021-05-19 RX ADMIN — FAMOTIDINE 40 MG: 20 TABLET ORAL at 21:17

## 2021-05-19 RX ADMIN — FERROUS SULFATE TAB 325 MG (65 MG ELEMENTAL FE) 325 MG: 325 (65 FE) TAB at 14:36

## 2021-05-19 RX ADMIN — VALSARTAN 320 MG: 320 TABLET ORAL at 14:38

## 2021-05-19 RX ADMIN — LEVOTHYROXINE SODIUM 100 MCG: 100 TABLET ORAL at 14:36

## 2021-05-19 RX ADMIN — SODIUM CHLORIDE, PRESERVATIVE FREE 10 ML: 5 INJECTION INTRAVENOUS at 19:57

## 2021-05-19 RX ADMIN — POTASSIUM BICARBONATE 20 MEQ: 782 TABLET, EFFERVESCENT ORAL at 14:34

## 2021-05-19 RX ADMIN — FLECAINIDE ACETATE 75 MG: 50 TABLET ORAL at 14:39

## 2021-05-19 RX ADMIN — HYDROCODONE BITARTRATE AND ACETAMINOPHEN 1 TABLET: 5; 325 TABLET ORAL at 17:52

## 2021-05-19 RX ADMIN — METOPROLOL TARTRATE 25 MG: 25 TABLET, FILM COATED ORAL at 10:04

## 2021-05-19 RX ADMIN — BUDESONIDE 500 MCG: 0.5 SUSPENSION RESPIRATORY (INHALATION) at 21:18

## 2021-05-19 RX ADMIN — FLECAINIDE ACETATE 50 MG: 50 TABLET ORAL at 10:04

## 2021-05-19 RX ADMIN — ACETAMINOPHEN 500 MG: 500 TABLET ORAL at 21:17

## 2021-05-19 RX ADMIN — METOPROLOL TARTRATE 50 MG: 50 TABLET, FILM COATED ORAL at 21:17

## 2021-05-19 RX ADMIN — FUROSEMIDE 20 MG: 20 TABLET ORAL at 14:36

## 2021-05-19 RX ADMIN — ARFORMOTEROL TARTRATE 15 MCG: 15 SOLUTION RESPIRATORY (INHALATION) at 21:17

## 2021-05-19 RX ADMIN — FLECAINIDE ACETATE 75 MG: 50 TABLET ORAL at 21:16

## 2021-05-19 RX ADMIN — CELECOXIB 200 MG: 100 CAPSULE ORAL at 14:38

## 2021-05-19 RX ADMIN — GABAPENTIN 100 MG: 100 CAPSULE ORAL at 21:17

## 2021-05-19 RX ADMIN — GABAPENTIN 100 MG: 100 CAPSULE ORAL at 14:35

## 2021-05-19 RX ADMIN — RIVAROXABAN 15 MG: 15 TABLET, FILM COATED ORAL at 17:49

## 2021-05-19 RX ADMIN — MAGNESIUM SULFATE HEPTAHYDRATE 2000 MG: 40 INJECTION, SOLUTION INTRAVENOUS at 14:41

## 2021-05-19 RX ADMIN — SODIUM CHLORIDE 500 ML: 9 INJECTION, SOLUTION INTRAVENOUS at 09:53

## 2021-05-19 RX ADMIN — SODIUM CHLORIDE, PRESERVATIVE FREE 10 ML: 5 INJECTION INTRAVENOUS at 21:18

## 2021-05-19 RX ADMIN — MONTELUKAST SODIUM 10 MG: 10 TABLET, FILM COATED ORAL at 21:17

## 2021-05-19 RX ADMIN — ACETAMINOPHEN 500 MG: 500 TABLET ORAL at 14:35

## 2021-05-19 ASSESSMENT — PAIN DESCRIPTION - DESCRIPTORS
DESCRIPTORS: SHARP

## 2021-05-19 ASSESSMENT — PAIN DESCRIPTION - PROGRESSION
CLINICAL_PROGRESSION: GRADUALLY WORSENING
CLINICAL_PROGRESSION: NOT CHANGED

## 2021-05-19 ASSESSMENT — ENCOUNTER SYMPTOMS
VOMITING: 0
SHORTNESS OF BREATH: 1
DIARRHEA: 1
RHINORRHEA: 0
BLOOD IN STOOL: 1
COLOR CHANGE: 0
PHOTOPHOBIA: 0
NAUSEA: 1
COUGH: 0
WHEEZING: 0
ABDOMINAL PAIN: 0

## 2021-05-19 ASSESSMENT — PAIN DESCRIPTION - FREQUENCY
FREQUENCY: CONTINUOUS

## 2021-05-19 ASSESSMENT — PAIN DESCRIPTION - PAIN TYPE: TYPE: CHRONIC PAIN;ACUTE PAIN

## 2021-05-19 ASSESSMENT — PAIN DESCRIPTION - ONSET: ONSET: ON-GOING

## 2021-05-19 ASSESSMENT — PAIN DESCRIPTION - ORIENTATION: ORIENTATION: RIGHT

## 2021-05-19 ASSESSMENT — PAIN - FUNCTIONAL ASSESSMENT
PAIN_FUNCTIONAL_ASSESSMENT: PREVENTS OR INTERFERES WITH ALL ACTIVE AND SOME PASSIVE ACTIVITIES
PAIN_FUNCTIONAL_ASSESSMENT: PREVENTS OR INTERFERES WITH ALL ACTIVE AND SOME PASSIVE ACTIVITIES

## 2021-05-19 ASSESSMENT — PAIN SCALES - GENERAL
PAINLEVEL_OUTOF10: 6
PAINLEVEL_OUTOF10: 4
PAINLEVEL_OUTOF10: 6

## 2021-05-19 ASSESSMENT — PAIN DESCRIPTION - DIRECTION
RADIATING_TOWARDS: NON-RADIATING
RADIATING_TOWARDS: NON-RADIATING

## 2021-05-19 ASSESSMENT — PAIN DESCRIPTION - LOCATION: LOCATION: SACRUM

## 2021-05-19 NOTE — ED PROVIDER NOTES
80-year-old female with a history of Afib presenting with complaint of shortness of breath. At approximately 5:30 AM this morning, she was lying in bed when she suddenly felt somewhat short of breath, nauseous, and broke out in to a sweat, and chest discomfort. Per caregiver, her heart rate was in the 140s and her BP was 78N systolic. Patient has not had any illnesses recently. The history is provided by the patient and a caregiver. Shortness of Breath  Severity:  Moderate  Onset quality:  Sudden  Associated symptoms: chest pain and diaphoresis    Associated symptoms: no abdominal pain, no cough, no fever, no headaches and no rash         Review of Systems   Constitutional: Positive for diaphoresis. Negative for chills and fever. HENT: Negative for congestion and rhinorrhea. Eyes: Negative for photophobia and visual disturbance. Respiratory: Positive for shortness of breath. Negative for cough. Cardiovascular: Positive for chest pain. Negative for leg swelling. Gastrointestinal: Positive for nausea. Negative for abdominal pain. Genitourinary: Negative for dysuria and frequency. Musculoskeletal: Negative for myalgias and neck stiffness. Skin: Negative for rash and wound. Neurological: Negative for dizziness, light-headedness and headaches. Physical Exam  Constitutional:       General: She is not in acute distress. Appearance: Normal appearance. She is normal weight. She is not ill-appearing or toxic-appearing. Comments: Patient lying in bed comfortably   HENT:      Head: Normocephalic and atraumatic. Nose: Nose normal.   Eyes:      Extraocular Movements: Extraocular movements intact. Conjunctiva/sclera: Conjunctivae normal.   Cardiovascular:      Rate and Rhythm: Normal rate and regular rhythm. Pulmonary:      Effort: Pulmonary effort is normal.      Breath sounds: Normal breath sounds. Abdominal:      General: Abdomen is flat. There is no distension. Palpations: Abdomen is soft. Tenderness: There is no abdominal tenderness. Musculoskeletal:         General: No swelling or tenderness. Cervical back: Normal range of motion and neck supple. Skin:     General: Skin is warm and dry. Neurological:      General: No focal deficit present. Mental Status: She is alert. Cranial Nerves: No cranial nerve deficit. Psychiatric:         Mood and Affect: Mood normal.         Behavior: Behavior normal.      Comments: Normal affect          Procedures     MDM  Number of Diagnoses or Management Options  Chest pain, unspecified type  Diagnosis management comments: 45-year-old female with a history of A. fib presenting for chest pain and shortness of breath. EKG showed normal sinus rhythm with no acute ischemic changes. Troponin 0.02. Hemoglobin stable. Due to patient's suspicious story and previous cardiac disease, decision was made to admit patient for further workup and monitoring. Family medicine was consulted and agreed to admit patient. She remained hemodynamically stable in the ED. Amount and/or Complexity of Data Reviewed  Clinical lab tests: reviewed                      --------------------------------------------- PAST HISTORY ---------------------------------------------  Past Medical History:  has a past medical history of Anticoagulated, Atrial fibrillation ([address] Bimalleolar fracture, SOUQA-75, Diastolic dysfunction, Difficulty walking, Diverticular disease, Elevated CA-125, Hiatal hernia, Hypertension, Hypothyroid, Meningocele spinal ([address] Neuropathy, JANETTE (obstructive sleep apnea), Osteoporosis, PAF (paroxysmal atrial fibrillation) ([address] Pain in both lower legs, Patellar fracture, Rheumatoid arthritis with rheumatoid factor ([address] Rheumatoid arthritis(714.0), Rib fractures, Seasonal allergies, Skin cancer of lip, and Wears dentures.     Past Surgical History:  has a past surgical history that includes Gastric fundoplication (4877);  section; Carpal tunnel release (Bilateral); Colonoscopy (2011); Total knee arthroplasty (2007); Cardiac catheterization; Hysterectomy, total abdominal (early ); Upper gastrointestinal endoscopy (N/A, 2020); and Colonoscopy (N/A, 2020). Social History:  reports that she has never smoked. She has never used smokeless tobacco. She reports that she does not drink alcohol and does not use drugs. Family History: family history includes Heart Attack (age of onset: 58) in her mother; Hypertension in her brother, sister, and sister; Other in her daughter, father, sister, and son; Rheum Arthritis in her brother and sister; Thyroid Disease in her sister. The patients home medications have been reviewed.     Allergies: Amoxicillin    -------------------------------------------------- RESULTS -------------------------------------------------    LABS:  Results for orders placed or performed during the hospital encounter of 21   CBC Auto Differential   Result Value Ref Range    WBC 9.3 4.5 - 11.5 E9/L    RBC 3.50 3.50 - 5.50 E12/L    Hemoglobin 10.5 (L) 11.5 - 15.5 g/dL    Hematocrit 34.9 34.0 - 48.0 %    MCV 99.7 80.0 - 99.9 fL    MCH 30.0 26.0 - 35.0 pg    MCHC 30.1 (L) 32.0 - 34.5 %    RDW 14.9 11.5 - 15.0 fL    Platelets 992 (H) 821 - 450 E9/L    MPV 9.5 7.0 - 12.0 fL    Neutrophils % 75.2 43.0 - 80.0 %    Immature Granulocytes % 2.2 0.0 - 5.0 %    Lymphocytes % 7.6 (L) 20.0 - 42.0 %    Monocytes % 12.2 (H) 2.0 - 12.0 %    Eosinophils % 2.3 0.0 - 6.0 %    Basophils % 0.5 0.0 - 2.0 %    Neutrophils Absolute 7.00 1.80 - 7.30 E9/L    Immature Granulocytes # 0.20 E9/L    Lymphocytes Absolute 0.71 (L) 1.50 - 4.00 E9/L    Monocytes Absolute 1.13 (H) 0.10 - 0.95 E9/L    Eosinophils Absolute 0.21 0.05 - 0.50 E9/L    Basophils Absolute 0.05 0.00 - 0.20 J8/K   Basic Metabolic Panel   Result Value Ref Range    Sodium 140 132 - 146 mmol/L    Potassium 3.7 3.5 - 5.0 mmol/L    Chloride 100 98 - 107 mmol/L    CO2 31 (H) 22 - 29 mmol/L    Anion Gap 9 7 - 16 mmol/L    Glucose 107 (H) 74 - 99 mg/dL    BUN 22 6 - 23 mg/dL    CREATININE 0.6 0.5 - 1.0 mg/dL    GFR Non-African American >60 >=60 mL/min/1.73    GFR African American >60     Calcium 9.1 8.6 - 10.2 mg/dL   Magnesium   Result Value Ref Range    Magnesium 1.6 1.6 - 2.6 mg/dL   Hepatic Function Panel   Result Value Ref Range    Total Protein 6.1 (L) 6.4 - 8.3 g/dL    Albumin 2.6 (L) 3.5 - 5.2 g/dL    Alkaline Phosphatase 118 (H) 35 - 104 U/L    ALT 11 0 - 32 U/L    AST 18 0 - 31 U/L    Total Bilirubin 0.2 0.0 - 1.2 mg/dL    Bilirubin, Direct <0.2 0.0 - 0.3 mg/dL    Bilirubin, Indirect see below 0.0 - 1.0 mg/dL   Lipase   Result Value Ref Range    Lipase 29 13 - 60 U/L   Troponin   Result Value Ref Range    Troponin 0.02 0.00 - 0.03 ng/mL   Brain Natriuretic Peptide   Result Value Ref Range    Pro-BNP 1,507 (H) 0 - 450 pg/mL   Protime-INR   Result Value Ref Range    Protime 13.5 (H) 9.3 - 12.4 sec    INR 1.2    Lactic Acid, Plasma   Result Value Ref Range    Lactic Acid 2.4 (H) 0.5 - 2.2 mmol/L   TSH without Reflex   Result Value Ref Range    TSH 1.350 0.270 - 4.200 uIU/mL   EKG 12 Lead   Result Value Ref Range    Ventricular Rate 93 BPM    Atrial Rate 93 BPM    P-R Interval 162 ms    QRS Duration 78 ms    Q-T Interval 352 ms    QTc Calculation (Bazett) 437 ms    P Axis 74 degrees    R Axis -12 degrees    T Axis 5 degrees       RADIOLOGY:  XR CHEST PORTABLE   Final Result   No significant change since the prior study. There are bilateral reticular   and patchy ground-glass airspace opacities which appear stable, likely   related to chronic lung changes      Prominent but stable cardiac silhouette             EKG:  This EKG is signed and interpreted by me.     Rate: 93  Rhythm: Sinus  Interpretation: no acute changes  Comparison: stable as compared to patient's most recent EKG      ------------------------- NURSING NOTES AND VITALS REVIEWED ---------------------------  Date / Time Roomed:  5/19/2021  6:34 AM  ED Bed Assignment:  23/23    The nursing notes within the ED encounter and vital signs as below have been reviewed. Patient Vitals for the past 24 hrs:   BP Temp Temp src Pulse Resp SpO2 Height Weight   05/19/21 0954 (!) 112/57 97.8 °F (36.6 °C) Oral 78 16 98 % -- --   05/19/21 0732 (!) 105/54 -- -- 86 16 100 % -- --   05/19/21 0646 -- 97.8 °F (36.6 °C) -- -- -- -- -- --   05/19/21 0644 110/64 -- -- 94 16 97 % 5' 4\" (1.626 m) 120 lb (54.4 kg)       Oxygen Saturation Interpretation: Normal    ------------------------------------------ PROGRESS NOTES ------------------------------------------    Counseling:  I have spoken with the patient and caregiver and discussed todays results, in addition to providing specific details for the plan of care and counseling regarding the diagnosis and prognosis. Their questions are answered at this time and they are agreeable with the plan of admission.    --------------------------------- ADDITIONAL PROVIDER NOTES ---------------------------------  This patient's ED course included: a personal history and physicial examination, re-evaluation prior to disposition, multiple bedside re-evaluations, cardiac monitoring and continuous pulse oximetry    This patient has remained hemodynamically stable during their ED course. Diagnosis:  1. Chest pain, unspecified type        Disposition:  Patient's disposition: Admit to telemetry  Patient's condition is stable.          Dedrick York MD  Resident  05/20/21 8666

## 2021-05-19 NOTE — H&P
Cardiovascular: Positive for chest pain. Negative for palpitations. Gastrointestinal: Positive for blood in stool, diarrhea and nausea. Negative for abdominal pain and vomiting. Endocrine: Negative for polydipsia and polyuria. Genitourinary: Negative for dysuria and hematuria. Musculoskeletal: Negative for arthralgias and myalgias. Skin: Positive for wound (over sacrum, stated it is 'deep'). Negative for color change. Neurological: Negative for dizziness, numbness and headaches.          Past Medical History:   Diagnosis Date    Anticoagulated     takes Xarelto    Atrial [address] [address]     follows with Dr. Socorro Sage fracture     COVID-19 7247    Diastolic dysfunction     stage I    Difficulty walking     uses walker    Diverticular disease     identified on colonoscopy in 2011    Elevated CA-125     referred to Dr Meeta Metz; no work up planned     Hiatal hernia     s/p Nissen with recurrence; Dr David Hickman    Hypertension     Hypothyroid     Thyroiditis noted on labs in     Meningocele spinal [facility])     thoracic; Dr Deidra Maria; no plans for surgery    Neuropathy     chronic; 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  / at finger, and feet, legs    JANETTE (obstructive sleep apnea)     not using cpap or bipap    Osteoporosis     PAF (paroxysmal atrial fibrillation) (HCC)     anticoagulation per cardiology  / follows with Dr. Kirsten Lloyd    Pain in both lower legs 2020    Patellar fracture     Rheumatoid arthritis with rheumatoid factor ([facility]     Rheumatoid arthritis(714.0)     Dr David Nieto; on [facility])    Rib fractures 2014    Seasonal allergies     Skin cancer of lip     squamous cell    Wears dentures          Past Surgical History:   Procedure Laterality Date    CARDIAC CATHETERIZATION      CARPAL TUNNEL RELEASE Bilateral      SECTION      COLONOSCOPY  2011    Dr Hector Caraballo; diverticular disease; repeat in     COLONOSCOPY N/A 2020    COLONOSCOPY WITH BIOPSY performed by Cathryn Delong MD at [address] [place] [address]  early 2000's    TOTAL KNEE ARTHROPLASTY  6/2007    bilateral    UPPER GASTROINTESTINAL ENDOSCOPY N/A 4/29/2020    EGD BIOPSY performed by Cathryn Delong MD at [facility] ENDOSCOPY       Medications Prior to Admission:    Not in a hospital admission. Allergies:    Amoxicillin    Social History:    reports that she has never smoked. She has never used smokeless tobacco. She reports that she does not drink alcohol and does not use drugs. Family History:   family history includes Heart Attack (age of onset: 58) in her mother; Hypertension in her brother, sister, and sister; Other in her daughter, father, sister, and son; Rheum Arthritis in her brother and sister; Thyroid Disease in her sister. PHYSICAL EXAM:    Vitals:  BP (!) 112/57   Pulse 78   Temp 97.8 °F (36.6 °C) (Oral)   Resp 16   Ht 5' 4\" (1.626 m)   Wt 120 lb (54.4 kg)   SpO2 98%   BMI 20.60 kg/m²     General:  Awake, alert, oriented X 3. Frail elderly female, conversing without any difficulty. No apparent distress. On 2.5 L oxygen via nasal cannula  HEENT:  Normocephalic, atraumatic. No scleral icterus. No conjunctival injection. No nasal discharge. Neck:  Supple, no cervical adenopathy  Heart:  RRR, systolic murmur auscultated over right upper sternal border, no gallops or rubs  Lungs: crackles bibasilar, upper lobes CTA bilaterally, bilat symmetrical expansion, no wheeze, or rhonchi  Abdomen:   Bowel sounds present, soft, nontender, no masses, no organomegaly, no peritoneal signs; on rectal exam, patient had brown stool and visible external hemorrhoids, FOBT positive  Extremities:  No clubbing, cyanosis, or edema  Skin:  Warm and dry, open decubitus ulcer in sacral area without obvious discharge, mildly erythematous around wound opening, appears deeper than currently visible  Neuro:  Cranial nerves 2-12 intact, no focal deficits    LABS:  Recent Results (from the past 24 hour(s))   EKG 12 Lead    Collection Time: 05/19/21  6:47 AM   Result Value Ref Range    Ventricular Rate 93 BPM    Atrial Rate 93 BPM    P-R Interval 162 ms    QRS Duration 78 ms    Q-T Interval 352 ms    QTc Calculation (Bazett) 437 ms    P Axis 74 degrees    R Axis -12 degrees    T Axis 5 degrees   CBC Auto Differential    Collection Time: 05/19/21  6:56 AM   Result Value Ref Range    WBC 9.3 4.5 - 11.5 E9/L    RBC 3.50 3.50 - 5.50 E12/L    Hemoglobin 10.5 (L) 11.5 - 15.5 g/dL    Hematocrit 34.9 34.0 - 48.0 %    MCV 99.7 80.0 - 99.9 fL    MCH 30.0 26.0 - 35.0 pg    MCHC 30.1 (L) 32.0 - 34.5 %    RDW 14.9 11.5 - 15.0 fL    Platelets 610 (H) 862 - 450 E9/L    MPV 9.5 7.0 - 12.0 fL    Neutrophils % 75.2 43.0 - 80.0 %    Immature Granulocytes % 2.2 0.0 - 5.0 %    Lymphocytes % 7.6 (L) 20.0 - 42.0 %    Monocytes % 12.2 (H) 2.0 - 12.0 %    Eosinophils % 2.3 0.0 - 6.0 %    Basophils % 0.5 0.0 - 2.0 %    Neutrophils Absolute 7.00 1.80 - 7.30 E9/L    Immature Granulocytes # 0.20 E9/L    Lymphocytes Absolute 0.71 (L) 1.50 - 4.00 E9/L    Monocytes Absolute 1.13 (H) 0.10 - 0.95 E9/L    Eosinophils Absolute 0.21 0.05 - 0.50 E9/L    Basophils Absolute 0.05 0.00 - 0.20 S3/Z   Basic Metabolic Panel    Collection Time: 05/19/21  6:56 AM   Result Value Ref Range    Sodium 140 132 - 146 mmol/L    Potassium 3.7 3.5 - 5.0 mmol/L    Chloride 100 98 - 107 mmol/L    CO2 31 (H) 22 - 29 mmol/L    Anion Gap 9 7 - 16 mmol/L    Glucose 107 (H) 74 - 99 mg/dL    BUN 22 6 - 23 mg/dL    CREATININE 0.6 0.5 - 1.0 mg/dL    GFR Non-African American >60 >=60 mL/min/1.73    GFR African American >60     Calcium 9.1 8.6 - 10.2 mg/dL   Magnesium    Collection Time: 05/19/21  6:56 AM   Result Value Ref Range    Magnesium 1.6 1.6 - 2.6 mg/dL   Hepatic Function Panel    Collection Time: 05/19/21  6:56 AM   Result Value Ref Range    Total Protein 6.1 (L) 6.4 - 8.3 g/dL Albumin 2.6 (L) 3.5 - 5.2 g/dL    Alkaline Phosphatase 118 (H) 35 - 104 U/L    ALT 11 0 - 32 U/L    AST 18 0 - 31 U/L    Total Bilirubin 0.2 0.0 - 1.2 mg/dL    Bilirubin, Direct <0.2 0.0 - 0.3 mg/dL    Bilirubin, Indirect see below 0.0 - 1.0 mg/dL   Lipase    Collection Time: 05/19/21  6:56 AM   Result Value Ref Range    Lipase 29 13 - 60 U/L   Troponin    Collection Time: 05/19/21  6:56 AM   Result Value Ref Range    Troponin 0.02 0.00 - 0.03 ng/mL   Brain Natriuretic Peptide    Collection Time: 05/19/21  6:56 AM   Result Value Ref Range    Pro-BNP 1,507 (H) 0 - 450 pg/mL   Protime-INR    Collection Time: 05/19/21  6:56 AM   Result Value Ref Range    Protime 13.5 (H) 9.3 - 12.4 sec    INR 1.2    Lactic Acid, Plasma    Collection Time: 05/19/21  6:56 AM   Result Value Ref Range    Lactic Acid 2.4 (H) 0.5 - 2.2 mmol/L   TSH without Reflex    Collection Time: 05/19/21  6:56 AM   Result Value Ref Range    TSH 1.350 0.270 - 4.200 uIU/mL   COVID-19, Rapid    Collection Time: 05/19/21  9:55 AM    Specimen: Nasopharyngeal Swab   Result Value Ref Range    SARS-CoV-2, NAAT Not Detected Not Detected       ASSESSMENT/PLAN:      Active Hospital Problems    Diagnosis Date Noted    Chest pain [R07.9] 05/19/2021       Chest pain  -hx of chronic respiratory failure with ILD secondary to RA, HFpEF, COPD, A. Fib and GERD  -cardiac vs GI in etiology, less likely cardiac from lab work and pt presentation  -last stress test on 2/2019 EF 95% and was negative for ischemia. F/u with Dr. Bey Or outpatient  -currently asymptomatic. EKG was NSR with premature supraventricular complexes.  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  Fib  -on BB, Xarelto and Flecanide  -continue tele  -cardiology consulted    HFpEF  -pt doesn't look volume overloaded  -on Lasix 20 mg daily  -continue home amlodipine  -strict input and output monitoring  -daily weights    GI Bleed  -H/H 10.5/34.9, stable  -FOBT positive, has declined colonoscopy in the past  -most recent colonoscopy was done approximately 1 year ago and showed diverticular disease  -external hemorrhoids on exam  -continue monitoring    Chronic Iron Deficiency Anemia  -possibly related to GI bleed  -Hgb stable from previous discharge earlier this month  -continue iron tablets  -Monitor    Decubitus ulcer  -wound culture from 5/8/21 was positive for proteus  -wound care consulted  -specialty bed with low air loss ordered    Hypothyroidism  -continue home synthroid    Constipation  -continue home dulcolax    Rheumatoid Arthritis  -continue home celebrex    Parasthesia of Bilateral Lower Extremities  -continue home gabapentin  -continue home duloxetine    CODE: FULL  Diet: cardiac  DVT ppx: Juan Ryan DO   11:27 AM  5/19/2021

## 2021-05-19 NOTE — CONSULTS
Inpatient Cardiology Consultation      Reason for Consult: Chest pain    Consulting Physician: Dr. Dillan Tinajero    Requesting Physician:  Dr. Lori Brody    Date of Consultation: 5/19/2021    HISTORY OF PRESENT ILLNESS:   Ms. Bernardo Heredia is a 80year old female who is known to Dr. Betty Guillen 3/2019 (previously followed with Dr. Aide Beck). She also follows with EP (Dr. Jorge Burt). PMH: PAF, history of SVT (on flecainide), HFpEF, RA (wheelchair bound/Tiffanie lift due to arthritis), anemia, JANETTE, hypertension, chronic respiratory failure with continuous home oxygen use (2.5 L of oxygen), COPD and stage IV pressure ulcer around her coccyx (developed 2 months ago, follows with wound center). Prior to patient presenting to [facility] on 5/19/2021, patient woke up at approximately 5:15 AM on 5/19/2021 with a sudden onset of rapid heart beat, shortness of breath, nausea and midsternal chest \"tightness\" 5/10, nonradiating, and mild diaphoresis. Patient resides with her son and daughter-in-law and she was advised to take deep breaths and to vagal maneuvers and after 30 minutes her heart rate went from 1 40-80 and her symptoms had resolved. Her blood pressure at that time was 73/45 and she appeared clammy therefore EMS was summoned. Patient has had similar episodes in the past with a history of SVT. Patient is wheelchair-bound for which she denies chest pain or shortness of breath at rest.  Her caregiver who is at the bedside denies any recent swelling in her legs, orthopnea, PND, weight gain or weight loss. She does report having one episode of diarrhea on 5/17/2021 with scant amount of bright red blood. Other than arthritic pain she has been in her normal state of health. She chronically wears 2.5 L of oxygen continuous throughout the day. Upon arrival to the ED: /64, heart rate 94, 97.8 °F, 100% on 2 L nasal cannula.   Labs: Sodium 140, potassium 3.7, BUN 22, creatinine 0.6, lactic acid 2.4, proBNP 1507, troponin 0 0.02, albumin 2.6, alk phos 118, TSH 1.350, WBC 9.3, H/H 10.5/34.9, platelet count 183. Covid-19: Negative. CXR: No significant change since prior, there are bilateral reticular and patchy groundglass opacities which appear stable, likely to chronic lung changes. ER medications: 500 cc IV fluid bolus, Lopressor 25 mg PO x 1 She was admitted to a telemetry monitoring unit for further evaluation management. Currently she is sitting up in bed and appears to be in no acute distress. Denies chest pain or shortness of breath. She is currently sinus rhythm on telemetry with no arrhythmias. VSS. Please note: past medical records were reviewed per electronic medical record (EMR) - see detailed reports under Past Medical/ Surgical History. Past Medical History:    1. Osteoporosis. 2. Thyroid abnormality. 3. Hypertension  4. Palpitations with negative ambulatory monitor, . 5. Exercise MPS, 2003. 8 METS, EF 68%, normal perfusion. 6. Echo, 2004. Sclerotic mildly insufficient AV, mild MR, TR and PI. RVSP 29, normal systolic and diastolic function. 7. Carotid US, 2004. Minimal LICA plaque with <15% narrowing, normal SHAUNA, normal vertebral flow bilaterally.    8. Family history positive. Mother had MI at age 79 and  at 80 of CHF. Sister requiring MVR for MR and prolapse. 9. Lifetime nonsmoker. 10. No known drug allergies. 11. Event monitor, 2006. Short run of nonsustained SVT at 150 bpm.    12. Echo, 2006. Normal EF, no LVH, normal diastolic function, E/E 12, mild MR, RVSP 43, mild dilatation of the right heart chambers. 13. Ambulatory monitor demonstrated nonsustained SVT at 150 bpm, correlating with symptoms, metoprolol dose doubled to 50 mg bid, 2007. 14. Bilateral knee replacements 2007, Dr. Anna Ruffin. Well tolerated. 15. Dipyridamole stress MPS, 11/10/2008. No TID, normal perfusion, mild soft tissue attenuation, EF 89%. No ischemia.  Low risk/low probability. 16. Echo, 11/10/2008. Stage I diastolic dysfunction, borderline/mild LAE. EF normal. Mild-moderate AR. Mild PHTN with RVSP 43.    17. Large hiatal hernia requiring Nissen fundoplication, [facility], 06/2011.    18. Rheumatoid arthritis documented Fall 2010. Patient being treated with methotrexate. 19. Echo, 05/23/2011. Normal LV size, wall thickening and EF. Stage I diastolic dysfunction. Normal LV filling pressures. Mild ANDREAS. MAC with mild MR, mild-moderate TR, moderately elevated RVSP 50-55 mmHg.    20. Lexiscan MPS, 05/25/2011. Normal. No TID. Wall motion normal, EF above 70%.    21. [facility] admission, 06/02/2011 with anorexia, nausea, weight loss, dyspnea. EKG sinus rhythm, borderline low voltage, minimal nonspecific ST-T abnormalities. , BMP normal. Hb 10, WBC and platelet count normal. T4 elevated at 13, free T4 elevated at 2.16, TSH low at 0.079.    22. Periop AF while at [facility] for Nissen fundoplication. Treated with amiodarone. 23. Sharp chest pain, 07/2011. CT chest negative for PE.    24. 24-hour Holter monitor, 11/07/2012. Sinus rhythm with occasional isolated PVC's, frequent PAC's and several short runs of SVT up to 31 beats in duration. No prolonged pauses and no symptoms.    25. [facility] admission, 02/04/2013 with two to three weeks of worsening diarrhea. She had been on OP antibiotic therapy for a nasal infection. CBC, LFT's, BMP normal except Cr 1.4. CXR negative except for mild cardiomegaly. EKG revealed sinus rhythm with possible old inferior infarction. Cardiac enzymes negative.    26. Chronic back pain and leg pain which may be due to lumbar spinal stenosis. The patient receiving epidural injections which require temporarily stopping her anticoagulant therapies.    27. Echo, 09/08/2014. Normal LV size, wall thickness, regional wall motion and EF. Stage II diastolic dysfunction. E/E' 16. Moderate LAE, mild HEATHER. Mild MAC, mild MR and TR. RVSP 45-50 mmHg. 28. Lexiscan MPS, 03/31/2015. Normal perfusion. Normal EF. No TID. Low risk/low probability. 29. Hospitalization, 03/2016, with recurrent atrial fibrillation.    30. Hospitalization in [place], [place], mid April 2016, records pending. Patient reportedly had catheterization, which showed no significant coronary disease. 31. Flecainide started 05/23/2016. Prior EP evaluation. Maintaining SR. Chronic anticoagulation with xarelto. 32. 48 hour Holter monitor from 11/16-11/18/2017 showed sinus rhythm with sinus arrhythmia, short runs of paroxysmal atrial tachycardia, up to 7 beats in duration, but no AV block, atrial fibrillation or symptoms. 33. Obstructive sleep apnea.  Patient evaluated by Dr. Meli Garcia and refuses CPAP therapy.    34. Echocardiogram, 05/02/2017, normal left ventricular size, wall thickness and systolic function with ejection fraction 89%, stage 1 diastolic relaxation abnormalities, mild left atrial enlargement, normal mitral valve structure and function, mild tricuspid insufficiency with mildly elevated right ventricular systolic pressure at 40 mmHg.  Aortic sclerosis without stenosis but with mild aortic insufficiency. 28. Chronic HFpEF with hospitalization for CHF exacerbation in 4/2021  36. Negative stress test in 2/2019 Lexiscan nuclear stress test: 2/8/19 No evidence of stress-induced left ventricular myocardial ischemia. LVEF 95%. 37. Echocardiogram: 2/25/21 (Dr. Laura Ardon)  Left ventricular internal dimensions were normal in diastole and systole. Ejection fraction is visually estimated at 65%. No regional wall motion abnormalities seen. Normal left ventricular ejection fraction. The left atrium is borderline dilated. Moderate mitral annular calcification. Mild mitral regurgitation is present. The aortic valve appears mildly sclerotic. Mild aortic regurgitation is noted. Medications Prior to admit:  Prior to Admission medications    Medication Sig Start Date End Date Taking?  Authorizing Provider   celecoxib (CELEBREX) 200 MG capsule Take 200 mg by mouth daily   Yes Historical Provider, MD   DULoxetine HCl 40 MG CSDR Take 40 mg by mouth nightly   Yes Historical Provider, MD   omeprazole (PRILOSEC) 40 MG delayed release capsule Take 40 mg by mouth 2 times daily   Yes Historical Provider, MD   rivaroxaban (XARELTO) 15 MG TABS tablet Take 15 mg by mouth daily (with breakfast)   Yes Historical Provider, MD   montelukast (SINGULAIR) 10 MG tablet Take 10 mg by mouth nightly   Yes Historical Provider, MD   acetaminophen (TYLENOL) 500 MG tablet Take 500 mg by mouth 2 times daily   Yes Historical Provider, MD   OXYGEN Inhale 2.5 L into the lungs continuous   Yes Historical Provider, MD   gabapentin (NEURONTIN) 100 MG capsule Take 1 capsule by mouth 2 times daily for 90 days.  5/4/21 8/2/21 Yes Wagner Villagomez MD   levothyroxine (SYNTHROID) 100 MCG tablet Take 1 tablet by mouth Daily 5/4/21  Yes Wagner Villagomez MD   KLOR-CON/EF 25 MEQ disintegrating tablet DISSOLVE ONE PACKET IN 4OZ WATER OR JUICE AND DRINK ONCE A DAY 5/3/21  Yes Wagner Villagomez MD   amLODIPine (NORVASC) 5 MG tablet Take 1 tablet by mouth daily 4/15/21  Yes Marcus Brothers V, DO   furosemide (LASIX) 20 MG tablet Take 1 tablet by mouth daily 4/15/21  Yes Marcus Sanchezers V, DO   Arformoterol Tartrate (BROVANA) 15 MCG/2ML NEBU Take 2 mLs by nebulization 2 times daily 4/14/21  Yes April FRAN Toscano CNP   budesonide (PULMICORT) 0.5 MG/2ML nebulizer suspension Take 2 mLs by nebulization 2 times daily 4/14/21  Yes April FRAN Toscano CNP   metoprolol tartrate (LOPRESSOR) 50 MG tablet Take 50 mg by mouth 2 times daily  3/30/21  Yes Historical Provider, MD   flecainide (TAMBOCOR) 150 MG tablet Take 0.5 tablets by mouth 2 times daily 3/9/21  Yes Prosper Mayo MD   valsartan (DIOVAN) 320 MG tablet Take 1 tablet by mouth daily 3/10/21  Yes Prosper Mayo MD   vitamin B-12 (CYANOCOBALAMIN) 1000 MCG tablet Take 1,000 mcg by mouth daily   Yes Historical Provider, MD   polyethyl glycol-propyl glycol 0.4-0.3 % (SYSTANE) 0.4-0.3 % ophthalmic solution Place 1 drop into both eyes 2 times daily as needed for Dry Eyes    Yes Historical Provider, MD   Probiotic Product (ALIGN PO) Take 1 capsule by mouth daily    Yes Historical Provider, MD   fexofenadine (ALLEGRA) 180 MG tablet Take 180 mg by mouth daily.    Yes Historical Provider, MD   leucovorin calcium (WELLCOVORIN) 5 MG tablet Take 25 mg by mouth once a week *TUESDAYS* 7/19/13  Yes Historical Provider, MD   docusate sodium (COLACE) 100 MG capsule Take 100 mg by mouth daily as needed for Constipation    Historical Provider, MD   famotidine (PEPCID) 40 MG tablet Take 40 mg by mouth nightly as needed    Historical Provider, MD   fluticasone (FLONASE) 50 MCG/ACT nasal spray 1 spray by Each Nostril route daily as needed for Rhinitis or Allergies    Historical Provider, MD   levalbuterol Davied Feathers) 0.31 MG/3ML nebulization Take 3 mLs by nebulization every 6 hours as needed for Wheezing 3/5/21   FRAN Freeman   albuterol sulfate HFA (VENTOLIN HFA) 108 (90 Base) MCG/ACT inhaler Inhale 2 puffs into the lungs 4 times daily as needed for Wheezing 2/17/21   ÁngelPorcupine DO Prasanth   ondansetron (ZOFRAN) 4 MG tablet Take 1 tablet by mouth every 8 hours as needed for Nausea or Vomiting 7/23/19   Mini Meade MD   methotrexate 2.5 MG tablet Take by mouth once a week *MONDAYS*  2T(5MG) @ LUNCH & 4T(10MG) PM    Historical Provider, MD       Current Medications:    Current Facility-Administered Medications: flecainide (TAMBOCOR) tablet 50 mg, 50 mg, Oral, BID  albuterol (PROVENTIL) nebulizer solution 2.5 mg, 2.5 mg, Nebulization, BID PRN  amLODIPine (NORVASC) tablet 5 mg, 5 mg, Oral, Daily  Arformoterol Tartrate (BROVANA) nebulizer solution 15 mcg, 15 mcg, Nebulization, BID  budesonide (PULMICORT) nebulizer suspension 500 mcg, 0.5 mg, Nebulization, BID  celecoxib (CELEBREX) capsule 200 mg, 200 mg, Oral, Daily  docusate (COLACE, DULCOLAX) CAPS 100 cough, orthopnea or PND. No hemoptysis   · Gastrointestinal: Denies hematemesis or anorexia. No hematochezia or melena    · Genitourinary: Denies urgency, dysuria or hematuria. · Musculoskeletal: + Wheelchair-bound/Tiffanie lift. Bilateral hands and feet arthritic pain. · Integumentary: Denies rash, hives or pruritis   · Neurological: Denies dizziness, headaches or seizures. No numbness or tingling  · Psychiatric: Denies anxiety.  + Depression. · Endocrine: Denies temperature intolerance. No recent weight change. .  · Hematologic/Lymphatic: Denies abnormal bruising or bleeding. No swollen lymph nodes    PHYSICAL EXAM:   BP (!) 112/57   Pulse 78   Temp 97.8 °F (36.6 °C) (Oral)   Resp 16   Ht 5' 4\" (1.626 m)   Wt 120 lb (54.4 kg)   SpO2 98%   BMI 20.60 kg/m²   CONST: Elderly  female who appears of stated age. Awake, alert and cooperative. No apparent distress. HEENT:   Head- Normocephalic, atraumatic   Eyes- Conjunctivae pink, anicteric  Throat- Oral mucosa pink and moist  Neck-  No stridor, trachea midline, no jugular venous distention. No carotid bruit. CHEST: Chest symmetrical and non-tender to palpation. No accessory muscle use or intercostal retractions  RESPIRATORY: Lung sounds -diminished breath sounds. On 2.5L O2.  CARDIOVASCULAR:     Heart Inspection- shows no noted pulsations  Heart Palpation- no heaves or thrills; PMI is non-displaced   Heart Ausculation- Regular rate and rhythm, no murmur. No s3, s4 or rub   PV: + Bilateral tender shins to light palpation, no lower extremity edema. + varicosities. Pedal pulses palpable, no clubbing or cyanosis   ABDOMEN: Soft, non-tender to light palpation. Bowel sounds present. No palpable masses no organomegaly; no abdominal bruit  MS: Good muscle strength and tone. No atrophy or abnormal movements. : Deferred  SKIN: Warm and dry no statis dermatitis or ulcers   NEURO / PSYCH: Oriented to person, place and time. Speech clear and appropriate. Follows all commands. Flat affect. DATA:    ECG: As per Dr. Lucero Arboleda to follow. Tele strips: Sinus rhythm. Diagnostic:    Labs:   CBC:   Recent Labs     05/19/21  0656   WBC 9.3   HGB 10.5*   HCT 34.9   *     BMP:   Recent Labs     05/19/21  0656      K 3.7   CO2 31*   BUN 22   CREATININE 0.6   LABGLOM >60   CALCIUM 9.1     Mag:   Recent Labs     05/19/21  0656   MG 1.6     Phos: No results for input(s): PHOS in the last 72 hours. TFT:   Lab Results   Component Value Date    TSH 1.350 05/19/2021    T1UYEBS 13.0 (H) 06/04/2011    THYROIDAB SEE BELOW 04/24/2012    FT3 2.3 06/04/2011    T4FREE 1.65 01/06/2021      HgA1c:   Lab Results   Component Value Date    LABA1C 5.4 02/12/2021     No results found for: EAG  proBNP:   Recent Labs     05/19/21  0656   PROBNP 1,507*     PT/INR:   Recent Labs     05/19/21  0656   PROTIME 13.5*   INR 1.2     APTT:No results for input(s): APTT in the last 72 hours. CARDIAC ENZYMES:  Recent Labs     05/19/21  0656   TROPONINI 0.02     FASTING LIPID PANEL:  Lab Results   Component Value Date    CHOL 182 10/22/2019    HDL 44 10/22/2019    LDLCALC 110 10/22/2019    TRIG 140 10/22/2019     LIVER PROFILE:  Recent Labs     05/19/21  0656   AST 18   ALT 11   LABALBU 2.6*       CXR: 5/19/2021  No significant change since the prior study.  There are bilateral reticular   and patchy ground-glass airspace opacities which appear stable, likely   related to chronic lung changes       Prominent but stable cardiac silhouette     Assessment/Plan as per Dr. Lucero Arboleda. Electronically signed by FRAN Alfred CNP on 5/19/21 at 12:09 PM EDT    The above documentation has been prepared under my direction and personally reviewed by me in its entirety. I confirm that the note above accurately reflects all work, treatment, procedures, and medical decision making performed by me. The patient's history was independently obtained. The patient was independently examined. Electrocardiogram, prior and present cardiovascular assessment, and laboratory studies were reviewed. The patient is an 26-year-old white female known to [facility] with primary cardiovascular care provided by Humaira Sotelo as well as the electrophysiology service. She has a known history of paroxysmal atrial fibrillation with maintenance of sinus rhythm on antiarrhythmic therapy with flecainide and a beta-blocker as well as that of oral anticoagulation with associated evidence of diastolic heart failure as well as that of rheumatoid arthritis on immunosuppressive therapy and a previous history of obstructive sleep apnea resolved with weight reduction. He was hospitalized in February, 2021 with a COVID-19 infection with associated pneumonia and hypoxic respiratory failure with supplemental oxygen required at the time of hospital discharge with extremely limited functional capabilities following her COVID-19 infection. During that hospitalization, a repeat echocardiogram demonstrated evidence of a normal-sized left ventricular chamber with normal left ventricular systolic function and indeterminate diastolic function with mitral annular calcification mild mitral regurgitation. During hospitalization transient episodes of paroxysmal atrial fibrillation were noted with durations of less than 5 seconds and without needs of modification of her medical regimen. He is continued to require supplemental oxygen with the subsequent development of a pressure ulcer on her coccyx.   She remains stable from a cardiovascular standpoint until awakening in the early morning hours on the day of assessment with the sensation of relatively simultaneously noted midsternal chest discomfort described as a tightness sensation with associated dyspnea and diaphoresis as well as that of palpitations with reported heart rates of approximately 140 bpm and symptoms apparently abated with vagal maneuvers with an uncertain duration potentially of up to 30 minutes. Following the resolution of symptoms, she presented to the emergency room with a resting electrocardiogram reviewed time of evaluation demonstrating evidence of sinus rhythm with occasional ventricular ectopy and no additional significant abnormalities and a chest x-ray concerning no evidence of cardiomegaly with evidence of interstitial infiltrates. Initial cardiac biomarkers were normal with a subsequent equivocal troponin level and normal CPK level with a normal myocardial index and a proBNP level of 1505 pg/mL significantly decreased from that noted approximately 1 month earlier. At the time of evaluation, her medications and allergies were reviewed as well as that of her past medical history and review of systems as documented. On examination, she appears chronically ill and in no present discomfort nor distress. She is hemodynamically stable vital signs as documented. Jugular venous pressure appears normal with no identified carotid bruits. Lung fields are clear to auscultation. Cardiac examination is notable for a regular rate and rhythm no gallop rhythm or cardiac murmur. A benign abdominal examination is present no significant peripheral edema noted. Diagnostic Assessment and Plan: On a clinical basis, the patient presents with an episode potentially compatible with recurrent paroxysmal atrial fibrillation with associated angina potentially on the basis of increased myocardial oxygen demands from her tachycardia. Presently, no significant electrocardiographic abnormalities are present and during hospitalization presently only a 4 beat episode of a narrow complex tachycardia has been noted with otherwise maintenance of sinus rhythm.   On this basis, her existing medical regimen will be maintained with needs of ongoing arrhythmia monitoring during the remainder of her observation with plans of additional cardiac biomarkers and a follow-up electrocardiogram to assess the presence or absence of evolutionary changes. Unless arrhythmias or additional abnormalities are documented during her hospital observation, no modification of her existing medical regimen will be undertaken with plans of additional arrhythmia monitoring following hospital discharge. At the time of evaluation this is been extensively discussed with she and her daughter, the latter via transtelephonic communication during her evaluation. Independent of findings, continued aggressive risk factor modification of blood pressure and serum lipids will remain essential to reducing her risk of future atherosclerotic development. Thank you for allowing me to participate in your patient's care. Please feel free to contact me if you have any questions or concerns. Karrie Jarquin.  Laura Ardon, [id number] [facility]

## 2021-05-19 NOTE — PROGRESS NOTES
Message sent to [facility] cardiology NP via [employer] re: troponin 0.05. Read at 1601.

## 2021-05-19 NOTE — PROGRESS NOTES
Unable to visit. Nurse in room  Will attempt tomorrow  Jose Roberto Amend.  Jamir Hernandez, CNS, Wound Care

## 2021-05-19 NOTE — ED NOTES
-Medication reconciliation is now complete:    Medications ADDED:  -FEXOFENADINE 180MG: Take One Tablet By Mouth Once Daily  -FLUTICASONE 50MCG/ACT NASAL SPRAY: Use 2 Sprays in Each Nostril Once Daily As Needed  -SYSTANE 0.4-0.3%: Instill 1 Drops Into Each Eye Once Daily As Needed  -ALIGN PROBIOTIC: Take One Capsule By Mouth Once Daily  -VITAMIN B12: Take One Tablet By Mouth Once Daily  -MONTELUKAST 10MG: Take One Tablet By Mouth Once Nightly   -ACETAMINOPHEN 500MG: Take One Tablet By Mouth Twice Daily    Medications CHANGED:   Patient was taking: DOCUSATE SODIUM 100MG: Take One Capsule By Mouth Once Daily    Patient is now taking: DOCUSATE SODIUM 100MG: Take One Capsule By Mouth Once Daily As Needed For Constipation      Patient was taking  FAMOTIDINE 40MG: Take One Tablet By Mouth Once Nightly     Patient is now taking: FAMOTIDINE 40MG:  Take One Tablet By Mouth Once Nightly As Needed    IMPORTANT: When Reconciling Medication at Bedside with Daughter, Moy Massey on the Phone and 1 of 2 Caregivers present in the room they were not 100% on if Patient has been receiving  Leucovorin or Methotrexate since last Hospital stay here(04/06/2021-04/14/2021).     Medication is Ordered as the Following:    LEUCOVORIN 5MG:  Take Five Tablets(25MG) By Mouth Once Weekly on Tuesdays    METHOTREXATE 2.5MG: Take By Mouth Once Weekly on Mondays  Two Tablets(5MG) @ Lunch & Four Tablets(10mg) in PM

## 2021-05-20 ENCOUNTER — TELEPHONE (OUTPATIENT)
Dept: CARDIOLOGY CLINIC | Age: 86
End: 2021-05-20

## 2021-05-20 VITALS
SYSTOLIC BLOOD PRESSURE: 119 MMHG | DIASTOLIC BLOOD PRESSURE: 57 MMHG | HEIGHT: 64 IN | OXYGEN SATURATION: 97 % | TEMPERATURE: 97.8 F | WEIGHT: 133.13 LBS | BODY MASS INDEX: 22.73 KG/M2 | HEART RATE: 63 BPM | RESPIRATION RATE: 18 BRPM

## 2021-05-20 DIAGNOSIS — R00.2 PALPITATIONS: Primary | ICD-10-CM

## 2021-05-20 PROBLEM — R07.9 CHEST PAIN: Status: RESOLVED | Noted: 2021-05-19 | Resolved: 2021-05-20

## 2021-05-20 LAB
ANION GAP SERPL CALCULATED.3IONS-SCNC: 6 MMOL/L (ref 7–16)
BASOPHILS ABSOLUTE: 0.03 E9/L (ref 0–0.2)
BASOPHILS RELATIVE PERCENT: 0.4 % (ref 0–2)
BUN BLDV-MCNC: 19 MG/DL (ref 6–23)
CALCIUM SERPL-MCNC: 8.7 MG/DL (ref 8.6–10.2)
CHLORIDE BLD-SCNC: 100 MMOL/L (ref 98–107)
CO2: 28 MMOL/L (ref 22–29)
CREAT SERPL-MCNC: 0.7 MG/DL (ref 0.5–1)
EKG ATRIAL RATE: 62 BPM
EKG ATRIAL RATE: 62 BPM
EKG ATRIAL RATE: 70 BPM
EKG P AXIS: -30 DEGREES
EKG P AXIS: 12 DEGREES
EKG P AXIS: 12 DEGREES
EKG P-R INTERVAL: 172 MS
EKG P-R INTERVAL: 190 MS
EKG P-R INTERVAL: 192 MS
EKG Q-T INTERVAL: 390 MS
EKG Q-T INTERVAL: 428 MS
EKG Q-T INTERVAL: 432 MS
EKG QRS DURATION: 74 MS
EKG QRS DURATION: 78 MS
EKG QRS DURATION: 86 MS
EKG QTC CALCULATION (BAZETT): 421 MS
EKG QTC CALCULATION (BAZETT): 434 MS
EKG QTC CALCULATION (BAZETT): 442 MS
EKG R AXIS: -13 DEGREES
EKG R AXIS: -2 DEGREES
EKG R AXIS: 9 DEGREES
EKG T AXIS: 13 DEGREES
EKG T AXIS: 16 DEGREES
EKG T AXIS: 23 DEGREES
EKG VENTRICULAR RATE: 62 BPM
EKG VENTRICULAR RATE: 63 BPM
EKG VENTRICULAR RATE: 70 BPM
EOSINOPHILS ABSOLUTE: 0.23 E9/L (ref 0.05–0.5)
EOSINOPHILS RELATIVE PERCENT: 3.4 % (ref 0–6)
GFR AFRICAN AMERICAN: >60
GFR NON-AFRICAN AMERICAN: >60 ML/MIN/1.73
GLUCOSE BLD-MCNC: 109 MG/DL (ref 74–99)
HCT VFR BLD CALC: 28.2 % (ref 34–48)
HEMOGLOBIN: 8 G/DL (ref 11.5–15.5)
HYPOCHROMIA: ABNORMAL
IMMATURE GRANULOCYTES #: 0.15 E9/L
IMMATURE GRANULOCYTES %: 2.2 % (ref 0–5)
LYMPHOCYTES ABSOLUTE: 0.82 E9/L (ref 1.5–4)
LYMPHOCYTES RELATIVE PERCENT: 12 % (ref 20–42)
MCH RBC QN AUTO: 29.3 PG (ref 26–35)
MCHC RBC AUTO-ENTMCNC: 28.4 % (ref 32–34.5)
MCV RBC AUTO: 103.3 FL (ref 80–99.9)
MONOCYTES ABSOLUTE: 1.27 E9/L (ref 0.1–0.95)
MONOCYTES RELATIVE PERCENT: 18.6 % (ref 2–12)
NEUTROPHILS ABSOLUTE: 4.31 E9/L (ref 1.8–7.3)
NEUTROPHILS RELATIVE PERCENT: 63.4 % (ref 43–80)
OVALOCYTES: ABNORMAL
PDW BLD-RTO: 14.8 FL (ref 11.5–15)
PLATELET # BLD: 453 E9/L (ref 130–450)
PMV BLD AUTO: 9.8 FL (ref 7–12)
POIKILOCYTES: ABNORMAL
POLYCHROMASIA: ABNORMAL
POTASSIUM REFLEX MAGNESIUM: 4.1 MMOL/L (ref 3.5–5)
RBC # BLD: 2.73 E12/L (ref 3.5–5.5)
SODIUM BLD-SCNC: 134 MMOL/L (ref 132–146)
TROPONIN: 0.03 NG/ML (ref 0–0.03)
TROPONIN: 0.05 NG/ML (ref 0–0.03)
WBC # BLD: 6.8 E9/L (ref 4.5–11.5)

## 2021-05-20 PROCEDURE — 93010 ELECTROCARDIOGRAM REPORT: CPT | Performed by: INTERNAL MEDICINE

## 2021-05-20 PROCEDURE — 84484 ASSAY OF TROPONIN QUANT: CPT

## 2021-05-20 PROCEDURE — 99215 OFFICE O/P EST HI 40 MIN: CPT | Performed by: INTERNAL MEDICINE

## 2021-05-20 PROCEDURE — G0378 HOSPITAL OBSERVATION PER HR: HCPCS

## 2021-05-20 PROCEDURE — 6370000000 HC RX 637 (ALT 250 FOR IP): Performed by: FAMILY MEDICINE

## 2021-05-20 PROCEDURE — 93005 ELECTROCARDIOGRAM TRACING: CPT | Performed by: INTERNAL MEDICINE

## 2021-05-20 PROCEDURE — 6370000000 HC RX 637 (ALT 250 FOR IP): Performed by: STUDENT IN AN ORGANIZED HEALTH CARE EDUCATION/TRAINING PROGRAM

## 2021-05-20 PROCEDURE — 94640 AIRWAY INHALATION TREATMENT: CPT

## 2021-05-20 PROCEDURE — 2580000003 HC RX 258: Performed by: FAMILY MEDICINE

## 2021-05-20 PROCEDURE — 85025 COMPLETE CBC W/AUTO DIFF WBC: CPT

## 2021-05-20 PROCEDURE — 96366 THER/PROPH/DIAG IV INF ADDON: CPT

## 2021-05-20 PROCEDURE — 6360000002 HC RX W HCPCS: Performed by: FAMILY MEDICINE

## 2021-05-20 PROCEDURE — 36415 COLL VENOUS BLD VENIPUNCTURE: CPT

## 2021-05-20 PROCEDURE — 85018 HEMOGLOBIN: CPT

## 2021-05-20 PROCEDURE — 2700000000 HC OXYGEN THERAPY PER DAY

## 2021-05-20 PROCEDURE — 6360000002 HC RX W HCPCS: Performed by: STUDENT IN AN ORGANIZED HEALTH CARE EDUCATION/TRAINING PROGRAM

## 2021-05-20 PROCEDURE — 85014 HEMATOCRIT: CPT

## 2021-05-20 PROCEDURE — 99219 PR INITIAL OBSERVATION CARE/DAY 50 MINUTES: CPT | Performed by: FAMILY MEDICINE

## 2021-05-20 PROCEDURE — 80048 BASIC METABOLIC PNL TOTAL CA: CPT

## 2021-05-20 RX ORDER — MAGNESIUM SULFATE IN WATER 40 MG/ML
2000 INJECTION, SOLUTION INTRAVENOUS ONCE
Status: COMPLETED | OUTPATIENT
Start: 2021-05-20 | End: 2021-05-20

## 2021-05-20 RX ORDER — FERROUS SULFATE 325(65) MG
325 TABLET ORAL EVERY OTHER DAY
Qty: 30 TABLET | Refills: 5 | Status: SHIPPED | OUTPATIENT
Start: 2021-05-20

## 2021-05-20 RX ORDER — ACETAMINOPHEN 500 MG
500 TABLET ORAL 2 TIMES DAILY PRN
Status: DISCONTINUED | OUTPATIENT
Start: 2021-05-20 | End: 2021-05-20 | Stop reason: HOSPADM

## 2021-05-20 RX ADMIN — PANTOPRAZOLE SODIUM 40 MG: 40 TABLET, DELAYED RELEASE ORAL at 05:26

## 2021-05-20 RX ADMIN — POTASSIUM BICARBONATE 20 MEQ: 782 TABLET, EFFERVESCENT ORAL at 17:22

## 2021-05-20 RX ADMIN — DOCUSATE SODIUM 100 MG: 100 CAPSULE, LIQUID FILLED ORAL at 09:51

## 2021-05-20 RX ADMIN — POTASSIUM BICARBONATE 20 MEQ: 782 TABLET, EFFERVESCENT ORAL at 09:48

## 2021-05-20 RX ADMIN — GABAPENTIN 100 MG: 100 CAPSULE ORAL at 09:48

## 2021-05-20 RX ADMIN — HYDROCODONE BITARTRATE AND ACETAMINOPHEN 1 TABLET: 5; 325 TABLET ORAL at 05:25

## 2021-05-20 RX ADMIN — VALSARTAN 320 MG: 320 TABLET ORAL at 09:49

## 2021-05-20 RX ADMIN — DULOXETINE HYDROCHLORIDE 40 MG: 20 CAPSULE, DELAYED RELEASE ORAL at 09:50

## 2021-05-20 RX ADMIN — FLECAINIDE ACETATE 75 MG: 50 TABLET ORAL at 09:49

## 2021-05-20 RX ADMIN — LEVOTHYROXINE SODIUM 100 MCG: 100 TABLET ORAL at 05:26

## 2021-05-20 RX ADMIN — CELECOXIB 200 MG: 100 CAPSULE ORAL at 09:48

## 2021-05-20 RX ADMIN — ARFORMOTEROL TARTRATE 15 MCG: 15 SOLUTION RESPIRATORY (INHALATION) at 08:49

## 2021-05-20 RX ADMIN — METOPROLOL TARTRATE 50 MG: 50 TABLET, FILM COATED ORAL at 09:48

## 2021-05-20 RX ADMIN — MAGNESIUM SULFATE HEPTAHYDRATE 2000 MG: 40 INJECTION, SOLUTION INTRAVENOUS at 09:55

## 2021-05-20 RX ADMIN — ACETAMINOPHEN 500 MG: 500 TABLET ORAL at 13:40

## 2021-05-20 RX ADMIN — AMLODIPINE BESYLATE 5 MG: 5 TABLET ORAL at 09:48

## 2021-05-20 RX ADMIN — FUROSEMIDE 20 MG: 20 TABLET ORAL at 06:21

## 2021-05-20 RX ADMIN — SODIUM CHLORIDE, PRESERVATIVE FREE 10 ML: 5 INJECTION INTRAVENOUS at 09:51

## 2021-05-20 RX ADMIN — BUDESONIDE 500 MCG: 0.5 SUSPENSION RESPIRATORY (INHALATION) at 08:49

## 2021-05-20 ASSESSMENT — PAIN DESCRIPTION - ORIENTATION: ORIENTATION: RIGHT

## 2021-05-20 ASSESSMENT — PAIN DESCRIPTION - ONSET
ONSET: ON-GOING
ONSET: ON-GOING

## 2021-05-20 ASSESSMENT — PAIN SCALES - GENERAL
PAINLEVEL_OUTOF10: 4
PAINLEVEL_OUTOF10: 3
PAINLEVEL_OUTOF10: 3
PAINLEVEL_OUTOF10: 1
PAINLEVEL_OUTOF10: 2

## 2021-05-20 ASSESSMENT — PAIN DESCRIPTION - PROGRESSION
CLINICAL_PROGRESSION: NOT CHANGED
CLINICAL_PROGRESSION: NOT CHANGED

## 2021-05-20 ASSESSMENT — PAIN DESCRIPTION - PAIN TYPE
TYPE: CHRONIC PAIN
TYPE: CHRONIC PAIN

## 2021-05-20 ASSESSMENT — PAIN DESCRIPTION - FREQUENCY
FREQUENCY: CONTINUOUS
FREQUENCY: CONTINUOUS

## 2021-05-20 ASSESSMENT — PAIN DESCRIPTION - LOCATION: LOCATION: RIB CAGE

## 2021-05-20 ASSESSMENT — PAIN DESCRIPTION - DIRECTION: RADIATING_TOWARDS: NON-RADIATING

## 2021-05-20 ASSESSMENT — PAIN DESCRIPTION - DESCRIPTORS: DESCRIPTORS: SHARP

## 2021-05-20 NOTE — PROGRESS NOTES
mg at 05/19/21 2117    pantoprazole (PROTONIX) tablet 40 mg  40 mg Oral QAM AC Suki A Bosak, DO   40 mg at 05/20/21 0526    valsartan (DIOVAN) tablet 320 mg  320 mg Oral Daily Suki A Bosak, DO   320 mg at 05/19/21 1438    rivaroxaban (XARELTO) tablet 15 mg  15 mg Oral Daily Suki A Bosak, DO   15 mg at 05/19/21 1749    sodium chloride flush 0.9 % injection 5-40 mL  5-40 mL Intravenous 2 times per day Alton Garcia A Bosak, DO   10 mL at 05/19/21 2118    sodium chloride flush 0.9 % injection 5-40 mL  5-40 mL Intravenous PRN Suki A Monsterak, DO   10 mL at 05/19/21 1957    0.9 % sodium chloride infusion  25 mL Intravenous PRN Suki A Monsterak, DO        promethazine (PHENERGAN) tablet 12.5 mg  12.5 mg Oral Q6H PRN Suki A Monsterak, DO        Or    ondansetron (ZOFRAN) injection 4 mg  4 mg Intravenous Q6H PRN Suki A Bosak, DO        polyethylene glycol (GLYCOLAX) packet 17 g  17 g Oral Daily PRN Suki A Monsterak, DO        acetaminophen (TYLENOL) suppository 650 mg  650 mg Rectal Q6H PRN Suki A Bosak, DO        famotidine (PEPCID) tablet 40 mg  40 mg Oral Nightly Suki A Bosak, DO   40 mg at 05/19/21 2117    Arformoterol Tartrate (BROVANA) nebulizer solution 15 mcg  15 mcg Nebulization BID Suki A Bosak, DO   15 mcg at 05/19/21 2117    budesonide (PULMICORT) nebulizer suspension 500 mcg  0.5 mg Nebulization BID Suki A Bosak, DO   500 mcg at 05/19/21 2118    albuterol (PROVENTIL) nebulizer solution 2.5 mg  2.5 mg Nebulization BID PRN Suki A Bosak, DO        potassium bicarb-citric acid (EFFER-K) effervescent tablet 20 mEq  20 mEq Oral BID WC Suki A Bosak, DO   20 mEq at 05/19/21 1749    montelukast (SINGULAIR) tablet 10 mg  10 mg Oral Nightly Suki Chacon, DO   10 mg at 05/19/21 2114    HYDROcodone-acetaminophen (NORCO) 5-325 MG per tablet 1 tablet  1 tablet Oral Q6H PRN Garo, DO   1 tablet at 05/20/21 0525      sodium chloride         Physical Exam:  /61   Pulse 65   Temp 97.6 °F (36.4 °C) (Oral)   Resp 16   Ht 5' 4\" (1.626 m)   Wt 120 lb (54.4 kg)   SpO2 100%   BMI 20.60 kg/m²   Weight change: Wt Readings from Last 3 Encounters:   05/19/21 120 lb (54.4 kg)   05/05/21 120 lb (54.4 kg)   04/14/21 113 lb 11.2 oz (51.6 kg)     The patient is awake, alert and in no discomfort or distress. She remains chronically ill and debilitated. No gross musculoskeletal deformity is present. No significant skin or nail changes are present. Gross examination of head, eyes, nose and throat are negative. Jugular venous pressure is normal and no carotid bruits are present. Normal respiratory effort is noted with no accessory muscle usage present. Lung fields are clear to ascultation. Cardiac examination is notable for a regular rate and rhythm with no palpable thrill. No gallop rhythm or cardiac murmur are identified. A benign abdominal examination is present with no masses or organomegaly. Intact pulses are present throughout all extremities and no peripheral edema is present. No focal neurologic deficits are present. Intake/Output:  No intake or output data in the 24 hours ending 05/20/21 0740  No intake/output data recorded.     Laboratory Tests:  Lab Results   Component Value Date    CREATININE 0.7 05/20/2021    BUN 19 05/20/2021     05/20/2021    K 4.1 05/20/2021     05/20/2021    CO2 28 05/20/2021     Recent Labs     05/19/21  1510   CKTOTAL 94   CKMB 3.7     Lab Results   Component Value Date     (H) 06/03/2011     Lab Results   Component Value Date    WBC 6.8 05/20/2021    RBC 2.73 05/20/2021    HGB 8.0 05/20/2021    HCT 28.2 05/20/2021    .3 05/20/2021    MCH 29.3 05/20/2021    MCHC 28.4 05/20/2021    RDW 14.8 05/20/2021     05/20/2021    MPV 9.8 05/20/2021     Recent Labs     05/19/21  0656   ALKPHOS 118*   ALT 11   AST 18   PROT 6.1*   BILITOT 0.2   BILIDIR <0.2   LABALBU 2.6*     Lab Results   Component Value Date    MG 1.6 05/19/2021     Lab Results   Component Value Date    PROTIME 13.5 05/19/2021    PROTIME 15.8 05/12/2014    INR 1.2 05/19/2021     Lab Results   Component Value Date    TSH 1.350 05/19/2021     No components found for: CHLPL  Lab Results   Component Value Date    TRIG 140 10/22/2019    TRIG 129 06/10/2019    TRIG 82 03/22/2016     Lab Results   Component Value Date    HDL 44 10/22/2019    HDL 49 06/10/2019    HDL 53 03/22/2016     Lab Results   Component Value Date    LDLCALC 110 (H) 10/22/2019    LDLCALC 99 06/10/2019    LDLCALC 106 (H) 03/22/2016       Cardiac Tests:  ECG: Serial electrocardiographic tracings reviewed at the time of evaluation continued to demonstrate evidence of sinus rhythm occasionally additionally demonstrating supraventricular and ventricular ectopy with a delayed precordial transition and no evidence of evolution from that of her baseline tracings  Telemetry findings reviewed: sinus rhythm, no new tachy/bradyarrhythmias overnight      ASSESSMENT / PLAN: On a clinical basis, the patient appears compensated from a cardiovascular standpoint with no recurrent symptoms nor documented arrhythmias. Her cardiac biomarkers are not indicative of an acute coronary syndrome nor significant myocardial injury based on the pattern of troponin levels in addition to that of her normal CPK and myocardial index. On this basis, her existing medical regimen will be maintained from a cardiovascular standpoint with needs of outpatient arrhythmia monitoring to be arranged at the time of hospital discharge to assess the presence or absence of additional atrial tachyarrhythmias requiring further modification of her existing medical regimen.   The noted decrease of her hemoglobin levels as noted without reports of active bleeding and further assessment and management will be deferred to the primary care service especially in light of needs of chronic anticoagulation in the face of her atrial fibrillation as well as that of the concomitant use of anti-inflammatory therapy in the face of her rheumatoid disease. Ongoing aggressive risk factor modification of blood pressure and serum lipids will remain essential to reducing risk of future atherosclerotic development. No additional cardiovascular assessment is anticipated during her present hospitalization and arrangements will be made for follow-up with her primary cardiologist, Humaira Sotelo upon her hospital discharge. Note: This report was completed utilizing computer voice recognition software. Every effort has been made to ensure accuracy, however; inadvertent computerized transcription errors may be present. Serafin Rose.  Ra Guajardo, [facility]

## 2021-05-20 NOTE — DISCHARGE INSTR - COC
01/28/2021    Influenza Virus Vaccine 10/06/2015, 10/21/2016    Influenza, High Dose (Fluzone 65 yrs and older) 09/25/2014, 10/06/2015, 10/21/2016, 10/02/2017, 10/05/2018    Influenza, Triv, inactivated, subunit, adjuvanted, IM (Fluad 65 yrs and older) 10/16/2019, 10/30/2020    Pneumococcal Conjugate 13-valent ([id number]) 10/22/2015    Pneumococcal Polysaccharide ([id number]) 01/23/2017    Tdap (Boostrix, Adacel) 11/17/2011    Zoster Live (Zostavax) 08/27/2012    Zoster Recombinant (Shingrix) 09/19/2018, 03/19/2019       Active Problems:  Patient Active Problem List   Diagnosis Code    Elevated CA-125 R97.1    Hiatal hernia K44.9    Hypothyroidism E03.9    Diverticular disease K57.90    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    Rheumatoid arthritis ([facility] [address] X59.7    Diastolic dysfunction Z17.67    Paresthesia of lower extremity R20.2    Lumbar radicular pain M54.16    Anemia D64.9    Multiple closed fractures of ribs of both sides with routine healing S22.43XD    Thoracic spinal stenosis with mass at T6-T7 M48.04    Thoracic spine tumor D49.2    Thoracic spine fracture (HCC) S22.009A    Atrial fibrillation with RVR ([facility]) I48.91    A-fib ([facility]) I48.91    Obstructive sleep apnea syndrome G47.33    Essential hypertension I10    Adjustment disorder with anxious mood F43.22    Laceration of lower limb S81.819A    Palpitations R00.2    Debility R53.81    Closed fracture of patella S82.009A    Delayed gastric emptying K30    Pain in both lower legs M79.661, M79.662    Bimalleolar fracture, left, closed, initial encounter S82.842A    Osteoporosis M81.0    Non-pressure chronic ulcer of other part of right lower leg with fat layer exposed ([facility] [address] L97.812    Bimalleolar fracture S82.843A    Patellar fracture S82.009A    Acute respiratory failure with hypoxia ([facility]) J96.01    Depression F32.9    History of 2019 novel coronavirus disease (COVID-19) Z86.16    Mild protein-calorie malnutrition ([facility]) E44.1  Apathy R45.3    Chronic heart failure with preserved ejection fraction ([facility]) I50.32    Chronic respiratory failure with hypoxia ([facility]) J96.11    Pressure injury of sacral region, unstageable ([facility]) L89.150    Frailty R54    Leukocytosis D72.829    COPD (chronic obstructive pulmonary disease) ([facility]) J44.9    GI bleed K92.2    Thrush B37.0    Congestive heart failure ([facility]) I50.9    Pressure injury of coccygeal region, stage 4 ([facility]) L89.154    Fecal occult blood test positive R19.5    Current use of long term anticoagulation Z79.01    Gastroesophageal reflux disease K21.9    HFrEF (heart failure with reduced ejection fraction) ([facility]) I50.20    Anemia, blood loss D50.0       Isolation/Infection:   Isolation          No Isolation        Patient Infection Status     Infection Onset Added Last Indicated Last Indicated By Review Planned Expiration Resolved Resolved By    None active    Resolved    COVID-19 Rule Out 21 COVID-19, Rapid (Ordered)   21 Rule-Out Test Resulted    COVID-19 Rule Out 21 Respiratory Panel, Molecular, with COVID-19 (Restricted: peds pts or suitable admitted adults) (Ordered)   21 Rule-Out Test Resulted    COVID-19 Rule Out 21 Respiratory Panel, Molecular, with COVID-19 (Restricted: peds pts or suitable admitted adults) (Ordered)   21 Rule-Out Test Resulted    COVID-19 Rule Out 21 COVID-19, Rapid (Ordered)   21 Rule-Out Test Resulted    COVID-19 21 Respiratory Panel, Molecular, with COVID-19 (Restricted: peds pts or suitable admitted adults)   21     COVID-19 Rule Out 21 Respiratory Panel, Molecular, with COVID-19 (Restricted: peds pts or suitable admitted adults) (Ordered)   21 Rule-Out Test Resulted    COVID-19 Rule Out 20 Covid-19 Ambulatory (Ordered)   20 Rule-Out Test Resulted    COVID-19 Rule Out 08/31/20 08/31/20 08/31/20 Covid-19 Ambulatory (Ordered)   09/01/20 Rule-Out Test Resulted    COVID-19 Rule Out 08/24/20 08/24/20 08/24/20 Covid-19 Ambulatory (Ordered)   08/26/20 Rule-Out Test Resulted    MRSA 08/19/20 08/21/20 09/09/20 Culture, Wound   02/08/21 Daniella Curry RN    COVID-19 Rule Out 08/17/20 08/18/20 08/17/20 Covid-19 Ambulatory (Ordered)   08/19/20 Rule-Out Test Resulted    COVID-19 Rule Out 08/12/20 08/12/20 08/12/20 Covid-19 Ambulatory (Ordered)   08/13/20 Rule-Out Test Resulted    COVID-19 Rule Out 08/03/20 08/03/20 08/03/20 Covid-19 Ambulatory (Ordered)   08/04/20 Rule-Out Test Resulted    COVID-19 Rule Out 07/15/20 07/15/20 07/15/20 COVID-19 (Ordered)   07/15/20 Rule-Out Test Resulted          Nurse Assessment:  Last Vital Signs: BP (!) 119/57   Pulse 63   Temp 97.8 °F (36.6 °C) (Oral)   Resp 18   Ht 5' 4\" (1.626 m)   Wt 120 lb (54.4 kg)   SpO2 97%   BMI 20.60 kg/m²     Last documented pain score (0-10 scale): Pain Level: 1  Last Weight:   Wt Readings from Last 1 Encounters:   05/20/21 120 lb (54.4 kg)     Mental Status:  oriented, alert, coherent, logical, thought processes intact and able to concentrate and follow conversation    IV Access:  - None    Nursing Mobility/ADLs:  Walking   Dependent  Transfer  Dependent  Bathing  Dependent  Dressing  Dependent  Toileting  Dependent  Feeding  Assisted  Med Admin  Assisted  Med Delivery   whole    Wound Care Documentation and Therapy:  Wound 02/11/21 Throat Left Left neck (Active)   Number of days: 97       Wound 03/06/21 Coccyx (Active)   Number of days: 75       Wound 04/06/21 Left calf (Active)   Number of days: 43       Wound 05/05/21 Coccyx #1 (Active)   Wound Image   05/05/21 1326   Wound Etiology Pressure Stage  4 05/05/21 1326   Dressing Status Clean;Dry; Intact 05/20/21 0818   Wound Cleansed Cleansed with saline 05/20/21 0630   Dressing/Treatment Foam 05/20/21 0818   Offloading for Diabetic Foot Ulcers No offloading required 05/12/21 1606   Wound Length (cm) 2.1 cm 05/19/21 1210   Wound Width (cm) 1 cm 05/19/21 1210   Wound Depth (cm) 1.8 cm 05/19/21 1210   Wound Surface Area (cm^2) 2.1 cm^2 05/19/21 1210   Change in Wound Size % (l*w) -61.54 05/19/21 1210   Wound Volume (cm^3) 3.78 cm^3 05/19/21 1210   Wound Healing % -315 05/19/21 1210   Post-Procedure Length (cm) 1.8 cm 05/12/21 1555   Post-Procedure Width (cm) 0.9 cm 05/12/21 1555   Post-Procedure Depth (cm) 1 cm 05/12/21 1555   Post-Procedure Surface Area (cm^2) 1.62 cm^2 05/12/21 1555   Post-Procedure Volume (cm^3) 1.62 cm^3 05/12/21 1555   Distance Tunneling (cm) 0.4 cm 05/05/21 1326   Tunneling Position ___ O'Clock 1 05/05/21 1326   Undermining Starts ___ O'Clock 9 05/12/21 1526   Undermining Ends___ O'Clock 11 05/12/21 1526   Undermining Maxium Distance (cm) 1.0 @ 9 oclock  05/12/21 1526   Wound Assessment Pink/red 05/19/21 2112   Drainage Amount Small 05/19/21 2112   Drainage Description Yellow 05/19/21 2112   Odor None 05/19/21 2112   Jaycee-wound Assessment Blanchable erythema;Fragile 05/19/21 2112   Margins Defined edges 05/19/21 2112   Wound Thickness Description not for Pressure Injury Full thickness 05/19/21 1210   Number of days: 15        Elimination:  Continence:   · Bowel: no  · Bladder: No  Urinary Catheter: None   Colostomy/Ileostomy/Ileal Conduit: No       Date of Last BM: 5/18/21  No intake or output data in the 24 hours ending 05/20/21 1542  No intake/output data recorded. Safety Concerns: At Risk for Falls    Impairments/Disabilities:      None    Nutrition Therapy:  Current Nutrition Therapy:   - Oral Diet:  Cardiac    Routes of Feeding: Oral  Liquids:  Thin Liquids  Daily Fluid Restriction: no  Last Modified Barium Swallow with Video (Video Swallowing Test): not done    Treatments at the Time of Hospital Discharge:   Respiratory Treatments: yes    Oxygen Therapy:  2 liters nasal cannula  Ventilator:    - No ventilator support    Rehab Therapies: resume as prior to hospitalization  Weight Bearing Status/Restrictions: No weight bearing restirctions  Other Medical Equipment (for information only, NOT a DME order):  hospital bed and lift  Other Treatments: resume dressings to sacrum as prior to hospitalization    Patient's personal belongings (please select all that are sent with patient):  Glasses, Dentures upper, Jewelry, ring, pants, shirt    RN SIGNATURE:  Electronically signed by Suma Johnston RN on 5/20/21 at 3:52 PM EDT    CASE MANAGEMENT/SOCIAL WORK SECTION    Inpatient Status Date: ***    Readmission Risk Assessment Score:  Readmission Risk              Risk of Unplanned Readmission:  0           Discharging to Facility/ Agency   · Name:   · Address:  · Phone:  · Fax:    Dialysis Facility (if applicable)   · Name:  · Address:  · Dialysis Schedule:  · Phone:  · Fax:    / signature: {Esignature:[id number]}    PHYSICIAN SECTION    Prognosis: {Prognosis:3410910331}    Condition at Discharge: [address] [place] [address] Patient Condition:[address]35}    Rehab Potential (if transferring to Rehab): {Prognosis:4106999436}    Recommended Labs or Other Treatments After Discharge: ***    Physician Certification: I certify the above information and transfer of Yahaiar Girard  is necessary for the continuing treatment of the diagnosis listed and that she requires {Admit to Appropriate Level of Care:79050} for {GREATER/LESS:427734966} 30 days.      Update Admission H&P: {CHP DME Changes in MSFFU:346109821}    PHYSICIAN SIGNATURE:  {Esignature:[id number]}

## 2021-05-20 NOTE — PROGRESS NOTES
Pt transported off unit on [facility] via ambulance service without incident. Caretaker Leena at bedside to follow pt home. Discharge paperwork given to Tanika Cortez and teaching done re:  Medication schedule.

## 2021-05-20 NOTE — FLOWSHEET NOTE
Inpatient Wound Care    Admit Date: 5/19/2021  6:34 AM    Reason for consult:  Sacral wound, deep, sacral wound and skin tear left lateral calf. Significant history:  See H&P    Wound history: POA, follows with wound center  Findings: awake and verbal. Caregiver present     05/20/21 1553   Wound 05/05/21 Coccyx #1   Date First Assessed/Time First Assessed: 05/05/21 1325   Wound Approximate Age at First Assessment (Weeks): 12 weeks  Primary Wound Type: Pressure Injury  Location: Coccyx  Wound Description (Comments): #1   Wound Image    Wound Etiology Pressure Stage  4   Wound Cleansed Cleansed with saline   Dressing/Treatment   (aquacel rope, mepilex)   Dressing Change Due 05/21/21   Wound Length (cm) 2 cm   Wound Width (cm) 1 cm   Wound Depth (cm) 1 cm   Wound Surface Area (cm^2) 2 cm^2   Change in Wound Size % (l*w) -53.85   Wound Volume (cm^3) 2 cm^3   Wound Healing % -120   Undermining Starts ___ O'Clock 8   Undermining Ends___ O'Clock 2   Undermining Maxium Distance (cm) 1   Wound Assessment   (pink, yellow)   Odor None   Jaycee-wound Assessment Intact   rolled up mepilex on left leg, area dried, not open  Impression: stage 4 coccyx pressure injury-POA    Interventions in place: lo air loss, comfort glide, SOS  S/W yarely, follows with wound center, on AA, supplements  Plan: continue aquacel ag as at home when discharged  Héctor Luu.  Veronica Arguello, FRAN - CNS 5/20/2021 3:57 PM

## 2021-05-20 NOTE — PROGRESS NOTES
Cardiology consulted due to rising troponin. PMHx of atrial fibrillation and SVT. New EKG this evening unchanged from previous EKG per my review. Vitals stable. Per nurse, patient is without chest pain and resting comfortably.

## 2021-05-20 NOTE — PROGRESS NOTES
P Quality Flow/Interdisciplinary Rounds Progress Note        Quality Flow Rounds held on May 20, 2021    Disciplines Attending:  Bedside Nurse, ,  and Nursing Unit Leadership    Josiah Sheldon was admitted on 5/19/2021  6:34 AM    Anticipated Discharge Date:  Expected Discharge Date: 05/20/21    Disposition:    Sung Score:  Sung Scale Score: 13    Readmission Risk              Risk of Unplanned Readmission:  0           Discussed patient goal for the day, patient clinical progression, and barriers to discharge.   The following Goal(s) of the Day/Commitment(s) have been identified:  possible d/c today      Eder Mack RN  May 20, 2021

## 2021-05-20 NOTE — PROGRESS NOTES
Comprehensive Nutrition Assessment    Type and Reason for Visit:  Initial, Positive Nutrition Screen, Wound    Nutrition Recommendations/Plan: Continue diet and ONS to help with wound healing and meeting nutritional needs. Nutrition Assessment:  Pt adm for chest pain. She had N&V, diarrhea and blood in stool. Today per EMR symptoms improved. PMHx of atrial fibrillation, supraventricular tachycardia, JANETTE, HTN, chronic respiratory failure, COPD. Pt at risk d/t wounds. Will start ONS to help w/ wound healing. Malnutrition Assessment:  Malnutrition Status:  No malnutrition    Context:  Chronic Illness     Findings of the 6 clinical characteristics of malnutrition:  Energy Intake:  No significant decrease in energy intake  Weight Loss:  No significant weight loss     Body Fat Loss:  No significant body fat loss     Muscle Mass Loss:  No significant muscle mass loss    Fluid Accumulation:  Unable to assess     Strength:  Not Performed    Estimated Daily Nutrient Needs:  Energy (kcal):  8725-5852 (MSJx1. 2SF); Weight Used for Energy Requirements:  Current     Protein (g):  80-90g (1.3-1.5g/kg CBW); Weight Used for Protein Requirements:  Current        Fluid (ml/day):  4413-0191; Method Used for Fluid Requirements:  1 ml/kcal      Nutrition Related Findings:  A&Ox4, BS active, Abd WDL, I/Os none charted in EMR, Edema trace BLE pitting      Wounds:  Pressure Injury, Stage IV (Per Wound care-Sacral wound, deep, sacral wound and skin tear left lateral calf)       Current Nutrition Therapies:    DIET CARDIAC; Anthropometric Measures:  · Height: 5' 4\" (162.6 cm)  · Current Body Weight: 133 lb 2 oz (60.4 kg) (5/20 bedscale)   · Admission Body Weight: 133 lb 2 oz (60.4 kg) (5/20 bedscale (first measured))    · Usual Body Weight: 128 lb 12 oz (58.4 kg) (7/12/20 bedscale)     · Ideal Body Weight: 120 lbs; % Ideal Body Weight 110.9 %   · BMI: 22.8      · BMI Categories: Normal Weight (BMI 18.5-24. 9)       Nutrition Diagnosis:   · Increased nutrient needs related to increase demand for energy/nutrients as evidenced by wounds    Nutrition Interventions:   Food and/or Nutrient Delivery:  Continue Current Diet, Start Oral Nutrition Supplement (Shaun BID)  Nutrition Education/Counseling:  Education initiated (Discussed importance of ONS for wound healing)   Coordination of Nutrition Care:  Continue to monitor while inpatient    Goals:  >75% of meals and ONS consumed       Nutrition Monitoring and Evaluation:   Behavioral-Environmental Outcomes:  None Identified   Food/Nutrient Intake Outcomes:  Food and Nutrient Intake, Supplement Intake  Physical Signs/Symptoms Outcomes:  Biochemical Data, Diarrhea, GI Status, Nausea or Vomiting, Fluid Status or Edema, Nutrition Focused Physical Findings, Skin, Weight     Discharge Planning:     Too soon to determine     Electronically signed by Oni Quiros RD, LD on 5/20/21 at 4:27 PM EDT    Contact: [phone]

## 2021-05-20 NOTE — PROGRESS NOTES
Discharge instructions given to pt's daughter Suzanne Watson per pt request via phone call. Bertin Lu verbalized understanding of instructions and is in agreement with discharge to home with previous [address]. Copy of AVS provided to pt for discharge.

## 2021-05-20 NOTE — DISCHARGE SUMMARY
Discharge Summary     Date:5/20/2021  Patient Name: York Kussmaul     YOB: 1935     Age: 80 y.o. MRN: [id number]    Admit Date:5/19/2021    Discharge Date: 05/20/21  Discharged Condition:good    Discharge Diagnoses   Active Problems:    Fecal occult blood test positive    Current use of long term anticoagulation    Gastroesophageal reflux disease    HFrEF (heart failure with reduced ejection fraction) (HCC)    Anemia, blood loss  Resolved Problems:    Chest pain      Hospital Stay   Narrative of Hospital Course:  York Kussmaul was admitted on 5/19/2021 with chest pain. It was associated with nausea, diaphoresis, and shortness of breath for about an hour before it stopped and she presented to the ED. In the ED, patient was found to be in NSR, on her baseline oxygen requirement of 2.5L, and anemic. She also had an elevated troponin, elevated lactic acid, and hypotension. She did not require pressors, and her pressures improved after a fluid bolus. Patient was found to be FOBT positive with a hemoglobin of 10.5. Patient's pantoprazole and famotidine were continued in light of possible upper GI bleed. Patient did not wish to undergo colonoscopy at this time. She has a known coccygeal wound that was not infected at presentation or discharge. Wound care was consulted. Cardiology was consulted after admission. Patient was found to be tachycardic, possibly causing increased myocardial oxygen demand, which Cardiology stated may be the cause of her chest pain. Troponin increased to 0.06, then decreased to 0.05; lactic acidosis resolved. Cardiology stated her biomarkers were not indicative of ACS or significant myocardial injury. Patient's hemoglobin was noted to drop from 10.5 to 8.0, then 8.8 on day of discharge. No blood was transfused during this admission.   In light of patient's GI bleeding risk, age, and weight, the decision was made to start Eliquis as it is shown to have a lower incidence of GI bleeds than Xarelto. This change's risks and benefits were discussed with patient, patient's family, and patient's caregiver before initiation. At time of discharge, patient was without chest pain or associated presenting symptoms. Patient was given several labs to have drawn before her follow up, scheduled for her and documented in her discharge paperwork, with her primary care physician.     Consultants:   IP CONSULT TO FAMILY MEDICINE  IP CONSULT TO CARDIOLOGY  IP CONSULT TO DIETITIAN  IP CONSULT TO IV TEAM  IP CONSULT TO IV TEAM  IP CONSULT TO CARDIOLOGY    Surgeries/Procedures Performed:        Treatments:   IV hydration, analgesia: acetaminophen and norco, cardiac meds: valsartan, metoprolol, amlodipine and furosemide, anticoagulation: xarelto and therapies: PT and OT    Significant Diagnostic Studies:   Recent Labs:  CBC:   Lab Results   Component Value Date    WBC 6.8 05/20/2021    RBC 2.73 05/20/2021    HGB 8.8 05/20/2021    HCT 29.6 05/20/2021    .3 05/20/2021    MCH 29.3 05/20/2021    MCHC 28.4 05/20/2021    RDW 14.8 05/20/2021     05/20/2021     CMP:    Lab Results   Component Value Date    GLUCOSE 109 05/20/2021    GLUCOSE 79 04/24/2012     05/20/2021    K 4.1 05/20/2021     05/20/2021    CO2 28 05/20/2021    BUN 19 05/20/2021    CREATININE 0.7 05/20/2021    ANIONGAP 6 05/20/2021    ALKPHOS 118 05/19/2021    ALT 11 05/19/2021    AST 18 05/19/2021    BILITOT 0.2 05/19/2021    LABALBU 2.6 05/19/2021    LABALBU 4.0 04/24/2012    LABGLOM >60 05/20/2021    GFRAA >60 05/20/2021    PROT 6.1 05/19/2021    CALCIUM 8.7 05/20/2021     PT/INR:    Lab Results   Component Value Date    PROTIME 13.5 05/19/2021    PROTIME 15.8 05/12/2014    INR 1.2 05/19/2021     PTT:   Lab Results   Component Value Date    APTT 23.4 03/01/2021     U/A:    Lab Results   Component Value Date    COLORU Yellow 05/19/2021    SPECGRAV 1.015 05/19/2021    PHUR 7.5 05/19/2021    PROTEINU TRACE 05/19/2021    GLUCOSEU Negative 05/19/2021    GLUCOSEU NEGATIVE 04/24/2012    KETUA Negative 05/19/2021    BILIRUBINUR Negative 05/19/2021    BILIRUBINUR negative 06/04/2019    BILIRUBINUR NEGATIVE 04/24/2012    UROBILINOGEN 1.0 05/19/2021    NITRU Negative 05/19/2021    LEUKOCYTESUR TRACE 05/19/2021       Radiology Last 7 Days:  XR CHEST PORTABLE    Result Date: 5/19/2021  No significant change since the prior study. There are bilateral reticular and patchy ground-glass airspace opacities which appear stable, likely related to chronic lung changes Prominent but stable cardiac silhouette       Discharge Physical Exam:   BP (!) 119/57   Pulse 63   Temp 97.8 °F (36.6 °C) (Oral)   Resp 18   Ht 5' 4\" (1.626 m)   Wt 133 lb 2 oz (60.4 kg)   SpO2 97%   BMI 22.85 kg/m²    Constitutional:       General: She is not in acute distress. Appearance: Normal appearance. HENT:      Head: Normocephalic and atraumatic. Comments: Bitemporal wasting     Nose: Nose normal. No rhinorrhea. Mouth/Throat:      Mouth: Mucous membranes are moist.      Pharynx: Oropharynx is clear. Eyes:      General:         Right eye: No discharge. Left eye: No discharge. Cardiovascular:      Rate and Rhythm: Normal rate and regular rhythm. Pulmonary:      Effort: Pulmonary effort is normal.      Breath sounds: Normal breath sounds. No wheezing. Abdominal:      General: Bowel sounds are normal.      Palpations: Abdomen is soft. Tenderness: There is no abdominal tenderness. Musculoskeletal:         General: Tenderness (Right lower rib cage) present. No swelling. Right lower leg: Edema (Trace pitting) present. Left lower leg: Edema (Trace pitting) present. Skin:     General: Skin is warm and dry. Neurological:      Mental Status: She is alert and oriented to person, place, and time. Mental status is at baseline.        Discharge Plan   Disposition: Home    Provider Follow-Up:   Santiago Hargrove nebulization  Commonly known as: Xopenex  Take 3 mLs by nebulization every 6 hours as needed for Wheezing     levothyroxine 100 MCG tablet  Commonly known as: SYNTHROID  Take 1 tablet by mouth Daily     metoprolol tartrate 50 MG tablet  Commonly known as: LOPRESSOR     montelukast 10 MG tablet  Commonly known as: SINGULAIR     ondansetron 4 MG tablet  Commonly known as: ZOFRAN  Take 1 tablet by mouth every 8 hours as needed for Nausea or Vomiting     OXYGEN     polyethyl glycol-propyl glycol 0.4-0.3 % 0.4-0.3 % ophthalmic solution  Commonly known as: SYSTANE     valsartan 320 MG tablet  Commonly known as: DIOVAN  Take 1 tablet by mouth daily     vitamin B-12 1000 MCG tablet  Commonly known as: CYANOCOBALAMIN        STOP taking these medications    acetaminophen 500 MG tablet  Commonly known as: TYLENOL     leucovorin calcium 5 MG tablet  Commonly known as: WELLCOVORIN     methotrexate 2.5 MG chemo tablet  Commonly known as: RHEUMATREX     Xarelto 15 MG Tabs tablet  Generic drug: rivaroxaban           Where to Get Your Medications      These medications were sent to [facility] [place], [place] [address] [place], [place] [id number]    Phone: [phone]   · apixaban 2.5 MG Tabs tablet  · ferrous sulfate 325 (65 Fe) MG tablet         Electronically signed by Donald Aburto DO on 5/20/21 at 4:36 PM EDT

## 2021-05-20 NOTE — TELEPHONE ENCOUNTER
----- Message from Mayra Bee MD sent at 5/20/2021  3:16 PM EDT -----  Thank you for the email, or I would not be able to find you with your name change. This is a patient of Jayros admitted with palpitations potentially consistent with recurrent atrial fibrillation but not documented.   Please arrange for a 2-week event recorder and then follow-up with him

## 2021-05-20 NOTE — PROGRESS NOTES
Bimalleolar fracture, UQRDT-35, Diastolic dysfunction, Difficulty walking, Diverticular disease, Elevated CA-125, Hiatal hernia, Hypertension, Hypothyroid, Meningocele spinal (HCC), Neuropathy, JANETTE (obstructive sleep apnea), Osteoporosis, PAF (paroxysmal atrial fibrillation) ([facility] [address] Pain in both lower legs, Patellar fracture, Rheumatoid arthritis with rheumatoid factor ([facility] [address] Rheumatoid arthritis(714.0), Rib fractures, Seasonal allergies, Skin cancer of lip, and Wears dentures. Social History:   reports that she has never smoked. She has never used smokeless tobacco. She reports that she does not drink alcohol and does not use drugs. Family History:   Family History   Problem Relation Age of Onset    Heart Attack Mother 58    Other Father         suicide, depression, leg trouble    Other Sister         Dementia    Hypertension Sister     Hypertension Sister     Hypertension Brother     Thyroid Disease Sister     Rheum Arthritis Sister     Rheum Arthritis Brother     Other Daughter         hemochromatosis     Other Son         hemachromatosis        Physical Examination      Vitals:  /61   Pulse 65   Temp 97.6 °F (36.4 °C) (Oral)   Resp 16   Ht 5' 4\" (1.626 m)   Wt 120 lb (54.4 kg)   SpO2 100%   BMI 20.60 kg/m²   Temp (24hrs), Av.8 °F (36.6 °C), Min:97.6 °F (36.4 °C), Max:97.9 °F (36.6 °C)      I/O (24Hr): No intake or output data in the 24 hours ending 21 0729    Physical Exam  Constitutional:       General: She is not in acute distress. Appearance: Normal appearance. HENT:      Head: Normocephalic and atraumatic. Comments: Bitemporal wasting     Nose: Nose normal. No rhinorrhea. Mouth/Throat:      Mouth: Mucous membranes are moist.      Pharynx: Oropharynx is clear. Eyes:      General:         Right eye: No discharge. Left eye: No discharge. Cardiovascular:      Rate and Rhythm: Normal rate and regular rhythm.    Pulmonary:      Effort: Pulmonary effort is normal.      Breath sounds: Normal breath sounds. No wheezing. Abdominal:      General: Bowel sounds are normal.      Palpations: Abdomen is soft. Tenderness: There is no abdominal tenderness. Musculoskeletal:         General: Tenderness (Right lower rib cage) present. No swelling. Right lower leg: Edema (Trace pitting) present. Left lower leg: Edema (Trace pitting) present. Skin:     General: Skin is warm and dry. Neurological:      Mental Status: She is alert and oriented to person, place, and time. Mental status is at baseline. Labs/Imaging/Diagnostics   Labs:  CBC:  Recent Labs     05/19/21  0656 05/20/21  0218   WBC 9.3 6.8   RBC 3.50 2.73*   HGB 10.5* 8.0*   HCT 34.9 28.2*   MCV 99.7 103.3*   RDW 14.9 14.8   * 453*     CHEMISTRIES:  Recent Labs     05/19/21 0656 05/20/21  0218    134   K 3.7 4.1    100   CO2 31* 28   BUN 22 19   CREATININE 0.6 0.7   GLUCOSE 107* 109*   MG 1.6  --      PT/INR:  Recent Labs     05/19/21  0656   PROTIME 13.5*   INR 1.2     APTT:No results for input(s): APTT in the last 72 hours. LIVER PROFILE:  Recent Labs     05/19/21  0656   AST 18   ALT 11   BILIDIR <0.2   BILITOT 0.2   ALKPHOS 118*       Imaging Last 24 Hours:  XR CHEST PORTABLE    Result Date: 5/19/2021  EXAMINATION: ONE XRAY VIEW OF THE CHEST 5/19/2021 6:07 am COMPARISON: 04/06/2021 HISTORY: ORDERING SYSTEM PROVIDED HISTORY: atrial fib, weakness TECHNOLOGIST PROVIDED HISTORY: Reason for exam:->atrial fib, weakness FINDINGS: Stable prominent cardiac silhouette. There are bilateral diffuse ground-glass airspace opacities, which appear stable. Fibrotic and bronchiectatic changes are seen in the lung bases. No effusion or pneumothorax. No acute osseous abnormality. No significant change since the prior study.   There are bilateral reticular and patchy ground-glass airspace opacities which appear stable, likely related to chronic lung changes Prominent but stable cardiac silhouette       Assessment        Hospital Problems         Last Modified POA    Chest pain 5/19/2021 Yes          Plan:        Chest pain  -hx of chronic respiratory failure with ILD secondary to RA, HFpEF, COPD, A. Fib and GERD  -cardiac vs GI in etiology, less likely cardiac from lab work and pt presentation  -last stress test on 2/2019 EF 95% and was negative for ischemia. F/u with Dr. Raji Peng outpatient  -currently asymptomatic. EKG was NSR with premature supraventricular complexes. CXR showed b/l reticular and patchy ground-glass airspace opacities which are stable and related to chronic lung changes  -trop 0.05, increased to 0.06, third check 0.05  -lactic acid 2.4 initially, now 1.7  -continue telemetry  -continue monitoring vitals  -cardiology consulted, appreciate input     Chronic respiratory failure  -secondary to ILD from RA, on 2.5 L oxygen at home  -currently on 3 L and denies any SOB  -continue home albuterol, brovana, pulmicort  -CXR showed b/l reticular and patchy ground-glass airspace opacities which are stable and related to chronic lung changes  -continue monitoring     GERD  -protonix 40 mg and will schedule famotidine 20 mg nightly  -continue monitoring     A.  Fib  -on BB, Xarelto and Flecanide  -continue tele  -cardiology consulted     HFpEF  -pt does not look volume overloaded  -on Lasix 20 mg daily  -continue home amlodipine  -strict input and output monitoring  -daily weights     GI Bleed  -Hemoglobin yesterday 10.5 today 8.0  -Possibly secondary to hemodilution as WBC count and platelet count dropped as well  -FOBT positive, has declined colonoscopy in the past  -most recent colonoscopy was done approximately 1 year ago and showed diverticular disease  -external hemorrhoids on exam  -continue monitoring     Chronic Iron Deficiency Anemia  -possibly related to GI bleed  -Hgb stable from previous discharge earlier this month  -continue iron tablets  -Monitor, as above     Decubitus ulcer  -wound culture from 5/8/21 was positive for proteus  -wound care consulted  -specialty bed with low air loss ordered     Right lower rib pain  -Recent history of being picked up by an aide at her home with pressure on her right lower rib cage  -Heating pad for pain, pain medication as ordered  -Continue to monitor    Hypothyroidism  -continue home synthroid     Constipation  -continue home dulcolax     Rheumatoid Arthritis  -continue home celebrex     Parasthesia of Bilateral Lower Extremities  -continue home gabapentin  -continue home duloxetine    DVT Prophylaxis: Xarelto  GI Prophylaxis: Protonix and Pepcid  Diet: DIET CARDIAC;   Code: Full   Advance Directives     Power of  Living Will ACP-Advance Directive ACP-Power of     Filed on 04/15/21 Filed on 04/15/21 Ella Han           Electronically signed by Denise Orellana DO PGY-1 Family Medicine Resident on 05/20/21 at 7:29 AM

## 2021-05-21 ENCOUNTER — TELEPHONE (OUTPATIENT)
Dept: PALLATIVE CARE | Age: 86
End: 2021-05-21

## 2021-05-21 RX ORDER — AMLODIPINE BESYLATE 5 MG/1
TABLET ORAL
Qty: 30 TABLET | Refills: 5 | Status: SHIPPED | OUTPATIENT
Start: 2021-05-21

## 2021-05-21 NOTE — TELEPHONE ENCOUNTER
[facility] Trans[facility] Initial Follow Up Call    Outreach made within 2 business days of discharge: Yes    Patient: Whitney Corbin Patient : 1935   MRN: [id number]  Reason for Admission: There are no discharge diagnoses documented for the most recent discharge. Discharge Date: 21       Spoke with: Zi Meneses. Discharge department/facility: home    TCM Interactive Patient Contact:  Was patient able to fill all prescriptions: Yes  Was patient instructed to bring all medications to the follow-up visit: Yes  Is patient taking all medications as directed in the discharge summary?  Yes  Does patient understand their discharge instructions: Yes  Does patient have questions or concerns that need addressed prior to 7-14 day follow up office visit: no    Scheduled appointment with PCP within 7-14 days    Follow Up  Future Appointments   Date Time Provider Santana Arnett   2021 11:45 AM MD Rito Borjas [facility]   2021  1:00 PM MD JUAN FRANCISCO [facility]\""   2021  1:00 PM MD JUAN FRANCISCO [facility] \[facility]\""   6/10/2021  3:15 PM Seven Zuleta APRN - CNP [facility]       Wolf Morrison

## 2021-05-21 NOTE — TELEPHONE ENCOUNTER
CRYSTAL placed phone call to pt daughter Britt Clemente to follow up on pt after recent hospital admission and discharge. Britt Clemente reported pt is doing well, seeing cardiology to follow up on some possible issues. No new needs identified at this time by daughter but was reminded by CRYSTAL to call if anything is needed from PM. Will continue to follow.  Pt is scheduled for home visit with Moe Crabtree on 6-10-21 @3:15pm.

## 2021-05-21 NOTE — TELEPHONE ENCOUNTER
Patient's daughter notified of Dr. Linda Montano' recommendations. She will check transportation for patient and call me back. Order placed for monitor.

## 2021-05-22 LAB
ORGANISM: ABNORMAL
WOUND/ABSCESS: ABNORMAL
WOUND/ABSCESS: ABNORMAL

## 2021-05-23 ENCOUNTER — APPOINTMENT (OUTPATIENT)
Dept: GENERAL RADIOLOGY | Age: 86
DRG: 189 | End: 2021-05-23
Payer: MEDICARE

## 2021-05-23 ENCOUNTER — TELEPHONE (OUTPATIENT)
Dept: OTHER | Facility: CLINIC | Age: 86
End: 2021-05-23

## 2021-05-23 ENCOUNTER — HOSPITAL ENCOUNTER (INPATIENT)
Age: 86
LOS: 1 days | Discharge: HOSPICE | DRG: 189 | End: 2021-05-24
Attending: EMERGENCY MEDICINE | Admitting: FAMILY MEDICINE
Payer: MEDICARE

## 2021-05-23 DIAGNOSIS — I50.20 HFREF (HEART FAILURE WITH REDUCED EJECTION FRACTION) (HCC): ICD-10-CM

## 2021-05-23 DIAGNOSIS — J96.21 ACUTE ON CHRONIC RESPIRATORY FAILURE WITH HYPOXIA (HCC): Primary | ICD-10-CM

## 2021-05-23 DIAGNOSIS — R52 PAIN: ICD-10-CM

## 2021-05-23 DIAGNOSIS — F41.9 ANXIETY: ICD-10-CM

## 2021-05-23 PROBLEM — E43 SEVERE PROTEIN-CALORIE MALNUTRITION (HCC): Status: ACTIVE | Noted: 2021-05-23

## 2021-05-23 LAB
ANION GAP SERPL CALCULATED.3IONS-SCNC: 10 MMOL/L (ref 7–16)
ANISOCYTOSIS: ABNORMAL
BASOPHILS ABSOLUTE: 0 E9/L (ref 0–0.2)
BASOPHILS RELATIVE PERCENT: 0 % (ref 0–2)
BUN BLDV-MCNC: 30 MG/DL (ref 6–23)
CALCIUM SERPL-MCNC: 9.3 MG/DL (ref 8.6–10.2)
CHLORIDE BLD-SCNC: 97 MMOL/L (ref 98–107)
CO2: 30 MMOL/L (ref 22–29)
CREAT SERPL-MCNC: 0.6 MG/DL (ref 0.5–1)
EOSINOPHILS ABSOLUTE: 0.14 E9/L (ref 0.05–0.5)
EOSINOPHILS RELATIVE PERCENT: 0.9 % (ref 0–6)
GFR AFRICAN AMERICAN: >60
GFR NON-AFRICAN AMERICAN: >60 ML/MIN/1.73
GLUCOSE BLD-MCNC: 106 MG/DL (ref 74–99)
HCT VFR BLD CALC: 34.3 % (ref 34–48)
HEMOGLOBIN: 10.2 G/DL (ref 11.5–15.5)
HYPOCHROMIA: ABNORMAL
LACTIC ACID: 1.9 MMOL/L (ref 0.5–2.2)
LYMPHOCYTES ABSOLUTE: 0.15 E9/L (ref 1.5–4)
LYMPHOCYTES RELATIVE PERCENT: 0.9 % (ref 20–42)
MCH RBC QN AUTO: 29.1 PG (ref 26–35)
MCHC RBC AUTO-ENTMCNC: 29.7 % (ref 32–34.5)
MCV RBC AUTO: 98 FL (ref 80–99.9)
MONOCYTES ABSOLUTE: 1.06 E9/L (ref 0.1–0.95)
MONOCYTES RELATIVE PERCENT: 7.2 % (ref 2–12)
NEUTROPHILS ABSOLUTE: 13.83 E9/L (ref 1.8–7.3)
NEUTROPHILS RELATIVE PERCENT: 91 % (ref 43–80)
NUCLEATED RED BLOOD CELLS: 0.9 /100 WBC
OVALOCYTES: ABNORMAL
PDW BLD-RTO: 15.1 FL (ref 11.5–15)
PLATELET # BLD: 619 E9/L (ref 130–450)
PMV BLD AUTO: 9.9 FL (ref 7–12)
POIKILOCYTES: ABNORMAL
POLYCHROMASIA: ABNORMAL
POTASSIUM REFLEX MAGNESIUM: 3.8 MMOL/L (ref 3.5–5)
PRO-BNP: 1608 PG/ML (ref 0–450)
PROCALCITONIN: 0.44 NG/ML (ref 0–0.08)
RBC # BLD: 3.5 E12/L (ref 3.5–5.5)
ROULEAUX: ABNORMAL
SODIUM BLD-SCNC: 137 MMOL/L (ref 132–146)
TARGET CELLS: ABNORMAL
TROPONIN: 0.01 NG/ML (ref 0–0.03)
WBC # BLD: 15.2 E9/L (ref 4.5–11.5)

## 2021-05-23 PROCEDURE — 84145 PROCALCITONIN (PCT): CPT

## 2021-05-23 PROCEDURE — 6370000000 HC RX 637 (ALT 250 FOR IP): Performed by: STUDENT IN AN ORGANIZED HEALTH CARE EDUCATION/TRAINING PROGRAM

## 2021-05-23 PROCEDURE — 2580000003 HC RX 258: Performed by: EMERGENCY MEDICINE

## 2021-05-23 PROCEDURE — 36415 COLL VENOUS BLD VENIPUNCTURE: CPT

## 2021-05-23 PROCEDURE — 2060000000 HC ICU INTERMEDIATE R&B

## 2021-05-23 PROCEDURE — 6370000000 HC RX 637 (ALT 250 FOR IP): Performed by: EMERGENCY MEDICINE

## 2021-05-23 PROCEDURE — 99284 EMERGENCY DEPT VISIT MOD MDM: CPT

## 2021-05-23 PROCEDURE — 96365 THER/PROPH/DIAG IV INF INIT: CPT

## 2021-05-23 PROCEDURE — 85025 COMPLETE CBC W/AUTO DIFF WBC: CPT

## 2021-05-23 PROCEDURE — 87040 BLOOD CULTURE FOR BACTERIA: CPT

## 2021-05-23 PROCEDURE — 94660 CPAP INITIATION&MGMT: CPT

## 2021-05-23 PROCEDURE — 83605 ASSAY OF LACTIC ACID: CPT

## 2021-05-23 PROCEDURE — 80048 BASIC METABOLIC PNL TOTAL CA: CPT

## 2021-05-23 PROCEDURE — 71045 X-RAY EXAM CHEST 1 VIEW: CPT

## 2021-05-23 PROCEDURE — 83880 ASSAY OF NATRIURETIC PEPTIDE: CPT

## 2021-05-23 PROCEDURE — 2580000003 HC RX 258: Performed by: STUDENT IN AN ORGANIZED HEALTH CARE EDUCATION/TRAINING PROGRAM

## 2021-05-23 PROCEDURE — 94664 DEMO&/EVAL PT USE INHALER: CPT

## 2021-05-23 PROCEDURE — 96375 TX/PRO/DX INJ NEW DRUG ADDON: CPT

## 2021-05-23 PROCEDURE — 94640 AIRWAY INHALATION TREATMENT: CPT

## 2021-05-23 PROCEDURE — 6360000002 HC RX W HCPCS: Performed by: EMERGENCY MEDICINE

## 2021-05-23 PROCEDURE — 6360000002 HC RX W HCPCS: Performed by: STUDENT IN AN ORGANIZED HEALTH CARE EDUCATION/TRAINING PROGRAM

## 2021-05-23 PROCEDURE — 84484 ASSAY OF TROPONIN QUANT: CPT

## 2021-05-23 RX ORDER — LEVOTHYROXINE SODIUM 0.1 MG/1
100 TABLET ORAL DAILY
Status: DISCONTINUED | OUTPATIENT
Start: 2021-05-23 | End: 2021-05-24 | Stop reason: HOSPADM

## 2021-05-23 RX ORDER — IPRATROPIUM BROMIDE AND ALBUTEROL SULFATE 2.5; .5 MG/3ML; MG/3ML
1 SOLUTION RESPIRATORY (INHALATION) ONCE
Status: COMPLETED | OUTPATIENT
Start: 2021-05-23 | End: 2021-05-23

## 2021-05-23 RX ORDER — PANTOPRAZOLE SODIUM 40 MG/1
40 TABLET, DELAYED RELEASE ORAL
Status: DISCONTINUED | OUTPATIENT
Start: 2021-05-23 | End: 2021-05-24 | Stop reason: HOSPADM

## 2021-05-23 RX ORDER — FUROSEMIDE 20 MG/1
20 TABLET ORAL DAILY
Status: DISCONTINUED | OUTPATIENT
Start: 2021-05-23 | End: 2021-05-24 | Stop reason: HOSPADM

## 2021-05-23 RX ORDER — FERROUS SULFATE 325(65) MG
325 TABLET ORAL EVERY OTHER DAY
Status: DISCONTINUED | OUTPATIENT
Start: 2021-05-24 | End: 2021-05-24 | Stop reason: HOSPADM

## 2021-05-23 RX ORDER — SODIUM CHLORIDE 9 MG/ML
INJECTION, SOLUTION INTRAVENOUS EVERY 12 HOURS
Status: DISCONTINUED | OUTPATIENT
Start: 2021-05-24 | End: 2021-05-24 | Stop reason: HOSPADM

## 2021-05-23 RX ORDER — POTASSIUM BICARBONATE 25 MEQ/1
25 TABLET, EFFERVESCENT ORAL ONCE
Status: DISCONTINUED | OUTPATIENT
Start: 2021-05-23 | End: 2021-05-23

## 2021-05-23 RX ORDER — FLECAINIDE ACETATE 50 MG/1
75 TABLET ORAL 2 TIMES DAILY
Status: DISCONTINUED | OUTPATIENT
Start: 2021-05-23 | End: 2021-05-24 | Stop reason: HOSPADM

## 2021-05-23 RX ORDER — DULOXETIN HYDROCHLORIDE 20 MG/1
40 CAPSULE, DELAYED RELEASE ORAL NIGHTLY
Status: DISCONTINUED | OUTPATIENT
Start: 2021-05-23 | End: 2021-05-24 | Stop reason: HOSPADM

## 2021-05-23 RX ORDER — LORAZEPAM 2 MG/ML
0.25 INJECTION INTRAMUSCULAR ONCE
Status: COMPLETED | OUTPATIENT
Start: 2021-05-23 | End: 2021-05-23

## 2021-05-23 RX ORDER — ONDANSETRON 2 MG/ML
4 INJECTION INTRAMUSCULAR; INTRAVENOUS ONCE
Status: COMPLETED | OUTPATIENT
Start: 2021-05-23 | End: 2021-05-23

## 2021-05-23 RX ORDER — PROMETHAZINE HYDROCHLORIDE 25 MG/1
12.5 TABLET ORAL EVERY 6 HOURS PRN
Status: DISCONTINUED | OUTPATIENT
Start: 2021-05-23 | End: 2021-05-24 | Stop reason: HOSPADM

## 2021-05-23 RX ORDER — 0.9 % SODIUM CHLORIDE 0.9 %
500 INTRAVENOUS SOLUTION INTRAVENOUS ONCE
Status: COMPLETED | OUTPATIENT
Start: 2021-05-23 | End: 2021-05-23

## 2021-05-23 RX ORDER — SODIUM CHLORIDE 0.9 % (FLUSH) 0.9 %
5-40 SYRINGE (ML) INJECTION PRN
Status: DISCONTINUED | OUTPATIENT
Start: 2021-05-23 | End: 2021-05-24 | Stop reason: HOSPADM

## 2021-05-23 RX ORDER — IPRATROPIUM BROMIDE AND ALBUTEROL SULFATE 2.5; .5 MG/3ML; MG/3ML
1 SOLUTION RESPIRATORY (INHALATION)
Status: DISCONTINUED | OUTPATIENT
Start: 2021-05-23 | End: 2021-05-24 | Stop reason: HOSPADM

## 2021-05-23 RX ORDER — MONTELUKAST SODIUM 10 MG/1
10 TABLET ORAL NIGHTLY
Status: DISCONTINUED | OUTPATIENT
Start: 2021-05-23 | End: 2021-05-24 | Stop reason: HOSPADM

## 2021-05-23 RX ORDER — DOCUSATE SODIUM 100 MG/1
100 CAPSULE, LIQUID FILLED ORAL DAILY PRN
Status: DISCONTINUED | OUTPATIENT
Start: 2021-05-23 | End: 2021-05-24

## 2021-05-23 RX ORDER — HYDROCODONE BITARTRATE AND ACETAMINOPHEN 5; 325 MG/1; MG/1
1 TABLET ORAL EVERY 4 HOURS PRN
Status: DISCONTINUED | OUTPATIENT
Start: 2021-05-23 | End: 2021-05-24

## 2021-05-23 RX ORDER — FAMOTIDINE 20 MG/1
40 TABLET, FILM COATED ORAL NIGHTLY PRN
Status: DISCONTINUED | OUTPATIENT
Start: 2021-05-23 | End: 2021-05-24 | Stop reason: HOSPADM

## 2021-05-23 RX ORDER — ONDANSETRON 2 MG/ML
4 INJECTION INTRAMUSCULAR; INTRAVENOUS EVERY 6 HOURS PRN
Status: DISCONTINUED | OUTPATIENT
Start: 2021-05-23 | End: 2021-05-24 | Stop reason: HOSPADM

## 2021-05-23 RX ORDER — CELECOXIB 100 MG/1
200 CAPSULE ORAL DAILY
Status: DISCONTINUED | OUTPATIENT
Start: 2021-05-23 | End: 2021-05-24 | Stop reason: HOSPADM

## 2021-05-23 RX ORDER — CODEINE PHOSPHATE AND GUAIFENESIN 10; 100 MG/5ML; MG/5ML
10 SOLUTION ORAL ONCE
Status: COMPLETED | OUTPATIENT
Start: 2021-05-23 | End: 2021-05-23

## 2021-05-23 RX ORDER — POLYETHYLENE GLYCOL 3350 17 G/17G
17 POWDER, FOR SOLUTION ORAL DAILY PRN
Status: DISCONTINUED | OUTPATIENT
Start: 2021-05-23 | End: 2021-05-24 | Stop reason: HOSPADM

## 2021-05-23 RX ORDER — AMLODIPINE BESYLATE 5 MG/1
5 TABLET ORAL DAILY
Status: DISCONTINUED | OUTPATIENT
Start: 2021-05-23 | End: 2021-05-24 | Stop reason: HOSPADM

## 2021-05-23 RX ORDER — VALSARTAN 320 MG/1
320 TABLET ORAL DAILY
Status: DISCONTINUED | OUTPATIENT
Start: 2021-05-23 | End: 2021-05-24 | Stop reason: HOSPADM

## 2021-05-23 RX ORDER — SODIUM CHLORIDE 9 MG/ML
25 INJECTION, SOLUTION INTRAVENOUS PRN
Status: DISCONTINUED | OUTPATIENT
Start: 2021-05-23 | End: 2021-05-24 | Stop reason: HOSPADM

## 2021-05-23 RX ORDER — METOPROLOL TARTRATE 50 MG/1
50 TABLET, FILM COATED ORAL 2 TIMES DAILY
Status: DISCONTINUED | OUTPATIENT
Start: 2021-05-23 | End: 2021-05-24 | Stop reason: HOSPADM

## 2021-05-23 RX ORDER — GABAPENTIN 100 MG/1
100 CAPSULE ORAL 2 TIMES DAILY
Status: DISCONTINUED | OUTPATIENT
Start: 2021-05-23 | End: 2021-05-24 | Stop reason: HOSPADM

## 2021-05-23 RX ORDER — FENTANYL CITRATE 50 UG/ML
25 INJECTION, SOLUTION INTRAMUSCULAR; INTRAVENOUS ONCE
Status: COMPLETED | OUTPATIENT
Start: 2021-05-23 | End: 2021-05-23

## 2021-05-23 RX ORDER — ACETAMINOPHEN 650 MG/1
650 SUPPOSITORY RECTAL EVERY 6 HOURS PRN
Status: DISCONTINUED | OUTPATIENT
Start: 2021-05-23 | End: 2021-05-24 | Stop reason: HOSPADM

## 2021-05-23 RX ORDER — ACETAMINOPHEN 325 MG/1
650 TABLET ORAL EVERY 6 HOURS PRN
Status: DISCONTINUED | OUTPATIENT
Start: 2021-05-23 | End: 2021-05-24 | Stop reason: HOSPADM

## 2021-05-23 RX ORDER — SODIUM CHLORIDE 0.9 % (FLUSH) 0.9 %
5-40 SYRINGE (ML) INJECTION EVERY 12 HOURS SCHEDULED
Status: DISCONTINUED | OUTPATIENT
Start: 2021-05-23 | End: 2021-05-24 | Stop reason: HOSPADM

## 2021-05-23 RX ADMIN — FENTANYL CITRATE 25 MCG: 50 INJECTION, SOLUTION INTRAMUSCULAR; INTRAVENOUS at 12:44

## 2021-05-23 RX ADMIN — GUAIFENESIN AND CODEINE PHOSPHATE 10 ML: 100; 10 SOLUTION ORAL at 11:27

## 2021-05-23 RX ADMIN — IPRATROPIUM BROMIDE AND ALBUTEROL SULFATE 1 AMPULE: .5; 3 SOLUTION RESPIRATORY (INHALATION) at 20:06

## 2021-05-23 RX ADMIN — MONTELUKAST SODIUM 10 MG: 10 TABLET, FILM COATED ORAL at 20:29

## 2021-05-23 RX ADMIN — GABAPENTIN 100 MG: 100 CAPSULE ORAL at 20:29

## 2021-05-23 RX ADMIN — CEFEPIME 2000 MG: 2 INJECTION, POWDER, FOR SOLUTION INTRAVENOUS at 13:26

## 2021-05-23 RX ADMIN — DULOXETINE HYDROCHLORIDE 40 MG: 20 CAPSULE, DELAYED RELEASE ORAL at 20:30

## 2021-05-23 RX ADMIN — SODIUM CHLORIDE, PRESERVATIVE FREE 10 ML: 5 INJECTION INTRAVENOUS at 20:25

## 2021-05-23 RX ADMIN — SODIUM CHLORIDE 500 ML: 9 INJECTION, SOLUTION INTRAVENOUS at 13:26

## 2021-05-23 RX ADMIN — LORAZEPAM 0.25 MG: 2 INJECTION INTRAMUSCULAR; INTRAVENOUS at 20:24

## 2021-05-23 RX ADMIN — ONDANSETRON 4 MG: 2 INJECTION INTRAMUSCULAR; INTRAVENOUS at 12:44

## 2021-05-23 RX ADMIN — IPRATROPIUM BROMIDE AND ALBUTEROL SULFATE 1 AMPULE: .5; 3 SOLUTION RESPIRATORY (INHALATION) at 11:32

## 2021-05-23 RX ADMIN — APIXABAN 2.5 MG: 2.5 TABLET, FILM COATED ORAL at 20:30

## 2021-05-23 RX ADMIN — VANCOMYCIN HYDROCHLORIDE 1000 MG: 1 INJECTION, POWDER, LYOPHILIZED, FOR SOLUTION INTRAVENOUS at 16:14

## 2021-05-23 ASSESSMENT — ENCOUNTER SYMPTOMS
SINUS PRESSURE: 0
SHORTNESS OF BREATH: 1
COUGH: 1
EYE PAIN: 0
NAUSEA: 0
WHEEZING: 0
ABDOMINAL PAIN: 0
VOMITING: 0
CHEST TIGHTNESS: 0
CONSTIPATION: 0
DIARRHEA: 0

## 2021-05-23 ASSESSMENT — PAIN SCALES - GENERAL: PAINLEVEL_OUTOF10: 7

## 2021-05-23 NOTE — PROGRESS NOTES
Message sent to Dr. Haywood Sees via perfect serve regarding reason for ID consult. Message read. Awaiting response back.

## 2021-05-23 NOTE — H&P
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 She was found by family to have saturation below 80% at home this morning. They gave her breathing treatments and increased her rate to 8L with some improvement. Attempts to then decrease this rate were not successful however and she was brought in for further evaluation. She was found to have b/l pneumonia and elevated white count. She had difficulty with the nonrebreather and was started on bipap. She was satting well with bipap but doesn't tolerate the mask very well so she was given fentanyl with subsequent improvement in air hunger and discomfort. Family at bedside discussed code status with ER physician and patient and family communicated desire to be dnr-cc. I'm told they have a pre-existing relationship with palliative and would like to discuss the possibility of hospice as well. Patient didn't have a clear statement about what she would want in terms of hospice when I talked to her other than she expressed that her current goal is to get better from the pneumonia.      Past Medical History:   Diagnosis Date    Anticoagulated     takes Xarelto    Atrial fibrillation ([facility] [address]     follows with Dr. Johny Bowen fracture     COVID-19 3/7/8589    Diastolic dysfunction     stage I    Difficulty walking     uses walker    Diverticular disease 2011    identified on colonoscopy in 9/2011    Elevated CA-125     referred to Dr Chely Del Real; no work up planned     Hiatal hernia     s/p Nissen with recurrence; Dr Crissy Rowland disturbance. Respiratory: Positive for cough and shortness of breath. Negative for chest tightness and wheezing. Cardiovascular: Negative for chest pain and palpitations. Gastrointestinal: Negative for abdominal pain, constipation, diarrhea, nausea and vomiting. Genitourinary: Negative for dysuria, frequency and urgency. Musculoskeletal: Negative for neck pain and neck stiffness. Skin: Positive for wound (sacral ulcer). Negative for pallor. Neurological: Positive for weakness (generalized). Negative for dizziness, light-headedness and headaches. Hematological: Does not bruise/bleed easily. Psychiatric/Behavioral: Negative for agitation and behavioral problems. PHYSICAL EXAM:  Vitals:  /60   Pulse 75   Temp 97.6 °F (36.4 °C) (Oral)   Resp (!) 38   Ht 5' 4\" (1.626 m)   Wt 133 lb (60.3 kg)   SpO2 100%   BMI 22.83 kg/m²   General:  Alert, oriented X 3. No distress with bipap on. HEENT:  No scleral icterus. No conjunctival injection. Neck:  Supple, no thyromegaly, no lymphadenopathy  Cardiovascular:  Reg rate, no murmurs, gallops, or rubs. No JVD. No carotid bruits. Lungs:  rhonchorus throughout b/l lung field. On bipap satting appropriately. Abdomen:  Soft, nontender, no peritoneal signs  Extremities:  No clubbing, cyanosis, or edema  Skin:  Warm and dry.  Sacral ulcer  Neuro:  Cranial nerves 2-12 grossly intact, no focal deficits    LABS:  CBC:   Lab Results   Component Value Date    WBC 15.2 05/23/2021    RBC 3.50 05/23/2021    HGB 10.2 05/23/2021    HCT 34.3 05/23/2021    MCV 98.0 05/23/2021    MCH 29.1 05/23/2021    MCHC 29.7 05/23/2021    RDW 15.1 05/23/2021     05/23/2021    MPV 9.9 05/23/2021     CMP:    Lab Results   Component Value Date     05/23/2021    K 3.8 05/23/2021    CL 97 05/23/2021    CO2 30 05/23/2021    BUN 30 05/23/2021    CREATININE 0.6 05/23/2021    GFRAA >60 05/23/2021    LABGLOM >60 05/23/2021    GLUCOSE 106 05/23/2021    GLUCOSE 79 04/24/2012    PROT 6.1 05/19/2021    LABALBU 2.6 05/19/2021    LABALBU 4.0 04/24/2012    CALCIUM 9.3 05/23/2021    BILITOT 0.2 05/19/2021    ALKPHOS 118 05/19/2021    AST 18 05/19/2021    ALT 11 05/19/2021     Troponin:    Lab Results   Component Value Date    TROPONINI 0.01 05/23/2021     U/A:    Lab Results   Component Value Date    NITRITE negative 06/04/2019    COLORU Yellow 05/19/2021    PROTEINU TRACE 05/19/2021    PHUR 7.5 05/19/2021    WBCUA 10-20 05/19/2021    WBCUA NONE 04/24/2012    RBCUA 0-1 05/19/2021    RBCUA 2-5 03/23/2014    YEAST Present 02/24/2021    BACTERIA MANY 05/19/2021    CLARITYU Clear 05/19/2021    SPECGRAV 1.015 05/19/2021    LEUKOCYTESUR TRACE 05/19/2021    UROBILINOGEN 1.0 05/19/2021    BILIRUBINUR Negative 05/19/2021    BILIRUBINUR negative 06/04/2019    BILIRUBINUR NEGATIVE 04/24/2012    BLOODU Negative 05/19/2021    GLUCOSEU Negative 05/19/2021    GLUCOSEU NEGATIVE 04/24/2012    AMORPHOUS FEW 05/19/2021       ASSESSMENT:    Active Hospital Problems    Diagnosis Date Noted    Acute on chronic respiratory failure with hypoxia ([facility] [address] [J96.21] 05/23/2021       PLAN:    Acute on Chronic hypoxic respiratory failure  Pt with underlying fibrosis and copd on chronic O2 at home presents with increased oxygen demands, increased resp rate, leukocytosis and b/l infiltrate on cxr. Risk for mrsa and pseudomonas given multiple hospitalizations, iv abx in 3 mo, underlying bronchiectasis and fibrosis with recurrent copd exacerbations  -Started on cefepime and vanc. Continue.  Consult pharm to dose vanc   -Follow cultures  -BIPAP or nonrebreather as tolerated  -Consult palliative for goals, code status, comfort care - patient established with palliative      Wound on sacrum - [facility] - consult    Hx arrhythmia - continue home flecaidine, eliquis, metoprolol    HTN   - continue valsartan, amlodipine      Sagrario Glassing, DO DO  2:53 PM  5/23/21    Medications Prior to Admission: Not in a hospital admission.

## 2021-05-23 NOTE — PROGRESS NOTES
Date: 5/23/2021    Time: 4:26 PM    Patient Placed On BIPAP/CPAP/ Non-Invasive Ventilation? No    If no must comment. Facial area red/color change? No           If YES are Blister/Lesion present? No   If yes must notify nursing staff  BIPAP/CPAP skin barrier?   Yes    Skin barrier type:mepilexlite       Comments: pt remains on bipap        Sincere Baldwin RCP

## 2021-05-23 NOTE — PROGRESS NOTES
Date: 5/23/2021    Time: 5:51 PM    Patient Placed On BIPAP/CPAP/ Non-Invasive Ventilation? Yes    If no must comment. Facial area red/color change? No           If YES are Blister/Lesion present? No   If yes must notify nursing staff  BIPAP/CPAP skin barrier?   Yes    Skin barrier type:mepilexlite       Comments:        Vaibhav Mena RCP

## 2021-05-23 NOTE — ED NOTES
Bed: 08  Expected date:   Expected time:   Means of arrival:   Comments:  ems     Maricel Friedman RN  05/23/21 104

## 2021-05-24 ENCOUNTER — TELEPHONE (OUTPATIENT)
Dept: FAMILY MEDICINE CLINIC | Age: 86
End: 2021-05-24

## 2021-05-24 VITALS
BODY MASS INDEX: 22.56 KG/M2 | HEIGHT: 64 IN | RESPIRATION RATE: 24 BRPM | OXYGEN SATURATION: 93 % | WEIGHT: 132.13 LBS | DIASTOLIC BLOOD PRESSURE: 64 MMHG | SYSTOLIC BLOOD PRESSURE: 111 MMHG | HEART RATE: 87 BPM | TEMPERATURE: 97.5 F

## 2021-05-24 DIAGNOSIS — I50.32 CHRONIC HEART FAILURE WITH PRESERVED EJECTION FRACTION (HCC): Primary | ICD-10-CM

## 2021-05-24 DIAGNOSIS — J96.11 CHRONIC RESPIRATORY FAILURE WITH HYPOXIA (HCC): ICD-10-CM

## 2021-05-24 PROBLEM — J96.21 ACUTE ON CHRONIC RESPIRATORY FAILURE WITH HYPOXIA (HCC): Status: RESOLVED | Noted: 2021-05-23 | Resolved: 2021-05-24

## 2021-05-24 LAB
ADENOVIRUS BY PCR: NOT DETECTED
ALBUMIN SERPL-MCNC: 2.4 G/DL (ref 3.5–5.2)
ALP BLD-CCNC: 136 U/L (ref 35–104)
ALT SERPL-CCNC: 8 U/L (ref 0–32)
ANION GAP SERPL CALCULATED.3IONS-SCNC: 9 MMOL/L (ref 7–16)
ANISOCYTOSIS: ABNORMAL
AST SERPL-CCNC: 25 U/L (ref 0–31)
BASOPHILS ABSOLUTE: 0 E9/L (ref 0–0.2)
BASOPHILS RELATIVE PERCENT: 0 % (ref 0–2)
BILIRUB SERPL-MCNC: 0.4 MG/DL (ref 0–1.2)
BORDETELLA PARAPERTUSSIS BY PCR: NOT DETECTED
BORDETELLA PERTUSSIS BY PCR: NOT DETECTED
BUN BLDV-MCNC: 23 MG/DL (ref 6–23)
CALCIUM SERPL-MCNC: 9.1 MG/DL (ref 8.6–10.2)
CHLAMYDOPHILIA PNEUMONIAE BY PCR: NOT DETECTED
CHLORIDE BLD-SCNC: 99 MMOL/L (ref 98–107)
CO2: 31 MMOL/L (ref 22–29)
CORONAVIRUS 229E BY PCR: NOT DETECTED
CORONAVIRUS HKU1 BY PCR: NOT DETECTED
CORONAVIRUS NL63 BY PCR: NOT DETECTED
CORONAVIRUS OC43 BY PCR: NOT DETECTED
CREAT SERPL-MCNC: 0.6 MG/DL (ref 0.5–1)
EOSINOPHILS ABSOLUTE: 0 E9/L (ref 0.05–0.5)
EOSINOPHILS RELATIVE PERCENT: 0 % (ref 0–6)
GFR AFRICAN AMERICAN: >60
GFR NON-AFRICAN AMERICAN: >60 ML/MIN/1.73
GLUCOSE BLD-MCNC: 112 MG/DL (ref 74–99)
HCT VFR BLD CALC: 32.9 % (ref 34–48)
HEMOGLOBIN: 9.7 G/DL (ref 11.5–15.5)
HUMAN METAPNEUMOVIRUS BY PCR: NOT DETECTED
HUMAN RHINOVIRUS/ENTEROVIRUS BY PCR: NOT DETECTED
INFLUENZA A BY PCR: NOT DETECTED
INFLUENZA B BY PCR: NOT DETECTED
LYMPHOCYTES ABSOLUTE: 0.86 E9/L (ref 1.5–4)
LYMPHOCYTES RELATIVE PERCENT: 6.1 % (ref 20–42)
MAGNESIUM: 1.6 MG/DL (ref 1.6–2.6)
MCH RBC QN AUTO: 28.7 PG (ref 26–35)
MCHC RBC AUTO-ENTMCNC: 29.5 % (ref 32–34.5)
MCV RBC AUTO: 97.3 FL (ref 80–99.9)
MONOCYTES ABSOLUTE: 0.43 E9/L (ref 0.1–0.95)
MONOCYTES RELATIVE PERCENT: 2.6 % (ref 2–12)
MYCOPLASMA PNEUMONIAE BY PCR: NOT DETECTED
NEUTROPHILS ABSOLUTE: 13.01 E9/L (ref 1.8–7.3)
NEUTROPHILS RELATIVE PERCENT: 91.3 % (ref 43–80)
NUCLEATED RED BLOOD CELLS: 0 /100 WBC
PARAINFLUENZA VIRUS 1 BY PCR: NOT DETECTED
PARAINFLUENZA VIRUS 2 BY PCR: NOT DETECTED
PARAINFLUENZA VIRUS 3 BY PCR: NOT DETECTED
PARAINFLUENZA VIRUS 4 BY PCR: NOT DETECTED
PDW BLD-RTO: 15.1 FL (ref 11.5–15)
PLATELET # BLD: 550 E9/L (ref 130–450)
PMV BLD AUTO: 9.6 FL (ref 7–12)
POIKILOCYTES: ABNORMAL
POLYCHROMASIA: ABNORMAL
POTASSIUM REFLEX MAGNESIUM: 3.4 MMOL/L (ref 3.5–5)
RBC # BLD: 3.38 E12/L (ref 3.5–5.5)
RESPIRATORY SYNCYTIAL VIRUS BY PCR: NOT DETECTED
SARS-COV-2, PCR: NOT DETECTED
SODIUM BLD-SCNC: 139 MMOL/L (ref 132–146)
TEAR DROP CELLS: ABNORMAL
TOTAL PROTEIN: 5.9 G/DL (ref 6.4–8.3)
WBC # BLD: 14.3 E9/L (ref 4.5–11.5)

## 2021-05-24 PROCEDURE — 6370000000 HC RX 637 (ALT 250 FOR IP): Performed by: STUDENT IN AN ORGANIZED HEALTH CARE EDUCATION/TRAINING PROGRAM

## 2021-05-24 PROCEDURE — 99222 1ST HOSP IP/OBS MODERATE 55: CPT | Performed by: STUDENT IN AN ORGANIZED HEALTH CARE EDUCATION/TRAINING PROGRAM

## 2021-05-24 PROCEDURE — 6370000000 HC RX 637 (ALT 250 FOR IP): Performed by: FAMILY MEDICINE

## 2021-05-24 PROCEDURE — 80053 COMPREHEN METABOLIC PANEL: CPT

## 2021-05-24 PROCEDURE — 36415 COLL VENOUS BLD VENIPUNCTURE: CPT

## 2021-05-24 PROCEDURE — 83735 ASSAY OF MAGNESIUM: CPT

## 2021-05-24 PROCEDURE — 2580000003 HC RX 258: Performed by: STUDENT IN AN ORGANIZED HEALTH CARE EDUCATION/TRAINING PROGRAM

## 2021-05-24 PROCEDURE — 94660 CPAP INITIATION&MGMT: CPT

## 2021-05-24 PROCEDURE — 94640 AIRWAY INHALATION TREATMENT: CPT

## 2021-05-24 PROCEDURE — 0202U NFCT DS 22 TRGT SARS-COV-2: CPT

## 2021-05-24 PROCEDURE — 99223 1ST HOSP IP/OBS HIGH 75: CPT | Performed by: FAMILY MEDICINE

## 2021-05-24 PROCEDURE — 6360000002 HC RX W HCPCS: Performed by: STUDENT IN AN ORGANIZED HEALTH CARE EDUCATION/TRAINING PROGRAM

## 2021-05-24 PROCEDURE — 87081 CULTURE SCREEN ONLY: CPT

## 2021-05-24 PROCEDURE — 85025 COMPLETE CBC W/AUTO DIFF WBC: CPT

## 2021-05-24 PROCEDURE — 6360000002 HC RX W HCPCS: Performed by: FAMILY MEDICINE

## 2021-05-24 RX ORDER — MORPHINE SULFATE 100 MG/5ML
2.5 SOLUTION ORAL
Qty: 15 ML | Refills: 0 | Status: SHIPPED | OUTPATIENT
Start: 2021-05-24 | End: 2021-05-27

## 2021-05-24 RX ORDER — MORPHINE SULFATE 10 MG/5ML
4 SOLUTION ORAL
Status: DISCONTINUED | OUTPATIENT
Start: 2021-05-24 | End: 2021-05-24 | Stop reason: HOSPADM

## 2021-05-24 RX ORDER — DOXYCYCLINE HYCLATE 100 MG
100 TABLET ORAL 2 TIMES DAILY
Qty: 14 TABLET | Refills: 0 | Status: SHIPPED | OUTPATIENT
Start: 2021-05-24 | End: 2021-05-31

## 2021-05-24 RX ORDER — DOCUSATE SODIUM 100 MG/1
100 CAPSULE, LIQUID FILLED ORAL DAILY
Status: DISCONTINUED | OUTPATIENT
Start: 2021-05-24 | End: 2021-05-24 | Stop reason: HOSPADM

## 2021-05-24 RX ORDER — LORAZEPAM 0.5 MG/1
0.5 TABLET ORAL EVERY 8 HOURS PRN
Qty: 9 TABLET | Refills: 0 | Status: SHIPPED | OUTPATIENT
Start: 2021-05-24 | End: 2021-05-27

## 2021-05-24 RX ORDER — CEFDINIR 300 MG/1
300 CAPSULE ORAL 2 TIMES DAILY
Qty: 14 CAPSULE | Refills: 0 | Status: SHIPPED | OUTPATIENT
Start: 2021-05-24 | End: 2021-05-31

## 2021-05-24 RX ORDER — MAGNESIUM SULFATE IN WATER 40 MG/ML
2000 INJECTION, SOLUTION INTRAVENOUS ONCE
Status: COMPLETED | OUTPATIENT
Start: 2021-05-24 | End: 2021-05-24

## 2021-05-24 RX ADMIN — MAGNESIUM SULFATE HEPTAHYDRATE 2000 MG: 40 INJECTION, SOLUTION INTRAVENOUS at 10:24

## 2021-05-24 RX ADMIN — DULOXETINE HYDROCHLORIDE 40 MG: 20 CAPSULE, DELAYED RELEASE ORAL at 10:11

## 2021-05-24 RX ADMIN — PANTOPRAZOLE SODIUM 40 MG: 40 TABLET, DELAYED RELEASE ORAL at 06:56

## 2021-05-24 RX ADMIN — SODIUM CHLORIDE, PRESERVATIVE FREE 10 ML: 5 INJECTION INTRAVENOUS at 10:22

## 2021-05-24 RX ADMIN — CEFEPIME 2000 MG: 2 INJECTION, POWDER, FOR SOLUTION INTRAVENOUS at 02:18

## 2021-05-24 RX ADMIN — DOCUSATE SODIUM 100 MG: 100 CAPSULE ORAL at 10:24

## 2021-05-24 RX ADMIN — POTASSIUM BICARBONATE 40 MEQ: 782 TABLET, EFFERVESCENT ORAL at 10:24

## 2021-05-24 RX ADMIN — FUROSEMIDE 20 MG: 20 TABLET ORAL at 06:56

## 2021-05-24 RX ADMIN — MORPHINE SULFATE 4 MG: 10 SOLUTION ORAL at 14:35

## 2021-05-24 RX ADMIN — IPRATROPIUM BROMIDE AND ALBUTEROL SULFATE 1 AMPULE: .5; 3 SOLUTION RESPIRATORY (INHALATION) at 08:58

## 2021-05-24 RX ADMIN — CELECOXIB 200 MG: 100 CAPSULE ORAL at 10:23

## 2021-05-24 RX ADMIN — LEVOTHYROXINE SODIUM 100 MCG: 100 TABLET ORAL at 06:56

## 2021-05-24 RX ADMIN — GABAPENTIN 100 MG: 100 CAPSULE ORAL at 10:22

## 2021-05-24 RX ADMIN — METOPROLOL TARTRATE 50 MG: 50 TABLET, FILM COATED ORAL at 10:22

## 2021-05-24 RX ADMIN — FLECAINIDE ACETATE 75 MG: 50 TABLET ORAL at 10:11

## 2021-05-24 RX ADMIN — VALSARTAN 320 MG: 320 TABLET ORAL at 10:11

## 2021-05-24 RX ADMIN — APIXABAN 2.5 MG: 2.5 TABLET, FILM COATED ORAL at 10:21

## 2021-05-24 RX ADMIN — IPRATROPIUM BROMIDE AND ALBUTEROL SULFATE 1 AMPULE: .5; 3 SOLUTION RESPIRATORY (INHALATION) at 12:16

## 2021-05-24 RX ADMIN — AMLODIPINE BESYLATE 5 MG: 5 TABLET ORAL at 10:22

## 2021-05-24 ASSESSMENT — PAIN SCALES - GENERAL: PAINLEVEL_OUTOF10: 8

## 2021-05-24 NOTE — DISCHARGE SUMMARY
Discharge Summary     Date:5/24/2021  Patient Name: Pranay Duggan     YOB: 1935     Age: 80 y.o. MRN: [id number]    Admit Date:5/23/2021    Discharge Date: 05/24/21  Discharged Condition:good    Discharge Diagnoses   Active Problems:    Paroxysmal A-fib ([facility]    Pneumonia due to infectious organism    Frailty    Leukocytosis    Pressure injury of coccygeal region, stage 4 ([facility]    Severe protein-calorie malnutrition ([facility]  Resolved Problems:    Acute on chronic respiratory failure with hypoxia [facility])      Hospital Stay   Narrative of Hospital Course:  Pranay Duggan was admitted on 5/23/2021 with shortness of breath and hypoxia. Patient was found to have bilateral pneumonia on chest x-ray, as well as acute on chronic hypoxic respiratory failure requiring 1 L more nasal cannula oxygen than her baseline of 3 L. Patient was also noted to have a leukocytosis and was started on cefepime and vancomycin. Patient required BiPAP as tolerated for oxygenation at night. Cefepime and vancomycin were continued throughout admission. Wound care was consulted for patient's chronic sacral ulcer. Palliative care and hospice were also consulted by request of patient and patient's family. After discussion with patient and patient's family, the decision was made for patient to go with hospice. As a palliative measure, patient was sent with morphine, Ativan, doxycycline, and Omnicef scripts. Patient's daughter Jd Castellon was contacted and agreed to keep their follow-up virtual appointment with patient's PCP tomorrow 5/25.     Consultants:   IP CONSULT TO FAMILY MEDICINE  IP CONSULT TO HOSPICE  IP CONSULT TO SOCIAL WORK  IP CONSULT TO PALLIATIVE CARE  IP CONSULT TO PHARMACY    Surgeries/Procedures Performed:        Treatments:   IV hydration, antibiotics: vancomycin and cefepime, analgesia: Morphine, cardiac meds: Valsartan, metoprolol, flecainide and furosemide, anticoagulation: Eliquis and respiratory therapy: O2 and DuoNebs    Significant Diagnostic Studies:   Recent Labs:  CBC:   Lab Results   Component Value Date    WBC 14.3 05/24/2021    RBC 3.38 05/24/2021    HGB 9.7 05/24/2021    HCT 32.9 05/24/2021    MCV 97.3 05/24/2021    MCH 28.7 05/24/2021    MCHC 29.5 05/24/2021    RDW 15.1 05/24/2021     05/24/2021     HFP:    Lab Results   Component Value Date    PROT 5.9 05/24/2021     CMP:    Lab Results   Component Value Date    GLUCOSE 112 05/24/2021    GLUCOSE 79 04/24/2012     05/24/2021    K 3.4 05/24/2021    CL 99 05/24/2021    CO2 31 05/24/2021    BUN 23 05/24/2021    CREATININE 0.6 05/24/2021    ANIONGAP 9 05/24/2021    ALKPHOS 136 05/24/2021    ALT 8 05/24/2021    AST 25 05/24/2021    BILITOT 0.4 05/24/2021    LABALBU 2.4 05/24/2021    LABALBU 4.0 04/24/2012    LABGLOM >60 05/24/2021    GFRAA >60 05/24/2021    PROT 5.9 05/24/2021    CALCIUM 9.1 05/24/2021     PT/INR:    Lab Results   Component Value Date    PROTIME 13.5 05/19/2021    PROTIME 15.8 05/12/2014    INR 1.2 05/19/2021     PTT:   Lab Results   Component Value Date    APTT 23.4 03/01/2021     U/A:    Lab Results   Component Value Date    COLORU Yellow 05/19/2021    SPECGRAV 1.015 05/19/2021    PHUR 7.5 05/19/2021    PROTEINU TRACE 05/19/2021    GLUCOSEU Negative 05/19/2021    GLUCOSEU NEGATIVE 04/24/2012    KETUA Negative 05/19/2021    BILIRUBINUR Negative 05/19/2021    BILIRUBINUR negative 06/04/2019    BILIRUBINUR NEGATIVE 04/24/2012    UROBILINOGEN 1.0 05/19/2021    NITRU Negative 05/19/2021    LEUKOCYTESUR TRACE 05/19/2021       Radiology Last 7 Days:  XR CHEST PORTABLE    Result Date: 5/23/2021  1. Extensive multifocal bilateral airspace disease superimposed on emphysematous changes and underlying interstitial fibrosis. XR CHEST PORTABLE    Result Date: 5/19/2021  No significant change since the prior study.   There are bilateral reticular and patchy ground-glass airspace opacities which appear stable, likely related to chronic lung changes Prominent but stable cardiac silhouette       Discharge Physical Exam:   /64   Pulse 87   Temp 97.5 °F (36.4 °C) (Oral)   Resp 24   Ht 5' 4\" (1.626 m)   Wt 132 lb 2 oz (59.9 kg)   SpO2 93%   BMI 22.68 kg/m²   Constitutional:       General: She is not in acute distress. Appearance: Normal appearance. HENT:      Head: Normocephalic and atraumatic. Nose: Nose normal. No rhinorrhea. Comments: Nasal cannula in place     Mouth/Throat:      Mouth: Mucous membranes are moist.      Pharynx: Oropharynx is clear. Eyes:      General:         Right eye: No discharge. Left eye: No discharge. Cardiovascular:      Rate and Rhythm: Normal rate and regular rhythm. Pulmonary:      Effort: Pulmonary effort is normal.      Breath sounds: Rales (Soft, bilateral lower lobes) present. No wheezing. Abdominal:      General: Bowel sounds are normal.      Palpations: Abdomen is soft. Tenderness: There is no abdominal tenderness. Musculoskeletal:      Right lower leg: No edema. Left lower leg: No edema. Skin:     General: Skin is warm and dry. Neurological:      Mental Status: She is alert and oriented to person, place, and time. Mental status is at baseline.      Discharge Plan   Disposition: Hospice    Provider Follow-Up:   Prakash Gilliland MD  [facility] [place] [address] [address] [phone]    Go in 1 day  Hospital Follow Up, existing appointment       Hospital/Incidental Findings Requiring Follow-Up:  CAP now on Omnicef and doxycycline for symptomatic relief  Chest x-ray with emphysematous changes, underlying interstitial fibrosis  Hypokalemia, mild at 3.4, repleted in hospital  Alkaline phosphatase 136  Anemia with hemoglobin 9.7  Hospice consulted, palliative care consulted, patient went home with prescription for morphine and Ativan per palliative care specialist    Patient Instructions   Diet: regular diet and Patient has requested to discontinue puréed diet    Activity: activity as tolerated    Other Instructions: Follow-up with your primary care physician at your scheduled appointment tomorrow, virtual visit  Complete your courses of cefdinir and doxycycline for your community-acquired pneumonia    Discharge Medications         Medication List      START taking these medications    cefdinir 300 MG capsule  Commonly known as: OMNICEF  Take 1 capsule by mouth 2 times daily for 7 days     doxycycline hyclate 100 MG tablet  Commonly known as: VIBRA-TABS  Take 1 tablet by mouth 2 times daily for 7 days     LORazepam 0.5 MG tablet  Commonly known as: Ativan  Take 1 tablet by mouth every 8 hours as needed for Anxiety for up to 3 days. morphine sulfate 20 MG/ML concentrated oral solution  Take 0.125 mLs by mouth every hour as needed for Pain for up to 3 days.         CONTINUE taking these medications    albuterol sulfate  (90 Base) MCG/ACT inhaler  Commonly known as: Ventolin HFA  Inhale 2 puffs into the lungs 4 times daily as needed for Wheezing     ALIGN PO     Allegra 180 MG tablet  Generic drug: fexofenadine     amLODIPine 5 MG tablet  Commonly known as: NORVASC  TAKE ONE TABLET BY MOUTH EVERY DAY     apixaban 2.5 MG Tabs tablet  Commonly known as: Eliquis  Take 1 tablet by mouth 2 times daily     Arformoterol Tartrate 15 MCG/2ML Nebu  Commonly known as: BROVANA  Take 2 mLs by nebulization 2 times daily     budesonide 0.5 MG/2ML nebulizer suspension  Commonly known as: PULMICORT  Take 2 mLs by nebulization 2 times daily     celecoxib 200 MG capsule  Commonly known as: CELEBREX     docusate sodium 100 MG capsule  Commonly known as: COLACE     DULoxetine HCl 40 MG Csdr     famotidine 40 MG tablet  Commonly known as: PEPCID     ferrous sulfate 325 (65 Fe) MG tablet  Commonly known as: IRON 325  Take 1 tablet by mouth every other day     flecainide 150 MG tablet  Commonly known as: TAMBOCOR  Take 0.5 tablets by mouth 2 times daily     fluticasone 50 MCG/ACT nasal spray  Commonly known as: FLONASE     furosemide 20 MG tablet  Commonly known as: LASIX  Take 1 tablet by mouth daily     gabapentin 100 MG capsule  Commonly known as: NEURONTIN  Take 1 capsule by mouth 2 times daily for 90 days.      HYDROcodone-acetaminophen 5-325 MG per tablet  Commonly known as: NORCO     Klor-Con/EF 25 MEQ disintegrating tablet  Generic drug: potassium bicarbonate  DISSOLVE ONE PACKET IN 4OZ WATER OR JUICE AND DRINK ONCE A DAY     levalbuterol 0.31 MG/3ML nebulization  Commonly known as: Xopenex  Take 3 mLs by nebulization every 6 hours as needed for Wheezing     levothyroxine 100 MCG tablet  Commonly known as: SYNTHROID  Take 1 tablet by mouth Daily     metoprolol tartrate 50 MG tablet  Commonly known as: LOPRESSOR     montelukast 10 MG tablet  Commonly known as: SINGULAIR     omeprazole 40 MG delayed release capsule  Commonly known as: PRILOSEC     ondansetron 4 MG tablet  Commonly known as: ZOFRAN  Take 1 tablet by mouth every 8 hours as needed for Nausea or Vomiting     OXYGEN     polyethyl glycol-propyl glycol 0.4-0.3 % 0.4-0.3 % ophthalmic solution  Commonly known as: SYSTANE     valsartan 320 MG tablet  Commonly known as: DIOVAN  Take 1 tablet by mouth daily     vitamin B-12 1000 MCG tablet  Commonly known as: CYANOCOBALAMIN           Where to Get Your Medications      These medications were sent to [facility] [place], [place] [address] [place] [address]    Phone: [phone]   · cefdinir 300 MG capsule  · doxycycline hyclate 100 MG tablet     These medications were sent to [address] [place] [address] [phone]  [address] [place] [address]    Phone: [phone]   · LORazepam 0.5 MG tablet  · morphine sulfate 20 MG/ML concentrated oral solution         Electronically signed by Peng Kevin DO on 5/24/21 at 2:57 PM EDT

## 2021-05-24 NOTE — CARE COORDINATION
Social Work/Discharge Planning:  Social Work consult noted. Met with patient daughter Britt Clemente and Penelope Carpenter and completed initial assessment. Explained Social Work role and discussed transitions of care/discharge planning. Patient lives with her son Grady Franklin in a two story house with no steps to enter. Patient stays on the first floor of her home. Patient has a hospital bed and wears four liters of oxygen through [facility]. Patient has twenty-four hour caregivers. She is active with [facility]. Britt Clemente states plan is for her mother to return home today with hospice through [address] [place] [address]. Hospice to see patient and her daughter. Will continue to follow and assist with discharge planning.  Electronically signed by JU Goodwin on 5/24/2021 at 11:37 AM

## 2021-05-24 NOTE — PROGRESS NOTES
CLINICAL PHARMACY NOTE: MEDS TO BEDS    Total # of Prescriptions Filled: 2   The following medications were delivered to the patient:  · Lorazepam 0.5 mg  · Morphine 100 sol    Additional Documentation:  Patients daughter signed. Daughter was questioning the dosage.  Nurses station was going to talk to [address] [place] [address] to talk to the daughter

## 2021-05-24 NOTE — PROGRESS NOTES
Coccyx wound dressing changed. [place], 5-8 up to 1.5 cm. No odor  Wound clean. periwound intact  2x1. 5x1. cleanse with normal saline. Packed with aquacel rope, mepilex small sacral applied  Home with Hospice  Dr. Ranjeet Evans present  Sheila Oliveira.  Carolina Macedo, CNS, Wound Care

## 2021-05-24 NOTE — PROGRESS NOTES
Pharmacy Consultation Note  (Antibiotic Dosing and Monitoring)    Initial consult date: 5/23  Consulting physician: Lluvia Simpson  Drug: Vancomycin  Indication: HAP    Age/  Gender Height Weight IBW  Allergy Information   86 y.o./female 5' 4\" (162.6 cm) 133 lb (60.3 kg)     Ideal body weight: 54.7 kg (120 lb 9.5 oz)  Adjusted ideal body weight: 56.8 kg (125 lb 3.3 oz)   Amoxicillin      Renal Function:  Recent Labs     05/23/21  1121 05/24/21  0712   BUN 30* 23   CREATININE 0.6 0.6       Intake/Output Summary (Last 24 hours) at 5/24/2021 1413  Last data filed at 5/24/2021 1333  Gross per 24 hour   Intake 120 ml   Output --   Net 120 ml       Vancomycin Monitoring:      Vancomycin Administration Times:  Recent vancomycin administrations                   vancomycin 1000 mg IVPB in 250 mL D5W addavial (mg) 1,000 mg New Bag 05/23/21 1614                Assessment:  · Patient is a 80 y.o. female who has been initiated on vancomycin  · Estimated Creatinine Clearance: 58 mL/min (based on SCr of 0.6 mg/dL).   · Goal AUC/VIRGIE = 400-600; goal trough level = 10-20 mcg/mL    Plan:  · Will continue vancomycin 750 IV every 24 hours  · Will check vancomycin levels when appropriate  · Will continue to monitor renal function   · Clinical pharmacy to follow      Vitaliy Mcqueen [facility] 5/24/2021 2:13 PM

## 2021-05-24 NOTE — CARE COORDINATION
Social Work/Discharge Planning:  Social Work and Hospice consult noted. Met with patient daughter Alonso Raza and confirmed she prefers [facility]. Called [facility]  with [facility] and confirmed they received referral.  Will continue to follow.   Electronically signed by Azalia Spatz, LSW on 5/24/2021 at 8:59 AM

## 2021-05-24 NOTE — PROGRESS NOTES
dysfunction, Difficulty walking, Diverticular disease, Elevated CA-125, Hiatal hernia, Hypertension, Hypothyroid, Meningocele spinal ([address] Neuropathy, JANETTE (obstructive sleep apnea), Osteoporosis, PAF (paroxysmal atrial fibrillation) ([address] Pain in both lower legs, Patellar fracture, Rheumatoid arthritis with rheumatoid factor ([address] Rheumatoid arthritis(714.0), Rib fractures, Seasonal allergies, Skin cancer of lip, and Wears dentures. Social History:   reports that she has never smoked. She has never used smokeless tobacco. She reports that she does not drink alcohol and does not use drugs. Family History:   Family History   Problem Relation Age of Onset    Heart Attack Mother 58    Other Father         suicide, depression, leg trouble    Other Sister         Dementia    Hypertension Sister     Hypertension Sister     Hypertension Brother     Thyroid Disease Sister     Rheum Arthritis Sister     Rheum Arthritis Brother     Other Daughter         hemochromatosis     Other Son         hemachromatosis        Physical Examination      Vitals:  /63   Pulse 87   Temp 98.2 °F (36.8 °C) (Tympanic)   Resp 27   Ht 5' 4\" (1.626 m)   Wt 132 lb 2 oz (59.9 kg)   SpO2 98%   BMI 22.68 kg/m²   Temp (24hrs), Av.1 °F (36.7 °C), Min:97.6 °F (36.4 °C), Max:98.6 °F (37 °C)      I/O (24Hr): No intake or output data in the 24 hours ending 21 0732    Physical Exam  Constitutional:       General: She is not in acute distress. Appearance: Normal appearance. HENT:      Head: Normocephalic and atraumatic. Nose: Nose normal. No rhinorrhea. Comments: Nasal cannula in place     Mouth/Throat:      Mouth: Mucous membranes are moist.      Pharynx: Oropharynx is clear. Eyes:      General:         Right eye: No discharge. Left eye: No discharge. Cardiovascular:      Rate and Rhythm: Normal rate and regular rhythm.    Pulmonary:      Effort: Pulmonary effort is normal.      Breath sounds: Rales (Soft, bilateral lower lobes) present. No wheezing. Abdominal:      General: Bowel sounds are normal.      Palpations: Abdomen is soft. Tenderness: There is no abdominal tenderness. Musculoskeletal:      Right lower leg: No edema. Left lower leg: No edema. Skin:     General: Skin is warm and dry. Neurological:      Mental Status: She is alert and oriented to person, place, and time. Mental status is at baseline. Labs/Imaging/Diagnostics   Labs:  CBC:  Recent Labs     05/23/21  1121 05/24/21  0712   WBC 15.2* 14.3*   RBC 3.50 3.38*   HGB 10.2* 9.7*   HCT 34.3 32.9*   MCV 98.0 97.3   RDW 15.1* 15.1*   * 550*     CHEMISTRIES:  Recent Labs     05/23/21  1121      K 3.8   CL 97*   CO2 30*   BUN 30*   CREATININE 0.6   GLUCOSE 106*     PT/INR:No results for input(s): PROTIME, INR in the last 72 hours. APTT:No results for input(s): APTT in the last 72 hours. LIVER PROFILE:No results for input(s): AST, ALT, BILIDIR, BILITOT, ALKPHOS in the last 72 hours. Imaging Last 24 Hours:  XR CHEST PORTABLE    Result Date: 5/23/2021  EXAMINATION: ONE XRAY VIEW OF THE CHEST 5/23/2021 11:23 am COMPARISON: 05/19/2021 and previous CT of the thorax of 04/12/2021 HISTORY: ORDERING SYSTEM PROVIDED HISTORY: dyspnea TECHNOLOGIST PROVIDED HISTORY: Reason for exam:->dyspnea FINDINGS: Extensive bilateral airspace disease is noted superimposed on underlying emphysematous changes and interstitial fibrosis. The heart is enlarged. I suspect a small left pleural effusion. 1. Extensive multifocal bilateral airspace disease superimposed on emphysematous changes and underlying interstitial fibrosis. Assessment        Hospital Problems         Last Modified POA    Paroxysmal A-fib [facility]) 5/23/2021 Yes    Overview Signed 1/24/2020  2:58 PM by Amisha Gil MD     Overview:   6/17/2011  Post-operatively the patient developed Atrial Fibrillation while in CVICU.  Converted to NSR after lopressor IV. Patient in NSR this am. IV amiodarone infiltrated. Started on oral Amiodarone 400 mg TID on 6/14/2011. Will continue to taper dosages slowly. Plan:  -decrease  Amiodarone 400 mg bid and taper  -continue Metoprolol 50 bid (home dose)  .           Frailty 5/23/2021 Yes    Leukocytosis 5/23/2021 Yes    Pressure injury of coccygeal region, stage 4 ([address] 5/23/2021 Yes    Acute on chronic respiratory failure with hypoxia ([address] 5/23/2021 Yes    Severe protein-calorie malnutrition ([address] 5/23/2021 Yes          Plan:        Acute on chronic hypoxic respiratory failure  History of fibrosis, COPD on home oxygen, now with new oxygen demand and infiltrates on chest x-ray  -Procalcitonin elevated  -Lactic acid 1.9  -Continue cefepime and vancomycin  -Continue nasal cannula oxygen, titrate down to patient's home dose of 3 L  -Palliative care consulted  -Hospice consulted    Sacral decubitus ulcer stage IV  Chronic  -Consult wound care, does not appear to be infected at this time    Hypertension  -Continue home valsartan and amlodipine    Atrial fibrillation  -Continue home flecainide, Eliquis, metoprolol    DVT Prophylaxis: Eliquis  GI Prophylaxis: Protonix  Diet: DIET GENERAL; Dysphagia Pureed   Code: DNR-CC   Advance Directives     Power of  Living Will ACP-Advance Directive ACP-Power of     Filed on 04/15/21 Filed on 04/15/21 Remigio Gonzalez           Electronically signed by Alexandrea Crawford DO PGY-1 Family Medicine Resident on 05/24/21 at 7:32 AM

## 2021-05-24 NOTE — PROGRESS NOTES
Comprehensive Nutrition Assessment    Type and Reason for Visit:  Initial, Positive Nutrition Screen, Wound    Nutrition Recommendations/Plan: Continue diet and ONS to help with wound healing and meeting nutritional needs. Nutrition Assessment:  Pt adm with Acute on chronic respiratory failure with hypoxia. She was found by family to have saturation below 80% at home on 5/23 per EMR. Pt with underlying fibrosis and copd on chronic. Risk for mrsa and pseudomonas given multiple hospitalizations, iv abx in 3 mo, underlying bronchiectasis and fibrosis with recurrent copd exacerbationspast medical history of atrial fibrillation, supraventricular tachycardia, HFpEF, hiatal hernia, RA, anemia, rib fractures, JANETTE, HTN, osteoporosis, chronic respiratory failure with hypoxia on 2.5L O2 NC at home, COPD, and stage 4 pressure injury of coccygeal region who presents with respiratory failure. Per EMR Her daughter expressed that she has been in and out of the hospital frequently over the past few months. The family is all in agreement that they would just like for Dorian Lovett to be comfortable. A hospice referral was placed. Pt has wound pressure injury coccyx. Pt at risk d/t wound. Will start ONS to help w/ wound healing.     Malnutrition Assessment:  Malnutrition Status:  No malnutrition    Context:  Chronic Illness     Findings of the 6 clinical characteristics of malnutrition:  Energy Intake:  Mild decrease in energy intake (Comment) (Pt stated past week decrease in PO and appetite)  Weight Loss:  No significant weight loss     Body Fat Loss:  No significant body fat loss     Muscle Mass Loss:  1 - Mild muscle mass loss Clavicles (pectoralis & deltoids), Temples (temporalis), Hand (interosseous)  Fluid Accumulation:  No significant fluid accumulation     Strength:  Not Performed    Estimated Daily Nutrient Needs:  Energy (kcal):  7658-2888; Weight Used for Energy Requirements:  Current     Protein (g):  80-90g (1.3-1.5g/kg); Weight Used for Protein Requirements:  Current        Fluid (ml/day):  4775-8745; Method Used for Fluid Requirements:  1 ml/kcal      Nutrition Related Findings:  A&Ox4, BS active, Abd soft, I/Os none charted per EMR, Edema +1 pitting BLE      Wounds:  Pressure Injury, Wound Consult Pending       Current Nutrition Therapies:    DIET GENERAL; Anthropometric Measures:  · Height: 5' 4\" (162.6 cm)  · Current Body Weight: 132 lb 2 oz (59.9 kg)   · Admission Body Weight: 132 lb 2 oz (59.9 kg) (5/24 bedscale)    · Usual Body Weight: 128 lb 12 oz (58.4 kg) (7/12/20 bedscale)     · Ideal Body Weight: 120 lbs; % Ideal Body Weight 110.1 %   · BMI: 22.7  · Adjusted Body Weight:  ; No Adjustment   · Adjusted BMI:      · BMI Categories: Normal Weight (BMI 18.5-24. 9)       Nutrition Diagnosis:   · Increased nutrient needs related to increase demand for energy/nutrients as evidenced by wounds    Nutrition Interventions:   Food and/or Nutrient Delivery:  Continue Current Diet, Start Oral Nutrition Supplement (Shaun BID)  Nutrition Education/Counseling:  Education initiated (Discussed importance of ONS for wound healing)   Coordination of Nutrition Care:  No recommendation at this time    Goals:  >50%of meals and ONS consumed       Nutrition Monitoring and Evaluation:   Behavioral-Environmental Outcomes:  None Identified   Food/Nutrient Intake Outcomes:  Food and Nutrient Intake, Supplement Intake  Physical Signs/Symptoms Outcomes:  Biochemical Data, Fluid Status or Edema, Nutrition Focused Physical Findings, Skin, Weight     Discharge Planning:     Too soon to determine     Electronically signed by Peterson Pereira RD, LD on 5/24/21 at 12:22 PM EDT    Contact: [phone]

## 2021-05-24 NOTE — PROGRESS NOTES
[address] [place] [address]     Liaison Information Visit Note              Patient Name: Rhiannon Keita   :  1935  MRN:  [id number]  Admit date:  2021   Hospital Admitting Physician:  Ismael De Santiago MD   PCP:  Wagner Villagomez MD  Primary Insurance: Payor: Kilo Holcomb /  /  /    Emergency Contact:      Contact/Relation:  Neena Davis /         Phone:   [phone]  Advance Directive  Patient has completed an advance directive  and Patient has a documented healthcare surrogate  Discussed with: Family member  DPOA-HC Name-Relation:Healthcare Agent's Name: Jon James Agent's Phone Number: [phone]    Terminal Diagnosis End Stage COPD as confirmed by Dr. Estela Carter Problem List:   Patient Active Problem List   Diagnosis Code    Elevated CA-125 R97.1    Hiatal hernia K44.9    Hypothyroidism E03.9    Diverticular disease K57.90    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    Rheumatoid arthritis ([facility] [address] R97.3    Diastolic dysfunction X65.83    Paresthesia of lower extremity R20.2    Lumbar radicular pain M54.16    Anemia D64.9    Multiple closed fractures of ribs of both sides with routine healing S22.43XD    Thoracic spinal stenosis with mass at T6-T7 M48.04    Thoracic spine tumor D49.2    Thoracic spine fracture (HCC) S22.009A    Atrial fibrillation with RVR (HCC) I48.91    Paroxysmal A-fib (HCC) I48.0    Obstructive sleep apnea syndrome G47.33    Essential hypertension I10    Adjustment disorder with anxious mood F43.22    Laceration of lower limb S81.819A    Palpitations R00.2    Debility R53.81    Closed fracture of patella S82.009A    Delayed gastric emptying K30    Pain in both lower legs M79.661, M79.662    Bimalleolar fracture, left, closed, initial encounter S82.842A    Osteoporosis M81.0    Non-pressure chronic ulcer of other part of right lower leg with fat layer exposed ([address] L97.812    Bimalleolar fracture U63.366G    Patellar be moved to a lower level of care such as home with hospice, ECF with hospice or the Transition program.  [place] [address] transition program also explained which is routine hospice care provided at the [place] [address] instead of an ECF or home. The transition program is private pay $300/day for room/board. Room/board for the transition program is not covered by Medicaid as would be in an ECF. Family informed that with the routine level of care at home or ECF,  the hospice team consists of the RN who visits 1-3 times a week, a  who visits within the first five days of the hospice election, the personal care team who visit 1-3 times a week, non-medical volunteers and Chaplains. Explained that at home in routine level of care, familles are responsible for the 24 hour care. Discussed that under hospice care patient would not receive chemotherapy, radiation, immune therapy, IV antibiotics, dialysis or blood transfusions. Explained that once in hospice care, all aggressive treatments would be stopped and allow nature to takes its course with focus on comfort care for the patient. Met with family and patient. Education to family members on medications. Family requesting an addendum. Spoke with patients daughters about DME needed in the home, order faxed for DME for delivery today. Family has caregivers in place at the home already so care can be provided 24hrs. . Family wishing for patient to go home as soon as possible. Transport set up through [facility] ambulance for a 3pm pickup. Dr. Aleja Stout escribed comfort meds to [address] [place] [address] for meds to beds program. Called [employer] for coverage. Gave billing information to pharmacy. Called Dr. Thanh Orta office per nurse. He will follow under hospice. Nurse will fax intake, hospice order and prognosis. Updated , nurse, intake. Discharge Plan:  Discharge Disposition; Home with hospice    HOT plan:  1. Admit when patient home  2.  Please call [facility] [phone] with any questions. 3. Patient not currently under the care of hospice.     Electronically signed by Hannah Obregon RN on 5/24/2021 at 1:10 PM

## 2021-05-24 NOTE — CONSULTS
Palliative Care Department  [phone]  Palliative Care Initial Consult  Provider Jihan Esquivel MD, MD      PATIENT: Kasie Quintanilla  : 1935  MRN: [id number]  ADMISSION DATE: 2021 10:44 AM  Referring Provider: Dr. Nelly Garcia was consulted on hospital day 1 for assistance with Goals of care, Code Status Discussion,Symptom management     HPI:     Silver Abel is a 80 y.o. 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 ASSESSMENT/PLAN:     Pertinent Hospital Diagnoses    Chronic hypoxic respiratory failure   COPD/fibrosis   Sacral decubitus ulcer stage IV    Palliative Care Encounter / Counseling Regarding Goals of Care  Please see detailed goals of care discussion as below   At this time, Kasie Quintanilla, Does have capacity for medical decision-making. Capacity is time limited and situation/question specific   Outcome of goals of care meeting: Met with patient and daughter, morphine started for pain, shortness of breath, and agitation   Code status DNR-CC   Advanced Directives: no POA or living will in [facility]   Surrogate/Legal NOK: Joe Hubbard [phone] (daughter/ oldest) Soledad Gómez [phone] (son)    Symptom management  -Pain  Start morphine solution 4 mg every 2 hours as needed, we can increase this as needed    Constipation  -Colace daily    Thank you for the opportunity to participate in the care of Kasie Quintanilla. Jihan Esquivel MD  Palliative Medicine     SUBJECTIVE:     Details of Conversation: Met with patient and daughter Diaz Moctezuma at bedside. Diaz Moctezuma states that she is oldest child and came in from [facility] last night.   The patient has 24-hour caregivers that are also in the room. Chaka Mckeon is visibly uncomfortable and agitated. She rates her pain a 9 on a scale of 1-10. She has sharp pains on her right rib cage. Morphine 4 mg every 2 hours was ordered and Norco was discontinued. Chaka Mckeon is hesitant to take narcotic medications, because she thinks she will not remember her family. This is also her concern about transitioning to hospice. Explained that the dosage is very low, and the goal is for her to be comfortable and alert. Her daughter expressed that she has been in and out of the hospital frequently over the past few months. The family is all in agreement that they would just like for Chaka Mckeon to be comfortable. A hospice referral was placed. She wears home O2 for her COPD/fibrosis. Chaka Mckeon is supposed to wear a BiPAP at nighttime, but has been refusing. She is short of breath, which has been worse over the past few weeks. She denies any nausea or constipation. Physical Function:  PPS:  30%  PPS 1 months ago: 40%    Prognosis: Poor    OBJECTIVE:     /64   Pulse 87   Temp 97.5 °F (36.4 °C) (Oral)   Resp 24   Ht 5' 4\" (1.626 m)   Wt 132 lb 2 oz (59.9 kg)   SpO2 (!) 81%   BMI 22.68 kg/m²     Physical Examination:  Gen: elderly, thin, NAD, awake, alert   HEENT: normocephalic, atraumatic, PERRL, EOMI,   Neck: trachea midline, no JVD  Lungs: respirations easy and not labored,   Heart: regular rate and rhythm, distant heart tones,   Abdomen: normoactive bowel sounds, soft, non-tender  Extremities: no clubbing, cyanosis or edema, moving all extremities    Skin: warm, dry without rashes, lesions, bruising  Neuro: awake, alert, oriented x 3, follows commands, no gross neurologic deficit    Objective data reviewed: labs, images, records, medication use, vitals and chart    Time/Communication  Greater than 50% of time spent, total 60 minutes in counseling and coordination of care at the bedside regarding goals of care.     Thank you for allowing Palliative Medicine to participate in the care of Jessica Adrian. Note: This report was completed using computerAskem voiced recognition software. Every effort has been made to ensure accuracy; however, inadvertent computerized transcription errors may be present.

## 2021-05-24 NOTE — TELEPHONE ENCOUNTER
Patient getting discharged from the hospital today. The family requests enrollment into Hospice. Hospice needs diagnosis and duration. Order needs faxed to [phone].

## 2021-05-24 NOTE — PROGRESS NOTES
Date: 5/23/2021    Time: 8:08 PM    Patient Placed On BIPAP/CPAP/ Non-Invasive Ventilation? No    If no must comment. Facial area red/color change? No           If YES are Blister/Lesion present? No   If yes must notify nursing staff  BIPAP/CPAP skin barrier?   Yes    Skin barrier type:mepilexlite       Comments: pt remains on bipap        Maura Taylor RCP

## 2021-05-25 ENCOUNTER — CARE COORDINATION (OUTPATIENT)
Dept: CASE MANAGEMENT | Age: 86
End: 2021-05-25

## 2021-05-25 ENCOUNTER — TELEPHONE (OUTPATIENT)
Dept: FAMILY MEDICINE CLINIC | Age: 86
End: 2021-05-25

## 2021-05-25 LAB
EKG ATRIAL RATE: 82 BPM
EKG P AXIS: 5 DEGREES
EKG P-R INTERVAL: 202 MS
EKG Q-T INTERVAL: 408 MS
EKG QRS DURATION: 94 MS
EKG QTC CALCULATION (BAZETT): 476 MS
EKG T AXIS: -4 DEGREES
EKG VENTRICULAR RATE: 82 BPM
ORGANISM: ABNORMAL

## 2021-05-25 NOTE — CARE COORDINATION
[facility] Transitions Initial Follow Up Call    Call within 2 business days of discharge: Yes    Patient: Lizeth Easley Patient : 1935   MRN: [id number]  Reason for Admission: heart failure   Discharge Date: 21 RARS: Readmission Risk Score: 27      Last Discharge [facility]       Complaint Diagnosis Description Type Department Provider    21 Shortness of Breath HFrEF (heart failure with reduced ejection fraction) ([facility] [address] . .. ED to Hosp-Admission (Discharged) (ADMITTED) JUAN FRANCISCO Devine MD; Andriette Cheadle. .. Facility: [facility]      Non-face-to-face services provided:  Communication with home health agencies or other community services the patient is currently using-SPOKE WITH [facility] AT [address] [place] [address], [address] [place] [address].      Follow Up  Future Appointments   Date Time Provider Santana Arnett   2021 11:45 AM [facility]   2021  1:00 PM MD JUAN FRANCISCO [facility]   2021  1:00 PM MD JUAN FRANCISCO [facility]   6/10/2021  3:15 PM FRAN James - CNP [facility]       John Martell RN

## 2021-05-28 LAB
BLOOD CULTURE, ROUTINE: NORMAL
CULTURE, BLOOD 2: NORMAL

## 2021-05-28 NOTE — PLAN OF CARE
Problem: Skin Integrity:  Goal: Will show no infection signs and symptoms  Description: Will show no infection signs and symptoms  Outcome: Completed  Goal: Absence of new skin breakdown  Description: Absence of new skin breakdown  Outcome: Completed     Problem: Falls - Risk of:  Goal: Will remain free from falls  Description: Will remain free from falls  Outcome: Completed  Goal: Absence of physical injury  Description: Absence of physical injury  Outcome: Completed     Problem: Pain:  Description: Pain management should include both nonpharmacologic and pharmacologic interventions. Goal: Pain level will decrease  Description: Pain level will decrease  Outcome: Completed  Goal: Control of acute pain  Description: Control of acute pain  Outcome: Completed  Goal: Control of chronic pain  Description: Control of chronic pain  Outcome: Completed     Problem: Airway Clearance - Ineffective  Goal: Achieve or maintain patent airway  Outcome: Completed     Problem: Gas Exchange - Impaired  Goal: Absence of hypoxia  Outcome: Completed  Goal: Promote optimal lung function  Outcome: Completed     Problem: Breathing Pattern - Ineffective  Goal: Ability to achieve and maintain a regular respiratory rate  Outcome: Completed     Problem:  Body Temperature -  Risk of, Imbalanced  Goal: Complications related to the disease process, condition or treatment will be avoided or minimized  Outcome: Completed     Problem: Isolation Precautions - Risk of Spread of Infection  Goal: Prevent transmission of infection  Outcome: Completed     Problem: Nutrition Deficits  Goal: Optimize nutritional status  Outcome: Completed     Problem: Risk for Fluid Volume Deficit  Goal: Maintain normal heart rhythm  Outcome: Completed  Goal: Maintain absence of muscle cramping  Outcome: Completed  Goal: Maintain normal serum potassium, sodium, calcium, phosphorus, and pH  Outcome: Completed     Problem: Loneliness or Risk for Loneliness  Goal: Demonstrate positive use of time alone when socialization is not possible  Outcome: Completed     Problem: Fatigue  Goal: Verbalize increase energy and improved vitality  Outcome: Completed     Problem: Patient Education: Go to Patient Education Activity  Goal: Patient/Family Education  Outcome: Completed no

## 2021-06-01 NOTE — ED PROVIDER NOTES
includes Gastric fundoplication (0169);  section; Carpal tunnel release (Bilateral); Colonoscopy (2011); Total knee arthroplasty (2007); Cardiac catheterization; Hysterectomy, total abdominal (early ); Upper gastrointestinal endoscopy (N/A, 2020); and Colonoscopy (N/A, 2020). Social History:  reports that she has never smoked. She has never used smokeless tobacco. She reports that she does not drink alcohol and does not use drugs. Family History: family history includes Heart Attack (age of onset: 58) in her mother; Hypertension in her brother, sister, and sister; Other in her daughter, father, sister, and son; Rheum Arthritis in her brother and sister; Thyroid Disease in her sister. . Unless otherwise noted, family history is non contributory    The patients home medications have been reviewed. Allergies: Amoxicillin        ---------------------------------------------------PHYSICAL EXAM--------------------------------------    Constitutional/General: Alert and oriented x3  Head: Normocephalic and atraumatic  Eyes: PERRL, EOMI, sclera non icteric  Mouth: Oropharynx clear, handling secretions, no trismus, no asymmetry of the posterior oropharynx or uvular edema  Neck: Supple, full ROM, no stridor, no meningeal signs  Respiratory: Inspiratory and expiratory wheeze diffusely. Retractions noted. Cardiovascular:  Regular rate. Regular rhythm. 2+ distal pulses. Equal extremity pulses. Chest: No chest wall tenderness  GI:  Abdomen Soft, Non tender, Non distended. No rebound, guarding, or rigidity. Musculoskeletal: Moves all extremities x 4. Warm and well perfused, no clubbing, cyanosis, or edema. Capillary refill <3 seconds  Integument: skin warm and dry. No rashes. Neurologic: GCS 15, no focal deficits, symmetric strength 5/5 in the upper and lower extremities bilaterally  Psychiatric: Normal Affect      EKG:   Interpreted by emergency department physician, Dr. Ja Juárez    This EKG is signed and interpreted by me. Rate: 82  Rhythm: Sinus  Interpretation: non-specific EKG  Comparison: stable as compared to patient's most recent EKG      -------------------------------------------------- RESULTS -------------------------------------------------  I have personally reviewed all laboratory and imaging results for this patient. Results are listed below.      LABS: (Lab results interpreted by me)  Results for orders placed or performed during the hospital encounter of 05/23/21   Culture, Blood 1    Specimen: Blood   Result Value Ref Range    Blood Culture, Routine 5 Days no growth    Culture, Blood 2    Specimen: Blood   Result Value Ref Range    Culture, Blood 2 5 Days no growth    Respiratory Panel, Molecular, with COVID-19 (Restricted: peds pts or suitable admitted adults)    Specimen: Nasopharyngeal; Nose   Result Value Ref Range    Adenovirus by PCR Not Detected Not Detected    Bordetella parapertussis by PCR Not Detected Not Detected    Bordetella pertussis by PCR Not Detected Not Detected    Chlamydophilia pneumoniae by PCR Not Detected Not Detected    Coronavirus 229E by PCR Not Detected Not Detected    Coronavirus HKU1 by PCR Not Detected Not Detected    Coronavirus NL63 by PCR Not Detected Not Detected    Coronavirus OC43 by PCR Not Detected Not Detected    SARS-CoV-2, PCR Not Detected Not Detected    Human Metapneumovirus by PCR Not Detected Not Detected    Human Rhinovirus/Enterovirus by PCR Not Detected Not Detected    Influenza A by PCR Not Detected Not Detected    Influenza B by PCR Not Detected Not Detected    Mycoplasma pneumoniae by PCR Not Detected Not Detected    Parainfluenza Virus 1 by PCR Not Detected Not Detected    Parainfluenza Virus 2 by PCR Not Detected Not Detected    Parainfluenza Virus 3 by PCR Not Detected Not Detected    Parainfluenza Virus 4 by PCR Not Detected Not Detected    Respiratory Syncytial Virus by PCR Not Detected Not Detected   Culture, MRSA, ------------------------- NURSING NOTES AND VITALS REVIEWED ---------------------------   The nursing notes within the ED encounter and vital signs as below have been reviewed by myself  /64   Pulse 87   Temp 97.5 °F (36.4 °C) (Oral)   Resp 24   Ht 5' 4\" (1.626 m)   Wt 132 lb 2 oz (59.9 kg)   SpO2 93%   BMI 22.68 kg/m²     Oxygen Saturation Interpretation: Improved after treatment    The cardiac monitor revealed NSR with a heart rate in the 80s as interpreted by me. The cardiac monitor was ordered secondary to the patient's heart rate and to monitor the patient for dysrhythmia. CPT 32315    The patients available past medical records and past encounters were reviewed. ------------------------------ ED COURSE/MEDICAL DECISION MAKING----------------------  Medications   ipratropium-albuterol (DUONEB) nebulizer solution 1 ampule (1 ampule Inhalation Given 5/23/21 1132)   guaiFENesin-codeine (GUAIFENESIN AC) 100-10 MG/5ML liquid 10 mL (10 mLs Oral Given 5/23/21 1127)   fentaNYL (SUBLIMAZE) injection 25 mcg (25 mcg Intravenous Given 5/23/21 1244)   ondansetron (ZOFRAN) injection 4 mg (4 mg Intravenous Given 5/23/21 1244)   vancomycin 1000 mg IVPB in 250 mL D5W addavial (0 mg Intravenous Stopped 5/23/21 1708)   cefepime (MAXIPIME) 2000 mg IVPB minibag (0 mg Intravenous Stopped 5/23/21 1421)   0.9 % sodium chloride bolus (0 mLs Intravenous Stopped 5/23/21 1525)   LORazepam (ATIVAN) injection 0.25 mg (0.25 mg Intravenous Given 5/23/21 2024)   potassium bicarb-citric acid (EFFER-K) effervescent tablet 40 mEq (40 mEq Oral Given 5/24/21 1024)   magnesium sulfate 2000 mg in 50 mL IVPB premix (0 mg Intravenous Stopped 5/24/21 1224)                    Medical Decision Making:     I, Dr. Sarah Holland am the primary provider of record    Patient has increased work of breathing. Family member is in healthcare and stacked breathing treatments before bringing her in.  She sounds terribly tight after multiple serious        NOTE: This report was transcribed using voice recognition software.  Every effort was made to ensure accuracy; however, inadvertent computerized transcription errors may be present        Laury Avila MD  06/01/21 8012

## 2021-07-09 LAB
HCT VFR BLD CALC: 29.6 % (ref 34–48)
HEMOGLOBIN: 8.8 G/DL (ref 11.5–15.5)

## 2025-01-31 NOTE — CARE COORDINATION
Social Work:    Reviewed chart notes. Patient endured a non-surgical ankle fracture that will be casted today. Social work met with Mrs. Nancy Emerson and spoke with her daughter Reymundo Sanchez and granddaughter Marlin Yuen via phone. Social service explained skilled rehab limits with [facility], we discussed private pay post skilled care, respite stays with [facility], and HHC/DME options. Choices for skilled rehab were discussed. Reymundo Sanchez and Marlin Yuen inquired about [facility], and [facility]. Social work confirmed bed availability & acceptance of insurance with all. Await decision, which was requested ASAP.     Electronically signed by JU Mas on 7/14/2020 at 10:04 AM No

## (undated) DEVICE — BLOCK BITE 60FR RUBBER ADLT DENTAL

## (undated) DEVICE — SPONGE GZ W4XL4IN RAYON POLY FILL CVR W/ NONWOVEN FAB

## (undated) DEVICE — FORCEPS BX L240CM JAW DIA2.4MM ORNG L CAP W/ NDL DISP RAD

## (undated) DEVICE — GRADUATE TRIANG MEASURE 1000ML BLK PRNT

## (undated) DEVICE — FORCEPS BX OVL CUP FEN L CAP DISPOSABLE 160CM L RAD JAW 4